# Patient Record
Sex: FEMALE | Race: WHITE | NOT HISPANIC OR LATINO | Employment: OTHER | URBAN - METROPOLITAN AREA
[De-identification: names, ages, dates, MRNs, and addresses within clinical notes are randomized per-mention and may not be internally consistent; named-entity substitution may affect disease eponyms.]

---

## 2017-03-23 ENCOUNTER — DOCUMENTATION (OUTPATIENT)
Dept: PHYSICAL THERAPY | Facility: CLINIC | Age: 72
End: 2017-03-23

## 2017-03-23 NOTE — PROGRESS NOTES
Discharge Summary  Discharge Summary from Physical Therapy Report      Dates  PT visit: 09/28/16 - 10/13/16  Number of Visits: 6     Discharge Status of Patient: See final PT note dated 10/13/16    Goals: Partially Met    Discharge Plan: Patient to return to referring/providing physician    Comments Patient did not return for further PT after follow-up with Dr. Adame (10/21/16).    Date of Discharge 03/23/16        Annabelle Black, PT, DPT  Physical Therapist

## 2017-12-04 ENCOUNTER — ON CAMPUS - OUTPATIENT (OUTPATIENT)
Dept: URBAN - METROPOLITAN AREA HOSPITAL 91 | Facility: HOSPITAL | Age: 72
End: 2017-12-04

## 2017-12-04 DIAGNOSIS — Z96.89 PRESENCE OF OTHER SPECIFIED FUNCTIONAL IMPLANTS: ICD-10-CM

## 2017-12-04 PROCEDURE — 43275 ERCP REMOVE FORGN BODY DUCT: CPT

## 2019-12-30 ENCOUNTER — INPATIENT HOSPITAL (OUTPATIENT)
Dept: URBAN - METROPOLITAN AREA HOSPITAL 90 | Facility: HOSPITAL | Age: 74
End: 2019-12-30

## 2019-12-30 DIAGNOSIS — R19.7 DIARRHEA, UNSPECIFIED: ICD-10-CM

## 2019-12-30 DIAGNOSIS — R63.4 ABNORMAL WEIGHT LOSS: ICD-10-CM

## 2019-12-30 DIAGNOSIS — R74.8 ABNORMAL LEVELS OF OTHER SERUM ENZYMES: ICD-10-CM

## 2019-12-30 DIAGNOSIS — R10.9 UNSPECIFIED ABDOMINAL PAIN: ICD-10-CM

## 2019-12-30 PROCEDURE — 99223 1ST HOSP IP/OBS HIGH 75: CPT

## 2020-01-01 PROCEDURE — 99232 SBSQ HOSP IP/OBS MODERATE 35: CPT

## 2020-01-02 PROCEDURE — 99232 SBSQ HOSP IP/OBS MODERATE 35: CPT

## 2021-02-16 ENCOUNTER — APPOINTMENT (OUTPATIENT)
Dept: VACCINE CLINIC | Facility: HOSPITAL | Age: 76
End: 2021-02-16

## 2022-06-14 ENCOUNTER — APPOINTMENT (OUTPATIENT)
Dept: GENERAL RADIOLOGY | Facility: HOSPITAL | Age: 77
End: 2022-06-14

## 2022-06-14 ENCOUNTER — HOSPITAL ENCOUNTER (INPATIENT)
Facility: HOSPITAL | Age: 77
LOS: 4 days | Discharge: NURSING, REHAB OR LONG-TERM CARE | End: 2022-06-18
Attending: EMERGENCY MEDICINE | Admitting: INTERNAL MEDICINE

## 2022-06-14 DIAGNOSIS — S72.001A CLOSED FRACTURE OF RIGHT HIP, INITIAL ENCOUNTER: ICD-10-CM

## 2022-06-14 DIAGNOSIS — S72.141A CLOSED COMMINUTED INTERTROCHANTERIC FRACTURE OF RIGHT FEMUR, INITIAL ENCOUNTER: Primary | ICD-10-CM

## 2022-06-14 PROCEDURE — 25010000002 ONDANSETRON PER 1 MG: Performed by: EMERGENCY MEDICINE

## 2022-06-14 PROCEDURE — 86901 BLOOD TYPING SEROLOGIC RH(D): CPT | Performed by: EMERGENCY MEDICINE

## 2022-06-14 PROCEDURE — 25010000002 MORPHINE PER 10 MG: Performed by: EMERGENCY MEDICINE

## 2022-06-14 PROCEDURE — 86900 BLOOD TYPING SEROLOGIC ABO: CPT | Performed by: EMERGENCY MEDICINE

## 2022-06-14 PROCEDURE — 87635 SARS-COV-2 COVID-19 AMP PRB: CPT | Performed by: EMERGENCY MEDICINE

## 2022-06-14 PROCEDURE — 99284 EMERGENCY DEPT VISIT MOD MDM: CPT

## 2022-06-14 PROCEDURE — 85025 COMPLETE CBC W/AUTO DIFF WBC: CPT | Performed by: EMERGENCY MEDICINE

## 2022-06-14 PROCEDURE — 71045 X-RAY EXAM CHEST 1 VIEW: CPT

## 2022-06-14 PROCEDURE — 73502 X-RAY EXAM HIP UNI 2-3 VIEWS: CPT

## 2022-06-14 PROCEDURE — 80053 COMPREHEN METABOLIC PANEL: CPT | Performed by: EMERGENCY MEDICINE

## 2022-06-14 PROCEDURE — 86850 RBC ANTIBODY SCREEN: CPT | Performed by: EMERGENCY MEDICINE

## 2022-06-14 RX ORDER — MORPHINE SULFATE 2 MG/ML
4 INJECTION, SOLUTION INTRAMUSCULAR; INTRAVENOUS ONCE
Status: COMPLETED | OUTPATIENT
Start: 2022-06-14 | End: 2022-06-14

## 2022-06-14 RX ORDER — ONDANSETRON 2 MG/ML
4 INJECTION INTRAMUSCULAR; INTRAVENOUS ONCE
Status: COMPLETED | OUTPATIENT
Start: 2022-06-14 | End: 2022-06-14

## 2022-06-14 RX ADMIN — MORPHINE SULFATE 4 MG: 2 INJECTION, SOLUTION INTRAMUSCULAR; INTRAVENOUS at 23:43

## 2022-06-14 RX ADMIN — ONDANSETRON 4 MG: 2 INJECTION INTRAMUSCULAR; INTRAVENOUS at 23:43

## 2022-06-15 ENCOUNTER — ANESTHESIA (OUTPATIENT)
Dept: PERIOP | Facility: HOSPITAL | Age: 77
End: 2022-06-15

## 2022-06-15 ENCOUNTER — APPOINTMENT (OUTPATIENT)
Dept: GENERAL RADIOLOGY | Facility: HOSPITAL | Age: 77
End: 2022-06-15

## 2022-06-15 ENCOUNTER — ANESTHESIA EVENT (OUTPATIENT)
Dept: PERIOP | Facility: HOSPITAL | Age: 77
End: 2022-06-15

## 2022-06-15 PROBLEM — S72.141A CLOSED COMMINUTED INTERTROCHANTERIC FRACTURE OF PROXIMAL END OF RIGHT FEMUR: Status: ACTIVE | Noted: 2022-06-14

## 2022-06-15 PROBLEM — K21.9 GERD (GASTROESOPHAGEAL REFLUX DISEASE): Status: ACTIVE | Noted: 2022-06-15

## 2022-06-15 PROBLEM — I10 HTN (HYPERTENSION): Status: ACTIVE | Noted: 2022-06-15

## 2022-06-15 PROBLEM — E78.5 HLD (HYPERLIPIDEMIA): Status: ACTIVE | Noted: 2022-06-15

## 2022-06-15 LAB
ABO GROUP BLD: NORMAL
ALBUMIN SERPL-MCNC: 4.1 G/DL (ref 3.5–5.2)
ALBUMIN/GLOB SERPL: 1.1 G/DL
ALP SERPL-CCNC: 160 U/L (ref 39–117)
ALT SERPL W P-5'-P-CCNC: 39 U/L (ref 1–33)
ANION GAP SERPL CALCULATED.3IONS-SCNC: 15 MMOL/L (ref 5–15)
ANION GAP SERPL CALCULATED.3IONS-SCNC: 16.4 MMOL/L (ref 5–15)
AST SERPL-CCNC: 53 U/L (ref 1–32)
BASOPHILS # BLD AUTO: 0.04 10*3/MM3 (ref 0–0.2)
BASOPHILS NFR BLD AUTO: 0.6 % (ref 0–1.5)
BILIRUB SERPL-MCNC: 0.6 MG/DL (ref 0–1.2)
BLD GP AB SCN SERPL QL: NEGATIVE
BUN SERPL-MCNC: 11 MG/DL (ref 8–23)
BUN SERPL-MCNC: 12 MG/DL (ref 8–23)
BUN/CREAT SERPL: 14.5 (ref 7–25)
BUN/CREAT SERPL: 14.9 (ref 7–25)
CALCIUM SPEC-SCNC: 8.9 MG/DL (ref 8.6–10.5)
CALCIUM SPEC-SCNC: 9.3 MG/DL (ref 8.6–10.5)
CHLORIDE SERPL-SCNC: 101 MMOL/L (ref 98–107)
CHLORIDE SERPL-SCNC: 99 MMOL/L (ref 98–107)
CO2 SERPL-SCNC: 20.6 MMOL/L (ref 22–29)
CO2 SERPL-SCNC: 23 MMOL/L (ref 22–29)
CREAT SERPL-MCNC: 0.74 MG/DL (ref 0.57–1)
CREAT SERPL-MCNC: 0.83 MG/DL (ref 0.57–1)
DEPRECATED RDW RBC AUTO: 41.5 FL (ref 37–54)
DEPRECATED RDW RBC AUTO: 42.9 FL (ref 37–54)
EGFRCR SERPLBLD CKD-EPI 2021: 71.8 ML/MIN/1.73
EGFRCR SERPLBLD CKD-EPI 2021: 82.4 ML/MIN/1.73
EOSINOPHIL # BLD AUTO: 0 10*3/MM3 (ref 0–0.4)
EOSINOPHIL NFR BLD AUTO: 0 % (ref 0.3–6.2)
ERYTHROCYTE [DISTWIDTH] IN BLOOD BY AUTOMATED COUNT: 12.2 % (ref 12.3–15.4)
ERYTHROCYTE [DISTWIDTH] IN BLOOD BY AUTOMATED COUNT: 12.3 % (ref 12.3–15.4)
GLOBULIN UR ELPH-MCNC: 3.6 GM/DL
GLUCOSE SERPL-MCNC: 109 MG/DL (ref 65–99)
GLUCOSE SERPL-MCNC: 127 MG/DL (ref 65–99)
HCT VFR BLD AUTO: 34.4 % (ref 34–46.6)
HCT VFR BLD AUTO: 38.4 % (ref 34–46.6)
HGB BLD-MCNC: 11.9 G/DL (ref 12–15.9)
HGB BLD-MCNC: 13.6 G/DL (ref 12–15.9)
IMM GRANULOCYTES # BLD AUTO: 0.03 10*3/MM3 (ref 0–0.05)
IMM GRANULOCYTES NFR BLD AUTO: 0.4 % (ref 0–0.5)
LYMPHOCYTES # BLD AUTO: 0.94 10*3/MM3 (ref 0.7–3.1)
LYMPHOCYTES NFR BLD AUTO: 13.6 % (ref 19.6–45.3)
MCH RBC QN AUTO: 32.8 PG (ref 26.6–33)
MCH RBC QN AUTO: 33.9 PG (ref 26.6–33)
MCHC RBC AUTO-ENTMCNC: 34.6 G/DL (ref 31.5–35.7)
MCHC RBC AUTO-ENTMCNC: 35.4 G/DL (ref 31.5–35.7)
MCV RBC AUTO: 94.8 FL (ref 79–97)
MCV RBC AUTO: 95.8 FL (ref 79–97)
MONOCYTES # BLD AUTO: 0.46 10*3/MM3 (ref 0.1–0.9)
MONOCYTES NFR BLD AUTO: 6.7 % (ref 5–12)
NEUTROPHILS NFR BLD AUTO: 5.42 10*3/MM3 (ref 1.7–7)
NEUTROPHILS NFR BLD AUTO: 78.7 % (ref 42.7–76)
NRBC BLD AUTO-RTO: 0 /100 WBC (ref 0–0.2)
PLATELET # BLD AUTO: 62 10*3/MM3 (ref 140–450)
PLATELET # BLD AUTO: 77 10*3/MM3 (ref 140–450)
PMV BLD AUTO: 10.4 FL (ref 6–12)
PMV BLD AUTO: 10.5 FL (ref 6–12)
POTASSIUM SERPL-SCNC: 3.5 MMOL/L (ref 3.5–5.2)
POTASSIUM SERPL-SCNC: 3.6 MMOL/L (ref 3.5–5.2)
PROT SERPL-MCNC: 7.7 G/DL (ref 6–8.5)
QT INTERVAL: 429 MS
RBC # BLD AUTO: 3.63 10*6/MM3 (ref 3.77–5.28)
RBC # BLD AUTO: 4.01 10*6/MM3 (ref 3.77–5.28)
RH BLD: POSITIVE
SARS-COV-2 RNA PNL SPEC NAA+PROBE: NOT DETECTED
SODIUM SERPL-SCNC: 137 MMOL/L (ref 136–145)
SODIUM SERPL-SCNC: 138 MMOL/L (ref 136–145)
T&S EXPIRATION DATE: NORMAL
WBC NRBC COR # BLD: 5.56 10*3/MM3 (ref 3.4–10.8)
WBC NRBC COR # BLD: 6.89 10*3/MM3 (ref 3.4–10.8)

## 2022-06-15 PROCEDURE — 76000 FLUOROSCOPY <1 HR PHYS/QHP: CPT | Performed by: ORTHOPAEDIC SURGERY

## 2022-06-15 PROCEDURE — 80048 BASIC METABOLIC PNL TOTAL CA: CPT | Performed by: NURSE PRACTITIONER

## 2022-06-15 PROCEDURE — 73502 X-RAY EXAM HIP UNI 2-3 VIEWS: CPT | Performed by: ORTHOPAEDIC SURGERY

## 2022-06-15 PROCEDURE — 25010000002 HYDROMORPHONE PER 4 MG: Performed by: NURSE ANESTHETIST, CERTIFIED REGISTERED

## 2022-06-15 PROCEDURE — C1713 ANCHOR/SCREW BN/BN,TIS/BN: HCPCS | Performed by: ORTHOPAEDIC SURGERY

## 2022-06-15 PROCEDURE — 25010000002 VANCOMYCIN PER 500 MG: Performed by: ORTHOPAEDIC SURGERY

## 2022-06-15 PROCEDURE — 25010000002 ONDANSETRON PER 1 MG

## 2022-06-15 PROCEDURE — 0QS636Z REPOSITION RIGHT UPPER FEMUR WITH INTRAMEDULLARY INTERNAL FIXATION DEVICE, PERCUTANEOUS APPROACH: ICD-10-PCS | Performed by: ORTHOPAEDIC SURGERY

## 2022-06-15 PROCEDURE — 25010000002 PROPOFOL 10 MG/ML EMULSION: Performed by: NURSE ANESTHETIST, CERTIFIED REGISTERED

## 2022-06-15 PROCEDURE — 25010000002 DEXAMETHASONE PER 1 MG: Performed by: NURSE ANESTHETIST, CERTIFIED REGISTERED

## 2022-06-15 PROCEDURE — 73501 X-RAY EXAM HIP UNI 1 VIEW: CPT

## 2022-06-15 PROCEDURE — 99222 1ST HOSP IP/OBS MODERATE 55: CPT | Performed by: NURSE PRACTITIONER

## 2022-06-15 PROCEDURE — 85027 COMPLETE CBC AUTOMATED: CPT | Performed by: NURSE PRACTITIONER

## 2022-06-15 PROCEDURE — 25010000002 FENTANYL CITRATE (PF) 50 MCG/ML SOLUTION: Performed by: NURSE ANESTHETIST, CERTIFIED REGISTERED

## 2022-06-15 PROCEDURE — 93005 ELECTROCARDIOGRAM TRACING: CPT | Performed by: NURSE PRACTITIONER

## 2022-06-15 PROCEDURE — 93010 ELECTROCARDIOGRAM REPORT: CPT | Performed by: INTERNAL MEDICINE

## 2022-06-15 PROCEDURE — 25010000002 ONDANSETRON PER 1 MG: Performed by: ORTHOPAEDIC SURGERY

## 2022-06-15 PROCEDURE — 25010000002 MORPHINE PER 10 MG: Performed by: NURSE PRACTITIONER

## 2022-06-15 PROCEDURE — C1769 GUIDE WIRE: HCPCS | Performed by: ORTHOPAEDIC SURGERY

## 2022-06-15 DEVICE — TRIGEN LOW PROFILE SCREW 5.0MM X 35MM
Type: IMPLANTABLE DEVICE | Site: FEMUR | Status: FUNCTIONAL
Brand: TRIGEN

## 2022-06-15 DEVICE — TRIGEN INTERTAN 11.5MM X 18CM 125DEGREE
Type: IMPLANTABLE DEVICE | Site: FEMUR | Status: FUNCTIONAL
Brand: TRIGEN

## 2022-06-15 DEVICE — INTERTAN LAG/COMPRESSION SCREW KIT                                    95MM / 90MM
Type: IMPLANTABLE DEVICE | Site: FEMUR | Status: FUNCTIONAL
Brand: TRIGEN

## 2022-06-15 RX ORDER — LANOLIN ALCOHOL/MO/W.PET/CERES
1000 CREAM (GRAM) TOPICAL DAILY
COMMUNITY

## 2022-06-15 RX ORDER — PANTOPRAZOLE SODIUM 40 MG/1
40 TABLET, DELAYED RELEASE ORAL EVERY MORNING
Status: DISCONTINUED | OUTPATIENT
Start: 2022-06-15 | End: 2022-06-18 | Stop reason: HOSPADM

## 2022-06-15 RX ORDER — MIDAZOLAM HYDROCHLORIDE 1 MG/ML
0.5 INJECTION INTRAMUSCULAR; INTRAVENOUS
Status: DISCONTINUED | OUTPATIENT
Start: 2022-06-15 | End: 2022-06-15 | Stop reason: HOSPADM

## 2022-06-15 RX ORDER — LISINOPRIL 10 MG/1
10 TABLET ORAL EVERY MORNING
Status: DISCONTINUED | OUTPATIENT
Start: 2022-06-15 | End: 2022-06-17

## 2022-06-15 RX ORDER — FENTANYL CITRATE 50 UG/ML
50 INJECTION, SOLUTION INTRAMUSCULAR; INTRAVENOUS
Status: DISCONTINUED | OUTPATIENT
Start: 2022-06-15 | End: 2022-06-15 | Stop reason: HOSPADM

## 2022-06-15 RX ORDER — EPHEDRINE SULFATE 50 MG/ML
INJECTION, SOLUTION INTRAVENOUS AS NEEDED
Status: DISCONTINUED | OUTPATIENT
Start: 2022-06-15 | End: 2022-06-15 | Stop reason: SURG

## 2022-06-15 RX ORDER — CEFAZOLIN SODIUM 2 G/100ML
2 INJECTION, SOLUTION INTRAVENOUS ONCE
Status: DISCONTINUED | OUTPATIENT
Start: 2022-06-15 | End: 2022-06-15

## 2022-06-15 RX ORDER — DEXAMETHASONE SODIUM PHOSPHATE 10 MG/ML
INJECTION INTRAMUSCULAR; INTRAVENOUS AS NEEDED
Status: DISCONTINUED | OUTPATIENT
Start: 2022-06-15 | End: 2022-06-15 | Stop reason: SURG

## 2022-06-15 RX ORDER — ONDANSETRON 2 MG/ML
INJECTION INTRAMUSCULAR; INTRAVENOUS AS NEEDED
Status: DISCONTINUED | OUTPATIENT
Start: 2022-06-15 | End: 2022-06-15 | Stop reason: SURG

## 2022-06-15 RX ORDER — PROPOFOL 10 MG/ML
VIAL (ML) INTRAVENOUS AS NEEDED
Status: DISCONTINUED | OUTPATIENT
Start: 2022-06-15 | End: 2022-06-15 | Stop reason: SURG

## 2022-06-15 RX ORDER — SODIUM CHLORIDE, SODIUM LACTATE, POTASSIUM CHLORIDE, CALCIUM CHLORIDE 600; 310; 30; 20 MG/100ML; MG/100ML; MG/100ML; MG/100ML
9 INJECTION, SOLUTION INTRAVENOUS CONTINUOUS
Status: DISCONTINUED | OUTPATIENT
Start: 2022-06-15 | End: 2022-06-17

## 2022-06-15 RX ORDER — ONDANSETRON 2 MG/ML
4 INJECTION INTRAMUSCULAR; INTRAVENOUS ONCE AS NEEDED
Status: DISCONTINUED | OUTPATIENT
Start: 2022-06-15 | End: 2022-06-15 | Stop reason: HOSPADM

## 2022-06-15 RX ORDER — ONDANSETRON 4 MG/1
4 TABLET, FILM COATED ORAL EVERY 6 HOURS PRN
Status: DISCONTINUED | OUTPATIENT
Start: 2022-06-15 | End: 2022-06-18 | Stop reason: HOSPADM

## 2022-06-15 RX ORDER — LABETALOL HYDROCHLORIDE 5 MG/ML
5 INJECTION, SOLUTION INTRAVENOUS
Status: DISCONTINUED | OUTPATIENT
Start: 2022-06-15 | End: 2022-06-15 | Stop reason: HOSPADM

## 2022-06-15 RX ORDER — TRAMADOL HYDROCHLORIDE 50 MG/1
50 TABLET ORAL ONCE AS NEEDED
Status: DISCONTINUED | OUTPATIENT
Start: 2022-06-15 | End: 2022-06-15 | Stop reason: HOSPADM

## 2022-06-15 RX ORDER — SODIUM CHLORIDE 9 MG/ML
100 INJECTION, SOLUTION INTRAVENOUS CONTINUOUS
Status: DISCONTINUED | OUTPATIENT
Start: 2022-06-15 | End: 2022-06-16

## 2022-06-15 RX ORDER — ACETAMINOPHEN 325 MG/1
650 TABLET ORAL EVERY 4 HOURS PRN
Status: DISCONTINUED | OUTPATIENT
Start: 2022-06-15 | End: 2022-06-18 | Stop reason: HOSPADM

## 2022-06-15 RX ORDER — ENOXAPARIN SODIUM 100 MG/ML
40 INJECTION SUBCUTANEOUS EVERY 24 HOURS
Status: DISCONTINUED | OUTPATIENT
Start: 2022-06-16 | End: 2022-06-16

## 2022-06-15 RX ORDER — HYDROMORPHONE HYDROCHLORIDE 1 MG/ML
0.5 INJECTION, SOLUTION INTRAMUSCULAR; INTRAVENOUS; SUBCUTANEOUS
Status: DISCONTINUED | OUTPATIENT
Start: 2022-06-15 | End: 2022-06-15 | Stop reason: HOSPADM

## 2022-06-15 RX ORDER — OXYCODONE AND ACETAMINOPHEN 10; 325 MG/1; MG/1
1 TABLET ORAL EVERY 6 HOURS PRN
Status: DISCONTINUED | OUTPATIENT
Start: 2022-06-15 | End: 2022-06-18 | Stop reason: HOSPADM

## 2022-06-15 RX ORDER — FLUMAZENIL 0.1 MG/ML
0.2 INJECTION INTRAVENOUS AS NEEDED
Status: DISCONTINUED | OUTPATIENT
Start: 2022-06-15 | End: 2022-06-15 | Stop reason: HOSPADM

## 2022-06-15 RX ORDER — PROMETHAZINE HYDROCHLORIDE 25 MG/1
25 SUPPOSITORY RECTAL ONCE AS NEEDED
Status: DISCONTINUED | OUTPATIENT
Start: 2022-06-15 | End: 2022-06-15 | Stop reason: HOSPADM

## 2022-06-15 RX ORDER — EPHEDRINE SULFATE 50 MG/ML
5 INJECTION, SOLUTION INTRAVENOUS ONCE AS NEEDED
Status: DISCONTINUED | OUTPATIENT
Start: 2022-06-15 | End: 2022-06-15 | Stop reason: HOSPADM

## 2022-06-15 RX ORDER — SODIUM CHLORIDE 0.9 % (FLUSH) 0.9 %
3 SYRINGE (ML) INJECTION EVERY 12 HOURS SCHEDULED
Status: DISCONTINUED | OUTPATIENT
Start: 2022-06-15 | End: 2022-06-15 | Stop reason: HOSPADM

## 2022-06-15 RX ORDER — ACETAMINOPHEN 160 MG/5ML
650 SOLUTION ORAL EVERY 4 HOURS PRN
Status: DISCONTINUED | OUTPATIENT
Start: 2022-06-15 | End: 2022-06-18 | Stop reason: HOSPADM

## 2022-06-15 RX ORDER — VANCOMYCIN HYDROCHLORIDE 1 G/200ML
1 INJECTION, SOLUTION INTRAVENOUS ONCE
Status: COMPLETED | OUTPATIENT
Start: 2022-06-15 | End: 2022-06-15

## 2022-06-15 RX ORDER — ROPINIROLE 0.5 MG/1
0.25 TABLET, FILM COATED ORAL NIGHTLY
Status: DISCONTINUED | OUTPATIENT
Start: 2022-06-15 | End: 2022-06-18 | Stop reason: HOSPADM

## 2022-06-15 RX ORDER — SODIUM CHLORIDE 0.9 % (FLUSH) 0.9 %
10 SYRINGE (ML) INJECTION AS NEEDED
Status: DISCONTINUED | OUTPATIENT
Start: 2022-06-15 | End: 2022-06-18 | Stop reason: HOSPADM

## 2022-06-15 RX ORDER — CALCIUM CARBONATE 200(500)MG
2 TABLET,CHEWABLE ORAL 2 TIMES DAILY PRN
Status: DISCONTINUED | OUTPATIENT
Start: 2022-06-15 | End: 2022-06-18 | Stop reason: HOSPADM

## 2022-06-15 RX ORDER — LIDOCAINE HYDROCHLORIDE 10 MG/ML
0.5 INJECTION, SOLUTION EPIDURAL; INFILTRATION; INTRACAUDAL; PERINEURAL ONCE AS NEEDED
Status: DISCONTINUED | OUTPATIENT
Start: 2022-06-15 | End: 2022-06-15 | Stop reason: HOSPADM

## 2022-06-15 RX ORDER — ACETAMINOPHEN 650 MG/1
650 SUPPOSITORY RECTAL EVERY 4 HOURS PRN
Status: DISCONTINUED | OUTPATIENT
Start: 2022-06-15 | End: 2022-06-18 | Stop reason: HOSPADM

## 2022-06-15 RX ORDER — DIPHENHYDRAMINE HCL 25 MG
25 CAPSULE ORAL
Status: DISCONTINUED | OUTPATIENT
Start: 2022-06-15 | End: 2022-06-15 | Stop reason: HOSPADM

## 2022-06-15 RX ORDER — NALOXONE HCL 0.4 MG/ML
0.2 VIAL (ML) INJECTION AS NEEDED
Status: DISCONTINUED | OUTPATIENT
Start: 2022-06-15 | End: 2022-06-15 | Stop reason: HOSPADM

## 2022-06-15 RX ORDER — MAGNESIUM HYDROXIDE 1200 MG/15ML
LIQUID ORAL AS NEEDED
Status: DISCONTINUED | OUTPATIENT
Start: 2022-06-15 | End: 2022-06-15 | Stop reason: HOSPADM

## 2022-06-15 RX ORDER — PROMETHAZINE HYDROCHLORIDE 25 MG/1
25 TABLET ORAL ONCE AS NEEDED
Status: DISCONTINUED | OUTPATIENT
Start: 2022-06-15 | End: 2022-06-15 | Stop reason: HOSPADM

## 2022-06-15 RX ORDER — PRAVASTATIN SODIUM 20 MG
20 TABLET ORAL EVERY MORNING
Status: DISCONTINUED | OUTPATIENT
Start: 2022-06-15 | End: 2022-06-18 | Stop reason: HOSPADM

## 2022-06-15 RX ORDER — FAMOTIDINE 10 MG/ML
20 INJECTION, SOLUTION INTRAVENOUS ONCE
Status: COMPLETED | OUTPATIENT
Start: 2022-06-15 | End: 2022-06-15

## 2022-06-15 RX ORDER — IBUPROFEN 600 MG/1
600 TABLET ORAL ONCE AS NEEDED
Status: DISCONTINUED | OUTPATIENT
Start: 2022-06-15 | End: 2022-06-15 | Stop reason: HOSPADM

## 2022-06-15 RX ORDER — MELOXICAM 15 MG/1
15 TABLET ORAL DAILY
Status: DISCONTINUED | OUTPATIENT
Start: 2022-06-15 | End: 2022-06-15

## 2022-06-15 RX ORDER — ONDANSETRON 2 MG/ML
4 INJECTION INTRAMUSCULAR; INTRAVENOUS EVERY 6 HOURS PRN
Status: DISCONTINUED | OUTPATIENT
Start: 2022-06-15 | End: 2022-06-18 | Stop reason: HOSPADM

## 2022-06-15 RX ORDER — SODIUM CHLORIDE 0.9 % (FLUSH) 0.9 %
10 SYRINGE (ML) INJECTION EVERY 12 HOURS SCHEDULED
Status: DISCONTINUED | OUTPATIENT
Start: 2022-06-15 | End: 2022-06-18 | Stop reason: HOSPADM

## 2022-06-15 RX ORDER — AMOXICILLIN 250 MG
2 CAPSULE ORAL 2 TIMES DAILY
Status: DISCONTINUED | OUTPATIENT
Start: 2022-06-15 | End: 2022-06-18 | Stop reason: HOSPADM

## 2022-06-15 RX ORDER — MORPHINE SULFATE 2 MG/ML
2 INJECTION, SOLUTION INTRAMUSCULAR; INTRAVENOUS
Status: DISCONTINUED | OUTPATIENT
Start: 2022-06-15 | End: 2022-06-18 | Stop reason: HOSPADM

## 2022-06-15 RX ORDER — FUROSEMIDE 20 MG/1
20 TABLET ORAL 2 TIMES DAILY
COMMUNITY

## 2022-06-15 RX ORDER — DIPHENHYDRAMINE HYDROCHLORIDE 50 MG/ML
12.5 INJECTION INTRAMUSCULAR; INTRAVENOUS
Status: DISCONTINUED | OUTPATIENT
Start: 2022-06-15 | End: 2022-06-15 | Stop reason: HOSPADM

## 2022-06-15 RX ORDER — CEFAZOLIN SODIUM 2 G/100ML
2 INJECTION, SOLUTION INTRAVENOUS
Status: DISCONTINUED | OUTPATIENT
Start: 2022-06-16 | End: 2022-06-15

## 2022-06-15 RX ORDER — SODIUM CHLORIDE 0.9 % (FLUSH) 0.9 %
3-10 SYRINGE (ML) INJECTION AS NEEDED
Status: DISCONTINUED | OUTPATIENT
Start: 2022-06-15 | End: 2022-06-15 | Stop reason: HOSPADM

## 2022-06-15 RX ORDER — IPRATROPIUM BROMIDE AND ALBUTEROL SULFATE 2.5; .5 MG/3ML; MG/3ML
3 SOLUTION RESPIRATORY (INHALATION) ONCE AS NEEDED
Status: DISCONTINUED | OUTPATIENT
Start: 2022-06-15 | End: 2022-06-15 | Stop reason: HOSPADM

## 2022-06-15 RX ORDER — HYDRALAZINE HYDROCHLORIDE 20 MG/ML
5 INJECTION INTRAMUSCULAR; INTRAVENOUS
Status: DISCONTINUED | OUTPATIENT
Start: 2022-06-15 | End: 2022-06-15 | Stop reason: HOSPADM

## 2022-06-15 RX ORDER — HYDROMORPHONE HCL 110MG/55ML
PATIENT CONTROLLED ANALGESIA SYRINGE INTRAVENOUS AS NEEDED
Status: DISCONTINUED | OUTPATIENT
Start: 2022-06-15 | End: 2022-06-15 | Stop reason: SURG

## 2022-06-15 RX ORDER — ROPINIROLE 0.25 MG/1
0.25 TABLET, FILM COATED ORAL NIGHTLY
COMMUNITY

## 2022-06-15 RX ORDER — LIDOCAINE HYDROCHLORIDE 20 MG/ML
INJECTION, SOLUTION INFILTRATION; PERINEURAL AS NEEDED
Status: DISCONTINUED | OUTPATIENT
Start: 2022-06-15 | End: 2022-06-15 | Stop reason: SURG

## 2022-06-15 RX ORDER — VANCOMYCIN HYDROCHLORIDE 1 G/200ML
15 INJECTION, SOLUTION INTRAVENOUS EVERY 12 HOURS
Status: COMPLETED | OUTPATIENT
Start: 2022-06-15 | End: 2022-06-15

## 2022-06-15 RX ORDER — ALBUTEROL SULFATE 2.5 MG/3ML
2.5 SOLUTION RESPIRATORY (INHALATION) EVERY 6 HOURS PRN
Status: DISCONTINUED | OUTPATIENT
Start: 2022-06-15 | End: 2022-06-18 | Stop reason: HOSPADM

## 2022-06-15 RX ADMIN — SODIUM CHLORIDE 100 ML/HR: 9 INJECTION, SOLUTION INTRAVENOUS at 02:51

## 2022-06-15 RX ADMIN — MORPHINE SULFATE 2 MG: 2 INJECTION, SOLUTION INTRAMUSCULAR; INTRAVENOUS at 11:15

## 2022-06-15 RX ADMIN — HYDROMORPHONE HYDROCHLORIDE 0.5 MG: 2 INJECTION, SOLUTION INTRAMUSCULAR; INTRAVENOUS; SUBCUTANEOUS at 13:52

## 2022-06-15 RX ADMIN — HYDROMORPHONE HYDROCHLORIDE 0.5 MG: 1 INJECTION, SOLUTION INTRAMUSCULAR; INTRAVENOUS; SUBCUTANEOUS at 15:34

## 2022-06-15 RX ADMIN — VANCOMYCIN HYDROCHLORIDE 1 G: 1 INJECTION, SOLUTION INTRAVENOUS at 13:23

## 2022-06-15 RX ADMIN — LIDOCAINE HYDROCHLORIDE 100 MG: 20 INJECTION, SOLUTION INFILTRATION; PERINEURAL at 13:39

## 2022-06-15 RX ADMIN — DEXAMETHASONE SODIUM PHOSPHATE 8 MG: 10 INJECTION INTRAMUSCULAR; INTRAVENOUS at 13:43

## 2022-06-15 RX ADMIN — PRAVASTATIN SODIUM 20 MG: 20 TABLET ORAL at 07:23

## 2022-06-15 RX ADMIN — Medication 10 ML: at 20:31

## 2022-06-15 RX ADMIN — METOPROLOL TARTRATE 25 MG: 25 TABLET, FILM COATED ORAL at 08:07

## 2022-06-15 RX ADMIN — ONDANSETRON 4 MG: 2 INJECTION INTRAMUSCULAR; INTRAVENOUS at 14:24

## 2022-06-15 RX ADMIN — LISINOPRIL 10 MG: 10 TABLET ORAL at 07:23

## 2022-06-15 RX ADMIN — PANTOPRAZOLE SODIUM 40 MG: 40 TABLET, DELAYED RELEASE ORAL at 07:23

## 2022-06-15 RX ADMIN — FAMOTIDINE 20 MG: 10 INJECTION INTRAVENOUS at 13:20

## 2022-06-15 RX ADMIN — OXYCODONE AND ACETAMINOPHEN 1 TABLET: 325; 10 TABLET ORAL at 20:31

## 2022-06-15 RX ADMIN — ONDANSETRON 4 MG: 2 INJECTION INTRAMUSCULAR; INTRAVENOUS at 17:33

## 2022-06-15 RX ADMIN — SODIUM CHLORIDE, POTASSIUM CHLORIDE, SODIUM LACTATE AND CALCIUM CHLORIDE 9 ML/HR: 600; 310; 30; 20 INJECTION, SOLUTION INTRAVENOUS at 13:19

## 2022-06-15 RX ADMIN — MORPHINE SULFATE 2 MG: 2 INJECTION, SOLUTION INTRAMUSCULAR; INTRAVENOUS at 02:50

## 2022-06-15 RX ADMIN — MELOXICAM 15 MG: 15 TABLET ORAL at 08:07

## 2022-06-15 RX ADMIN — OXYCODONE AND ACETAMINOPHEN 1 TABLET: 325; 10 TABLET ORAL at 05:39

## 2022-06-15 RX ADMIN — METOPROLOL TARTRATE 25 MG: 25 TABLET, FILM COATED ORAL at 20:30

## 2022-06-15 RX ADMIN — VANCOMYCIN HYDROCHLORIDE 1000 MG: 1 INJECTION, SOLUTION INTRAVENOUS at 18:38

## 2022-06-15 RX ADMIN — ROPINIROLE HYDROCHLORIDE 0.25 MG: 0.5 TABLET, FILM COATED ORAL at 20:29

## 2022-06-15 RX ADMIN — PROPOFOL 150 MG: 10 INJECTION, EMULSION INTRAVENOUS at 13:39

## 2022-06-15 RX ADMIN — DOCUSATE SODIUM 50MG AND SENNOSIDES 8.6MG 2 TABLET: 8.6; 5 TABLET, FILM COATED ORAL at 20:29

## 2022-06-15 RX ADMIN — EPHEDRINE SULFATE 5 MG: 50 INJECTION INTRAVENOUS at 14:11

## 2022-06-15 RX ADMIN — EPHEDRINE SULFATE 5 MG: 50 INJECTION INTRAVENOUS at 14:27

## 2022-06-15 RX ADMIN — HYDROMORPHONE HYDROCHLORIDE 0.5 MG: 2 INJECTION, SOLUTION INTRAMUSCULAR; INTRAVENOUS; SUBCUTANEOUS at 13:47

## 2022-06-15 RX ADMIN — FENTANYL CITRATE 50 MCG: 50 INJECTION INTRAMUSCULAR; INTRAVENOUS at 15:16

## 2022-06-15 NOTE — CONSULTS
Orthopaedic Consult    Marc Crawford MD      Patient: Kenia N Sample    Date of Admission: 6/14/2022 10:21 PM    YOB: 1945    Medical Record Number: [id number]    Attending Physician: Axel Jaeger*  Consulting Physician: Marc Crawford MD      Chief Complaints: Closed fracture of right hip, initial encounter ([facility]) [S72.001A]      History of Present Illness: 77 y.o. female admitted to [facility], [place] with Closed fracture of right hip, initial encounter ([facility]) [S72.001A].  The patient is well-known to my practice.  She presented with a fall yesterday and presented to the emergency department with complaints of pain and difficulty bearing weight.  Radiographs demonstrated an intertrochanteric femur fracture.  She was presumably booked for surgery with another surgeon however as the patient is established in our practice, she has requested for me to assume her care.  She notes pain in the right hip.  No other musculoskeletal complaints at the present time.  The patient had actually been seen in our office yesterday and had a cortisone injection to the right greater trochanteric bursa.    Allergies:   Allergies   Allergen Reactions   • Levaquin [Levofloxacin] Hives   • Zocor [Simvastatin] Myalgia   • Codeine GI Intolerance   • Penicillins Hives       Medications:   Home Medications:  Medications Prior to Admission   Medication Sig Dispense Refill Last Dose   • albuterol (PROVENTIL HFA;VENTOLIN HFA) 108 (90 BASE) MCG/ACT inhaler Inhale 2 puffs every 4 (four) hours as needed.      • Magnesium 500 MG capsule Take 500 mg by mouth 2 (two) times a day.      • metoprolol tartrate (LOPRESSOR) 25 MG tablet Take 25 mg by mouth 2 (two) times a day.      • omeprazole (PriLOSEC) 40 MG capsule Take 40 mg by mouth every morning.      • pravastatin (PRAVACHOL) 20 MG tablet Take 20 mg by mouth every morning.      • sennosides-docusate sodium (SENOKOT-S) 8.6-50 MG tablet Take 2 tablets by mouth 2 (two) times a  day. 100 tablet 1    • vitamin B-12 (CYANOCOBALAMIN) 1000 MCG tablet Take 1,000 mcg by mouth Daily.      • furosemide (LASIX) 20 MG tablet Take 20 mg by mouth 2 (Two) Times a Day.      • lisinopril (PRINIVIL,ZESTRIL) 10 MG tablet Take 10 mg by mouth every morning.      • meloxicam (MOBIC) 15 MG tablet Take 15 mg by mouth daily.      • oxyCODONE-acetaminophen (PERCOCET)  MG per tablet Take 1 tablet by mouth every 3 (three) hours as needed for moderate pain (4-6) (max 8/day). 100 tablet 0    • rOPINIRole (REQUIP) 0.25 MG tablet Take 0.25 mg by mouth Every Night. Take 1 hour before bedtime.      • triamterene-hydrochlorothiazide (MAXZIDE) 75-50 MG per tablet Take 1 tablet by mouth every morning.          Current Medications:  Scheduled Meds:lisinopril, 10 mg, Oral, QAM  metoprolol tartrate, 25 mg, Oral, Q12H  pantoprazole, 40 mg, Oral, QAM  pravastatin, 20 mg, Oral, QAM  rOPINIRole, 0.25 mg, Oral, Nightly  sennosides-docusate, 2 tablet, Oral, BID  sodium chloride, 10 mL, Intravenous, Q12H      Continuous Infusions:sodium chloride, 100 mL/hr, Last Rate: 100 mL/hr (06/15/22 0251)      PRN Meds:.•  acetaminophen **OR** acetaminophen **OR** acetaminophen  •  albuterol  •  calcium carbonate  •  Morphine  •  ondansetron **OR** ondansetron  •  oxyCODONE-acetaminophen  •  sodium chloride    Past Medical History:   Diagnosis Date   • Anginal pain (CMS/HCC)    • Arthritis    • Gastric reflux syndrome    • Hiatal hernia    • High cholesterol    • History of bronchitis    • Hypertension    • Irregular heart beat      Past Surgical History:   Procedure Laterality Date   • APPENDECTOMY     • EXTERNAL EAR SURGERY Right     had tumor excised behind right ear lobe benign   • GALLBLADDER SURGERY     • HYSTERECTOMY     • KNEE ARTHROPLASTY Right    • KNEE ARTHROSCOPY Right    • MA REVISE KNEE JOINT REPLACE,1 PART Right 9/7/2016    Procedure: RT ILIOTIBIAL BAND RELEASE RT TOTAL KNEE POLY EXCHANGE;  Surgeon: Toy Adame MD;   Location: [facility] MAIN OR;  Service: Orthopedics   • ROTATOR CUFF REPAIR Bilateral    • WRIST MASS EXCISION Right      Social History     Occupational History   • Not on file   Tobacco Use   • Smoking status: Former Smoker     Packs/day: 1.00     Years: 30.00     Pack years: 30.00     Types: Cigarettes   • Smokeless tobacco: Never Used   Substance and Sexual Activity   • Alcohol use: Yes     Comment: 1 wine drink q 3-4 months   • Drug use: No   • Sexual activity: Not on file      Social History     Social History Narrative   • Not on file     No family history on file.      Review of Systems:   Constitutional: Negative for fatigue, fever, or weight loss  HEENT: Patient denies any headaches, vision changes, sore throat. Admits to wearing glasses  Pulmonary: Patient denies any cough, congestion, SOA, or wheezing  Cardiovascular: Patient denies any chest pain, palpitations, weakness,  Gastrointestinal:  Patient denies nausea, vomiting, diarrhea, constipation  Genital/Urinary: Patient denies dysuria, urinary frequency, urgency, incontinence, retention  Musculoskeletal: Positive for right hip pain. Negative for muscle cramps  Neurological: Patient denies dizziness, paresthesia, loss of sensation, seizures, syncope  Endocrine system: Patient denies tremors, cold or heat intolerance  Psychological: Patient denies thoughts/plans or harming self or other; hallucinations,  insomnia  Skin: Patient denies any bruising, rashes, lesions, or ulcers   Hematopoietic: Patient denies history of MRSA or slow wound healing,  spontaneous or excessive bleeding    Physical Exam: 77 y.o. female  Vitals:    06/15/22 0142 06/15/22 0604 06/15/22 0723 06/15/22 1100   BP: 139/72 124/76 124/76 100/60   BP Location: Right arm Right arm  Right arm   Patient Position: Lying Lying  Lying   Pulse: 71 81 81 54   Resp: 18 18  18   Temp: 97 °F (36.1 °C) 98.2 °F (36.8 °C)  98.1 °F (36.7 °C)   TempSrc: Oral Oral  Oral   SpO2: 90% 92%  97%   Weight: 72 kg  "(158 lb 12.8 oz)      Height: 160 cm (63\")                               General Appearance:  Alert, cooperative, in no acute distress    HEENT:    Normocephalic,  Atraumatic, Pupils are equal   Neck:   No adenopathy, supple, trachea midline, no thyromegaly       Lungs:     Clear to auscultation, equal expansion bilaterally, respirations regular, even and               unlabored    Heart:    Regular rhythm and normal rate, normal S1 and S2, no murmur, no gallops   Chest Wall:    Within normal limits   Abdomen:     Normal bowel sounds, no masses,  soft non-tender, non-distended,       Rectal:  Musculoskeletal:     Deferred  No skin changes appreciated to the right lower extremity she is resting in an externally rotated position she has extreme tenderness with logroll.  Distally the extremity is neurovascular intact.  Pulses are palpable the foot is warm and well-perfused similar to the contralateral side   Extremities:   No clubbing, no edema, no cyanosis   Pulses  Neurovascular:   Pulses palpable and equal bilaterally in all 4 extremities    Patient was alert and oriented x 3 spheres   Skin:   No lesions, ulcers or rashes   Neurologic:   Cranial nerves 2 - 12 grossly intact, sensation intact            Diagnostic Tests:  2 views of the right hip demonstrate an intertrochanteric femur fracture with some displacement and external rotation deformity.        Assessment:  Patient Active Problem List   Diagnosis   • Snapping right knee   • Closed fracture of right hip (HCC)   • HTN (hypertension)   • HLD (hyperlipidemia)   • GERD (gastroesophageal reflux disease)   • Closed comminuted intertrochanteric fracture of proximal end of right femur (HCC)           Plan:    Right intertrochanteric femur fracture  I discussed the case with the patient as well as her family member at bedside.  Due to the nature of the fracture, I do recommend proceeding with operative intervention which would be in a intramedullary nail.  I " discussed the risks of nonunion malunion DVT as well as other morbidities and mortality associated with hip fracture.  She is aware of the slightly elevated risk of infection especially given that she had an injection of cortisone in this area about 24 hours ago.  Unfortunately, there is nothing we can do to mitigate this risk.  She voices an understanding.    We will proceed with operative intervention today.  All questions were answered to the patient's satisfaction    Date: 6/15/2022  Marc Crawford MD  Orthopaedic Surgeon    [facility]  [phone]  [email or web address]

## 2022-06-15 NOTE — NURSING NOTE
Pt requested different surgeon. RN called preferred provider and they agree to take case. On call surgeon notified.

## 2022-06-15 NOTE — OP NOTE
Right FEMUR INTRAMEDULLARY NAILING/RODDING  Procedure Note    Ada N Sample  6/15/2022    Pre-op Diagnosis: Right intertrochanteric femur fracture  Post-op Diagnosis: Same  Procedure: Right intertrochanteric femur fracture intramedullary nail CPT 31274  Surgeon:  Marc Crawford MD  Anesthesia: General, Anesthesiologist: Luke Sanders MD  CRNA: Sondra Howell CRNA; Jo Lloyd CRNA  Staff: Circulator: Jorge A Mcdonald RN  Radiology Technologist: Krista Durham; Augustina Reddy  Scrub Person: Chana Gruber  Vendor Representative: Baron Jeet  Assistant: Austin Simon APRN CFA  The services of a first assist were necessary for performing the procedure safely and expeditiously.  The first assist was present for the entire duration of the case and helped with positioning, retraction and closure of the incision.    Estimated Blood Loss: 100ml  Specimens:   Order Name Source Comment Collection Info Order Time   PREPARE PLATELET PHERESIS    6/15/2022  1:19 PM     Indication for Transfusion   Other - Comment Required          Other indication   Platelets <012255 with orthopaedic surgery          When to Transfuse?   Hold for Surgery/Procedure (Insert Date)          Surgery Date   6/15/2022          Drains: none  Complications: None    Components Utilized:    Implant Name Type Inv. Item Serial No.  Lot No. LRB No. Used Action   NAIL FEM IM TRIGEN/INTERTAN 125D 11.5MM 18CM - [id number] Implant NAIL FEM IM TRIGEN/INTERTAN 125D 11.5MM 18CM  [employer] [id number] Right 1 Implanted   SCRW LAG COMPR KT SZ95 AND 90MM 2PK - [id number] Implant SCRW LAG COMPR KT SZ95 AND 90MM 2PK  [employer] [id number] Right 1 Implanted   SCRW TRIGEN LP 5X35MM - [id number] Implant SCRW TRIGEN LP 5X35MM  [employer] [id number] Right 1 Implanted       Indication for Procedure:  This patient is a very pleasant 77-year-old female who presented to the hospital yesterday after complaints of a fall.   She had significant pain and difficulty bearing weight.  Radiographs demonstrated a displaced intertrochanteric femur fracture.  The patient was actually seen in our office yesterday and had a greater trochanteric bursal injection on the right side..  Radiographs upon presentation to the emergency department demonstrated an intertrochanteric femur fracture.  Surgical options and non-surgical options were discussed in detail and to the patient's satisfaction.  Surgical intervention was recommended based on the patient's injury and functional status.  I did recommend operative intervention for stabilization of the fracture to restore mobility and decreased morbidity associated with a hip fracture.  The risk of infection, hardware failure, screw cut out, nonunion, malunion, pain were all discussed with the patient and family.  I did advise the family as well as the patient that she is at a slightly elevated risk of infection postoperatively given the recent corticosteroid injection in the surgical site.    The risks and benefits of surgery were discussed with patient and informed consent was obtained.  Risks include but are not limited to, infection, bleeding, nerve injury, blood clots, risks associated with anesthesia, need for further surgery, persistent pain, and possibly death.    Protocols for intravenous antibiotics and venous thrombosis were followed for this patient.  IV antibiotics were infused prior to surgery and will be discontinued within 24 hours of completion of the surgical procedure.       DESCRIPTION OF PROCEDURE:     The patient was seen in Preoperative Holding Area where their surgical site was marked. Preoperative antibiotics were received. H&P and consent updated. They were taken to the Operating Room and provided general anesthesia on the Operating Room table.  The patient was placed on a [place] operating table.  The perineal post was placed and well-padded.  Traction boots were applied and  well-padded.  At this point the operative extremity was placed on slight traction and internal rotation for a gentle reduction maneuver.  The nonoperative extremity was placed in a scissor position extended and adducted.  I then assessed using biplanar image intensification my reduction and the calcar was well reduced.  I was overall very satisfied with the reduction.  Then the extremity was prepped.  Was draped in usual sterile fashion using the shower curtain drape.  Ancef was administered.  A formal surgical timeout was performed by all members of the team.  At this point the greater trochanter was marked out.  I made a small incision about 3 cm just proximal to the tip of the greater trochanter.  The starting wire was then placed just medial to the face of the greater trochanter.  The pin was confirmed to be close to center on the shaft on AP and lateral image intensification.  The starting wire was then passed into the canal.  The opening reamer was then placed using careful attention to medialize as much as possible.  Once this was performed a ball-tipped guidewire was passed into the canal confirmed to be intramedullary.  I then sequentially reamed up to a size 13 for a 11.5 mm nail.  I elected a 125 degrees neck shaft angle nail.  The nail was tapped into place using careful attention to make sure the reduction was maintained.  Once I had visualized the nail in appropriate position rostral to caudal in the neck I removed the ball-tipped guidewire made a counterincision for the cephalo-medullary screw and then placed a guidewire up the neck.  I confirmed this to be a tip apex distance less than 25 mm on AP and lateral views.  It was center center.  I then measured this and selected the lag screw and compression screw of the aforementioned lengths.  These were 95 and 90 mm appropriate drills were then performed for both the compression lag screw as well as the proximal lag screw.  Gentle compression was placed  across the fracture it was left in a dynamic mode as far as a cephalo-medullary screw.  Attention was then directed distally towards the distal interlock using a guide a 35 mm distal interlock screw was placed in bicortical fashion.  The jig was then removed traction was removed from the extremity reduction was maintained.  Final imaging was taken and saved to the PACS system confirming satisfactory reduction alignment of the fracture.  The wounds were then irrigated using sterile saline.  The deep layer was closed using 0 Vicryl suture followed by 2-0 Vicryl suture and then staples.  Sterile dressings were applied.  All counts are correct at the end of the procedure.  Patient was transferred off of the operating table in satisfactory condition.    Postoperative Plan:  The patient will receive routine postoperative vancomycin due to anaphylaxis with penicillin they will receive Lovenox for DVT prophylaxis.    Weight bearing status: Weightbearing as tolerated to right lower extremity    Patient will need her thrombocytopenia monitored there is a potential for significant bruising postoperatively and we may end up needing to transfuse platelets depending on how she does.        Marc Crawford MD  Orthopaedic Surgeon    [facility]  [phone]

## 2022-06-15 NOTE — PLAN OF CARE
Goal Outcome Evaluation:           Progress: improving  Outcome Evaluation: Pt is 77/F S/P of right hip IM nailing after a fall at home. VSS. 2L O2. Dressing C/D/I. DTV. Accumax and scds in place. Educated on BP monitoring. D/C plan pending. Will continue to monitor.

## 2022-06-15 NOTE — ED NOTES
Nursing report ED to floor  Ada N Sample  77 y.o.  female    HPI :   Chief Complaint   Patient presents with   • Fall       Admitting doctor:   Mahin Escobedo MD    Admitting diagnosis:   The encounter diagnosis was Closed fracture of right hip, initial encounter ([facility]).    Code status:   Current Code Status     Date Active Code Status Order ID Comments User Context       Not on file    Advance Care Planning Activity          Allergies:   Zocor [simvastatin], Codeine, and Penicillins    Intake and Output  No intake or output data in the 24 hours ending 06/15/22 0052    Weight:   There were no vitals filed for this visit.    Most recent vitals:   Vitals:    06/14/22 2218   BP: 122/88   Pulse: 84   Resp: 18   Temp: 98.4 °F (36.9 °C)   TempSrc: Tympanic   SpO2: 96%       Active LDAs/IV Access:   Lines, Drains & Airways     Active LDAs     Name Placement date Placement time Site Days    Peripheral IV 06/14/22 2337 Left Forearm 06/14/22 2337  Forearm  less than 1                Labs (abnormal labs have a star):   Labs Reviewed   COMPREHENSIVE METABOLIC PANEL - Abnormal; Notable for the following components:       Result Value    Glucose 127 (*)     ALT (SGPT) 39 (*)     AST (SGOT) 53 (*)     Alkaline Phosphatase 160 (*)     All other components within normal limits    Narrative:     GFR Normal >60  Chronic Kidney Disease <60  Kidney Failure <15     CBC WITH AUTO DIFFERENTIAL - Abnormal; Notable for the following components:    MCH 33.9 (*)     Platelets 77 (*)     Neutrophil % 78.7 (*)     Lymphocyte % 13.6 (*)     Eosinophil % 0.0 (*)     All other components within normal limits   COVID-19,[facility] IN-HOUSE CEPHEID/SANDEE, NP SWAB IN TRANSPORT MEDIA 8-12 HR TAT - Normal    Narrative:     Fact sheet for providers: https://www.fda.gov/media/966268/download    Fact sheet for patients: https://www.fda.gov/media/524927/download    Test performed by PCR.   COVID PRE-OP / PRE-PROCEDURE SCREENING ORDER (NO ISOLATION)     Narrative:     The following orders were created for panel order COVID PRE-OP / PRE-PROCEDURE SCREENING ORDER (NO ISOLATION) - Swab, Nasopharynx.  Procedure                               Abnormality         Status                     ---------                               -----------         ------                     COVID-TRES Dee IN-HOUSE...[[id number]]  Normal              Final result                 Please view results for these tests on the individual orders.   TYPE AND SCREEN   CBC AND DIFFERENTIAL    Narrative:     The following orders were created for panel order CBC & Differential.  Procedure                               Abnormality         Status                     ---------                               -----------         ------                     CBC Auto Differential[[id number]]         Abnormal            Final result                 Please view results for these tests on the individual orders.       EKG:   No orders to display       Meds given in ED:   Medications   morphine injection 4 mg (4 mg Intravenous Given 6/14/22 2343)   ondansetron (ZOFRAN) injection 4 mg (4 mg Intravenous Given 6/14/22 2343)       Imaging results:  XR Chest 1 View    Result Date: 6/14/2022  No acute findings.  This report was finalized on 6/14/2022 11:37 PM by Dr. Chelsi Tompkins M.D.      XR Hip With or Without Pelvis 2 - 3 View Right    Result Date: 6/14/2022  Intertrochanteric right femoral fracture.  This report was finalized on 6/14/2022 11:36 PM by Dr. Chelsi Tompkins M.D.        Ambulatory status:   bedrest    Social issues:   Social History     Socioeconomic History   • Marital status:    Tobacco Use   • Smoking status: Former Smoker     Packs/day: 1.00     Years: 30.00     Pack years: 30.00     Types: Cigarettes   • Smokeless tobacco: Never Used   Substance and Sexual Activity   • Alcohol use: Yes     Comment: 1 wine drink q 3-4 months   • Drug use: No       NIH Stroke Scale:        Nursing report ED  to floor:

## 2022-06-15 NOTE — CONSULTS
Patient Name: Kenia Palomo  :1945  77 y.o.    Date of Admission: 2022  Date of Consultation:  06/15/22  Encounter Provider: BLAKE Grimes  Place of Service: [facility] CARDIOLOGY  Referring Provider: Mahin Escobedo MD  Patient Care Team:  Brayden Jackson MD as PCP - General  Brayden Jackson MD as PCP - Family Medicine  Naif York MD as Consulting Physician (Cardiology)  Narendra Banda MD as Surgeon (General Surgery)      Chief complaint: fall, hip fracture    Reason for consultation: perioperative risk assessment    History of Present Illness: Ms. Palomo is a 77 year old woman, followed by Dr. York for hypertension and hyperlipidemia. She does not have a history of coronary artery disease or congestive heart failure. She was evaluated for shortness of breath with a stress test in  that showed no evidence of ischemia. She had an echo in  for lower extremity edema and shortness of breath. This showed normal LVEF and no significant valve disease. She was last seen in the office for routine follow up in 2021. She was doing well at that time. EKG was abnormal but not changed from baseline. Blood pressure was well controlled.     She presented to the emergency room last night after sustaining a mechanical fall. She was walking in her kitchen, spilled some water and slipped and fell. She had severe right hip pain. She was found to have a right hip fracture. She denies syncope or near syncope.    Patient reports she has decent functional capacity. She is the primary care giver to her . He can walk several blocks without difficulty or angina.     She has chronic shortness of breath with exertion that is unchanged.     She takes lasix for lower extremity edema. She has never been told she has congestive heart failure.     She has never had a myocardial infarction or been told she has coronary artery disease.     Echocardiogram  07/2020  Summary    Overall left ventricular function is normal.    The mitral valve appears normal in structure and function.    The ejection fraction biplane was calculated at 75% Left ventricle size is    normal. Mild left ventricular hypertrophy. Overall left ventricular function    is normal. Normal diastolic filling pattern for age.    The peak instantaneous gradient of the aortic valve is 15 mmHg. The mean    gradient of the aortic valve is 2 mmHg. The aortic valve area by VTI, is    calculated at 1.57 cm2.       Past Medical History:   Diagnosis Date   • Anginal pain (CMS/HCC)    • Arthritis    • Gastric reflux syndrome    • Hiatal hernia    • High cholesterol    • History of bronchitis    • Hypertension    • Irregular heart beat        Past Surgical History:   Procedure Laterality Date   • APPENDECTOMY     • EXTERNAL EAR SURGERY Right     had tumor excised behind right ear lobe benign   • GALLBLADDER SURGERY     • HYSTERECTOMY     • KNEE ARTHROPLASTY Right    • KNEE ARTHROSCOPY Right    • SD REVISE KNEE JOINT REPLACE,1 PART Right 9/7/2016    Procedure: RT ILIOTIBIAL BAND RELEASE RT TOTAL KNEE POLY EXCHANGE;  Surgeon: Toy Adame MD;  Location: [facility];  Service: Orthopedics   • ROTATOR CUFF REPAIR Bilateral    • WRIST MASS EXCISION Right          Prior to Admission medications    Medication Sig Start Date End Date Taking? Authorizing Provider   albuterol (PROVENTIL HFA;VENTOLIN HFA) 108 (90 BASE) MCG/ACT inhaler Inhale 2 puffs every 4 (four) hours as needed.   Yes Priscilla Romero MD   Magnesium 500 MG capsule Take 500 mg by mouth 2 (two) times a day.   Yes Priscilla Romero MD   metoprolol tartrate (LOPRESSOR) 25 MG tablet Take 25 mg by mouth 2 (two) times a day. 10/26/15  Yes Priscilla Romero MD   omeprazole (PriLOSEC) 40 MG capsule Take 40 mg by mouth every morning.   Yes Priscilla Romero MD   pravastatin (PRAVACHOL) 20 MG tablet Take 20 mg by mouth every morning.   Yes  Priscilla Romero MD   sennosides-docusate sodium (SENOKOT-S) 8.6-50 MG tablet Take 2 tablets by mouth 2 (two) times a day. 9/8/16  Yes Toy Adame MD   vitamin B-12 (CYANOCOBALAMIN) 1000 MCG tablet Take 1,000 mcg by mouth Daily.   Yes Priscilla Romero MD   furosemide (LASIX) 20 MG tablet Take 20 mg by mouth 2 (Two) Times a Day.    Priscilla Romero MD   lisinopril (PRINIVIL,ZESTRIL) 10 MG tablet Take 10 mg by mouth every morning.    Priscilla Romero MD   meloxicam (MOBIC) 15 MG tablet Take 15 mg by mouth daily.    Priscilla Romero MD   oxyCODONE-acetaminophen (PERCOCET)  MG per tablet Take 1 tablet by mouth every 3 (three) hours as needed for moderate pain (4-6) (max 8/day). 9/8/16   Toy Adame MD   rOPINIRole (REQUIP) 0.25 MG tablet Take 0.25 mg by mouth Every Night. Take 1 hour before bedtime.    Priscilla Romero MD   triamterene-hydrochlorothiazide (MAXZIDE) 75-50 MG per tablet Take 1 tablet by mouth every morning.    Priscilla Romero MD       Allergies   Allergen Reactions   • Levaquin [Levofloxacin] Hives   • Zocor [Simvastatin] Myalgia   • Codeine GI Intolerance   • Penicillins Hives       Social History     Socioeconomic History   • Marital status:    Tobacco Use   • Smoking status: Former Smoker     Packs/day: 1.00     Years: 30.00     Pack years: 30.00     Types: Cigarettes   • Smokeless tobacco: Never Used   Substance and Sexual Activity   • Alcohol use: Yes     Comment: 1 wine drink q 3-4 months   • Drug use: No       No family history on file.    REVIEW OF SYSTEMS:   All systems reviewed.  Pertinent positives identified in HPI.  All other systems are negative.        Objective:     Vitals:    06/15/22 0041 06/15/22 0142 06/15/22 0604 06/15/22 0723   BP: 105/61 139/72 124/76 124/76   BP Location:  Right arm Right arm    Patient Position:  Lying Lying    Pulse:  71 81 81   Resp:  18 18    Temp:  97 °F (36.1 °C) 98.2 °F (36.8 °C)    TempSrc:  Oral Oral   "  SpO2:  90% 92%    Weight:  72 kg (158 lb 12.8 oz)     Height:  160 cm (63\")       Body mass index is 28.13 kg/m².    Physical Exam:  Constitutional: She is oriented to person, place, and time. She appears well-developed. She does not appear ill.   HENT:   Head: Normocephalic and atraumatic. Head is without contusion.   Right Ear: Hearing normal. No drainage.   Left Ear: Hearing normal. No drainage.   Nose: No nasal deformity. No epistaxis.   Eyes: Lids are normal. Right eye exhibits no exudate. Left eye exhibits no exudate.  Neck: No JVD present. Carotid bruit is not present. No tracheal deviation present. No thyroid mass and no thyromegaly present.   Cardiovascular: Normal rate, regular rhythm and normal heart sounds.    Pulses:       Posterior tibial pulses are 2+ on the right side, and 2+ on the left side.   Pulmonary/Chest: Effort normal and breath sounds normal.   Abdominal: Soft. Normal appearance and bowel sounds are normal. There is no tenderness.   Musculoskeletal: Normal range of motion.        Right shoulder: She exhibits no deformity.        Left shoulder: She exhibits no deformity.   Neurological: She is alert and oriented to person, place, and time. She has normal strength.   Skin: Skin is warm, dry and intact. No rash noted.   Psychiatric: She has a normal mood and affect. Her behavior is normal. Thought content normal.   Vitals reviewed      Lab Review:     Results from last 7 days   Lab Units 06/15/22  0437 06/14/22  2339   SODIUM mmol/L 138 137   POTASSIUM mmol/L 3.5 3.6   CHLORIDE mmol/L 101 99   CO2 mmol/L 20.6* 23.0   BUN mg/dL 11 12   CREATININE mg/dL 0.74 0.83   CALCIUM mg/dL 8.9 9.3   BILIRUBIN mg/dL  --  0.6   ALK PHOS U/L  --  160*   ALT (SGPT) U/L  --  39*   AST (SGOT) U/L  --  53*   GLUCOSE mg/dL 109* 127*         Results from last 7 days   Lab Units 06/15/22  0437   WBC 10*3/mm3 5.56   HEMOGLOBIN g/dL 11.9*   HEMATOCRIT % 34.4   PLATELETS 10*3/mm3 62*        EKG interpretation by " Chela September 2021.  EKG reviewed by me and shows normal sinus rhythm, left anterior fascicular block, poor R wave progression, nonspecific ST-T wave changes especially in the inferior leads, the EKG is similar to the previous EKG dated September 1, 2020 reviewed both EKGs              Current Facility-Administered Medications:   •  acetaminophen (TYLENOL) tablet 650 mg, 650 mg, Oral, Q4H PRN **OR** acetaminophen (TYLENOL) 160 MG/5ML solution 650 mg, 650 mg, Oral, Q4H PRN **OR** acetaminophen (TYLENOL) suppository 650 mg, 650 mg, Rectal, Q4H PRN, Gema Pan APRN  •  albuterol (PROVENTIL) nebulizer solution 0.083% 2.5 mg/3mL, 2.5 mg, Nebulization, Q6H PRN, Gema Pan APRN  •  calcium carbonate (TUMS) chewable tablet 500 mg (200 mg elemental), 2 tablet, Oral, BID PRN, Gema Pan APRN  •  lisinopril (PRINIVIL,ZESTRIL) tablet 10 mg, 10 mg, Oral, QAM, Gema Pan APRN, 10 mg at 06/15/22 0723  •  meloxicam (MOBIC) tablet 15 mg, 15 mg, Oral, Daily, Gema Pan APRN, 15 mg at 06/15/22 0807  •  metoprolol tartrate (LOPRESSOR) tablet 25 mg, 25 mg, Oral, Q12H, Gema Pan APRN, 25 mg at 06/15/22 0807  •  morphine injection 2 mg, 2 mg, Intravenous, Q3H PRN, Gema Pan APRN, 2 mg at 06/15/22 0250  •  ondansetron (ZOFRAN) tablet 4 mg, 4 mg, Oral, Q6H PRN **OR** ondansetron (ZOFRAN) injection 4 mg, 4 mg, Intravenous, Q6H PRN, Gema Pan APRN  •  oxyCODONE-acetaminophen (PERCOCET)  MG per tablet 1 tablet, 1 tablet, Oral, Q6H PRN, Gema Pan APRN, 1 tablet at 06/15/22 0539  •  pantoprazole (PROTONIX) EC tablet 40 mg, 40 mg, Oral, QALORRIE, Gema Pan APRN, 40 mg at 06/15/22 0723  •  pravastatin (PRAVACHOL) tablet 20 mg, 20 mg, Oral, CATERINA, Gema Pan APRN, 20 mg at 06/15/22 0723  •  rOPINIRole (REQUIP) tablet 0.25 mg, 0.25 mg, Oral, Nightly, Gema Pan APRN  •  sennosides-docusate (PERICOLACE) 8.6-50 MG  per tablet 2 tablet, 2 tablet, Oral, BID, Gema Pan APRN  •  sodium chloride 0.9 % flush 10 mL, 10 mL, Intravenous, Q12H, Gema Pan APRN  •  sodium chloride 0.9 % flush 10 mL, 10 mL, Intravenous, PRN, Gema Pan APRN  •  sodium chloride 0.9 % infusion, 100 mL/hr, Intravenous, Continuous, Gema Pan APRN, Last Rate: 100 mL/hr at 06/15/22 0251, 100 mL/hr at 06/15/22 0251    Assessment and Plan:       1. Mechanical fall, right hip fracture  2. Hypertension  3. Hyperlipidemia   4. Abnormal EKG - reviewed tracing from [place] - and her EKG is unchanged.   5. Stress 2016 without ischemia  6. Echo 2020 with preserved LVEF. No significant valve disease.     No history of CAD or heart failure.   Fair functional capacity without symptoms.   She is low risk would proceed with surgical intervention as indicated.     BLAKE Grimes  06/15/22  08:17 EDT

## 2022-06-15 NOTE — ED TRIAGE NOTES
Pt was brought in by ems from home for a fall earlier today. Pt was walking in kitchen when she slipped. Pt c/o right pelvis pain. Denies loc, denies hitting head, denies blood thinners. shortening or rotation present to right leg.     Pt wearing mask on arrival. Staff wearing mask and goggles at time of triage.

## 2022-06-15 NOTE — ED PROVIDER NOTES
EMERGENCY DEPARTMENT ENCOUNTER    Room Number:  15/15  PCP: Brayden Jackson MD  Historian: Patient  History Limited By: Nothing      HPI  Chief Complaint: Right hip injury  Context: Kenia Palomo is a 77 y.o. female who presents to the ED c/o right hip injury.  Patient states she slipped about 4 hours ago and injured her right hip.  States she had been having some problems with that hip even before the slip.  Patient states she did not hit her head.  No neck pain no chest pain or abdominal pain.  Has had no blood thinners.  No prior hip injury.      Location: Right hip  Radiation: None  Character: Aching  Duration: 4 hours  Severity: Moderate  Progression: Not improving  Aggravating Factors: Movement  Alleviating Factors: Remaining still        MEDICAL RECORD REVIEW    Hypertension, hyperlipidemia          PAST MEDICAL HISTORY  Active Ambulatory Problems     Diagnosis Date Noted   • Snapping right knee 09/07/2016     Resolved Ambulatory Problems     Diagnosis Date Noted   • No Resolved Ambulatory Problems     Past Medical History:   Diagnosis Date   • Anginal pain (CMS/HCC)    • Arthritis    • Gastric reflux syndrome    • Hiatal hernia    • High cholesterol    • History of bronchitis    • Hypertension    • Irregular heart beat          PAST SURGICAL HISTORY  Past Surgical History:   Procedure Laterality Date   • APPENDECTOMY     • EXTERNAL EAR SURGERY Right     had tumor excised behind right ear lobe benign   • GALLBLADDER SURGERY     • HYSTERECTOMY     • KNEE ARTHROPLASTY Right    • KNEE ARTHROSCOPY Right    • MD REVISE KNEE JOINT REPLACE,1 PART Right 9/7/2016    Procedure: RT ILIOTIBIAL BAND RELEASE RT TOTAL KNEE POLY EXCHANGE;  Surgeon: Toy Adame MD;  [facility];  Service: Orthopedics   • ROTATOR CUFF REPAIR Bilateral    • WRIST MASS EXCISION Right          FAMILY HISTORY  No family history on file.      SOCIAL HISTORY  Social History     Socioeconomic History   • Marital status:     Tobacco Use   • Smoking status: Former Smoker     Packs/day: 1.00     Years: 30.00     Pack years: 30.00     Types: Cigarettes   • Smokeless tobacco: Never Used   Substance and Sexual Activity   • Alcohol use: Yes     Comment: 1 wine drink q 3-4 months   • Drug use: No         ALLERGIES  Zocor [simvastatin], Codeine, and Penicillins        REVIEW OF SYSTEMS  Review of Systems   Constitutional: Negative for fever.   HENT: Negative for sore throat.    Eyes: Negative.    Respiratory: Negative for cough and shortness of breath.    Cardiovascular: Negative for chest pain.   Gastrointestinal: Negative for abdominal pain, diarrhea and vomiting.   Genitourinary: Negative for dysuria.   Musculoskeletal: Positive for myalgias. Negative for neck pain.   Skin: Negative for rash.   Allergic/Immunologic: Negative.    Neurological: Negative for weakness, numbness and headaches.   Hematological: Negative.    Psychiatric/Behavioral: Negative.    All other systems reviewed and are negative.           PHYSICAL EXAM  ED Triage Vitals [06/14/22 2218]   Temp Heart Rate Resp BP SpO2   98.4 °F (36.9 °C) 84 18 122/88 96 %      Temp src Heart Rate Source Patient Position BP Location FiO2 (%)   Tympanic Monitor -- -- --       Physical Exam  Vitals and nursing note reviewed.   Constitutional:       General: She is not in acute distress.  HENT:      Head: Normocephalic and atraumatic.   Eyes:      Pupils: Pupils are equal, round, and reactive to light.   Cardiovascular:      Rate and Rhythm: Normal rate and regular rhythm.      Heart sounds: Normal heart sounds.   Pulmonary:      Effort: Pulmonary effort is normal. No respiratory distress.      Breath sounds: Normal breath sounds.   Abdominal:      Palpations: Abdomen is soft.      Tenderness: There is no abdominal tenderness. There is no guarding or rebound.   Musculoskeletal:         General: Normal range of motion.      Cervical back: Normal range of motion and neck supple.      Comments:  Pain with movement of right hip.  Slightly shortened   Skin:     General: Skin is warm and dry.      Findings: No rash.   Neurological:      Mental Status: She is alert and oriented to person, place, and time.      Sensory: Sensation is intact.   Psychiatric:         Mood and Affect: Mood and affect normal.       Patient was wearing a face mask when I entered the room and they continued to wear a mask throughout their stay in the ED.  I wore PPE, including gloves, face mask with shield or face mask with goggles whenever I was in the room with patient.       LAB RESULTS  No results found for this or any previous visit (from the past 24 hour(s)).    Ordered the above labs and reviewed the results.        RADIOLOGY  XR Hip With or Without Pelvis 2 - 3 View Right    (Results Pending)   XR Chest 1 View    (Results Pending)        Ordered the above noted radiological studies. Reviewed by me in PACS.           PROCEDURES  Procedures            MEDICATIONS GIVEN IN ER  Medications   morphine injection 4 mg (has no administration in time range)   ondansetron (ZOFRAN) injection 4 mg (has no administration in time range)             PROGRESS AND CONSULTS  ED Course as of 06/14/22 2331 Tue Jun 14, 2022   2236 22:36 EDT  ED first look.  Patient states about 4 hours ago she slipped and fell injuring her right hip.  Patient states she had been having pain in that hip earlier.  Patient did not hit her head.  No neck pain no chest pain or abdominal pain.  Patient transported by EMS.   [SL]   2316 23:16 EDT  Discussed with Dr. Suazo who will consult. [SL]   2328 23:28 EDT  Discussed with Gema LUTHER with [facility] and will admit. [SL]      ED Course User Index  [SL] Don Husain MD           MEDICAL DECISION MAKING      MDM  Number of Diagnoses or Management Options     Amount and/or Complexity of Data Reviewed  Tests in the radiology section of CPT®: reviewed and ordered (Right hip fracture)  Discuss the patient with other  providers: yes (Discussed with Dr. Suazo and Gema with [facility] and will be admitted.)               DIAGNOSIS  Final diagnoses:   Closed fracture of right hip, initial encounter ([facility])           DISPOSITION  admit        Latest Documented Vital Signs:  As of 23:31 EDT  BP- 122/88 HR- 84 Temp- 98.4 °F (36.9 °C) (Tympanic) O2 sat- 96%                         Don Husain MD  06/14/22 9965

## 2022-06-15 NOTE — H&P
Patient Name:  Kenia Palomo  YOB: 1945  MRN:  [id number]  Admit Date:  6/14/2022  Patient Care Team:  Brayden Jackson MD as PCP - General  Brayden Jackson MD as PCP - Family Medicine  Naif York MD as Consulting Physician (Cardiology)  Narendra Banda MD as Surgeon (General Surgery)      Subjective   History Present Illness     Chief Complaint   Patient presents with   • Fall       Ms. Palomo is a 77 y.o. former smoker with a history of hypertension, hyperlipidemia, and GERD that presents to [facility] complaining of a fall.  She reports she was walking in her kitchen last night when she slipped and fell.  She denies head injury and loss of consciousness.  She states she was able to get up and walk after falling but she had difficulty getting off her toilet later that evening.  She reports pain in the right hip that is described as constant, severe, and sharp in nature.  She states movement exacerbates the pain and she denies alleviating factors.  She denies paresthesias, chest pain, shortness of breath, fever, and chills.  An x-ray performed in the ED revealed a intertrochanteric right femoral fracture.        History of Present Illness  Review of Systems   Constitutional: Negative for chills and fever.   HENT: Negative for congestion and sore throat.    Eyes: Negative for photophobia and visual disturbance.   Respiratory: Negative for cough and shortness of breath.    Cardiovascular: Negative for chest pain, palpitations and leg swelling.   Gastrointestinal: Negative for abdominal pain, nausea and vomiting.   Endocrine: Negative for polydipsia, polyphagia and polyuria.   Genitourinary: Negative for dysuria, flank pain and frequency.   Musculoskeletal: Positive for arthralgias, gait problem and myalgias.   Skin: Negative for rash and wound.   Neurological: Negative for dizziness, light-headedness, numbness and headaches.        Personal History     Past Medical  History:   Diagnosis Date   • Anginal pain (CMS/HCC)    • Arthritis    • Gastric reflux syndrome    • Hiatal hernia    • High cholesterol    • History of bronchitis    • Hypertension    • Irregular heart beat      Past Surgical History:   Procedure Laterality Date   • APPENDECTOMY     • EXTERNAL EAR SURGERY Right     had tumor excised behind right ear lobe benign   • GALLBLADDER SURGERY     • HYSTERECTOMY     • KNEE ARTHROPLASTY Right    • KNEE ARTHROSCOPY Right    • MS REVISE KNEE JOINT REPLACE,1 PART Right 9/7/2016    Procedure: RT ILIOTIBIAL BAND RELEASE RT TOTAL KNEE POLY EXCHANGE;  Surgeon: Toy Adame MD;  Location: [facility];  Service: Orthopedics   • ROTATOR CUFF REPAIR Bilateral    • WRIST MASS EXCISION Right      No family history on file.  Social History     Tobacco Use   • Smoking status: Former Smoker     Packs/day: 1.00     Years: 30.00     Pack years: 30.00     Types: Cigarettes   • Smokeless tobacco: Never Used   Substance Use Topics   • Alcohol use: Yes     Comment: 1 wine drink q 3-4 months   • Drug use: No     Medications Prior to Admission   Medication Sig Dispense Refill Last Dose   • albuterol (PROVENTIL HFA;VENTOLIN HFA) 108 (90 BASE) MCG/ACT inhaler Inhale 2 puffs every 4 (four) hours as needed.      • Magnesium 500 MG capsule Take 500 mg by mouth 2 (two) times a day.      • metoprolol tartrate (LOPRESSOR) 25 MG tablet Take 25 mg by mouth 2 (two) times a day.      • omeprazole (PriLOSEC) 40 MG capsule Take 40 mg by mouth every morning.      • pravastatin (PRAVACHOL) 20 MG tablet Take 20 mg by mouth every morning.      • sennosides-docusate sodium (SENOKOT-S) 8.6-50 MG tablet Take 2 tablets by mouth 2 (two) times a day. 100 tablet 1    • vitamin B-12 (CYANOCOBALAMIN) 1000 MCG tablet Take 1,000 mcg by mouth Daily.      • furosemide (LASIX) 20 MG tablet Take 20 mg by mouth 2 (Two) Times a Day.      • lisinopril (PRINIVIL,ZESTRIL) 10 MG tablet Take 10 mg by mouth every morning.      •  meloxicam (MOBIC) 15 MG tablet Take 15 mg by mouth daily.      • oxyCODONE-acetaminophen (PERCOCET)  MG per tablet Take 1 tablet by mouth every 3 (three) hours as needed for moderate pain (4-6) (max 8/day). 100 tablet 0    • rOPINIRole (REQUIP) 0.25 MG tablet Take 0.25 mg by mouth Every Night. Take 1 hour before bedtime.      • triamterene-hydrochlorothiazide (MAXZIDE) 75-50 MG per tablet Take 1 tablet by mouth every morning.        Allergies:    Allergies   Allergen Reactions   • Zocor [Simvastatin] Myalgia   • Codeine GI Intolerance   • Penicillins Hives       Objective    Objective     Vital Signs  Temp:  [97 °F (36.1 °C)-98.4 °F (36.9 °C)] 97 °F (36.1 °C)  Heart Rate:  [71-84] 71  Resp:  [18] 18  BP: (105-140)/(61-88) 139/72  SpO2:  [90 %-96 %] 90 %  on   ;   Device (Oxygen Therapy): room air  Body mass index is 28.13 kg/m².    Physical Exam  Vitals and nursing note reviewed.   Constitutional:       Appearance: Normal appearance.   HENT:      Head: Normocephalic and atraumatic.      Nose: Nose normal.      Mouth/Throat:      Mouth: Mucous membranes are moist.      Pharynx: Oropharynx is clear.   Eyes:      Extraocular Movements: Extraocular movements intact.      Conjunctiva/sclera: Conjunctivae normal.   Cardiovascular:      Rate and Rhythm: Normal rate and regular rhythm.      Pulses: Normal pulses.           Dorsalis pedis pulses are 2+ on the right side and 2+ on the left side.      Heart sounds: Normal heart sounds.   Pulmonary:      Effort: Pulmonary effort is normal.      Breath sounds: Normal breath sounds.   Abdominal:      General: Bowel sounds are normal.      Palpations: Abdomen is soft.   Musculoskeletal:         General: Tenderness (right hip) present.      Cervical back: Normal range of motion and neck supple.      Right lower leg: No edema.      Left lower leg: No edema.   Skin:     General: Skin is warm and dry.   Neurological:      General: No focal deficit present.      Mental Status:  She is alert and oriented to person, place, and time.   Psychiatric:         Mood and Affect: Mood normal.         Behavior: Behavior normal.         Results Review:  I reviewed the patient's new clinical results.  I reviewed the patient's new imaging results and agree with the interpretation.  I reviewed the patient's other test results and agree with the interpretation  I personally viewed and interpreted the patient's EKG/Telemetry data  Discussed with ED provider.    Lab Results (last 24 hours)     Procedure Component Value Units Date/Time    COVID PRE-OP / PRE-PROCEDURE SCREENING ORDER (NO ISOLATION) - Swab, Nasopharynx [[id number]]  (Normal) Collected: 06/14/22 2332    Specimen: Swab from Nasopharynx Updated: 06/15/22 0000    Narrative:      The following orders were created for panel order COVID PRE-OP / PRE-PROCEDURE SCREENING ORDER (NO ISOLATION) - Swab, Nasopharynx.  Procedure                               Abnormality         Status                     ---------                               -----------         ------                     COVID-19,[facility] IN-HOUSE...[[id number]]  Normal              Final result                 Please view results for these tests on the individual orders.    COVID-19,[facility] IN-HOUSE CEPHEID/SANDEE NP SWAB IN TRANSPORT MEDIA 8-12 HR TAT - Swab, Nasopharynx [[id number]]  (Normal) Collected: 06/14/22 2332    Specimen: Swab from Nasopharynx Updated: 06/15/22 0000     COVID19 Not Detected    Narrative:      Fact sheet for providers: https://www.fda.gov/media/500408/download    Fact sheet for patients: https://www.fda.gov/media/869384/download    Test performed by PCR.    CBC & Differential [[id number]]  (Abnormal) Collected: 06/14/22 2339    Specimen: Blood Updated: 06/15/22 0008    Narrative:      The following orders were created for panel order CBC & Differential.  Procedure                               Abnormality         Status                     ---------                                -----------         ------                     CBC Auto Differential[[id number]]         Abnormal            Final result                 Please view results for these tests on the individual orders.    Comprehensive Metabolic Panel [[id number]]  (Abnormal) Collected: 06/14/22 2339    Specimen: Blood Updated: 06/15/22 0024     Glucose 127 mg/dL      BUN 12 mg/dL      Creatinine 0.83 mg/dL      Sodium 137 mmol/L      Potassium 3.6 mmol/L      Chloride 99 mmol/L      CO2 23.0 mmol/L      Calcium 9.3 mg/dL      Total Protein 7.7 g/dL      Albumin 4.10 g/dL      ALT (SGPT) 39 U/L      AST (SGOT) 53 U/L      Alkaline Phosphatase 160 U/L      Total Bilirubin 0.6 mg/dL      Globulin 3.6 gm/dL      A/G Ratio 1.1 g/dL      BUN/Creatinine Ratio 14.5     Anion Gap 15.0 mmol/L      eGFR 71.8 mL/min/1.73      Comment: National Kidney Foundation and American Society of Nephrology (ASN) Task Force recommended calculation based on the Chronic Kidney Disease Epidemiology Collaboration (CKD-EPI) equation refit without adjustment for race.       Narrative:      GFR Normal >60  Chronic Kidney Disease <60  Kidney Failure <15      CBC Auto Differential [[id number]]  (Abnormal) Collected: 06/14/22 2339    Specimen: Blood Updated: 06/15/22 0008     WBC 6.89 10*3/mm3      RBC 4.01 10*6/mm3      Hemoglobin 13.6 g/dL      Hematocrit 38.4 %      MCV 95.8 fL      MCH 33.9 pg      MCHC 35.4 g/dL      RDW 12.3 %      RDW-SD 42.9 fl      MPV 10.5 fL      Platelets 77 10*3/mm3      Neutrophil % 78.7 %      Lymphocyte % 13.6 %      Monocyte % 6.7 %      Eosinophil % 0.0 %      Basophil % 0.6 %      Immature Grans % 0.4 %      Neutrophils, Absolute 5.42 10*3/mm3      Lymphocytes, Absolute 0.94 10*3/mm3      Monocytes, Absolute 0.46 10*3/mm3      Eosinophils, Absolute 0.00 10*3/mm3      Basophils, Absolute 0.04 10*3/mm3      Immature Grans, Absolute 0.03 10*3/mm3      nRBC 0.0 /100 WBC           Imaging Results (Last 24 Hours)     Procedure Component  Value Units Date/Time    XR Chest 1 View [[id number]] Collected: 06/14/22 2336     Updated: 06/14/22 2340    Narrative:      SINGLE VIEW OF THE CHEST     HISTORY: Preoperative examination     COMPARISON: 08/31/2016     FINDINGS:  Cardiomegaly is present. There is no vascular congestion. There is  stable elevation of the right hemidiaphragm. There is calcification of  the aorta. No pneumothorax, pleural effusion, or acute infiltrate is  seen.       Impression:      No acute findings.     This report was finalized on 6/14/2022 11:37 PM by Dr. Chelsi Tompkins M.D.       XR Hip With or Without Pelvis 2 - 3 View Right [[id number]] Collected: 06/14/22 2335     Updated: 06/14/22 2339    Narrative:      AP VIEW THE PELVIS +2 VIEWS RIGHT HIP     HISTORY: Right hip injury     COMPARISON: None available.     FINDINGS:  The patient has a comminuted intertrochanteric fracture of the right  femur. No additional fractures are seen. There are degenerative changes  involving both hips and both SI joints.       Impression:      Intertrochanteric right femoral fracture.     This report was finalized on 6/14/2022 11:36 PM by Dr. Chelsi Tompkins M.D.                 No orders to display        Assessment/Plan     Active Hospital Problems    Diagnosis  POA   • **Closed fracture of right hip (HCC) [S72.001A]  Yes   • HTN (hypertension) [I10]  Unknown   • HLD (hyperlipidemia) [E78.5]  Unknown   • GERD (gastroesophageal reflux disease) [K21.9]  Unknown       Closed Fracture of Right Hip  -Admit to a med/surg unit for monitoring  -Orthopedic surgery consult  -Keep NPO  -PRN analgesia  -Neurovascular checks  -PT consult  -Check EKG for surgical clearance  -RCRI class 1 with a risk percentage of 0.4%    Hypertension  -Blood pressures stable. Continue home regimen  -Monitor    Hyperlipidemia/GERD  -Continue home medications    -I discussed the patients findings and my recommendations with patient.    VTE Prophylaxis - SCDs.  Code Status -  Full code.       BLAKE Leija  [facility]  06/15/22  02:27 EDT

## 2022-06-15 NOTE — ANESTHESIA POSTPROCEDURE EVALUATION
Patient: Ada N Sample    Procedure Summary     Date: 06/15/22 Room / Location: [facility] OR  / [facility] MAIN OR    Anesthesia Start: 1331 Anesthesia Stop: 1449    Procedure: RIGHT HIP INTRAMEDULLARY NAILING/RODDING (Right Thigh) Diagnosis:       Closed comminuted intertrochanteric fracture of right femur, initial encounter ([facility])      (Closed comminuted intertrochanteric fracture of right femur, initial encounter ([facility]) [S72.141A])    Surgeons: Marc Crawford MD Provider: Luke Sanders MD    Anesthesia Type: general ASA Status: 3          Anesthesia Type: general    Vitals  Vitals Value Taken Time   /79 06/15/22 1601   Temp 37.1 °C (98.7 °F) 06/15/22 1445   Pulse 65 06/15/22 1606   Resp 14 06/15/22 1545   SpO2 96 % 06/15/22 1607   Vitals shown include unvalidated device data.        Anesthesia Post Evaluation

## 2022-06-15 NOTE — ANESTHESIA PROCEDURE NOTES
Airway  Urgency: elective    Date/Time: 6/15/2022 1:41 PM  Airway not difficult    General Information and Staff    Patient location during procedure: OR  Anesthesiologist: Luke Sanders MD  CRNA/CAA: Sondra Howell CRNA    Indications and Patient Condition  Indications for airway management: airway protection    Preoxygenated: yes  MILS maintained throughout  Mask difficulty assessment: 0 - not attempted    Final Airway Details  Final airway type: supraglottic airway      Successful airway: classic  Size 4    Number of attempts at approach: 1  Assessment: lips, teeth, and gum same as pre-op and atraumatic intubation

## 2022-06-15 NOTE — ANESTHESIA PREPROCEDURE EVALUATION
Anesthesia Evaluation     Patient summary reviewed and Nursing notes reviewed                Airway   Mallampati: II  TM distance: >3 FB  Neck ROM: full  No difficulty expected  Dental    (+) edentulous, upper dentures and lower dentures    Pulmonary    (+) a smoker Former,   Cardiovascular     ECG reviewed  Rhythm: regular  Rate: normal    (+) hypertension, angina, hyperlipidemia,       Neuro/Psych- negative ROS  GI/Hepatic/Renal/Endo    (+)  hiatal hernia, GERD,      Musculoskeletal     Abdominal    Substance History - negative use     OB/GYN negative ob/gyn ROS         Other   arthritis,                      Anesthesia Plan    ASA 3     general     (Dentures have been removed    I have reviewed the patient's history with the patient and the chart, including all pertinent laboratory results and imaging. I have explained the risks of anesthesia including but not limited to dental damage, corneal abrasion, nerve injury, MI, stroke, and death. Questions asked and answered. Anesthetic plan discussed with patient and team as indicated. Patient expressed understanding of the above.  )  intravenous induction     Anesthetic plan, risks, benefits, and alternatives have been provided, discussed and informed consent has been obtained with: patient.    Plan discussed with CRNA.        CODE STATUS:    Code Status (Patient has no pulse and is not breathing): CPR (Attempt to Resuscitate)  Medical Interventions (Patient has pulse or is breathing): Full Support

## 2022-06-15 NOTE — CONSULTS
Orthopedic Consult    Patient: Kenia N Sample                                           YOB: 1945     Date of Admission: 6/14/2022 10:21 PM            Medical Record Number: [id number]    Attending Physician: Axel Jaeger*    Consulting Physician: Maximino Suazo MD    Reason for Consult: RIGHT hip fracture    History of Present Illness: 77 y.o. female admitted to [facility] with Closed fracture of right hip, initial encounter ([facility]) [S72.001A].     The patient was evaluated in the emergency room and was diagnosed with a  hip fracture.   Secondary to the age and multiple medical co morbidities the patient was admitted to the hospitalist.   As I was on call for the emergency room I was consulted for further evaluation and treatment.       I received a call regarding this patient's admission from the floor as a consult today morning.  I have made tentative plans for internal fixation for June 16.      I received a message from Dr. Marc Crawford that the patient is known to the practice and she is requesting his care.  I will sign off.    Date: 6/15/2022    Maximino Suazo MD    CC: Brayden Jackson MD; MD Mil Gavin Robert Russel*

## 2022-06-15 NOTE — PLAN OF CARE
Goal Outcome Evaluation:  Plan of Care Reviewed With: patient        Progress: no change  Outcome Evaluation: ER ADMIT OF TODAY. PATIENT HERE WITH RIGHT HIP FX. VSS. BEDREST. PUREWICK IN PLACE. PO/IV PAIN MEDICATION HELPING WITH PAIN. NPO SIPS WITH MED. EDUCATION PROVIDED ON PAIN CONTROL. PATIENT VERBALIZED UNDERSTANDING.

## 2022-06-16 PROBLEM — D69.6 THROMBOCYTOPENIA (HCC): Status: ACTIVE | Noted: 2022-06-16

## 2022-06-16 LAB
ALBUMIN SERPL-MCNC: 3.3 G/DL (ref 3.5–5.2)
ALBUMIN/GLOB SERPL: 1.1 G/DL
ALP SERPL-CCNC: 104 U/L (ref 39–117)
ALT SERPL W P-5'-P-CCNC: 25 U/L (ref 1–33)
ANION GAP SERPL CALCULATED.3IONS-SCNC: 10.1 MMOL/L (ref 5–15)
AST SERPL-CCNC: 31 U/L (ref 1–32)
BILIRUB SERPL-MCNC: 0.8 MG/DL (ref 0–1.2)
BUN SERPL-MCNC: 16 MG/DL (ref 8–23)
BUN/CREAT SERPL: 18.4 (ref 7–25)
CALCIUM SPEC-SCNC: 8.5 MG/DL (ref 8.6–10.5)
CHLORIDE SERPL-SCNC: 103 MMOL/L (ref 98–107)
CO2 SERPL-SCNC: 24.9 MMOL/L (ref 22–29)
CREAT SERPL-MCNC: 0.87 MG/DL (ref 0.57–1)
DEPRECATED RDW RBC AUTO: 43.2 FL (ref 37–54)
EGFRCR SERPLBLD CKD-EPI 2021: 67.9 ML/MIN/1.73
ERYTHROCYTE [DISTWIDTH] IN BLOOD BY AUTOMATED COUNT: 12 % (ref 12.3–15.4)
GLOBULIN UR ELPH-MCNC: 3 GM/DL
GLUCOSE SERPL-MCNC: 133 MG/DL (ref 65–99)
HCT VFR BLD AUTO: 29.6 % (ref 34–46.6)
HGB BLD-MCNC: 10.1 G/DL (ref 12–15.9)
MCH RBC QN AUTO: 33.9 PG (ref 26.6–33)
MCHC RBC AUTO-ENTMCNC: 34.1 G/DL (ref 31.5–35.7)
MCV RBC AUTO: 99.3 FL (ref 79–97)
PLATELET # BLD AUTO: 54 10*3/MM3 (ref 140–450)
PMV BLD AUTO: 11.7 FL (ref 6–12)
POTASSIUM SERPL-SCNC: 3.9 MMOL/L (ref 3.5–5.2)
PROT SERPL-MCNC: 6.3 G/DL (ref 6–8.5)
RBC # BLD AUTO: 2.98 10*6/MM3 (ref 3.77–5.28)
SODIUM SERPL-SCNC: 138 MMOL/L (ref 136–145)
WBC NRBC COR # BLD: 6.31 10*3/MM3 (ref 3.4–10.8)

## 2022-06-16 PROCEDURE — 85027 COMPLETE CBC AUTOMATED: CPT | Performed by: ORTHOPAEDIC SURGERY

## 2022-06-16 PROCEDURE — 80053 COMPREHEN METABOLIC PANEL: CPT | Performed by: ORTHOPAEDIC SURGERY

## 2022-06-16 PROCEDURE — 99232 SBSQ HOSP IP/OBS MODERATE 35: CPT | Performed by: INTERNAL MEDICINE

## 2022-06-16 PROCEDURE — 25010000002 ONDANSETRON PER 1 MG: Performed by: ORTHOPAEDIC SURGERY

## 2022-06-16 PROCEDURE — 25010000002 MORPHINE PER 10 MG: Performed by: ORTHOPAEDIC SURGERY

## 2022-06-16 RX ADMIN — SODIUM CHLORIDE 100 ML/HR: 9 INJECTION, SOLUTION INTRAVENOUS at 06:31

## 2022-06-16 RX ADMIN — PANTOPRAZOLE SODIUM 40 MG: 40 TABLET, DELAYED RELEASE ORAL at 08:39

## 2022-06-16 RX ADMIN — DOCUSATE SODIUM 50MG AND SENNOSIDES 8.6MG 2 TABLET: 8.6; 5 TABLET, FILM COATED ORAL at 08:39

## 2022-06-16 RX ADMIN — Medication 10 ML: at 08:40

## 2022-06-16 RX ADMIN — MORPHINE SULFATE 2 MG: 2 INJECTION, SOLUTION INTRAMUSCULAR; INTRAVENOUS at 21:18

## 2022-06-16 RX ADMIN — OXYCODONE AND ACETAMINOPHEN 1 TABLET: 325; 10 TABLET ORAL at 23:56

## 2022-06-16 RX ADMIN — LISINOPRIL 10 MG: 10 TABLET ORAL at 08:39

## 2022-06-16 RX ADMIN — MORPHINE SULFATE 2 MG: 2 INJECTION, SOLUTION INTRAMUSCULAR; INTRAVENOUS at 15:40

## 2022-06-16 RX ADMIN — ONDANSETRON 4 MG: 2 INJECTION INTRAMUSCULAR; INTRAVENOUS at 01:02

## 2022-06-16 RX ADMIN — OXYCODONE AND ACETAMINOPHEN 1 TABLET: 325; 10 TABLET ORAL at 14:24

## 2022-06-16 RX ADMIN — METOPROLOL TARTRATE 25 MG: 25 TABLET, FILM COATED ORAL at 08:39

## 2022-06-16 RX ADMIN — MORPHINE SULFATE 2 MG: 2 INJECTION, SOLUTION INTRAMUSCULAR; INTRAVENOUS at 01:02

## 2022-06-16 RX ADMIN — PRAVASTATIN SODIUM 20 MG: 20 TABLET ORAL at 08:39

## 2022-06-16 RX ADMIN — OXYCODONE AND ACETAMINOPHEN 1 TABLET: 325; 10 TABLET ORAL at 08:43

## 2022-06-16 RX ADMIN — SODIUM CHLORIDE 1000 ML: 9 INJECTION, SOLUTION INTRAVENOUS at 12:09

## 2022-06-16 RX ADMIN — DOCUSATE SODIUM 50MG AND SENNOSIDES 8.6MG 2 TABLET: 8.6; 5 TABLET, FILM COATED ORAL at 21:19

## 2022-06-16 RX ADMIN — Medication 10 ML: at 21:18

## 2022-06-16 RX ADMIN — ROPINIROLE HYDROCHLORIDE 0.25 MG: 0.5 TABLET, FILM COATED ORAL at 21:19

## 2022-06-16 NOTE — DISCHARGE PLACEMENT REQUEST
"Sample, Kenia N (77 y.o. Female)             Date of Birth   1945    Social Security Number       Address   [address] [place] [address]    Home Phone   [phone]    MRN   [id number]       [facility]    Marital Status                               Admission Date   6/14/22    Admission Type   Emergency    Admitting Provider   Axel Jaeger MD    Attending Provider   Axel Jaeger MD    Department, Room/Bed   [facility] [address] [place], P898/1       Discharge Date       Discharge Disposition       Discharge Destination                               Attending Provider: Axel Jaeger MD    Allergies: Levaquin [Levofloxacin], Zocor [Simvastatin], Codeine, Penicillins    Isolation: None   Infection: None   Code Status: CPR   Advance Care Planning Activity    Ht: 160 cm (63\")   Wt: 72 kg (158 lb 12.8 oz)    Admission Cmt: None   Principal Problem: Closed comminuted intertrochanteric fracture of proximal end of right femur (HCC) [S72.141A] More...                 Active Insurance as of 6/14/2022     Primary Coverage     Payor Plan Insurance Group Employer/Plan Group    MEDICARE MEDICARE A & B      Payor Plan Address Payor Plan Phone Number Payor Plan Fax Number Effective Dates    [address] [phone]  5/1/1993 - None Entered    [facility] [id number]       Subscriber Name Subscriber Birth Date Member ID       SAMPLE,ADA N 1945 [id number]           Secondary Coverage     Payor Plan Insurance Group Employer/Plan Group    [place]      Payor Plan Address Payor Plan Phone Number Payor Plan Fax Number Effective Dates    [address] [phone]  8/29/2016 - None Entered    [place] [address]       Subscriber Name Subscriber Birth Date Member ID       SAMPLE,ADA N 1945 [id number]                 Emergency Contacts      (Rel.) Home Phone Work Phone Mobile Phone    Sample,Nabil (Spouse) " [phone] -- [phone]    Kaylie Gutierrez (Daughter) [phone] -- [phone]

## 2022-06-16 NOTE — PROGRESS NOTES
Orthopedic Progress Note    Subjective :   Patient seen and examined.  Resting comfortably in her hospital bed.  No acute issues overnight.  She did ambulate yesterday.  Pain is controlled.    Objective :    Vital signs in last 24 hours:  Temp:  [95.6 °F (35.3 °C)-98.8 °F (37.1 °C)] 96.8 °F (36 °C)  Heart Rate:  [50-98] 62  Resp:  [12-18] 16  BP: ()/(56-93) 123/68  Vitals:    06/15/22 1733 06/15/22 2338 06/16/22 0157 06/16/22 0700   BP: 120/62 97/56 122/62 123/68   BP Location:  Right arm Right arm Right arm   Patient Position:  Lying Lying Lying   Pulse: 98 50 57 62   Resp: 16 14 16 16   Temp: 96.7 °F (35.9 °C) 97.1 °F (36.2 °C) 95.6 °F (35.3 °C) 96.8 °F (36 °C)   TempSrc: Oral Oral Oral Oral   SpO2: 98% 99% 98% 93%   Weight:       Height:           PHYSICAL EXAM:  Patient is calm, in no acute distress, awake and oriented x 3.  Dressing clean, dry and intact.  No signs of infection.  Swelling is appropriate.  Ecchymosis is appropriate in amount.  No significant bruising.  At this time.  Homans test is negative.  Patient is neurovascularly intact distally.  EHL, FHL, TA, and GS are intact.  DP/TP are 2+.    LABS:  Results from last 7 days   Lab Units 06/16/22  0454   WBC 10*3/mm3 6.31   HEMOGLOBIN g/dL 10.1*   HEMATOCRIT % 29.6*   PLATELETS 10*3/mm3 54*     Results from last 7 days   Lab Units 06/16/22  0454   SODIUM mmol/L 138   POTASSIUM mmol/L 3.9   CHLORIDE mmol/L 103   CO2 mmol/L 24.9   BUN mg/dL 16   CREATININE mg/dL 0.87   GLUCOSE mg/dL 133*   CALCIUM mg/dL 8.5*        Study Result    Narrative & Impression   XR HIP W OR WO PELVIS 1 VIEW RIGHT-     INDICATIONS: Postoperative evaluation.     TECHNIQUE: Frontal views of the pelvis and right hip     COMPARISON: 06/14/2022     FINDINGS:      Intact appearing bob and screw surgical hardware is seen transfixing  the right intertrochanteric fracture, with adjacent surgical soft tissue  gas.        IMPRESSION:     Postsurgical changes.           This  report was finalized on 6/15/2022 3:36 PM by Dr. Delonte White M.D.            ASSESSMENT:  Status post right hip intramedullary nail fixation, POD #1    Plan:  Continue Physical Therapy, weightbearing as tolerated to the right lower extremity.  Continue SCDs, Continue Lovenox for DVT prophylaxis.  Dispo planning for home with home health care versus rehab when medically stable.  We will await physical therapy recommendations.  Continue to monitor platelets.  As well as the bruising.  Platelets this morning are 54.  Patient to follow-up in the office in 3 weeks.  We will sign off from the orthopedic standpoint.  Please call if needed.     Thor Soriano PA-C    Date: 6/16/2022  Time: 07:17 EDT

## 2022-06-16 NOTE — PLAN OF CARE
Goal Outcome Evaluation:  Plan of Care Reviewed With: patient        Progress: improving  Outcome Evaluation: A&OX4, UP TO CHAIR, VOIDING PER BRP, SL, 1L NS BOLUS D/T HYPOTENSION IS AM, ALL ANTIHYPERTENSIVES HELD EXCEPT FOR PARAMETERS ON METOPROLOL PER CARDIOLOGY, ADEQUATE PO INTAKE, PAIN CONTROLLED C PO MEDS, DSG- SCANT S/S DRAINAGE, NVI, RA

## 2022-06-16 NOTE — CASE MANAGEMENT/SOCIAL WORK
Discharge Planning Assessment  [facility]     Patient Name: Kenia BRIAN Sample  MRN: [id number]  Today's Date: 6/16/2022    Admit Date: 6/14/2022     Discharge Needs Assessment     Row Name 06/16/22 1155       Living Environment    People in Home spouse    Name(s) of People in Home Nabil Sample/    Current Living Arrangements home    Primary Care Provided by self    Provides Primary Care For spouse    Family Caregiver if Needed child(tirso), adult    Family Caregiver Names 3 daughters, Kaylie, Cindy, & Tiarra    Quality of Family Relationships helpful;involved;supportive    Able to Return to Prior Arrangements other (see comments)  Patient may need SNF at [place] vs        Resource/Environmental Concerns    Resource/Environmental Concerns none    Transportation Concerns none       Transition Planning    Patient/Family Anticipates Transition to home with help/services;inpatient rehabilitation facility    Patient/Family Anticipated Services at Transition home health care;skilled nursing;rehabilitation services    Transportation Anticipated family or friend will provide       Discharge Needs Assessment    Readmission Within the Last 30 Days no previous admission in last 30 days    Equipment Currently Used at Home none    Concerns to be Addressed no discharge needs identified;denies needs/concerns at this time    Anticipated Changes Related to Illness none    Equipment Needed After Discharge none    Outpatient/Agency/Support Group Needs homecare agency;inpatient rehabilitation facility;skilled nursing facility    Discharge Facility/Level of Care Needs home with home health;nursing facility, skilled;rehabilitation facility    Provided Post Acute Provider List? N/A    N/A Provider List Comment [facility]    Provided Post Acute Provider Quality & Resource List? N/A    N/A Quality & Resource List Comment [facility]    Patient's Choice of Community Agency(s)  [facility] or [facility]               Discharge Plan     Row Name 06/16/22 1157       Plan    Plan Home with [facility] vs [facility] SNF, pending therapy and doctor recommendations    Patient/Family in Agreement with Plan yes    Plan Comments CCP met with patient and daughter Cindy at bedside introduced self and explained role. Patient confirmed the demographic information on her face sheet is accurate with the exception of her PCP. She states her current PCP is Dr. Doris Guillory with [facility]. Patient states she has worked with [facility] in the past. Patient states she has been to [facility] in the past for rehab. Patient denies owning or using and DME. Patient states she has 3 stairs with handrails on both sides to enter/exit her home. Patient denies any stairs within the home to maneuver. Patient states she uses the [facility] on [address] for her medicine. Patient states family can transport her home at discharge.              Continued Care and Services - Admitted Since 6/14/2022    Coordination has not been started for this encounter.          Demographic Summary     Row Name 06/16/22 1154       General Information    Admission Type inpatient    Arrived From home    Reason for Consult discharge planning    Preferred Language English               Functional Status     Row Name 06/16/22 1154       Functional Status    Usual Activity Tolerance good    Current Activity Tolerance moderate       Functional Status, IADL    Medications independent    Meal Preparation independent    Housekeeping independent    Laundry independent    Shopping independent       Mental Status    General Appearance WDL WDL       Mental Status Summary    Recent Changes in Mental Status/Cognitive Functioning no changes       Employment/    Employment Status retired               Psychosocial    No documentation.                Abuse/Neglect    No documentation.                Legal    No documentation.                 Substance Abuse    No documentation.                Patient Forms    No documentation.

## 2022-06-16 NOTE — PROGRESS NOTES
" LOS: 2 days     Name: Kenia BRIAN Sample  Age: 77 y.o.  Sex: female  :  1945  MRN: [id number]         Primary Care Physician: Brayden Jackson MD    Subjective   Subjective  Hip pain better today.  No new acute complaints.  Denies chest pain or shortness of breath.  Still some pain but reasonably controlled.  Discussed with her the thrombocytopenia for which she states she does have a history of \"liver problems\" but she cannot be more specific and I do not see any associated records.    Objective   Vital Signs  Temp:  [95.6 °F (35.3 °C)-98.8 °F (37.1 °C)] 96.8 °F (36 °C)  Heart Rate:  [50-98] 62  Resp:  [12-18] 16  BP: ()/(56-93) 123/68  Body mass index is 28.13 kg/m².    Objective:  General Appearance:  Comfortable and in no acute distress.    Vital signs: (most recent): Blood pressure 123/68, pulse 62, temperature 96.8 °F (36 °C), temperature source Oral, resp. rate 16, height 160 cm (63\"), weight 72 kg (158 lb 12.8 oz), SpO2 93 %.    Lungs:  Normal effort and normal respiratory rate.    Heart: Normal rate.  Regular rhythm.    Abdomen: Abdomen is soft.  Bowel sounds are normal.   There is no abdominal tenderness.     Extremities: There is no dependent edema or local swelling.    Neurological: Patient is alert and oriented to person, place and time.    Skin:  Warm and dry.              Results Review:       I reviewed the patient's new clinical results.    Results from last 7 days   Lab Units 22  0454 06/15/22  0437 22  2339   WBC 10*3/mm3 6.31 5.56 6.89   HEMOGLOBIN g/dL 10.1* 11.9* 13.6   PLATELETS 10*3/mm3 54* 62* 77*     Results from last 7 days   Lab Units 22  0454 06/15/22  0437 22  2339   SODIUM mmol/L 138 138 137   POTASSIUM mmol/L 3.9 3.5 3.6   CHLORIDE mmol/L 103 101 99   CO2 mmol/L 24.9 20.6* 23.0   BUN mg/dL 16 11 12   CREATININE mg/dL 0.87 0.74 0.83   CALCIUM mg/dL 8.5* 8.9 9.3   GLUCOSE mg/dL 133* 109* 127*                 Scheduled Meds:   lisinopril, 10 mg, Oral, " QAM  metoprolol tartrate, 25 mg, Oral, Q12H  pantoprazole, 40 mg, Oral, QAM  pravastatin, 20 mg, Oral, QAM  rOPINIRole, 0.25 mg, Oral, Nightly  sennosides-docusate, 2 tablet, Oral, BID  sodium chloride, 10 mL, Intravenous, Q12H      PRN Meds:   •  acetaminophen **OR** acetaminophen **OR** acetaminophen  •  albuterol  •  calcium carbonate  •  Morphine  •  ondansetron **OR** ondansetron  •  oxyCODONE-acetaminophen  •  sodium chloride  Continuous Infusions:  lactated ringers, 9 mL/hr, Last Rate: Stopped (06/15/22 1441)        Assessment & Plan   Active Hospital Problems    Diagnosis  POA   • **Closed comminuted intertrochanteric fracture of proximal end of right femur (HCC) [S72.141A]  Unknown   • Thrombocytopenia (HCC) [D69.6]  Unknown   • HTN (hypertension) [I10]  Unknown   • HLD (hyperlipidemia) [E78.5]  Unknown   • GERD (gastroesophageal reflux disease) [K21.9]  Unknown      Resolved Hospital Problems   No resolved problems to display.       Assessment & Plan    - POD 1 from right intramedullary nail  -Blood pressure stable and continue present regimen  -Has been weaned to room air this morning  -Thrombocytopenia appears to be acute on chronic based upon previous labs.  Currently trending down.  Still greater than 50,000 and will monitor.  If drops below 50 K then will consult hematology  -No heparin or anticoagulation for DVT prophylaxis given thrombocytopenia.  SCDs only for now  -PT and CCP for discharge planning        I wore full protective equipment throughout the patient encounter including eye protection and facemask.  Hand hygiene was performed before donning protective equipment and after removal when leaving the room.    Axel Jaeger MD  [facility]  06/16/22  09:39 EDT

## 2022-06-16 NOTE — PROGRESS NOTES
"CC: Hypertension    Interval History: No acute events overnight      Vital Signs  Temp:  [95.6 °F (35.3 °C)-98.8 °F (37.1 °C)] 96.4 °F (35.8 °C)  Heart Rate:  [50-98] 58  Resp:  [12-18] 16  BP: ()/(56-93) 88/58    Intake/Output Summary (Last 24 hours) at 6/16/2022 1157  Last data filed at 6/16/2022 0858  Gross per 24 hour   Intake 2260 ml   Output 900 ml   Net 1360 ml     Flowsheet Rows    Flowsheet Row First Filed Value   Admission Height 160 cm (63\") Documented at 06/15/2022 0142   Admission Weight 72 kg (158 lb 12.8 oz) Documented at 06/15/2022 0142          PHYSICAL EXAM:  General: No acute distress  Resp:NL Rate, symmetric chest expansion,unlabored, clear  CV:NL rate and rhythm, NL PMI, NL S1 and S2, no Murmur, no gallop, no rub, No JVD.   ABD:Nl sounds, no masses or tenderness, nondistended, no guarding or rebound  Neuro: alert,cooperative and oriented  Extr:Normal pedal pulses, No edema or cyanosis, moves all extremities      Results Review:    Results from last 7 days   Lab Units 06/16/22  0454   SODIUM mmol/L 138   POTASSIUM mmol/L 3.9   CHLORIDE mmol/L 103   CO2 mmol/L 24.9   BUN mg/dL 16   CREATININE mg/dL 0.87   GLUCOSE mg/dL 133*   CALCIUM mg/dL 8.5*         Results from last 7 days   Lab Units 06/16/22  0454   WBC 10*3/mm3 6.31   HEMOGLOBIN g/dL 10.1*   HEMATOCRIT % 29.6*   PLATELETS 10*3/mm3 54*                     I reviewed the patient's new clinical results.  I personally viewed and interpreted the patient's EKG/Telemetry data        Medication Review:   Meds reviewed    lactated ringers, 9 mL/hr, Last Rate: Stopped (06/15/22 1441)        Assessment/Plan    1. Mechanical fall, right hip fracture status post right intramedullary nail on 6/15/2022-surgery was uneventful  2. Hypertension-at time of my exam she was noted to have hypotension with systolic in the 80s.  Family verified patient was noted on lisinopril at home and only on metoprolol  Computer home medication list include " triamterene/hydrochlorothiazide, lisinopril and metoprolol- but she is only on metoprolol and Lasix at home.  3. Hyperlipidemia on statin  4. Abnormal EKG - reviewed tracing from [facility] - and her EKG is unchanged.   5. Stress 2016 without ischemia  6. Echo 2020 with preserved LVEF. No significant valve disease.        Patient will receive 1 L normal saline bolus with frequent blood pressure checks.  Hold antihypertensives for today and restart metoprolol if needed with bp >140/90    No further recommendation from cardiology standpoint.  I will sign off but call with any questions.    Discussed diagnosis and plan of care with patient, her daughter and her nurse.      Jerson Daniels MD  06/16/22  11:57 EDT

## 2022-06-16 NOTE — PLAN OF CARE
Goal Outcome Evaluation:  Plan of Care Reviewed With: patient        Progress: improving  Outcome Evaluation: 77/F POD1 right im nail after fall at home. VSS 2L O2. dressing intact w/ dried drainage. assist x1 w/ walker to bathroom to void. pain controlled w/ PRN pain meds. d/c plan pending. TM.

## 2022-06-16 NOTE — CASE MANAGEMENT/SOCIAL WORK
Continued Stay Note  [facility]     Patient Name: Kenia BRIAN Sample  MRN: [id number]  Today's Date: 6/16/2022    Admit Date: 6/14/2022     Discharge Plan     Row Name 06/16/22 1204       Plan    Plan Home with [facility] vs [facility], SNF, pending therapy and doctor recommendations    Patient/Family in Agreement with Plan yes    Plan Comments CCP made outbound call to [facility] this date at 12pm, left VM regarding referral. CCP made outbound call to [facility] with [facility] this date at 12:04pm, no answer left VM regarding referral.    Row Name 06/16/22 1157       Plan    Plan Home with [facility] vs [facility] SNF, pending therapy and doctor recommendations    Patient/Family in Agreement with Plan yes    Plan Comments CCP met with patient and daughter Cindy at bedside introduced self and explained role. Patient confirmed the demographic information on her face sheet is accurate with the exception of her PCP. She states her current PCP is Dr. Doris Guillory with [facility]. Patient states she has worked with [facility] in the past. Patient states she has been to [facility] in the past for rehab. Patient denies owning or using and DME. Patient states she has 3 stairs with handrails on both sides to enter/exit her home. Patient denies any stairs within the home to maneuver. Patient states she uses the [facility] on [address] for her medicine. Patient states family can transport her home at discharge.               Discharge Codes    No documentation.

## 2022-06-17 LAB
ANION GAP SERPL CALCULATED.3IONS-SCNC: 8.6 MMOL/L (ref 5–15)
BUN SERPL-MCNC: 18 MG/DL (ref 8–23)
BUN/CREAT SERPL: 17.8 (ref 7–25)
CALCIUM SPEC-SCNC: 8.6 MG/DL (ref 8.6–10.5)
CHLORIDE SERPL-SCNC: 103 MMOL/L (ref 98–107)
CO2 SERPL-SCNC: 25.4 MMOL/L (ref 22–29)
CREAT SERPL-MCNC: 1.01 MG/DL (ref 0.57–1)
DEPRECATED RDW RBC AUTO: 42.2 FL (ref 37–54)
EGFRCR SERPLBLD CKD-EPI 2021: 56.7 ML/MIN/1.73
ERYTHROCYTE [DISTWIDTH] IN BLOOD BY AUTOMATED COUNT: 11.7 % (ref 12.3–15.4)
GLUCOSE SERPL-MCNC: 113 MG/DL (ref 65–99)
HCT VFR BLD AUTO: 27.2 % (ref 34–46.6)
HGB BLD-MCNC: 9.3 G/DL (ref 12–15.9)
MCH RBC QN AUTO: 34.2 PG (ref 26.6–33)
MCHC RBC AUTO-ENTMCNC: 34.2 G/DL (ref 31.5–35.7)
MCV RBC AUTO: 100 FL (ref 79–97)
PLATELET # BLD AUTO: 59 10*3/MM3 (ref 140–450)
PMV BLD AUTO: 11.5 FL (ref 6–12)
POTASSIUM SERPL-SCNC: 4.9 MMOL/L (ref 3.5–5.2)
RBC # BLD AUTO: 2.72 10*6/MM3 (ref 3.77–5.28)
SODIUM SERPL-SCNC: 137 MMOL/L (ref 136–145)
WBC NRBC COR # BLD: 5.91 10*3/MM3 (ref 3.4–10.8)

## 2022-06-17 PROCEDURE — 85027 COMPLETE CBC AUTOMATED: CPT | Performed by: INTERNAL MEDICINE

## 2022-06-17 PROCEDURE — 25010000002 MORPHINE PER 10 MG: Performed by: ORTHOPAEDIC SURGERY

## 2022-06-17 PROCEDURE — 80048 BASIC METABOLIC PNL TOTAL CA: CPT | Performed by: INTERNAL MEDICINE

## 2022-06-17 PROCEDURE — 97530 THERAPEUTIC ACTIVITIES: CPT

## 2022-06-17 PROCEDURE — 97162 PT EVAL MOD COMPLEX 30 MIN: CPT

## 2022-06-17 RX ADMIN — Medication 10 ML: at 08:47

## 2022-06-17 RX ADMIN — PANTOPRAZOLE SODIUM 40 MG: 40 TABLET, DELAYED RELEASE ORAL at 08:46

## 2022-06-17 RX ADMIN — MORPHINE SULFATE 2 MG: 2 INJECTION, SOLUTION INTRAMUSCULAR; INTRAVENOUS at 17:45

## 2022-06-17 RX ADMIN — METOPROLOL TARTRATE 25 MG: 25 TABLET, FILM COATED ORAL at 08:46

## 2022-06-17 RX ADMIN — OXYCODONE AND ACETAMINOPHEN 1 TABLET: 325; 10 TABLET ORAL at 06:08

## 2022-06-17 RX ADMIN — LISINOPRIL 10 MG: 10 TABLET ORAL at 08:45

## 2022-06-17 RX ADMIN — OXYCODONE AND ACETAMINOPHEN 1 TABLET: 325; 10 TABLET ORAL at 12:57

## 2022-06-17 RX ADMIN — PRAVASTATIN SODIUM 20 MG: 20 TABLET ORAL at 08:45

## 2022-06-17 RX ADMIN — OXYCODONE AND ACETAMINOPHEN 1 TABLET: 325; 10 TABLET ORAL at 19:11

## 2022-06-17 RX ADMIN — Medication 10 ML: at 21:44

## 2022-06-17 RX ADMIN — DOCUSATE SODIUM 50MG AND SENNOSIDES 8.6MG 2 TABLET: 8.6; 5 TABLET, FILM COATED ORAL at 08:46

## 2022-06-17 RX ADMIN — DOCUSATE SODIUM 50MG AND SENNOSIDES 8.6MG 2 TABLET: 8.6; 5 TABLET, FILM COATED ORAL at 21:39

## 2022-06-17 RX ADMIN — METOPROLOL TARTRATE 25 MG: 25 TABLET, FILM COATED ORAL at 21:38

## 2022-06-17 NOTE — PLAN OF CARE
Goal Outcome Evaluation:  Plan of Care Reviewed With: patient        Progress: improving  Outcome Evaluation: 77/F POD2 right im nail after suffering a fall at home fx the right hip. a/Ox4, r/a, hypotensive and bp meds held this shift. dressing intact w/ dried drainage. assist x1 w/ walker to bathroom or bsc to void. pain controlled w/ PRN pain meds. d/c plan pending either  or [facility]. [facility].

## 2022-06-17 NOTE — CASE MANAGEMENT/SOCIAL WORK
Continued Stay Note  [facility]     Patient Name: Kenia BRIAN Sample  MRN: [id number]  Today's Date: 6/17/2022    Admit Date: 6/14/2022     Discharge Plan     Row Name 06/17/22 1709       Plan    Plan [facility] -- Accepted.    Patient/Family in Agreement with Plan yes    Plan Comments CCP called and left a message for the patient's daughter/Kaylie to f/u on the patient's Covid-19 Vaccination status. CCP left both [facility]'s telephone number as well as the Case Management Dept telephone number in case she calls back over the weekend. No other needs identified at this time.               Discharge Codes    No documentation.               Expected Discharge Date and Time     Expected Discharge Date Expected Discharge Time    Jun 17, 2022             Melissa JIMENEZ RN

## 2022-06-17 NOTE — PLAN OF CARE
Goal Outcome Evaluation:  Plan of Care Reviewed With: patient           Outcome Evaluation: Patient is a 76 yo female who presented after falling at home and was found to have a R femur fx. Pt is now POD2 R IM nailing. She lives with her  and is ind at baseline without use of AD. She has 4 ABHINAV home and reports 2 falls in the last 6 months. She presents today with decreased activity tolerance, strength, ROM, and overall functional decline. She completed bed mobility with CGA, STS to rwx requiring CGAx2, and ambulated 25ft using rwx requiring CGA-minAx2. Pt with antalgic gait pattern and decreased annelise. She will likely continue to benefit from skilled PT to address functional deficits. PT rec SNF at [place].

## 2022-06-17 NOTE — THERAPY EVALUATION
Patient Name: Kenia BRIAN Sample  : 1945    MRN: [id number]                              Today's Date: 2022       Admit Date: 2022    Visit Dx:     ICD-10-CM ICD-9-CM   1. Closed comminuted intertrochanteric fracture of right femur, initial encounter (formerly [facility])  S72.141A 820.21   2. Closed fracture of right hip, initial encounter (formerly [facility])  S72.001A 820.8     Patient Active Problem List   Diagnosis   • Snapping right knee   • HTN (hypertension)   • HLD (hyperlipidemia)   • GERD (gastroesophageal reflux disease)   • Closed comminuted intertrochanteric fracture of proximal end of right femur (HCC)   • Thrombocytopenia (HCC)     Past Medical History:   Diagnosis Date   • Anginal pain (formerly [facility])    • Arthritis    • Gastric reflux syndrome    • Hiatal hernia    • High cholesterol    • History of bronchitis    • Hypertension    • Irregular heart beat      Past Surgical History:   Procedure Laterality Date   • APPENDECTOMY     • EXTERNAL EAR SURGERY Right     had tumor excised behind right ear lobe benign   • FEMUR IM NAILING/RODDING Right 6/15/2022    Procedure: RIGHT HIP INTRAMEDULLARY NAILING/RODDING;  Surgeon: Marc Crawford MD;  Location: [facility];  Service: Orthopedics;  Laterality: Right;   • GALLBLADDER SURGERY     • HYSTERECTOMY     • KNEE ARTHROPLASTY Right    • KNEE ARTHROSCOPY Right    • IA REVISE KNEE JOINT REPLACE,1 PART Right 2016    Procedure: RT ILIOTIBIAL BAND RELEASE RT TOTAL KNEE POLY EXCHANGE;  Surgeon: Toy Adame MD;  Location: [facility];  Service: Orthopedics   • ROTATOR CUFF REPAIR Bilateral    • WRIST MASS EXCISION Right       General Information     Row Name 22 1528          Physical Therapy Time and Intention    Document Type evaluation  -CB     Mode of Treatment individual therapy;physical therapy  -CB     Row Name 22 1528          General Information    Patient Profile Reviewed yes  -CB     Prior Level of Function independent:;gait;transfer;bed mobility  -CB      Existing Precautions/Restrictions fall  -CB     Barriers to Rehab none identified  -CB     Row Name 06/17/22 1528          Living Environment    People in Home spouse  -CB     Row Name 06/17/22 1528          Home Main Entrance    Number of Stairs, Main Entrance four  -CB     Stair Railings, Main Entrance railings safe and in good condition  -CB     Row Name 06/17/22 1528          Stairs Within Home, Primary    Number of Stairs, Within Home, Primary none  -CB     Row Name 06/17/22 1528          Cognition    Orientation Status (Cognition) oriented x 3  -CB     Row Name 06/17/22 1528          Safety Issues, Functional Mobility    Impairments Affecting Function (Mobility) balance;endurance/activity tolerance;pain;strength;range of motion (ROM)  -CB           User Key  (r) = Recorded By, (t) = Taken By, (c) = Cosigned By    Initials Name Provider Type    Pennie Choudhury PT Physical Therapist               Mobility     Row Name 06/17/22 1529          Bed Mobility    Bed Mobility supine-sit  -CB     Supine-Sit [place] (Bed Mobility) contact guard;verbal cues  -CB     Assistive Device (Bed Mobility) head of bed elevated;bed rails  -CB     Row Name 06/17/22 1529          Sit-Stand Transfer    Sit-Stand [place] (Transfers) contact guard;2 person assist;verbal cues;nonverbal cues (demo/gesture)  -CB     Assistive Device (Sit-Stand Transfers) walker, front-wheeled  -CB     Comment, (Sit-Stand Transfer) cues for UE placement  -CB     Row Name 06/17/22 1529          Gait/Stairs (Locomotion)    [place] Level (Gait) contact guard;minimum assist (75% patient effort);2 person assist;verbal cues;nonverbal cues (demo/gesture)  -CB     Assistive Device (Gait) walker, front-wheeled  -CB     Distance in Feet (Gait) 25ft  -CB     Deviations/Abnormal Patterns (Gait) gait speed decreased;stride length decreased;antalgic  -CB     Right Sided Gait Deviations weight shift ability decreased  -CB     Comment, (Gait/Stairs) slow  annelise and antalgic gait pattern  -CB     Row Name 06/17/22 1529          Mobility    Extremity Weight-bearing Status right lower extremity  -CB     Right Lower Extremity (Weight-bearing Status) weight-bearing as tolerated (WBAT)  -CB           User Key  (r) = Recorded By, (t) = Taken By, (c) = Cosigned By    Initials Name Provider Type    CB Pennie Moya, PT Physical Therapist               Obj/Interventions     Row Name 06/17/22 1531          Range of Motion Comprehensive    Comment, General Range of Motion R hip ROM limited by pain LLE WFL  -CB     Row Name 06/17/22 1531          Strength Comprehensive (MMT)    Comment, General Manual Muscle Testing (MMT) Assessment post op weakness  -CB     Row Name 06/17/22 1531          Motor Skills    Therapeutic Exercise --  AP, marching, LAQ x10 reps; minimal hip flexion RLE  -CB     Row Name 06/17/22 1531          Balance    Balance Assessment standing static balance;standing dynamic balance  -CB     Static Standing Balance contact guard;verbal cues  -CB     Dynamic Standing Balance contact guard;minimal assist;verbal cues  -CB     Position/Device Used, Standing Balance supported;walker, front-wheeled  -CB     Balance Interventions sitting;standing;sit to stand;supported;static;dynamic;minimal challenge  -CB     Row Name 06/17/22 1531          Sensory Assessment (Somatosensory)    Sensory Assessment (Somatosensory) sensation intact  -CB           User Key  (r) = Recorded By, (t) = Taken By, (c) = Cosigned By    Initials Name Provider Type    CB Pennie Moya, PT Physical Therapist               Goals/Plan     [facility] Name 06/17/22 1534          Bed Mobility Goal 1 (PT)    Activity/Assistive Device (Bed Mobility Goal 1, PT) bed mobility activities, all  -CB     [place] Level/Cues Needed (Bed Mobility Goal 1, PT) standby assist  -CB     Time Frame (Bed Mobility Goal 1, PT) long term goal (LTG);1 week  -CB     Row Name 06/17/22 1534          Transfer Goal 1 (PT)     Activity/Assistive Device (Transfer Goal 1, PT) sit-to-stand/stand-to-sit;bed-to-chair/chair-to-bed  -CB     [place] Level/Cues Needed (Transfer Goal 1, PT) standby assist  -CB     Time Frame (Transfer Goal 1, PT) long term goal (LTG);1 week  -CB     Row Name 06/17/22 1534          Gait Training Goal 1 (PT)    Activity/Assistive Device (Gait Training Goal 1, PT) gait (walking locomotion);walker, rolling  -CB     [place] Level (Gait Training Goal 1, PT) contact guard required  -CB     Distance (Gait Training Goal 1, PT) 75ft  -CB     Time Frame (Gait Training Goal 1, PT) long term goal (LTG);1 week  -CB     Row Name 06/17/22 1530          Therapy Assessment/Plan (PT)    Planned Therapy Interventions (PT) balance training;bed mobility training;gait training;home exercise program;patient/family education;strengthening;ROM (range of motion);transfer training  -CB           User Key  (r) = Recorded By, (t) = Taken By, (c) = Cosigned By    Initials Name Provider Type    CB Pennie Moya, PT Physical Therapist               Clinical Impression     Row Name 06/17/22 1530          Pain    Pretreatment Pain Rating 8/10  -CB     Posttreatment Pain Rating 8/10  -CB     Pain Location - Side/Orientation Right  -CB     Pain Location incisional  -CB     Pain Location - hip  -CB     Pain Intervention(s) Repositioned;Ambulation/increased activity  -CB     Row Name 06/17/22 1536          Plan of Care Review    Plan of Care Reviewed With patient  -CB     Outcome Evaluation Patient is a 78 yo female who presented after falling at home and was found to have a R femur fx. Pt is now POD2 R IM nailing. She lives with her  and is ind at baseline without use of AD. She has 4 ABHINAV home and reports 2 falls in the last 6 months. She presents today with decreased activity tolerance, strength, ROM, and overall functional decline. She completed bed mobility with CGA, STS to rwx requiring CGAx2, and ambulated 25ft using rwx  requiring CGA-minAx2. Pt with antalgic gait pattern and decreased annelise. She will likely continue to benefit from skilled PT to address functional deficits. PT rec SNF at [place].  -CB     Row Name 06/17/22 1531          Therapy Assessment/Plan (PT)    Rehab Potential (PT) good, to achieve stated therapy goals  -CB     Criteria for Skilled Interventions Met (PT) yes  -CB     Therapy Frequency (PT) 6 times/wk  -CB     Row Name 06/17/22 1531          Positioning and Restraints    Pre-Treatment Position in bed  -CB     Post Treatment Position chair  -CB     In Chair notified nsg;reclined;call light within reach;encouraged to call for assist;exit alarm on;legs elevated;with family/caregiver  -CB           User Key  (r) = Recorded By, (t) = Taken By, (c) = Cosigned By    Initials Name Provider Type    Pennie Choudhury, PT Physical Therapist               Outcome Measures     Row Name 06/17/22 1535 06/17/22 0730       How much help from another person do you currently need...    Turning from your back to your side while in flat bed without using bedrails? 3  -CB 1  -BC    Moving from lying on back to sitting on the side of a flat bed without bedrails? 3  -CB 1  -BC    Moving to and from a bed to a chair (including a wheelchair)? 3  -CB 1  -BC    Standing up from a chair using your arms (e.g., wheelchair, bedside chair)? 3  -CB 1  -BC    Climbing 3-5 steps with a railing? 1  -CB 1  -BC    To walk in hospital room? 3  -CB 1  -BC    AM-PAC 6 Clicks Score (PT) 16  -CB 6  -BC    Highest level of mobility 5 --> Static standing  -CB 2 --> Bed activities/dependent transfer  -BC    Row Name 06/17/22 1535          Functional Assessment    Outcome Measure Options AM-PAC 6 Clicks Basic Mobility (PT)  -CB           User Key  (r) = Recorded By, (t) = Taken By, (c) = Cosigned By    Initials Name Provider Type    Pennie Choudhury, PT Physical Therapist    Philomena Smith RN Registered Nurse                             Physical Therapy  Education                 Title: PT OT SLP Therapies (In Progress)     Topic: Physical Therapy (In Progress)     Point: Mobility training (Done)     Learning Progress Summary           Patient Acceptance, E,TB,D, VU,NR by  at 6/17/2022 1535                   Point: Home exercise program (Done)     Learning Progress Summary           Patient Acceptance, E,TB,D, VU,NR by  at 6/17/2022 1535                   Point: Body mechanics (Not Started)     Learner Progress:  Not documented in this visit.          Point: Precautions (Not Started)     Learner Progress:  Not documented in this visit.                      User Key     Initials Effective Dates Name Provider Type Discipline     10/22/21 -  Pennie Moya, PT Physical Therapist PT              PT Recommendation and Plan  Planned Therapy Interventions (PT): balance training, bed mobility training, gait training, home exercise program, patient/family education, strengthening, ROM (range of motion), transfer training  Plan of Care Reviewed With: patient  Outcome Evaluation: Patient is a 78 yo female who presented after falling at home and was found to have a R femur fx. Pt is now POD2 R IM nailing. She lives with her  and is ind at baseline without use of AD. She has 4 ABHINAV home and reports 2 falls in the last 6 months. She presents today with decreased activity tolerance, strength, ROM, and overall functional decline. She completed bed mobility with CGA, STS to rwx requiring CGAx2, and ambulated 25ft using rwx requiring CGA-minAx2. Pt with antalgic gait pattern and decreased annelise. She will likely continue to benefit from skilled PT to address functional deficits. PT rec SNF at [place].     Time Calculation:    PT Charges     Row Name 06/17/22 4857             Time Calculation    Start Time 1505  -CB      Stop Time 1520  -CB      Time Calculation (min) 15 min  -CB      PT Received On 06/17/22  -CB      PT - Next Appointment 06/18/22  -      PT Goal Re-Cert  Due Date 06/24/22  -CB              Time Calculation- PT    Total Timed Code Minutes- PT 8 minute(s)  -CB              Timed Charges    48508 - PT Therapeutic Activity Minutes 8  -CB              Total Minutes    Timed Charges Total Minutes 8  -CB       Total Minutes 8  -CB            User Key  (r) = Recorded By, (t) = Taken By, (c) = Cosigned By    Initials Name Provider Type    CB Pennie Moya, PT Physical Therapist              Therapy Charges for Today     Code Description Service Date Service Provider Modifiers Qty    16593627385 HC PT THERAPEUTIC ACT EA 15 MIN 6/17/2022 Pennie Moya, PT GP 1    63930990194 HC PT EVAL MOD COMPLEXITY 3 6/17/2022 Pennie Moya, PT GP 1    63667569314 HC PT THER SUPP EA 15 MIN 6/17/2022 Pennie Moya, PT GP 1          PT G-Codes  Outcome Measure Options: AM-PAC 6 Clicks Basic Mobility (PT)  AM-PAC 6 Clicks Score (PT): 16    Pennie Moya PT  6/17/2022

## 2022-06-17 NOTE — PROGRESS NOTES
" LOS: 3 days     Name: Kenia BRIAN Sample  Age: 77 y.o.  Sex: female  :  1945  MRN: [id number]         Primary Care Physician: Brayden Jackson MD    Subjective   Subjective  Still having some hip pain but overall better.  Denies shortness of breath or chest pain.    Objective   Vital Signs  Temp:  [96.4 °F (35.8 °C)-98.4 °F (36.9 °C)] 97.4 °F (36.3 °C)  Heart Rate:  [58-72] 71  Resp:  [16-20] 16  BP: ()/(52-68) 120/68  Body mass index is 28.13 kg/m².    Objective:  General Appearance:  Comfortable and in no acute distress.    Vital signs: (most recent): Blood pressure 120/68, pulse 71, temperature 97.4 °F (36.3 °C), temperature source Oral, resp. rate 16, height 160 cm (63\"), weight 72 kg (158 lb 12.8 oz), SpO2 93 %.    Lungs:  Normal effort and normal respiratory rate.    Heart: Normal rate.  Regular rhythm.    Abdomen: Abdomen is soft.  Bowel sounds are normal.   There is no abdominal tenderness.     Extremities: There is no dependent edema or local swelling.    Neurological: Patient is alert and oriented to person, place and time.    Skin:  Warm and dry.              Results Review:       I reviewed the patient's new clinical results.    Results from last 7 days   Lab Units 06/17/22  0438 06/16/22  0454 06/15/22  0437 06/14/22  2339   WBC 10*3/mm3 5.91 6.31 5.56 6.89   HEMOGLOBIN g/dL 9.3* 10.1* 11.9* 13.6   PLATELETS 10*3/mm3 59* 54* 62* 77*     Results from last 7 days   Lab Units 06/17/22  0438 06/16/22  0454 06/15/22  0437 06/14/22  2339   SODIUM mmol/L 137 138 138 137   POTASSIUM mmol/L 4.9 3.9 3.5 3.6   CHLORIDE mmol/L 103 103 101 99   CO2 mmol/L 25.4 24.9 20.6* 23.0   BUN mg/dL 18 16 11 12   CREATININE mg/dL 1.01* 0.87 0.74 0.83   CALCIUM mg/dL 8.6 8.5* 8.9 9.3   GLUCOSE mg/dL 113* 133* 109* 127*                 Scheduled Meds:   metoprolol tartrate, 25 mg, Oral, Q12H  pantoprazole, 40 mg, Oral, QAM  pravastatin, 20 mg, Oral, QAM  rOPINIRole, 0.25 mg, Oral, Nightly  sennosides-docusate, 2 tablet, " Oral, BID  sodium chloride, 10 mL, Intravenous, Q12H      PRN Meds:   •  acetaminophen **OR** acetaminophen **OR** acetaminophen  •  albuterol  •  calcium carbonate  •  Morphine  •  ondansetron **OR** ondansetron  •  oxyCODONE-acetaminophen  •  sodium chloride  Continuous Infusions:       Assessment & Plan   Active Hospital Problems    Diagnosis  POA   • **Closed comminuted intertrochanteric fracture of proximal end of right femur (HCC) [S72.141A]  Unknown   • Thrombocytopenia (HCC) [D69.6]  Unknown   • HTN (hypertension) [I10]  Unknown   • HLD (hyperlipidemia) [E78.5]  Unknown   • GERD (gastroesophageal reflux disease) [K21.9]  Unknown      Resolved Hospital Problems   No resolved problems to display.       Assessment & Plan       - POD 2 from right intramedullary nail  -Blood pressure stable and continue present regimen  -Has been weaned to room air this morning  -Thrombocytopenia appears to be acute on chronic based upon previous labs.    Trending back up. Still greater than 50,000 and will monitor.   -No heparin or anticoagulation for DVT prophylaxis given thrombocytopenia.  SCDs only for now  -PT and CCP for discharge planning      I wore full protective equipment throughout the patient encounter including eye protection and facemask.  Hand hygiene was performed before donning protective equipment and after removal when leaving the room.    Axel Jaeger MD  [facility]  06/17/22  08:54 EDT

## 2022-06-17 NOTE — CASE MANAGEMENT/SOCIAL WORK
Continued Stay Note  [facility]     Patient Name: Kenia BRIAN Sample  MRN: [id number]  Today's Date: 6/17/2022    Admit Date: 6/14/2022     Discharge Plan     Row Name 06/17/22 1053       Plan    Plan [facility] -- Accepted.    Patient/Family in Agreement with Plan yes    Plan Comments Spoke with Sarah/Maciej who has accepted the pt and has a SNF bed for her at [facility] tomorrow/Sunday. Updated the patient who's agreeable and plans on [facility]. Sarah said the patient is overdue to receive her 2nd Booster Shot (4th dose in total) of the Covid-19 Vaccine. Discussed with the patient who said she doesnt believe she's received the 2nd booster shot, but asked CCP to call her daughter/Kaylie. CCP discussed with Kaylie who said she believes the patient has receveid the 2nd booster shot - she will look into the pt's records and CCP will f/u with her. Kaylie is also agreeable with the pt discharging to [facility].               Discharge Codes    No documentation.               Expected Discharge Date and Time     Expected Discharge Date Expected Discharge Time    Jun 17, 2022             Melissa JIMENEZ RN

## 2022-06-18 VITALS
RESPIRATION RATE: 22 BRPM | TEMPERATURE: 98.8 F | SYSTOLIC BLOOD PRESSURE: 157 MMHG | HEART RATE: 70 BPM | HEIGHT: 63 IN | BODY MASS INDEX: 28.14 KG/M2 | OXYGEN SATURATION: 93 % | WEIGHT: 158.8 LBS | DIASTOLIC BLOOD PRESSURE: 81 MMHG

## 2022-06-18 LAB
ANION GAP SERPL CALCULATED.3IONS-SCNC: 10.6 MMOL/L (ref 5–15)
BUN SERPL-MCNC: 14 MG/DL (ref 8–23)
BUN/CREAT SERPL: 15.4 (ref 7–25)
CALCIUM SPEC-SCNC: 8.6 MG/DL (ref 8.6–10.5)
CHLORIDE SERPL-SCNC: 101 MMOL/L (ref 98–107)
CO2 SERPL-SCNC: 24.4 MMOL/L (ref 22–29)
CREAT SERPL-MCNC: 0.91 MG/DL (ref 0.57–1)
DEPRECATED RDW RBC AUTO: 43 FL (ref 37–54)
EGFRCR SERPLBLD CKD-EPI 2021: 64.3 ML/MIN/1.73
ERYTHROCYTE [DISTWIDTH] IN BLOOD BY AUTOMATED COUNT: 11.9 % (ref 12.3–15.4)
GLUCOSE SERPL-MCNC: 111 MG/DL (ref 65–99)
HCT VFR BLD AUTO: 27.4 % (ref 34–46.6)
HGB BLD-MCNC: 9.3 G/DL (ref 12–15.9)
MCH RBC QN AUTO: 33.8 PG (ref 26.6–33)
MCHC RBC AUTO-ENTMCNC: 33.9 G/DL (ref 31.5–35.7)
MCV RBC AUTO: 99.6 FL (ref 79–97)
PLATELET # BLD AUTO: 74 10*3/MM3 (ref 140–450)
PMV BLD AUTO: 10.7 FL (ref 6–12)
POTASSIUM SERPL-SCNC: 4 MMOL/L (ref 3.5–5.2)
RBC # BLD AUTO: 2.75 10*6/MM3 (ref 3.77–5.28)
SODIUM SERPL-SCNC: 136 MMOL/L (ref 136–145)
WBC NRBC COR # BLD: 6.99 10*3/MM3 (ref 3.4–10.8)

## 2022-06-18 PROCEDURE — 80048 BASIC METABOLIC PNL TOTAL CA: CPT | Performed by: INTERNAL MEDICINE

## 2022-06-18 PROCEDURE — 85027 COMPLETE CBC AUTOMATED: CPT | Performed by: INTERNAL MEDICINE

## 2022-06-18 RX ORDER — ACETAMINOPHEN 325 MG/1
650 TABLET ORAL EVERY 4 HOURS PRN
Start: 2022-06-18

## 2022-06-18 RX ORDER — OXYCODONE AND ACETAMINOPHEN 10; 325 MG/1; MG/1
1 TABLET ORAL EVERY 6 HOURS PRN
Qty: 12 TABLET | Refills: 0 | Status: SHIPPED | OUTPATIENT
Start: 2022-06-18

## 2022-06-18 RX ADMIN — PRAVASTATIN SODIUM 20 MG: 20 TABLET ORAL at 09:21

## 2022-06-18 RX ADMIN — METOPROLOL TARTRATE 25 MG: 25 TABLET, FILM COATED ORAL at 09:22

## 2022-06-18 RX ADMIN — PANTOPRAZOLE SODIUM 40 MG: 40 TABLET, DELAYED RELEASE ORAL at 06:02

## 2022-06-18 RX ADMIN — DOCUSATE SODIUM 50MG AND SENNOSIDES 8.6MG 2 TABLET: 8.6; 5 TABLET, FILM COATED ORAL at 09:22

## 2022-06-18 RX ADMIN — Medication 10 ML: at 09:23

## 2022-06-18 RX ADMIN — OXYCODONE AND ACETAMINOPHEN 1 TABLET: 325; 10 TABLET ORAL at 09:22

## 2022-06-18 NOTE — PLAN OF CARE
Stable. A/O. No s/s distress. POD#2 R hip nailing. WBAT. PRN Percocet for pain control. Assist x1 w/ walker. BRP. Plan to d/c [facility].

## 2022-06-18 NOTE — DISCHARGE SUMMARY
Date of Admission: 6/14/2022  Date of Discharge:  6/18/2022  Primary Care Physician: Brayden Jackson MD     Discharge Diagnosis:  Active Hospital Problems    Diagnosis  POA   • **Closed comminuted intertrochanteric fracture of proximal end of right femur (HCC) [S72.141A]  Unknown   • Thrombocytopenia (HCC) [D69.6]  Unknown   • HTN (hypertension) [I10]  Unknown   • HLD (hyperlipidemia) [E78.5]  Unknown   • GERD (gastroesophageal reflux disease) [K21.9]  Unknown      Resolved Hospital Problems   No resolved problems to display.       DETAILS OF HOSPITAL STAY     Pertinent Test Results and Procedures Performed    X-ray of the right hip and pelvis showed intertrochanteric right femoral fracture    Chest x-ray unremarkable    Hospital Course  This is a pleasant 77-year-old female who suffered a mechanical fall at home resulting in right intertrochanteric hip fracture.  Please see H&P for full details of admission.  She was evaluated by orthopedic surgery and underwent right intramedullary nailing.  Perioperatively she did have acute on chronic thrombocytopenia with platelets dropping as low as around 54k.  For this reason, chemical DVT prophylaxis was avoided.  Her platelets are now trending up and in the 70k range without any signs of bleeding.  She was evaluated perioperatively by cardiology and did not require any additional cardiac work-up.  Postoperatively she did have some hypotension which resolved with IV fluids.  She is now medically stable, has been approved for subacute rehab and will transition there today in stable condition.  She will follow-up with orthopedic surgery and 2 to 3 weeks.    Physical Exam at Discharge:  General: No acute distress, AAOx3  HEENT: EOMI, PERRL  Cardiovascular: +s1 and s2, RRR  Lungs: No rhonchi or wheezing  Abdomen: soft, nontender    Consults:   Consults     Date and Time Order Name Status Description    6/15/2022  7:08 AM Inpatient Cardiology Consult Completed     6/15/2022   2:31 AM Inpatient Orthopedic Surgery Consult Completed     6/14/2022 10:55 PM Ortho (on-call MD unless specified) Completed     6/14/2022 10:55 PM [facility] (on-call MD unless specified) Details              Condition on Discharge: Stable, improved    Discharge Disposition  Skilled Nursing Facility (DC - External)    Discharge Medications     Discharge Medications      New Medications      Instructions Start Date   acetaminophen 325 MG tablet  Commonly known as: TYLENOL   650 mg, Oral, Every 4 Hours PRN         Changes to Medications      Instructions Start Date   oxyCODONE-acetaminophen  MG per tablet  Commonly known as: PERCOCET  What changed: when to take this   1 tablet, Oral, Every 6 Hours PRN         Continue These Medications      Instructions Start Date   albuterol sulfate  (90 Base) MCG/ACT inhaler  Commonly known as: PROVENTIL HFA;VENTOLIN HFA;PROAIR HFA   2 puffs, Inhalation, Every 4 Hours PRN      furosemide 20 MG tablet  Commonly known as: LASIX   20 mg, Oral, 2 Times Daily      Magnesium 500 MG capsule   500 mg, Oral, 2 Times Daily      metoprolol tartrate 25 MG tablet  Commonly known as: LOPRESSOR   25 mg, Oral, 2 Times Daily      omeprazole 40 MG capsule  Commonly known as: priLOSEC   40 mg, Oral, Every Morning      pravastatin 20 MG tablet  Commonly known as: PRAVACHOL   20 mg, Oral, Every Morning      rOPINIRole 0.25 MG tablet  Commonly known as: REQUIP   0.25 mg, Oral, Nightly, Take 1 hour before bedtime.      sennosides-docusate 8.6-50 MG per tablet  Commonly known as: PERICOLACE   2 tablets, Oral, 2 Times Daily      vitamin B-12 1000 MCG tablet  Commonly known as: CYANOCOBALAMIN   1,000 mcg, Oral, Daily         Stop These Medications    lisinopril 10 MG tablet  Commonly known as: PRINIVIL,ZESTRIL     meloxicam 15 MG tablet  Commonly known as: MOBIC     triamterene-hydrochlorothiazide 75-50 MG per tablet  Commonly known as: MAXZIDE            Discharge Diet:   Diet Instructions     Diet:  Regular      Discharge Diet: Regular          Activity at Discharge:   Activity Instructions     Activity as Tolerated            Follow-up Appointments  No future appointments.  Additional Instructions for the Follow-ups that You Need to Schedule     Discharge Follow-up with PCP   As directed       Currently Documented PCP:    Brayden Jackson MD    PCP Phone Number:    [phone]     Follow Up Details: 1 week after rehab         Discharge Follow-up with Specified Provider: Dr. Crawford of orthopedic surgery in 2-3 weeks   As directed      To: Dr. Crawford of orthopedic surgery in 2-3 weeks               I have examined and discussed discharge planning with the patient today.    I wore full protective equipment throughout the patient encounter including eye protection and facemask.  Hand hygiene was performed before donning protective equipment and after removal when leaving the room.     Axel Jaeger MD  06/18/22  09:10 EDT    Time: Discharge greater than 30 min

## 2022-06-20 NOTE — CASE MANAGEMENT/SOCIAL WORK
Case Management Discharge Note      Final Note: [facility] SNF.    Provided Post Acute Provider List?: N/A  N/A Provider List Comment: Patient [facility] for [facility]  Provided Post Acute Provider Quality & Resource List?: N/A  N/A Quality & Resource List Comment: Patient requests [facility] for [facility]    Selected Continued Care - Discharged on 6/18/2022 Admission date: 6/14/2022 - Discharge disposition: Skilled Nursing Facility (DC - External)    Destination Coordination complete.    Service Provider Selected Services Address Phone Fax Patient Preferred    [facility]  Skilled Nursing [address] [id number] [phone] --          Durable Medical Equipment    No services have been selected for the patient.              Dialysis/Infusion    No services have been selected for the patient.              Home Medical Care Coordination complete.    Service Provider Selected Services Address Phone Fax Patient Preferred    [facility]  Home Health Services [address] [address] [phone] --          Therapy    No services have been selected for the patient.              Community Resources    No services have been selected for the patient.              Community & DME    No services have been selected for the patient.                       Final Discharge Disposition Code: 03 - skilled nursing facility (SNF)

## 2022-07-05 ENCOUNTER — HOME HEALTH ADMISSION (OUTPATIENT)
Dept: HOME HEALTH SERVICES | Facility: HOME HEALTHCARE | Age: 77
End: 2022-07-05

## 2023-12-03 ENCOUNTER — APPOINTMENT (OUTPATIENT)
Dept: GENERAL RADIOLOGY | Facility: HOSPITAL | Age: 78
End: 2023-12-03
Payer: MEDICARE

## 2023-12-03 PROCEDURE — 36415 COLL VENOUS BLD VENIPUNCTURE: CPT

## 2023-12-03 PROCEDURE — 73502 X-RAY EXAM HIP UNI 2-3 VIEWS: CPT

## 2023-12-03 PROCEDURE — 99284 EMERGENCY DEPT VISIT MOD MDM: CPT

## 2023-12-03 PROCEDURE — 72110 X-RAY EXAM L-2 SPINE 4/>VWS: CPT

## 2023-12-03 RX ORDER — HYDROCODONE BITARTRATE AND ACETAMINOPHEN 5; 325 MG/1; MG/1
1 TABLET ORAL ONCE
Status: COMPLETED | OUTPATIENT
Start: 2023-12-03 | End: 2023-12-04

## 2023-12-04 ENCOUNTER — APPOINTMENT (OUTPATIENT)
Dept: CT IMAGING | Facility: HOSPITAL | Age: 78
End: 2023-12-04
Payer: MEDICARE

## 2023-12-04 ENCOUNTER — HOSPITAL ENCOUNTER (OUTPATIENT)
Facility: HOSPITAL | Age: 78
Setting detail: OBSERVATION
Discharge: HOME HEALTH | End: 2023-12-06
Attending: EMERGENCY MEDICINE | Admitting: INTERNAL MEDICINE
Payer: MEDICARE

## 2023-12-04 DIAGNOSIS — M43.9 COMPRESSION DEFORMITY OF VERTEBRA: ICD-10-CM
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
## 2023-12-04 DIAGNOSIS — M51.36 DEGENERATIVE DISC DISEASE, LUMBAR: ICD-10-CM

## 2023-12-04 DIAGNOSIS — R26.2 UNABLE TO AMBULATE: Primary | ICD-10-CM

## 2023-12-04 DIAGNOSIS — S16.1XXA STRAIN OF NECK MUSCLE, INITIAL ENCOUNTER: ICD-10-CM

## 2023-12-04 DIAGNOSIS — M54.50 ACUTE MIDLINE LOW BACK PAIN WITHOUT SCIATICA: ICD-10-CM

## 2023-12-04 DIAGNOSIS — M25.551 RIGHT HIP PAIN: ICD-10-CM

## 2023-12-04 LAB
ANION GAP SERPL CALCULATED.3IONS-SCNC: 12 MMOL/L (ref 5–15)
ANION GAP SERPL CALCULATED.3IONS-SCNC: 13 MMOL/L (ref 5–15)
BASOPHILS # BLD AUTO: 0.03 10*3/MM3 (ref 0–0.2)
BASOPHILS NFR BLD AUTO: 0.5 % (ref 0–1.5)
BUN SERPL-MCNC: 14 MG/DL (ref 8–23)
BUN SERPL-MCNC: 16 MG/DL (ref 8–23)
BUN/CREAT SERPL: 17.9 (ref 7–25)
BUN/CREAT SERPL: 19.8 (ref 7–25)
CALCIUM SPEC-SCNC: 9.2 MG/DL (ref 8.6–10.5)
CALCIUM SPEC-SCNC: 9.2 MG/DL (ref 8.6–10.5)
CHLORIDE SERPL-SCNC: 101 MMOL/L (ref 98–107)
CHLORIDE SERPL-SCNC: 99 MMOL/L (ref 98–107)
CO2 SERPL-SCNC: 27 MMOL/L (ref 22–29)
CO2 SERPL-SCNC: 28 MMOL/L (ref 22–29)
CREAT SERPL-MCNC: 0.78 MG/DL (ref 0.57–1)
CREAT SERPL-MCNC: 0.81 MG/DL (ref 0.57–1)
DEPRECATED RDW RBC AUTO: 44.8 FL (ref 37–54)
DEPRECATED RDW RBC AUTO: 45.4 FL (ref 37–54)
EGFRCR SERPLBLD CKD-EPI 2021: 73.5 ML/MIN/1.73
EGFRCR SERPLBLD CKD-EPI 2021: 76.9 ML/MIN/1.73
EOSINOPHIL # BLD AUTO: 0.1 10*3/MM3 (ref 0–0.4)
EOSINOPHIL NFR BLD AUTO: 1.6 % (ref 0.3–6.2)
ERYTHROCYTE [DISTWIDTH] IN BLOOD BY AUTOMATED COUNT: 12.6 % (ref 12.3–15.4)
ERYTHROCYTE [DISTWIDTH] IN BLOOD BY AUTOMATED COUNT: 12.8 % (ref 12.3–15.4)
GLUCOSE SERPL-MCNC: 112 MG/DL (ref 65–99)
GLUCOSE SERPL-MCNC: 123 MG/DL (ref 65–99)
HCT VFR BLD AUTO: 36 % (ref 34–46.6)
HCT VFR BLD AUTO: 37.1 % (ref 34–46.6)
HGB BLD-MCNC: 12.4 G/DL (ref 12–15.9)
HGB BLD-MCNC: 12.5 G/DL (ref 12–15.9)
LYMPHOCYTES # BLD AUTO: 1.68 10*3/MM3 (ref 0.7–3.1)
LYMPHOCYTES NFR BLD AUTO: 27.2 % (ref 19.6–45.3)
MCH RBC QN AUTO: 32.5 PG (ref 26.6–33)
MCH RBC QN AUTO: 34 PG (ref 26.6–33)
MCHC RBC AUTO-ENTMCNC: 33.4 G/DL (ref 31.5–35.7)
MCHC RBC AUTO-ENTMCNC: 34.7 G/DL (ref 31.5–35.7)
MCV RBC AUTO: 97.4 FL (ref 79–97)
MCV RBC AUTO: 97.8 FL (ref 79–97)
MONOCYTES # BLD AUTO: 0.44 10*3/MM3 (ref 0.1–0.9)
MONOCYTES NFR BLD AUTO: 7.1 % (ref 5–12)
NEUTROPHILS NFR BLD AUTO: 3.91 10*3/MM3 (ref 1.7–7)
NEUTROPHILS NFR BLD AUTO: 63.3 % (ref 42.7–76)
PLATELET # BLD AUTO: 70 10*3/MM3 (ref 140–450)
PLATELET # BLD AUTO: 80 10*3/MM3 (ref 140–450)
PMV BLD AUTO: 10.4 FL (ref 6–12)
PMV BLD AUTO: 10.8 FL (ref 6–12)
POTASSIUM SERPL-SCNC: 3.5 MMOL/L (ref 3.5–5.2)
POTASSIUM SERPL-SCNC: 3.8 MMOL/L (ref 3.5–5.2)
RBC # BLD AUTO: 3.68 10*6/MM3 (ref 3.77–5.28)
RBC # BLD AUTO: 3.81 10*6/MM3 (ref 3.77–5.28)
SODIUM SERPL-SCNC: 139 MMOL/L (ref 136–145)
SODIUM SERPL-SCNC: 141 MMOL/L (ref 136–145)
WBC NRBC COR # BLD AUTO: 3.74 10*3/MM3 (ref 3.4–10.8)
WBC NRBC COR # BLD AUTO: 6.18 10*3/MM3 (ref 3.4–10.8)

## 2023-12-04 PROCEDURE — 80048 BASIC METABOLIC PNL TOTAL CA: CPT | Performed by: NURSE PRACTITIONER

## 2023-12-04 PROCEDURE — 96375 TX/PRO/DX INJ NEW DRUG ADDON: CPT

## 2023-12-04 PROCEDURE — 85025 COMPLETE CBC W/AUTO DIFF WBC: CPT | Performed by: PHYSICIAN ASSISTANT

## 2023-12-04 PROCEDURE — 96376 TX/PRO/DX INJ SAME DRUG ADON: CPT

## 2023-12-04 PROCEDURE — G0378 HOSPITAL OBSERVATION PER HR: HCPCS

## 2023-12-04 PROCEDURE — 25010000002 ONDANSETRON PER 1 MG: Performed by: NURSE PRACTITIONER

## 2023-12-04 PROCEDURE — 72125 CT NECK SPINE W/O DYE: CPT

## 2023-12-04 PROCEDURE — 36415 COLL VENOUS BLD VENIPUNCTURE: CPT | Performed by: NURSE PRACTITIONER

## 2023-12-04 PROCEDURE — 80048 BASIC METABOLIC PNL TOTAL CA: CPT | Performed by: PHYSICIAN ASSISTANT

## 2023-12-04 PROCEDURE — 85027 COMPLETE CBC AUTOMATED: CPT | Performed by: NURSE PRACTITIONER

## 2023-12-04 PROCEDURE — 96374 THER/PROPH/DIAG INJ IV PUSH: CPT

## 2023-12-04 PROCEDURE — 25010000002 MORPHINE PER 10 MG: Performed by: NURSE PRACTITIONER

## 2023-12-04 RX ORDER — MORPHINE SULFATE 2 MG/ML
2 INJECTION, SOLUTION INTRAMUSCULAR; INTRAVENOUS EVERY 4 HOURS PRN
Status: DISCONTINUED | OUTPATIENT
Start: 2023-12-04 | End: 2023-12-04

## 2023-12-04 RX ORDER — SODIUM CHLORIDE 0.9 % (FLUSH) 0.9 %
10 SYRINGE (ML) INJECTION AS NEEDED
Status: DISCONTINUED | OUTPATIENT
Start: 2023-12-04 | End: 2023-12-06 | Stop reason: HOSPADM

## 2023-12-04 RX ORDER — SODIUM CHLORIDE 0.9 % (FLUSH) 0.9 %
10 SYRINGE (ML) INJECTION EVERY 12 HOURS SCHEDULED
Status: DISCONTINUED | OUTPATIENT
Start: 2023-12-04 | End: 2023-12-06 | Stop reason: HOSPADM

## 2023-12-04 RX ORDER — CYCLOBENZAPRINE HCL 10 MG
10 TABLET ORAL 3 TIMES DAILY PRN
Status: DISCONTINUED | OUTPATIENT
Start: 2023-12-04 | End: 2023-12-06 | Stop reason: HOSPADM

## 2023-12-04 RX ORDER — AMOXICILLIN 250 MG
2 CAPSULE ORAL 2 TIMES DAILY
Status: DISCONTINUED | OUTPATIENT
Start: 2023-12-04 | End: 2023-12-04

## 2023-12-04 RX ORDER — OXYCODONE AND ACETAMINOPHEN 10; 325 MG/1; MG/1
1 TABLET ORAL EVERY 6 HOURS PRN
Status: DISCONTINUED | OUTPATIENT
Start: 2023-12-04 | End: 2023-12-04

## 2023-12-04 RX ORDER — ONDANSETRON 2 MG/ML
4 INJECTION INTRAMUSCULAR; INTRAVENOUS EVERY 6 HOURS PRN
Status: DISCONTINUED | OUTPATIENT
Start: 2023-12-04 | End: 2023-12-06 | Stop reason: HOSPADM

## 2023-12-04 RX ORDER — CALCIUM CARBONATE 500 MG/1
2 TABLET, CHEWABLE ORAL 2 TIMES DAILY PRN
Status: DISCONTINUED | OUTPATIENT
Start: 2023-12-04 | End: 2023-12-06 | Stop reason: HOSPADM

## 2023-12-04 RX ORDER — MORPHINE SULFATE 4 MG/ML
4 INJECTION, SOLUTION INTRAMUSCULAR; INTRAVENOUS EVERY 4 HOURS PRN
Status: DISCONTINUED | OUTPATIENT
Start: 2023-12-04 | End: 2023-12-04

## 2023-12-04 RX ORDER — BISACODYL 10 MG
10 SUPPOSITORY, RECTAL RECTAL DAILY PRN
Status: DISCONTINUED | OUTPATIENT
Start: 2023-12-04 | End: 2023-12-06 | Stop reason: HOSPADM

## 2023-12-04 RX ORDER — OXYCODONE AND ACETAMINOPHEN 10; 325 MG/1; MG/1
1 TABLET ORAL EVERY 4 HOURS PRN
Status: DISCONTINUED | OUTPATIENT
Start: 2023-12-04 | End: 2023-12-04

## 2023-12-04 RX ORDER — POLYETHYLENE GLYCOL 3350 17 G/17G
17 POWDER, FOR SOLUTION ORAL DAILY PRN
Status: DISCONTINUED | OUTPATIENT
Start: 2023-12-04 | End: 2023-12-06 | Stop reason: HOSPADM

## 2023-12-04 RX ORDER — CHOLECALCIFEROL (VITAMIN D3) 125 MCG
1000 CAPSULE ORAL DAILY
Status: DISCONTINUED | OUTPATIENT
Start: 2023-12-04 | End: 2023-12-06 | Stop reason: HOSPADM

## 2023-12-04 RX ORDER — BISACODYL 5 MG/1
5 TABLET, DELAYED RELEASE ORAL DAILY PRN
Status: DISCONTINUED | OUTPATIENT
Start: 2023-12-04 | End: 2023-12-06 | Stop reason: HOSPADM

## 2023-12-04 RX ORDER — PANTOPRAZOLE SODIUM 40 MG/1
40 TABLET, DELAYED RELEASE ORAL
Status: DISCONTINUED | OUTPATIENT
Start: 2023-12-04 | End: 2023-12-06 | Stop reason: HOSPADM

## 2023-12-04 RX ORDER — PRAVASTATIN SODIUM 20 MG
20 TABLET ORAL EVERY MORNING
Status: DISCONTINUED | OUTPATIENT
Start: 2023-12-04 | End: 2023-12-06 | Stop reason: HOSPADM

## 2023-12-04 RX ORDER — LIDOCAINE 4 G/G
2 PATCH TOPICAL
Status: DISCONTINUED | OUTPATIENT
Start: 2023-12-04 | End: 2023-12-06 | Stop reason: HOSPADM

## 2023-12-04 RX ORDER — ROPINIROLE 0.5 MG/1
0.25 TABLET, FILM COATED ORAL NIGHTLY
Status: DISCONTINUED | OUTPATIENT
Start: 2023-12-04 | End: 2023-12-06 | Stop reason: HOSPADM

## 2023-12-04 RX ORDER — FUROSEMIDE 20 MG/1
20 TABLET ORAL 2 TIMES DAILY
Status: DISCONTINUED | OUTPATIENT
Start: 2023-12-04 | End: 2023-12-06 | Stop reason: HOSPADM

## 2023-12-04 RX ORDER — HYDROCODONE BITARTRATE AND ACETAMINOPHEN 5; 325 MG/1; MG/1
1 TABLET ORAL EVERY 4 HOURS PRN
Status: DISCONTINUED | OUTPATIENT
Start: 2023-12-04 | End: 2023-12-06 | Stop reason: HOSPADM

## 2023-12-04 RX ORDER — ACETAMINOPHEN 160 MG/5ML
650 SOLUTION ORAL EVERY 4 HOURS PRN
Status: DISCONTINUED | OUTPATIENT
Start: 2023-12-04 | End: 2023-12-06 | Stop reason: HOSPADM

## 2023-12-04 RX ORDER — SODIUM CHLORIDE 9 MG/ML
40 INJECTION, SOLUTION INTRAVENOUS AS NEEDED
Status: DISCONTINUED | OUTPATIENT
Start: 2023-12-04 | End: 2023-12-06 | Stop reason: HOSPADM

## 2023-12-04 RX ORDER — ONDANSETRON 4 MG/1
4 TABLET, FILM COATED ORAL EVERY 6 HOURS PRN
Status: DISCONTINUED | OUTPATIENT
Start: 2023-12-04 | End: 2023-12-06 | Stop reason: HOSPADM

## 2023-12-04 RX ORDER — ACETAMINOPHEN 325 MG/1
650 TABLET ORAL EVERY 4 HOURS PRN
Status: DISCONTINUED | OUTPATIENT
Start: 2023-12-04 | End: 2023-12-06 | Stop reason: HOSPADM

## 2023-12-04 RX ORDER — MORPHINE SULFATE 2 MG/ML
2 INJECTION, SOLUTION INTRAMUSCULAR; INTRAVENOUS EVERY 4 HOURS PRN
Status: DISCONTINUED | OUTPATIENT
Start: 2023-12-04 | End: 2023-12-06 | Stop reason: HOSPADM

## 2023-12-04 RX ORDER — HYDROCODONE BITARTRATE AND ACETAMINOPHEN 5; 325 MG/1; MG/1
1 TABLET ORAL ONCE
Status: DISCONTINUED | OUTPATIENT
Start: 2023-12-04 | End: 2023-12-06 | Stop reason: HOSPADM

## 2023-12-04 RX ORDER — ACETAMINOPHEN 650 MG/1
650 SUPPOSITORY RECTAL EVERY 4 HOURS PRN
Status: DISCONTINUED | OUTPATIENT
Start: 2023-12-04 | End: 2023-12-06 | Stop reason: HOSPADM

## 2023-12-04 RX ORDER — AMOXICILLIN 250 MG
2 CAPSULE ORAL 2 TIMES DAILY
Status: DISCONTINUED | OUTPATIENT
Start: 2023-12-04 | End: 2023-12-06 | Stop reason: HOSPADM

## 2023-12-04 RX ADMIN — MAGNESIUM OXIDE 400 MG (241.3 MG MAGNESIUM) TABLET 400 MG: TABLET at 21:18

## 2023-12-04 RX ADMIN — HYDROCODONE BITARTRATE AND ACETAMINOPHEN 1 TABLET: 5; 325 TABLET ORAL at 21:18

## 2023-12-04 RX ADMIN — CYCLOBENZAPRINE 10 MG: 10 TABLET, FILM COATED ORAL at 21:18

## 2023-12-04 RX ADMIN — ONDANSETRON 4 MG: 2 INJECTION INTRAMUSCULAR; INTRAVENOUS at 04:59

## 2023-12-04 RX ADMIN — MORPHINE SULFATE 2 MG: 2 INJECTION, SOLUTION INTRAMUSCULAR; INTRAVENOUS at 08:54

## 2023-12-04 RX ADMIN — MAGNESIUM OXIDE 400 MG (241.3 MG MAGNESIUM) TABLET 400 MG: TABLET at 12:17

## 2023-12-04 RX ADMIN — FUROSEMIDE 20 MG: 20 TABLET ORAL at 12:17

## 2023-12-04 RX ADMIN — DOCUSATE SODIUM 50MG AND SENNOSIDES 8.6MG 2 TABLET: 8.6; 5 TABLET, FILM COATED ORAL at 08:54

## 2023-12-04 RX ADMIN — HYDROCODONE BITARTRATE AND ACETAMINOPHEN 1 TABLET: 5; 325 TABLET ORAL at 01:10

## 2023-12-04 RX ADMIN — DOCUSATE SODIUM 50MG AND SENNOSIDES 8.6MG 2 TABLET: 8.6; 5 TABLET, FILM COATED ORAL at 12:18

## 2023-12-04 RX ADMIN — PANTOPRAZOLE SODIUM 40 MG: 40 TABLET, DELAYED RELEASE ORAL at 12:17

## 2023-12-04 RX ADMIN — OXYCODONE AND ACETAMINOPHEN 1 TABLET: 10; 325 TABLET ORAL at 16:04

## 2023-12-04 RX ADMIN — LIDOCAINE 2 PATCH: 4 PATCH TOPICAL at 12:17

## 2023-12-04 RX ADMIN — DOCUSATE SODIUM 50MG AND SENNOSIDES 8.6MG 2 TABLET: 8.6; 5 TABLET, FILM COATED ORAL at 21:18

## 2023-12-04 RX ADMIN — METOPROLOL TARTRATE 25 MG: 25 TABLET, FILM COATED ORAL at 21:18

## 2023-12-04 RX ADMIN — ONDANSETRON 4 MG: 2 INJECTION INTRAMUSCULAR; INTRAVENOUS at 18:55

## 2023-12-04 RX ADMIN — Medication 10 ML: at 08:55

## 2023-12-04 RX ADMIN — Medication 10 ML: at 21:19

## 2023-12-04 RX ADMIN — Medication 1000 MCG: at 12:17

## 2023-12-04 RX ADMIN — PRAVASTATIN SODIUM 20 MG: 20 TABLET ORAL at 12:18

## 2023-12-04 RX ADMIN — ROPINIROLE HYDROCHLORIDE 0.25 MG: 0.5 TABLET, FILM COATED ORAL at 21:18

## 2023-12-04 RX ADMIN — FUROSEMIDE 20 MG: 20 TABLET ORAL at 21:18

## 2023-12-04 RX ADMIN — METOPROLOL TARTRATE 25 MG: 25 TABLET, FILM COATED ORAL at 12:18

## 2023-12-04 RX ADMIN — MORPHINE SULFATE 2 MG: 2 INJECTION, SOLUTION INTRAMUSCULAR; INTRAVENOUS at 04:59

## 2023-12-04 NOTE — ED PROVIDER NOTES
EMERGENCY DEPARTMENT ENCOUNTER    Room Number:  10/10  PCP: Brayden Jackson MD      HPI:  Chief Complaint: Right hip pain, lower back, neck pain     A complete HPI/ROS/PMH/PSH/SH/FH are unobtainable due to: Nothing  Context: Kenia Palomo is a 78 y.o. female who presents to the ED c/o right hip pain, lower back pain, neck pain.  Patient states she was pivoting in her home, lost balance falling onto her right hip.  She states since that time the fall she has had significant neck pain, lower back pain, right hip pain.  She reports she has not tried to ambulate or bear weight since the fall.  She currently describes the pain as severe at rest and worse with movement.  She reports she had right hip surgery roughly a year ago.  She denies hitting her head, headache, nausea, vomiting.    Reviewing her chart she had a right intertrochanteric fracture and was admitted to the hospital mid June 2022.  On 6/15/2022 Dr. Marc Crawford took the patient to the OR and surgery was performed with right femur intramedullary nailing/rodding.  Surgery was documented as being uncomplicated.  Patient remained in the hospital through 6/18/2022 and was discharged by Dr. Axel Jaeger/internal medicine.        PAST MEDICAL HISTORY  Active Ambulatory Problems     Diagnosis Date Noted    Snapping right knee 09/07/2016    HTN (hypertension) 06/15/2022    HLD (hyperlipidemia) 06/15/2022    GERD (gastroesophageal reflux disease) 06/15/2022    Closed comminuted intertrochanteric fracture of proximal end of right femur 06/14/2022    Thrombocytopenia 06/16/2022     Resolved Ambulatory Problems     Diagnosis Date Noted    No Resolved Ambulatory Problems     Past Medical History:   Diagnosis Date    Anginal pain     Arthritis     Gastric reflux syndrome     Hiatal hernia     High cholesterol     History of bronchitis     Hypertension     Irregular heart beat          PAST SURGICAL HISTORY  Past Surgical History:   Procedure Laterality Date     APPENDECTOMY      EXTERNAL EAR SURGERY Right     had tumor excised behind right ear lobe benign    FEMUR IM NAILING/RODDING Right 6/15/2022    Procedure: RIGHT HIP INTRAMEDULLARY NAILING/RODDING;  Surgeon: Marc Crawford MD;  Location: [facility];  Service: Orthopedics;  Laterality: Right;    GALLBLADDER SURGERY      HYSTERECTOMY      KNEE ARTHROPLASTY Right     KNEE ARTHROSCOPY Right     LA REVISE KNEE JOINT REPLACE,1 PART Right 9/7/2016    Procedure: RT ILIOTIBIAL BAND RELEASE RT TOTAL KNEE POLY EXCHANGE;  Surgeon: Toy Adame MD;  Location: [facility];  Service: Orthopedics    ROTATOR CUFF REPAIR Bilateral     WRIST MASS EXCISION Right          FAMILY HISTORY  No family history on file.      SOCIAL HISTORY  Social History     Socioeconomic History    Marital status:    Tobacco Use    Smoking status: Former     Packs/day: 1.00     Years: 30.00     Additional pack years: 0.00     Total pack years: 30.00     Types: Cigarettes    Smokeless tobacco: Never   Vaping Use    Vaping Use: Never used   Substance and Sexual Activity    Alcohol use: Yes     Comment: 1 wine drink q 3-4 months    Drug use: No         ALLERGIES  Levaquin [levofloxacin], Zocor [simvastatin], Codeine, and Penicillins        REVIEW OF SYSTEMS  Review of Systems     All systems reviewed and negative except for those discussed in HPI.       PHYSICAL EXAM  ED Triage Vitals [12/03/23 2143]   Temp Heart Rate Resp BP SpO2   98.9 °F (37.2 °C) 72 18 146/88 99 %      Temp src Heart Rate Source Patient Position BP Location FiO2 (%)   Tympanic Monitor Sitting -- --       Physical Exam      GENERAL: Appears uncomfortable but nontoxic  HENT: nares patent  EYES: no scleral icterus  CV: regular rhythm, normal rate, pedal pulses palpable and roughly equal bilaterally RESPIRATORY: normal effort  ABDOMEN: soft, nontender  MUSCULOSKELETAL: Lower back: TTP mid lumbar spine and along the right SI joint/no obvious deformity.  Right hip: Increased pain  with internal/external rotation/leg is not shortened and externally rotated.  C-spine: Mild tenderness diffusely/no point tenderness/no deformity  NEURO: alert, moves all extremities, follows commands  PSYCH:  calm, cooperative  SKIN: warm, dry    Vital signs and nursing notes reviewed.          LAB RESULTS  Recent Results (from the past 24 hour(s))   Basic Metabolic Panel    Collection Time: 12/04/23  1:10 AM    Specimen: Blood   Result Value Ref Range    Glucose 112 (H) 65 - 99 mg/dL    BUN 16 8 - 23 mg/dL    Creatinine 0.81 0.57 - 1.00 mg/dL    Sodium 139 136 - 145 mmol/L    Potassium 3.5 3.5 - 5.2 mmol/L    Chloride 99 98 - 107 mmol/L    CO2 27.0 22.0 - 29.0 mmol/L    Calcium 9.2 8.6 - 10.5 mg/dL    BUN/Creatinine Ratio 19.8 7.0 - 25.0    Anion Gap 13.0 5.0 - 15.0 mmol/L    eGFR 73.5 >60.0 mL/min/1.73   CBC Auto Differential    Collection Time: 12/04/23  1:10 AM    Specimen: Blood   Result Value Ref Range    WBC 6.18 3.40 - 10.80 10*3/mm3    RBC 3.81 3.77 - 5.28 10*6/mm3    Hemoglobin 12.4 12.0 - 15.9 g/dL    Hematocrit 37.1 34.0 - 46.6 %    MCV 97.4 (H) 79.0 - 97.0 fL    MCH 32.5 26.6 - 33.0 pg    MCHC 33.4 31.5 - 35.7 g/dL    RDW 12.8 12.3 - 15.4 %    RDW-SD 44.8 37.0 - 54.0 fl    MPV 10.4 6.0 - 12.0 fL    Platelets 80 (L) 140 - 450 10*3/mm3    Neutrophil % 63.3 42.7 - 76.0 %    Lymphocyte % 27.2 19.6 - 45.3 %    Monocyte % 7.1 5.0 - 12.0 %    Eosinophil % 1.6 0.3 - 6.2 %    Basophil % 0.5 0.0 - 1.5 %    Neutrophils, Absolute 3.91 1.70 - 7.00 10*3/mm3    Lymphocytes, Absolute 1.68 0.70 - 3.10 10*3/mm3    Monocytes, Absolute 0.44 0.10 - 0.90 10*3/mm3    Eosinophils, Absolute 0.10 0.00 - 0.40 10*3/mm3    Basophils, Absolute 0.03 0.00 - 0.20 10*3/mm3       Ordered the above labs and reviewed the results.        RADIOLOGY  CT Cervical Spine Without Contrast    Result Date: 12/4/2023  Patient: SAMPLE, ADA  Time Out: 01:11 Exam(s): CT C SPINE EXAM:   CT Cervical Spine Without Intravenous Contrast CLINICAL HISTORY:      fall, neck pain. TECHNIQUE:   Axial computed tomography images of the cervical spine without intravenous contrast.  CTDI is 18.53 mGy and DLP is 300 mGy-cm.  This CT exam was performed according to the principle of ALARA (As Low As Reasonably Achievable) by using one or more of the following dose reduction techniques: automated exposure control, adjustment of the mA and or kV according to patient size, and or use of iterative reconstruction technique. COMPARISON:   No relevant prior studies available. FINDINGS:   Vertebrae:  The vertebral bodies are intact without acute osseous traumatic injury.  There is straightening of the normal cervical lordosis.   No anterolisthesis or retrolisthesis is identified.  The facet joints are well aligned without subluxation or dislocation.  The pedicles, transverse processes and spinous processes are intact.   Discs spinal canal neural foramina:  Disc space narrowing with marginal hypertrophic changes, most prominent from C4-5 through C6-7.  No significant osseous central canal stenosis.   Soft tissues:  Incidental surgical clips in the left supraclavicular region.  No significant traumatic injury.   Lung apices:  The included lung apices demonstrate no evidence for acute traumatic injury. IMPRESSION:       No acute osseous traumatic injury or significant abnormal alignment involving the cervical spine.  Incidental chronic multilevel degenerative changes noted.     Electronically signed by Jimy Cm MD on 12-04-23 at 0111     Ordered the above noted radiological studies. Reviewed by me in PACS.          PROCEDURES  Procedures          MEDICATIONS GIVEN IN ER  Medications   morphine injection 2 mg (has no administration in time range)   HYDROcodone-acetaminophen (NORCO) 5-325 MG per tablet 1 tablet (1 tablet Oral Given 12/4/23 0110)         MEDICAL DECISION MAKING, PROGRESS, and CONSULTS    Discussion below represents my analysis of pertinent findings related to patient's  condition, differential diagnosis, treatment plan and final disposition.      Orders placed during this visit:  Orders Placed This Encounter   Procedures    XR Hip With or Without Pelvis 2 - 3 View Right    XR Spine Lumbar Complete 4+VW    CT Cervical Spine Without Contrast    Basic Metabolic Panel    CBC Auto Differential    Basic Metabolic Panel    CBC (No Diff)    Diet: Regular/House Diet; Texture: Regular Texture (IDDSI 7); Fluid Consistency: Thin (IDDSI 0)    Ambulate patient    Vital Signs    Intake & Output    Weigh Patient    Oral Care    Saline Lock & Maintain IV Access    Place Sequential Compression Device    Maintain Sequential Compression Device    Neurovascular Checks    Code Status and Medical Interventions:    [facility] (on-call MD unless specified) Details    Inpatient Orthopedic Surgery Consult    PT Consult: Eval & Treat Functional Mobility Below Baseline    Insert Peripheral IV    CBC & Differential         Additional sources:  - Discussed/obtained information from independent historians: Daughter confirms history of bedside  Additional information was obtained to confirm the patient's history.    - External (non-ED) record review: As above in the main body of my note            - Chronic or social conditions impacting care: None      Differential diagnosis:    Cervical strain, cervical fracture, lumbar strain, lumbar contusion, compression fracture, right hip periprosthetic fracture.  Will obtain CT of the C-spine, x-ray of the right hip and lumbar spine to further evaluate.      Independent interpretation of labs, radiology studies, and discussions with consultants:  ED Course as of 12/04/23 0414   Sun Dec 03, 2023   4509 First look in triage: Patient states that she has right hip pain, right low back pain, and neck pain after a fall at home this afternoon.  She fell about a year ago and broke her right hip and had surgery.  She denies head trauma or loss of consciousness.    On exam she does have  some mild pain with passive ranging of the right hip, mild cervical spine tenderness without step-off, and mild L-spine tenderness without step-off.  I discussed plan to obtain imaging studies and provide something for pain control.    Care will be resumed by the oncoming provider once patient is roomed. [JR]   Mon Dec 04, 2023   0056 BP: 146/88 [RC]   0056 Temp: 98.9 °F (37.2 °C) [RC]   0056 Heart Rate: 72 [RC]   0056 Resp: 18 [RC]   0056 SpO2: 99 %  RA [RC]   0059 I viewed the patient's lumbar spine x-ray.  There are compression like deformities at L1 and L5 with spondylolisthesis of L5 on S1 and severe degenerative changes.  Patient does not appear to be acutely point tender in these areas and I suspect this is more chronic but have no past imaging to compare to.  Will wait for the radiologist official read [RC]   0101 I viewed the patient's right hip x-ray: No obvious periprosthetic fracture, pelvic fractures noted.  Again we will wait on the radiologist official read. [RC]   0101 Will order pain medication at this time and attempt to ambulate the patient to help determine disposition [RC]   0308 Patient unable to ambulate or remotely began to bear weight.  She is coming from home.  Is felt she be better served by admission for further orthopedic evaluation.  Discussed patient's case with the hospitalist who accepts the patient at this time [RC]      ED Course User Index  [JR] Charlie Sylvester MD  [RC] Kenji Keys III, PA               DIAGNOSIS  Final diagnoses:   Unable to ambulate   Fall, initial encounter   Acute midline low back pain without sciatica   Right hip pain   Strain of neck muscle, initial encounter         DISPOSITION  admission      Latest Documented Vital Signs:  As of 04:14 EST  BP- 109/67 HR- 71 Temp- 98.9 °F (37.2 °C) (Tympanic) O2 sat- 93%      --    Please note that portions of this were completed with a voice recognition program.       Note Disclaimer: At [facility],  we believe that sharing information builds trust and better relationships. You are receiving this note because you are receiving care at [facility] or recently visited. It is possible you will see health information before a provider has talked with you about it. This kind of information can be easy to misunderstand. To help you fully understand what it means for your health, we urge you to discuss this note with your provider.         Kenji Keys III, PA  12/04/23 0414

## 2023-12-04 NOTE — ED PROVIDER NOTES
MD ATTESTATION NOTE  The HOMA and I have discussed this patient's history, physical exam, and treatment plan. I have reviewed the HOMA documentation and I affirm the documentation and agree with their diagnostics, findings, treatment, plan, and disposition.    I provided a substantive portion of the care of this patient and personally had a face to face interaction with the patient. I personally performed the physical exam, in its entirety.  The attached note describes my personal findings.    PCP: Brayden Jackson MD  Patient Care Team:  Brayden Jackson MD as PCP - General  Brayden Jackson MD as PCP - Family Medicine  Naif York MD as Consulting Physician (Cardiology)  Narendra Banda MD as Surgeon (General Surgery)     Kenia Palomo is a 78 y.o. female who presents to the ED c/o fall.  Patient reports mechanical fall at home, denies any head injury, no loss consciousness, complains of neck low back as well as right hip pain.  Patient reports pain is sharp, worse with movement, improved with rest.  Patient reports that she has not been able ambulate since the fall.    On exam:  General: NAD.  Head: NCAT.  ENT: nares patent, no scleral icterus  Neck: Supple, trachea midline.  Cervical spine: No step-offs or deformities, no midline tenderness to palpation, bilateral paraspinal tenderness to palpation.  Cardiac: regular rate and rhythm.  Lungs: normal effort.  Abdomen: Soft, NTTP.   Lumbar spine: No step-offs or deformities, no midline tenderness palpation, bilateral paraspinal tenderness to palpation.  Pelvis stable.  Right lower extremity has normal lie.  Extremities: Moves all extremities well, no peripheral edema  Right lower extremity: Tenderness at right lateral hip, no crepitus or deformity, skin intact, neurovascular intact distally.  Neuro: alert, MAEW, follows commands  Psych: calm, cooperative  Skin: Warm, dry.    Medical Decision Making:  After the initial H&P, I discussed pertinent information from  history and physical exam with patient/family.  Discussed differential diagnosis.  Discussed plan for ED evaluation/work-up/treatment.  All questions answered.  Patient/family is agreeable with plan.    ED Course as of 12/04/23 0556   [place] Dec 03, 2023   2398 First look in triage: Patient states that she has right hip pain, right low back pain, and neck pain after a fall at home this afternoon.  She fell about a year ago and broke her right hip and had surgery.  She denies head trauma or loss of consciousness.    On exam she does have some mild pain with passive ranging of the right hip, mild cervical spine tenderness without step-off, and mild L-spine tenderness without step-off.  I discussed plan to obtain imaging studies and provide something for pain control.    Care will be resumed by the oncoming provider once patient is roomed. [JR]   Mon Dec 04, 2023   0056 BP: 146/88 [RC]   0056 Temp: 98.9 °F (37.2 °C) [RC]   0056 Heart Rate: 72 [RC]   0056 Resp: 18 [RC]   0056 SpO2: 99 %  RA [RC]   0059 I viewed the patient's lumbar spine x-ray.  There are compression like deformities at L1 and L5 with spondylolisthesis of L5 on S1 and severe degenerative changes.  Patient does not appear to be acutely point tender in these areas and I suspect this is more chronic but have no past imaging to compare to.  Will wait for the radiologist official read [RC]   0101 I viewed the patient's right hip x-ray: No obvious periprosthetic fracture, pelvic fractures noted.  Again we will wait on the radiologist official read. [RC]   0101 Will order pain medication at this time and attempt to ambulate the patient to help determine disposition [RC]   0308 Patient unable to ambulate or remotely began to bear weight.  She is coming from home.  Is felt she be better served by admission for further orthopedic evaluation.  Discussed patient's case with the hospitalist who accepts the patient at this time [RC]      ED Course User Index  [JR]  Charlie Sylvester MD  [RC] Kenji Keys III, PA       Diagnosis  Final diagnoses:   Unable to ambulate   Fall, initial encounter   Acute midline low back pain without sciatica   Right hip pain   Strain of neck muscle, initial encounter          Rony Kirk MD  12/04/23 4396

## 2023-12-04 NOTE — H&P
Patient Name:  Kenia Palomo  YOB: 1945  MRN:  [id number]  Admit Date:  12/4/2023  Patient Care Team:  Brayden Jackson MD as PCP - General  Brayden Jackson MD as PCP - Family Medicine  Naif York MD as Consulting Physician (Cardiology)  Narendra Banda MD as Surgeon (General Surgery)      Subjective   History Present Illness     Chief Complaint   Patient presents with    Back Pain    Hip Pain    Fall       Ms. Palomo is a 78 y.o. with a history of right hip fracture, left foot stress  fracture, GERD, HTN that presents after a mechanical fall at home. She states she has a left foot stress fracture that impairs her mobility and a right hip that needs to be replaced. She lost her balance at home and present with right back pain and right hip pain. She also has mild neck pain. She denies injury to the head, LOC, CP, syncope, SOB, or lightheadedness. She is not on blood thinners. She was unable to ambulate due to pain.     History of Present Illness    Review of Systems   Constitutional:  Positive for activity change. Negative for appetite change and unexpected weight change.   HENT:  Negative for trouble swallowing.    Respiratory:  Negative for choking and shortness of breath.    Cardiovascular:  Negative for chest pain.   Gastrointestinal:  Negative for abdominal distention, abdominal pain, blood in stool, constipation, diarrhea, nausea and vomiting.   Genitourinary:  Negative for dysuria.   Musculoskeletal:  Positive for arthralgias, back pain, gait problem, myalgias and neck pain.   Skin:  Negative for color change and wound.   Neurological:  Negative for dizziness, tremors, speech difficulty, weakness, light-headedness and numbness.   Hematological:  Does not bruise/bleed easily.   Psychiatric/Behavioral: Negative.  Negative for behavioral problems.         Personal History     Past Medical History:   Diagnosis Date    Anginal pain     Arthritis     Gastric reflux syndrome     Hiatal  hernia     High cholesterol     History of bronchitis     Hypertension     Irregular heart beat      Past Surgical History:   Procedure Laterality Date    APPENDECTOMY      EXTERNAL EAR SURGERY Right     had tumor excised behind right ear lobe benign    FEMUR IM NAILING/RODDING Right 6/15/2022    Procedure: RIGHT HIP INTRAMEDULLARY NAILING/RODDING;  Surgeon: Marc Crawford MD;  Location: [facility];  Service: Orthopedics;  Laterality: Right;    GALLBLADDER SURGERY      HYSTERECTOMY      KNEE ARTHROPLASTY Right     KNEE ARTHROSCOPY Right     AK REVISE KNEE JOINT REPLACE,1 PART Right 9/7/2016    Procedure: RT ILIOTIBIAL BAND RELEASE RT TOTAL KNEE POLY EXCHANGE;  Surgeon: Toy Adame MD;  Location: [facility];  Service: Orthopedics    ROTATOR CUFF REPAIR Bilateral     WRIST MASS EXCISION Right      No family history on file.  Social History     Tobacco Use    Smoking status: Former     Packs/day: 1.00     Years: 30.00     Additional pack years: 0.00     Total pack years: 30.00     Types: Cigarettes    Smokeless tobacco: Never   Vaping Use    Vaping Use: Never used   Substance Use Topics    Alcohol use: Yes     Comment: 1 wine drink q 3-4 months    Drug use: No     No current facility-administered medications on file prior to encounter.     Current Outpatient Medications on File Prior to Encounter   Medication Sig Dispense Refill    acetaminophen (TYLENOL) 325 MG tablet Take 2 tablets by mouth Every 4 (Four) Hours As Needed for Mild Pain .      albuterol (PROVENTIL HFA;VENTOLIN HFA) 108 (90 BASE) MCG/ACT inhaler Inhale 2 puffs Every 4 (Four) Hours As Needed.      furosemide (LASIX) 20 MG tablet Take 1 tablet by mouth 2 (Two) Times a Day.      Magnesium 500 MG capsule Take 500 mg by mouth 2 (two) times a day.      metoprolol tartrate (LOPRESSOR) 25 MG tablet Take 1 tablet by mouth 2 (Two) Times a Day.      omeprazole (PriLOSEC) 40 MG capsule Take 1 capsule by mouth Every Morning.      pravastatin (PRAVACHOL) 20  MG tablet Take 1 tablet by mouth Every Morning.      rOPINIRole (REQUIP) 0.25 MG tablet Take 1 tablet by mouth Every Night. Take 1 hour before bedtime.      sennosides-docusate sodium (SENOKOT-S) 8.6-50 MG tablet Take 2 tablets by mouth 2 (two) times a day. 100 tablet 1    vitamin B-12 (CYANOCOBALAMIN) 1000 MCG tablet Take 1 tablet by mouth Daily.      oxyCODONE-acetaminophen (PERCOCET)  MG per tablet Take 1 tablet by mouth Every 6 (Six) Hours As Needed for Moderate Pain  (max 8/day). 12 tablet 0     Allergies   Allergen Reactions    Levaquin [Levofloxacin] Hives    Zocor [Simvastatin] Myalgia    Codeine GI Intolerance    Penicillins Hives and Swelling     Pt reports tongue swelling       Objective    Objective     Vital Signs  Temp:  [98 °F (36.7 °C)-98.9 °F (37.2 °C)] 98 °F (36.7 °C)  Heart Rate:  [70-93] 86  Resp:  [16-18] 16  BP: (109-167)/(67-94) 156/82  SpO2:  [85 %-99 %] 99 %  on  Flow (L/min):  [2] 2;   Device (Oxygen Therapy): nasal cannula  Body mass index is 26.72 kg/m².    Physical Exam  Vitals and nursing note reviewed.   Constitutional:       General: She is not in acute distress.     Appearance: She is well-developed. She is not toxic-appearing.   HENT:      Head: Normocephalic and atraumatic.   Eyes:      Pupils: Pupils are equal, round, and reactive to light.   Cardiovascular:      Rate and Rhythm: Normal rate and regular rhythm.      Heart sounds: Normal heart sounds.   Pulmonary:      Effort: Pulmonary effort is normal.      Breath sounds: Normal breath sounds.   Abdominal:      General: Bowel sounds are normal. There is no distension or abdominal bruit.      Palpations: Abdomen is soft. Abdomen is not rigid. There is no shifting dullness, fluid wave, mass or pulsatile mass.      Tenderness: There is no abdominal tenderness. There is no guarding.      Hernia: No hernia is present.   Genitourinary:     Vagina: Vaginal discharge: right hip tenderness decreased ROM, not shortened or rotated..    Musculoskeletal:         General: Normal range of motion.   Skin:     General: Skin is warm and dry.   Neurological:      General: No focal deficit present.      Mental Status: She is alert and oriented to person, place, and time.   Psychiatric:         Behavior: Behavior normal.         Thought Content: Thought content normal.           Results Review:  I reviewed the patient's new clinical results.  I reviewed the patient's new imaging results and agree with the interpretation.  I reviewed the patient's other test results and agree with the interpretation  I personally viewed and interpreted the patient's EKG/Telemetry data  Discussed with ED provider.    Lab Results (last 24 hours)       Procedure Component Value Units Date/Time    CBC & Differential [[id number]]  (Abnormal) Collected: 12/04/23 0110    Specimen: Blood Updated: 12/04/23 0140    Narrative:      The following orders were created for panel order CBC & Differential.  Procedure                               Abnormality         Status                     ---------                               -----------         ------                     CBC Auto Differential[[id number]]        Abnormal            Final result                 Please view results for these tests on the individual orders.    Basic Metabolic Panel [[id number]]  (Abnormal) Collected: 12/04/23 0110    Specimen: Blood Updated: 12/04/23 0155     Glucose 112 mg/dL      BUN 16 mg/dL      Creatinine 0.81 mg/dL      Sodium 139 mmol/L      Potassium 3.5 mmol/L      Chloride 99 mmol/L      CO2 27.0 mmol/L      Calcium 9.2 mg/dL      BUN/Creatinine Ratio 19.8     Anion Gap 13.0 mmol/L      eGFR 73.5 mL/min/1.73     Narrative:      GFR Normal >60  Chronic Kidney Disease <60  Kidney Failure <15    The GFR formula is only valid for adults with stable renal function between ages 18 and 70.    CBC Auto Differential [[id number]]  (Abnormal) Collected: 12/04/23 0110    Specimen: Blood Updated: 12/04/23 0140      WBC 6.18 10*3/mm3      RBC 3.81 10*6/mm3      Hemoglobin 12.4 g/dL      Hematocrit 37.1 %      MCV 97.4 fL      MCH 32.5 pg      MCHC 33.4 g/dL      RDW 12.8 %      RDW-SD 44.8 fl      MPV 10.4 fL      Platelets 80 10*3/mm3      Neutrophil % 63.3 %      Lymphocyte % 27.2 %      Monocyte % 7.1 %      Eosinophil % 1.6 %      Basophil % 0.5 %      Neutrophils, Absolute 3.91 10*3/mm3      Lymphocytes, Absolute 1.68 10*3/mm3      Monocytes, Absolute 0.44 10*3/mm3      Eosinophils, Absolute 0.10 10*3/mm3      Basophils, Absolute 0.03 10*3/mm3     Basic Metabolic Panel [[id number]]  (Abnormal) Collected: 12/04/23 0656    Specimen: Blood Updated: 12/04/23 0756     Glucose 123 mg/dL      BUN 14 mg/dL      Creatinine 0.78 mg/dL      Sodium 141 mmol/L      Potassium 3.8 mmol/L      Chloride 101 mmol/L      CO2 28.0 mmol/L      Calcium 9.2 mg/dL      BUN/Creatinine Ratio 17.9     Anion Gap 12.0 mmol/L      eGFR 76.9 mL/min/1.73     Narrative:      GFR Normal >60  Chronic Kidney Disease <60  Kidney Failure <15    The GFR formula is only valid for adults with stable renal function between ages 18 and 70.    CBC (No Diff) [[id number]]  (Abnormal) Collected: 12/04/23 0656    Specimen: Blood Updated: 12/04/23 0801     WBC 3.74 10*3/mm3      RBC 3.68 10*6/mm3      Hemoglobin 12.5 g/dL      Hematocrit 36.0 %      MCV 97.8 fL      MCH 34.0 pg      MCHC 34.7 g/dL      RDW 12.6 %      RDW-SD 45.4 fl      MPV 10.8 fL      Platelets 70 10*3/mm3             Imaging Results (Last 24 Hours)       Procedure Component Value Units Date/Time    XR Hip With or Without Pelvis 2 - 3 View Right [[id number]] Collected: 12/04/23 0621     Updated: 12/04/23 0628    Narrative:      XR HIP W OR WO PELVIS 2-3 VIEW RIGHT-     Clinical: Fell with right hip pain     COMPARISON: 6/15/2022     FINDINGS: Medullary bob and gamma nails in position as before. No  hardware loosening. No fracture or dislocation seen. The right hip joint  is similar to the previous  examination. Healed intertrochanteric  fracture. There is right hip joint degeneration also seen similar to the  previous examination. The remainder is unremarkable.     This report was finalized on 12/4/2023 6:25 AM by Dr. Francisco Perdomo M.D  on Workstation: [id number]       XR Spine Lumbar Complete 4+VW [[id number]] Collected: 12/04/23 0614     Updated: 12/04/23 0621    Narrative:      XR SPINE LUMBAR COMPLETE 4+VW-     Clinical: Fell with low back pain     FINDINGS: Compression deformity with approximately 50% volume loss of  the T12 and L2. Moderate to substantial multilevel disc degeneration  lower thoracic and lumbar spine. Grade 1-2 anterior listhesis of L5 on  S1. Questionable associated spondylolysis. There is mid and lower lumbar  facet hypertrophy. The remainder is unremarkable.     This report was finalized on 12/4/2023 6:18 AM by Dr. Francisco Perdomo M.D  on Workstation: [id number]       CT Cervical Spine Without Contrast [[id number]] Collected: 12/04/23 0112     Updated: 12/04/23 0112    Narrative:        Patient: SAMPLE, ADA  Time Out: 01:11  Exam(s): CT C SPINE     EXAM:    CT Cervical Spine Without Intravenous Contrast    CLINICAL HISTORY:      fall, neck pain.    TECHNIQUE:    Axial computed tomography images of the cervical spine without   intravenous contrast.  CTDI is 18.53 mGy and DLP is 300 mGy-cm.  This CT   exam was performed according to the principle of ALARA (As Low As   Reasonably Achievable) by using one or more of the following dose   reduction techniques: automated exposure control, adjustment of the mA   and or kV according to patient size, and or use of iterative   reconstruction technique.    COMPARISON:    No relevant prior studies available.    FINDINGS:    Vertebrae:  The vertebral bodies are intact without acute osseous   traumatic injury.  There is straightening of the normal cervical lordosis.    No anterolisthesis or retrolisthesis is identified.  The facet joints   are well  aligned without subluxation or dislocation.  The pedicles,   transverse processes and spinous processes are intact.    Discs spinal canal neural foramina:  Disc space narrowing with marginal   hypertrophic changes, most prominent from C4-5 through C6-7.  No   significant osseous central canal stenosis.    Soft tissues:  Incidental surgical clips in the left supraclavicular   region.  No significant traumatic injury.    Lung apices:  The included lung apices demonstrate no evidence for   acute traumatic injury.    IMPRESSION:         No acute osseous traumatic injury or significant abnormal alignment   involving the cervical spine.  Incidental chronic multilevel degenerative   changes noted.      Impression:          Electronically signed by Jimy Cm MD on 12-04-23 at 0111                No orders to display        Assessment/Plan     Active Hospital Problems    Diagnosis  POA    **Unable to ambulate [R26.2]  Yes    Right hip pain [M25.551]  Yes    Thrombocytopenia [D69.6]  Yes    HTN (hypertension) [I10]  Yes    HLD (hyperlipidemia) [E78.5]  Yes    GERD (gastroesophageal reflux disease) [K21.9]  Yes      Resolved Hospital Problems   No resolved problems to display.       Ms. Palomo is a 78 y.o.  that presents with a fall resulting in right hip, back, neck pain. Her biggest complaint at this time is hip pain. She was unable to ambulate in the ER. Xray with compression like deformities at L1 and L5 with spondylolisthesis of L5 on S1 and severe degenerative changes. She has a history of right hip pain witgh hx right femus intrameduallry nailing/ bob per TONY Crawford  Consult orthopedic surgery  Defer additional imaging to specialist. Per patient she needs a total hip replacement. Supportive care and pain control   PT OT when cleared by ortho   Check vit D, B12 folate    HTN- resume home lasix and BB. BP slightly high from pain.     GERD- continue ppi    HLD- statin       I discussed the patient's findings and my  recommendations with patient, nursing staff, and Dr. Lal .    VTE Prophylaxis - SCDs.  Code Status - Full code.       BLAKE House  [facility]  12/04/23  09:31 EST

## 2023-12-04 NOTE — ED TRIAGE NOTES
PT arrived via [facility] with complaints of a fall today. PT complains of back and hip pain. PT family witnessed the fall and denies any LOC or blood thinners.

## 2023-12-04 NOTE — PROGRESS NOTES
Orthopaedic Consultation      Patient: Kenia BRIAN Sample    Date of Admission: 12/4/2023 12:45 AM    YOB: 1945    Medical Record Number: [id number]    Attending Physician:  Earnest Lal MD    Consulting Physician:  Thor Soriano PA-C        Chief Complaints: Low back pain/right hip pain after fall    History of Present Illness:     The patient is a pleasant 78-year-old female.  She is well-known to our office.  She is followed in our office for multiple orthopedic issues.  She is managed by Dr. George for her left foot.  She has had previous history of a stress fracture and was initially treated in the boot and subsequently also given a recent cortisone injection into her foot for pain relief.  However she also has previous history of right hip intramedullary nail fixation after a fall by Dr. Crawford.  She has been followed in the office subsequently for this.  Very early during her postoperative course she did have initial collapse of her fracture site.  However she did end up going on to heal the fracture.  She did have some prominence of her hardware and she was having pain from this.  We previously have discussed with her removal of intramedullary nail and conversion to right total hip arthroplasty.  However because of her foot she has been managing with that at this point.  She presented to [facility], [place], operated by [facility] status post fall at home yesterday.  She states that she lost her balance at home.  She fell.  She was having back pain as well as right hip pain.  X-rays were performed of the right hip showing right hip intramedullary nail fixation with healing of the previous fracture.  She was also found to have compression fractures of her spine of T12 and L2.  Patient subsequently was not able to ambulate in the emergency room.  Therefore she was admitted.  Orthopedics has been consulted for evaluation of the above.      Allergies:   Allergies   Allergen Reactions    Levaquin [Levofloxacin] Hives     Zocor [Simvastatin] Myalgia    Codeine GI Intolerance    Penicillins Hives and Swelling     Pt reports tongue swelling       Medications:   Home Medications:  Medications Prior to Admission   Medication Sig Dispense Refill Last Dose    acetaminophen (TYLENOL) 325 MG tablet Take 2 tablets by mouth Every 4 (Four) Hours As Needed for Mild Pain .   12/3/2023    albuterol (PROVENTIL HFA;VENTOLIN HFA) 108 (90 BASE) MCG/ACT inhaler Inhale 2 puffs Every 4 (Four) Hours As Needed.   12/3/2023    furosemide (LASIX) 20 MG tablet Take 1 tablet by mouth 2 (Two) Times a Day.   12/3/2023    Magnesium 500 MG capsule Take 500 mg by mouth 2 (two) times a day.   12/3/2023    metoprolol tartrate (LOPRESSOR) 25 MG tablet Take 1 tablet by mouth 2 (Two) Times a Day.   12/3/2023    omeprazole (PriLOSEC) 40 MG capsule Take 1 capsule by mouth Every Morning.   12/3/2023    pravastatin (PRAVACHOL) 20 MG tablet Take 1 tablet by mouth Every Morning.   12/3/2023    rOPINIRole (REQUIP) 0.25 MG tablet Take 1 tablet by mouth Every Night. Take 1 hour before bedtime.   12/3/2023    sennosides-docusate sodium (SENOKOT-S) 8.6-50 MG tablet Take 2 tablets by mouth 2 (two) times a day. 100 tablet 1 12/3/2023    vitamin B-12 (CYANOCOBALAMIN) 1000 MCG tablet Take 1 tablet by mouth Daily.   12/3/2023    oxyCODONE-acetaminophen (PERCOCET)  MG per tablet Take 1 tablet by mouth Every 6 (Six) Hours As Needed for Moderate Pain  (max 8/day). 12 tablet 0        Current Medications:  Scheduled Meds:furosemide, 20 mg, Oral, BID  HYDROcodone-acetaminophen, 1 tablet, Oral, Once  Lidocaine, 2 patch, Transdermal, Q24H  magnesium oxide, 400 mg, Oral, BID  metoprolol tartrate, 25 mg, Oral, Q12H  pantoprazole, 40 mg, Oral, Q AM  pravastatin, 20 mg, Oral, QAM  rOPINIRole, 0.25 mg, Oral, Nightly  sennosides-docusate, 2 tablet, Oral, BID  sodium chloride, 10 mL, Intravenous, Q12H  vitamin B-12, 1,000 mcg, Oral, Daily      Continuous Infusions:   PRN Meds:.   acetaminophen **OR** acetaminophen **OR** acetaminophen    [DISCONTINUED] senna-docusate sodium **AND** polyethylene glycol **AND** bisacodyl **AND** bisacodyl    calcium carbonate    cyclobenzaprine    Morphine    ondansetron **OR** ondansetron    oxyCODONE-acetaminophen    sodium chloride    sodium chloride    Past Medical History:   Diagnosis Date    Anginal pain     Arthritis     Gastric reflux syndrome     Hiatal hernia     High cholesterol     History of bronchitis     Hypertension     Irregular heart beat      Past Surgical History:   Procedure Laterality Date    APPENDECTOMY      EXTERNAL EAR SURGERY Right     had tumor excised behind right ear lobe benign    FEMUR IM NAILING/RODDING Right 6/15/2022    Procedure: RIGHT HIP INTRAMEDULLARY NAILING/RODDING;  Surgeon: Marc Crawford MD;  Location: [facility];  Service: Orthopedics;  Laterality: Right;    GALLBLADDER SURGERY      HYSTERECTOMY      KNEE ARTHROPLASTY Right     KNEE ARTHROSCOPY Right     TX REVISE KNEE JOINT REPLACE,1 PART Right 9/7/2016    Procedure: RT ILIOTIBIAL BAND RELEASE RT TOTAL KNEE POLY EXCHANGE;  Surgeon: Toy Adame MD;  Location: [facility];  Service: Orthopedics    ROTATOR CUFF REPAIR Bilateral     WRIST MASS EXCISION Right      Social History     Occupational History    Not on file   Tobacco Use    Smoking status: Former     Packs/day: 1.00     Years: 30.00     Additional pack years: 0.00     Total pack years: 30.00     Types: Cigarettes    Smokeless tobacco: Never   Vaping Use    Vaping Use: Never used   Substance and Sexual Activity    Alcohol use: Yes     Comment: 1 wine drink q 3-4 months    Drug use: No    Sexual activity: Not on file      Social History     Social History Narrative    Not on file     No family history on file.      Review of Systems:   No other pertinent positives or negatives other than what is mentioned in the HPI and below.  Constitutional: Negative for fatigue, fever, or weight loss  HEENT: No  "active headache.  Pulmonary: Patient denies SOA.  Cardiovascular: Patient denies any chest pain.  Gastrointestinal:  Patient denies active vomiting or diarrhea.  Musculoskeletal: Positive for right hip and low back pain.  Neurological: Patient denies active dizziness or loss of consciousness.  Skin: Patient denies any active bleeding.    Vital signs in last 24 hours:  Temp:  [97 °F (36.1 °C)-98.9 °F (37.2 °C)] 97 °F (36.1 °C)  Heart Rate:  [70-93] 74  Resp:  [16-18] 16  BP: (109-167)/(67-94) 143/85  Vitals:    12/04/23 0511 12/04/23 0531 12/04/23 0653 12/04/23 1154   BP:  146/94 156/82 143/85   BP Location:   Right arm Right arm   Patient Position:   Lying Lying   Pulse:  85 86 74   Resp:   16 16   Temp:   98 °F (36.7 °C) 97 °F (36.1 °C)   TempSrc:   Oral Oral   SpO2: (!) 85% 96% 99% 99%   Weight:   68.4 kg (150 lb 12.7 oz)    Height:   160 cm (62.99\")           Physical Exam: 78 y.o. female         General Appearance:  Alert, cooperative, in no acute distress    HEENT:    Atraumatic, Pupils are equal   Neck:   Cervical spine midline, no appreciable JVD   Lungs:     Breathing non-labored and chest rise symmetric    Heart:   Abdomen:     Rectal:       Extremities:   Pulses  Neurovascular:   Skin:   Musculoskeletal:      Pulse regular    Soft, Non-tender or distended    Deferred        No clubbing, cyanosis, or edema    Intact    Cranial nerves 2 - 12 grossly intact, sensation intact    No skin lesions  Focused physical examination of the patient's right lower extremity was performed.  She has no overlying skin changes.  Previous incisions over her right hip are well-healed.  The right lower extremity is slightly shorter than the left.  However consistent with previous exams in our office.  No surrounding erythema or warmth about the hip.  She is able to perform active knee flexion.  She has a previous incision over the anterior knee that is healed well as well.  She has no pain with logroll.  Her compartments are " soft.  Her sensation is intact distally.  She has mild discomfort with hip flexion.  Neurovascularly intact.  EHL, FHL, TA, and GS are intact.  DP/TP are 2+.     Diagnostic Tests:    Results from last 7 days   Lab Units 12/04/23  0656   WBC 10*3/mm3 3.74   HEMOGLOBIN g/dL 12.5   HEMATOCRIT % 36.0   PLATELETS 10*3/mm3 70*     Results from last 7 days   Lab Units 12/04/23  0656   SODIUM mmol/L 141   POTASSIUM mmol/L 3.8   CHLORIDE mmol/L 101   CO2 mmol/L 28.0   BUN mg/dL 14   CREATININE mg/dL 0.78   GLUCOSE mg/dL 123*   CALCIUM mg/dL 9.2         Study Result    Narrative & Impression      Patient: SAMPLE, ADA  Time Out: 01:11  Exam(s): CT C SPINE      EXAM:    CT Cervical Spine Without Intravenous Contrast     CLINICAL HISTORY:      fall, neck pain.     TECHNIQUE:    Axial computed tomography images of the cervical spine without   intravenous contrast.  CTDI is 18.53 mGy and DLP is 300 mGy-cm.  This CT   exam was performed according to the principle of ALARA (As Low As   Reasonably Achievable) by using one or more of the following dose   reduction techniques: automated exposure control, adjustment of the mA   and or kV according to patient size, and or use of iterative   reconstruction technique.     COMPARISON:    No relevant prior studies available.     FINDINGS:    Vertebrae:  The vertebral bodies are intact without acute osseous   traumatic injury.  There is straightening of the normal cervical lordosis.    No anterolisthesis or retrolisthesis is identified.  The facet joints   are well aligned without subluxation or dislocation.  The pedicles,   transverse processes and spinous processes are intact.    Discs spinal canal neural foramina:  Disc space narrowing with marginal   hypertrophic changes, most prominent from C4-5 through C6-7.  No   significant osseous central canal stenosis.    Soft tissues:  Incidental surgical clips in the left supraclavicular   region.  No significant traumatic injury.    Lung apices:   The included lung apices demonstrate no evidence for   acute traumatic injury.     IMPRESSION:         No acute osseous traumatic injury or significant abnormal alignment   involving the cervical spine.  Incidental chronic multilevel degenerative   changes noted.     IMPRESSION:        Electronically signed by Jimy Cm MD on 12-04-23 at 0111       Study Result    Narrative & Impression   XR SPINE LUMBAR COMPLETE 4+VW-     Clinical: Fell with low back pain     FINDINGS: Compression deformity with approximately 50% volume loss of  the T12 and L2. Moderate to substantial multilevel disc degeneration  lower thoracic and lumbar spine. Grade 1-2 anterior listhesis of L5 on  S1. Questionable associated spondylolysis. There is mid and lower lumbar  facet hypertrophy. The remainder is unremarkable.     This report was finalized on 12/4/2023 6:18 AM by Dr. Francisco Perdomo M.D  on Workstation: [id number]     Study Result    Narrative & Impression   XR HIP W OR WO PELVIS 2-3 VIEW RIGHT-     Clinical: Fell with right hip pain     COMPARISON: 6/15/2022     FINDINGS: Medullary bob and gamma nails in position as before. No  hardware loosening. No fracture or dislocation seen. The right hip joint  is similar to the previous examination. Healed intertrochanteric  fracture. There is right hip joint degeneration also seen similar to the  previous examination. The remainder is unremarkable.     This report was finalized on 12/4/2023 6:25 AM by Dr. Francisco Perdomo M.D  on Workstation: [id number]         Assessment:    Unable to ambulate    HTN (hypertension)    HLD (hyperlipidemia)    GERD (gastroesophageal reflux disease)    Thrombocytopenia    Right hip pain        Plan:      I did have an extensive discussion with the patient regards her situation in the hospital today.  I have reviewed the above.  Patient does have compression deformities at the T12 and L2 areas.  This could certainly be causing her back pain.  She also has  grade 1 2 anterior listhesis of L5 on S1.  X-rays of the right hip have been reviewed.  They have also been compared to previous x-rays that we have had in the office.  She has a healed intertrochanteric fracture.  The intramedullary nail was essentially in the same place that it was during her office visit.  She did have initial collapse as well as prominence of her hardware.  We have previously discussed with her conversion to total hip arthroplasty.  However she has been managing the stress fracture in her foot at this point.    In regards to her situation in the hospital.  No acute fractures or emergent intervention needed for the patient's right hip.  Therefore she can be weightbearing as tolerated from the orthopedic standpoint.  We will have her work with physical therapy and Occupational Therapy to see if she can safely maneuver and ambulate on her own with assistance.  She normally uses a walker at home.  If she is not able to progress well with physical therapy she may need rehab placement.  Otherwise in terms of previous discussion we would plan this on an outpatient basis and nonemergent basis with conversion of intramedullary nail to total hip arthroplasty.    All findings discussed my supervising physician Dr. Marc Crawford.    Thank you very much for this consultation and allowing us to be a part of the patient's care.  No further orthopedic intervention warranted at this time.  Will sign off.  Please call if needed.    Date: 12/4/2023  Thor Soriano PA-C

## 2023-12-04 NOTE — ED NOTES
Nursing report ED to floor  Ada N Sample  78 y.o.  female    HPI :   Chief Complaint   Patient presents with    Back Pain    Hip Pain    Fall     Kenia N Sample is a 78 y.o. female who presents to the ED c/o right hip pain, lower back pain, neck pain.  Patient states she was pivoting in her home, lost balance falling onto her right hip.  She states since that time the fall she has had significant neck pain, lower back pain, right hip pain.  She reports she has not tried to ambulate or bear weight since the fall.  She currently describes the pain as severe at rest and worse with movement.  She reports she had right hip surgery roughly a year ago.  She denies hitting her head, headache, nausea, vomiting.     Reviewing her chart she had a right intertrochanteric fracture and was admitted to the hospital mid June 2022.  On 6/15/2022 Dr. Marc Crawford took the patient to the OR and surgery was performed with right femur intramedullary nailing/rodding.  Surgery was documented as being uncomplicated.  Patient remained in the hospital through 6/18/2022 and was discharged by Dr. Axel Jaeger/internal medicine.    Admitting doctor:   Jimy Perez MD    Admitting diagnosis:   The primary encounter diagnosis was Unable to ambulate. Diagnoses of Fall, initial encounter, Acute midline low back pain without sciatica, Right hip pain, and Strain of neck muscle, initial encounter were also pertinent to this visit.    Code status:   Current Code Status       Date Active Code Status Order ID Comments User Context       12/4/2023 0304 CPR (Attempt to Resuscitate) [id number]  Gema Pan APRN ED        Question Answer    Code Status (Patient has no pulse and is not breathing) CPR (Attempt to Resuscitate)    Medical Interventions (Patient has pulse or is breathing) Full Support                    Allergies:   Levaquin [levofloxacin], Zocor [simvastatin], Codeine, and Penicillins    Isolation:   No active  isolations    Intake and Output  No intake or output data in the 24 hours ending 12/04/23 0437    Weight:       12/03/23  2145   Weight: 63.5 kg (140 lb)       Most recent vitals:   Vitals:    12/04/23 0331 12/04/23 0332 12/04/23 0400 12/04/23 0401   BP: 114/69   109/67   Patient Position:       Pulse: 70   71   Resp:       Temp:       TempSrc:       SpO2:  93% 93%    Weight:       Height:           Active LDAs/IV Access:   Lines, Drains & Airways       Active LDAs       Name Placement date Placement time Site Days    Peripheral IV 12/04/23 0208 Left Antecubital 12/04/23 0208  Antecubital  less than 1                    Labs (abnormal labs have a star):   Labs Reviewed   BASIC METABOLIC PANEL - Abnormal; Notable for the following components:       Result Value    Glucose 112 (*)     All other components within normal limits    Narrative:     GFR Normal >60  Chronic Kidney Disease <60  Kidney Failure <15    The GFR formula is only valid for adults with stable renal function between ages 18 and 70.   CBC WITH AUTO DIFFERENTIAL - Abnormal; Notable for the following components:    MCV 97.4 (*)     Platelets 80 (*)     All other components within normal limits   BASIC METABOLIC PANEL   CBC (NO DIFF)   CBC AND DIFFERENTIAL    Narrative:     The following orders were created for panel order CBC & Differential.  Procedure                               Abnormality         Status                     ---------                               -----------         ------                     CBC Auto Differential[[id number]]        Abnormal            Final result                 Please view results for these tests on the individual orders.       EKG:   No orders to display       Meds given in ED:   Medications   sodium chloride 0.9 % flush 10 mL (has no administration in time range)   sodium chloride 0.9 % flush 10 mL (has no administration in time range)   sodium chloride 0.9 % infusion 40 mL (has no administration in time range)    sennosides-docusate (PERICOLACE) 8.6-50 MG per tablet 2 tablet (has no administration in time range)     And   polyethylene glycol (MIRALAX) packet 17 g (has no administration in time range)     And   bisacodyl (DULCOLAX) EC tablet 5 mg (has no administration in time range)     And   bisacodyl (DULCOLAX) suppository 10 mg (has no administration in time range)   acetaminophen (TYLENOL) tablet 650 mg (has no administration in time range)     Or   acetaminophen (TYLENOL) 160 MG/5ML oral solution 650 mg (has no administration in time range)     Or   acetaminophen (TYLENOL) suppository 650 mg (has no administration in time range)   ondansetron (ZOFRAN) tablet 4 mg (has no administration in time range)     Or   ondansetron (ZOFRAN) injection 4 mg (has no administration in time range)   calcium carbonate (TUMS) chewable tablet 500 mg (200 mg elemental) (has no administration in time range)   HYDROcodone-acetaminophen (NORCO) 5-325 MG per tablet 1 tablet (has no administration in time range)   morphine injection 2 mg (has no administration in time range)   HYDROcodone-acetaminophen (NORCO) 5-325 MG per tablet 1 tablet (1 tablet Oral Given 12/4/23 0110)       Imaging results:  CT Cervical Spine Without Contrast    Result Date: 12/4/2023  Electronically signed by Jimy Cm MD on 12-04-23 at 0111     Ambulatory status:   - bedrest per injury     Social issues:   Social History     Socioeconomic History    Marital status:    Tobacco Use    Smoking status: Former     Packs/day: 1.00     Years: 30.00     Additional pack years: 0.00     Total pack years: 30.00     Types: Cigarettes    Smokeless tobacco: Never   Vaping Use    Vaping Use: Never used   Substance and Sexual Activity    Alcohol use: Yes     Comment: 1 wine drink q 3-4 months    Drug use: No              Tammy Suarez RN  12/04/23 04:37 EST

## 2023-12-05 LAB
25(OH)D3 SERPL-MCNC: 28.7 NG/ML (ref 30–100)
ALBUMIN SERPL-MCNC: 3.4 G/DL (ref 3.5–5.2)
ALBUMIN/GLOB SERPL: 1 G/DL
ALP SERPL-CCNC: 101 U/L (ref 39–117)
ALT SERPL W P-5'-P-CCNC: 23 U/L (ref 1–33)
ANION GAP SERPL CALCULATED.3IONS-SCNC: 7.2 MMOL/L (ref 5–15)
AST SERPL-CCNC: 30 U/L (ref 1–32)
BASOPHILS # BLD AUTO: 0.04 10*3/MM3 (ref 0–0.2)
BASOPHILS NFR BLD AUTO: 0.8 % (ref 0–1.5)
BILIRUB SERPL-MCNC: 0.8 MG/DL (ref 0–1.2)
BUN SERPL-MCNC: 15 MG/DL (ref 8–23)
BUN/CREAT SERPL: 16.3 (ref 7–25)
CALCIUM SPEC-SCNC: 9.1 MG/DL (ref 8.6–10.5)
CHLORIDE SERPL-SCNC: 102 MMOL/L (ref 98–107)
CO2 SERPL-SCNC: 32.8 MMOL/L (ref 22–29)
CREAT SERPL-MCNC: 0.92 MG/DL (ref 0.57–1)
DEPRECATED RDW RBC AUTO: 44.6 FL (ref 37–54)
EGFRCR SERPLBLD CKD-EPI 2021: 63.1 ML/MIN/1.73
EOSINOPHIL # BLD AUTO: 0.09 10*3/MM3 (ref 0–0.4)
EOSINOPHIL NFR BLD AUTO: 1.8 % (ref 0.3–6.2)
ERYTHROCYTE [DISTWIDTH] IN BLOOD BY AUTOMATED COUNT: 12.4 % (ref 12.3–15.4)
FOLATE SERPL-MCNC: >20 NG/ML (ref 4.78–24.2)
GLOBULIN UR ELPH-MCNC: 3.3 GM/DL
GLUCOSE SERPL-MCNC: 106 MG/DL (ref 65–99)
HCT VFR BLD AUTO: 34.4 % (ref 34–46.6)
HGB BLD-MCNC: 12 G/DL (ref 12–15.9)
IMM GRANULOCYTES # BLD AUTO: 0.01 10*3/MM3 (ref 0–0.05)
IMM GRANULOCYTES NFR BLD AUTO: 0.2 % (ref 0–0.5)
LYMPHOCYTES # BLD AUTO: 1.74 10*3/MM3 (ref 0.7–3.1)
LYMPHOCYTES NFR BLD AUTO: 34.7 % (ref 19.6–45.3)
MCH RBC QN AUTO: 34.2 PG (ref 26.6–33)
MCHC RBC AUTO-ENTMCNC: 34.9 G/DL (ref 31.5–35.7)
MCV RBC AUTO: 98 FL (ref 79–97)
MONOCYTES # BLD AUTO: 0.38 10*3/MM3 (ref 0.1–0.9)
MONOCYTES NFR BLD AUTO: 7.6 % (ref 5–12)
NEUTROPHILS NFR BLD AUTO: 2.75 10*3/MM3 (ref 1.7–7)
NEUTROPHILS NFR BLD AUTO: 54.9 % (ref 42.7–76)
NRBC BLD AUTO-RTO: 0 /100 WBC (ref 0–0.2)
PLATELET # BLD AUTO: 58 10*3/MM3 (ref 140–450)
PMV BLD AUTO: 10.2 FL (ref 6–12)
POTASSIUM SERPL-SCNC: 4.4 MMOL/L (ref 3.5–5.2)
PROT SERPL-MCNC: 6.7 G/DL (ref 6–8.5)
RBC # BLD AUTO: 3.51 10*6/MM3 (ref 3.77–5.28)
SODIUM SERPL-SCNC: 142 MMOL/L (ref 136–145)
VIT B12 BLD-MCNC: 739 PG/ML (ref 211–946)
WBC NRBC COR # BLD AUTO: 5.01 10*3/MM3 (ref 3.4–10.8)

## 2023-12-05 PROCEDURE — 96376 TX/PRO/DX INJ SAME DRUG ADON: CPT

## 2023-12-05 PROCEDURE — 97166 OT EVAL MOD COMPLEX 45 MIN: CPT

## 2023-12-05 PROCEDURE — 97530 THERAPEUTIC ACTIVITIES: CPT

## 2023-12-05 PROCEDURE — 25010000002 ONDANSETRON PER 1 MG: Performed by: NURSE PRACTITIONER

## 2023-12-05 PROCEDURE — G0378 HOSPITAL OBSERVATION PER HR: HCPCS

## 2023-12-05 PROCEDURE — 82746 ASSAY OF FOLIC ACID SERUM: CPT | Performed by: NURSE PRACTITIONER

## 2023-12-05 PROCEDURE — 97162 PT EVAL MOD COMPLEX 30 MIN: CPT

## 2023-12-05 PROCEDURE — 85025 COMPLETE CBC W/AUTO DIFF WBC: CPT | Performed by: INTERNAL MEDICINE

## 2023-12-05 PROCEDURE — 82607 VITAMIN B-12: CPT | Performed by: NURSE PRACTITIONER

## 2023-12-05 PROCEDURE — 97535 SELF CARE MNGMENT TRAINING: CPT

## 2023-12-05 PROCEDURE — 80053 COMPREHEN METABOLIC PANEL: CPT | Performed by: INTERNAL MEDICINE

## 2023-12-05 PROCEDURE — 82306 VITAMIN D 25 HYDROXY: CPT | Performed by: NURSE PRACTITIONER

## 2023-12-05 RX ORDER — ERGOCALCIFEROL 1.25 MG/1
50000 CAPSULE ORAL
Status: DISCONTINUED | OUTPATIENT
Start: 2023-12-05 | End: 2023-12-06 | Stop reason: HOSPADM

## 2023-12-05 RX ADMIN — PANTOPRAZOLE SODIUM 40 MG: 40 TABLET, DELAYED RELEASE ORAL at 06:09

## 2023-12-05 RX ADMIN — HYDROCODONE BITARTRATE AND ACETAMINOPHEN 1 TABLET: 5; 325 TABLET ORAL at 04:04

## 2023-12-05 RX ADMIN — Medication 10 ML: at 21:30

## 2023-12-05 RX ADMIN — FUROSEMIDE 20 MG: 20 TABLET ORAL at 21:31

## 2023-12-05 RX ADMIN — PRAVASTATIN SODIUM 20 MG: 20 TABLET ORAL at 06:09

## 2023-12-05 RX ADMIN — METOPROLOL TARTRATE 25 MG: 25 TABLET, FILM COATED ORAL at 09:24

## 2023-12-05 RX ADMIN — ROPINIROLE HYDROCHLORIDE 0.25 MG: 0.5 TABLET, FILM COATED ORAL at 21:31

## 2023-12-05 RX ADMIN — METOPROLOL TARTRATE 25 MG: 25 TABLET, FILM COATED ORAL at 21:31

## 2023-12-05 RX ADMIN — CYCLOBENZAPRINE 10 MG: 10 TABLET, FILM COATED ORAL at 04:05

## 2023-12-05 RX ADMIN — Medication 10 ML: at 09:28

## 2023-12-05 RX ADMIN — HYDROCODONE BITARTRATE AND ACETAMINOPHEN 1 TABLET: 5; 325 TABLET ORAL at 21:36

## 2023-12-05 RX ADMIN — CYCLOBENZAPRINE 10 MG: 10 TABLET, FILM COATED ORAL at 14:49

## 2023-12-05 RX ADMIN — HYDROCODONE BITARTRATE AND ACETAMINOPHEN 1 TABLET: 5; 325 TABLET ORAL at 14:49

## 2023-12-05 RX ADMIN — MAGNESIUM OXIDE 400 MG (241.3 MG MAGNESIUM) TABLET 400 MG: TABLET at 21:31

## 2023-12-05 RX ADMIN — MAGNESIUM OXIDE 400 MG (241.3 MG MAGNESIUM) TABLET 400 MG: TABLET at 09:24

## 2023-12-05 RX ADMIN — ONDANSETRON 4 MG: 2 INJECTION INTRAMUSCULAR; INTRAVENOUS at 19:56

## 2023-12-05 RX ADMIN — DOCUSATE SODIUM 50MG AND SENNOSIDES 8.6MG 2 TABLET: 8.6; 5 TABLET, FILM COATED ORAL at 09:24

## 2023-12-05 RX ADMIN — FUROSEMIDE 20 MG: 20 TABLET ORAL at 09:24

## 2023-12-05 RX ADMIN — Medication 1000 MCG: at 09:24

## 2023-12-05 RX ADMIN — LIDOCAINE 2 PATCH: 4 PATCH TOPICAL at 09:24

## 2023-12-05 RX ADMIN — ERGOCALCIFEROL 50000 UNITS: 1.25 CAPSULE ORAL at 11:42

## 2023-12-05 NOTE — PLAN OF CARE
Goal Outcome Evaluation:  Plan of Care Reviewed With: patient                Patient is an 78 y.o. female admitted to [facility] for fall resulting in T12 and L2 compression fx, R hip pain (WBAT with no intervention at this time), grade 1-2 anterior listhesis L5-S1 on 12/4/2023. PMHx includes R IM nailing, GERD, HTN. Patient is ind at baseline with use of rwx and lives with her spouse. Today, patient performed bed mobility with Adriana (log roll), required CGA for transfers, and ambulated 10ft and 50ft using rwx requiring CGA. Strength, pain, activity tolerance deficits noted. Patient may benefit from skilled PT services to address functional deficits and improve level of independence prior to discharge. Anticipate home with spouse and HHPT upon DC.      Anticipated Discharge Disposition (PT): home with home health, home with assist

## 2023-12-05 NOTE — PLAN OF CARE
Problem: Adult Inpatient Plan of Care  Goal: Plan of Care Review  Outcome: Ongoing, Progressing  Flowsheets (Taken 12/5/2023 6509)  Progress: no change  Plan of Care Reviewed With: patient  Outcome Evaluation: Patient was medicated with PRN Norco x 2 and Flexeril x 2 for pain and comfort this shift. Patient is alert, oriented and up with x 1 assist. Bed alarm on. Patient takes pills whole with water. Continue with plan of care.   Goal Outcome Evaluation:  Plan of Care Reviewed With: patient        Progress: no change  Outcome Evaluation: Patient was medicated with PRN Norco x 2 and Flexeril x 2 for pain and comfort this shift. Patient is alert, oriented and up with x 1 assist. Bed alarm on. Patient takes pills whole with water. Continue with plan of care.

## 2023-12-05 NOTE — PROGRESS NOTES
Name: Kenia BRIAN Sample ADMIT: 2023   : 1945  PCP: Brayden Jackson MD    MRN: [id number] LOS: 0 days   AGE/SEX: 78 y.o. female  ROOM: [facility]     Subjective   Subjective   Her hip pain feels better but she is still having low back pain but now wonders if back pain was present prior to her fall. She was able to walk to bathroom with walker but has not worked with pt ot yet. No numbness or tingling in extremities        Objective   Objective   Vital Signs  Temp:  [97 °F (36.1 °C)-97.8 °F (36.6 °C)] 97.7 °F (36.5 °C)  Heart Rate:  [59-74] 66  Resp:  [16] 16  BP: (143-153)/(74-90) 151/90  SpO2:  [97 %-99 %] 99 %  on  Flow (L/min):  [2] 2;   Device (Oxygen Therapy): nasal cannula  Body mass index is 26.72 kg/m².  Physical Exam  Vitals reviewed.   Constitutional:       General: She is not in acute distress.     Appearance: She is well-developed.   HENT:      Head: Normocephalic and atraumatic.      Mouth/Throat:      Mouth: Mucous membranes are moist.   Cardiovascular:      Rate and Rhythm: Normal rate and regular rhythm.   Pulmonary:      Effort: Pulmonary effort is normal. No respiratory distress.      Breath sounds: Normal breath sounds.   Abdominal:      General: Bowel sounds are normal. There is no distension.      Palpations: Abdomen is soft.      Tenderness: There is no abdominal tenderness.   Musculoskeletal:         General: Tenderness present.      Comments: Lumbar pain    Skin:     General: Skin is warm and dry.   Neurological:      General: No focal deficit present.      Mental Status: She is alert and oriented to person, place, and time.   Psychiatric:         Mood and Affect: Mood normal.         Behavior: Behavior normal.         Thought Content: Thought content normal.       Results Review     I reviewed the patient's new clinical results.  Results from last 7 days   Lab Units 23  0709 23  0656 23  0110   WBC 10*3/mm3 5.01 3.74 6.18   HEMOGLOBIN g/dL 12.0 12.5 12.4   PLATELETS  "10*3/mm3 58* 70* 80*     Results from last 7 days   Lab Units 12/05/23  0709 12/04/23  0656 12/04/23  0110   SODIUM mmol/L 142 141 139   POTASSIUM mmol/L 4.4 3.8 3.5   CHLORIDE mmol/L 102 101 99   CO2 mmol/L 32.8* 28.0 27.0   BUN mg/dL 15 14 16   CREATININE mg/dL 0.92 0.78 0.81   GLUCOSE mg/dL 106* 123* 112*   EGFR mL/min/1.73 63.1 76.9 73.5     Results from last 7 days   Lab Units 12/05/23  0709   ALBUMIN g/dL 3.4*   BILIRUBIN mg/dL 0.8   ALK PHOS U/L 101   AST (SGOT) U/L 30   ALT (SGPT) U/L 23     Results from last 7 days   Lab Units 12/05/23  0709 12/04/23  0656 12/04/23  0110   CALCIUM mg/dL 9.1 9.2 9.2   ALBUMIN g/dL 3.4*  --   --        No results found for: \"HGBA1C\", \"POCGLU\"    CT Cervical Spine Without Contrast    Result Date: 12/4/2023  Electronically signed by Jimy Cm MD on 12-04-23 at 0111     I have personally reviewed all medications:  Scheduled Medications  furosemide, 20 mg, Oral, BID  HYDROcodone-acetaminophen, 1 tablet, Oral, Once  Lidocaine, 2 patch, Transdermal, Q24H  magnesium oxide, 400 mg, Oral, BID  metoprolol tartrate, 25 mg, Oral, Q12H  pantoprazole, 40 mg, Oral, Q AM  pravastatin, 20 mg, Oral, QAM  rOPINIRole, 0.25 mg, Oral, Nightly  sennosides-docusate, 2 tablet, Oral, BID  sodium chloride, 10 mL, Intravenous, Q12H  vitamin B-12, 1,000 mcg, Oral, Daily    Infusions   Diet  Diet: Regular/House Diet; Texture: Regular Texture (IDDSI 7); Fluid Consistency: Thin (IDDSI 0)    I have personally reviewed:  [x]  Laboratory   [x]  Microbiology   [x]  Radiology   [x]  EKG/Telemetry  [x]  Cardiology/Vascular   []  Pathology    []  Records       Assessment/Plan     Active Hospital Problems    Diagnosis  POA    **Unable to ambulate [R26.2]  Yes    Right hip pain [M25.551]  Yes    Thrombocytopenia [D69.6]  Yes    HTN (hypertension) [I10]  Yes    HLD (hyperlipidemia) [E78.5]  Yes    GERD (gastroesophageal reflux disease) [K21.9]  Yes      Resolved Hospital Problems   No resolved problems to " display.       78 y.o. female admitted with Unable to ambulate.     Right hip pain: Orthopedic surgery has evaluated and cleared her for PT/OT.  They are not planning on any additional imaging of the hip and are planning on outpatient follow-up continue to control pain  Still awaiting PT OT eval.  If she fails to progress we could consider MRI of the T and L-spine due to the compression deformities seen on plain film however, she seems to think back pain is stable. Wean IV narcotics depending on how she does with PT OT     HTN: Home regimen,  Monitor BP.     GERD: PPI     HLD: Statin     Thrombocytopenia: Monitor.    SCDs for DVT prophylaxis.  Full code.  Discussed with patient and nursing staff.  Anticipate discharge  HH v SNF in 1-2 days       BLAKE House  [facility]  12/05/23  09:27 EST

## 2023-12-05 NOTE — THERAPY EVALUATION
Patient Name: Kenia BRIAN Sample  : 1945    MRN: [id number]                              Today's Date: 2023       Admit Date: 2023    Visit Dx:     ICD-10-CM ICD-9-CM   1. Unable to ambulate  R26.2 719.7   2. Fall, initial encounter  W19.XXXA E888.9   3. Acute midline low back pain without sciatica  M54.50 724.2   4. Right hip pain  M25.551 719.45   5. Strain of neck muscle, initial encounter  S16.1XXA 847.0     Patient Active Problem List   Diagnosis    Snapping right knee    HTN (hypertension)    HLD (hyperlipidemia)    GERD (gastroesophageal reflux disease)    Closed comminuted intertrochanteric fracture of proximal end of right femur    Thrombocytopenia    Unable to ambulate    Right hip pain     Past Medical History:   Diagnosis Date    Anginal pain     Arthritis     Gastric reflux syndrome     Hiatal hernia     High cholesterol     History of bronchitis     Hypertension     Irregular heart beat      Past Surgical History:   Procedure Laterality Date    APPENDECTOMY      EXTERNAL EAR SURGERY Right     had tumor excised behind right ear lobe benign    FEMUR IM NAILING/RODDING Right 6/15/2022    Procedure: RIGHT HIP INTRAMEDULLARY NAILING/RODDING;  Surgeon: Marc Crawford MD;  Location: [facility];  Service: Orthopedics;  Laterality: Right;    GALLBLADDER SURGERY      HYSTERECTOMY      KNEE ARTHROPLASTY Right     KNEE ARTHROSCOPY Right     NM REVJ TOTAL KNEE ARTHRP W/WO ALGRFT 1 COMPONENT Right 2016    Procedure: RT ILIOTIBIAL BAND RELEASE RT TOTAL KNEE POLY EXCHANGE;  Surgeon: Toy Adame MD;  Location: [facility];  Service: Orthopedics    ROTATOR CUFF REPAIR Bilateral     WRIST MASS EXCISION Right       General Information       Row Name 23 1426          OT Time and Intention    Document Type evaluation  -RB     Mode of Treatment individual therapy;occupational therapy  -RB       Row Name 23 1426          General Information    Patient Profile Reviewed yes  -RB     Prior  Level of Function independent:;ADL's;transfer  -RB     Existing Precautions/Restrictions fall  -RB     Barriers to Rehab medically complex;previous functional deficit  -RB       Row Name 12/05/23 1426          Living Environment    People in Home spouse  -RB       Row Name 12/05/23 1426          Cognition    Orientation Status (Cognition) oriented x 3  -RB       Row Name 12/05/23 1426          Safety Issues, Functional Mobility    Safety Issues Affecting Function (Mobility) safety precaution awareness;safety precautions follow-through/compliance  -RB     Impairments Affecting Function (Mobility) balance;endurance/activity tolerance;strength;pain  -RB               User Key  (r) = Recorded By, (t) = Taken By, (c) = Cosigned By      Initials Name Provider Type    RB Letitia Snidre OT Occupational Therapist                     Mobility/ADL's       Row Name 12/05/23 1426          Bed Mobility    Bed Mobility supine-sit  -RB     Supine-Sit [place] (Bed Mobility) minimum assist (75% patient effort);verbal cues;1 person assist  -RB     Assistive Device (Bed Mobility) bed rails  -RB     Comment, (Bed Mobility) cues for log rolling  -RB       Row Name 12/05/23 1426          Transfers    Transfers sit-stand transfer;stand-sit transfer  -RB       Row Name 12/05/23 1426          Sit-Stand Transfer    Sit-Stand [place] (Transfers) contact guard;verbal cues  -RB     Assistive Device (Sit-Stand Transfers) walker, front-wheeled  -RB       Row Name 12/05/23 1426          Stand-Sit Transfer    Stand-Sit [place] (Transfers) verbal cues;contact guard  -     Assistive Device (Stand-Sit Transfers) walker, front-wheeled  -RB       Row Name 12/05/23 1426          Functional Mobility    Functional Mobility- Ind. Level contact guard assist;verbal cues required  -RB     Functional Mobility- Device walker, front-wheeled  -RB     Functional Mobility- Safety Issues step length decreased  -RB       Row Name 12/05/23 1426           Activities of Daily Living    BADL Assessment/Intervention toileting  -RB       Row Name 12/05/23 1426          Mobility    Extremity Weight-bearing Status right lower extremity  -RB     Right Lower Extremity (Weight-bearing Status) weight-bearing as tolerated (WBAT)  -RB       Row Name 12/05/23 1426          Toileting Assessment/Training    [place] Level (Toileting) toileting skills;minimum assist (75% patient effort);adjust/manage clothing;change pad/brief  -RB     Position (Toileting) supported sitting;supported standing  -RB               User Key  (r) = Recorded By, (t) = Taken By, (c) = Cosigned By      Initials Name Provider Type    Letitia Samuel OT Occupational Therapist                   Obj/Interventions       Row Name 12/05/23 1427          Sensory Assessment (Somatosensory)    Sensory Assessment (Somatosensory) sensation intact  -RB       Row Name 12/05/23 1427          Vision Assessment/Intervention    Visual Impairment/Limitations WFL  -RB       Row Name 12/05/23 1427          Range of Motion Comprehensive    General Range of Motion no range of motion deficits identified  -RB       Row Name 12/05/23 1427          Strength Comprehensive (MMT)    Comment, General Manual Muscle Testing (MMT) Assessment Generalized weakness in her arms and legs, specifically RLE due to prior fx's  -RB       Row Name 12/05/23 1427          Balance    Comment, Balance SBA sitting balance and CGA + walker standing balance  -RB               User Key  (r) = Recorded By, (t) = Taken By, (c) = Cosigned By      Initials Name Provider Type    Letitia Samuel OT Occupational Therapist                   Goals/Plan       Row Name 12/05/23 1428          Bed Mobility Goal 1 (OT)    Activity/Assistive Device (Bed Mobility Goal 1, OT) bed mobility activities, all  -RB     [place] Level/Cues Needed (Bed Mobility Goal 1, OT) supervision required  -RB     Time Frame (Bed Mobility Goal 1, OT) short term goal  (STG);2 weeks  -RB     Progress/Outcomes (Bed Mobility Goal 1, OT) continuing progress toward goal  -RB       Row Name 12/05/23 1428          Transfer Goal 1 (OT)    Activity/Assistive Device (Transfer Goal 1, OT) transfers, all  -RB     [place] Level/Cues Needed (Transfer Goal 1, OT) supervision required  -RB     Time Frame (Transfer Goal 1, OT) short term goal (STG);2 weeks  -RB     Progress/Outcome (Transfer Goal 1, OT) continuing progress toward goal  -RB       Row Name 12/05/23 1428          Dressing Goal 1 (OT)    Activity/Device (Dressing Goal 1, OT) dressing skills, all  -RB     [place]/Cues Needed (Dressing Goal 1, OT) supervision required  -RB     Time Frame (Dressing Goal 1, OT) short term goal (STG);2 weeks  -RB     Progress/Outcome (Dressing Goal 1, OT) continuing progress toward goal  -RB       Row Name 12/05/23 1428          Toileting Goal 1 (OT)    Activity/Device (Toileting Goal 1, OT) toileting skills, all  -RB     [place] Level/Cues Needed (Toileting Goal 1, OT) supervision required  -RB     Time Frame (Toileting Goal 1, OT) short term goal (STG);2 weeks  -RB     Progress/Outcome (Toileting Goal 1, OT) continuing progress toward goal  -RB       Row Name 12/05/23 1428          Grooming Goal 1 (OT)    Activity/Device (Grooming Goal 1, OT) grooming skills, all  -RB     [place] (Grooming Goal 1, OT) supervision required  -RB     Time Frame (Grooming Goal 1, OT) short term goal (STG);2 weeks  -RB     Progress/Outcome (Grooming Goal 1, OT) continuing progress toward goal  -RB       Row Name 12/05/23 1428          Therapy Assessment/Plan (OT)    Planned Therapy Interventions (OT) transfer/mobility retraining;strengthening exercise;activity tolerance training;adaptive equipment training;BADL retraining;functional balance retraining;occupation/activity based interventions;patient/caregiver education/training  -RB               User Key  (r) = Recorded By, (t) = Taken By, (c) = Cosigned  By      Initials Name Provider Type    RB Letitia Snider, GERONIMO Occupational Therapist                   Clinical Impression       Row Name 12/05/23 1427          Pain Assessment    Pretreatment Pain Rating 0/10 - no pain  -RB     Posttreatment Pain Rating 7/10  -RB     Pain Location generalized  -RB     Pain Location - back  -RB     Pain Intervention(s) Repositioned;Ambulation/increased activity  -RB       Row Name 12/05/23 1427          Plan of Care Review    Plan of Care Reviewed With patient  -RB     Progress no change  -RB     Outcome Evaluation Patient is an 78 y.o. female admitted to [facility] for fall resulting in T12 and L2 compression fx, R hip pain (WBAT with no intervention at this time), grade 1-2 anterior listhesis L5-S1 on 12/4/2023. PMHx includes R IM nailing, GERD, HTN. Patient is ind at baseline with use of rwx and lives with her spouse. Today, patient performed bed mobility with Adriana (log roll), required CGA for transfers, and ambulated 10ft and 50ft using rwx requiring CGA. Strength, pain, activity tolerance deficits noted. Patient may benefit from skilled PT services to address functional deficits and improve level of independence prior to discharge. Anticipate home with spouse and HHPT upon DC.  -RB       Row Name 12/05/23 1428          Therapy Assessment/Plan (OT)    Rehab Potential (OT) good, to achieve stated therapy goals  -RB     Criteria for Skilled Therapeutic Interventions Met (OT) yes;skilled treatment is necessary  -RB     Therapy Frequency (OT) 5 times/wk  -RB       Row Name 12/05/23 1427          Therapy Plan Review/Discharge Plan (OT)    Anticipated Discharge Disposition (OT) home with assist;home with home health  -RB       Row Name 12/05/23 1427          Vital Signs    O2 Delivery Pre Treatment room air  -RB     O2 Delivery Intra Treatment room air  -RB     O2 Delivery Post Treatment room air  -RB     Pre Patient Position Supine  -RB     Intra Patient Position Standing  -RB     Post  Patient Position Sitting  -RB       Row Name 12/05/23 1427          Positioning and Restraints    Pre-Treatment Position in bed  -RB     Post Treatment Position chair  -RB     In Chair notified nsg;reclined;sitting;call light within reach;encouraged to call for assist;exit alarm on;legs elevated  -RB               User Key  (r) = Recorded By, (t) = Taken By, (c) = Cosigned By      Initials Name Provider Type    RB Letitia Snider OT Occupational Therapist                   Outcome Measures       Row Name 12/05/23 1429          How much help from another is currently needed...    Putting on and taking off regular lower body clothing? 2  -RB     Bathing (including washing, rinsing, and drying) 2  -RB     Toileting (which includes using toilet bed pan or urinal) 2  -RB     Putting on and taking off regular upper body clothing 3  -RB     Taking care of personal grooming (such as brushing teeth) 3  -RB     Eating meals 4  -RB     AM-PAC 6 Clicks Score (OT) 16  -RB       Row Name 12/05/23 1420 12/05/23 0924       How much help from another person do you currently need...    Turning from your back to your side while in flat bed without using bedrails? 3  -CB 3  -EB    Moving from lying on back to sitting on the side of a flat bed without bedrails? 3  -CB 3  -EB    Moving to and from a bed to a chair (including a wheelchair)? 3  -CB 3  -EB    Standing up from a chair using your arms (e.g., wheelchair, bedside chair)? 3  -CB 3  -EB    Climbing 3-5 steps with a railing? 3  -CB 3  -EB    To walk in hospital room? 3  -CB 3  -EB    AM-PAC 6 Clicks Score (PT) 18  -CB 18  -EB    Highest Level of Mobility Goal 6 --> Walk 10 steps or more  -CB 6 --> Walk 10 steps or more  -EB      Row Name 12/05/23 1429          Modified Dean Scale    Modified Moore Scale 4 - Moderately severe disability.  Unable to walk without assistance, and unable to attend to own bodily needs without assistance.  -RB       Row Name 12/05/23 1429 12/05/23  1420       Functional Assessment    Outcome Measure Options AM-PAC 6 Clicks Daily Activity (OT);Modified [place]  -RB AM-PAC 6 Clicks Basic Mobility (PT)  -CB              User Key  (r) = Recorded By, (t) = Taken By, (c) = Cosigned By      Initials Name Provider Type    RB Letitia Snider, OT Occupational Therapist    Luisa Cr, RN Registered Nurse    Pennie Choudhury, PT Physical Therapist                    Occupational Therapy Education       Title: PT OT SLP Therapies (In Progress)       Topic: Occupational Therapy (Not Started)       Point: ADL training (Not Started)       Description:   Instruct learner(s) on proper safety adaptation and remediation techniques during self care or transfers.   Instruct in proper use of assistive devices.                  Learner Progress:  Not documented in this visit.              Point: Home exercise program (Not Started)       Description:   Instruct learner(s) on appropriate technique for monitoring, assisting and/or progressing therapeutic exercises/activities.                  Learner Progress:  Not documented in this visit.              Point: Precautions (Not Started)       Description:   Instruct learner(s) on prescribed precautions during self-care and functional transfers.                  Learner Progress:  Not documented in this visit.              Point: Body mechanics (Not Started)       Description:   Instruct learner(s) on proper positioning and spine alignment during self-care, functional mobility activities and/or exercises.                  Learner Progress:  Not documented in this visit.                                  OT Recommendation and Plan  Planned Therapy Interventions (OT): transfer/mobility retraining, strengthening exercise, activity tolerance training, adaptive equipment training, BADL retraining, functional balance retraining, occupation/activity based interventions, patient/caregiver education/training  Therapy Frequency (OT): 5  times/wk  Plan of Care Review  Plan of Care Reviewed With: patient  Progress: no change  Outcome Evaluation: Patient is an 78 y.o. female admitted to [facility] for fall resulting in T12 and L2 compression fx, R hip pain (WBAT with no intervention at this time), grade 1-2 anterior listhesis L5-S1 on 12/4/2023. PMHx includes R IM nailing, GERD, HTN. Patient is ind at baseline with use of rwx and lives with her spouse. Today, patient performed bed mobility with Adriana (log roll), required CGA for transfers, and ambulated 10ft and 50ft using rwx requiring CGA. Strength, pain, activity tolerance deficits noted. Patient may benefit from skilled PT services to address functional deficits and improve level of independence prior to discharge. Anticipate home with spouse and HHPT upon DC.     Time Calculation:   Evaluation Complexity (OT)  Review Occupational Profile/Medical/Therapy History Complexity: expanded/moderate complexity  Assessment, Occupational Performance/Identification of Deficit Complexity: 3-5 performance deficits  Clinical Decision Making Complexity (OT): detailed assessment/moderate complexity  Overall Complexity of Evaluation (OT): moderate complexity     Time Calculation- OT       Row Name 12/05/23 1425             Time Calculation- OT    OT Start Time 1346  -RB      OT Stop Time 1402  -RB      OT Time Calculation (min) 16 min  -RB      Total Timed Code Minutes- OT 8 minute(s)  -RB      OT Received On 12/05/23  -RB      OT - Next Appointment 12/06/23  -RB         Timed Charges    08388 - OT Self Care/Mgmt Minutes 8  -RB         Untimed Charges    OT Eval/Re-eval Minutes 8  -RB         Total Minutes    Timed Charges Total Minutes 8  -RB      Untimed Charges Total Minutes 8  -RB       Total Minutes 16  -RB                User Key  (r) = Recorded By, (t) = Taken By, (c) = Cosigned By      Initials Name Provider Type    RB Letitia Snider, OT Occupational Therapist                  Therapy Charges for Today        Code Description Service Date Service Provider Modifiers Qty    08567127949 HC OT SELF CARE/MGMT/TRAIN EA 15 MIN 12/5/2023 Letitia Snider, OT GO 1    [id number] HC OT EVAL MOD COMPLEXITY 2 12/5/2023 Letitia Snider OT GO 1                 Letitia Snider OT  12/5/2023

## 2023-12-05 NOTE — THERAPY EVALUATION
Patient Name: Kenia BRIAN Sample  : 1945    MRN: [id number]                              Today's Date: 2023       Admit Date: 2023    Visit Dx:     ICD-10-CM ICD-9-CM   1. Unable to ambulate  R26.2 719.7   2. Fall, initial encounter  W19.XXXA E888.9   3. Acute midline low back pain without sciatica  M54.50 724.2   4. Right hip pain  M25.551 719.45   5. Strain of neck muscle, initial encounter  S16.1XXA 847.0     Patient Active Problem List   Diagnosis    Snapping right knee    HTN (hypertension)    HLD (hyperlipidemia)    GERD (gastroesophageal reflux disease)    Closed comminuted intertrochanteric fracture of proximal end of right femur    Thrombocytopenia    Unable to ambulate    Right hip pain     Past Medical History:   Diagnosis Date    Anginal pain     Arthritis     Gastric reflux syndrome     Hiatal hernia     High cholesterol     History of bronchitis     Hypertension     Irregular heart beat      Past Surgical History:   Procedure Laterality Date    APPENDECTOMY      EXTERNAL EAR SURGERY Right     had tumor excised behind right ear lobe benign    FEMUR IM NAILING/RODDING Right 6/15/2022    Procedure: RIGHT HIP INTRAMEDULLARY NAILING/RODDING;  Surgeon: Marc Crawford MD;  Location: [facility];  Service: Orthopedics;  Laterality: Right;    GALLBLADDER SURGERY      HYSTERECTOMY      KNEE ARTHROPLASTY Right     KNEE ARTHROSCOPY Right     GA REVJ TOTAL KNEE ARTHRP W/WO ALGRFT 1 COMPONENT Right 2016    Procedure: RT ILIOTIBIAL BAND RELEASE RT TOTAL KNEE POLY EXCHANGE;  Surgeon: Toy Adame MD;  Location: [facility];  Service: Orthopedics    ROTATOR CUFF REPAIR Bilateral     WRIST MASS EXCISION Right       General Information       Row Name 23 1417          Physical Therapy Time and Intention    Document Type evaluation  -CB     Mode of Treatment physical therapy  -CB       Row Name 23 1417          General Information    Patient Profile Reviewed yes  -CB     Prior Level of  Function independent:;gait;transfer;bed mobility  rwx at baseline  -CB     Existing Precautions/Restrictions fall;weight bearing  -CB     Barriers to Rehab medically complex  -CB       Row Name 12/05/23 1417          Living Environment    People in Home spouse  -CB       Row Name 12/05/23 1417          Home Main Entrance    Number of Stairs, Main Entrance --  a few per pt  -CB       Row Name 12/05/23 1417          Cognition    Orientation Status (Cognition) oriented x 3  -CB       Row Name 12/05/23 1417          Safety Issues, Functional Mobility    Impairments Affecting Function (Mobility) endurance/activity tolerance;strength;pain  -CB               User Key  (r) = Recorded By, (t) = Taken By, (c) = Cosigned By      Initials Name Provider Type    CB Pennie Moya, PT Physical Therapist                   Mobility       Row Name 12/05/23 1418          Bed Mobility    Bed Mobility supine-sit  -CB     Supine-Sit [place] (Bed Mobility) minimum assist (75% patient effort);verbal cues  -CB     Comment, (Bed Mobility) log roll technique  -CB       Row Name 12/05/23 1418          Sit-Stand Transfer    Sit-Stand [place] (Transfers) contact guard;verbal cues  -CB     Assistive Device (Sit-Stand Transfers) walker, front-wheeled  -CB     Comment, (Sit-Stand Transfer) x2 STS reps to rwx  -CB       Row Name 12/05/23 1418          Gait/Stairs (Locomotion)    [place] Level (Gait) contact guard;verbal cues  -CB     Assistive Device (Gait) walker, front-wheeled  -CB     Distance in Feet (Gait) 10ft and 50ft  -CB     Deviations/Abnormal Patterns (Gait) gait speed decreased;stride length decreased;annelise decreased  -CB     Bilateral Gait Deviations forward flexed posture;heel strike decreased  -CB     Comment, (Gait/Stairs) no overt LOB noted  -CB       Row Name 12/05/23 1418          Mobility    Extremity Weight-bearing Status right lower extremity  -CB     Right Lower Extremity (Weight-bearing Status) weight-bearing  as tolerated (WBAT)  -CB               User Key  (r) = Recorded By, (t) = Taken By, (c) = Cosigned By      Initials Name Provider Type    Pennie Choudhury PT Physical Therapist                   Obj/Interventions       Row Name 12/05/23 1419          Range of Motion Comprehensive    Comment, General Range of Motion R hip limited by pain  -CB       Row Name 12/05/23 1419          Strength Comprehensive (MMT)    General Manual Muscle Testing (MMT) Assessment lower extremity strength deficits identified  -CB     Comment, General Manual Muscle Testing (MMT) Assessment generalized weakness  -CB       Row Name 12/05/23 1419          Balance    Balance Assessment standing static balance;standing dynamic balance  -CB     Static Standing Balance contact guard;verbal cues  -CB     Dynamic Standing Balance contact guard;verbal cues  -CB     Position/Device Used, Standing Balance supported;walker, front-wheeled  -CB       Row Name 12/05/23 1419          Sensory Assessment (Somatosensory)    Sensory Assessment (Somatosensory) sensation intact  -CB               User Key  (r) = Recorded By, (t) = Taken By, (c) = Cosigned By      Initials Name Provider Type    Pennie Choudhury, CARLY Physical Therapist                   Goals/Plan       Row Name 12/05/23 1420          Bed Mobility Goal 1 (PT)    Activity/Assistive Device (Bed Mobility Goal 1, PT) bed mobility activities, all  -CB     [place] Level/Cues Needed (Bed Mobility Goal 1, PT) modified independence  -CB     Time Frame (Bed Mobility Goal 1, PT) long term goal (LTG);1 week  -CB       Row Name 12/05/23 1420          Transfer Goal 1 (PT)    Activity/Assistive Device (Transfer Goal 1, PT) sit-to-stand/stand-to-sit;bed-to-chair/chair-to-bed;walker, rolling  -CB     [place] Level/Cues Needed (Transfer Goal 1, PT) supervision required  -CB     Time Frame (Transfer Goal 1, PT) long term goal (LTG);1 week  -CB       Row Name 12/05/23 1420          Gait Training Goal 1 (PT)     Activity/Assistive Device (Gait Training Goal 1, PT) gait (walking locomotion);assistive device use;improve balance and speed;increase endurance/gait distance;decrease fall risk;diminish gait deviation;walker, rolling  -CB     [place] Level (Gait Training Goal 1, PT) supervision required  -CB     Distance (Gait Training Goal 1, PT) 150ft  -CB     Time Frame (Gait Training Goal 1, PT) long term goal (LTG);1 week  -CB       Row Name 12/05/23 1420          Therapy Assessment/Plan (PT)    Planned Therapy Interventions (PT) balance training;bed mobility training;gait training;home exercise program;patient/family education;transfer training;strengthening  -CB               User Key  (r) = Recorded By, (t) = Taken By, (c) = Cosigned By      Initials Name Provider Type    Pennie Choudhury, PT Physical Therapist                   Clinical Impression       Row Name 12/05/23 1419          Pain    Pre/Posttreatment Pain Comment 7/10 in R hip and low back  -CB     Pain Intervention(s) Repositioned;Rest;Ambulation/increased activity  -CB       Row Name 12/05/23 1419          Plan of Care Review    Plan of Care Reviewed With patient  -CB       Row Name 12/05/23 1419          Therapy Assessment/Plan (PT)    Rehab Potential (PT) good, to achieve stated therapy goals  -CB     Criteria for Skilled Interventions Met (PT) yes  -CB     Therapy Frequency (PT) 5 times/wk  -CB       Row Name 12/05/23 1419          Positioning and Restraints    Pre-Treatment Position in bed  -CB     Post Treatment Position chair  -CB     In Chair notified nsg;call light within reach;encouraged to call for assist;exit alarm on;sitting;RUE elevated;LUE elevated  -CB               User Key  (r) = Recorded By, (t) = Taken By, (c) = Cosigned By      Initials Name Provider Type    Pennie Choudhury, PT Physical Therapist                   Outcome Measures       Row Name 12/05/23 1420 12/05/23 0993       How much help from another person do you currently  need...    Turning from your back to your side while in flat bed without using bedrails? 3  -CB 3  -EB    Moving from lying on back to sitting on the side of a flat bed without bedrails? 3  -CB 3  -EB    Moving to and from a bed to a chair (including a wheelchair)? 3  -CB 3  -EB    Standing up from a chair using your arms (e.g., wheelchair, bedside chair)? 3  -CB 3  -EB    Climbing 3-5 steps with a railing? 3  -CB 3  -EB    To walk in hospital room? 3  -CB 3  -EB    AM-PAC 6 Clicks Score (PT) 18  -CB 18  -EB    Highest Level of Mobility Goal 6 --> Walk 10 steps or more  -CB 6 --> Walk 10 steps or more  -EB      Row Name 12/05/23 1420          Functional Assessment    Outcome Measure Options AM-PAC 6 Clicks Basic Mobility (PT)  -               User Key  (r) = Recorded By, (t) = Taken By, (c) = Cosigned By      Initials Name Provider Type    EB Luisa Rosales, RN Registered Nurse    CB Pennie Moya, PT Physical Therapist                                 Physical Therapy Education       Title: PT OT SLP Therapies (In Progress)       Topic: Physical Therapy (In Progress)       Point: Mobility training (Done)       Learning Progress Summary             Patient Acceptance, E,TB, VU,NR by  at 12/5/2023 1421                         Point: Home exercise program (Not Started)       Learner Progress:  Not documented in this visit.              Point: Body mechanics (Done)       Learning Progress Summary             Patient Acceptance, E,TB, VU,NR by  at 12/5/2023 1421                         Point: Precautions (Done)       Learning Progress Summary             Patient Acceptance, E,TB, VU,NR by  at 12/5/2023 1421                                         User Key       Initials Effective Dates Name Provider Type Discipline     10/22/21 -  Pennie Moya, CARLY Physical Therapist PT                  PT Recommendation and Plan  Planned Therapy Interventions (PT): balance training, bed mobility training, gait training, home  exercise program, patient/family education, transfer training, strengthening  Plan of Care Reviewed With: patient     Time Calculation:         PT Charges       Row Name 12/05/23 1424             Time Calculation    Start Time 1346  -CB      Stop Time 1400  -CB      Time Calculation (min) 14 min  -CB      PT Received On 12/05/23  -CB      PT - Next Appointment 12/06/23  -CB      PT Goal Re-Cert Due Date 12/12/23  -CB         Time Calculation- PT    Total Timed Code Minutes- PT 8 minute(s)  -CB         Timed Charges    74816 - PT Therapeutic Activity Minutes 8  -CB         Total Minutes    Timed Charges Total Minutes 8  -CB       Total Minutes 8  -CB                User Key  (r) = Recorded By, (t) = Taken By, (c) = Cosigned By      Initials Name Provider Type    CB Pennie Moya, PT Physical Therapist                  Therapy Charges for Today       Code Description Service Date Service Provider Modifiers Qty    27911576561 HC PT THERAPEUTIC ACT EA 15 MIN 12/5/2023 Pennie Moya, PT GP 1    79544313987 HC PT EVAL MOD COMPLEXITY 3 12/5/2023 Pennie Moya, PT GP 1            PT G-Codes  Outcome Measure Options: AM-PAC 6 Clicks Basic Mobility (PT)  AM-PAC 6 Clicks Score (PT): 18  PT Discharge Summary  Anticipated Discharge Disposition (PT): home with home health, home with assist    Pennie Moya PT  12/5/2023

## 2023-12-05 NOTE — PLAN OF CARE
Goal Outcome Evaluation:  Plan of Care Reviewed With: patient        Progress: no change  Outcome Evaluation: Pt is alert and oriented x4. Up with x1 and walker to the bathroom. Sat in chair throughout the day. x1 norco and flexeril given. Continuous pulse ox. VSS. Will cont plan of care.

## 2023-12-05 NOTE — PLAN OF CARE
The pt was admitted to [facility] 2/2 to T12 and L2 compression fx, R hip pain (WBAT with no intervention at this time), grade 1-2 anterior listhesis L5-S1 on 12/4/2023. PMHx includes R IM nailing, GERD, HTN. Patient is ind at baseline with use of rwx and lives with her spouse. The pt was able to log roll to the EOB with Min A. CGA + walker to stand and mobilize to the bathroom for toileting. She reported generalized back pain. No LOB. Anticipate a d/c to Home with HH.     Anticipated Discharge Disposition (OT): home with assist, home with home health

## 2023-12-06 ENCOUNTER — DOCUMENTATION (OUTPATIENT)
Dept: HOME HEALTH SERVICES | Facility: HOME HEALTHCARE | Age: 78
End: 2023-12-06
Payer: MEDICARE

## 2023-12-06 ENCOUNTER — HOME HEALTH ADMISSION (OUTPATIENT)
Dept: HOME HEALTH SERVICES | Facility: HOME HEALTHCARE | Age: 78
End: 2023-12-06
Payer: MEDICARE

## 2023-12-06 VITALS
DIASTOLIC BLOOD PRESSURE: 80 MMHG | RESPIRATION RATE: 18 BRPM | SYSTOLIC BLOOD PRESSURE: 128 MMHG | BODY MASS INDEX: 26.72 KG/M2 | HEIGHT: 63 IN | OXYGEN SATURATION: 90 % | TEMPERATURE: 97 F | HEART RATE: 68 BPM | WEIGHT: 150.79 LBS

## 2023-12-06 PROBLEM — M51.369 DEGENERATIVE DISC DISEASE, LUMBAR: Status: ACTIVE | Noted: 2023-12-06

## 2023-12-06 PROBLEM — M51.36 DEGENERATIVE DISC DISEASE, LUMBAR: Status: ACTIVE | Noted: 2023-12-06

## 2023-12-06 PROCEDURE — G0378 HOSPITAL OBSERVATION PER HR: HCPCS

## 2023-12-06 PROCEDURE — 97110 THERAPEUTIC EXERCISES: CPT

## 2023-12-06 PROCEDURE — 97530 THERAPEUTIC ACTIVITIES: CPT

## 2023-12-06 PROCEDURE — 97535 SELF CARE MNGMENT TRAINING: CPT

## 2023-12-06 RX ORDER — CYCLOBENZAPRINE HCL 10 MG
10 TABLET ORAL 3 TIMES DAILY PRN
Qty: 15 TABLET | Refills: 0 | Status: SHIPPED | OUTPATIENT
Start: 2023-12-06 | End: 2023-12-11

## 2023-12-06 RX ORDER — POLYETHYLENE GLYCOL 3350 17 G/17G
17 POWDER, FOR SOLUTION ORAL DAILY PRN
Start: 2023-12-06

## 2023-12-06 RX ORDER — HYDROCODONE BITARTRATE AND ACETAMINOPHEN 5; 325 MG/1; MG/1
1 TABLET ORAL EVERY 4 HOURS PRN
Qty: 18 TABLET | Refills: 0 | Status: SHIPPED | OUTPATIENT
Start: 2023-12-06 | End: 2023-12-09

## 2023-12-06 RX ORDER — BISACODYL 5 MG/1
5 TABLET, DELAYED RELEASE ORAL DAILY PRN
Start: 2023-12-06

## 2023-12-06 RX ORDER — NALOXONE HYDROCHLORIDE 4 MG/.1ML
SPRAY NASAL
Qty: 2 EACH | Refills: 0 | Status: SHIPPED | OUTPATIENT
Start: 2023-12-06

## 2023-12-06 RX ORDER — LIDOCAINE 4 G/G
2 PATCH TOPICAL
Qty: 20 PATCH | Refills: 0 | Status: SHIPPED | OUTPATIENT
Start: 2023-12-07 | End: 2023-12-17

## 2023-12-06 RX ORDER — ERGOCALCIFEROL 1.25 MG/1
50000 CAPSULE ORAL
Qty: 5 CAPSULE | Refills: 0 | Status: SHIPPED | OUTPATIENT
Start: 2023-12-12

## 2023-12-06 RX ADMIN — LIDOCAINE 2 PATCH: 4 PATCH TOPICAL at 10:03

## 2023-12-06 RX ADMIN — FUROSEMIDE 20 MG: 20 TABLET ORAL at 10:02

## 2023-12-06 RX ADMIN — PRAVASTATIN SODIUM 20 MG: 20 TABLET ORAL at 06:18

## 2023-12-06 RX ADMIN — PANTOPRAZOLE SODIUM 40 MG: 40 TABLET, DELAYED RELEASE ORAL at 06:18

## 2023-12-06 RX ADMIN — DOCUSATE SODIUM 50MG AND SENNOSIDES 8.6MG 2 TABLET: 8.6; 5 TABLET, FILM COATED ORAL at 10:02

## 2023-12-06 RX ADMIN — Medication 1000 MCG: at 10:02

## 2023-12-06 RX ADMIN — METOPROLOL TARTRATE 25 MG: 25 TABLET, FILM COATED ORAL at 10:02

## 2023-12-06 RX ADMIN — HYDROCODONE BITARTRATE AND ACETAMINOPHEN 1 TABLET: 5; 325 TABLET ORAL at 10:07

## 2023-12-06 RX ADMIN — MAGNESIUM OXIDE 400 MG (241.3 MG MAGNESIUM) TABLET 400 MG: TABLET at 10:02

## 2023-12-06 RX ADMIN — Medication 10 ML: at 10:03

## 2023-12-06 NOTE — THERAPY TREATMENT NOTE
Patient Name: Kenia BRIAN Sample  : 1945    MRN: [id number]                              Today's Date: 2023       Admit Date: 2023    Visit Dx:     ICD-10-CM ICD-9-CM   1. Unable to ambulate  R26.2 719.7   2. Fall, initial encounter  W19.XXXA E888.9   3. Acute midline low back pain without sciatica  M54.50 724.2   4. Right hip pain  M25.551 719.45   5. Strain of neck muscle, initial encounter  S16.1XXA 847.0     Patient Active Problem List   Diagnosis    Snapping right knee    HTN (hypertension)    HLD (hyperlipidemia)    GERD (gastroesophageal reflux disease)    Closed comminuted intertrochanteric fracture of proximal end of right femur    Thrombocytopenia    Unable to ambulate    Right hip pain    Degenerative disc disease, thoracic and lumbar     Past Medical History:   Diagnosis Date    Anginal pain     Arthritis     Gastric reflux syndrome     Hiatal hernia     High cholesterol     History of bronchitis     Hypertension     Irregular heart beat      Past Surgical History:   Procedure Laterality Date    APPENDECTOMY      EXTERNAL EAR SURGERY Right     had tumor excised behind right ear lobe benign    FEMUR IM NAILING/RODDING Right 6/15/2022    Procedure: RIGHT HIP INTRAMEDULLARY NAILING/RODDING;  Surgeon: Marc Crawford MD;  Location: [facility];  Service: Orthopedics;  Laterality: Right;    GALLBLADDER SURGERY      HYSTERECTOMY      KNEE ARTHROPLASTY Right     KNEE ARTHROSCOPY Right     PA REVJ TOTAL KNEE ARTHRP W/WO ALGRFT 1 COMPONENT Right 2016    Procedure: RT ILIOTIBIAL BAND RELEASE RT TOTAL KNEE POLY EXCHANGE;  Surgeon: Toy Adame MD;  Location: [facility];  Service: Orthopedics    ROTATOR CUFF REPAIR Bilateral     WRIST MASS EXCISION Right       General Information       Row Name 23 1458          Physical Therapy Time and Intention    Document Type therapy note (daily note)  -CB     Mode of Treatment individual therapy;physical therapy  -CB       Row Name 23 1451           General Information    Existing Precautions/Restrictions fall  -CB       Row Name 12/06/23 1458          Cognition    Orientation Status (Cognition) oriented x 3  -CB               User Key  (r) = Recorded By, (t) = Taken By, (c) = Cosigned By      Initials Name Provider Type    CB Pennie Moya, PT Physical Therapist                   Mobility       Row Name 12/06/23 1458          Bed Mobility    Comment, (Bed Mobility) sitting EOB upon PT entering room  -CB       Row Name 12/06/23 1458          Sit-Stand Transfer    Sit-Stand [place] (Transfers) standby assist;verbal cues  -CB     Assistive Device (Sit-Stand Transfers) walker, front-wheeled  -CB       Row Name 12/06/23 1458          Gait/Stairs (Locomotion)    [place] Level (Gait) standby assist;verbal cues  -CB     Assistive Device (Gait) walker, front-wheeled  -CB     Distance in Feet (Gait) 250ft  -CB     Deviations/Abnormal Patterns (Gait) gait speed decreased;stride length decreased;annelise decreased  -CB     Bilateral Gait Deviations forward flexed posture;heel strike decreased  -CB     Comment, (Gait/Stairs) no overt LOB or unsteadiness noted  -CB       Row Name 12/06/23 1458          Mobility    Extremity Weight-bearing Status right lower extremity  -CB     Right Lower Extremity (Weight-bearing Status) weight-bearing as tolerated (WBAT)  -CB               User Key  (r) = Recorded By, (t) = Taken By, (c) = Cosigned By      Initials Name Provider Type    Pennie Choudhury PT Physical Therapist                   Obj/Interventions       Row Name 12/06/23 1459          Balance    Balance Assessment standing static balance;standing dynamic balance  -CB     Static Standing Balance standby assist  -CB     Dynamic Standing Balance standby assist  -CB     Position/Device Used, Standing Balance supported;walker, front-wheeled  -CB     Balance Interventions sitting;standing;sit to stand;supported;dynamic;static;minimal challenge  -CB                User Key  (r) = Recorded By, (t) = Taken By, (c) = Cosigned By      Initials Name Provider Type    Pennie Choudhury, PT Physical Therapist                   Goals/Plan    No documentation.                  Clinical Impression       Row Name 12/06/23 1459          Pain    Pretreatment Pain Rating 7/10  -CB     Posttreatment Pain Rating 7/10  -CB     Pain Location lower  -CB     Pain Location - back  -CB     Pain Intervention(s) Repositioned;Rest;Ambulation/increased activity  -CB       Row Name 12/06/23 0773          Plan of Care Review    Plan of Care Reviewed With patient  -CB     Progress improving  -CB     Outcome Evaluation Patient is agreeable to PT this afternoon and improved overall functional mobility. She increased ambulation distance to 250ft using rwx requiring SBA and STS to rwx with SBA. Pt reports pain 7/10 but improved with ambulation. Plan home with assist and HHPT.  -CB       Row Name 12/06/23 1453          Positioning and Restraints    Pre-Treatment Position in bed  -CB     Post Treatment Position bed  -CB     In Bed sitting EOB;call light within reach;encouraged to call for assist;exit alarm on;with family/caregiver;side rails up x2  -CB               User Key  (r) = Recorded By, (t) = Taken By, (c) = Cosigned By      Initials Name Provider Type    Pennie Choudhury, PT Physical Therapist                   Outcome Measures       Row Name 12/06/23 8311          How much help from another person do you currently need...    Turning from your back to your side while in flat bed without using bedrails? 4  -CB     Moving from lying on back to sitting on the side of a flat bed without bedrails? 4  -CB     Moving to and from a bed to a chair (including a wheelchair)? 3  -CB     Standing up from a chair using your arms (e.g., wheelchair, bedside chair)? 3  -CB     Climbing 3-5 steps with a railing? 3  -CB     To walk in hospital room? 3  -CB     AM-PAC 6 Clicks Score (PT) 20  -CB     Highest Level of  Mobility Goal 6 --> Walk 10 steps or more  -       Row Name 12/06/23 1501 12/06/23 0943       Functional Assessment    Outcome Measure Options AM-PAC 6 Clicks Basic Mobility (PT)  -CB AM-PAC 6 Clicks Daily Activity (OT)  -ENOC              User Key  (r) = Recorded By, (t) = Taken By, (c) = Cosigned By      Initials Name Provider Type    CB Pennie Moya, PT Physical Therapist    Amelia Interiano, OT Occupational Therapist                                 Physical Therapy Education       Title: PT OT SLP Therapies (In Progress)       Topic: Physical Therapy (In Progress)       Point: Mobility training (Done)       Learning Progress Summary             Patient Acceptance, E,TB, VU,NR by  at 12/6/2023 1502    Acceptance, E,TB, VU,NR by  at 12/5/2023 1421                         Point: Home exercise program (Not Started)       Learner Progress:  Not documented in this visit.              Point: Body mechanics (Done)       Learning Progress Summary             Patient Acceptance, E,TB, VU,NR by  at 12/5/2023 1421                         Point: Precautions (Done)       Learning Progress Summary             Patient Acceptance, E,TB, VU,NR by  at 12/5/2023 1421                                         User Key       Initials Effective Dates Name Provider Type Discipline     10/22/21 -  Pennie Moya, PT Physical Therapist PT                  PT Recommendation and Plan  Planned Therapy Interventions (PT): balance training, bed mobility training, gait training, home exercise program, patient/family education, transfer training, strengthening  Plan of Care Reviewed With: patient  Progress: improving  Outcome Evaluation: Patient is agreeable to PT this afternoon and improved overall functional mobility. She increased ambulation distance to 250ft using rwx requiring SBA and STS to rwx with SBA. Pt reports pain 7/10 but improved with ambulation. Plan home with assist and HHPT.     Time Calculation:         PT Charges        Row Name 12/06/23 1502             Time Calculation    Start Time 1343  -CB      Stop Time 1355  -CB      Time Calculation (min) 12 min  -CB      PT Received On 12/06/23  -CB      PT - Next Appointment 12/07/23  -CB         Time Calculation- PT    Total Timed Code Minutes- PT 12 minute(s)  -CB         Timed Charges    98613 - PT Therapeutic Activity Minutes 12  -CB         Total Minutes    Timed Charges Total Minutes 12  -CB       Total Minutes 12  -CB                User Key  (r) = Recorded By, (t) = Taken By, (c) = Cosigned By      Initials Name Provider Type    CB Pennie Moya, PT Physical Therapist                  Therapy Charges for Today       Code Description Service Date Service Provider Modifiers Qty    50833775082 HC PT THERAPEUTIC ACT EA 15 MIN 12/5/2023 Pennie Moya, PT GP 1    67109483618 HC PT EVAL MOD COMPLEXITY 3 12/5/2023 Pennie Moya, PT GP 1    69308536384 HC PT THERAPEUTIC ACT EA 15 MIN 12/6/2023 Pennie Moya, PT GP 1            PT G-Codes  Outcome Measure Options: AM-PAC 6 Clicks Basic Mobility (PT)  AM-PAC 6 Clicks Score (PT): 20  AM-PAC 6 Clicks Score (OT): 18  Modified Dean Scale: 4 - Moderately severe disability.  Unable to walk without assistance, and unable to attend to own bodily needs without assistance.  PT Discharge Summary  Anticipated Discharge Disposition (PT): home with home health, home with assist    Pennie Moya PT  12/6/2023

## 2023-12-06 NOTE — PLAN OF CARE
Goal Outcome Evaluation:  Plan of Care Reviewed With: patient           Outcome Evaluation: Pt seen for OT session this AM with a focus on UE strengthening and functional mobility. she performed several theraband (red) exercises seated in chair. Performed functional mobility with CGA/SBA and use of rwx. Also performed toileting with CGA for brief management. pt improving well. Pt to continue to benefit from skilled OT to address deficits and maximize indpendence with ADLs.      Anticipated Discharge Disposition (OT): home with assist, home with home health

## 2023-12-06 NOTE — PROGRESS NOTES
Case Management Discharge Note      Final Note: Discharged to home with [facility] today and daughter to provide the transportation at discharge. NABILA Ba RN, CCP.    Provided Post Acute Provider List?: Yes  Post Acute Provider List: Home Health  Provided Post Acute Provider Quality & Resource List?: Yes  Post Acute Provider Quality and Resource List: Home Health  Delivered To: Patient  Method of Delivery: In person    Selected Continued Care - Admitted Since 12/4/2023       Destination    No services have been selected for the patient.                Durable Medical Equipment    No services have been selected for the patient.                Dialysis/Infusion    No services have been selected for the patient.                Home Medical Care Coordination complete.      Service Provider Selected Services Address Phone Fax Patient Preferred    [facility] ,  Home Nursing ,  Home Rehabilitation [address] [address] [phone] --              Therapy    No services have been selected for the patient.                Community Resources    No services have been selected for the patient.                Community & DME    No services have been selected for the patient.                    Transportation Services  Private: Car    Final Discharge Disposition Code: 06 - home with home health care

## 2023-12-06 NOTE — PROGRESS NOTES
[facility] will follow post hospital as requested. Patient/spouse agreeable to services. Contact information confirmed. Correct address for patient is [address] [place] [address]. [place] [address]. Patient can be reached on home number [phone]. Back up number is [phone] ( daughter). Patient is not currently receiving any other home health services. Verbal order received from Terri with PCP, Dr. Guillory, for home health care.

## 2023-12-06 NOTE — PROGRESS NOTES
Discharge Planning Assessment  [facility]     Patient Name: Kenia N Sample  MRN: [id number]  Today's Date: 12/6/2023    Admit Date: 12/4/2023    [facility]   Discharge Needs Assessment       Row Name 12/06/23 1343       Living Environment    People in Home spouse    Current Living Arrangements home    Potentially Unsafe Housing Conditions none    Primary Care Provided by self    Provides Primary Care For no one    Family Caregiver if Needed child(tirso), adult;spouse    Quality of Family Relationships helpful;involved;supportive    Able to Return to Prior Arrangements yes       Resource/Environmental Concerns    Resource/Environmental Concerns none    Transportation Concerns none       Transition Planning    Patient/Family Anticipates Transition to home with family    Transportation Anticipated family or friend will provide       Discharge Needs Assessment    Readmission Within the Last 30 Days no previous admission in last 30 days    Equipment Currently Used at Home walker, rolling    Anticipated Changes Related to Illness none    Equipment Needed After Discharge none                   Discharge Plan       Row Name 12/06/23 1527       Plan    Final Discharge Disposition Code 06 - home with home health care    Final Note Discharged to home with [facility] today and daughter to provide the transportation at discharge. NABILA Ba RN, CCP.      Row Name 12/06/23 1338       [facility]    [facility]    Provided Post Acute Provider List? Yes    Post Acute Provider List Home Health    Provided Post Acute Provider Quality & Resource List? Yes    Post Acute Provider Quality and Resource List Home Health    Delivered To Patient    Method of Delivery In person    Plan Comments CCP met with patient at bedside. Introduced self and explained role of CCP. Patient confirmed the information on her face sheet was accurate. Patients PCP is Amy Guillory which was updated on the patients’ chart.  Patient lives at home with her spouse. Patient stated that her address is [address] [place] [address], [place], [address]. Updated the address in epic. She uses a walker at home. Patient has used [facility] and been to [facility] in the past. [facility] referral pending at request from patient. Patient states family can transport.                  Continued Care and Services - Admitted Since 12/4/2023       Home Medical Care Coordination complete.      Service Provider Request Status Selected Services Address Phone Fax Patient Preferred     [facility]  Selected Home Health Services ,  Home Nursing ,  Home Rehabilitation [address] [address] [phone] --                  Expected Discharge Date and Time       Expected Discharge Date Expected Discharge Time    Dec 6, 2023            Demographic Summary       Row Name 12/06/23 1520       General Information    Admission Type observation    Arrived From emergency department    Referral Source admission list      Row Name 12/06/23 1343       General Information    Admission Type observation    Arrived From emergency department    Referral Source admission list    Reason for Consult discharge planning    Preferred Language English                   Functional Status       Row Name 12/06/23 1343       Functional Status    Usual Activity Tolerance good    Current Activity Tolerance good       Functional Status, IADL    Medications independent    Meal Preparation independent    Housekeeping independent    Laundry independent    Shopping independent       Mental Status    General Appearance WDL WDL       Mental Status Summary    Recent Changes in Mental Status/Cognitive Functioning no changes       Employment/    Employment Status retired                   Psychosocial    No documentation.                  Abuse/Neglect    No documentation.                  Legal    No documentation.                  Substance Abuse    No  documentation.                  Patient Forms    No documentation.                     Eva Ba RN

## 2023-12-06 NOTE — CASE MANAGEMENT/SOCIAL WORK
Discharge Planning Assessment  [facility]     Patient Name: Kenia BRIAN Sample  MRN: [id number]  Today's Date: 12/6/2023    Admit Date: 12/4/2023    [facility]   Discharge Needs Assessment       Row Name 12/06/23 1343       Living Environment    People in Home spouse    Current Living Arrangements home    Potentially Unsafe Housing Conditions none    Primary Care Provided by self    Provides Primary Care For no one    Family Caregiver if Needed child(tirso), adult;spouse    Quality of Family Relationships helpful;involved;supportive    Able to Return to Prior Arrangements yes       Resource/Environmental Concerns    Resource/Environmental Concerns none    Transportation Concerns none       Transition Planning    Patient/Family Anticipates Transition to home with family    Transportation Anticipated family or friend will provide       Discharge Needs Assessment    Readmission Within the Last 30 Days no previous admission in last 30 days    Equipment Currently Used at Home walker, rolling    Anticipated Changes Related to Illness none    Equipment Needed After Discharge none                   Discharge Plan       Row Name 12/06/23 1338       Plan    [facility])    Provided Post Acute Provider List? Yes    Post Acute Provider List Home Health    Provided Post Acute Provider Quality & Resource List? Yes    Post Acute Provider Quality and Resource List Home Health    Delivered To Patient    Method of Delivery In person    Plan Comments CCP met with patient at bedside. Introduced self and explained role of CCP. Patient confirmed the information on her face sheet was accurate. Patients PCP is Amy Guillory which was updated on the patients’ chart. Patient lives at home with her spouse. Patient stated that her address is [address] [place] [address], [place], [place], [address]. Updated the address in epic. She uses a walker at home. Patient has used [facility] and been to [facility] in the past. [facility] referral pending  at request from patient. Patient states family can transport.                  Continued Care and Services - Admitted Since 12/4/2023       Home Medical Care       Service Provider Request Status Selected Services Address Phone Fax Patient Preferred     [facility] Pending - Request Sent N/A [address] [address] [phone] --                  Expected Discharge Date and Time       Expected Discharge Date Expected Discharge Time    Dec 7, 2023            Demographic Summary       Row Name 12/06/23 1343       General Information    Admission Type observation    Arrived From emergency department    Referral Source admission list    Reason for Consult discharge planning    Preferred Language English                   Functional Status       Row Name 12/06/23 1343       Functional Status    Usual Activity Tolerance good    Current Activity Tolerance good       Functional Status, IADL    Medications independent    Meal Preparation independent    Housekeeping independent    Laundry independent    Shopping independent       Mental Status    General Appearance WDL WDL       Mental Status Summary    Recent Changes in Mental Status/Cognitive Functioning no changes       Employment/    Employment Status retired                   Psychosocial    No documentation.                  Abuse/Neglect    No documentation.                  Legal    No documentation.                  Substance Abuse    No documentation.                  Patient Forms    No documentation.

## 2023-12-06 NOTE — THERAPY TREATMENT NOTE
Patient Name: Kenia BRIAN Sample  : 1945    MRN: [id number]                              Today's Date: 2023       Admit Date: 2023    Visit Dx:     ICD-10-CM ICD-9-CM   1. Unable to ambulate  R26.2 719.7   2. Fall, initial encounter  W19.XXXA E888.9   3. Acute midline low back pain without sciatica  M54.50 724.2   4. Right hip pain  M25.551 719.45   5. Strain of neck muscle, initial encounter  S16.1XXA 847.0     Patient Active Problem List   Diagnosis    Snapping right knee    HTN (hypertension)    HLD (hyperlipidemia)    GERD (gastroesophageal reflux disease)    Closed comminuted intertrochanteric fracture of proximal end of right femur    Thrombocytopenia    Unable to ambulate    Right hip pain     Past Medical History:   Diagnosis Date    Anginal pain     Arthritis     Gastric reflux syndrome     Hiatal hernia     High cholesterol     History of bronchitis     Hypertension     Irregular heart beat      Past Surgical History:   Procedure Laterality Date    APPENDECTOMY      EXTERNAL EAR SURGERY Right     had tumor excised behind right ear lobe benign    FEMUR IM NAILING/RODDING Right 6/15/2022    Procedure: RIGHT HIP INTRAMEDULLARY NAILING/RODDING;  Surgeon: Marc Crawford MD;  Location: [facility];  Service: Orthopedics;  Laterality: Right;    GALLBLADDER SURGERY      HYSTERECTOMY      KNEE ARTHROPLASTY Right     KNEE ARTHROSCOPY Right     [place] REVJ TOTAL KNEE ARTHRP W/WO ALGRFT 1 COMPONENT Right 2016    Procedure: RT ILIOTIBIAL BAND RELEASE RT TOTAL KNEE POLY EXCHANGE;  Surgeon: Toy Adame MD;  Location: [facility];  Service: Orthopedics    ROTATOR CUFF REPAIR Bilateral     WRIST MASS EXCISION Right       General Information       Row Name 23 0943          OT Time and Intention    Document Type therapy note (daily note)  -ENOC     Mode of Treatment individual therapy;occupational therapy  -KA       Row Name 23 0943          General Information    Patient Profile Reviewed yes   -     Existing Precautions/Restrictions fall  -       Row Name 12/06/23 0943          Cognition    Orientation Status (Cognition) oriented x 3  -       Row Name 12/06/23 0943          Safety Issues, Functional Mobility    Impairments Affecting Function (Mobility) balance;endurance/activity tolerance;strength;pain  -               User Key  (r) = Recorded By, (t) = Taken By, (c) = Cosigned By      Initials Name Provider Type    Amelia Interiano OT Occupational Therapist                     Mobility/ADL's       Row Name 12/06/23 0944          Bed Mobility    Bed Mobility supine-sit  -     Supine-Sit [place] (Bed Mobility) contact guard;1 person assist  -     Comment, (Bed Mobility) UI at the end of session  -       Row Name 12/06/23 0944          Transfers    Transfers sit-stand transfer;toilet transfer  -       Row Name 12/06/23 0944          Sit-Stand Transfer    Sit-Stand [place] (Transfers) contact guard;verbal cues  -     Assistive Device (Sit-Stand Transfers) walker, front-wheeled  -       Row Name 12/06/23 0944          Stand-Sit Transfer    Stand-Sit [place] (Transfers) verbal cues;contact guard  -     Assistive Device (Stand-Sit Transfers) walker, front-wheeled  -       Row Name 12/06/23 0944          Toilet Transfer    [place] Level (Toilet Transfer) standby assist;contact guard;1 person assist  -     Assistive Device (Toilet Transfer) walker, front-wheeled  -ENOC     Comment, (Toilet Transfer) --  Declined need for toileting this AM initially and then called therapist back into room to ambulate to toilet which she performed with SBA/CGA and use of rwx  -       Row Name 12/06/23 0944          Functional Mobility    Functional Mobility- Ind. Level contact guard assist;standby assist  -     Functional Mobility- Device walker, front-wheeled  [facility]     Functional Mobility-Distance (Feet) --  within room and out into hallway for fxl household distances with  SBA/CGA and use of rwx. slow pace noted  -       Row Name 12/06/23 0944          Activities of Daily Living    BADL Assessment/Intervention --  politely declined stating she was waiting for her daughter to bring her own supplies for bathing and grooming  -       Row Name 12/06/23 0944          Mobility    Extremity Weight-bearing Status right lower extremity  -     Right Lower Extremity (Weight-bearing Status) weight-bearing as tolerated (WBAT)  -               User Key  (r) = Recorded By, (t) = Taken By, (c) = Cosigned By      Initials Name Provider Type    Amelia Interiano OT Occupational Therapist                   Obj/Interventions       Row Name 12/06/23 0945          Balance    Balance Assessment sitting static balance;sitting dynamic balance  -     Static Sitting Balance standby assist;supervision  -     Dynamic Sitting Balance standby assist  -     Position, Sitting Balance sitting edge of bed  -     Static Standing Balance standby assist  -     Dynamic Standing Balance standby assist;contact guard  -     Position/Device Used, Standing Balance walker, front-wheeled  -     Balance Interventions sitting;standing;occupation based/functional task  -               User Key  (r) = Recorded By, (t) = Taken By, (c) = Cosigned By      Initials Name Provider Type    Amelia Interiano OT Occupational Therapist                   Goals/Plan    No documentation.                  Clinical Impression       Row Name 12/06/23 0946          Pain Assessment    Pretreatment Pain Rating 7/10  -KA     Posttreatment Pain Rating 7/10  -KA     Pain Location generalized  -     Pain Location - back  -       Row Name 12/06/23 0946          Plan of Care Review    Plan of Care Reviewed With patient  -     Outcome Evaluation Pt seen for OT session this AM with a focus on UE strengthening and functional mobility. she performed several theraband (red) exercises seated in chair. Performed functional  mobility with CGA/SBA and use of rwx. Also performed toileting with CGA for brief management. pt improving well. Pt to continue to benefit from skilled OT to address deficits and maximize indpendence with ADLs.  -       Row Name 12/06/23 0946          Therapy Plan Review/Discharge Plan (OT)    Anticipated Discharge Disposition (OT) home with assist;home with home health  -       Row Name 12/06/23 0946          Vital Signs    Pre Patient Position Supine  -     Intra Patient Position Standing  -KA     Post Patient Position Sitting  -       Row Name 12/06/23 0946          Positioning and Restraints    Pre-Treatment Position in bed  -KA     Post Treatment Position chair  -     In Chair reclined;call light within reach;encouraged to call for assist;exit alarm on  -               User Key  (r) = Recorded By, (t) = Taken By, (c) = Cosigned By      Initials Name Provider Type    Amelia Interiano OT Occupational Therapist                   Outcome Measures       Row Name 12/06/23 0943          How much help from another is currently needed...    Putting on and taking off regular lower body clothing? 2  -KA     Bathing (including washing, rinsing, and drying) 3  -KA     Toileting (which includes using toilet bed pan or urinal) 3  -KA     Putting on and taking off regular upper body clothing 3  -KA     Taking care of personal grooming (such as brushing teeth) 3  -KA     Eating meals 4  -KA     AM-PAC 6 Clicks Score (OT) 18  -KA       Row Name 12/06/23 0943          Functional Assessment    Outcome Measure Options AM-PAC 6 Clicks Daily Activity (OT)  -               User Key  (r) = Recorded By, (t) = Taken By, (c) = Cosigned By      Initials Name Provider Type    Amelia Interiano OT Occupational Therapist                    Occupational Therapy Education       Title: PT OT SLP Therapies (In Progress)       Topic: Occupational Therapy (Not Started)       Point: ADL training (Not Started)       Description:    Instruct learner(s) on proper safety adaptation and remediation techniques during self care or transfers.   Instruct in proper use of assistive devices.                  Learner Progress:  Not documented in this visit.              Point: Home exercise program (Not Started)       Description:   Instruct learner(s) on appropriate technique for monitoring, assisting and/or progressing therapeutic exercises/activities.                  Learner Progress:  Not documented in this visit.              Point: Precautions (Not Started)       Description:   Instruct learner(s) on prescribed precautions during self-care and functional transfers.                  Learner Progress:  Not documented in this visit.              Point: Body mechanics (Not Started)       Description:   Instruct learner(s) on proper positioning and spine alignment during self-care, functional mobility activities and/or exercises.                  Learner Progress:  Not documented in this visit.                                  OT Recommendation and Plan     Plan of Care Review  Plan of Care Reviewed With: patient  Outcome Evaluation: Pt seen for OT session this AM with a focus on UE strengthening and functional mobility. she performed several theraband (red) exercises seated in chair. Performed functional mobility with CGA/SBA and use of rwx. Also performed toileting with CGA for brief management. pt improving well. Pt to continue to benefit from skilled OT to address deficits and maximize indpendence with ADLs.     Time Calculation:         Time Calculation- OT       Row Name 12/06/23 0959 12/06/23 0942          Time Calculation- OT    OT Start Time 0947  -KA 0920  -KA     OT Stop Time 0955  -KA 0943  -KA     OT Time Calculation (min) 8 min  -KA 23 min  -KA     Total Timed Code Minutes- OT 8 minute(s)  -KA 23 minute(s)  -KA        Timed Charges    66889 - OT Therapeutic Exercise Minutes -- 13  -KA     97767 - OT Therapeutic Activity Minutes -- 10   -KA     07359 - OT Self Care/Mgmt Minutes 8  -KA --        Total Minutes    Timed Charges Total Minutes 8  -KA 23  -KA      Total Minutes 8  -KA 23  -KA               User Key  (r) = Recorded By, (t) = Taken By, (c) = Cosigned By      Initials Name Provider Type    Amelia Interiano OT Occupational Therapist                  Therapy Charges for Today       Code Description Service Date Service Provider Modifiers Qty    32285904936 HC OT THER PROC EA 15 MIN 12/6/2023 Amelia Moralez OT GO 1    78803228461 HC OT THERAPEUTIC ACT EA 15 MIN 12/6/2023 Amelia Moralez OT GO 1    68170038654 HC OT SELF CARE/MGMT/TRAIN EA 15 MIN 12/6/2023 Amelia Moralez OT GO 1                 Amelia Moralez OT  12/6/2023

## 2023-12-06 NOTE — PLAN OF CARE
Goal Outcome Evaluation:  Plan of Care Reviewed With: patient        Progress: improving  Outcome Evaluation: Patient is agreeable to PT this afternoon and improved overall functional mobility. She increased ambulation distance to 250ft using rwx requiring SBA and STS to rwx with SBA. Pt reports pain 7/10 but improved with ambulation. Plan home with assist and HHPT.      Anticipated Discharge Disposition (PT): home with home health, home with assist

## 2023-12-06 NOTE — DISCHARGE SUMMARY
Patient Name: Kenia Palomo  : 1945  MRN: [id number]    Date of Admission: 2023  Date of Discharge:  2023  Primary Care Physician: Amy Guillory MD      Chief Complaint:   Back Pain, Hip Pain, and Fall      Discharge Diagnoses     Active Hospital Problems    Diagnosis  POA    **Unable to ambulate [R26.2]  Yes    Degenerative disc disease, thoracic and lumbar [M51.36]  Unknown    Right hip pain [M25.551]  Yes    Thrombocytopenia [D69.6]  Yes    HTN (hypertension) [I10]  Yes    HLD (hyperlipidemia) [E78.5]  Yes    GERD (gastroesophageal reflux disease) [K21.9]  Yes      Resolved Hospital Problems   No resolved problems to display.        Hospital Course     Ms. Palomo is a 78 y.o. female with a history of chronic back pain, stress fracture of left foot, history of right intertrochanteric femur fracture, osteoarthritis that presents from a fall at home.  She presented with right hip pain, low back pain with inability to ambulate due to pain.  States she fell because she has a stress fracture of her left foot that puts her off balance.  She has been having chronic low back pain but it is significantly worse after her fall.  Orthopedic surgery was consulted for right hip pain.  X-rays demonstrated right intramedullary nail fixation with healing of prior fracture and no abnormal hardware findings.  Orthopedic surgery did not recommend operative intervention and cleared her for PT OT.  X-rays of her spine with compression deformity of T12 and L2 with multilevel degenerative disc disease of the thoracic spine. If she failed to progress would have obtained MRI of the T and L-spine due to the compression deformities seen on plain film.  However, her pain has been improving and she is ambulating adequately with physical therapy and Occupational Therapy.  She will need home health at discharge.  She has been advised to follow-up with her PCP as needed.           Day of Discharge     Subjective:    Pain in low back present after working with therapy but tolerable. No numbness tingling or other concerning symptoms. Family at bedside.     Physical Exam:  Temp:  [97 °F (36.1 °C)-98.3 °F (36.8 °C)] 97 °F (36.1 °C)  Heart Rate:  [60-79] 68  Resp:  [18-20] 18  BP: (123-135)/(69-80) 128/80  Body mass index is 26.72 kg/m².  Physical Exam  Vitals reviewed.   Constitutional:       Appearance: She is well-developed. She is not ill-appearing.   Cardiovascular:      Rate and Rhythm: Normal rate and regular rhythm.   Pulmonary:      Effort: Pulmonary effort is normal. No respiratory distress.      Breath sounds: Normal breath sounds.   Abdominal:      General: Bowel sounds are normal. There is no distension.      Palpations: Abdomen is soft.      Tenderness: There is no abdominal tenderness.   Musculoskeletal:         General: No swelling or deformity.      Right lower leg: No edema.      Left lower leg: No edema.   Skin:     General: Skin is warm and dry.   Neurological:      General: No focal deficit present.      Mental Status: She is alert and oriented to person, place, and time.   Psychiatric:         Mood and Affect: Mood normal.         Behavior: Behavior normal.         Thought Content: Thought content normal.         Consultants     Consult Orders (all) (From admission, onward)       Start     Ordered    12/05/23 0928  Inpatient Case Management  Consult  Once        Provider:  (Not yet assigned)    12/05/23 0928 12/04/23 0303  Inpatient Orthopedic Surgery Consult  Once        Specialty:  Orthopedic Surgery  Provider:  Marc Crawford MD    12/04/23 0304    12/04/23 0134  [facility] (on-call MD unless specified) Details  Once        Specialty:  Hospitalist  Provider:  (Not yet assigned)    12/04/23 0133                  Procedures     * Surgery not found *    Imaging Results (All)       Procedure Component Value Units Date/Time    XR Hip With or Without Pelvis 2 - 3 View Right [[id number]] Collected:  12/04/23 0621     Updated: 12/04/23 0628    Narrative:      XR HIP W OR WO PELVIS 2-3 VIEW RIGHT-     Clinical: Fell with right hip pain     COMPARISON: 6/15/2022     FINDINGS: Medullary bob and gamma nails in position as before. No  hardware loosening. No fracture or dislocation seen. The right hip joint  is similar to the previous examination. Healed intertrochanteric  fracture. There is right hip joint degeneration also seen similar to the  previous examination. The remainder is unremarkable.     This report was finalized on 12/4/2023 6:25 AM by Dr. Francisco Perdomo M.D  on Workstation: [id number]       XR Spine Lumbar Complete 4+VW [[id number]] Collected: 12/04/23 0614     Updated: 12/04/23 0621    Narrative:      XR SPINE LUMBAR COMPLETE 4+VW-     Clinical: Fell with low back pain     FINDINGS: Compression deformity with approximately 50% volume loss of  the T12 and L2. Moderate to substantial multilevel disc degeneration  lower thoracic and lumbar spine. Grade 1-2 anterior listhesis of L5 on  S1. Questionable associated spondylolysis. There is mid and lower lumbar  facet hypertrophy. The remainder is unremarkable.     This report was finalized on 12/4/2023 6:18 AM by Dr. Francisco Perdomo M.D  on Workstation: [id number]       CT Cervical Spine Without Contrast [[id number]] Collected: 12/04/23 0112     Updated: 12/04/23 0112    Narrative:        Patient: SAMPLE, ADA  Time Out: 01:11  Exam(s): CT C SPINE     EXAM:    CT Cervical Spine Without Intravenous Contrast    CLINICAL HISTORY:      fall, neck pain.    TECHNIQUE:    Axial computed tomography images of the cervical spine without   intravenous contrast.  CTDI is 18.53 mGy and DLP is 300 mGy-cm.  This CT   exam was performed according to the principle of ALARA (As Low As   Reasonably Achievable) by using one or more of the following dose   reduction techniques: automated exposure control, adjustment of the mA   and or kV according to patient size, and or use of  iterative   reconstruction technique.    COMPARISON:    No relevant prior studies available.    FINDINGS:    Vertebrae:  The vertebral bodies are intact without acute osseous   traumatic injury.  There is straightening of the normal cervical lordosis.    No anterolisthesis or retrolisthesis is identified.  The facet joints   are well aligned without subluxation or dislocation.  The pedicles,   transverse processes and spinous processes are intact.    Discs spinal canal neural foramina:  Disc space narrowing with marginal   hypertrophic changes, most prominent from C4-5 through C6-7.  No   significant osseous central canal stenosis.    Soft tissues:  Incidental surgical clips in the left supraclavicular   region.  No significant traumatic injury.    Lung apices:  The included lung apices demonstrate no evidence for   acute traumatic injury.    IMPRESSION:         No acute osseous traumatic injury or significant abnormal alignment   involving the cervical spine.  Incidental chronic multilevel degenerative   changes noted.      Impression:          Electronically signed by Jimy Cm MD on 12-04-23 at 0111              Pertinent Labs     Results from last 7 days   Lab Units 12/05/23  0709 12/04/23  0656 12/04/23  0110   WBC 10*3/mm3 5.01 3.74 6.18   HEMOGLOBIN g/dL 12.0 12.5 12.4   PLATELETS 10*3/mm3 58* 70* 80*     Results from last 7 days   Lab Units 12/05/23  0709 12/04/23  0656 12/04/23  0110   SODIUM mmol/L 142 141 139   POTASSIUM mmol/L 4.4 3.8 3.5   CHLORIDE mmol/L 102 101 99   CO2 mmol/L 32.8* 28.0 27.0   BUN mg/dL 15 14 16   CREATININE mg/dL 0.92 0.78 0.81   GLUCOSE mg/dL 106* 123* 112*   EGFR mL/min/1.73 63.1 76.9 73.5     Results from last 7 days   Lab Units 12/05/23  0709   ALBUMIN g/dL 3.4*   BILIRUBIN mg/dL 0.8   ALK PHOS U/L 101   AST (SGOT) U/L 30   ALT (SGPT) U/L 23     Results from last 7 days   Lab Units 12/05/23  0709 12/04/23  0656 12/04/23  0110   CALCIUM mg/dL 9.1 9.2 9.2   ALBUMIN g/dL  "3.4*  --   --                Invalid input(s): \"LDLCALC\"          Test Results Pending at Discharge       Discharge Details        Discharge Medications        New Medications        Instructions Start Date   bisacodyl 5 MG EC tablet  Commonly known as: DULCOLAX   5 mg, Oral, Daily PRN      cyclobenzaprine 10 MG tablet  Commonly known as: FLEXERIL   10 mg, Oral, 3 Times Daily PRN      HYDROcodone-acetaminophen 5-325 MG per tablet  Commonly known as: NORCO   1 tablet, Oral, Every 4 Hours PRN      Lidocaine Pain Relief 4 %  Generic drug: Lidocaine   2 patches, Transdermal, Every 24 Hours Scheduled, Remove & Discard patch within 12 hours or as directed by MD   Start Date: December 7, 2023     naloxone 4 MG/0.1ML nasal spray  Commonly known as: NARCAN   Call 911. Don't prime. Tebbetts in 1 nostril for overdose. Repeat in 2-3 minutes in other nostril if no or minimal breathing/responsiveness.      polyethylene glycol 17 g packet  Commonly known as: MIRALAX   17 g, Oral, Daily PRN      vitamin D 1.25 MG (05892 UT) capsule capsule  Commonly known as: ERGOCALCIFEROL   50,000 Units, Oral, Every 7 Days   Start Date: December 12, 2023            Continue These Medications        Instructions Start Date   acetaminophen 325 MG tablet  Commonly known as: TYLENOL   650 mg, Oral, Every 4 Hours PRN      albuterol sulfate  (90 Base) MCG/ACT inhaler  Commonly known as: PROVENTIL HFA;VENTOLIN HFA;PROAIR HFA   2 puffs, Inhalation, Every 4 Hours PRN      furosemide 20 MG tablet  Commonly known as: LASIX   20 mg, Oral, 2 Times Daily      Magnesium 500 MG capsule   500 mg, Oral, 2 Times Daily      metoprolol tartrate 25 MG tablet  Commonly known as: LOPRESSOR   25 mg, Oral, 2 Times Daily      omeprazole 40 MG capsule  Commonly known as: priLOSEC   40 mg, Oral, Every Morning      oxyCODONE-acetaminophen  MG per tablet  Commonly known as: PERCOCET   1 tablet, Oral, Every 6 Hours PRN      pravastatin 20 MG tablet  Commonly known as: " PRAVACHOL   20 mg, Oral, Every Morning      rOPINIRole 0.25 MG tablet  Commonly known as: REQUIP   0.25 mg, Oral, Nightly, Take 1 hour before bedtime.      sennosides-docusate 8.6-50 MG per tablet  Commonly known as: PERICOLACE   2 tablets, Oral, 2 Times Daily      vitamin B-12 1000 MCG tablet  Commonly known as: CYANOCOBALAMIN   1,000 mcg, Oral, Daily               Allergies   Allergen Reactions    Levaquin [Levofloxacin] Hives    Zocor [Simvastatin] Myalgia    Codeine GI Intolerance    Penicillins Hives and Swelling     Pt reports tongue swelling    Percocet [Oxycodone-Acetaminophen] Itching and Nausea And Vomiting       Discharge Disposition:  Home-Health Care Svc      Discharge Diet:  Diet Order   Procedures    Diet: Regular/House Diet; Texture: Regular Texture (IDDSI 7); Fluid Consistency: Thin (IDDSI 0)       Discharge Activity:  as tolerated  Activity Instructions       Activity as Tolerated              CODE STATUS:    Code Status and Medical Interventions:   Ordered at: 12/04/23 0304     Code Status (Patient has no pulse and is not breathing):    CPR (Attempt to Resuscitate)     Medical Interventions (Patient has pulse or is breathing):    Full Support       No future appointments.  Additional Instructions for the Follow-ups that You Need to Schedule       Ambulatory Referral to Home Health   As directed      Face to Face Visit Date: 12/6/2023   Follow-up provider for Plan of Care?: I treated the patient in an acute care facility and will not continue treatment after discharge.   Follow-up provider: CARO SALEH [[id number]]   Reason/Clinical Findings: right hip pain   Describe mobility limitations that make leaving home difficult: impaired mobility due to back and hip pain   Nursing/Therapeutic Services Requested: Skilled Nursing Physical Therapy Occupational Therapy   Skilled nursing orders: Medication education Pain management   PT orders: Therapeutic exercise Gait Training Transfer training Strengthening  Home safety assessment   Weight Bearing Status: Full Weight Bearing   Occupational orders: Activities of daily living Energy conservation Strengthening Fine motor Home safety assessment   Frequency: Other               Follow-up Information       Amy Guillory MD .    Specialty: Family Medicine  Contact information:  [address] [id number]  [phone]               Brayden Jackson MD .    Specialty: Internal Medicine  Contact information:  [address] [id number]  [phone]                             Additional Instructions for the Follow-ups that You Need to Schedule       Ambulatory Referral to Home Health   As directed      Face to Face Visit Date: 12/6/2023   Follow-up provider for Plan of Care?: I treated the patient in an acute care facility and will not continue treatment after discharge.   Follow-up provider: CARO SALEH [[id number]]   Reason/Clinical Findings: right hip pain   Describe mobility limitations that make leaving home difficult: impaired mobility due to back and hip pain   Nursing/Therapeutic Services Requested: Skilled Nursing Physical Therapy Occupational Therapy   Skilled nursing orders: Medication education Pain management   PT orders: Therapeutic exercise Gait Training Transfer training Strengthening Home safety assessment   Weight Bearing Status: Full Weight Bearing   Occupational orders: Activities of daily living Energy conservation Strengthening Fine motor Home safety assessment   Frequency: Other            Time Spent on Discharge:  Greater than 45 minutes      [facility]  [facility]  12/06/23  14:48 EST

## 2023-12-06 NOTE — DISCHARGE PLACEMENT REQUEST
"Sample, Kenia N (78 y.o. Female)       Date of Birth   1945    Social Security Number       Address   [address] [place] [address]    Home Phone   [phone]    MRN   [id number]       [facility]    Marital Status                               Admission Date   12/4/23    Admission Type   Emergency    Admitting Provider   Earnest Lal MD    Attending Provider   Keaton Moss MD    Department, Room/Bed   [address], P498/1       Discharge Date       Discharge Disposition       Discharge Destination                                 Attending Provider: Keaton Moss MD    Allergies: Levaquin [Levofloxacin], Zocor [Simvastatin], Codeine, Penicillins, Percocet [Oxycodone-acetaminophen]    Isolation: None   Infection: None   Code Status: CPR    Ht: 160 cm (62.99\")   Wt: 68.4 kg (150 lb 12.7 oz)    Admission Cmt: None   Principal Problem: Unable to ambulate [R26.2]                   Active Insurance as of 12/4/2023       Primary Coverage       Payor Plan Insurance Group Employer/Plan Group    MEDICARE MEDICARE A & B        Payor Plan Address Payor Plan Phone Number Payor Plan Fax Number Effective Dates    [address] [phone]  5/1/1993 - None Entered    [facility] [id number]         Subscriber Name Subscriber Birth Date Member ID       SAMPLE,ADA N 1945 [id number]               Secondary Coverage       Payor Plan Insurance Group Employer/Plan Group    [place]        Payor Plan Address Payor Plan Phone Number Payor Plan Fax Number Effective Dates    [address] [phone]  8/29/2016 - None Entered    [facility] [place] [address]         Subscriber Name Subscriber Birth Date Member ID       SAMPLE,ADA N 1945 [id number]                     Emergency Contacts        (Rel.) Home Phone Work Phone Mobile Phone    Sample,Nabil (Spouse) [phone] -- [phone]    GutierrezKaylie (Daughter) " [phone] -- [phone]

## 2023-12-06 NOTE — PLAN OF CARE
Goal Outcome Evaluation:  Plan of Care Reviewed With: patient        Progress: no change   Patient is alert and oriented times four. Up with assistance of one and her walker. Complaints of pain to her hip. Medication given as ordered. She has not rested well this shift . She states that she just can't get to sleep. Daughter updated on plan of care. Will continue with plan of care.

## 2023-12-07 ENCOUNTER — HOME CARE VISIT (OUTPATIENT)
Dept: HOME HEALTH SERVICES | Facility: HOME HEALTHCARE | Age: 78
End: 2023-12-07
Payer: MEDICARE

## 2023-12-07 VITALS
HEART RATE: 79 BPM | TEMPERATURE: 96.5 F | SYSTOLIC BLOOD PRESSURE: 130 MMHG | RESPIRATION RATE: 18 BRPM | OXYGEN SATURATION: 95 % | DIASTOLIC BLOOD PRESSURE: 78 MMHG

## 2023-12-07 PROCEDURE — G0299 HHS/HOSPICE OF RN EA 15 MIN: HCPCS

## 2023-12-07 NOTE — HOME HEALTH
78F with a history of chronic back pain, stress fracture of left foot, history of right intertrochanteric femur fracture, osteoarthritis.  Recent hospitalization 12/4/23-12/6/23 after a fall at home, and inability to ambulate due to pain. States she fell because she has a stress fracture of her left foot that puts her off balance. She has been having chronic low back pain, that is starting to improve with PT.   Orthopedic surgery was consulted for right hip pain. X-rays demonstrated right intramedullary nail fixation with healing of prior fracture and no abnormal hardware findings. Orthopedic surgery did not recommend operative intervention and cleared her for PT OT. X-rays of her spine with compression deformity of T12 and L2 with multilevel degenerative disc disease of the thoracic spine. If she failed to progress would have obtained MRI of the T and L-spine due to the compression deformities seen on plain film.  Uses walker to ambulate.  Lives at home with spouse who assist. Medications reconciled. SN FOC pain due to lumbar disc degeneration.  SN teaching and instruction of pain control and medication regime.  Patient denies need for further SN visits.

## 2023-12-09 ENCOUNTER — HOME CARE VISIT (OUTPATIENT)
Dept: HOME HEALTH SERVICES | Facility: HOME HEALTHCARE | Age: 78
End: 2023-12-09
Payer: MEDICARE

## 2023-12-11 ENCOUNTER — HOME CARE VISIT (OUTPATIENT)
Dept: HOME HEALTH SERVICES | Facility: HOME HEALTHCARE | Age: 78
End: 2023-12-11
Payer: MEDICARE

## 2023-12-11 VITALS
SYSTOLIC BLOOD PRESSURE: 132 MMHG | DIASTOLIC BLOOD PRESSURE: 80 MMHG | HEART RATE: 89 BPM | OXYGEN SATURATION: 98 % | TEMPERATURE: 97.7 F

## 2023-12-11 VITALS — RESPIRATION RATE: 18 BRPM | HEART RATE: 100 BPM | OXYGEN SATURATION: 98 % | TEMPERATURE: 97.7 F

## 2023-12-11 PROCEDURE — G0151 HHCP-SERV OF PT,EA 15 MIN: HCPCS

## 2023-12-11 PROCEDURE — G0152 HHCP-SERV OF OT,EA 15 MIN: HCPCS

## 2023-12-11 NOTE — HOME HEALTH
"CURRENT SITUATION: Pt admitted to hospital 12-04-23 to 12-06-23 after a fall at home with resultant pain causing an inability to ambulate. She has a history of LBP which she was receiving OP PT for and it was improving. X-rays of her spine showed compression deformity w/50% volume loss at T12 and L2 and multi-level DDD of T-spine. Ortho recommends MRI if she does not progress.   PMHx: recent hx of L-foot stress fx (which she reports is why she fell b/c it throws her off balance, was wearing walking-boot x8 wks), a R-intertrochanteric femur fx s/p IM nailing (6/2022, stable, but painful and a JENNIFER was scheduled and cancelled due to the non-healing L-foot stress fx), HTN, HLD, and OA.   OT's FOCUS OF CARE: LBP mgmt in ADLs, safety w/ADLs.  SUBJECTIVE: \"I have those gadgets for dressing and I use the reacher.\" Agreeable to OT evaluation.  SOCIAL & ENVIRONMENTAL SITUATION: Pt lives w/spouse in a clean, well-kept single story home w/3+1 steps to enter w/B-rails.  PATIENT'S GOAL: \"I want this pain to go away so I can start to feel better.\"  INTERVENTIONS: OT Eval, ADL training, Home Safety, Transfer/Mobility training, Monitor vitals including SPO2 via pulse-oximeter (notifiy MD if resting O2 <90% on room air), Patient/CG education, Falls-risk prevention, Recommendations on AE, DME, AD, environmental adaptations.  ASSESSMENT: Pt is not in need of skilled OT at this time. She is performing her ADLs and functional transfers safely using AE and DME already in the home. She does experience pain during tasks and will benefit from PT to address her pains. Pt is in agreement that OT is not needed at this time. She v/u of the info covered in today's visit and she is encouraged to continue to use the AE in LBD/LBB, use the DME for seated showering to avoid pain flares and improve her safety, and avoid LUE resistive exercises until her L-upper rib area pains are significantly better/resolved. She understands also, that if her " situation changes, OT can return to address her needs as long as she is still active w/HH. At this time, OT will not return for further visits.  PLAN: OT eval only.

## 2023-12-11 NOTE — HOME HEALTH
Pt admitted to hospital 12-04-23 to 12-06-23 after a fall at home with resultant pain causing an inability to ambulate. She has a history of LBP which she was receiving OP PT for and it was improving. X-rays of her spine showed compression deformity w/50% volume loss at T12 and L2 and multi-level DDD of T-spine. Ortho recommends MRI if she does not progress.   PMHx: recent hx of L-foot stress fx (which she reports is why she fell b/c it throws her off balance, was wearing walking-boot x8 wks), a R-intertrochanteric  Pt presented on a walker.  She reports she had a fall.  She fractured her R hip June 2022 and has been on the walker since then.   Pt reports she fell last Sunday with the walker. She reports she has a stress fracture in L foot for about 6 months.  She is also having L flank pain that started after returning home from the hospital that she relates to the fall.  She reports when she fell she landed on her L side.  This flank pain is limiting her active L shoulder ROM.  Encourage her to complete AAROM of L shoulder to prevent frozen shoulder. Encourage her to make appt with PCP for post hospital f/u and to address L flank pain. Pt resides with spouse in a 1 story home with steps to enter.   FOC: low back pain, L flank pain, fall risk.    Skilled PT necessary to instruct pt in HEP, instruct pt in pain management, optimize her gait, and decrease her risk for falls.     Plan for next visit: review pain management, review and progress HEP as tolerated, gait with AAD.

## 2023-12-11 NOTE — Clinical Note
To: Amy Guillory MD    [facility] [address] [facility] [address]   REGARDING: ADA N. SAMPLE (; 1945)    OT HOME HEALTH EVAL COMPLETED 23.   ASSESSMENT: Pt is not in need of skilled OT at this time. She is performing her ADLs and functional transfers safely using AE and DME already in the home. She does experience pain during tasks and will benefit from PT to address her pains. (She has never tried a TENS unit.)     Pt is in agreement that OT is not needed at this time. She v/u of the info covered in today's visit and she is encouraged to continue to use the AE in LBD/LBB, use the DME for seated showering to avoid pain flares and improve her safety, and avoid LUE resistive exercises until her L-upper rib area pains are significantly better/resolved. She has new pain in this area, started the day after she did Theraband exercises w/therapist in hospital. Her pain level in that area has gotten worse since home and impacting the functional use of her LUE. It could possibly be an intercostal or mm pull/strain.     She also understands that if she gets her L-upper rib area medically checked out and her need for therapy changes, OT can return to address her needs as long as she is still active w/HH. At this time, OT will not return for further visits.    Thank you for the referral.

## 2023-12-14 ENCOUNTER — HOME CARE VISIT (OUTPATIENT)
Dept: HOME HEALTH SERVICES | Facility: HOME HEALTHCARE | Age: 78
End: 2023-12-14
Payer: MEDICARE

## 2023-12-18 ENCOUNTER — HOME CARE VISIT (OUTPATIENT)
Dept: HOME HEALTH SERVICES | Facility: HOME HEALTHCARE | Age: 78
End: 2023-12-18
Payer: MEDICARE

## 2023-12-20 ENCOUNTER — HOME CARE VISIT (OUTPATIENT)
Dept: HOME HEALTH SERVICES | Facility: HOME HEALTHCARE | Age: 78
End: 2023-12-20
Payer: MEDICARE

## 2023-12-20 NOTE — HOME HEALTH
Pt answered door for therapist walking with walker.  She was home with spouse and visitor. She returned the couch and is sitting on a heating pad. She reports she was told no more PT.  They believe she may have a compression fracture in low back.  She is waiting for results.  She denies recent falls, and no medication changes.  Plan to place patient on hold and wait until she sees Dr Del Valle on 12/26/2023.  Therapist has been having difficulty reaching patient by phone.  She reports she has been having trouble with her phone.  Therapist called her home phone while visiting and it worked.

## 2023-12-20 NOTE — CASE COMMUNICATION
Please route to attending MD     Patient missed a PT visit from [facility] on 12/20/2023    Reason: non billable visit today.  Plan to f/u with patient after she sees Dr Del Valle on 12/26/2023.  See note section for more details.       For your records only.   Per CMS Guidance, MD must be notified of missed/cancelled visits; therefore the prescribed frequency was not met.

## 2023-12-26 ENCOUNTER — HOME CARE VISIT (OUTPATIENT)
Dept: HOME HEALTH SERVICES | Facility: HOME HEALTHCARE | Age: 78
End: 2023-12-26
Payer: MEDICARE

## 2023-12-26 NOTE — CASE COMMUNICATION
Pt reports her MD appt was changed from 12/26 to 12/28/2023.  So will wait to see patient after this visit. She also reports she is upset because she found out from ortho that she has a mass on her lung.  They advised her to see her PCP, but she wants to go to a lung doctor.

## 2023-12-27 ENCOUNTER — HOME CARE VISIT (OUTPATIENT)
Dept: HOME HEALTH SERVICES | Facility: HOME HEALTHCARE | Age: 78
End: 2023-12-27
Payer: MEDICARE

## 2023-12-27 NOTE — CASE COMMUNICATION
Please route to attending MD     Patient missed a PT visit from [facility] on 12/27/2023    Reason: waiting update from ortho and her ortho appt was re-scheduled from 12/26 to 12/28/2023       For your records only.   Per CMS Guidance, MD must be notified of missed/cancelled visits; therefore the prescribed frequency was not met.

## 2024-01-02 ENCOUNTER — HOME CARE VISIT (OUTPATIENT)
Dept: HOME HEALTH SERVICES | Facility: HOME HEALTHCARE | Age: 79
End: 2024-01-02
Payer: MEDICARE

## 2024-01-02 NOTE — HOME HEALTH
Pt was seen by ortho and was told absolutely no PT.  She reports she is able to complete ADL's in the home with walker.  She continues to have back pain.  She reports they found a mass on her lung and she is waiting to go to lung specialist.    She does not have any therapy needs at this time.

## 2024-01-02 NOTE — CASE COMMUNICATION
"Pt has MD appt today.  She said ortho said \"absolutely no more PT.\"  She reports she has a fracture and is wearing a back brace.  Will most likely complete a DC without a visit.   "

## 2024-02-19 ENCOUNTER — CONSULT (OUTPATIENT)
Dept: ONCOLOGY | Facility: CLINIC | Age: 79
End: 2024-02-19
Payer: MEDICARE

## 2024-02-19 ENCOUNTER — LAB (OUTPATIENT)
Dept: LAB | Facility: HOSPITAL | Age: 79
End: 2024-02-19
Payer: MEDICARE

## 2024-02-19 VITALS
SYSTOLIC BLOOD PRESSURE: 172 MMHG | HEIGHT: 63 IN | OXYGEN SATURATION: 100 % | DIASTOLIC BLOOD PRESSURE: 98 MMHG | RESPIRATION RATE: 18 BRPM | HEART RATE: 71 BPM | WEIGHT: 145.7 LBS | TEMPERATURE: 96 F | BODY MASS INDEX: 25.82 KG/M2

## 2024-02-19 DIAGNOSIS — C34.92 NSCLC OF LEFT LUNG: Primary | ICD-10-CM

## 2024-02-19 DIAGNOSIS — C34.90 MALIGNANT NEOPLASM OF LUNG, UNSPECIFIED LATERALITY, UNSPECIFIED PART OF LUNG: Primary | ICD-10-CM

## 2024-02-19 LAB
BASOPHILS # BLD AUTO: 0.05 10*3/MM3 (ref 0–0.2)
BASOPHILS NFR BLD AUTO: 0.9 % (ref 0–1.5)
DEPRECATED RDW RBC AUTO: 45.4 FL (ref 37–54)
EOSINOPHIL # BLD AUTO: 0.09 10*3/MM3 (ref 0–0.4)
EOSINOPHIL NFR BLD AUTO: 1.7 % (ref 0.3–6.2)
ERYTHROCYTE [DISTWIDTH] IN BLOOD BY AUTOMATED COUNT: 12.7 % (ref 12.3–15.4)
HCT VFR BLD AUTO: 37.8 % (ref 34–46.6)
HGB BLD-MCNC: 13 G/DL (ref 12–15.9)
IMM GRANULOCYTES # BLD AUTO: 0.03 10*3/MM3 (ref 0–0.05)
IMM GRANULOCYTES NFR BLD AUTO: 0.6 % (ref 0–0.5)
LYMPHOCYTES # BLD AUTO: 1.59 10*3/MM3 (ref 0.7–3.1)
LYMPHOCYTES NFR BLD AUTO: 29.2 % (ref 19.6–45.3)
MCH RBC QN AUTO: 33.8 PG (ref 26.6–33)
MCHC RBC AUTO-ENTMCNC: 34.4 G/DL (ref 31.5–35.7)
MCV RBC AUTO: 98.2 FL (ref 79–97)
MONOCYTES # BLD AUTO: 0.48 10*3/MM3 (ref 0.1–0.9)
MONOCYTES NFR BLD AUTO: 8.8 % (ref 5–12)
NEUTROPHILS NFR BLD AUTO: 3.21 10*3/MM3 (ref 1.7–7)
NEUTROPHILS NFR BLD AUTO: 58.8 % (ref 42.7–76)
NRBC BLD AUTO-RTO: 0 /100 WBC (ref 0–0.2)
PLATELET # BLD AUTO: 94 10*3/MM3 (ref 140–450)
PMV BLD AUTO: 10.9 FL (ref 6–12)
RBC # BLD AUTO: 3.85 10*6/MM3 (ref 3.77–5.28)
WBC NRBC COR # BLD AUTO: 5.45 10*3/MM3 (ref 3.4–10.8)

## 2024-02-19 PROCEDURE — 99205 OFFICE O/P NEW HI 60 MIN: CPT | Performed by: INTERNAL MEDICINE

## 2024-02-19 PROCEDURE — 36415 COLL VENOUS BLD VENIPUNCTURE: CPT

## 2024-02-19 PROCEDURE — 85025 COMPLETE CBC W/AUTO DIFF WBC: CPT

## 2024-02-19 PROCEDURE — 1125F AMNT PAIN NOTED PAIN PRSNT: CPT | Performed by: INTERNAL MEDICINE

## 2024-02-19 PROCEDURE — 3077F SYST BP >= 140 MM HG: CPT | Performed by: INTERNAL MEDICINE

## 2024-02-19 PROCEDURE — 3080F DIAST BP >= 90 MM HG: CPT | Performed by: INTERNAL MEDICINE

## 2024-02-19 NOTE — PROGRESS NOTES
Subjective discussed the patient's history to date    REASON FOR CONSULTATION:    Provide an opinion on any further workup or treatment                             REQUESTING PHYSICIAN:      RECORDS OBTAINED:  Records of the patients history including those obtained from the referring provider were reviewed and summarized in detail.    HISTORY OF PRESENT ILLNESS:  The patient is a 78 y.o. year old female who is here for an opinion about the above issue.    History of Present Illness     The patient is an 78-year-old female with below medical history recently admitted 12/4 - 6/20/2023 with a history of right intertrochanteric femoral fracture and osteoarthritis presenting from a fall at home.  She had a stress fracture left foot that affected her balance, chronic low back pain that is also worsened with a fall.  Upon presentation orthopedic surgery was consulted finding a right intramedullary nail fixation and healing without abnormal findings.  No additional surgery was planned though physical therapy continued.  Spinal films demonstrated compression deformity T12 and L2 but fortunately she did further improve and was able to be discharged.    The patient had follow-up with several physicians including orthopedic physicians with an eventual chest exam that revealed an unexpected nodularity.  She was then referred to pulmonary medicine 1/8/2024 with a history of smoking for 35 years quitting 8 years ago and indication that there was a lung nodule found incidentally on imaging for the spine.  Through the [facility] system a CT of the chest demonstrated a 2.0 x 1.9 cm noncalcified nodule involving the posterior medial aspect left lower lobe abutting the pleura, more smoothly marginated 0.9 cm noncalcified right middle lobe nodule and a few additional smaller noncalcified nodules including 2 adjacent nodules measuring less than 0.4 cm the right middle lobe along with mild emphysema with mild to moderate subpleural and  bibasilar scarring.  There is no pleural effusion or pneumothorax.    A PET/CT was obtained 2/6/2024 with a solid subpleural irregular nodule left lower lobe medially measuring 1.7 x 1.8 intensely hypermetabolic with SUV of 12.8, no additional nodular mass, had metabolic right superior mediastinal lymph node lateral measuring 0.9 cm to 3.9, diffuse lower lobe tracer uptake with mediastinal right hilar lymph nodes partially calcified thought to be inflammatory or granulomatous disease.  There is also low-grade tracer uptake associated subacute healing left anterior rib fractures.    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    The patient was referred urgently from [facility] for assessment.  It is not certain why she has not been seen in the [facility] system though she now states that she wished to be seen here.  History    Sometimes you block it    Past Surgical History:   Procedure Laterality Date    APPENDECTOMY      CARDIAC CATHETERIZATION      EXTERNAL EAR SURGERY Right     had tumor excised behind right ear lobe benign    FEMUR IM NAILING/RODDING Right 06/15/2022    Procedure: RIGHT HIP INTRAMEDULLARY NAILING/RODDING;  Surgeon: Marc Crawford MD;  Location: [facility];  Service: Orthopedics;  Laterality: Right;    GALLBLADDER SURGERY      HYSTERECTOMY      KNEE ARTHROPLASTY Right     KNEE ARTHROSCOPY Right     FL REVJ TOTAL KNEE ARTHRP W/WO ALGRFT 1 COMPONENT Right 09/07/2016    Procedure: RT ILIOTIBIAL BAND RELEASE RT TOTAL KNEE POLY EXCHANGE;  Surgeon: Toy Adame MD;  Location: [facility];  Service: Orthopedics    ROTATOR CUFF REPAIR Bilateral     WRIST MASS EXCISION Right         Current Outpatient Medications on File Prior to Visit   Medication Sig Dispense Refill    acetaminophen (TYLENOL) 325 MG tablet Take 2 tablets by mouth Every 4 (Four) Hours As Needed for Mild  Pain .      albuterol (PROVENTIL HFA;VENTOLIN HFA) 108 (90 BASE) MCG/ACT inhaler Inhale 2 puffs Every 4 (Four) Hours As Needed. Indications: Asthma      bisacodyl (DULCOLAX) 5 MG EC tablet Take 1 tablet by mouth Daily As Needed for Constipation (Use if polyethylene glycol is ineffective).      furosemide (LASIX) 20 MG tablet Take 1 tablet by mouth 2 (Two) Times a Day. Indications: Edema      Magnesium 500 MG capsule Take 500 mg by mouth 2 (Two) Times a Day. Indications: Acid Indigestion      metoprolol tartrate (LOPRESSOR) 25 MG tablet Take 1 tablet by mouth 2 (Two) Times a Day. Indications: High Blood Pressure Disorder      naloxone (NARCAN) 4 MG/0.1ML nasal spray Call 911. Don't prime. Court[place] in 1 nostril for overdose. Repeat in 2-3 minutes in other nostril if no or minimal breathing/responsiveness. 2 each 0    omeprazole (PriLOSEC) 40 MG capsule Take 1 capsule by mouth Every Morning. Indications: Heartburn      oxyCODONE-acetaminophen (PERCOCET)  MG per tablet Take 1 tablet by mouth Every 6 (Six) Hours As Needed for Moderate Pain  (max 8/day). 12 tablet 0    polyethylene glycol (MIRALAX) 17 g packet Take 17 g by mouth Daily As Needed (Use if senna-docusate is ineffective).      pravastatin (PRAVACHOL) 20 MG tablet Take 1 tablet by mouth Every Morning.      rOPINIRole (REQUIP) 0.25 MG tablet Take 1 tablet by mouth Every Night. Take 1 hour before bedtime.  Indications: Restless Leg Syndrome      sennosides-docusate sodium (SENOKOT-S) 8.6-50 MG tablet Take 2 tablets by mouth 2 (two) times a day. 100 tablet 1    vitamin B-12 (CYANOCOBALAMIN) 1000 MCG tablet Take 1 tablet by mouth Daily. Indications: Inadequate Vitamin B12      vitamin D (ERGOCALCIFEROL) 1.25 MG (87678 UT) capsule capsule Take 1 capsule by mouth Every 7 (Seven) Days. 5 capsule 0     No current facility-administered medications on file prior to visit.        ALLERGIES:    Allergies   Allergen Reactions    Levaquin [Levofloxacin] Hives    Zocor  "[Simvastatin] Myalgia    Codeine GI Intolerance    Penicillins Hives and Swelling     Pt reports tongue swelling    Percocet [Oxycodone-Acetaminophen] Itching and Nausea And Vomiting        Social History     Socioeconomic History    Marital status:      Spouse name: Nabil   Tobacco Use    Smoking status: Former     Packs/day: 1.00     Years: 30.00     Additional pack years: 0.00     Total pack years: 30.00     Types: Cigarettes    Smokeless tobacco: Never   Vaping Use    Vaping Use: Never used   Substance and Sexual Activity    Alcohol use: Yes     Comment: 1 wine drink q 3-4 months    Drug use: No        Family History   Problem Relation Age of Onset    Hypertension Mother     Heart failure Mother     Heart attack Maternal Grandfather         Review of Systems   Constitutional:  Positive for activity change. Negative for appetite change, fatigue and unexpected weight change.   HENT: Negative.     Eyes: Negative.    Respiratory:  Positive for shortness of breath (Chronic).    Cardiovascular:  Positive for chest pain (Following her fall lasting 3 to 4 weeks, then resolving left chest).   Gastrointestinal: Negative.    Endocrine: Negative.    Genitourinary: Negative.    Musculoskeletal:  Positive for back pain (Chronic back pain).   Neurological: Negative.    Psychiatric/Behavioral: Negative.          Objective     Vitals:    02/19/24 1454   BP: 172/98   Pulse: 71   Resp: 18   Temp: 96 °F (35.6 °C)   TempSrc: Temporal   SpO2: 100%   Weight: 66.1 kg (145 lb 11.2 oz)   Height: 160 cm (62.99\")   PainSc: 10-Worst pain ever   PainLoc: Back         2/19/2024     2:59 PM   Current Status   ECOG score 1       Physical Exam  Constitutional:       Appearance: Normal appearance.   HENT:      Head: Normocephalic and atraumatic.      Mouth/Throat:      Mouth: Mucous membranes are moist.      Pharynx: Oropharynx is clear.   Eyes:      Extraocular Movements: Extraocular movements intact.      Conjunctiva/sclera: " Conjunctivae normal.      Pupils: Pupils are equal, round, and reactive to light.   Cardiovascular:      Rate and Rhythm: Normal rate and regular rhythm.      Pulses: Normal pulses.      Heart sounds: Normal heart sounds.   Pulmonary:      Effort: Pulmonary effort is normal.      Breath sounds: Normal breath sounds.   Abdominal:      General: Bowel sounds are normal.      Palpations: Abdomen is soft.   Musculoskeletal:         General: Normal range of motion.      Cervical back: Normal range of motion and neck supple.   Skin:     General: Skin is warm and dry.   Neurological:      General: No focal deficit present.      Mental Status: She is alert and oriented to person, place, and time.   Psychiatric:         Mood and Affect: Mood normal.         Behavior: Behavior normal.           RECENT LABS:  Hematology WBC   Date Value Ref Range Status   02/19/2024 5.45 3.40 - 10.80 10*3/mm3 Final   02/06/2024 4.15 (L) 4.5 - 11.0 10*3/uL Final     RBC   Date Value Ref Range Status   02/19/2024 3.85 3.77 - 5.28 10*6/mm3 Final   02/06/2024 3.70 (L) 4.0 - 5.2 10*6/uL Final     Hemoglobin   Date Value Ref Range Status   02/19/2024 13.0 12.0 - 15.9 g/dL Final   02/06/2024 12.4 12.0 - 16.0 g/dL Final     Hematocrit   Date Value Ref Range Status   02/19/2024 37.8 34.0 - 46.6 % Final   02/06/2024 36.6 36.0 - 46.0 % Final     Platelets   Date Value Ref Range Status   02/19/2024 94 (L) 140 - 450 10*3/mm3 Final   02/06/2024 77 (L) 140 - 440 10*3/uL Final          Assessment & Plan      78 year-old female with a orthopedic history as described in particular involving right hip, bilateral knees and bilateral rotator cuffs recently falling and sustaining a contusion involving her right hip and evidence of compression deformities T12 and L2.  Upon discharge she was seen by orthopedics and, unexpectedly, on additional back studies a left lung nodule was determined.    This was reviewed by pulmonary medicine at [facility] leading to a  a CT of  the chest which demonstrated a 2.0 x 1.9 cm noncalcified nodule involving the posterior medial aspect left lower lobe abutting the pleura, more smoothly marginated 0.9 cm noncalcified right middle lobe nodule and a few additional smaller noncalcified nodules including 2 adjacent nodules measuring less than 0.4 cm the right middle lobe along with mild emphysema with mild to moderate subpleural and bibasilar scarring.  There is no pleural effusion or pneumothorax.    A PET/CT was obtained 2/6/2024 with a solid subpleural irregular nodule left lower lobe medially measuring 1.7 x 1.8 intensely hypermetabolic with SUV of 12.8, no additional nodular mass, had metabolic right superior mediastinal lymph node lateral measuring 0.9 cm to 3.9, diffuse lower lobe tracer uptake with mediastinal right hilar lymph nodes partially calcified thought to be inflammatory or granulomatous disease.  There is also low-grade tracer uptake associated subacute healing left anterior rib fractures.    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    The patient is now seen in CBC office.  There has not been assessment by surgery or, apparently, by pulmonary medicine formally though the studies above are available for review.  After discussion we will need to have her presented in the thoracic conference so that opinions from radiology, thoracic surgery radiation therapy as well as pathology can be obtained.    Plan:  *Discussed with the patient and her daughter overall plans to have the patient presented in a larger venue so that a decision can be made about additional diagnostics and/or proceeding to surgery versus SBRT now.    *Tumor conference has been requested 2/22/2024.    *The patient and her daughter be contacted just after this to discuss options and opinions    *A 10-day follow-up will also  requested.    *The patient and are agreeable with this plan and follow-up.      I spent 60 minutes caring for Ada on this date of service. This time includes time spent by me in the following activities: preparing for the visit, reviewing tests, obtaining and/or reviewing a separately obtained history, performing a medically appropriate examination and/or evaluation, counseling and educating the patient/family/caregiver, ordering medications, tests, or procedures, referring and communicating with other health care professionals, documenting information in the medical record, independently interpreting results and communicating that information with the patient/family/caregiver, and care coordination.   *Patient's case discussed during thoracic conference with the finding of a right supraclavicular node.  This was unknown to us when seen her initially in consultation.  Request made to obtain ultrasound-guided biopsy of the right supraclavicular node and this is discussed with the patient's daughter who indicates the patient would be willing to proceed.  We will go and have this scheduled even though she is to be seen within the next week and the result may not yet be available.

## 2024-02-22 ENCOUNTER — PATIENT OUTREACH (OUTPATIENT)
Dept: OTHER | Facility: HOSPITAL | Age: 79
End: 2024-02-22
Payer: MEDICARE

## 2024-02-22 DIAGNOSIS — C34.92 NSCLC OF LEFT LUNG: Primary | ICD-10-CM

## 2024-02-22 DIAGNOSIS — R93.89 ABNORMAL CT OF THE CHEST: ICD-10-CM

## 2024-02-22 DIAGNOSIS — R94.8 ABNORMAL PET SCAN OF MEDIASTINUM: ICD-10-CM

## 2024-02-22 DIAGNOSIS — R94.2 ABNORMAL PET SCAN OF LUNG: ICD-10-CM

## 2024-02-22 NOTE — PROGRESS NOTES
Order entered for ultrasound guided right supraclavicular node biopsy per in-basket message Dr. Mann

## 2024-02-23 ENCOUNTER — PATIENT OUTREACH (OUTPATIENT)
Dept: OTHER | Facility: HOSPITAL | Age: 79
End: 2024-02-23
Payer: MEDICARE

## 2024-02-23 NOTE — PROGRESS NOTES
Reviewed chart. IP 12/4-12/6/23 s/p fall.  Dc'd with [facility]. Recent diagnosis of squamous cell carcinoma LLL diagnosed at [facility] via needle BX 2/6. Initial consult with Dr. Mann 2/19, scheduled again 2/28. having US guided BX of supraclavicular node 3/6    Referral rec'd from Dr. Mann. Called patient at preferred number in chart (which is for her daughter Melonie), left message introducing myself, requested cb to explain my role and the services available in the Cancer Center.

## 2024-02-24 NOTE — PROGRESS NOTES
03/06/24 0001   Pre-Procedure Phone Call   Procedure Time Verified Yes   Arrival Time 0630   Procedure Location Verified Yes   NPO Status Reinforced Yes  (MN)   Ride and Caregiver Arranged Yes   Patient Knows to Bring Current Medications   (daughter will bring an updated list the day of the procedure.)   Bring Outside Films Requested   (appt notes says Dr. Courtney reviewed the CT, PET from [facility].)

## 2024-02-26 ENCOUNTER — PATIENT OUTREACH (OUTPATIENT)
Dept: OTHER | Facility: HOSPITAL | Age: 79
End: 2024-02-26
Payer: MEDICARE

## 2024-02-26 NOTE — PROGRESS NOTES
US guided BX scheduled 3/6.    Called patient at preferred number in chart (which is for her daughter Melonie), left message introducing myself, requested cb to explain my role and the services available in the Cancer Center.

## 2024-02-27 ENCOUNTER — PATIENT OUTREACH (OUTPATIENT)
Dept: OTHER | Facility: HOSPITAL | Age: 79
End: 2024-02-27
Payer: MEDICARE

## 2024-02-27 NOTE — PROGRESS NOTES
Call from patient's daughter. Melonie preferred contact in the chart.  Introduced myself and explained navigational services.      The patient met with Dr. Mann for an initial consultation appointment 2/19/24 for her biopsy proven squamous cell carcinoma of her left lower lobe; TPS score 15%, EGFR mutation not detected, ALK rearrangement not detected, ROS1 rearrangement not detected. Her work up was completed at [facility].  Dr. Mann discussed her case during thoracic conference last week and ordered a US guided biopsy of a right supraclavicular node, which is scheduled 3/6/24.  The patient was scheduled to meet with Dr. Mann tomorrow, although her questioned if this appt was needed since her biopsy has not been completed. Contacted Dr. Mann's office about appt scheduled tomorrow.  Per Dr. Mann, he will see her several days after the biopsy scheduled for 3/6. Dr. Mann's office will call the patient to schedule this appt.  Notified her daughter of the appointment change.     Per her daughter, the patient is alert and oriented. She ambulates with a walker and is fairly independent with ADLs.  The patient was IP 12/4-12/6/23 after sustaining a fall at home. Per the hospital notes, she has a history of right intertrochanteric femoral fracture and osteoarthritis. She had a stress fracture left foot that affected her balance, chronic low back pain that is also worsened with a fall. Upon presentation orthopedic surgery was consulted finding a right intramedullary nail fixation and healing without abnormal findings. No additional surgery was planned though physical therapy continued. Spinal films demonstrated compression deformity T12 and L2 but fortunately she did further improve and was able to be discharged. She was discharged home with [facility].      The patient is a former smoker from age 14 to approximately 8 years ago.    The patient has Medicare and Medicaid.  The patient's daughter denies any current BHE claim  "issues. We discussed financial navigation, cost estimates and possible financial assistance if needed in the future.     The patient denies transportation, financial or other resource needs at this time.  Her daughter transports her to/from appts.     The patient has been eating well and denies weight loss.    The patient has an ACP. Requested copy for the chart.    Per her daughter, the patient is coping \"ok\" with her diagnosis. The patient has a history of anxiety although does not take medications.  We discussed OSW and Behavioral oncology services.  Patient's daughter expressed gratitude for my support and denied any additional needs at this time. We also discussed [employer] and [employer].     We discussed integrative therapies and other services at the [facility].  Mailed a navigation folder with the following information today: [facility] Support [facility], Cancer and Restorative Exercise - CARE, [facility] exercise program, Living with a Diagnosis of Lung Cancer, Lung Cancer [place] Support Services, [facility], Message Therapy, Reiki Therapy, [employer] [facility], Cancer Nutrition, Smoking Cessation and Survivorship Clinic.      I will call try to meet with patient after her next appointment with Dr. Mann or call within a few days of that appt; provided my name and number and encouraged patient to call if any needs arise.    "

## 2024-02-28 ENCOUNTER — TELEPHONE (OUTPATIENT)
Dept: OTHER | Facility: HOSPITAL | Age: 79
End: 2024-02-28
Payer: MEDICARE

## 2024-02-28 NOTE — TELEPHONE ENCOUNTER
Oncology Social Work  ..Distress Screening Follow-up    Diagnosis:  squamous cell carcinoma of her left lower lobe  Treatment:  TBD    Location of Distress Screening: Medical Oncology    Distress Level: 6 (2/19/2024  4:00 PM)    Physical Concerns:       Practical Problems:       Emotional Concerns:    Worry or anxiety: Y    Family Concerns:       Spiritual Concerns:        Interventions:     Chart reviewed.  OSW called and spoke to patient's daughter, Kaylie.  Explained role of OSW. Offered to see patient for emotional support/ therapy to help process dx and treatment.  Kaylie reports pt is experiencing anxiety.  Daughter to talk with her mom ( patient) and get back with OSW.  Offered to see pt after her appt with Dr Mann on 3/13.  Daughter has my contact number and will reach out.     Augustina Sidhu, MSSW, LCSW

## 2024-03-06 ENCOUNTER — TELEPHONE (OUTPATIENT)
Dept: ONCOLOGY | Facility: CLINIC | Age: 79
End: 2024-03-06
Payer: MEDICARE

## 2024-03-06 ENCOUNTER — HOSPITAL ENCOUNTER (OUTPATIENT)
Dept: ULTRASOUND IMAGING | Facility: HOSPITAL | Age: 79
Discharge: HOME | End: 2024-03-06
Admitting: RADIOLOGY
Payer: MEDICARE

## 2024-03-06 VITALS
HEIGHT: 63 IN | HEART RATE: 56 BPM | BODY MASS INDEX: 25.69 KG/M2 | RESPIRATION RATE: 16 BRPM | WEIGHT: 145 LBS | OXYGEN SATURATION: 97 % | DIASTOLIC BLOOD PRESSURE: 76 MMHG | SYSTOLIC BLOOD PRESSURE: 147 MMHG

## 2024-03-06 DIAGNOSIS — R94.8 ABNORMAL PET SCAN OF MEDIASTINUM: ICD-10-CM

## 2024-03-06 DIAGNOSIS — R93.89 ABNORMAL CT OF THE CHEST: ICD-10-CM

## 2024-03-06 DIAGNOSIS — C34.92 NSCLC OF LEFT LUNG: ICD-10-CM

## 2024-03-06 PROCEDURE — 88341 IMHCHEM/IMCYTCHM EA ADD ANTB: CPT | Performed by: INTERNAL MEDICINE

## 2024-03-06 PROCEDURE — 25010000002 LIDOCAINE 1 % SOLUTION: Performed by: RADIOLOGY

## 2024-03-06 PROCEDURE — 88305 TISSUE EXAM BY PATHOLOGIST: CPT | Performed by: INTERNAL MEDICINE

## 2024-03-06 PROCEDURE — 88173 CYTOPATH EVAL FNA REPORT: CPT | Performed by: INTERNAL MEDICINE

## 2024-03-06 PROCEDURE — 63710000001 ALPRAZOLAM 0.5 MG TABLET: Performed by: RADIOLOGY

## 2024-03-06 PROCEDURE — A9270 NON-COVERED ITEM OR SERVICE: HCPCS | Performed by: RADIOLOGY

## 2024-03-06 PROCEDURE — 88342 IMHCHEM/IMCYTCHM 1ST ANTB: CPT | Performed by: INTERNAL MEDICINE

## 2024-03-06 RX ORDER — LIDOCAINE HYDROCHLORIDE 10 MG/ML
10 INJECTION, SOLUTION INFILTRATION; PERINEURAL ONCE
Status: COMPLETED | OUTPATIENT
Start: 2024-03-06 | End: 2024-03-06

## 2024-03-06 RX ORDER — POTASSIUM CHLORIDE 20 MEQ/1
1 TABLET, EXTENDED RELEASE ORAL 2 TIMES DAILY
COMMUNITY
Start: 2024-01-11

## 2024-03-06 RX ORDER — TEMAZEPAM 15 MG/1
15 CAPSULE ORAL NIGHTLY PRN
COMMUNITY

## 2024-03-06 RX ORDER — FLUTICASONE PROPIONATE 50 MCG
2 SPRAY, SUSPENSION (ML) NASAL DAILY
COMMUNITY
Start: 2024-01-12

## 2024-03-06 RX ORDER — ALLOPURINOL 100 MG/1
1 TABLET ORAL DAILY
COMMUNITY
Start: 2024-02-02

## 2024-03-06 RX ORDER — ALPRAZOLAM 0.5 MG/1
0.5 TABLET ORAL ONCE
Status: COMPLETED | OUTPATIENT
Start: 2024-03-06 | End: 2024-03-06

## 2024-03-06 RX ADMIN — ALPRAZOLAM 0.5 MG: 0.5 TABLET ORAL at 07:21

## 2024-03-06 RX ADMIN — LIDOCAINE HYDROCHLORIDE 8.5 ML: 10 INJECTION, SOLUTION INFILTRATION; PERINEURAL at 08:17

## 2024-03-06 NOTE — DISCHARGE INSTRUCTIONS
EDUCATION / DISCHARGE INSTRUCTIONS  Aspiration:  An aspiration is a procedure used to take a sample of cells or tissue from a lump, mass or other area of concern.   Within a week the radiologist will send a report to your physician.  A pathologist will also examine the tissue and send a report.  THIS EDUCATION INFORMATION WAS REVIEWED PRIOR TO THE PROCEDURE AND CONSENT.  Patient initials_________________Time________________      Post Procedure:    *  Rest today (no pushing pulling or straining).   *  Slowly increase activity tomorow.    *  If you received sedation do not drive for 24 hours.   *  Keep dressing clean and dry.   *  Leave dressing on puncture site for 24 hours.    *  You may shower when dressing removed.   *  If needed, use an ice pack at the site for up to 20 minutes at a time for pain.    Call your doctor if experiencing:   *  Signs of infection such as redness, swelling, excessive pain and / or foul smelling drainage from the puncture site.   *  Chills or fever over 101 degrees (by mouth).   *  Unrelieved pain.   *  Any new or severe symptoms.      Following the procedure:     Follow-up with the ordering physician as directed.    Continue to take other medications as directed by your physician unless otherwise instructed.     If you have any concerns please call the Radiology Nurses Desk at [phone].  You are the most important factor in your recovery.  Follow the above instructions carefully.

## 2024-03-06 NOTE — TELEPHONE ENCOUNTER
Caller: Kaylie Gutierrez    Relationship: Emergency Contact    Best call back number: [phone]     Who are you requesting to speak with (clinical staff, provider,  specific staff member): SCHEDULING    Do you know the name of the person who called: KAT    What was the call regarding: RETURNING CALL TO OK MOVING APPT ON 3/13/24  TO 8 AM

## 2024-03-06 NOTE — TELEPHONE ENCOUNTER
Caller: Kaylie Gutierrez    Relationship: Emergency Contact    Best call back number: [phone]    What is the best time to reach you: ANYTIME    Who are you requesting to speak with (clinical staff, provider,  specific staff member): SCHEDULING        What was the call regarding:     WANTED TO CHECK AND SEE IF THERE WAS AN EARLIER TIME COULD POSSIBLY MOVE APPOINTMENT UP TO FOR 03/13 LAB AND FOLLOW UP 1

## 2024-03-07 ENCOUNTER — TELEPHONE (OUTPATIENT)
Dept: INTERVENTIONAL RADIOLOGY/VASCULAR | Facility: HOSPITAL | Age: 79
End: 2024-03-07
Payer: MEDICARE

## 2024-03-08 LAB
CYTO UR: NORMAL
LAB AP CASE REPORT: NORMAL
LAB AP DIAGNOSIS COMMENT: NORMAL
LAB AP NON-GYN SPECIMEN ADEQUACY: NORMAL
PATH REPORT.FINAL DX SPEC: NORMAL
PATH REPORT.GROSS SPEC: NORMAL

## 2024-03-12 NOTE — PROGRESS NOTES
Subjective discussed inconclusive biopsy results per right supraclavicular lymph node.    REASON FOR FOLLOW-UP: Left lower lobe lung mass-squamous of carcinoma  Provide an opinion on any further workup or treatment                             History of Present Illness     The patient is an 79-year-old female with below medical history recently admitted 12/4 - 6/20/2023 with a history of right intertrochanteric femoral fracture and osteoarthritis presenting from a fall at home.  She had a stress fracture left foot that affected her balance, chronic low back pain that is also worsened with a fall.  Upon presentation orthopedic surgery was consulted finding a right intramedullary nail fixation and healing without abnormal findings.  No additional surgery was planned though physical therapy continued.  Spinal films demonstrated compression deformity T12 and L2 but fortunately she did further improve and was able to be discharged.    The patient had follow-up with several physicians including orthopedic physicians with an eventual chest exam that revealed an unexpected nodularity.  She was then referred to pulmonary medicine 1/8/2024 with a history of smoking for 35 years quitting 8 years ago and indication that there was a lung nodule found incidentally on imaging for the spine.  Through the [facility] system a CT of the chest demonstrated a 2.0 x 1.9 cm noncalcified nodule involving the posterior medial aspect left lower lobe abutting the pleura, more smoothly marginated 0.9 cm noncalcified right middle lobe nodule and a few additional smaller noncalcified nodules including 2 adjacent nodules measuring less than 0.4 cm the right middle lobe along with mild emphysema with mild to moderate subpleural and bibasilar scarring.  There is no pleural effusion or pneumothorax.    A PET/CT was obtained 2/6/2024 with a solid subpleural irregular nodule left lower lobe medially measuring 1.7 x 1.8 intensely hypermetabolic with SUV  of 12.8, no additional nodular mass, had metabolic right superior mediastinal lymph node lateral measuring 0.9 cm to 3.9, diffuse lower lobe tracer uptake with mediastinal right hilar lymph nodes partially calcified thought to be inflammatory or granulomatous disease.  There is also low-grade tracer uptake associated subacute healing left anterior rib fractures.    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    The patient was referred urgently from [facility] for assessment.  It is not certain why she has not been seen in the [place] system though she now states that she wished to be seen here.     Patient's case discussed during thoracic conference with the finding of a right supraclavicular node.  This was unknown to us when seen her initially in consultation.  Request made to obtain ultrasound-guided biopsy of the right supraclavicular node and this is discussed with the patient's daughter who indicates the patient would be willing to proceed.  We will go and have this scheduled even though she is to be seen within the next week and the result may not yet be available.    3/6/2023 FNA obtained, discussed with IR physician Dr. Courtney with findings of a few atypical clusters present suspicious for involvement by non-small cell carcinoma.  He is offered to rebiopsy quickly if necessary.    The patient is seen 3/13/2024 with rebiopsy requested as well as radiation therapy consultation.  The case is discussed with interventional radiology and a repeat biopsy is possible.  The patient is willing to proceed in this fashion and we have also discussed radiation therapy consultation in the interval.  She continues have symptoms of chronic back pain and left lid lesion that is going to be reviewed by ophthalmology.       Past Surgical History:   Procedure Laterality Date    APPENDECTOMY       CARDIAC CATHETERIZATION      EXTERNAL EAR SURGERY Right     had tumor excised behind right ear lobe benign    FEMUR IM NAILING/RODDING Right 06/15/2022    Procedure: RIGHT HIP INTRAMEDULLARY NAILING/RODDING;  Surgeon: Marc Crawford MD;  Location: [facility];  Service: Orthopedics;  Laterality: Right;    GALLBLADDER SURGERY      HYSTERECTOMY      KNEE ARTHROPLASTY Right     KNEE ARTHROSCOPY Right     NV REVJ TOTAL KNEE ARTHRP W/WO ALGRFT 1 COMPONENT Right 09/07/2016    Procedure: RT ILIOTIBIAL BAND RELEASE RT TOTAL KNEE POLY EXCHANGE;  Surgeon: Toy Adame MD;  Location: [facility];  Service: Orthopedics    ROTATOR CUFF REPAIR Bilateral     WRIST MASS EXCISION Right         Current Outpatient Medications on File Prior to Visit   Medication Sig Dispense Refill    acetaminophen (TYLENOL) 325 MG tablet Take 2 tablets by mouth Every 4 (Four) Hours As Needed for Mild Pain .      albuterol (PROVENTIL HFA;VENTOLIN HFA) 108 (90 BASE) MCG/ACT inhaler Inhale 2 puffs Every 4 (Four) Hours As Needed. Indications: Asthma      allopurinol (ZYLOPRIM) 100 MG tablet Take 1 tablet by mouth Daily.      bisacodyl (DULCOLAX) 5 MG EC tablet Take 1 tablet by mouth Daily As Needed for Constipation (Use if polyethylene glycol is ineffective).      fluticasone (FLONASE) 50 MCG/ACT nasal spray 2 sprays into the nostril(s) as directed by provider Daily.      furosemide (LASIX) 20 MG tablet Take 1 tablet by mouth 2 (Two) Times a Day. Indications: Edema      Magnesium 500 MG capsule Take 500 mg by mouth 2 (Two) Times a Day. Indications: Acid Indigestion      metoprolol tartrate (LOPRESSOR) 25 MG tablet Take 1 tablet by mouth 2 (Two) Times a Day. Indications: High Blood Pressure Disorder      naloxone (NARCAN) 4 MG/0.1ML nasal spray Call 911. Don't prime. [place] in 1 nostril for overdose. Repeat in 2-3 minutes in other nostril if no or minimal breathing/responsiveness. 2 each 0    omeprazole (PriLOSEC) 40 MG capsule Take 1 capsule by  mouth Every Morning. Indications: Heartburn      oxyCODONE-acetaminophen (PERCOCET)  MG per tablet Take 1 tablet by mouth Every 6 (Six) Hours As Needed for Moderate Pain  (max 8/day). 12 tablet 0    polyethylene glycol (MIRALAX) 17 g packet Take 17 g by mouth Daily As Needed (Use if senna-docusate is ineffective).      potassium chloride (K-DUR,KLOR-CON) 20 MEQ CR tablet Take 1 tablet by mouth 2 (Two) Times a Day.      pravastatin (PRAVACHOL) 20 MG tablet Take 1 tablet by mouth Every Morning.      rOPINIRole (REQUIP) 0.25 MG tablet Take 1 tablet by mouth Every Night. Take 1 hour before bedtime.  Indications: Restless Leg Syndrome      sennosides-docusate sodium (SENOKOT-S) 8.6-50 MG tablet Take 2 tablets by mouth 2 (two) times a day. 100 tablet 1    temazepam (RESTORIL) 15 MG capsule Take 1 capsule by mouth At Night As Needed for Sleep.      vitamin B-12 (CYANOCOBALAMIN) 1000 MCG tablet Take 1 tablet by mouth Daily. Indications: Inadequate Vitamin B12      vitamin D (ERGOCALCIFEROL) 1.25 MG (67970 UT) capsule capsule Take 1 capsule by mouth Every 7 (Seven) Days. 5 capsule 0     No current facility-administered medications on file prior to visit.        ALLERGIES:    Allergies   Allergen Reactions    Levaquin [Levofloxacin] Hives    Zocor [Simvastatin] Myalgia    Codeine GI Intolerance    Penicillins Hives and Swelling     Pt reports tongue swelling    Percocet [Oxycodone-Acetaminophen] Itching and Nausea And Vomiting        Social History     Socioeconomic History    Marital status:      Spouse name: Nabil   Tobacco Use    Smoking status: Former     Current packs/day: 1.00     Average packs/day: 1 pack/day for 30.0 years (30.0 ttl pk-yrs)     Types: Cigarettes    Smokeless tobacco: Never   Vaping Use    Vaping status: Never Used   Substance and Sexual Activity    Alcohol use: Yes     Comment: 1 wine drink q 3-4 months    Drug use: No        Family History   Problem Relation Age of Onset     "Hypertension Mother     Heart failure Mother     Heart attack Maternal Grandfather         Review of Systems   Constitutional:  Positive for activity change. Negative for appetite change, fatigue and unexpected weight change.   HENT: Negative.     Eyes: Negative.    Respiratory:  Positive for shortness of breath (Chronic).    Cardiovascular:  Positive for chest pain (Following her fall lasting 3 to 4 weeks, then resolving left chest).   Gastrointestinal: Negative.    Endocrine: Negative.    Genitourinary: Negative.    Musculoskeletal:  Positive for back pain (Chronic back pain).   Neurological: Negative.    Psychiatric/Behavioral: Negative.          Objective     Vitals:    03/13/24 0827   BP: 176/94   Pulse: 74   Resp: 16   Temp: 97.7 °F (36.5 °C)   TempSrc: Temporal   SpO2: 98%   Weight: 67.2 kg (148 lb 3.2 oz)   Height: 160 cm (62.99\")   PainSc:   4   PainLoc: Back           3/13/2024     8:31 AM   Current Status   ECOG score 1       Physical Exam  Constitutional:       Appearance: Normal appearance.   HENT:      Head: Normocephalic and atraumatic.      Mouth/Throat:      Mouth: Mucous membranes are moist.      Pharynx: Oropharynx is clear.   Eyes:      Extraocular Movements: Extraocular movements intact.      Conjunctiva/sclera: Conjunctivae normal.      Pupils: Pupils are equal, round, and reactive to light.      Comments: Left lid nodular lesion   Cardiovascular:      Rate and Rhythm: Normal rate and regular rhythm.      Pulses: Normal pulses.      Heart sounds: Normal heart sounds.   Pulmonary:      Effort: Pulmonary effort is normal.      Breath sounds: Normal breath sounds.   Abdominal:      General: Bowel sounds are normal.      Palpations: Abdomen is soft.   Musculoskeletal:         General: Normal range of motion.      Cervical back: Normal range of motion and neck supple.   Skin:     General: Skin is warm and dry.   Neurological:      General: No focal deficit present.      Mental Status: She is alert " and oriented to person, place, and time.   Psychiatric:         Mood and Affect: Mood normal.         Behavior: Behavior normal.           RECENT LABS:  Hematology WBC   Date Value Ref Range Status   03/13/2024 5.97 3.40 - 10.80 10*3/mm3 Final   02/06/2024 4.15 (L) 4.5 - 11.0 10*3/uL Final     RBC   Date Value Ref Range Status   03/13/2024 3.85 3.77 - 5.28 10*6/mm3 Final   02/06/2024 3.70 (L) 4.0 - 5.2 10*6/uL Final     Hemoglobin   Date Value Ref Range Status   03/13/2024 13.4 12.0 - 15.9 g/dL Final   02/06/2024 12.4 12.0 - 16.0 g/dL Final     Hematocrit   Date Value Ref Range Status   03/13/2024 37.0 34.0 - 46.6 % Final   02/06/2024 36.6 36.0 - 46.0 % Final     Platelets   Date Value Ref Range Status   03/13/2024 84 (L) 140 - 450 10*3/mm3 Final   02/06/2024 77 (L) 140 - 440 10*3/uL Final          Assessment & Plan      79 year-old female with a orthopedic history as described in particular involving right hip, bilateral knees and bilateral rotator cuffs recently falling and sustaining a contusion involving her right hip and evidence of compression deformities T12 and L2.  Upon discharge she was seen by orthopedics and, unexpectedly, on additional back studies a left lung nodule was determined.    This was reviewed by pulmonary medicine at [facility] leading to a  a CT of the chest which demonstrated a 2.0 x 1.9 cm noncalcified nodule involving the posterior medial aspect left lower lobe abutting the pleura, more smoothly marginated 0.9 cm noncalcified right middle lobe nodule and a few additional smaller noncalcified nodules including 2 adjacent nodules measuring less than 0.4 cm the right middle lobe along with mild emphysema with mild to moderate subpleural and bibasilar scarring.  There is no pleural effusion or pneumothorax.    A PET/CT was obtained 2/6/2024 with a solid subpleural irregular nodule left lower lobe medially measuring 1.7 x 1.8 intensely hypermetabolic with SUV of 12.8, no additional nodular mass,  had metabolic right superior mediastinal lymph node lateral measuring 0.9 cm to 3.9, diffuse lower lobe tracer uptake with mediastinal right hilar lymph nodes partially calcified thought to be inflammatory or granulomatous disease.  There is also low-grade tracer uptake associated subacute healing left anterior rib fractures.    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    The patient is now seen in [facility] office.  There has not been assessment by surgery or, apparently, by pulmonary medicine formally though the studies above are available for review.  After discussion we will need to have her presented in the thoracic conference so that opinions from radiology, thoracic surgery radiation therapy as well as pathology can be obtained.    Patient's case discussed during thoracic conference with the finding of a right supraclavicular node.  This was unknown to us when seen her initially in consultation.  Request made to obtain ultrasound-guided biopsy of the right supraclavicular node and this is discussed with the patient's daughter who indicates the patient would be willing to proceed.  We will go and have this scheduled even though she is to be seen within the next week and the result may not yet be available.    3/6/2023 FNA obtained, discussed with IR physician Dr. Courtney with findings of a few atypical clusters present suspicious for involvement by non-small cell carcinoma.  He is offered to rebiopsy quickly if necessary.    The patient is seen 3/13/2024 with rebiopsy requested as well as radiation therapy consultation.  The case is discussed with interventional radiology and a repeat biopsy is possible.  The patient is willing to proceed in this fashion and we have also discussed radiation therapy consultation in the interval.  She continues have symptoms of chronic back pain and  left lid lesion that is going to be reviewed by ophthalmology.       Plan:    *Patient agrees to undergo repeat biopsy right supraclavicular lymph node, Dr. Courtney plans to proceed with FNA versus core biopsy next available.    *MD follow-up 5 days later    *Request radiation therapy consultation in the interval.    *Patient plans to undergo assessment for her back pain-chronic-potential pain management as well as ophthalmologic assessment of the nodular lesion involving her left lid.    *The patient and are agreeable with this plan and follow-up.    I spent 40 minutes caring for Ada on this date of service. This time includes time spent by me in the following activities: preparing for the visit, reviewing tests, obtaining and/or reviewing a separately obtained history, performing a medically appropriate examination and/or evaluation, counseling and educating the patient/family/caregiver, ordering medications, tests, or procedures, referring and communicating with other health care professionals, documenting information in the medical record, independently interpreting results and communicating that information with the patient/family/caregiver, and care coordination.         I

## 2024-03-13 ENCOUNTER — OFFICE VISIT (OUTPATIENT)
Dept: ONCOLOGY | Facility: CLINIC | Age: 79
End: 2024-03-13
Payer: MEDICARE

## 2024-03-13 ENCOUNTER — LAB (OUTPATIENT)
Dept: LAB | Facility: HOSPITAL | Age: 79
End: 2024-03-13
Payer: MEDICARE

## 2024-03-13 VITALS
DIASTOLIC BLOOD PRESSURE: 94 MMHG | WEIGHT: 148.2 LBS | HEART RATE: 74 BPM | OXYGEN SATURATION: 98 % | BODY MASS INDEX: 26.26 KG/M2 | HEIGHT: 63 IN | SYSTOLIC BLOOD PRESSURE: 176 MMHG | TEMPERATURE: 97.7 F | RESPIRATION RATE: 16 BRPM

## 2024-03-13 DIAGNOSIS — R94.8 ABNORMAL PET SCAN OF MEDIASTINUM: ICD-10-CM

## 2024-03-13 DIAGNOSIS — C34.92 NSCLC OF LEFT LUNG: ICD-10-CM

## 2024-03-13 DIAGNOSIS — C34.92 NSCLC OF LEFT LUNG: Primary | ICD-10-CM

## 2024-03-13 LAB
BASOPHILS # BLD AUTO: 0.06 10*3/MM3 (ref 0–0.2)
BASOPHILS NFR BLD AUTO: 1 % (ref 0–1.5)
DEPRECATED RDW RBC AUTO: 45.1 FL (ref 37–54)
EOSINOPHIL # BLD AUTO: 0.11 10*3/MM3 (ref 0–0.4)
EOSINOPHIL NFR BLD AUTO: 1.8 % (ref 0.3–6.2)
ERYTHROCYTE [DISTWIDTH] IN BLOOD BY AUTOMATED COUNT: 12.9 % (ref 12.3–15.4)
HCT VFR BLD AUTO: 37 % (ref 34–46.6)
HGB BLD-MCNC: 13.4 G/DL (ref 12–15.9)
IMM GRANULOCYTES # BLD AUTO: 0.09 10*3/MM3 (ref 0–0.05)
IMM GRANULOCYTES NFR BLD AUTO: 1.5 % (ref 0–0.5)
LYMPHOCYTES # BLD AUTO: 2.18 10*3/MM3 (ref 0.7–3.1)
LYMPHOCYTES NFR BLD AUTO: 36.5 % (ref 19.6–45.3)
MCH RBC QN AUTO: 34.8 PG (ref 26.6–33)
MCHC RBC AUTO-ENTMCNC: 36.2 G/DL (ref 31.5–35.7)
MCV RBC AUTO: 96.1 FL (ref 79–97)
MONOCYTES # BLD AUTO: 0.47 10*3/MM3 (ref 0.1–0.9)
MONOCYTES NFR BLD AUTO: 7.9 % (ref 5–12)
NEUTROPHILS NFR BLD AUTO: 3.06 10*3/MM3 (ref 1.7–7)
NEUTROPHILS NFR BLD AUTO: 51.3 % (ref 42.7–76)
NRBC BLD AUTO-RTO: 0 /100 WBC (ref 0–0.2)
PLATELET # BLD AUTO: 84 10*3/MM3 (ref 140–450)
PMV BLD AUTO: 11.2 FL (ref 6–12)
RBC # BLD AUTO: 3.85 10*6/MM3 (ref 3.77–5.28)
WBC NRBC COR # BLD AUTO: 5.97 10*3/MM3 (ref 3.4–10.8)

## 2024-03-13 PROCEDURE — 85025 COMPLETE CBC W/AUTO DIFF WBC: CPT

## 2024-03-14 ENCOUNTER — PATIENT OUTREACH (OUTPATIENT)
Dept: OTHER | Facility: HOSPITAL | Age: 79
End: 2024-03-14
Payer: MEDICARE

## 2024-03-14 NOTE — PROGRESS NOTES
Reviewed chart.Met with Dr. Mann yesterday. Patient agreed to undergo repeat biopsy right supraclavicular lymph node, schedule 3/15. Meets with Dr. Hopkins 3/20.    Called patient's daughter Kaylie. We discussed the patient's appt with Dr. Mann yesterday as well as her biopsy appt tomorrow and appt with Dr. Hopkins next week.      The patient received the navigation folder that I mailed.  Her daughter denies any questions/concerns or resource needs at this time.  I will try to meet them next week at her appt with Dr. Hopkins. Encouraged them to call as needed.

## 2024-03-15 ENCOUNTER — TELEPHONE (OUTPATIENT)
Dept: ONCOLOGY | Facility: CLINIC | Age: 79
End: 2024-03-15
Payer: MEDICARE

## 2024-03-15 ENCOUNTER — HOSPITAL ENCOUNTER (OUTPATIENT)
Dept: ULTRASOUND IMAGING | Facility: HOSPITAL | Age: 79
Discharge: HOME | End: 2024-03-15
Payer: MEDICARE

## 2024-03-15 RX ORDER — SODIUM CHLORIDE 0.9 % (FLUSH) 0.9 %
10 SYRINGE (ML) INJECTION AS NEEDED
Status: CANCELLED | OUTPATIENT
Start: 2024-03-18

## 2024-03-15 NOTE — TELEPHONE ENCOUNTER
Caller: Kaylie Gutierrez    Relationship to patient: Emergency Contact    Best call back number: [phone]    Type of visit: LAB AND F/U APPT    Requested date: 3/20/24 - HAS ANOTHER APPT  AND WANTED TO SEE DR IBARRA AFTER THAT IF POSSIBLE    If rescheduling, when is the original appointment: 3/21/24

## 2024-03-15 NOTE — PROGRESS NOTES
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 female with a history of hypertension, hyperlipidemia, GERD who was found incidentally to have a 2 cm nodule in left lower lobe abutting the pleura.  In addition she had a 0.9 cm right middle lobe nodule as well as some other scattered small pulmonary nodules.  PET/CT was performed on 2/6/2024 demonstrating  intense avidity in the left lower lobe nodule as well as avidity in the right supraclavicular lymph node measuring  0.9 cm with an SUV of 3.9.  CT-guided biopsy of the left lower lobe nodule demonstrated squamous cell carcinoma.  The patient's case was discussed at thoracic conference and biopsy of the right supraclavicular node was recommended.  FNA demonstrated atypical clusters suspicious for involvement of non-small cell carcinoma.  The patient was sent for rebiopsy for confirmation and referred to radiation oncology.      Imaging:      CT Chest 1/10/2024    IMPRESSION:   1. There is an approximately 2 cm spiculated noncalcified nodule   involving the posterior medial aspect of the left lower lobe which is   most concerning for a primary lung cancer. PET/CT imaging is   recommended for further characterization.   2. There are additional noncalcified pulmonary nodules measuring up to   0.9 cm which are strictly indeterminate. The 0.9 cm right middle lobe   nodule can also be reassessed with PET/CT imaging. Additional smaller   nodules will need to be followed by 3 month surveillance CT.   3. Emphysema with subpleural and basilar predominant scarring.   4.  Old healed granulomatous disease.   5. Atherosclerosis including coronary artery disease.   6. Osteopenia with age indeterminate compression deformities involving   T12, L1, and L2.   7. Limited evaluation as detailed above.       PET/CT 1/25/2024    IMPRESSION:   1. Solid irregular hypermetabolic subpleural nodule in the left lower   lobe is consistent with the primary neoplasm. A CT guided biopsy is   recommended.   2. Hypermetabolic lymph node noted lateral to the right lobe of the   thyroid gland worrisome for lymph node metastasis.   3. Lower grade tracer uptake associated with partially calcified   mediastinal and right hilar lymph nodes is likely inflammatory related   to remote granulomatous disease.   4. Low-grade tracer uptake associated with subacute healing left   anterior rib fractures.       Pathology:      Left lower lobe CT-guided biopsy pathology 2/6/2024    DIAGNOSIS:   LUNG, LEFT LOWER LOBE, CT-GUIDED CORE BIOPSY:        SQUAMOUS CELL CARCINOMA.        PLEASE SEE COMMENT.       PATHOLOGIST COMMENT:   Using appropriate controls the sections from the lung core biopsy are   immunohistochemically stained.  The tumor cells show positive immunoreactivity   for CAM5.2, CK5/6 and p40.  There is focal weak immunoreactivity for CK7 and the   cells are negative for TTF-1 and synaptophysin.     This immunophenotype is consistent with the diagnosis of a squamous cell   carcinoma.       FNA right supraclavicular lymph node 3/6/2024    Final Diagnosis   1. Neck, Right Supraclavicular Lymph Node, Fine Needle Aspiration (FNA):               A.  Few atypical cell clusters present in smears suspicious for involvement by                        nonsmall cell carcinoma (see comment).               B.  Limited cellularity present in cell block material; nondiagnostic of malignancy (see comment)     Comment    Recent CT scan disclosed lung nodule with suspicious border involving left lower lobe (1/10/2024).  Current  specimen clinically designated (right supraclavicular lymph node/neck).  Few of the smears show scattered lymphocytes with rare clusters of hyperchromatic atypical cells suspicious for involvement by non-small cell carcinoma.  Cellblock material contains only limited cellularity with only rare clusters of epithelial cells with only minimal atypia.  Immunostains for TTF-1, CK7, CK5/6, p40, Napsin A with appropriate controls were attempted on the limited cell block material.  Only rare clusters of CK7 positive epithelial cells are noted in the cellblock material.  No significant immunoreactivity is seen utilizing CK5/6, p40, TTF-1 and/or Napsin A.  Definitive malignancy could not be confirmed by immunostaining.  Additional larger sampling would be necessary for more definitive diagnosis.  This case was shared in consultation with Alton White and Tristan, who concurred with the above diagnosis.       Labs:    Lab Results   Component Value Date    CREATININE 0.92 12/05/2023             Problem List:  Patient Active Problem List   Diagnosis    Snapping right knee    HTN (hypertension)    HLD (hyperlipidemia)    GERD (gastroesophageal reflux disease)    Closed comminuted intertrochanteric fracture of proximal end of right femur    Thrombocytopenia    Unable to ambulate    Right hip pain    Degenerative disc disease, thoracic and lumbar    NSCLC of left lung          Medications:  Current Outpatient Medications on File Prior to Visit   Medication Sig Dispense Refill    acetaminophen (TYLENOL) 325 MG tablet Take 2 tablets by mouth Every 4 (Four) Hours As Needed for Mild Pain .      albuterol (PROVENTIL HFA;VENTOLIN HFA) 108 (90 BASE) MCG/ACT inhaler Inhale 2 puffs Every 4 (Four) Hours As Needed. Indications: Asthma      allopurinol (ZYLOPRIM) 100 MG tablet Take 1 tablet by mouth Daily.      bisacodyl (DULCOLAX) 5 MG EC tablet Take 1 tablet by mouth Daily As Needed for Constipation (Use if polyethylene glycol is ineffective).       fluticasone (FLONASE) 50 MCG/ACT nasal spray 2 sprays into the nostril(s) as directed by provider Daily.      furosemide (LASIX) 20 MG tablet Take 1 tablet by mouth 2 (Two) Times a Day. Indications: Edema      Magnesium 500 MG capsule Take 500 mg by mouth 2 (Two) Times a Day. Indications: Acid Indigestion      metoprolol tartrate (LOPRESSOR) 25 MG tablet Take 1 tablet by mouth 2 (Two) Times a Day. Indications: High Blood Pressure Disorder      naloxone (NARCAN) 4 MG/0.1ML nasal spray Call 911. Don't prime. [place] in 1 nostril for overdose. Repeat in 2-3 minutes in other nostril if no or minimal breathing/responsiveness. 2 each 0    omeprazole (PriLOSEC) 40 MG capsule Take 1 capsule by mouth Every Morning. Indications: Heartburn      polyethylene glycol (MIRALAX) 17 g packet Take 17 g by mouth Daily As Needed (Use if senna-docusate is ineffective).      potassium chloride (K-DUR,KLOR-CON) 20 MEQ CR tablet Take 1 tablet by mouth 2 (Two) Times a Day.      pravastatin (PRAVACHOL) 20 MG tablet Take 1 tablet by mouth Every Morning. (Patient not taking: Reported on 3/18/2024)      rOPINIRole (REQUIP) 0.25 MG tablet Take 1 tablet by mouth Every Night. Take 1 hour before bedtime.  Indications: Restless Leg Syndrome      sennosides-docusate sodium (SENOKOT-S) 8.6-50 MG tablet Take 2 tablets by mouth 2 (two) times a day. 100 tablet 1    temazepam (RESTORIL) 15 MG capsule Take 1 capsule by mouth At Night As Needed for Sleep.      vitamin B-12 (CYANOCOBALAMIN) 1000 MCG tablet Take 1 tablet by mouth Daily. Indications: Inadequate Vitamin B12      vitamin D (ERGOCALCIFEROL) 1.25 MG (40772 UT) capsule capsule Take 1 capsule by mouth Every 7 (Seven) Days. 5 capsule 0     No current facility-administered medications on file prior to visit.          Allergies:  Allergies   Allergen Reactions    Levaquin [Levofloxacin] Hives    Zocor [Simvastatin] Myalgia    Atorvastatin Other (See Comments)    Codeine GI Intolerance    Penicillins  "Hives and Swelling     Pt reports tongue swelling    Percocet [Oxycodone-Acetaminophen] Itching and Nausea And Vomiting         Family History:  The patient has no family history of conditions which would be contraindications to radiation therapy  Mother  of lung cancer, daughter colon neuroendocrine tumor and thyroid cancer    Social History:  Former Smoker    Distance From Clinic: 30 minutes - 1 hour    Patient has someone who can assist with transportation: yes      Review of Systems:    Review of Systems   Constitutional:  Positive for fatigue.   Psychiatric/Behavioral:  The patient is nervous/anxious.        Vital Signs:  /91   Pulse 63   Wt 68.1 kg (150 lb 3.2 oz)   SpO2 97%   BMI 26.61 kg/m²   Estimated body mass index is 26.61 kg/m² as calculated from the following:    Height as of 3/18/24: 160 cm (63\").    Weight as of this encounter: 68.1 kg (150 lb 3.2 oz).  Pain Score    24 0953   PainSc:   7   PainLoc: Back         ECOG: Capable of only limited selfcare; confined to bed or chair more than 50% of waking hours = 3    Physical Exam  Vitals reviewed.   Constitutional:       General: She is not in acute distress.     Appearance: Normal appearance.   HENT:      Head: Normocephalic and atraumatic.   Eyes:      Extraocular Movements: Extraocular movements intact.      Pupils: Pupils are equal, round, and reactive to light.   Pulmonary:      Effort: Pulmonary effort is normal.   Abdominal:      General: Abdomen is flat.      Palpations: Abdomen is soft.   Musculoskeletal:      Cervical back: Normal range of motion.   Skin:     General: Skin is warm and dry.   Neurological:      General: No focal deficit present.      Mental Status: She is alert and oriented to person, place, and time.   Psychiatric:         Mood and Affect: Mood normal.         Behavior: Behavior normal.          Result Review :  The following data was reviewed by: Fernando Hopkins MD on 2024:  Labs: Last Creatinine "   Data reviewed : Radiologic studies CT Chest, PET CT             Diagnoses and all orders for this visit:    1. NSCLC of left lung (Primary)        Assessment:    Kenia N Sample is an 79 y.o. female with a history of hypertension, hyperlipidemia, GERD who was found incidentally to have a 2 cm nodule in left lower lobe abutting the pleura.  In addition she had a 0.9 cm right middle lobe nodule as well as some other scattered small pulmonary nodules.  PET/CT was performed on 2/6/2024 demonstrating  intense avidity in the left lower lobe nodule as well as avidity in the right supraclavicular lymph node measuring  0.9 cm with an SUV of 3.9.  CT-guided biopsy of the left lower lobe nodule demonstrated squamous cell carcinoma.  The patient's case was discussed at thoracic conference and biopsy of the right supraclavicular node was recommended.  FNA demonstrated atypical clusters suspicious for involvement of non-small cell carcinoma.  The patient was sent for rebiopsy for confirmation and referred to radiation oncology.    I met with the patient and discussed her workup to date.  I discussed the risk, benefits, side effects, and logistics of radiation treatment planning delivery in the context of treating her left lower lobe lesion abutting the pleura with definitive radiation.  At the time of our visit her pathology from repeat biopsy of the neck was not back.  I emphasized to the patient that should this pathology come back positive that may influence her treatment decisions significantly.  The patient expressed understanding.    Following the completion of my visit with the patient , the patient's pathology returned with metastatic carcinoma in the right neck node.  I have placed the patient on for tumor board discussion tomorrow morning.  I have discussed the case with Dr. Mann.  I will follow-up with the patient after tumor board.      Plan:    -Patient with approximately 2 cm left lower lobe squamous cell carcinoma  abutting the pleura, now with an additional contralateral supraclavicular neck lymph node near the thyroid.  We will discuss the patient's case at tumor board tomorrow morning.  The patient will likely need MRI brain for additional staging.  SBRT remains an option for her left lower lobe.       I spent 60 minutes caring for Ada on this date of service. This time includes time spent by me in the following activities:preparing for the visit, reviewing tests, obtaining and/or reviewing a separately obtained history, documenting information in the medical record, independently interpreting results and communicating that information with the patient/family/caregiver, and care coordination  Follow Up   No follow-ups on file.  Patient was given instructions and counseling regarding her condition or for health maintenance advice. Please see specific information pulled into the AVS if appropriate.     Fernando Hopkins MD

## 2024-03-18 ENCOUNTER — HOSPITAL ENCOUNTER (OUTPATIENT)
Dept: ULTRASOUND IMAGING | Facility: HOSPITAL | Age: 79
Discharge: HOME | End: 2024-03-18
Admitting: INTERNAL MEDICINE
Payer: MEDICARE

## 2024-03-18 VITALS
SYSTOLIC BLOOD PRESSURE: 148 MMHG | WEIGHT: 148 LBS | RESPIRATION RATE: 18 BRPM | OXYGEN SATURATION: 95 % | DIASTOLIC BLOOD PRESSURE: 85 MMHG | HEART RATE: 59 BPM | TEMPERATURE: 97.8 F | HEIGHT: 63 IN | BODY MASS INDEX: 26.22 KG/M2

## 2024-03-18 DIAGNOSIS — R93.89 ABNORMAL CT OF THE CHEST: ICD-10-CM

## 2024-03-18 DIAGNOSIS — R94.8 ABNORMAL PET SCAN OF MEDIASTINUM: ICD-10-CM

## 2024-03-18 DIAGNOSIS — C34.92 NSCLC OF LEFT LUNG: ICD-10-CM

## 2024-03-18 PROCEDURE — 63710000001 ACETAMINOPHEN 325 MG TABLET: Performed by: RADIOLOGY

## 2024-03-18 PROCEDURE — 88342 IMHCHEM/IMCYTCHM 1ST ANTB: CPT | Performed by: INTERNAL MEDICINE

## 2024-03-18 PROCEDURE — A9270 NON-COVERED ITEM OR SERVICE: HCPCS | Performed by: RADIOLOGY

## 2024-03-18 PROCEDURE — 63710000001 ALPRAZOLAM 0.5 MG TABLET: Performed by: RADIOLOGY

## 2024-03-18 PROCEDURE — 88305 TISSUE EXAM BY PATHOLOGIST: CPT | Performed by: INTERNAL MEDICINE

## 2024-03-18 PROCEDURE — 88360 TUMOR IMMUNOHISTOCHEM/MANUAL: CPT | Performed by: INTERNAL MEDICINE

## 2024-03-18 PROCEDURE — 25010000002 LIDOCAINE 1 % SOLUTION: Performed by: RADIOLOGY

## 2024-03-18 PROCEDURE — 76942 ECHO GUIDE FOR BIOPSY: CPT

## 2024-03-18 PROCEDURE — 88341 IMHCHEM/IMCYTCHM EA ADD ANTB: CPT | Performed by: INTERNAL MEDICINE

## 2024-03-18 RX ORDER — LIDOCAINE HYDROCHLORIDE 10 MG/ML
10 INJECTION, SOLUTION INFILTRATION; PERINEURAL ONCE
Status: COMPLETED | OUTPATIENT
Start: 2024-03-18 | End: 2024-03-18

## 2024-03-18 RX ORDER — SODIUM CHLORIDE 0.9 % (FLUSH) 0.9 %
10 SYRINGE (ML) INJECTION AS NEEDED
Status: DISCONTINUED | OUTPATIENT
Start: 2024-03-18 | End: 2024-03-19 | Stop reason: HOSPADM

## 2024-03-18 RX ORDER — ACETAMINOPHEN 325 MG/1
650 TABLET ORAL ONCE
Status: COMPLETED | OUTPATIENT
Start: 2024-03-18 | End: 2024-03-18

## 2024-03-18 RX ORDER — ALPRAZOLAM 0.5 MG/1
0.5 TABLET ORAL ONCE
Status: COMPLETED | OUTPATIENT
Start: 2024-03-18 | End: 2024-03-18

## 2024-03-18 RX ADMIN — ALPRAZOLAM 0.5 MG: 0.5 TABLET ORAL at 08:19

## 2024-03-18 RX ADMIN — LIDOCAINE HYDROCHLORIDE 3 ML: 10 INJECTION, SOLUTION INFILTRATION; PERINEURAL at 09:10

## 2024-03-18 RX ADMIN — ACETAMINOPHEN 325MG 650 MG: 325 TABLET ORAL at 09:39

## 2024-03-18 NOTE — DISCHARGE INSTRUCTIONS
EDUCATION / DISCHARGE INSTRUCTIONS  Aspiration:  An aspiration is a procedure used to take a sample of cells or tissue from a lump, mass or other area of concern.   Within a week the radiologist will send a report to your physician.  A pathologist will also examine the tissue and send a report.  THIS EDUCATION INFORMATION WAS REVIEWED PRIOR TO THE PROCEDURE AND CONSENT.        Post Procedure:    *  Rest today (no pushing pulling or straining).   *  Slowly increase activity tomorow.    *  If you received sedation do not drive for 24 hours.   *  Keep dressing clean and dry.   *  Leave dressing on puncture site for 24 hours.    *  You may shower when dressing removed.   *  If needed, use an ice pack at the site for up to 20 minutes at a time for pain.    Call your doctor if experiencing:   *  Signs of infection such as redness, swelling, excessive pain and / or foul smelling drainage from the puncture site.   *  Chills or fever over 101 degrees (by mouth).   *  Unrelieved pain.   *  Any new or severe symptoms.      Following the procedure:     Follow-up with the ordering physician as directed.    Continue to take other medications as directed by your physician unless otherwise instructed.    If you have any concerns please call the Radiology Nurses Desk at [phone].  You are the most important factor in your recovery.  Follow the above instructions carefully.

## 2024-03-19 ENCOUNTER — TELEPHONE (OUTPATIENT)
Dept: INTERVENTIONAL RADIOLOGY/VASCULAR | Facility: HOSPITAL | Age: 79
End: 2024-03-19
Payer: MEDICARE

## 2024-03-19 ENCOUNTER — TELEPHONE (OUTPATIENT)
Dept: RADIATION ONCOLOGY | Facility: HOSPITAL | Age: 79
End: 2024-03-19
Payer: MEDICARE

## 2024-03-19 NOTE — TELEPHONE ENCOUNTER
Called pt's listed number (daughter's phone ) to confirm 9:30am appt with Dr. Hopkins tomorrow. Pt's daughter did not answer, LVM.

## 2024-03-20 ENCOUNTER — CONSULT (OUTPATIENT)
Dept: RADIATION ONCOLOGY | Facility: HOSPITAL | Age: 79
End: 2024-03-20
Payer: MEDICARE

## 2024-03-20 ENCOUNTER — PATIENT OUTREACH (OUTPATIENT)
Dept: OTHER | Facility: HOSPITAL | Age: 79
End: 2024-03-20
Payer: MEDICARE

## 2024-03-20 VITALS
WEIGHT: 150.2 LBS | BODY MASS INDEX: 26.61 KG/M2 | SYSTOLIC BLOOD PRESSURE: 177 MMHG | HEART RATE: 63 BPM | OXYGEN SATURATION: 97 % | DIASTOLIC BLOOD PRESSURE: 91 MMHG

## 2024-03-20 DIAGNOSIS — C34.92 NSCLC OF LEFT LUNG: Primary | ICD-10-CM

## 2024-03-20 NOTE — PROGRESS NOTES
REASON FOR FOLLOW-UP: Left lower lobe lung mass-squamous of carcinoma                               History of Present Illness     The patient is an 79-year-old female with below medical history recently admitted 12/4 - 6/20/2023 with a history of right intertrochanteric femoral fracture and osteoarthritis presenting from a fall at home.  She had a stress fracture left foot that affected her balance, chronic low back pain that is also worsened with a fall.  Upon presentation orthopedic surgery was consulted finding a right intramedullary nail fixation and healing without abnormal findings.  No additional surgery was planned though physical therapy continued.  Spinal films demonstrated compression deformity T12 and L2 but fortunately she did further improve and was able to be discharged.    The patient had follow-up with several physicians including orthopedic physicians with an eventual chest exam that revealed an unexpected nodularity.  She was then referred to pulmonary medicine 1/8/2024 with a history of smoking for 35 years quitting 8 years ago and indication that there was a lung nodule found incidentally on imaging for the spine.  Through the [facility] system a CT of the chest demonstrated a 2.0 x 1.9 cm noncalcified nodule involving the posterior medial aspect left lower lobe abutting the pleura, more smoothly marginated 0.9 cm noncalcified right middle lobe nodule and a few additional smaller noncalcified nodules including 2 adjacent nodules measuring less than 0.4 cm the right middle lobe along with mild emphysema with mild to moderate subpleural and bibasilar scarring.  There is no pleural effusion or pneumothorax.    A PET/CT was obtained 2/6/2024 with a solid subpleural irregular nodule left lower lobe medially measuring 1.7 x 1.8 intensely hypermetabolic with SUV of 12.8, no additional nodular mass, had metabolic right superior mediastinal lymph node lateral measuring 0.9 cm to 3.9, diffuse lower  lobe tracer uptake with mediastinal right hilar lymph nodes partially calcified thought to be inflammatory or granulomatous disease.  There is also low-grade tracer uptake associated subacute healing left anterior rib fractures.    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    The patient was referred urgently from [facility] for assessment.  It is not certain why she has not been seen in the [facility] system though she now states that she wished to be seen here.     Patient's case discussed during thoracic conference with the finding of a right supraclavicular node.  This was unknown to us when seen her initially in consultation.  Request made to obtain ultrasound-guided biopsy of the right supraclavicular node and this is discussed with the patient's daughter who indicates the patient would be willing to proceed.  We will go and have this scheduled even though she is to be seen within the next week and the result may not yet be available.    3/6/2023 FNA obtained, discussed with IR physician Dr. Courtney with findings of a few atypical clusters present suspicious for involvement by non-small cell carcinoma.  He is offered to rebiopsy quickly if necessary.    The patient is seen 3/13/2024 with rebiopsy requested as well as radiation therapy consultation.  The case is discussed with interventional radiology and a repeat biopsy is possible.  The patient is willing to proceed in this fashion and we have also discussed radiation therapy consultation in the interval.  She continues have symptoms of chronic back pain and left lid lesion that is going to be reviewed by ophthalmology.    The patient went on to have a follow-up exam with repeat right supraclavicular lymph node biopsy 3/18/2024.  This was found positive for metastatic carcinoma.  The patient was seen by Dr. Hopkins with the conclusion  that she has a 2 cm left lower lobe squamous cell carcinoma abutting the pleura additional contralateral supraclavicular lymph node in the thyroid.  This is to be discussed in conference 3/21/2024.    This was discussed 3/21/2024 recognizing the patient's frailty.  Options for treating the areas that are known positive including the left lower lobe and right supraclavicular node would include radiation therapy and Dr. Hopkins will see the patient concerning this as we also follow her for consideration of systemic therapy including a [id number] considering the genomic studies done thus far and the inability to obtain additional genomics from the scant tissue that we have even from recent biopsy.  Finally the patient's overall status including an older adult assessment is going to be requested.       Past Surgical History:   Procedure Laterality Date    APPENDECTOMY      CARDIAC CATHETERIZATION      EXTERNAL EAR SURGERY Right     had tumor excised behind right ear lobe benign    FEMUR IM NAILING/RODDING Right 06/15/2022    Procedure: RIGHT HIP INTRAMEDULLARY NAILING/RODDING;  Surgeon: Marc Crawford MD;  Location: [facility];  Service: Orthopedics;  Laterality: Right;    GALLBLADDER SURGERY      HYSTERECTOMY      KNEE ARTHROPLASTY Right     KNEE ARTHROSCOPY Right     IN REVJ TOTAL KNEE ARTHRP W/WO ALGRFT 1 COMPONENT Right 09/07/2016    Procedure: RT ILIOTIBIAL BAND RELEASE RT TOTAL KNEE POLY EXCHANGE;  Surgeon: Toy Adame MD;  Location: [facility];  Service: Orthopedics    ROTATOR CUFF REPAIR Bilateral     US GUIDED LYMPH NODE BIOPSY  3/18/2024    WRIST MASS EXCISION Right         Current Outpatient Medications on File Prior to Visit   Medication Sig Dispense Refill    acetaminophen (TYLENOL) 325 MG tablet Take 2 tablets by mouth Every 4 (Four) Hours As Needed for Mild Pain .      albuterol (PROVENTIL HFA;VENTOLIN HFA) 108 (90 BASE) MCG/ACT inhaler Inhale 2 puffs Every 4 (Four) Hours As Needed. Indications:  Asthma      allopurinol (ZYLOPRIM) 100 MG tablet Take 1 tablet by mouth Daily.      bisacodyl (DULCOLAX) 5 MG EC tablet Take 1 tablet by mouth Daily As Needed for Constipation (Use if polyethylene glycol is ineffective).      fluticasone (FLONASE) 50 MCG/ACT nasal spray 2 sprays into the nostril(s) as directed by provider Daily.      furosemide (LASIX) 20 MG tablet Take 1 tablet by mouth 2 (Two) Times a Day. Indications: Edema      Magnesium 500 MG capsule Take 500 mg by mouth 2 (Two) Times a Day. Indications: Acid Indigestion      metoprolol tartrate (LOPRESSOR) 25 MG tablet Take 1 tablet by mouth 2 (Two) Times a Day. Indications: High Blood Pressure Disorder      naloxone (NARCAN) 4 MG/0.1ML nasal spray Call 911. Don't prime. [place] in 1 nostril for overdose. Repeat in 2-3 minutes in other nostril if no or minimal breathing/responsiveness. 2 each 0    omeprazole (PriLOSEC) 40 MG capsule Take 1 capsule by mouth Every Morning. Indications: Heartburn      polyethylene glycol (MIRALAX) 17 g packet Take 17 g by mouth Daily As Needed (Use if senna-docusate is ineffective).      potassium chloride (K-DUR,KLOR-CON) 20 MEQ CR tablet Take 1 tablet by mouth 2 (Two) Times a Day.      pravastatin (PRAVACHOL) 20 MG tablet Take 1 tablet by mouth Every Morning.      rOPINIRole (REQUIP) 0.25 MG tablet Take 1 tablet by mouth Every Night. Take 1 hour before bedtime.  Indications: Restless Leg Syndrome      sennosides-docusate sodium (SENOKOT-S) 8.6-50 MG tablet Take 2 tablets by mouth 2 (two) times a day. 100 tablet 1    temazepam (RESTORIL) 15 MG capsule Take 1 capsule by mouth At Night As Needed for Sleep.      vitamin B-12 (CYANOCOBALAMIN) 1000 MCG tablet Take 1 tablet by mouth Daily. Indications: Inadequate Vitamin B12      vitamin D (ERGOCALCIFEROL) 1.25 MG (45437 UT) capsule capsule Take 1 capsule by mouth Every 7 (Seven) Days. 5 capsule 0     No current facility-administered medications on file prior to visit.     "    ALLERGIES:    Allergies   Allergen Reactions    Levaquin [Levofloxacin] Hives    Zocor [Simvastatin] Myalgia    Atorvastatin Other (See Comments)    Codeine GI Intolerance    Penicillins Hives and Swelling     Pt reports tongue swelling    Percocet [Oxycodone-Acetaminophen] Itching and Nausea And Vomiting        Social History     Socioeconomic History    Marital status:      Spouse name: Nabil   Tobacco Use    Smoking status: Former     Current packs/day: 1.00     Average packs/day: 1 pack/day for 30.0 years (30.0 ttl pk-yrs)     Types: Cigarettes    Smokeless tobacco: Never   Vaping Use    Vaping status: Never Used   Substance and Sexual Activity    Alcohol use: Yes     Comment: 1 wine drink q 3-4 months    Drug use: No        Family History   Problem Relation Age of Onset    Hypertension Mother     Heart failure Mother     Heart attack Maternal Grandfather         Review of Systems   Constitutional:  Positive for activity change. Negative for appetite change, fatigue and unexpected weight change.   HENT: Negative.     Eyes: Negative.    Respiratory:  Positive for shortness of breath (Chronic).    Cardiovascular:  Positive for chest pain (Following her fall lasting 3 to 4 weeks, then resolving left chest).   Gastrointestinal: Negative.    Endocrine: Negative.    Genitourinary: Negative.    Musculoskeletal:  Positive for back pain (Chronic back pain).   Neurological: Negative.    Psychiatric/Behavioral: Negative.          Objective     Vitals:    03/21/24 0818   BP: 165/88   Pulse: 65   Resp: 16   Temp: 97.3 °F (36.3 °C)   TempSrc: Temporal   SpO2: 98%   Weight: 66.9 kg (147 lb 8 oz)   Height: 160 cm (62.99\")   PainSc:   4   PainLoc: Back             3/21/2024     8:18 AM   Current Status   ECOG score 1       Physical Exam  Constitutional:       Appearance: Normal appearance.   HENT:      Head: Normocephalic and atraumatic.      Mouth/Throat:      Mouth: Mucous membranes are moist.      Pharynx: " Oropharynx is clear.   Eyes:      Extraocular Movements: Extraocular movements intact.      Conjunctiva/sclera: Conjunctivae normal.      Pupils: Pupils are equal, round, and reactive to light.      Comments: Left lid nodular lesion   Cardiovascular:      Rate and Rhythm: Normal rate and regular rhythm.      Pulses: Normal pulses.      Heart sounds: Normal heart sounds.   Pulmonary:      Effort: Pulmonary effort is normal.      Breath sounds: Normal breath sounds.   Abdominal:      General: Bowel sounds are normal.      Palpations: Abdomen is soft.   Musculoskeletal:         General: Normal range of motion.      Cervical back: Normal range of motion and neck supple.   Skin:     General: Skin is warm and dry.   Neurological:      General: No focal deficit present.      Mental Status: She is alert and oriented to person, place, and time.   Psychiatric:         Mood and Affect: Mood normal.         Behavior: Behavior normal.           RECENT LABS:  Hematology WBC   Date Value Ref Range Status   03/21/2024 4.81 3.40 - 10.80 10*3/mm3 Final   02/06/2024 4.15 (L) 4.5 - 11.0 10*3/uL Final     RBC   Date Value Ref Range Status   03/21/2024 3.88 3.77 - 5.28 10*6/mm3 Final   02/06/2024 3.70 (L) 4.0 - 5.2 10*6/uL Final     Hemoglobin   Date Value Ref Range Status   03/21/2024 13.2 12.0 - 15.9 g/dL Final   02/06/2024 12.4 12.0 - 16.0 g/dL Final     Hematocrit   Date Value Ref Range Status   03/21/2024 36.7 34.0 - 46.6 % Final   02/06/2024 36.6 36.0 - 46.0 % Final     Platelets   Date Value Ref Range Status   03/21/2024 74 (L) 140 - 450 10*3/mm3 Final   02/06/2024 77 (L) 140 - 440 10*3/uL Final          Assessment & Plan      79 year-old female with a orthopedic history as described in particular involving right hip, bilateral knees and bilateral rotator cuffs recently falling and sustaining a contusion involving her right hip and evidence of compression deformities T12 and L2.  Upon discharge she was seen by orthopedics and,  unexpectedly, on additional back studies a left lung nodule was determined.    This was reviewed by pulmonary medicine at [facility] leading to a  a CT of the chest which demonstrated a 2.0 x 1.9 cm noncalcified nodule involving the posterior medial aspect left lower lobe abutting the pleura, more smoothly marginated 0.9 cm noncalcified right middle lobe nodule and a few additional smaller noncalcified nodules including 2 adjacent nodules measuring less than 0.4 cm the right middle lobe along with mild emphysema with mild to moderate subpleural and bibasilar scarring.  There is no pleural effusion or pneumothorax.    A PET/CT was obtained 2/6/2024 with a solid subpleural irregular nodule left lower lobe medially measuring 1.7 x 1.8 intensely hypermetabolic with SUV of 12.8, no additional nodular mass, had metabolic right superior mediastinal lymph node lateral measuring 0.9 cm to 3.9, diffuse lower lobe tracer uptake with mediastinal right hilar lymph nodes partially calcified thought to be inflammatory or granulomatous disease.  There is also low-grade tracer uptake associated subacute healing left anterior rib fractures.    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    The patient is now seen in CBC office.  There has not been assessment by surgery or, apparently, by pulmonary medicine formally though the studies above are available for review.  After discussion we will need to have her presented in the thoracic conference so that opinions from radiology, thoracic surgery radiation therapy as well as pathology can be obtained.    Patient's case discussed during thoracic conference with the finding of a right supraclavicular node.  This was unknown to us when seen her initially in consultation.  Request made to obtain ultrasound-guided biopsy of the right supraclavicular node and this  is discussed with the patient's daughter who indicates the patient would be willing to proceed.  We will go and have this scheduled even though she is to be seen within the next week and the result may not yet be available.    3/6/2023 FNA obtained, discussed with IR physician Dr. Courtney with findings of a few atypical clusters present suspicious for involvement by non-small cell carcinoma.  He is offered to rebiopsy quickly if necessary.    The patient is seen 3/13/2024 with rebiopsy requested as well as radiation therapy consultation.  The case is discussed with interventional radiology and a repeat biopsy is possible.  The patient is willing to proceed in this fashion and we have also discussed radiation therapy consultation in the interval.  She continues have symptoms of chronic back pain and left lid lesion that is going to be reviewed by ophthalmology.    This was discussed 3/21/2024 recognizing the patient's frailty.  Options for treating the areas that are known positive including the left lower lobe and right supraclavicular node would include radiation therapy and Dr. Hopkins will see the patient concerning this as we also follow her for consideration of systemic therapy including a [id number] considering the genomic studies done thus far and the inability to obtain additional genomics from the scant tissue that we have even from recent biopsy.  Finally the patient's overall status including an older adult assessment is going to be requested.     Plan:  *Again after discussion in tumor conference plan to request Dr. Hopkins to see the patient in follow-up and proceed with therapy    *MRI of the brain    *Supportive oncology referral for older adult assessment    *Return to laboratory today for [id number]    *MD follow-up 3 to 4 weeks    *After lengthy conversation the patient and her daughter agree with this plan and follow-up    I spent 50 minutes caring for Ada on this date of service. This time includes  time spent by me in the following activities: preparing for the visit, reviewing tests, obtaining and/or reviewing a separately obtained history, performing a medically appropriate examination and/or evaluation, counseling and educating the patient/family/caregiver, ordering medications, tests, or procedures, referring and communicating with other health care professionals, documenting information in the medical record, independently interpreting results and communicating that information with the patient/family/caregiver, and care coordination.           I

## 2024-03-20 NOTE — PROGRESS NOTES
Met patient and daughter in radiation center today. Reintroduced myself.    We discussed what to expect at her appt with Dr. Hopkins today. The patient voiced being anxious about waiting for her test results.  Provided active listening and emotional support, validating and normalizing patient's feelings.     We again discussed integrative therapies and other services [facility] as well as [facility] and [facility]. Patient expressed gratitude for my support and denied any additional needs at this time. I will call in 1-2 weeks; encouraged patient to call as needed.

## 2024-03-21 ENCOUNTER — OFFICE VISIT (OUTPATIENT)
Dept: ONCOLOGY | Facility: CLINIC | Age: 79
End: 2024-03-21
Payer: MEDICARE

## 2024-03-21 ENCOUNTER — LAB (OUTPATIENT)
Dept: LAB | Facility: HOSPITAL | Age: 79
End: 2024-03-21
Payer: MEDICARE

## 2024-03-21 VITALS
OXYGEN SATURATION: 98 % | WEIGHT: 147.5 LBS | RESPIRATION RATE: 16 BRPM | HEIGHT: 63 IN | DIASTOLIC BLOOD PRESSURE: 88 MMHG | BODY MASS INDEX: 26.13 KG/M2 | HEART RATE: 65 BPM | SYSTOLIC BLOOD PRESSURE: 165 MMHG | TEMPERATURE: 97.3 F

## 2024-03-21 DIAGNOSIS — C34.92 NSCLC OF LEFT LUNG: Primary | ICD-10-CM

## 2024-03-21 DIAGNOSIS — R94.8 ABNORMAL PET SCAN OF MEDIASTINUM: ICD-10-CM

## 2024-03-21 DIAGNOSIS — C34.92 NSCLC OF LEFT LUNG: ICD-10-CM

## 2024-03-21 LAB
BASOPHILS # BLD AUTO: 0.04 10*3/MM3 (ref 0–0.2)
BASOPHILS NFR BLD AUTO: 0.8 % (ref 0–1.5)
DEPRECATED RDW RBC AUTO: 43.3 FL (ref 37–54)
DX PRELIMINARY: NORMAL
EOSINOPHIL # BLD AUTO: 0.12 10*3/MM3 (ref 0–0.4)
EOSINOPHIL NFR BLD AUTO: 2.5 % (ref 0.3–6.2)
ERYTHROCYTE [DISTWIDTH] IN BLOOD BY AUTOMATED COUNT: 12.6 % (ref 12.3–15.4)
HCT VFR BLD AUTO: 36.7 % (ref 34–46.6)
HGB BLD-MCNC: 13.2 G/DL (ref 12–15.9)
IMM GRANULOCYTES # BLD AUTO: 0.02 10*3/MM3 (ref 0–0.05)
IMM GRANULOCYTES NFR BLD AUTO: 0.4 % (ref 0–0.5)
LAB AP CASE REPORT: NORMAL
LAB AP CLINICAL INFORMATION: NORMAL
LAB AP DIAGNOSIS COMMENT: NORMAL
LAB AP SPECIAL STAINS: NORMAL
LYMPHOCYTES # BLD AUTO: 1.87 10*3/MM3 (ref 0.7–3.1)
LYMPHOCYTES NFR BLD AUTO: 38.9 % (ref 19.6–45.3)
MCH RBC QN AUTO: 34 PG (ref 26.6–33)
MCHC RBC AUTO-ENTMCNC: 36 G/DL (ref 31.5–35.7)
MCV RBC AUTO: 94.6 FL (ref 79–97)
MONOCYTES # BLD AUTO: 0.43 10*3/MM3 (ref 0.1–0.9)
MONOCYTES NFR BLD AUTO: 8.9 % (ref 5–12)
NEUTROPHILS NFR BLD AUTO: 2.33 10*3/MM3 (ref 1.7–7)
NEUTROPHILS NFR BLD AUTO: 48.5 % (ref 42.7–76)
NRBC BLD AUTO-RTO: 0 /100 WBC (ref 0–0.2)
PATH REPORT.FINAL DX SPEC: NORMAL
PATH REPORT.GROSS SPEC: NORMAL
PLATELET # BLD AUTO: 74 10*3/MM3 (ref 140–450)
PMV BLD AUTO: 10.7 FL (ref 6–12)
RBC # BLD AUTO: 3.88 10*6/MM3 (ref 3.77–5.28)
WBC NRBC COR # BLD AUTO: 4.81 10*3/MM3 (ref 3.4–10.8)

## 2024-03-21 PROCEDURE — 36415 COLL VENOUS BLD VENIPUNCTURE: CPT

## 2024-03-21 PROCEDURE — 85025 COMPLETE CBC W/AUTO DIFF WBC: CPT

## 2024-03-25 ENCOUNTER — TELEPHONE (OUTPATIENT)
Dept: PSYCHIATRY | Facility: HOSPITAL | Age: 79
End: 2024-03-25
Payer: MEDICARE

## 2024-03-25 DIAGNOSIS — C34.92 NSCLC OF LEFT LUNG: Primary | ICD-10-CM

## 2024-03-25 NOTE — TELEPHONE ENCOUNTER
Oncology Social Work    Referral received to see for supportive therapy.  OSW called and spoke to patient's daughter, Kaylie.  Explained supportive therapy for her mother and what it would entail.  Kaylie to call OSW back after their appt with Radiation Oncology.  Phone # provided.  Augustina Sidhu, BALDOMEROW, LCSW

## 2024-03-26 ENCOUNTER — HOSPITAL ENCOUNTER (OUTPATIENT)
Dept: RADIATION ONCOLOGY | Facility: HOSPITAL | Age: 79
Setting detail: RADIATION/ONCOLOGY SERIES
End: 2024-03-26
Payer: MEDICARE

## 2024-03-26 PROCEDURE — 77263 THER RADIOLOGY TX PLNG CPLX: CPT | Performed by: STUDENT IN AN ORGANIZED HEALTH CARE EDUCATION/TRAINING PROGRAM

## 2024-03-26 PROCEDURE — 77334 RADIATION TREATMENT AID(S): CPT | Performed by: STUDENT IN AN ORGANIZED HEALTH CARE EDUCATION/TRAINING PROGRAM

## 2024-03-28 ENCOUNTER — HOSPITAL ENCOUNTER (OUTPATIENT)
Dept: ULTRASOUND IMAGING | Facility: HOSPITAL | Age: 79
End: 2024-03-28
Payer: MEDICARE

## 2024-04-01 ENCOUNTER — HOSPITAL ENCOUNTER (OUTPATIENT)
Dept: MRI IMAGING | Facility: HOSPITAL | Age: 79
Discharge: HOME | End: 2024-04-01
Admitting: INTERNAL MEDICINE
Payer: MEDICARE

## 2024-04-01 ENCOUNTER — HOSPITAL ENCOUNTER (OUTPATIENT)
Dept: RADIATION ONCOLOGY | Facility: HOSPITAL | Age: 79
Setting detail: RADIATION/ONCOLOGY SERIES
End: 2024-04-01
Payer: MEDICARE

## 2024-04-01 DIAGNOSIS — C34.92 NSCLC OF LEFT LUNG: ICD-10-CM

## 2024-04-01 LAB
CREAT BLDA-MCNC: 0.8 MG/DL (ref 0.6–1.3)
REF LAB TEST METHOD: NORMAL

## 2024-04-01 PROCEDURE — A9577 INJ MULTIHANCE: HCPCS | Performed by: INTERNAL MEDICINE

## 2024-04-01 PROCEDURE — 82565 ASSAY OF CREATININE: CPT

## 2024-04-01 PROCEDURE — 0 GADOBENATE DIMEGLUMINE 529 MG/ML SOLUTION: Performed by: INTERNAL MEDICINE

## 2024-04-01 PROCEDURE — 70553 MRI BRAIN STEM W/O & W/DYE: CPT

## 2024-04-01 RX ADMIN — GADOBENATE DIMEGLUMINE 13 ML: 529 INJECTION, SOLUTION INTRAVENOUS at 15:23

## 2024-04-02 PROCEDURE — 77338 DESIGN MLC DEVICE FOR IMRT: CPT | Performed by: STUDENT IN AN ORGANIZED HEALTH CARE EDUCATION/TRAINING PROGRAM

## 2024-04-02 PROCEDURE — 77301 RADIOTHERAPY DOSE PLAN IMRT: CPT | Performed by: STUDENT IN AN ORGANIZED HEALTH CARE EDUCATION/TRAINING PROGRAM

## 2024-04-02 PROCEDURE — 77300 RADIATION THERAPY DOSE PLAN: CPT | Performed by: STUDENT IN AN ORGANIZED HEALTH CARE EDUCATION/TRAINING PROGRAM

## 2024-04-02 PROCEDURE — 77293 RESPIRATOR MOTION MGMT SIMUL: CPT | Performed by: STUDENT IN AN ORGANIZED HEALTH CARE EDUCATION/TRAINING PROGRAM

## 2024-04-04 ENCOUNTER — HOSPITAL ENCOUNTER (OUTPATIENT)
Dept: RADIATION ONCOLOGY | Facility: HOSPITAL | Age: 79
Discharge: HOME | End: 2024-04-04

## 2024-04-04 ENCOUNTER — PATIENT OUTREACH (OUTPATIENT)
Dept: OTHER | Facility: HOSPITAL | Age: 79
End: 2024-04-04
Payer: MEDICARE

## 2024-04-04 LAB
RAD ONC ARIA COURSE ID: NORMAL
RAD ONC ARIA COURSE INTENT: NORMAL
RAD ONC ARIA COURSE LAST TREATMENT DATE: NORMAL
RAD ONC ARIA COURSE START DATE: NORMAL
RAD ONC ARIA COURSE TREATMENT ELAPSED DAYS: 0
RAD ONC ARIA FIRST TREATMENT DATE: NORMAL
RAD ONC ARIA PLAN FRACTIONS TREATED TO DATE: 1
RAD ONC ARIA PLAN FRACTIONS TREATED TO DATE: 1
RAD ONC ARIA PLAN ID: NORMAL
RAD ONC ARIA PLAN ID: NORMAL
RAD ONC ARIA PLAN PRESCRIBED DOSE PER FRACTION: 10 GY
RAD ONC ARIA PLAN PRESCRIBED DOSE PER FRACTION: 8 GY
RAD ONC ARIA PLAN PRIMARY REFERENCE POINT: NORMAL
RAD ONC ARIA PLAN PRIMARY REFERENCE POINT: NORMAL
RAD ONC ARIA PLAN TOTAL FRACTIONS PRESCRIBED: 5
RAD ONC ARIA PLAN TOTAL FRACTIONS PRESCRIBED: 5
RAD ONC ARIA PLAN TOTAL PRESCRIBED DOSE: 4000 CGY
RAD ONC ARIA PLAN TOTAL PRESCRIBED DOSE: 5000 CGY
RAD ONC ARIA REFERENCE POINT DOSAGE GIVEN TO DATE: 10 GY
RAD ONC ARIA REFERENCE POINT DOSAGE GIVEN TO DATE: 8 GY
RAD ONC ARIA REFERENCE POINT ID: NORMAL
RAD ONC ARIA REFERENCE POINT ID: NORMAL
RAD ONC ARIA REFERENCE POINT SESSION DOSAGE GIVEN: 10 GY
RAD ONC ARIA REFERENCE POINT SESSION DOSAGE GIVEN: 8 GY

## 2024-04-04 PROCEDURE — 77373 STRTCTC BDY RAD THER TX DLVR: CPT | Performed by: STUDENT IN AN ORGANIZED HEALTH CARE EDUCATION/TRAINING PROGRAM

## 2024-04-04 PROCEDURE — 77435 SBRT MANAGEMENT: CPT | Performed by: STUDENT IN AN ORGANIZED HEALTH CARE EDUCATION/TRAINING PROGRAM

## 2024-04-04 NOTE — PROGRESS NOTES
Reviewed chart    Met patient and daughter in radiation department today.  She states she is doing fairly well.  The positioning required for treatment today caused some arm discomfort, otherwise, she denies any issues at this time.  We discussed common side effects to treatment and usual onset of those symptoms.  Patient verbalized understanding.    The patient inquired about her recent MRI results.  Explained her providers will discuss with her at upcoming appt. She is scheduled for f/u appt with Dr. Mann 4/11.    The patient denies any questions, concerns or ongoing resource needs. The patient plans to reschedule the OAFA that she missed earlier this week.    I will follow up in several weeks; encouraged patient to call as needed.

## 2024-04-05 ENCOUNTER — HOSPITAL ENCOUNTER (OUTPATIENT)
Dept: RADIATION ONCOLOGY | Facility: HOSPITAL | Age: 79
Discharge: HOME | End: 2024-04-05

## 2024-04-05 LAB

## 2024-04-05 PROCEDURE — 77373 STRTCTC BDY RAD THER TX DLVR: CPT | Performed by: STUDENT IN AN ORGANIZED HEALTH CARE EDUCATION/TRAINING PROGRAM

## 2024-04-08 ENCOUNTER — RADIATION ONCOLOGY WEEKLY ASSESSMENT (OUTPATIENT)
Dept: RADIATION ONCOLOGY | Facility: HOSPITAL | Age: 79
End: 2024-04-08
Payer: MEDICARE

## 2024-04-08 ENCOUNTER — HOSPITAL ENCOUNTER (OUTPATIENT)
Dept: RADIATION ONCOLOGY | Facility: HOSPITAL | Age: 79
Discharge: HOME | End: 2024-04-08
Payer: MEDICARE

## 2024-04-08 VITALS
BODY MASS INDEX: 26.26 KG/M2 | HEART RATE: 63 BPM | DIASTOLIC BLOOD PRESSURE: 96 MMHG | WEIGHT: 148.2 LBS | SYSTOLIC BLOOD PRESSURE: 179 MMHG | OXYGEN SATURATION: 96 %

## 2024-04-08 DIAGNOSIS — C34.92 NSCLC OF LEFT LUNG: Primary | ICD-10-CM

## 2024-04-08 LAB
RAD ONC ARIA COURSE ID: NORMAL
RAD ONC ARIA COURSE INTENT: NORMAL
RAD ONC ARIA COURSE LAST TREATMENT DATE: NORMAL
RAD ONC ARIA COURSE START DATE: NORMAL
RAD ONC ARIA COURSE TREATMENT ELAPSED DAYS: 4
RAD ONC ARIA FIRST TREATMENT DATE: NORMAL
RAD ONC ARIA PLAN FRACTIONS TREATED TO DATE: 3
RAD ONC ARIA PLAN FRACTIONS TREATED TO DATE: 3
RAD ONC ARIA PLAN ID: NORMAL
RAD ONC ARIA PLAN ID: NORMAL
RAD ONC ARIA PLAN PRESCRIBED DOSE PER FRACTION: 10 GY
RAD ONC ARIA PLAN PRESCRIBED DOSE PER FRACTION: 8 GY
RAD ONC ARIA PLAN PRIMARY REFERENCE POINT: NORMAL
RAD ONC ARIA PLAN PRIMARY REFERENCE POINT: NORMAL
RAD ONC ARIA PLAN TOTAL FRACTIONS PRESCRIBED: 5
RAD ONC ARIA PLAN TOTAL FRACTIONS PRESCRIBED: 5
RAD ONC ARIA PLAN TOTAL PRESCRIBED DOSE: 4000 CGY
RAD ONC ARIA PLAN TOTAL PRESCRIBED DOSE: 5000 CGY
RAD ONC ARIA REFERENCE POINT DOSAGE GIVEN TO DATE: 24 GY
RAD ONC ARIA REFERENCE POINT DOSAGE GIVEN TO DATE: 30 GY
RAD ONC ARIA REFERENCE POINT ID: NORMAL
RAD ONC ARIA REFERENCE POINT ID: NORMAL
RAD ONC ARIA REFERENCE POINT SESSION DOSAGE GIVEN: 10 GY
RAD ONC ARIA REFERENCE POINT SESSION DOSAGE GIVEN: 8 GY

## 2024-04-08 PROCEDURE — 77336 RADIATION PHYSICS CONSULT: CPT | Performed by: STUDENT IN AN ORGANIZED HEALTH CARE EDUCATION/TRAINING PROGRAM

## 2024-04-08 PROCEDURE — 77373 STRTCTC BDY RAD THER TX DLVR: CPT | Performed by: STUDENT IN AN ORGANIZED HEALTH CARE EDUCATION/TRAINING PROGRAM

## 2024-04-08 NOTE — PROGRESS NOTES
Radiation Oncology  On-Treatment Note      Patient: Kenia BRIAN Sample    MRN: [id number]    Attending Physician: Fernando Hopkins MD     Diagnosis:     ICD-10-CM ICD-9-CM   1. NSCLC of left lung  C34.92 162.9       Radiation Therapy Visit:  Continue radiation therapy, Dosimetry plan remains acceptable, Films reviewed and remains acceptable, Pain assessed, Pain management planned, Radiation dose schedule reviewed and remains acceptable, Radiation technique remains acceptable, and Symptoms within expected range    Radiation Treatments       Active   Plans   LLL-SBRT   Most recent treatment: Dose planned: 1,000 cGy (fraction 3 on 4/8/2024)   Total: Dose planned: 5,000 cGy (5 fractions)   Elapsed Days: 4      RNeck LN-SBRT   Most recent treatment: Dose planned: 800 cGy (fraction 3 on 4/8/2024)   Total: Dose planned: 4,000 cGy (5 fractions)   Elapsed Days: 4      Reference Points   Rx: SBRT LLL   Most recent treatment: Dose given: 1,000 cGy (on 4/8/2024)   Total: Dose given: 3,000 cGy   Elapsed Days: 4      Rx: SBRT Neck LN   Most recent treatment: Dose given: 800 cGy (on 4/8/2024)   Total: Dose given: 2,400 cGy   Elapsed Days: 4                      Physical Examination:  Vitals: Blood pressure 179/96, pulse 63, weight 67.2 kg (148 lb 3.2 oz), SpO2 96%.  Pain Score    04/08/24 1028   PainSc:   7   PainLoc: Back       Ambulatory and capable of all selfcare but unable to carry out any work activities; up and about more than 50% of waking hours = 2    We examined the relevant areas: yes  Findings are within the expected range for this stage of treatment: yes  -------------------------------------------------------------------------------------------------------------------    ACTION ITEMS:  Patient tolerating treatment well and as expected for this stage in their treatment and Continue radiation therapy as planned    Estimated Completion Date: 4/10/2024    Follow up in 3 months      Fernando Hopkins MD  Radiation Oncology

## 2024-04-09 ENCOUNTER — HOSPITAL ENCOUNTER (OUTPATIENT)
Dept: RADIATION ONCOLOGY | Facility: HOSPITAL | Age: 79
Discharge: HOME | End: 2024-04-09

## 2024-04-09 LAB
RAD ONC ARIA COURSE ID: NORMAL
RAD ONC ARIA COURSE INTENT: NORMAL
RAD ONC ARIA COURSE LAST TREATMENT DATE: NORMAL
RAD ONC ARIA COURSE START DATE: NORMAL
RAD ONC ARIA COURSE TREATMENT ELAPSED DAYS: 5
RAD ONC ARIA FIRST TREATMENT DATE: NORMAL
RAD ONC ARIA PLAN FRACTIONS TREATED TO DATE: 4
RAD ONC ARIA PLAN FRACTIONS TREATED TO DATE: 4
RAD ONC ARIA PLAN ID: NORMAL
RAD ONC ARIA PLAN ID: NORMAL
RAD ONC ARIA PLAN PRESCRIBED DOSE PER FRACTION: 10 GY
RAD ONC ARIA PLAN PRESCRIBED DOSE PER FRACTION: 8 GY
RAD ONC ARIA PLAN PRIMARY REFERENCE POINT: NORMAL
RAD ONC ARIA PLAN PRIMARY REFERENCE POINT: NORMAL
RAD ONC ARIA PLAN TOTAL FRACTIONS PRESCRIBED: 5
RAD ONC ARIA PLAN TOTAL FRACTIONS PRESCRIBED: 5
RAD ONC ARIA PLAN TOTAL PRESCRIBED DOSE: 4000 CGY
RAD ONC ARIA PLAN TOTAL PRESCRIBED DOSE: 5000 CGY
RAD ONC ARIA REFERENCE POINT DOSAGE GIVEN TO DATE: 32 GY
RAD ONC ARIA REFERENCE POINT DOSAGE GIVEN TO DATE: 40 GY
RAD ONC ARIA REFERENCE POINT ID: NORMAL
RAD ONC ARIA REFERENCE POINT ID: NORMAL
RAD ONC ARIA REFERENCE POINT SESSION DOSAGE GIVEN: 10 GY
RAD ONC ARIA REFERENCE POINT SESSION DOSAGE GIVEN: 8 GY

## 2024-04-09 PROCEDURE — 77373 STRTCTC BDY RAD THER TX DLVR: CPT | Performed by: STUDENT IN AN ORGANIZED HEALTH CARE EDUCATION/TRAINING PROGRAM

## 2024-04-10 ENCOUNTER — TREATMENT (OUTPATIENT)
Dept: RADIATION ONCOLOGY | Facility: HOSPITAL | Age: 79
End: 2024-04-10
Payer: MEDICARE

## 2024-04-10 ENCOUNTER — HOSPITAL ENCOUNTER (OUTPATIENT)
Dept: RADIATION ONCOLOGY | Facility: HOSPITAL | Age: 79
Discharge: HOME | End: 2024-04-10

## 2024-04-10 DIAGNOSIS — C34.92 NSCLC OF LEFT LUNG: Primary | ICD-10-CM

## 2024-04-10 LAB
RAD ONC ARIA COURSE ID: NORMAL
RAD ONC ARIA COURSE INTENT: NORMAL
RAD ONC ARIA COURSE LAST TREATMENT DATE: NORMAL
RAD ONC ARIA COURSE START DATE: NORMAL
RAD ONC ARIA COURSE TREATMENT ELAPSED DAYS: 6
RAD ONC ARIA FIRST TREATMENT DATE: NORMAL
RAD ONC ARIA PLAN FRACTIONS TREATED TO DATE: 5
RAD ONC ARIA PLAN FRACTIONS TREATED TO DATE: 5
RAD ONC ARIA PLAN ID: NORMAL
RAD ONC ARIA PLAN ID: NORMAL
RAD ONC ARIA PLAN PRESCRIBED DOSE PER FRACTION: 10 GY
RAD ONC ARIA PLAN PRESCRIBED DOSE PER FRACTION: 8 GY
RAD ONC ARIA PLAN PRIMARY REFERENCE POINT: NORMAL
RAD ONC ARIA PLAN PRIMARY REFERENCE POINT: NORMAL
RAD ONC ARIA PLAN TOTAL FRACTIONS PRESCRIBED: 5
RAD ONC ARIA PLAN TOTAL FRACTIONS PRESCRIBED: 5
RAD ONC ARIA PLAN TOTAL PRESCRIBED DOSE: 4000 CGY
RAD ONC ARIA PLAN TOTAL PRESCRIBED DOSE: 5000 CGY
RAD ONC ARIA REFERENCE POINT DOSAGE GIVEN TO DATE: 40 GY
RAD ONC ARIA REFERENCE POINT DOSAGE GIVEN TO DATE: 50 GY
RAD ONC ARIA REFERENCE POINT ID: NORMAL
RAD ONC ARIA REFERENCE POINT ID: NORMAL
RAD ONC ARIA REFERENCE POINT SESSION DOSAGE GIVEN: 10 GY
RAD ONC ARIA REFERENCE POINT SESSION DOSAGE GIVEN: 8 GY

## 2024-04-10 PROCEDURE — 77373 STRTCTC BDY RAD THER TX DLVR: CPT | Performed by: STUDENT IN AN ORGANIZED HEALTH CARE EDUCATION/TRAINING PROGRAM

## 2024-04-10 NOTE — PROGRESS NOTES
REASON FOR FOLLOW-UP: Left lower lobe lung mass-squamous of carcinoma                               History of Present Illness     The patient is an 79-year-old female with below medical history recently admitted 12/4 - 6/20/2023 with a history of right intertrochanteric femoral fracture and osteoarthritis presenting from a fall at home.  She had a stress fracture left foot that affected her balance, chronic low back pain that is also worsened with a fall.  Upon presentation orthopedic surgery was consulted finding a right intramedullary nail fixation and healing without abnormal findings.  No additional surgery was planned though physical therapy continued.  Spinal films demonstrated compression deformity T12 and L2 but fortunately she did further improve and was able to be discharged.    The patient had follow-up with several physicians including orthopedic physicians with an eventual chest exam that revealed an unexpected nodularity.  She was then referred to pulmonary medicine 1/8/2024 with a history of smoking for 35 years quitting 8 years ago and indication that there was a lung nodule found incidentally on imaging for the spine.  Through the [facility] system a CT of the chest demonstrated a 2.0 x 1.9 cm noncalcified nodule involving the posterior medial aspect left lower lobe abutting the pleura, more smoothly marginated 0.9 cm noncalcified right middle lobe nodule and a few additional smaller noncalcified nodules including 2 adjacent nodules measuring less than 0.4 cm the right middle lobe along with mild emphysema with mild to moderate subpleural and bibasilar scarring.  There is no pleural effusion or pneumothorax.    A PET/CT was obtained 2/6/2024 with a solid subpleural irregular nodule left lower lobe medially measuring 1.7 x 1.8 intensely hypermetabolic with SUV of 12.8, no additional nodular mass, had metabolic right superior mediastinal lymph node lateral measuring 0.9 cm to 3.9, diffuse lower  lobe tracer uptake with mediastinal right hilar lymph nodes partially calcified thought to be inflammatory or granulomatous disease.  There is also low-grade tracer uptake associated subacute healing left anterior rib fractures.    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    The patient was referred urgently from [facility] for assessment.  It is not certain why she has not been seen in the [facility] system though she now states that she wished to be seen here.     Patient's case discussed during thoracic conference with the finding of a right supraclavicular node.  This was unknown to us when seen her initially in consultation.  Request made to obtain ultrasound-guided biopsy of the right supraclavicular node and this is discussed with the patient's daughter who indicates the patient would be willing to proceed.  We will go and have this scheduled even though she is to be seen within the next week and the result may not yet be available.    3/6/2023 FNA obtained, discussed with IR physician Dr. Courtney with findings of a few atypical clusters present suspicious for involvement by non-small cell carcinoma.  He is offered to rebiopsy quickly if necessary.    The patient is seen 3/13/2024 with rebiopsy requested as well as radiation therapy consultation.  The case is discussed with interventional radiology and a repeat biopsy is possible.  The patient is willing to proceed in this fashion and we have also discussed radiation therapy consultation in the interval.  She continues have symptoms of chronic back pain and left lid lesion that is going to be reviewed by ophthalmology.    The patient went on to have a follow-up exam with repeat right supraclavicular lymph node biopsy 3/18/2024.  This was found positive for metastatic carcinoma.  The patient was seen by Dr. Hopkins with the conclusion  that she has a 2 cm left lower lobe squamous cell carcinoma abutting the pleura additional contralateral supraclavicular lymph node in the thyroid.  This is to be discussed in conference 3/21/2024.    This was discussed 3/21/2024 recognizing the patient's frailty.  Options for treating the areas that are known positive including the left lower lobe and right supraclavicular node would include radiation therapy and Dr. Hopkins will see the patient concerning this as we also follow her for consideration of systemic therapy including a [id number] considering the genomic studies done thus far and the inability to obtain additional genomics from the scant tissue that we have even from recent biopsy.  Finally the patient's overall status including an older adult assessment is going to be requested.    The patient's [id number] revealed NF1  detected with possible indication of Selumetinib effectiveness.  Additionally TMB was 10.4 mutations per megabase.  Concurrently the patient was seen by radiation therapy treating the left lower lobe and right neck site via SBRT.  Further MRI of the brain revealed no evidence of metastatic disease.    She is seen back 4/11/2024.  She had been seen 4/8/2024 with SBRT to left lower lobe and right neck lesion.  This has not been completed and she is to follow-up with radiation therapy mid July.  We have discussed her findings today with the patient seen along with her granddaughter.  She did well with radiation therapy and has no additional symptoms currently respiratorily.  Her genomic testing suggested possible use of Selumetinib (not currently available) though we will follow this as she is subsequently reevaluated.       Past Surgical History:   Procedure Laterality Date    APPENDECTOMY      CARDIAC CATHETERIZATION      EXTERNAL EAR SURGERY Right     had tumor excised behind right ear lobe benign    FEMUR IM NAILING/RODDING Right 06/15/2022    Procedure: RIGHT HIP INTRAMEDULLARY  NAILING/RODDING;  Surgeon: Marc Crawford MD;  Location: [facility] MAIN OR;  Service: Orthopedics;  Laterality: Right;    GALLBLADDER SURGERY      HYSTERECTOMY      KNEE ARTHROPLASTY Right     KNEE ARTHROSCOPY Right     NJ REVJ TOTAL KNEE ARTHRP W/WO ALGRFT 1 COMPONENT Right 09/07/2016    Procedure: RT ILIOTIBIAL BAND RELEASE RT TOTAL KNEE POLY EXCHANGE;  Surgeon: Toy Adame MD;  Location: [facility] OR;  Service: Orthopedics    ROTATOR CUFF REPAIR Bilateral     US GUIDED LYMPH NODE BIOPSY  3/18/2024    WRIST MASS EXCISION Right         Current Outpatient Medications on File Prior to Visit   Medication Sig Dispense Refill    acetaminophen (TYLENOL) 325 MG tablet Take 2 tablets by mouth Every 4 (Four) Hours As Needed for Mild Pain .      albuterol (PROVENTIL HFA;VENTOLIN HFA) 108 (90 BASE) MCG/ACT inhaler Inhale 2 puffs Every 4 (Four) Hours As Needed. Indications: Asthma      allopurinol (ZYLOPRIM) 100 MG tablet Take 1 tablet by mouth Daily.      bisacodyl (DULCOLAX) 5 MG EC tablet Take 1 tablet by mouth Daily As Needed for Constipation (Use if polyethylene glycol is ineffective).      fluticasone (FLONASE) 50 MCG/ACT nasal spray 2 sprays into the nostril(s) as directed by provider Daily.      furosemide (LASIX) 20 MG tablet Take 1 tablet by mouth 2 (Two) Times a Day. Indications: Edema      Magnesium 500 MG capsule Take 500 mg by mouth 2 (Two) Times a Day. Indications: Acid Indigestion      metoprolol tartrate (LOPRESSOR) 25 MG tablet Take 1 tablet by mouth 2 (Two) Times a Day. Indications: High Blood Pressure Disorder      naloxone (NARCAN) 4 MG/0.1ML nasal spray Call 911. Don't prime. [place] in 1 nostril for overdose. Repeat in 2-3 minutes in other nostril if no or minimal breathing/responsiveness. 2 each 0    omeprazole (PriLOSEC) 40 MG capsule Take 1 capsule by mouth Every Morning. Indications: Heartburn      polyethylene glycol (MIRALAX) 17 g packet Take 17 g by mouth Daily As Needed (Use if senna-docusate is  ineffective).      potassium chloride (K-DUR,KLOR-CON) 20 MEQ CR tablet Take 1 tablet by mouth 2 (Two) Times a Day.      pravastatin (PRAVACHOL) 20 MG tablet Take 1 tablet by mouth Every Morning.      rOPINIRole (REQUIP) 0.25 MG tablet Take 1 tablet by mouth Every Night. Take 1 hour before bedtime.  Indications: Restless Leg Syndrome      sennosides-docusate sodium (SENOKOT-S) 8.6-50 MG tablet Take 2 tablets by mouth 2 (two) times a day. 100 tablet 1    temazepam (RESTORIL) 15 MG capsule Take 1 capsule by mouth At Night As Needed for Sleep.      vitamin B-12 (CYANOCOBALAMIN) 1000 MCG tablet Take 1 tablet by mouth Daily. Indications: Inadequate Vitamin B12      vitamin D (ERGOCALCIFEROL) 1.25 MG (39434 UT) capsule capsule Take 1 capsule by mouth Every 7 (Seven) Days. 5 capsule 0     No current facility-administered medications on file prior to visit.        ALLERGIES:    Allergies   Allergen Reactions    Levaquin [Levofloxacin] Hives    Zocor [Simvastatin] Myalgia    Atorvastatin Other (See Comments)    Codeine GI Intolerance    Penicillins Hives and Swelling     Pt reports tongue swelling    Percocet [Oxycodone-Acetaminophen] Itching and Nausea And Vomiting        Social History     Socioeconomic History    Marital status:      Spouse name: Nabil   Tobacco Use    Smoking status: Former     Current packs/day: 1.00     Average packs/day: 1 pack/day for 30.0 years (30.0 ttl pk-yrs)     Types: Cigarettes    Smokeless tobacco: Never   Vaping Use    Vaping status: Never Used   Substance and Sexual Activity    Alcohol use: Yes     Comment: 1 wine drink q 3-4 months    Drug use: No        Family History   Problem Relation Age of Onset    Hypertension Mother     Heart failure Mother     Heart attack Maternal Grandfather         Review of Systems   Constitutional:  Positive for activity change. Negative for appetite change, fatigue and unexpected weight change.   HENT: Negative.     Eyes: Negative.    Respiratory:   "Positive for shortness of breath (Chronic).    Cardiovascular:  Negative for chest pain.   Gastrointestinal: Negative.    Endocrine: Negative.    Genitourinary: Negative.    Musculoskeletal:  Positive for back pain (Chronic back pain).   Neurological: Negative.    Psychiatric/Behavioral: Negative.          Objective     Vitals:    04/11/24 1002   BP: 178/82   Pulse: 69   Resp: 18   Temp: 98.7 °F (37.1 °C)   TempSrc: Temporal   SpO2: 94%   Weight: 66.8 kg (147 lb 3.2 oz)   Height: 160 cm (62.99\")   PainSc: 0-No pain               4/11/2024    10:02 AM   Current Status   ECOG score 1       Physical Exam  Constitutional:       Appearance: Normal appearance.   HENT:      Head: Normocephalic and atraumatic.      Mouth/Throat:      Mouth: Mucous membranes are moist.      Pharynx: Oropharynx is clear.   Eyes:      Extraocular Movements: Extraocular movements intact.      Conjunctiva/sclera: Conjunctivae normal.      Pupils: Pupils are equal, round, and reactive to light.      Comments: Left lid nodular lesion   Cardiovascular:      Rate and Rhythm: Normal rate and regular rhythm.      Pulses: Normal pulses.      Heart sounds: Normal heart sounds.   Pulmonary:      Effort: Pulmonary effort is normal.      Breath sounds: Normal breath sounds.   Abdominal:      General: Bowel sounds are normal.      Palpations: Abdomen is soft.   Musculoskeletal:         General: Normal range of motion.      Cervical back: Normal range of motion and neck supple.   Skin:     General: Skin is warm and dry.   Neurological:      General: No focal deficit present.      Mental Status: She is alert and oriented to person, place, and time.   Psychiatric:         Mood and Affect: Mood normal.         Behavior: Behavior normal.           RECENT LABS:  Hematology WBC   Date Value Ref Range Status   04/11/2024 4.53 3.40 - 10.80 10*3/mm3 Final   02/06/2024 4.15 (L) 4.5 - 11.0 10*3/uL Final     RBC   Date Value Ref Range Status   04/11/2024 3.90 3.77 - " 5.28 10*6/mm3 Final   02/06/2024 3.70 (L) 4.0 - 5.2 10*6/uL Final     Hemoglobin   Date Value Ref Range Status   04/11/2024 12.9 12.0 - 15.9 g/dL Final   02/06/2024 12.4 12.0 - 16.0 g/dL Final     Hematocrit   Date Value Ref Range Status   04/11/2024 37.7 34.0 - 46.6 % Final   02/06/2024 36.6 36.0 - 46.0 % Final     Platelets   Date Value Ref Range Status   04/11/2024 72 (L) 140 - 450 10*3/mm3 Final   02/06/2024 77 (L) 140 - 440 10*3/uL Final          Assessment & Plan      79 year-old female with a orthopedic history as described in particular involving right hip, bilateral knees and bilateral rotator cuffs recently falling and sustaining a contusion involving her right hip and evidence of compression deformities T12 and L2.  Upon discharge she was seen by orthopedics and, unexpectedly, on additional back studies a left lung nodule was determined.    This was reviewed by pulmonary medicine at [facility] leading to a  a CT of the chest which demonstrated a 2.0 x 1.9 cm noncalcified nodule involving the posterior medial aspect left lower lobe abutting the pleura, more smoothly marginated 0.9 cm noncalcified right middle lobe nodule and a few additional smaller noncalcified nodules including 2 adjacent nodules measuring less than 0.4 cm the right middle lobe along with mild emphysema with mild to moderate subpleural and bibasilar scarring.  There is no pleural effusion or pneumothorax.    A PET/CT was obtained 2/6/2024 with a solid subpleural irregular nodule left lower lobe medially measuring 1.7 x 1.8 intensely hypermetabolic with SUV of 12.8, no additional nodular mass, had metabolic right superior mediastinal lymph node lateral measuring 0.9 cm to 3.9, diffuse lower lobe tracer uptake with mediastinal right hilar lymph nodes partially calcified thought to be inflammatory or granulomatous disease.  There is also low-grade tracer uptake associated subacute healing left anterior rib fractures.    CT-guided biopsy  obtained 2/6/2024 revealed squamous cell carcinoma, TPS score 15%, EGFR mutation not detected, ALK rearrangement not detected, ROS1 rearrangement not detected.    The patient is now seen in CBC office.  There has not been assessment by surgery or, apparently, by pulmonary medicine formally though the studies above are available for review.  After discussion we will need to have her presented in the thoracic conference so that opinions from radiology, thoracic surgery radiation therapy as well as pathology can be obtained.    Patient's case discussed during thoracic conference with the finding of a right supraclavicular node.  This was unknown to us when seen her initially in consultation.  Request made to obtain ultrasound-guided biopsy of the right supraclavicular node and this is discussed with the patient's daughter who indicates the patient would be willing to proceed.  We will go and have this scheduled even though she is to be seen within the next week and the result may not yet be available.    3/6/2023 FNA obtained, discussed with IR physician Dr. Courtney with findings of a few atypical clusters present suspicious for involvement by non-small cell carcinoma.  He is offered to rebiopsy quickly if necessary.    The patient is seen 3/13/2024 with rebiopsy requested as well as radiation therapy consultation.  The case is discussed with interventional radiology and a repeat biopsy is possible.  The patient is willing to proceed in this fashion and we have also discussed radiation therapy consultation in the interval.  She continues have symptoms of chronic back pain and left lid lesion that is going to be reviewed by ophthalmology.    This was discussed 3/21/2024 recognizing the patient's frailty.  Options for treating the areas that are known positive including the left lower lobe and right supraclavicular node would include radiation therapy and Dr. Hopkins will see the patient concerning this as we also follow her  for consideration of systemic therapy including a [id number] considering the genomic studies done thus far and the inability to obtain additional genomics from the scant tissue that we have even from recent biopsy.  Finally the patient's overall status including an older adult assessment is going to be requested.    She is seen back 4/11/2024.  She had been seen 4/8/2024 with SBRT to left lower lobe and right neck lesion.  This has not been completed and she is to follow-up with radiation therapy mid July.  We have discussed her findings today with the patient seen along with her granddaughter.  She did well with radiation therapy and has no additional symptoms currently respiratorily.    Her genomic testing suggested possible use of Selumetinib (not currently available) though we will follow this as she is subsequently reevaluated.         Plan:  *.  Follow-up CT neck, chest, abdomen, pelvis in early July 2024  *MD follow-up 7/12/2024-XRT and medical oncology.          I

## 2024-04-11 ENCOUNTER — LAB (OUTPATIENT)
Dept: LAB | Facility: HOSPITAL | Age: 79
End: 2024-04-11
Payer: MEDICARE

## 2024-04-11 ENCOUNTER — OFFICE VISIT (OUTPATIENT)
Dept: ONCOLOGY | Facility: CLINIC | Age: 79
End: 2024-04-11
Payer: MEDICARE

## 2024-04-11 VITALS
TEMPERATURE: 98.7 F | RESPIRATION RATE: 18 BRPM | BODY MASS INDEX: 26.08 KG/M2 | DIASTOLIC BLOOD PRESSURE: 82 MMHG | WEIGHT: 147.2 LBS | HEART RATE: 69 BPM | HEIGHT: 63 IN | SYSTOLIC BLOOD PRESSURE: 178 MMHG | OXYGEN SATURATION: 94 %

## 2024-04-11 DIAGNOSIS — C34.92 NSCLC OF LEFT LUNG: Primary | ICD-10-CM

## 2024-04-11 DIAGNOSIS — C34.92 NSCLC OF LEFT LUNG: ICD-10-CM

## 2024-04-11 LAB
BASOPHILS # BLD AUTO: 0.03 10*3/MM3 (ref 0–0.2)
BASOPHILS NFR BLD AUTO: 0.7 % (ref 0–1.5)
DEPRECATED RDW RBC AUTO: 44.7 FL (ref 37–54)
EOSINOPHIL # BLD AUTO: 0.08 10*3/MM3 (ref 0–0.4)
EOSINOPHIL NFR BLD AUTO: 1.8 % (ref 0.3–6.2)
ERYTHROCYTE [DISTWIDTH] IN BLOOD BY AUTOMATED COUNT: 12.5 % (ref 12.3–15.4)
HCT VFR BLD AUTO: 37.7 % (ref 34–46.6)
HGB BLD-MCNC: 12.9 G/DL (ref 12–15.9)
IMM GRANULOCYTES # BLD AUTO: 0.06 10*3/MM3 (ref 0–0.05)
IMM GRANULOCYTES NFR BLD AUTO: 1.3 % (ref 0–0.5)
LYMPHOCYTES # BLD AUTO: 1.12 10*3/MM3 (ref 0.7–3.1)
LYMPHOCYTES NFR BLD AUTO: 24.7 % (ref 19.6–45.3)
MCH RBC QN AUTO: 33.1 PG (ref 26.6–33)
MCHC RBC AUTO-ENTMCNC: 34.2 G/DL (ref 31.5–35.7)
MCV RBC AUTO: 96.7 FL (ref 79–97)
MONOCYTES # BLD AUTO: 0.41 10*3/MM3 (ref 0.1–0.9)
MONOCYTES NFR BLD AUTO: 9.1 % (ref 5–12)
NEUTROPHILS NFR BLD AUTO: 2.83 10*3/MM3 (ref 1.7–7)
NEUTROPHILS NFR BLD AUTO: 62.4 % (ref 42.7–76)
NRBC BLD AUTO-RTO: 0 /100 WBC (ref 0–0.2)
PLATELET # BLD AUTO: 72 10*3/MM3 (ref 140–450)
PMV BLD AUTO: 11 FL (ref 6–12)
RBC # BLD AUTO: 3.9 10*6/MM3 (ref 3.77–5.28)
WBC NRBC COR # BLD AUTO: 4.53 10*3/MM3 (ref 3.4–10.8)

## 2024-04-11 PROCEDURE — 99213 OFFICE O/P EST LOW 20 MIN: CPT | Performed by: INTERNAL MEDICINE

## 2024-04-11 PROCEDURE — 1126F AMNT PAIN NOTED NONE PRSNT: CPT | Performed by: INTERNAL MEDICINE

## 2024-04-11 PROCEDURE — 36415 COLL VENOUS BLD VENIPUNCTURE: CPT

## 2024-04-11 PROCEDURE — 3079F DIAST BP 80-89 MM HG: CPT | Performed by: INTERNAL MEDICINE

## 2024-04-11 PROCEDURE — 85025 COMPLETE CBC W/AUTO DIFF WBC: CPT

## 2024-04-11 PROCEDURE — 3077F SYST BP >= 140 MM HG: CPT | Performed by: INTERNAL MEDICINE

## 2024-04-11 NOTE — PROGRESS NOTES
Radiation Treatment Summary Note      Patient Name: Kenia BRIAN Sample  : 1945    Attending Provider: Fernando Hopkins MD      Diagnosis:     ICD-10-CM ICD-9-CM   1. NSCLC of left lung  C34.92 162.9       Radiation Start Date: 2024    Radiation Completion Date: 4/10/2024      Prescription:     1.  Site: LLL  Laterality: Left  Total Dose: 5000cGy  Dose per Fraction: 1000cGy  Total Fractions: 5  Daily or BID: Daily  Modality: Photon  Technique: SBRT (2-5fx)  Bolus: No    2.   Site: Right SCV Node  Laterality: Right  Total Dose: 4000cGy  Dose per Fraction: 800cGy  Total Fractions: 5  Daily or BID: Daily  Modality: Photon  Technique: SBRT (2-5fx)  Bolus: No        Final Delivered Dose Deviated From Initially Prescribed Dose: No    Concurrent Chemotherapy: No    Patient Tolerated Treatment Without Unexpected Side Effects/Complications: Yes    ECOG: Capable of only limited selfcare; confined to bed or chair more than 50% of waking hours = 3    Pain Management Plan: None Indicated/PRN OTC    Follow-Up Plan: 3 months    Imaging Ordered for Follow-Up: Pending systemic therapy decision with Dr. Mackenzie Hopkins MD

## 2024-04-16 ENCOUNTER — TELEPHONE (OUTPATIENT)
Dept: RADIATION ONCOLOGY | Facility: HOSPITAL | Age: 79
End: 2024-04-16
Payer: MEDICARE

## 2024-04-16 DIAGNOSIS — C34.92 NSCLC OF LEFT LUNG: Primary | ICD-10-CM

## 2024-04-16 LAB
RAD ONC ARIA COURSE END DATE: NORMAL
RAD ONC ARIA COURSE ID: NORMAL
RAD ONC ARIA COURSE INTENT: NORMAL
RAD ONC ARIA COURSE LAST TREATMENT DATE: NORMAL
RAD ONC ARIA COURSE START DATE: NORMAL
RAD ONC ARIA COURSE TREATMENT ELAPSED DAYS: 6
RAD ONC ARIA FIRST TREATMENT DATE: NORMAL
RAD ONC ARIA PLAN FRACTIONS TREATED TO DATE: 5
RAD ONC ARIA PLAN FRACTIONS TREATED TO DATE: 5
RAD ONC ARIA PLAN ID: NORMAL
RAD ONC ARIA PLAN ID: NORMAL
RAD ONC ARIA PLAN NAME: NORMAL
RAD ONC ARIA PLAN NAME: NORMAL
RAD ONC ARIA PLAN PRESCRIBED DOSE PER FRACTION: 10 GY
RAD ONC ARIA PLAN PRESCRIBED DOSE PER FRACTION: 8 GY
RAD ONC ARIA PLAN PRIMARY REFERENCE POINT: NORMAL
RAD ONC ARIA PLAN PRIMARY REFERENCE POINT: NORMAL
RAD ONC ARIA PLAN TOTAL FRACTIONS PRESCRIBED: 5
RAD ONC ARIA PLAN TOTAL FRACTIONS PRESCRIBED: 5
RAD ONC ARIA PLAN TOTAL PRESCRIBED DOSE: 4000 CGY
RAD ONC ARIA PLAN TOTAL PRESCRIBED DOSE: 5000 CGY
RAD ONC ARIA REFERENCE POINT DOSAGE GIVEN TO DATE: 40 GY
RAD ONC ARIA REFERENCE POINT DOSAGE GIVEN TO DATE: 50 GY
RAD ONC ARIA REFERENCE POINT ID: NORMAL
RAD ONC ARIA REFERENCE POINT ID: NORMAL

## 2024-04-16 NOTE — TELEPHONE ENCOUNTER
Pt. Called, stating she was experiencing severe heartburn after her XRT treatment. I Encouraged pt. It is a normal side effect and that I will speak with MD about additional medications to add.     Spoke with MD Ok to order Carafate.

## 2024-04-22 RX ORDER — SUCRALFATE 1 G/1
1 TABLET ORAL 3 TIMES DAILY
Qty: 120 TABLET | Refills: 1 | Status: SHIPPED | OUTPATIENT
Start: 2024-04-22

## 2024-05-01 ENCOUNTER — PATIENT OUTREACH (OUTPATIENT)
Dept: OTHER | Facility: HOSPITAL | Age: 79
End: 2024-05-01
Payer: MEDICARE

## 2024-05-01 NOTE — PROGRESS NOTES
Reviewed chart.  S/p SBRT to left lower lobe and right neck lesion 4/4-4/10/24. Saw Dr. Mann 4/11. Scheduled for scans 7/1, sees Dr. Mann and Dr. Hopkins 7/12     Called patient. Spoke with her daughter. She states the patient tolerated radiation fairly well. She had an episode of reflux last week which resolved. We discussed her recent appt with Dr. Mann and upcoming appts. She has no questions or concerns.     We again discussed integrative therapies and other [facility]. Patient's daughter expressed gratitude for my support and denied any additional needs at this time. I will call after her appts in July; encouraged patient/family to call as needed.

## 2024-05-30 NOTE — PAT
PAT call complete. Education provided to the patient on the following:     - Nothing to eat after midnight the night before your procedure, sip of water only with metoprolol AM DOS. Advised to stop ALL vitamins and supplements.   - You will need to have someone drive you home after your procedure and remain with you for 24 hours after. The  will need to remain on site during your visit.  - Please remove all jewelry, including body piercing's, and leave any valuables at home. Only bring your drivers license and insurance card on day of procedure.  - Do not wear contact lenses; wear glasses and bring your case.  - Wash with antibacterial soap (such as Dial) the night before and morning of procedure.  - Be prepared to provide your last dose of all home medications.  - Coffee and vending available on the 1st and 5th floors; no cafeteria on site.  - You will need to arrive at 0830 on 6/6/24 at [facility] located at [address]. We are located on the 5th floor.  - Please be aware that arrival times may be subject to change up until the day of surgery.   - Feel free to contact us at: [phone] with any additional questions/concerns.

## 2024-05-30 NOTE — DISCHARGE INSTRUCTIONS
POST OPERATIVE INSTRUCTIONS  EYELID SURGERY        Most procedures are performed with local anesthesia with intravenous sedation. While post-operative nausea is rare with this type of anesthesia, it is wise to start your diet by drinking clear liquids after surgery (e.g., 7-Up, Gatorade, ginger ale, etc.).  If clear liquids are tolerated well without nausea or vomiting, you may advance towards a regular diet.  Avoid “fatty foods” (eg, milk, pizza, hamburgers, etc.) on the day of surgery or as long as nausea persists.      Many eyelid procedures only require Extra Strength Tylenol for postoperative pain control.  For those patients with more extensive eyelid reconstruction, a prescription for a pain reliever is often given.  Patients may choose to take the prescribed pain reliever OR Extra Strength Tylenol, BUT NOT both together.  Unless specifically instructed, aspirin products such as Chapo, Bufferin, Anacin, Excedrin, etc., should be avoided for at least one week after surgery.  This also includes Advil, Nuprin, Motrin and Ibuprofen.  Also wait one week to restart vitamins, fish oils, or herbal medications. Any other medications (except blood thinners), which you were taking prior to surgery, should be continued on their regular schedule. If you are on blood thinners, we will call you the day after your surgery to discuss when to restart.     Whenever lying down or sleeping, keep your head elevated on 2 or 3 pillows such that your head is always elevated above the level of your chest.      Apply cold compress to surgical site for 15 min every 1 hour while awake for first 3 days following surgery. Can then decrease frequency to 4-5x daily until followup. A folded washcloth soaked in ice-cold water and wrung out works well for this.  A bag of frozen peas/corn also works well as it is lightweight but molds to the eye.  Do not apply ice directly to the skin. Can also alternate between cold compresses and warm  compresses after the first 3 days.    A small tube of eye ointment should be provided after surgery.  This is to be gently applied to the stitches and/or eye twice daily.  If the eyes feel irritated, the ointment is safe to use in the eye for lubrication.  This will likely result in blurred vision but will go away once the ointment is stopped.    EXCEPTION:  If a patch is taped over the eye, do NOT apply cold compresses or ointment.  Leave the patch undisturbed.  A physician will remove the patch at your first post-operative visit.    If your surgery was done on an outpatient basis, you should receive a phone call the day after surgery to check on your progress and to arrange for a post-operative clinic appointment.  The first post-operative visit will be 1-2 weeks after surgery to check the incisions and remove sutures if necessary.  This appointment may be with Dr. Quesada, who is Dr. Kamara/Faustino's surgical fellow. A second post-operative visit six to eight weeks after surgery will assess the final surgical result.    The following problems should be reported to the office as soon as possible:  Continuous, brisk bleeding.  Please note that some oozing or drainage is common following a surgical procedure.  Do not try to clean dried blood from the surgical site.   This will likely only create more bleeding.  Temperature (fever) over 101?F.  Excessive pain at surgical site not relieved by the pain medication, especially if associated with protrusion of the eyeball.  Sudden loss of vision.  Please note that some blurriness is expected after surgery due to the ointment used around the eyes.  All patients should be able to check their vision even with eyelid swelling.      If a problem should arise or you have a question, someone from our team can be reached at any time of the day or week by calling [phone].  I hope that your surgery experience with us is a positive one.  Please contact my office with any  questions.  Sincerely,       MD Fuad Valentino MD

## 2024-05-30 NOTE — PAT
Spoke to Dr. Kamara in regards to platelets of 72. Okay to proceed with surgery. No new labs needed.

## 2024-06-03 NOTE — NURSING NOTE
[facility] AA Review: Dr. Mckeon reviewed pt's chart. No preop EKG needed. Place pt on monitor DOS. If abnormal then get EKG.

## 2024-06-06 ENCOUNTER — ANESTHESIA (OUTPATIENT)
Age: 79
End: 2024-06-06
Payer: MEDICARE

## 2024-06-06 ENCOUNTER — ANESTHESIA EVENT (OUTPATIENT)
Age: 79
End: 2024-06-06
Payer: MEDICARE

## 2024-06-06 ENCOUNTER — HOSPITAL ENCOUNTER (OUTPATIENT)
Age: 79
Setting detail: HOSPITAL OUTPATIENT SURGERY
Discharge: HOME | End: 2024-06-06
Attending: OPHTHALMOLOGY | Admitting: OPHTHALMOLOGY
Payer: MEDICARE

## 2024-06-06 VITALS
TEMPERATURE: 97.1 F | DIASTOLIC BLOOD PRESSURE: 75 MMHG | RESPIRATION RATE: 16 BRPM | WEIGHT: 145.8 LBS | OXYGEN SATURATION: 96 % | HEART RATE: 55 BPM | SYSTOLIC BLOOD PRESSURE: 142 MMHG | HEIGHT: 63 IN | BODY MASS INDEX: 25.83 KG/M2

## 2024-06-06 DIAGNOSIS — C44.111: ICD-10-CM

## 2024-06-06 PROCEDURE — 25010000002 CLINDAMYCIN 900 MG/50ML SOLUTION: Performed by: OPHTHALMOLOGY

## 2024-06-06 PROCEDURE — 25010000002 PROPOFOL 1000 MG/100ML EMULSION: Performed by: ANESTHESIOLOGY

## 2024-06-06 PROCEDURE — 67840 REMOVE EYELID LESION: CPT | Performed by: OPHTHALMOLOGY

## 2024-06-06 PROCEDURE — 25810000003 LACTATED RINGERS PER 1000 ML: Performed by: ANESTHESIOLOGY

## 2024-06-06 PROCEDURE — 88331 PATH CONSLTJ SURG 1 BLK 1SPC: CPT | Performed by: OPHTHALMOLOGY

## 2024-06-06 PROCEDURE — 88305 TISSUE EXAM BY PATHOLOGIST: CPT | Performed by: OPHTHALMOLOGY

## 2024-06-06 PROCEDURE — 25010000002 BUPIVACAINE (PF) 0.5 % SOLUTION 10 ML VIAL: Performed by: OPHTHALMOLOGY

## 2024-06-06 PROCEDURE — 25010000002 PROPOFOL 10 MG/ML EMULSION: Performed by: ANESTHESIOLOGY

## 2024-06-06 RX ORDER — ERYTHROMYCIN 5 MG/G
OINTMENT OPHTHALMIC AS NEEDED
Status: DISCONTINUED | OUTPATIENT
Start: 2024-06-06 | End: 2024-06-06 | Stop reason: HOSPADM

## 2024-06-06 RX ORDER — CLINDAMYCIN PHOSPHATE 900 MG/50ML
900 INJECTION, SOLUTION INTRAVENOUS EVERY 8 HOURS SCHEDULED
Status: DISCONTINUED | OUTPATIENT
Start: 2024-06-06 | End: 2024-06-06 | Stop reason: HOSPADM

## 2024-06-06 RX ORDER — NALOXONE HCL 0.4 MG/ML
0.2 VIAL (ML) INJECTION AS NEEDED
Status: DISCONTINUED | OUTPATIENT
Start: 2024-06-06 | End: 2024-06-06 | Stop reason: HOSPADM

## 2024-06-06 RX ORDER — ACETAMINOPHEN 325 MG/1
650 TABLET ORAL ONCE
Status: COMPLETED | OUTPATIENT
Start: 2024-06-06 | End: 2024-06-06

## 2024-06-06 RX ORDER — HYDRALAZINE HYDROCHLORIDE 20 MG/ML
5 INJECTION INTRAMUSCULAR; INTRAVENOUS
Status: DISCONTINUED | OUTPATIENT
Start: 2024-06-06 | End: 2024-06-06 | Stop reason: HOSPADM

## 2024-06-06 RX ORDER — PROPOFOL 10 MG/ML
INJECTION, EMULSION INTRAVENOUS CONTINUOUS PRN
Status: DISCONTINUED | OUTPATIENT
Start: 2024-06-06 | End: 2024-06-06 | Stop reason: SURG

## 2024-06-06 RX ORDER — LIDOCAINE HYDROCHLORIDE 20 MG/ML
INJECTION, SOLUTION INFILTRATION; PERINEURAL AS NEEDED
Status: DISCONTINUED | OUTPATIENT
Start: 2024-06-06 | End: 2024-06-06 | Stop reason: SURG

## 2024-06-06 RX ORDER — DIPHENHYDRAMINE HYDROCHLORIDE 50 MG/ML
12.5 INJECTION INTRAMUSCULAR; INTRAVENOUS
Status: DISCONTINUED | OUTPATIENT
Start: 2024-06-06 | End: 2024-06-06 | Stop reason: HOSPADM

## 2024-06-06 RX ORDER — SODIUM CHLORIDE 0.9 % (FLUSH) 0.9 %
3 SYRINGE (ML) INJECTION EVERY 12 HOURS SCHEDULED
Status: DISCONTINUED | OUTPATIENT
Start: 2024-06-06 | End: 2024-06-06 | Stop reason: HOSPADM

## 2024-06-06 RX ORDER — TETRACAINE HYDROCHLORIDE 5 MG/ML
SOLUTION OPHTHALMIC AS NEEDED
Status: DISCONTINUED | OUTPATIENT
Start: 2024-06-06 | End: 2024-06-06 | Stop reason: HOSPADM

## 2024-06-06 RX ORDER — ONDANSETRON 2 MG/ML
4 INJECTION INTRAMUSCULAR; INTRAVENOUS ONCE AS NEEDED
Status: DISCONTINUED | OUTPATIENT
Start: 2024-06-06 | End: 2024-06-06 | Stop reason: HOSPADM

## 2024-06-06 RX ORDER — FLUMAZENIL 0.1 MG/ML
0.2 INJECTION INTRAVENOUS AS NEEDED
Status: DISCONTINUED | OUTPATIENT
Start: 2024-06-06 | End: 2024-06-06 | Stop reason: HOSPADM

## 2024-06-06 RX ORDER — SODIUM CHLORIDE, SODIUM LACTATE, POTASSIUM CHLORIDE, CALCIUM CHLORIDE 600; 310; 30; 20 MG/100ML; MG/100ML; MG/100ML; MG/100ML
9 INJECTION, SOLUTION INTRAVENOUS CONTINUOUS
Status: DISCONTINUED | OUTPATIENT
Start: 2024-06-06 | End: 2024-06-06 | Stop reason: HOSPADM

## 2024-06-06 RX ORDER — LABETALOL HYDROCHLORIDE 5 MG/ML
5 INJECTION, SOLUTION INTRAVENOUS
Status: DISCONTINUED | OUTPATIENT
Start: 2024-06-06 | End: 2024-06-06 | Stop reason: HOSPADM

## 2024-06-06 RX ORDER — SODIUM CHLORIDE 0.9 % (FLUSH) 0.9 %
3-10 SYRINGE (ML) INJECTION AS NEEDED
Status: DISCONTINUED | OUTPATIENT
Start: 2024-06-06 | End: 2024-06-06 | Stop reason: HOSPADM

## 2024-06-06 RX ORDER — FAMOTIDINE 10 MG/ML
20 INJECTION, SOLUTION INTRAVENOUS ONCE
Status: DISCONTINUED | OUTPATIENT
Start: 2024-06-06 | End: 2024-06-06 | Stop reason: HOSPADM

## 2024-06-06 RX ORDER — ERYTHROMYCIN 5 MG/G
1 OINTMENT OPHTHALMIC 2 TIMES DAILY
Qty: 3.5 G | Refills: 1 | Status: SHIPPED | OUTPATIENT
Start: 2024-06-06

## 2024-06-06 RX ORDER — PROPOFOL 10 MG/ML
VIAL (ML) INTRAVENOUS AS NEEDED
Status: DISCONTINUED | OUTPATIENT
Start: 2024-06-06 | End: 2024-06-06 | Stop reason: SURG

## 2024-06-06 RX ADMIN — LIDOCAINE HYDROCHLORIDE 50 MG: 20 INJECTION, SOLUTION INFILTRATION; PERINEURAL at 08:43

## 2024-06-06 RX ADMIN — PROPOFOL 20 MG: 10 INJECTION, EMULSION INTRAVENOUS at 08:43

## 2024-06-06 RX ADMIN — CLINDAMYCIN IN 5 PERCENT DEXTROSE 900 MG: 18 INJECTION, SOLUTION INTRAVENOUS at 07:47

## 2024-06-06 RX ADMIN — PROPOFOL 250 MCG/KG/MIN: 10 INJECTION, EMULSION INTRAVENOUS at 08:43

## 2024-06-06 RX ADMIN — SODIUM CHLORIDE, POTASSIUM CHLORIDE, SODIUM LACTATE AND CALCIUM CHLORIDE 9 ML/HR: 600; 310; 30; 20 INJECTION, SOLUTION INTRAVENOUS at 08:22

## 2024-06-06 RX ADMIN — PROPOFOL 20 MG: 10 INJECTION, EMULSION INTRAVENOUS at 08:57

## 2024-06-06 RX ADMIN — CLINDAMYCIN IN 5 PERCENT DEXTROSE 900 MG: 18 INJECTION, SOLUTION INTRAVENOUS at 08:43

## 2024-06-06 RX ADMIN — ACETAMINOPHEN 650 MG: 325 TABLET ORAL at 10:04

## 2024-06-06 NOTE — ANESTHESIA POSTPROCEDURE EVALUATION
"Patient: Kenia BRIAN Sample    Procedure Summary       Date: 06/06/24 Room / Location: [facility] OR [facility] / [facility]    Anesthesia Start: 0835 Anesthesia Stop: 0946    Procedure: LEFT LOWER EYELID BASEL CELL CARCINOMA EXCISIONAL AND REPAIR WITH FROZEN SECTIONS, (Left: Eye) Diagnosis:     Surgeons: Austin Kamara MD Provider: Rosalinda Zazueta MD    Anesthesia Type: MAC ASA Status: 3            Anesthesia Type: MAC    Vitals  Vitals Value Taken Time   /75 06/06/24 1002   Temp 36.2 °C (97.1 °F) 06/06/24 0948   Pulse 56 06/06/24 1011   Resp 16 06/06/24 0948   SpO2 99 % 06/06/24 1011   Vitals shown include unfiled device data.        Post Anesthesia Care and Evaluation    Patient location during evaluation: PHASE II  Patient participation: complete - patient participated  Level of consciousness: awake  Pain management: adequate    Airway patency: patent  Anesthetic complications: No anesthetic complications    Cardiovascular status: acceptable  Respiratory status: acceptable  Hydration status: acceptable    Comments: /77 (BP Location: Left arm, Patient Position: Lying)   Pulse 57   Temp 36.2 °C (97.1 °F) (Oral)   Resp 16   Ht 160 cm (63\")   Wt 66.1 kg (145 lb 12.8 oz)   SpO2 98%   BMI 25.83 kg/m²     "

## 2024-06-06 NOTE — ANESTHESIA PREPROCEDURE EVALUATION
Anesthesia Evaluation     Patient summary reviewed                Airway   Mallampati: III  Dental    (+) lower dentures and upper dentures    Pulmonary - normal exam   (+) a smoker Former, lung cancer,  Cardiovascular - normal exam    ECG reviewed    (+) hypertension less than 2 medications, valvular problems/murmurs AS, hyperlipidemia    ROS comment: Reviewed echo:                  Left ventricle size is normal.                            Mild left ventricular hypertrophy.                            Overall left ventricular function is normal. The ejection fraction biplane was calculated at 64%.                            Abnormal left ventricular diastolic filling consistent with impaired relaxation.                            The right ventricular chamber size and systolic function are within normal limits.         Neuro/Psych- negative ROS  GI/Hepatic/Renal/Endo    (+) hiatal hernia, GERD  (-) diabetes, no thyroid disorder    Musculoskeletal     Abdominal    Substance History      OB/GYN          Other      history of cancer                  Anesthesia Plan    ASA 3     MAC       Anesthetic plan, risks, benefits, and alternatives have been provided, discussed and informed consent has been obtained with: patient.    CODE STATUS:

## 2024-06-06 NOTE — OP NOTE
OPERATIVE NOTE    Patient Identification:  Name: Kenia BRIAN Sample  Age: 79 y.o.  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 2% lidocaine with epinephrine and 0.5% marcaine in a 1:1 fashion was injected over the surgical site, and the patient was prepped and draped in the usual manner for orbitofacial surgery.     Corneal protectors were placed in both eyes.    A wedge resection was marked on the left lower eyelid with a vertical line on each side of the area of the presumed nodular basal cell carcinoma. A #15 Bard-Max blade incision was made over the marked area.  Straight iris scissors were used to perform a full-thickness en bloc excision of the eyelid margin and tarsus with care to include the entire vertical length of tarsus.  The specimen was marked for orientation with a suture and sent to pathology for frozen sections.      The eyelid margin was reapproximated with a 6-0 silk suture through the mucocutaneous junction.The tarsal plate was closed with multiple interrupted 6-0 Vicryl sutures, partially penetrating the tarsus to avoid corneal irritation.  The skin was then closed with 5-0 fast absorbing suture.    The pathologist called back into the room and noted the lesion was confirmed basal cell carcinoma and margins were clear.    The corneal protectors were removed and antibiotic  ophthalmic ointment was placed over the surgical site.     The patient was then awakened and taken from the operating room in good condition, having tolerated the procedure well. There were no complications, and the estimated blood loss was less than 10 cc.

## 2024-06-06 NOTE — H&P
[facility]   PREOPERATIVE HISTORY AND PHYSICAL    Patient Name:Kenia BRIAN Sample  : 1945  MRN: [id number]  Primary Care Physician: Amy Guillory MD  Date of admission: 2024    Subjective   Subjective     Chief Complaint: preoperative evaluation    History of Present Illness  Kenia Palomo is a 79 y.o. female who presents for preoperative evaluation. She is scheduled for LEFT LOWER EYELID BASEL CELL CARCINOMA EXCISIONAL AND REPAIR WITH FROZEN SECTIONS, POSSIBLE ROTATIONAL FLAP (Left)    Review of Systems   Eyes:  Positive for redness and visual disturbance.   All other systems reviewed and are negative.       Personal History     Past Medical History:   Diagnosis Date    Anesthesia complication     slow to wake up    Anginal pain     Arthritis     Cancer     Lung    Gastric reflux syndrome     Gout     Hiatal hernia     High cholesterol     History of bronchitis     Hyperlipidemia     Hypertension     Irregular heart beat     Mild aortic valve stenosis     per echo 2023    RLS (restless legs syndrome)        Past Surgical History:   Procedure Laterality Date    APPENDECTOMY      CARDIAC CATHETERIZATION      EXTERNAL EAR SURGERY Right     had tumor excised behind right ear lobe benign    FEMUR IM NAILING/RODDING Right 06/15/2022    Procedure: RIGHT HIP INTRAMEDULLARY NAILING/RODDING;  Surgeon: Marc Crawford MD;  Location: [facility];  Service: Orthopedics;  Laterality: Right;    GALLBLADDER SURGERY      HYSTERECTOMY      KNEE ARTHROPLASTY Right     KNEE ARTHROSCOPY Right     ND REVJ TOTAL KNEE ARTHRP W/WO ALGRFT 1 COMPONENT Right 2016    Procedure: RT ILIOTIBIAL BAND RELEASE RT TOTAL KNEE POLY EXCHANGE;  Surgeon: Toy Adame MD;  Location: [facility];  Service: Orthopedics    ROTATOR CUFF REPAIR Bilateral     US GUIDED LYMPH NODE BIOPSY  3/18/2024    WRIST MASS EXCISION Right        Family History: Her family history includes Heart attack in her maternal grandfather; Heart failure  in her mother; Hypertension in her mother.     Social History: She  reports that she has quit smoking. Her smoking use included cigarettes. She has a 30 pack-year smoking history. She has never used smokeless tobacco. She reports current alcohol use. She reports that she does not use drugs.    Home Medications:  Magnesium, acetaminophen, albuterol sulfate HFA, allopurinol, bisacodyl, fluticasone, furosemide, metoprolol tartrate, naloxone, omeprazole, polyethylene glycol, potassium chloride, pravastatin, rOPINIRole, sennosides-docusate, sucralfate, temazepam, vitamin B-12, and vitamin D    Allergies:  She is allergic to levaquin [levofloxacin], zocor [simvastatin], atorvastatin, codeine, penicillins, and percocet [oxycodone-acetaminophen].    Objective    Objective     Vitals:    Temp:  [98 °F (36.7 °C)] 98 °F (36.7 °C)  Heart Rate:  [56-69] 56  Resp:  [14-16] 16  BP: (162-176)/() 162/70    Physical Exam  HENT:      Mouth/Throat:      Mouth: Mucous membranes are moist.   Eyes:      Comments: LLL lesion   Cardiovascular:      Rate and Rhythm: Normal rate.      Pulses: Normal pulses.   Pulmonary:      Effort: Pulmonary effort is normal.   Skin:     General: Skin is warm.   Neurological:      Mental Status: She is alert and oriented to person, place, and time.   Psychiatric:         Mood and Affect: Mood normal.         Assessment & Plan   Assessment / Plan     Brief Patient Summary:  Kenia N Sample is a 79 y.o. female who presents for preoperative evaluation.    * No Diagnosis Codes entered *    Active Hospital Problems:  There are no active hospital problems to display for this patient.    Plan:   Procedure(s):  LEFT LOWER EYELID BASEL CELL CARCINOMA EXCISIONAL AND REPAIR WITH FROZEN SECTIONS, POSSIBLE ROTATIONAL FLAP    The risks, benefits, and alternatives of the procedure including but not limited to bleeding,infection,injury to surrounding structures, loss of vision/eye and risks of the anesthesia were  discussed in detail with the patient and questions were answered. No guarantees were made or implied. Informed consent was obtained.    Sunita Quesada MD

## 2024-06-07 LAB
LAB AP CASE REPORT: NORMAL
Lab: NORMAL
PATH REPORT.FINAL DX SPEC: NORMAL
PATH REPORT.GROSS SPEC: NORMAL

## 2024-07-01 ENCOUNTER — HOSPITAL ENCOUNTER (OUTPATIENT)
Dept: PET IMAGING | Facility: HOSPITAL | Age: 79
Discharge: HOME | End: 2024-07-01
Admitting: INTERNAL MEDICINE
Payer: MEDICARE

## 2024-07-01 DIAGNOSIS — C34.92 NSCLC OF LEFT LUNG: ICD-10-CM

## 2024-07-01 LAB — CREAT BLDA-MCNC: 0.9 MG/DL (ref 0.6–1.3)

## 2024-07-01 PROCEDURE — 82565 ASSAY OF CREATININE: CPT

## 2024-07-01 PROCEDURE — 25510000001 IOPAMIDOL 61 % SOLUTION: Performed by: INTERNAL MEDICINE

## 2024-07-01 PROCEDURE — 0 DIATRIZOATE MEGLUMINE & SODIUM PER 1 ML: Performed by: INTERNAL MEDICINE

## 2024-07-01 PROCEDURE — 74177 CT ABD & PELVIS W/CONTRAST: CPT

## 2024-07-01 PROCEDURE — 70491 CT SOFT TISSUE NECK W/DYE: CPT

## 2024-07-01 PROCEDURE — 71260 CT THORAX DX C+: CPT

## 2024-07-01 RX ADMIN — DIATRIZOATE MEGLUMINE AND DIATRIZOATE SODIUM 30 ML: 660; 100 LIQUID ORAL; RECTAL at 14:28

## 2024-07-01 RX ADMIN — IOPAMIDOL 85 ML: 612 INJECTION, SOLUTION INTRAVENOUS at 14:27

## 2024-07-11 ENCOUNTER — TELEPHONE (OUTPATIENT)
Dept: RADIATION ONCOLOGY | Facility: HOSPITAL | Age: 79
End: 2024-07-11
Payer: MEDICARE

## 2024-07-11 NOTE — PROGRESS NOTES
REASON FOR FOLLOW-UP: Left lower lobe lung mass-squamous of carcinoma                               History of Present Illness     The patient is an 79-year-old female with below medical history recently admitted 12/4 - 6/20/2023 with a history of right intertrochanteric femoral fracture and osteoarthritis presenting from a fall at home.  She had a stress fracture left foot that affected her balance, chronic low back pain that is also worsened with a fall.  Upon presentation orthopedic surgery was consulted finding a right intramedullary nail fixation and healing without abnormal findings.  No additional surgery was planned though physical therapy continued.  Spinal films demonstrated compression deformity T12 and L2 but fortunately she did further improve and was able to be discharged.    The patient had follow-up with several physicians including orthopedic physicians with an eventual chest exam that revealed an unexpected nodularity.  She was then referred to pulmonary medicine 1/8/2024 with a history of smoking for 35 years quitting 8 years ago and indication that there was a lung nodule found incidentally on imaging for the spine.  Through the [facility] system a CT of the chest demonstrated a 2.0 x 1.9 cm noncalcified nodule involving the posterior medial aspect left lower lobe abutting the pleura, more smoothly marginated 0.9 cm noncalcified right middle lobe nodule and a few additional smaller noncalcified nodules including 2 adjacent nodules measuring less than 0.4 cm the right middle lobe along with mild emphysema with mild to moderate subpleural and bibasilar scarring.  There is no pleural effusion or pneumothorax.    A PET/CT was obtained 2/6/2024 with a solid subpleural irregular nodule left lower lobe medially measuring 1.7 x 1.8 intensely hypermetabolic with SUV of 12.8, no additional nodular mass, had metabolic right superior mediastinal lymph node lateral measuring 0.9 cm to 3.9, diffuse lower  lobe tracer uptake with mediastinal right hilar lymph nodes partially calcified thought to be inflammatory or granulomatous disease.  There is also low-grade tracer uptake associated subacute healing left anterior rib fractures.    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    The patient was referred urgently from [facility] for assessment.  It is not certain why she has not been seen in the [facility] system though she now states that she wished to be seen here.     Patient's case discussed during thoracic conference with the finding of a right supraclavicular node.  This was unknown to us when seen her initially in consultation.  Request made to obtain ultrasound-guided biopsy of the right supraclavicular node and this is discussed with the patient's daughter who indicates the patient would be willing to proceed.  We will go and have this scheduled even though she is to be seen within the next week and the result may not yet be available.    3/6/2023 FNA obtained, discussed with IR physician Dr. Courtney with findings of a few atypical clusters present suspicious for involvement by non-small cell carcinoma.  He is offered to rebiopsy quickly if necessary.    The patient is seen 3/13/2024 with rebiopsy requested as well as radiation therapy consultation.  The case is discussed with interventional radiology and a repeat biopsy is possible.  The patient is willing to proceed in this fashion and we have also discussed radiation therapy consultation in the interval.  She continues have symptoms of chronic back pain and left lid lesion that is going to be reviewed by ophthalmology.    The patient went on to have a follow-up exam with repeat right supraclavicular lymph node biopsy 3/18/2024.  This was found positive for metastatic carcinoma.  The patient was seen by Dr. Hopkins with the conclusion  that she has a 2 cm left lower lobe squamous cell carcinoma abutting the pleura additional contralateral supraclavicular lymph node in the thyroid.  This is to be discussed in conference 3/21/2024.    This was discussed 3/21/2024 recognizing the patient's frailty.  Options for treating the areas that are known positive including the left lower lobe and right supraclavicular node would include radiation therapy and Dr. Hopkins will see the patient concerning this as we also follow her for consideration of systemic therapy including a [id number] considering the genomic studies done thus far and the inability to obtain additional genomics from the scant tissue that we have even from recent biopsy.  Finally the patient's overall status including an older adult assessment is going to be requested.    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  Additionally TMB was 10.4 mutations per megabase.  Concurrently the patient was seen by radiation therapy treating the left lower lobe and right neck site via SBRT.  Further MRI of the brain revealed no evidence of metastatic disease.    She is seen back 4/11/2024.  She had been seen 4/8/2024 with SBRT to left lower lobe and right neck lesion.  This has not been completed and she is to follow-up with radiation therapy mid July.  We have discussed her findings today with the patient seen along with her granddaughter.  She did well with radiation therapy and has no additional symptoms currently respiratorily.  Her genomic testing suggested possible use of Selumetinib (not currently available) though we will follow this as she is subsequently reevaluated.    A follow-up CT series was obtained 7/1/2024.  This reveals a slight decrease in the hypermetabolic right supraclavicular node measuring 0.9 x 0.7 cm previously 1.2 x 1.1 cm.  In the chest there is decrease in the mass in the posterior medial aspect left lower lobe and  evidence of radiation pneumonitis adjacently.  Margins are difficult to appreciate completely but maximal diameter is 2 cm previously 2.2 cm as compared to previous.  There is a pleural-based 1.0 x 1.6 cm nodule medial aspect of the right lower lobe which which was less conspicuous previously, multiple nodule opacities right middle lobe that are larger and a 1.3 cm density possibly mucous plugging otherwise seen.  These findings suggest a follow-up series of scans will still be necessary.    The patient is seen with her daughter and they both agree with this follow-up plan.       Past Surgical History:   Procedure Laterality Date    APPENDECTOMY      CARDIAC CATHETERIZATION      EXTERNAL EAR SURGERY Right     had tumor excised behind right ear lobe benign    EYE LESION EXCISION Left 6/6/2024    Procedure: LEFT LOWER EYELID BASEL CELL CARCINOMA EXCISIONAL AND REPAIR WITH FROZEN SECTIONS,;  Surgeon: Austin Kamara MD;  Location: [facility] OR;  Service: Ophthalmology;  Laterality: Left;    FEMUR IM NAILING/RODDING Right 06/15/2022    Procedure: RIGHT HIP INTRAMEDULLARY NAILING/RODDING;  Surgeon: Marc Crawford MD;  Location: [facility] OR;  Service: Orthopedics;  Laterality: Right;    GALLBLADDER SURGERY      HYSTERECTOMY      KNEE ARTHROPLASTY Right     KNEE ARTHROSCOPY Right     AL REVJ TOTAL KNEE ARTHRP W/WO ALGRFT 1 COMPONENT Right 09/07/2016    Procedure: RT ILIOTIBIAL BAND RELEASE RT TOTAL KNEE POLY EXCHANGE;  Surgeon: Toy dAame MD;  Location: [facility];  Service: Orthopedics    ROTATOR CUFF REPAIR Bilateral     US GUIDED LYMPH NODE BIOPSY  3/18/2024    WRIST MASS EXCISION Right         Current Outpatient Medications on File Prior to Visit   Medication Sig Dispense Refill    acetaminophen (TYLENOL) 325 MG tablet Take 2 tablets by mouth Every 4 (Four) Hours As Needed for Mild Pain .      albuterol (PROVENTIL HFA;VENTOLIN HFA) 108 (90 BASE) MCG/ACT inhaler Inhale 2 puffs Every 4 (Four) Hours As  Needed. Indications: Asthma      allopurinol (ZYLOPRIM) 100 MG tablet Take 1 tablet by mouth Daily.      bisacodyl (DULCOLAX) 5 MG EC tablet Take 1 tablet by mouth Daily As Needed for Constipation (Use if polyethylene glycol is ineffective).      erythromycin (ROMYCIN) 5 MG/GM ophthalmic ointment Apply 1 Application to eye(s) as directed by provider 2 (Two) Times a Day. Apply to surgical site 2 times daily. May use in eye for discomfort. 3.5 g 1    fluticasone (FLONASE) 50 MCG/ACT nasal spray 2 sprays into the nostril(s) as directed by provider Daily.      Magnesium 500 MG capsule Take 500 mg by mouth 2 (Two) Times a Day. Indications: Acid Indigestion      metoprolol tartrate (LOPRESSOR) 25 MG tablet Take 1 tablet by mouth 2 (Two) Times a Day. Indications: High Blood Pressure Disorder      naloxone (NARCAN) 4 MG/0.1ML nasal spray Call 911. Don't prime. [place] in 1 nostril for overdose. Repeat in 2-3 minutes in other nostril if no or minimal breathing/responsiveness. 2 each 0    omeprazole (PriLOSEC) 40 MG capsule Take 1 capsule by mouth Every Morning. Indications: Heartburn      polyethylene glycol (MIRALAX) 17 g packet Take 17 g by mouth Daily As Needed (Use if senna-docusate is ineffective).      potassium chloride (K-DUR,KLOR-CON) 20 MEQ CR tablet Take 1 tablet by mouth 2 (Two) Times a Day.      pravastatin (PRAVACHOL) 20 MG tablet Take 1 tablet by mouth Every Morning.      sennosides-docusate sodium (SENOKOT-S) 8.6-50 MG tablet Take 2 tablets by mouth 2 (two) times a day. 100 tablet 1    sucralfate (Carafate) 1 g tablet Take 1 tablet by mouth 3 (Three) Times a Day. 120 tablet 1    temazepam (RESTORIL) 15 MG capsule Take 1 capsule by mouth At Night As Needed for Sleep.      vitamin B-12 (CYANOCOBALAMIN) 1000 MCG tablet Take 1 tablet by mouth Daily. Indications: Inadequate Vitamin B12      vitamin D (ERGOCALCIFEROL) 1.25 MG (49179 UT) capsule capsule Take 1 capsule by mouth Every 7 (Seven) Days. 5 capsule 0     [DISCONTINUED] furosemide (LASIX) 20 MG tablet Take 1 tablet by mouth 2 (Two) Times a Day. Indications: Edema      [DISCONTINUED] rOPINIRole (REQUIP) 0.25 MG tablet Take 1 tablet by mouth Every Night. Take 1 hour before bedtime.  Indications: Restless Leg Syndrome       No current facility-administered medications on file prior to visit.        ALLERGIES:    Allergies   Allergen Reactions    Levaquin [Levofloxacin] Hives    Zocor [Simvastatin] Myalgia    Atorvastatin Other (See Comments)    Codeine GI Intolerance    Penicillins Hives and Swelling     Pt reports tongue swelling    Percocet [Oxycodone-Acetaminophen] Itching and Nausea And Vomiting        Social History     Socioeconomic History    Marital status:      Spouse name: Nabil   Tobacco Use    Smoking status: Former     Current packs/day: 1.00     Average packs/day: 1 pack/day for 30.0 years (30.0 ttl pk-yrs)     Types: Cigarettes    Smokeless tobacco: Never   Vaping Use    Vaping status: Never Used   Substance and Sexual Activity    Alcohol use: Yes     Comment: 1 wine drink q 3-4 months    Drug use: No    Sexual activity: Defer        Family History   Problem Relation Age of Onset    Hypertension Mother     Heart failure Mother     Heart attack Maternal Grandfather         Review of Systems   Constitutional:  Positive for activity change. Negative for appetite change, fatigue and unexpected weight change.   HENT: Negative.     Eyes: Negative.    Respiratory:  Positive for shortness of breath (Chronic).    Cardiovascular:  Negative for chest pain.   Gastrointestinal: Negative.    Endocrine: Negative.    Genitourinary: Negative.    Musculoskeletal:  Positive for back pain (Chronic back pain).   Neurological: Negative.    Psychiatric/Behavioral: Negative.          Objective     Vitals:    07/12/24 0812 07/12/24 0818   BP: (!) 183/96 (!) 159/108   Pulse: 73    Resp: 16    Temp: 97.8 °F (36.6 °C)    TempSrc: Oral    SpO2: 95%    Weight: 67 kg (147 lb  "12.8 oz)    Height: 160 cm (62.99\")    PainSc: 0-No pain                  7/12/2024     8:11 AM   Current Status   ECOG score 0       Physical Exam  Constitutional:       Appearance: Normal appearance.   HENT:      Head: Normocephalic and atraumatic.      Mouth/Throat:      Mouth: Mucous membranes are moist.      Pharynx: Oropharynx is clear.   Eyes:      Extraocular Movements: Extraocular movements intact.      Conjunctiva/sclera: Conjunctivae normal.      Pupils: Pupils are equal, round, and reactive to light.      Comments: Left lid nodular lesion   Cardiovascular:      Rate and Rhythm: Normal rate and regular rhythm.      Pulses: Normal pulses.      Heart sounds: Normal heart sounds.   Pulmonary:      Effort: Pulmonary effort is normal.      Breath sounds: Normal breath sounds.   Abdominal:      General: Bowel sounds are normal.      Palpations: Abdomen is soft.   Musculoskeletal:         General: Normal range of motion.      Cervical back: Normal range of motion and neck supple.   Skin:     General: Skin is warm and dry.   Neurological:      General: No focal deficit present.      Mental Status: She is alert and oriented to person, place, and time.   Psychiatric:         Mood and Affect: Mood normal.         Behavior: Behavior normal.           RECENT LABS:  Hematology WBC   Date Value Ref Range Status   07/12/2024 3.63 3.40 - 10.80 10*3/mm3 Final   02/06/2024 4.15 (L) 4.5 - 11.0 10*3/uL Final     RBC   Date Value Ref Range Status   07/12/2024 3.75 (L) 3.77 - 5.28 10*6/mm3 Final   02/06/2024 3.70 (L) 4.0 - 5.2 10*6/uL Final     Hemoglobin   Date Value Ref Range Status   07/12/2024 13.0 12.0 - 15.9 g/dL Final   02/06/2024 12.4 12.0 - 16.0 g/dL Final     Hematocrit   Date Value Ref Range Status   07/12/2024 38.0 34.0 - 46.6 % Final   02/06/2024 36.6 36.0 - 46.0 % Final     Platelets   Date Value Ref Range Status   07/12/2024 91 (L) 140 - 450 10*3/mm3 Final   02/06/2024 77 (L) 140 - 440 10*3/uL Final    "       Assessment & Plan      79 year-old female with a orthopedic history as described in particular involving right hip, bilateral knees and bilateral rotator cuffs recently falling and sustaining a contusion involving her right hip and evidence of compression deformities T12 and L2.  Upon discharge she was seen by orthopedics and, unexpectedly, on additional back studies a left lung nodule was determined.    This was reviewed by pulmonary medicine at [facility] leading to a  a CT of the chest which demonstrated a 2.0 x 1.9 cm noncalcified nodule involving the posterior medial aspect left lower lobe abutting the pleura, more smoothly marginated 0.9 cm noncalcified right middle lobe nodule and a few additional smaller noncalcified nodules including 2 adjacent nodules measuring less than 0.4 cm the right middle lobe along with mild emphysema with mild to moderate subpleural and bibasilar scarring.  There is no pleural effusion or pneumothorax.    A PET/CT was obtained 2/6/2024 with a solid subpleural irregular nodule left lower lobe medially measuring 1.7 x 1.8 intensely hypermetabolic with SUV of 12.8, no additional nodular mass, had metabolic right superior mediastinal lymph node lateral measuring 0.9 cm to 3.9, diffuse lower lobe tracer uptake with mediastinal right hilar lymph nodes partially calcified thought to be inflammatory or granulomatous disease.  There is also low-grade tracer uptake associated subacute healing left anterior rib fractures.    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    The patient is now seen in CBC office.  There has not been assessment by surgery or, apparently, by pulmonary medicine formally though the studies above are available for review.  After discussion we will need to have her presented in the thoracic conference so that opinions from radiology,  thoracic surgery radiation therapy as well as pathology can be obtained.    Patient's case discussed during thoracic conference with the finding of a right supraclavicular node.  This was unknown to us when seen her initially in consultation.  Request made to obtain ultrasound-guided biopsy of the right supraclavicular node and this is discussed with the patient's daughter who indicates the patient would be willing to proceed.  We will go and have this scheduled even though she is to be seen within the next week and the result may not yet be available.    3/6/2023 FNA obtained, discussed with IR physician Dr. Courtney with findings of a few atypical clusters present suspicious for involvement by non-small cell carcinoma.  He is offered to rebiopsy quickly if necessary.    The patient is seen 3/13/2024 with rebiopsy requested as well as radiation therapy consultation.  The case is discussed with interventional radiology and a repeat biopsy is possible.  The patient is willing to proceed in this fashion and we have also discussed radiation therapy consultation in the interval.  She continues have symptoms of chronic back pain and left lid lesion that is going to be reviewed by ophthalmology.    This was discussed 3/21/2024 recognizing the patient's frailty.  Options for treating the areas that are known positive including the left lower lobe and right supraclavicular node would include radiation therapy and Dr. Hopkins will see the patient concerning this as we also follow her for consideration of systemic therapy including a [id number] considering the genomic studies done thus far and the inability to obtain additional genomics from the scant tissue that we have even from recent biopsy.  Finally the patient's overall status including an older adult assessment is going to be requested.    She is seen back 4/11/2024.  She had been seen 4/8/2024 with SBRT to left lower lobe and right neck lesion.  This has not been  completed and she is to follow-up with radiation therapy mid July.  We have discussed her findings today with the patient seen along with her granddaughter.  She did well with radiation therapy and has no additional symptoms currently respiratorily.    Her genomic testing suggested possible use of Selumetinib (not currently available) though we will follow this as she is subsequently reevaluated.      A follow-up CT series was obtained 7/1/2024.  This reveals a slight decrease in the hypermetabolic right supraclavicular node measuring 0.9 x 0.7 cm previously 1.2 x 1.1 cm.  In the chest there is decrease in the mass in the posterior medial aspect left lower lobe and evidence of radiation pneumonitis adjacently.  Margins are difficult to appreciate completely but maximal diameter is 2 cm previously 2.2 cm as compared to previous.  There is a pleural-based 1.0 x 1.6 cm nodule medial aspect of the right lower lobe which which was less conspicuous previously, multiple nodule opacities right middle lobe that are larger and a 1.3 cm density possibly mucous plugging otherwise seen.  These findings suggest a follow-up series of scans will still be necessary.    The patient is seen in office 7/12/2024 with a reasonably stable performance status.  At this point no additional intervention is necessary but a follow-up scan is requested.    Plan:  *Return 11 weeks for CT neck, chest, abdomen, pelvis, CMP, CBC    *12 weeks MD    *Radiation therapy follow-up in place as well              Plan:  *.  Follow-up CT neck, chest, abdomen, pelvis in early July 2024      *MD follow-up 7/12/2024-XRT and medical oncology.          I

## 2024-07-12 ENCOUNTER — OFFICE VISIT (OUTPATIENT)
Dept: ONCOLOGY | Facility: CLINIC | Age: 79
End: 2024-07-12
Payer: MEDICARE

## 2024-07-12 ENCOUNTER — LAB (OUTPATIENT)
Dept: LAB | Facility: HOSPITAL | Age: 79
End: 2024-07-12
Payer: MEDICARE

## 2024-07-12 ENCOUNTER — OFFICE VISIT (OUTPATIENT)
Dept: RADIATION ONCOLOGY | Facility: HOSPITAL | Age: 79
End: 2024-07-12
Payer: MEDICARE

## 2024-07-12 VITALS
HEART RATE: 73 BPM | RESPIRATION RATE: 16 BRPM | OXYGEN SATURATION: 95 % | WEIGHT: 147.8 LBS | DIASTOLIC BLOOD PRESSURE: 108 MMHG | HEIGHT: 63 IN | TEMPERATURE: 97.8 F | BODY MASS INDEX: 26.19 KG/M2 | SYSTOLIC BLOOD PRESSURE: 159 MMHG

## 2024-07-12 VITALS
WEIGHT: 147 LBS | HEART RATE: 67 BPM | SYSTOLIC BLOOD PRESSURE: 177 MMHG | BODY MASS INDEX: 26.05 KG/M2 | OXYGEN SATURATION: 96 % | DIASTOLIC BLOOD PRESSURE: 90 MMHG

## 2024-07-12 DIAGNOSIS — C34.92 NSCLC OF LEFT LUNG: Primary | ICD-10-CM

## 2024-07-12 DIAGNOSIS — C34.92 NSCLC OF LEFT LUNG: ICD-10-CM

## 2024-07-12 LAB
ALBUMIN SERPL-MCNC: 3.9 G/DL (ref 3.5–5.2)
ALBUMIN/GLOB SERPL: 1 G/DL
ALP SERPL-CCNC: 139 U/L (ref 39–117)
ALT SERPL W P-5'-P-CCNC: 10 U/L (ref 1–33)
ANION GAP SERPL CALCULATED.3IONS-SCNC: 10.7 MMOL/L (ref 5–15)
AST SERPL-CCNC: 37 U/L (ref 1–32)
BASOPHILS # BLD AUTO: 0.05 10*3/MM3 (ref 0–0.2)
BASOPHILS NFR BLD AUTO: 1.4 % (ref 0–1.5)
BILIRUB SERPL-MCNC: 0.4 MG/DL (ref 0–1.2)
BUN SERPL-MCNC: 17 MG/DL (ref 8–23)
BUN/CREAT SERPL: 19.3 (ref 7–25)
CALCIUM SPEC-SCNC: 9.8 MG/DL (ref 8.6–10.5)
CHLORIDE SERPL-SCNC: 103 MMOL/L (ref 98–107)
CO2 SERPL-SCNC: 28.3 MMOL/L (ref 22–29)
CREAT SERPL-MCNC: 0.88 MG/DL (ref 0.57–1)
DEPRECATED RDW RBC AUTO: 47.6 FL (ref 37–54)
EGFRCR SERPLBLD CKD-EPI 2021: 66.1 ML/MIN/1.73
EOSINOPHIL # BLD AUTO: 0.14 10*3/MM3 (ref 0–0.4)
EOSINOPHIL NFR BLD AUTO: 3.9 % (ref 0.3–6.2)
ERYTHROCYTE [DISTWIDTH] IN BLOOD BY AUTOMATED COUNT: 12.7 % (ref 12.3–15.4)
GLOBULIN UR ELPH-MCNC: 4.1 GM/DL
GLUCOSE SERPL-MCNC: 97 MG/DL (ref 65–99)
HCT VFR BLD AUTO: 38 % (ref 34–46.6)
HGB BLD-MCNC: 13 G/DL (ref 12–15.9)
IMM GRANULOCYTES # BLD AUTO: 0.01 10*3/MM3 (ref 0–0.05)
IMM GRANULOCYTES NFR BLD AUTO: 0.3 % (ref 0–0.5)
LYMPHOCYTES # BLD AUTO: 1.2 10*3/MM3 (ref 0.7–3.1)
LYMPHOCYTES NFR BLD AUTO: 33.1 % (ref 19.6–45.3)
MCH RBC QN AUTO: 34.7 PG (ref 26.6–33)
MCHC RBC AUTO-ENTMCNC: 34.2 G/DL (ref 31.5–35.7)
MCV RBC AUTO: 101.3 FL (ref 79–97)
MONOCYTES # BLD AUTO: 0.3 10*3/MM3 (ref 0.1–0.9)
MONOCYTES NFR BLD AUTO: 8.3 % (ref 5–12)
NEUTROPHILS NFR BLD AUTO: 1.93 10*3/MM3 (ref 1.7–7)
NEUTROPHILS NFR BLD AUTO: 53 % (ref 42.7–76)
NRBC BLD AUTO-RTO: 0 /100 WBC (ref 0–0.2)
PLATELET # BLD AUTO: 91 10*3/MM3 (ref 140–450)
PMV BLD AUTO: 10.6 FL (ref 6–12)
POTASSIUM SERPL-SCNC: 4.5 MMOL/L (ref 3.5–5.2)
PROT SERPL-MCNC: 8 G/DL (ref 6–8.5)
RBC # BLD AUTO: 3.75 10*6/MM3 (ref 3.77–5.28)
SODIUM SERPL-SCNC: 142 MMOL/L (ref 136–145)
WBC NRBC COR # BLD AUTO: 3.63 10*3/MM3 (ref 3.4–10.8)

## 2024-07-12 PROCEDURE — 80053 COMPREHEN METABOLIC PANEL: CPT

## 2024-07-12 PROCEDURE — 85025 COMPLETE CBC W/AUTO DIFF WBC: CPT

## 2024-07-12 PROCEDURE — 99214 OFFICE O/P EST MOD 30 MIN: CPT | Performed by: INTERNAL MEDICINE

## 2024-07-12 PROCEDURE — 3080F DIAST BP >= 90 MM HG: CPT | Performed by: INTERNAL MEDICINE

## 2024-07-12 PROCEDURE — 36415 COLL VENOUS BLD VENIPUNCTURE: CPT

## 2024-07-12 PROCEDURE — 3077F SYST BP >= 140 MM HG: CPT | Performed by: INTERNAL MEDICINE

## 2024-07-12 PROCEDURE — 1126F AMNT PAIN NOTED NONE PRSNT: CPT | Performed by: INTERNAL MEDICINE

## 2024-07-12 NOTE — PROGRESS NOTES
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  Site: LLL  Laterality: Left  Total Dose: 5000cGy  Dose per Fraction: 1000cGy  Total Fractions: 5  Daily or BID: Daily  Modality: Photon  Technique: SBRT (2-5fx)  Bolus: No     2.   Site: Right SCV Node  Laterality: Right  Total Dose: 4000cGy  Dose per Fraction: 800cGy  Total Fractions: 5  Daily or BID: Daily  Modality: Photon  Technique: SBRT (2-5fx)  Bolus: No      Interval History:    Ada N Sample presents for regularly scheduled follow-up approximately 3 months after completion of radiation therapy to the left lower lobe and a metastatic right supraclavicular lymph node.  The patient is not on any systemic therapy and continues to follow with Dr. Mann.  She reports that she is doing fairly well from a pulmonary standpoint.  Her main complaints are related to orthopedic issues in her hip for which she is considering seeing orthopedic surgery.      Imaging:    CT Neck 7/1/20254    FINDINGS:  NECK:  1. Slight decrease in size of the hypermetabolic right supraclavicular  node measuring 0.9 x 0.7 cm, previously 1.2 x 1.1 cm, series 2 image 55.  There are no other suspicious nodes at the supraclavicular regions or  neck.      CT CAP 7/1/2024    CHEST:  1. There has been slight decrease in size of the mass at the  posteromedial aspect of the left lower lobe and there is new radiation  pneumonitis adjacently. The margins of the remaining masses are  partially obscured by the radiation pneumonitis, but maximal diameter is  approximately 2 cm and previously 2.2 cm when measured along the same  plane, series 3 image 44. There is surrounding radiation pneumonitis and  a slightly separate region of radiation pneumonitis slightly inferiorly  and laterally on image 51.     2. There is a  pleural-based 1.0 x 0.6 cm nodule at the medial aspect of  the right upper lobe which was much less conspicuous previously, image  34. This nodule is worrisome. There are multiple nodular opacities at  the right middle lobe which are larger as well. There is an  approximately 1.3 cm nodular density which may represent a component of  mucus plugging, but is indeterminate, image 58. There is a 9 mm nodule  which is unchanged, image 54 and a smaller nodular density along a  vessel posteriorly image 53 which are stable.     3. There are no pleural or pericardial effusions. There is no  lymphadenopathy within the chest.        ABDOMEN/PELVIS: No convincing evidence for metastatic disease.  1. There is no suspicious liver lesion. Pneumobilia is stable. Adrenals  appear unremarkable. No acute abnormality is seen at the spleen,  pancreas, adrenals, or kidneys.     2. Gastric folds appear significantly thickened and gastritis is  suspected. Please correlate clinically. Multiple duodenal diverticula  are noted. There is no acute bowel abnormality. There is extensive  sigmoid diverticulosis without evidence for diverticulitis.     3. No free fluid or lymphadenopathy. Adnexa appear unremarkable. There  are extensive abdominal aortic atherosclerotic changes without  aneurysmal dilatation.      Pathology:      No new relevant pathology      Labs:    Lab Results   Component Value Date    CREATININE 0.88 07/12/2024       CMP          4/1/2024    14:27 7/1/2024    14:12 7/12/2024    08:09   CMP   Glucose   97    BUN   17    Creatinine 0.80  0.90  0.88    EGFR   66.1    Sodium   142    Potassium   4.5    Chloride   103    Calcium   9.8    Total Protein   8.0    Albumin   3.9    Globulin   4.1    Total Bilirubin   0.4    Alkaline Phosphatase   139    AST (SGOT)   37    ALT (SGPT)   10    Albumin/Globulin Ratio   1.0    BUN/Creatinine Ratio   19.3    Anion Gap   10.7      CBC          3/21/2024    08:05 4/11/2024    09:49 7/12/2024     08:09   CBC   WBC 4.81  4.53  3.63    RBC 3.88  3.90  3.75    Hemoglobin 13.2  12.9  13.0    Hematocrit 36.7  37.7  38.0    MCV 94.6  96.7  101.3    MCH 34.0  33.1  34.7    MCHC 36.0  34.2  34.2    RDW 12.6  12.5  12.7    Platelets 74  72  91              Problem List:  Patient Active Problem List   Diagnosis    Snapping right knee    HTN (hypertension)    HLD (hyperlipidemia)    GERD (gastroesophageal reflux disease)    Closed comminuted intertrochanteric fracture of proximal end of right femur    Thrombocytopenia    Unable to ambulate    Right hip pain    Degenerative disc disease, thoracic and lumbar    NSCLC of left lung          Medications:  Current Outpatient Medications on File Prior to Visit   Medication Sig Dispense Refill    acetaminophen (TYLENOL) 325 MG tablet Take 2 tablets by mouth Every 4 (Four) Hours As Needed for Mild Pain .      albuterol (PROVENTIL HFA;VENTOLIN HFA) 108 (90 BASE) MCG/ACT inhaler Inhale 2 puffs Every 4 (Four) Hours As Needed. Indications: Asthma      allopurinol (ZYLOPRIM) 100 MG tablet Take 1 tablet by mouth Daily.      bisacodyl (DULCOLAX) 5 MG EC tablet Take 1 tablet by mouth Daily As Needed for Constipation (Use if polyethylene glycol is ineffective).      erythromycin (ROMYCIN) 5 MG/GM ophthalmic ointment Apply 1 Application to eye(s) as directed by provider 2 (Two) Times a Day. Apply to surgical site 2 times daily. May use in eye for discomfort. 3.5 g 1    fluticasone (FLONASE) 50 MCG/ACT nasal spray 2 sprays into the nostril(s) as directed by provider Daily.      Magnesium 500 MG capsule Take 500 mg by mouth 2 (Two) Times a Day. Indications: Acid Indigestion      metoprolol tartrate (LOPRESSOR) 25 MG tablet Take 1 tablet by mouth 2 (Two) Times a Day. Indications: High Blood Pressure Disorder      naloxone (NARCAN) 4 MG/0.1ML nasal spray Call 911. Don't prime. [place] in 1 nostril for overdose. Repeat in 2-3 minutes in other nostril if no or minimal breathing/responsiveness. 2  "each 0    omeprazole (PriLOSEC) 40 MG capsule Take 1 capsule by mouth Every Morning. Indications: Heartburn      polyethylene glycol (MIRALAX) 17 g packet Take 17 g by mouth Daily As Needed (Use if senna-docusate is ineffective).      potassium chloride (K-DUR,KLOR-CON) 20 MEQ CR tablet Take 1 tablet by mouth 2 (Two) Times a Day.      pravastatin (PRAVACHOL) 20 MG tablet Take 1 tablet by mouth Every Morning.      sennosides-docusate sodium (SENOKOT-S) 8.6-50 MG tablet Take 2 tablets by mouth 2 (two) times a day. 100 tablet 1    sucralfate (Carafate) 1 g tablet Take 1 tablet by mouth 3 (Three) Times a Day. 120 tablet 1    temazepam (RESTORIL) 15 MG capsule Take 1 capsule by mouth At Night As Needed for Sleep.      vitamin B-12 (CYANOCOBALAMIN) 1000 MCG tablet Take 1 tablet by mouth Daily. Indications: Inadequate Vitamin B12      vitamin D (ERGOCALCIFEROL) 1.25 MG (18217 UT) capsule capsule Take 1 capsule by mouth Every 7 (Seven) Days. 5 capsule 0    [DISCONTINUED] furosemide (LASIX) 20 MG tablet Take 1 tablet by mouth 2 (Two) Times a Day. Indications: Edema      [DISCONTINUED] rOPINIRole (REQUIP) 0.25 MG tablet Take 1 tablet by mouth Every Night. Take 1 hour before bedtime.  Indications: Restless Leg Syndrome       No current facility-administered medications on file prior to visit.          Allergies:  Allergies   Allergen Reactions    Levaquin [Levofloxacin] Hives    Zocor [Simvastatin] Myalgia    Atorvastatin Other (See Comments)    Codeine GI Intolerance    Penicillins Hives and Swelling     Pt reports tongue swelling    Percocet [Oxycodone-Acetaminophen] Itching and Nausea And Vomiting           Vital Signs:  /90   Pulse 67   Wt 66.7 kg (147 lb)   SpO2 96%   BMI 26.05 kg/m²   Estimated body mass index is 26.05 kg/m² as calculated from the following:    Height as of an earlier encounter on 7/12/24: 160 cm (62.99\").    Weight as of this encounter: 66.7 kg (147 lb).  Pain Score    07/12/24 0859   PainSc: " 0-No pain         ECOG: Ambulatory and capable of all selfcare but unable to carry out any work activities; up and about more than 50% of waking hours = 2    Physical Exam  Vitals reviewed.   Constitutional:       General: She is not in acute distress.     Appearance: Normal appearance.   HENT:      Head: Normocephalic and atraumatic.   Eyes:      Extraocular Movements: Extraocular movements intact.      Pupils: Pupils are equal, round, and reactive to light.   Pulmonary:      Effort: Pulmonary effort is normal.   Abdominal:      General: Abdomen is flat.      Palpations: Abdomen is soft.   Musculoskeletal:      Cervical back: Normal range of motion.   Skin:     General: Skin is warm and dry.   Neurological:      General: No focal deficit present.      Mental Status: She is alert and oriented to person, place, and time.   Psychiatric:         Mood and Affect: Mood normal.         Behavior: Behavior normal.          Result Review :  The following data was reviewed by: Fernando Hopkins MD on 07/12/2024:  Labs: Last Creatinine, CBC, CMP  Data reviewed : Radiologic studies CT Neck, CT CAP             Diagnoses and all orders for this visit:    1. NSCLC of left lung (Primary)      Assessment:    Ada N Sample presents for regularly scheduled follow-up approximately 3 months after completion of radiation therapy to the left lower lobe and a metastatic right supraclavicular lymph node.  The patient is not on any systemic therapy and continues to follow with Dr. Mann.  She reports that she is doing fairly well from a pulmonary standpoint.  Her main complaints are related to orthopedic issues in her hip for which she is considering seeing orthopedic surgery.    I met with the patient and reviewed the results of her most recent CT imaging in detail.  Overall this is consistent with interval response to the treated areas.  I am somewhat concerned about findings in the right lung with some increased nodules.  I discussed the  case with Dr. Mann.  He has ordered repeat imaging in 3 months.  We will follow-up with her at that time as well.      Plan:    -Follow-up in 3 months with repeat CT imaging, already ordered by Dr. Mann.       I spent 30 minutes caring for Ada on this date of service. This time includes time spent by me in the following activities:preparing for the visit, reviewing tests, obtaining and/or reviewing a separately obtained history, referring and communicating with other health care professionals , documenting information in the medical record, independently interpreting results and communicating that information with the patient/family/caregiver, and care coordination  Follow Up   No follow-ups on file.  Patient was given instructions and counseling regarding her condition or for health maintenance advice. Please see specific information pulled into the AVS if appropriate.     Fernando Hopkins MD

## 2024-07-12 NOTE — LETTER
July 12, 2024     Amy Guillroy MD  [address]  [facility] [address]  [facility] [address]    Patient: Kenia N Sample   YOB: 1945   Date of Visit: 7/12/2024     Dear Amy Guillory MD:       Thank you for referring Ada Sample to me for evaluation. Below are the relevant portions of my assessment and plan of care.    If you have questions, please do not hesitate to call me. I look forward to following Ada along with you.         Sincerely,        Shabbir Mann MD        CC: MD Mackenzie Valdovinos Michael D., MD  07/12/24 0843  Sign when Signing Visit        REASON FOR FOLLOW-UP: Left lower lobe lung mass-squamous of carcinoma                               History of Present Illness     The patient is an 79-year-old female with below medical history recently admitted 12/4 - 6/20/2023 with a history of right intertrochanteric femoral fracture and osteoarthritis presenting from a fall at home.  She had a stress fracture left foot that affected her balance, chronic low back pain that is also worsened with a fall.  Upon presentation orthopedic surgery was consulted finding a right intramedullary nail fixation and healing without abnormal findings.  No additional surgery was planned though physical therapy continued.  Spinal films demonstrated compression deformity T12 and L2 but fortunately she did further improve and was able to be discharged.    The patient had follow-up with several physicians including orthopedic physicians with an eventual chest exam that revealed an unexpected nodularity.  She was then referred to pulmonary medicine 1/8/2024 with a history of smoking for 35 years quitting 8 years ago and indication that there was a lung nodule found incidentally on imaging for the spine.  Through the [facility] system a CT of the chest demonstrated a 2.0 x 1.9 cm noncalcified nodule involving the posterior medial aspect left lower lobe abutting the pleura, more smoothly marginated 0.9 cm  noncalcified right middle lobe nodule and a few additional smaller noncalcified nodules including 2 adjacent nodules measuring less than 0.4 cm the right middle lobe along with mild emphysema with mild to moderate subpleural and bibasilar scarring.  There is no pleural effusion or pneumothorax.    A PET/CT was obtained 2/6/2024 with a solid subpleural irregular nodule left lower lobe medially measuring 1.7 x 1.8 intensely hypermetabolic with SUV of 12.8, no additional nodular mass, had metabolic right superior mediastinal lymph node lateral measuring 0.9 cm to 3.9, diffuse lower lobe tracer uptake with mediastinal right hilar lymph nodes partially calcified thought to be inflammatory or granulomatous disease.  There is also low-grade tracer uptake associated subacute healing left anterior rib fractures.    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    The patient was referred urgently from [facility] for assessment.  It is not certain why she has not been seen in the [place] system though she now states that she wished to be seen here.     Patient's case discussed during thoracic conference with the finding of a right supraclavicular node.  This was unknown to us when seen her initially in consultation.  Request made to obtain ultrasound-guided biopsy of the right supraclavicular node and this is discussed with the patient's daughter who indicates the patient would be willing to proceed.  We will go and have this scheduled even though she is to be seen within the next week and the result may not yet be available.    3/6/2023 FNA obtained, discussed with IR physician Dr. Courtney with findings of a few atypical clusters present suspicious for involvement by non-small cell carcinoma.  He is offered to rebiopsy quickly if necessary.    The patient is seen 3/13/2024 with rebiopsy requested as  well as radiation therapy consultation.  The case is discussed with interventional radiology and a repeat biopsy is possible.  The patient is willing to proceed in this fashion and we have also discussed radiation therapy consultation in the interval.  She continues have symptoms of chronic back pain and left lid lesion that is going to be reviewed by ophthalmology.    The patient went on to have a follow-up exam with repeat right supraclavicular lymph node biopsy 3/18/2024.  This was found positive for metastatic carcinoma.  The patient was seen by Dr. Hopkins with the conclusion that she has a 2 cm left lower lobe squamous cell carcinoma abutting the pleura additional contralateral supraclavicular lymph node in the thyroid.  This is to be discussed in conference 3/21/2024.    This was discussed 3/21/2024 recognizing the patient's frailty.  Options for treating the areas that are known positive including the left lower lobe and right supraclavicular node would include radiation therapy and Dr. Hopkins will see the patient concerning this as we also follow her for consideration of systemic therapy including a [id number] considering the genomic studies done thus far and the inability to obtain additional genomics from the scant tissue that we have even from recent biopsy.  Finally the patient's overall status including an older adult assessment is going to be requested.    The patient's [id number] revealed NF1  detected with possible indication of Selumetinib effectiveness.  Additionally TMB was 10.4 mutations per megabase.  Concurrently the patient was seen by radiation therapy treating the left lower lobe and right neck site via SBRT.  Further MRI of the brain revealed no evidence of metastatic disease.    She is seen back 4/11/2024.  She had been seen 4/8/2024 with SBRT to left lower lobe and right neck lesion.  This has not been completed and she is to follow-up with radiation therapy mid July.  We have  discussed her findings today with the patient seen along with her granddaughter.  She did well with radiation therapy and has no additional symptoms currently respiratorily.  Her genomic testing suggested possible use of Selumetinib (not currently available) though we will follow this as she is subsequently reevaluated.    A follow-up CT series was obtained 7/1/2024.  This reveals a slight decrease in the hypermetabolic right supraclavicular node measuring 0.9 x 0.7 cm previously 1.2 x 1.1 cm.  In the chest there is decrease in the mass in the posterior medial aspect left lower lobe and evidence of radiation pneumonitis adjacently.  Margins are difficult to appreciate completely but maximal diameter is 2 cm previously 2.2 cm as compared to previous.  There is a pleural-based 1.0 x 1.6 cm nodule medial aspect of the right lower lobe which which was less conspicuous previously, multiple nodule opacities right middle lobe that are larger and a 1.3 cm density possibly mucous plugging otherwise seen.  These findings suggest a follow-up series of scans will still be necessary.    The patient is seen with her daughter and they both agree with this follow-up plan.       Past Surgical History:   Procedure Laterality Date   • APPENDECTOMY     • CARDIAC CATHETERIZATION     • EXTERNAL EAR SURGERY Right     had tumor excised behind right ear lobe benign   • EYE LESION EXCISION Left 6/6/2024    Procedure: LEFT LOWER EYELID BASEL CELL CARCINOMA EXCISIONAL AND REPAIR WITH FROZEN SECTIONS,;  Surgeon: Austin Kamara MD;  Location: [facility] MAIN OR;  Service: Ophthalmology;  Laterality: Left;   • FEMUR IM NAILING/RODDING Right 06/15/2022    Procedure: RIGHT HIP INTRAMEDULLARY NAILING/RODDING;  Surgeon: Marc Crawford MD;  Location[facility] MAIN OR;  Service: Orthopedics;  Laterality: Right;   • GALLBLADDER SURGERY     • HYSTERECTOMY     • KNEE ARTHROPLASTY Right    • KNEE ARTHROSCOPY Right    • UT REVJ TOTAL KNEE ARTHRP W/WO  ALGRFT 1 COMPONENT Right 09/07/2016    Procedure: RT ILIOTIBIAL BAND RELEASE RT TOTAL KNEE POLY EXCHANGE;  Surgeon: Toy Adame MD;  Location: [facility];  Service: Orthopedics   • ROTATOR CUFF REPAIR Bilateral    • US GUIDED LYMPH NODE BIOPSY  3/18/2024   • WRIST MASS EXCISION Right         Current Outpatient Medications on File Prior to Visit   Medication Sig Dispense Refill   • acetaminophen (TYLENOL) 325 MG tablet Take 2 tablets by mouth Every 4 (Four) Hours As Needed for Mild Pain .     • albuterol (PROVENTIL HFA;VENTOLIN HFA) 108 (90 BASE) MCG/ACT inhaler Inhale 2 puffs Every 4 (Four) Hours As Needed. Indications: Asthma     • allopurinol (ZYLOPRIM) 100 MG tablet Take 1 tablet by mouth Daily.     • bisacodyl (DULCOLAX) 5 MG EC tablet Take 1 tablet by mouth Daily As Needed for Constipation (Use if polyethylene glycol is ineffective).     • erythromycin (ROMYCIN) 5 MG/GM ophthalmic ointment Apply 1 Application to eye(s) as directed by provider 2 (Two) Times a Day. Apply to surgical site 2 times daily. May use in eye for discomfort. 3.5 g 1   • fluticasone (FLONASE) 50 MCG/ACT nasal spray 2 sprays into the nostril(s) as directed by provider Daily.     • Magnesium 500 MG capsule Take 500 mg by mouth 2 (Two) Times a Day. Indications: Acid Indigestion     • metoprolol tartrate (LOPRESSOR) 25 MG tablet Take 1 tablet by mouth 2 (Two) Times a Day. Indications: High Blood Pressure Disorder     • naloxone (NARCAN) 4 MG/0.1ML nasal spray Call 911. Don't prime. [place] in 1 nostril for overdose. Repeat in 2-3 minutes in other nostril if no or minimal breathing/responsiveness. 2 each 0   • omeprazole (PriLOSEC) 40 MG capsule Take 1 capsule by mouth Every Morning. Indications: Heartburn     • polyethylene glycol (MIRALAX) 17 g packet Take 17 g by mouth Daily As Needed (Use if senna-docusate is ineffective).     • potassium chloride (K-DUR,KLOR-CON) 20 MEQ CR tablet Take 1 tablet by mouth 2 (Two) Times a Day.     •  pravastatin (PRAVACHOL) 20 MG tablet Take 1 tablet by mouth Every Morning.     • sennosides-docusate sodium (SENOKOT-S) 8.6-50 MG tablet Take 2 tablets by mouth 2 (two) times a day. 100 tablet 1   • sucralfate (Carafate) 1 g tablet Take 1 tablet by mouth 3 (Three) Times a Day. 120 tablet 1   • temazepam (RESTORIL) 15 MG capsule Take 1 capsule by mouth At Night As Needed for Sleep.     • vitamin B-12 (CYANOCOBALAMIN) 1000 MCG tablet Take 1 tablet by mouth Daily. Indications: Inadequate Vitamin B12     • vitamin D (ERGOCALCIFEROL) 1.25 MG (78520 UT) capsule capsule Take 1 capsule by mouth Every 7 (Seven) Days. 5 capsule 0   • [DISCONTINUED] furosemide (LASIX) 20 MG tablet Take 1 tablet by mouth 2 (Two) Times a Day. Indications: Edema     • [DISCONTINUED] rOPINIRole (REQUIP) 0.25 MG tablet Take 1 tablet by mouth Every Night. Take 1 hour before bedtime.  Indications: Restless Leg Syndrome       No current facility-administered medications on file prior to visit.        ALLERGIES:    Allergies   Allergen Reactions   • Levaquin [Levofloxacin] Hives   • Zocor [Simvastatin] Myalgia   • Atorvastatin Other (See Comments)   • Codeine GI Intolerance   • Penicillins Hives and Swelling     Pt reports tongue swelling   • Percocet [Oxycodone-Acetaminophen] Itching and Nausea And Vomiting        Social History     Socioeconomic History   • Marital status:      Spouse name: Nabil   Tobacco Use   • Smoking status: Former     Current packs/day: 1.00     Average packs/day: 1 pack/day for 30.0 years (30.0 ttl pk-yrs)     Types: Cigarettes   • Smokeless tobacco: Never   Vaping Use   • Vaping status: Never Used   Substance and Sexual Activity   • Alcohol use: Yes     Comment: 1 wine drink q 3-4 months   • Drug use: No   • Sexual activity: Defer        Family History   Problem Relation Age of Onset   • Hypertension Mother    • Heart failure Mother    • Heart attack Maternal Grandfather         Review of Systems   Constitutional:   "Positive for activity change. Negative for appetite change, fatigue and unexpected weight change.   HENT: Negative.     Eyes: Negative.    Respiratory:  Positive for shortness of breath (Chronic).    Cardiovascular:  Negative for chest pain.   Gastrointestinal: Negative.    Endocrine: Negative.    Genitourinary: Negative.    Musculoskeletal:  Positive for back pain (Chronic back pain).   Neurological: Negative.    Psychiatric/Behavioral: Negative.          Objective    Vitals:    07/12/24 0812 07/12/24 0818   BP: (!) 183/96 (!) 159/108   Pulse: 73    Resp: 16    Temp: 97.8 °F (36.6 °C)    TempSrc: Oral    SpO2: 95%    Weight: 67 kg (147 lb 12.8 oz)    Height: 160 cm (62.99\")    PainSc: 0-No pain                  7/12/2024     8:11 AM   Current Status   ECOG score 0       Physical Exam  Constitutional:       Appearance: Normal appearance.   HENT:      Head: Normocephalic and atraumatic.      Mouth/Throat:      Mouth: Mucous membranes are moist.      Pharynx: Oropharynx is clear.   Eyes:      Extraocular Movements: Extraocular movements intact.      Conjunctiva/sclera: Conjunctivae normal.      Pupils: Pupils are equal, round, and reactive to light.      Comments: Left lid nodular lesion   Cardiovascular:      Rate and Rhythm: Normal rate and regular rhythm.      Pulses: Normal pulses.      Heart sounds: Normal heart sounds.   Pulmonary:      Effort: Pulmonary effort is normal.      Breath sounds: Normal breath sounds.   Abdominal:      General: Bowel sounds are normal.      Palpations: Abdomen is soft.   Musculoskeletal:         General: Normal range of motion.      Cervical back: Normal range of motion and neck supple.   Skin:     General: Skin is warm and dry.   Neurological:      General: No focal deficit present.      Mental Status: She is alert and oriented to person, place, and time.   Psychiatric:         Mood and Affect: Mood normal.         Behavior: Behavior normal.           RECENT LABS:  Hematology WBC "   Date Value Ref Range Status   07/12/2024 3.63 3.40 - 10.80 10*3/mm3 Final   02/06/2024 4.15 (L) 4.5 - 11.0 10*3/uL Final     RBC   Date Value Ref Range Status   07/12/2024 3.75 (L) 3.77 - 5.28 10*6/mm3 Final   02/06/2024 3.70 (L) 4.0 - 5.2 10*6/uL Final     Hemoglobin   Date Value Ref Range Status   07/12/2024 13.0 12.0 - 15.9 g/dL Final   02/06/2024 12.4 12.0 - 16.0 g/dL Final     Hematocrit   Date Value Ref Range Status   07/12/2024 38.0 34.0 - 46.6 % Final   02/06/2024 36.6 36.0 - 46.0 % Final     Platelets   Date Value Ref Range Status   07/12/2024 91 (L) 140 - 450 10*3/mm3 Final   02/06/2024 77 (L) 140 - 440 10*3/uL Final          Assessment & Plan     79 year-old female with a orthopedic history as described in particular involving right hip, bilateral knees and bilateral rotator cuffs recently falling and sustaining a contusion involving her right hip and evidence of compression deformities T12 and L2.  Upon discharge she was seen by orthopedics and, unexpectedly, on additional back studies a left lung nodule was determined.    This was reviewed by pulmonary medicine at [facility] leading to a  a CT of the chest which demonstrated a 2.0 x 1.9 cm noncalcified nodule involving the posterior medial aspect left lower lobe abutting the pleura, more smoothly marginated 0.9 cm noncalcified right middle lobe nodule and a few additional smaller noncalcified nodules including 2 adjacent nodules measuring less than 0.4 cm the right middle lobe along with mild emphysema with mild to moderate subpleural and bibasilar scarring.  There is no pleural effusion or pneumothorax.    A PET/CT was obtained 2/6/2024 with a solid subpleural irregular nodule left lower lobe medially measuring 1.7 x 1.8 intensely hypermetabolic with SUV of 12.8, no additional nodular mass, had metabolic right superior mediastinal lymph node lateral measuring 0.9 cm to 3.9, diffuse lower lobe tracer uptake with mediastinal right hilar lymph nodes  partially calcified thought to be inflammatory or granulomatous disease.  There is also low-grade tracer uptake associated subacute healing left anterior rib fractures.    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    The patient is now seen in CBC office.  There has not been assessment by surgery or, apparently, by pulmonary medicine formally though the studies above are available for review.  After discussion we will need to have her presented in the thoracic conference so that opinions from radiology, thoracic surgery radiation therapy as well as pathology can be obtained.    Patient's case discussed during thoracic conference with the finding of a right supraclavicular node.  This was unknown to us when seen her initially in consultation.  Request made to obtain ultrasound-guided biopsy of the right supraclavicular node and this is discussed with the patient's daughter who indicates the patient would be willing to proceed.  We will go and have this scheduled even though she is to be seen within the next week and the result may not yet be available.    3/6/2023 FNA obtained, discussed with IR physician Dr. Courtney with findings of a few atypical clusters present suspicious for involvement by non-small cell carcinoma.  He is offered to rebiopsy quickly if necessary.    The patient is seen 3/13/2024 with rebiopsy requested as well as radiation therapy consultation.  The case is discussed with interventional radiology and a repeat biopsy is possible.  The patient is willing to proceed in this fashion and we have also discussed radiation therapy consultation in the interval.  She continues have symptoms of chronic back pain and left lid lesion that is going to be reviewed by ophthalmology.    This was discussed 3/21/2024 recognizing the patient's frailty.  Options for treating the areas  that are known positive including the left lower lobe and right supraclavicular node would include radiation therapy and Dr. Hopkins will see the patient concerning this as we also follow her for consideration of systemic therapy including a [id number] considering the genomic studies done thus far and the inability to obtain additional genomics from the scant tissue that we have even from recent biopsy.  Finally the patient's overall status including an older adult assessment is going to be requested.    She is seen back 4/11/2024.  She had been seen 4/8/2024 with SBRT to left lower lobe and right neck lesion.  This has not been completed and she is to follow-up with radiation therapy mid July.  We have discussed her findings today with the patient seen along with her granddaughter.  She did well with radiation therapy and has no additional symptoms currently respiratorily.    Her genomic testing suggested possible use of Selumetinib (not currently available) though we will follow this as she is subsequently reevaluated.      A follow-up CT series was obtained 7/1/2024.  This reveals a slight decrease in the hypermetabolic right supraclavicular node measuring 0.9 x 0.7 cm previously 1.2 x 1.1 cm.  In the chest there is decrease in the mass in the posterior medial aspect left lower lobe and evidence of radiation pneumonitis adjacently.  Margins are difficult to appreciate completely but maximal diameter is 2 cm previously 2.2 cm as compared to previous.  There is a pleural-based 1.0 x 1.6 cm nodule medial aspect of the right lower lobe which which was less conspicuous previously, multiple nodule opacities right middle lobe that are larger and a 1.3 cm density possibly mucous plugging otherwise seen.  These findings suggest a follow-up series of scans will still be necessary.    The patient is seen in office 7/12/2024 with a reasonably stable performance status.  At this point no additional intervention is necessary but  a follow-up scan is requested.    Plan:  *Return 11 weeks for CT neck, chest, abdomen, pelvis, CMP, CBC    *12 weeks MD    *Radiation therapy follow-up in place as well              Plan:  *.  Follow-up CT neck, chest, abdomen, pelvis in early July 2024      *MD follow-up 7/12/2024-XRT and medical oncology.          I

## 2024-07-15 ENCOUNTER — PATIENT OUTREACH (OUTPATIENT)
Dept: OTHER | Facility: HOSPITAL | Age: 79
End: 2024-07-15
Payer: MEDICARE

## 2024-07-15 NOTE — PROGRESS NOTES
Reviewed chart. Patient with [facility].  Met with Dr. Hopkins and Dr. Mann 7/12. She is now 3 months post completion of radiation therapy to the left lower lobe and a metastatic right supraclavicular lymph node. The patient is not on any systemic therapy. 7/1 Scans consistent with interval response to the treated areas. CT scans scheduled in PET department 10/4. Sees Dr. Mann 10/11    Called patient. She is at an appt right now and will call me back

## 2024-07-17 ENCOUNTER — TELEPHONE (OUTPATIENT)
Dept: RADIATION ONCOLOGY | Facility: HOSPITAL | Age: 79
End: 2024-07-17
Payer: MEDICARE

## 2024-07-22 ENCOUNTER — PATIENT OUTREACH (OUTPATIENT)
Dept: OTHER | Facility: HOSPITAL | Age: 79
End: 2024-07-22
Payer: MEDICARE

## 2024-07-22 NOTE — PROGRESS NOTES
Reviewed chart. Patient with [facility].  Met with Dr. Hopkins and Dr. Mann 7/12. She is now 3 months post completion of radiation therapy to the left lower lobe and a metastatic right supraclavicular lymph node. The patient is not on any systemic therapy. 7/1 Scans consistent with interval response to the treated areas. CT scans scheduled in PET department 10/4. Sees Dr. Mann 10/11     Called patient. Left message that I was just touching base to see how she was doing. Wanted to know if she had any questions/concerns or resource/supportive care needs; requested cb.  Call from patient's daughter Melonie. She states the patient is doing well. Denies questions, concerns or needs. She will tell the patient that I called; encouraged them to call if needed. I will continue to follow.

## 2024-08-09 ENCOUNTER — TELEPHONE (OUTPATIENT)
Dept: RADIATION ONCOLOGY | Facility: HOSPITAL | Age: 79
End: 2024-08-09
Payer: MEDICARE

## 2024-09-30 ENCOUNTER — TELEPHONE (OUTPATIENT)
Dept: RADIATION ONCOLOGY | Facility: HOSPITAL | Age: 79
End: 2024-09-30
Payer: MEDICARE

## 2024-10-04 ENCOUNTER — LAB (OUTPATIENT)
Dept: LAB | Facility: HOSPITAL | Age: 79
End: 2024-10-04
Payer: MEDICARE

## 2024-10-04 ENCOUNTER — HOSPITAL ENCOUNTER (OUTPATIENT)
Dept: PET IMAGING | Facility: HOSPITAL | Age: 79
Discharge: HOME | End: 2024-10-04
Payer: MEDICARE

## 2024-10-04 DIAGNOSIS — C34.92 NSCLC OF LEFT LUNG: ICD-10-CM

## 2024-10-04 LAB
ALBUMIN SERPL-MCNC: 4 G/DL (ref 3.5–5.2)
ALBUMIN/GLOB SERPL: 1 G/DL
ALP SERPL-CCNC: 130 U/L (ref 39–117)
ALT SERPL W P-5'-P-CCNC: 16 U/L (ref 1–33)
ANION GAP SERPL CALCULATED.3IONS-SCNC: 11.6 MMOL/L (ref 5–15)
AST SERPL-CCNC: 31 U/L (ref 1–32)
BASOPHILS # BLD AUTO: 0.05 10*3/MM3 (ref 0–0.2)
BASOPHILS NFR BLD AUTO: 1.2 % (ref 0–1.5)
BILIRUB SERPL-MCNC: 0.6 MG/DL (ref 0–1.2)
BUN SERPL-MCNC: 15 MG/DL (ref 8–23)
BUN/CREAT SERPL: 18.1 (ref 7–25)
CALCIUM SPEC-SCNC: 9.7 MG/DL (ref 8.6–10.5)
CHLORIDE SERPL-SCNC: 99 MMOL/L (ref 98–107)
CO2 SERPL-SCNC: 26.4 MMOL/L (ref 22–29)
CREAT SERPL-MCNC: 0.83 MG/DL (ref 0.57–1)
DEPRECATED RDW RBC AUTO: 47.7 FL (ref 37–54)
EGFRCR SERPLBLD CKD-EPI 2021: 70.9 ML/MIN/1.73
EOSINOPHIL # BLD AUTO: 0.14 10*3/MM3 (ref 0–0.4)
EOSINOPHIL NFR BLD AUTO: 3.3 % (ref 0.3–6.2)
ERYTHROCYTE [DISTWIDTH] IN BLOOD BY AUTOMATED COUNT: 12.6 % (ref 12.3–15.4)
GLOBULIN UR ELPH-MCNC: 3.9 GM/DL
GLUCOSE SERPL-MCNC: 97 MG/DL (ref 65–99)
HCT VFR BLD AUTO: 38.7 % (ref 34–46.6)
HGB BLD-MCNC: 12.8 G/DL (ref 12–15.9)
IMM GRANULOCYTES # BLD AUTO: 0.01 10*3/MM3 (ref 0–0.05)
IMM GRANULOCYTES NFR BLD AUTO: 0.2 % (ref 0–0.5)
LYMPHOCYTES # BLD AUTO: 1.5 10*3/MM3 (ref 0.7–3.1)
LYMPHOCYTES NFR BLD AUTO: 35.4 % (ref 19.6–45.3)
MCH RBC QN AUTO: 34.3 PG (ref 26.6–33)
MCHC RBC AUTO-ENTMCNC: 33.1 G/DL (ref 31.5–35.7)
MCV RBC AUTO: 103.8 FL (ref 79–97)
MONOCYTES # BLD AUTO: 0.36 10*3/MM3 (ref 0.1–0.9)
MONOCYTES NFR BLD AUTO: 8.5 % (ref 5–12)
NEUTROPHILS NFR BLD AUTO: 2.18 10*3/MM3 (ref 1.7–7)
NEUTROPHILS NFR BLD AUTO: 51.4 % (ref 42.7–76)
NRBC BLD AUTO-RTO: 0 /100 WBC (ref 0–0.2)
PLATELET # BLD AUTO: 88 10*3/MM3 (ref 140–450)
PMV BLD AUTO: 10.6 FL (ref 6–12)
POTASSIUM SERPL-SCNC: 4 MMOL/L (ref 3.5–5.2)
PROT SERPL-MCNC: 7.9 G/DL (ref 6–8.5)
RBC # BLD AUTO: 3.73 10*6/MM3 (ref 3.77–5.28)
SODIUM SERPL-SCNC: 137 MMOL/L (ref 136–145)
WBC NRBC COR # BLD AUTO: 4.24 10*3/MM3 (ref 3.4–10.8)

## 2024-10-04 PROCEDURE — 36415 COLL VENOUS BLD VENIPUNCTURE: CPT

## 2024-10-04 PROCEDURE — 74177 CT ABD & PELVIS W/CONTRAST: CPT

## 2024-10-04 PROCEDURE — 0 DIATRIZOATE MEGLUMINE & SODIUM PER 1 ML: Performed by: INTERNAL MEDICINE

## 2024-10-04 PROCEDURE — 25510000001 IOPAMIDOL 61 % SOLUTION: Performed by: INTERNAL MEDICINE

## 2024-10-04 PROCEDURE — 85025 COMPLETE CBC W/AUTO DIFF WBC: CPT

## 2024-10-04 PROCEDURE — 71260 CT THORAX DX C+: CPT

## 2024-10-04 PROCEDURE — 80053 COMPREHEN METABOLIC PANEL: CPT

## 2024-10-04 PROCEDURE — 70491 CT SOFT TISSUE NECK W/DYE: CPT

## 2024-10-04 RX ORDER — DIATRIZOATE MEGLUMINE AND DIATRIZOATE SODIUM 660; 100 MG/ML; MG/ML
30 SOLUTION ORAL; RECTAL
Status: COMPLETED | OUTPATIENT
Start: 2024-10-04 | End: 2024-10-04

## 2024-10-04 RX ORDER — IOPAMIDOL 612 MG/ML
100 INJECTION, SOLUTION INTRAVASCULAR
Status: COMPLETED | OUTPATIENT
Start: 2024-10-04 | End: 2024-10-04

## 2024-10-04 RX ADMIN — DIATRIZOATE MEGLUMINE AND DIATRIZOATE SODIUM 30 ML: 660; 100 LIQUID ORAL; RECTAL at 13:45

## 2024-10-04 RX ADMIN — IOPAMIDOL 85 ML: 612 INJECTION, SOLUTION INTRAVENOUS at 14:44

## 2024-10-07 ENCOUNTER — HOSPITAL ENCOUNTER (OUTPATIENT)
Facility: HOSPITAL | Age: 79
Setting detail: OBSERVATION
Discharge: HOME HEALTH | End: 2024-10-09
Attending: STUDENT IN AN ORGANIZED HEALTH CARE EDUCATION/TRAINING PROGRAM | Admitting: HOSPITALIST
Payer: MEDICARE

## 2024-10-07 ENCOUNTER — APPOINTMENT (OUTPATIENT)
Dept: GENERAL RADIOLOGY | Facility: HOSPITAL | Age: 79
End: 2024-10-07
Payer: MEDICARE

## 2024-10-07 DIAGNOSIS — R26.89 UNABLE TO BEAR WEIGHT: ICD-10-CM
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
## 2024-10-07 DIAGNOSIS — M25.551 RIGHT HIP PAIN: Primary | ICD-10-CM

## 2024-10-07 LAB
ALBUMIN SERPL-MCNC: 3.6 G/DL (ref 3.5–5.2)
ALBUMIN/GLOB SERPL: 1.1 G/DL
ALP SERPL-CCNC: 146 U/L (ref 39–117)
ALT SERPL W P-5'-P-CCNC: 15 U/L (ref 1–33)
ANION GAP SERPL CALCULATED.3IONS-SCNC: 9 MMOL/L (ref 5–15)
AST SERPL-CCNC: 30 U/L (ref 1–32)
BASOPHILS # BLD AUTO: 0.04 10*3/MM3 (ref 0–0.2)
BASOPHILS NFR BLD AUTO: 1.1 % (ref 0–1.5)
BILIRUB SERPL-MCNC: 0.2 MG/DL (ref 0–1.2)
BUN SERPL-MCNC: 11 MG/DL (ref 8–23)
BUN/CREAT SERPL: 15.7 (ref 7–25)
CALCIUM SPEC-SCNC: 9.1 MG/DL (ref 8.6–10.5)
CHLORIDE SERPL-SCNC: 102 MMOL/L (ref 98–107)
CO2 SERPL-SCNC: 25 MMOL/L (ref 22–29)
CREAT SERPL-MCNC: 0.7 MG/DL (ref 0.57–1)
DEPRECATED RDW RBC AUTO: 45.7 FL (ref 37–54)
EGFRCR SERPLBLD CKD-EPI 2021: 87 ML/MIN/1.73
EOSINOPHIL # BLD AUTO: 0.14 10*3/MM3 (ref 0–0.4)
EOSINOPHIL NFR BLD AUTO: 4 % (ref 0.3–6.2)
ERYTHROCYTE [DISTWIDTH] IN BLOOD BY AUTOMATED COUNT: 12.5 % (ref 12.3–15.4)
GLOBULIN UR ELPH-MCNC: 3.3 GM/DL
GLUCOSE SERPL-MCNC: 103 MG/DL (ref 65–99)
HCT VFR BLD AUTO: 35.6 % (ref 34–46.6)
HGB BLD-MCNC: 12.5 G/DL (ref 12–15.9)
IMM GRANULOCYTES # BLD AUTO: 0 10*3/MM3 (ref 0–0.05)
IMM GRANULOCYTES NFR BLD AUTO: 0 % (ref 0–0.5)
LYMPHOCYTES # BLD AUTO: 1.21 10*3/MM3 (ref 0.7–3.1)
LYMPHOCYTES NFR BLD AUTO: 34.8 % (ref 19.6–45.3)
MCH RBC QN AUTO: 35.4 PG (ref 26.6–33)
MCHC RBC AUTO-ENTMCNC: 35.1 G/DL (ref 31.5–35.7)
MCV RBC AUTO: 100.8 FL (ref 79–97)
MONOCYTES # BLD AUTO: 0.3 10*3/MM3 (ref 0.1–0.9)
MONOCYTES NFR BLD AUTO: 8.6 % (ref 5–12)
NEUTROPHILS NFR BLD AUTO: 1.79 10*3/MM3 (ref 1.7–7)
NEUTROPHILS NFR BLD AUTO: 51.5 % (ref 42.7–76)
NRBC BLD AUTO-RTO: 0 /100 WBC (ref 0–0.2)
PLATELET # BLD AUTO: 83 10*3/MM3 (ref 140–450)
PMV BLD AUTO: 10 FL (ref 6–12)
POTASSIUM SERPL-SCNC: 4.1 MMOL/L (ref 3.5–5.2)
PROT SERPL-MCNC: 6.9 G/DL (ref 6–8.5)
RBC # BLD AUTO: 3.53 10*6/MM3 (ref 3.77–5.28)
SODIUM SERPL-SCNC: 136 MMOL/L (ref 136–145)
WBC NRBC COR # BLD AUTO: 3.48 10*3/MM3 (ref 3.4–10.8)

## 2024-10-07 PROCEDURE — 96374 THER/PROPH/DIAG INJ IV PUSH: CPT

## 2024-10-07 PROCEDURE — 25010000002 MORPHINE PER 10 MG: Performed by: STUDENT IN AN ORGANIZED HEALTH CARE EDUCATION/TRAINING PROGRAM

## 2024-10-07 PROCEDURE — 73502 X-RAY EXAM HIP UNI 2-3 VIEWS: CPT

## 2024-10-07 PROCEDURE — 99285 EMERGENCY DEPT VISIT HI MDM: CPT

## 2024-10-07 PROCEDURE — 80053 COMPREHEN METABOLIC PANEL: CPT | Performed by: STUDENT IN AN ORGANIZED HEALTH CARE EDUCATION/TRAINING PROGRAM

## 2024-10-07 PROCEDURE — 85025 COMPLETE CBC W/AUTO DIFF WBC: CPT | Performed by: STUDENT IN AN ORGANIZED HEALTH CARE EDUCATION/TRAINING PROGRAM

## 2024-10-07 RX ORDER — MORPHINE SULFATE 2 MG/ML
4 INJECTION, SOLUTION INTRAMUSCULAR; INTRAVENOUS ONCE
Status: COMPLETED | OUTPATIENT
Start: 2024-10-07 | End: 2024-10-07

## 2024-10-07 RX ADMIN — MORPHINE SULFATE 4 MG: 2 INJECTION, SOLUTION INTRAMUSCULAR; INTRAVENOUS at 23:03

## 2024-10-08 ENCOUNTER — APPOINTMENT (OUTPATIENT)
Dept: CT IMAGING | Facility: HOSPITAL | Age: 79
End: 2024-10-08
Payer: MEDICARE

## 2024-10-08 ENCOUNTER — APPOINTMENT (OUTPATIENT)
Dept: MRI IMAGING | Facility: HOSPITAL | Age: 79
End: 2024-10-08
Payer: MEDICARE

## 2024-10-08 ENCOUNTER — APPOINTMENT (OUTPATIENT)
Dept: GENERAL RADIOLOGY | Facility: HOSPITAL | Age: 79
End: 2024-10-08
Payer: MEDICARE

## 2024-10-08 PROBLEM — S22.080A T12 COMPRESSION FRACTURE: Status: ACTIVE | Noted: 2024-10-08

## 2024-10-08 PROBLEM — R26.89 UNABLE TO BEAR WEIGHT: Status: ACTIVE | Noted: 2024-10-08
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
## 2024-10-08 PROBLEM — S32.010A CLOSED COMPRESSION FRACTURE OF L1 VERTEBRA: Status: ACTIVE | Noted: 2024-10-08

## 2024-10-08 LAB
ALBUMIN SERPL-MCNC: 3.5 G/DL (ref 3.5–5.2)
ALBUMIN/GLOB SERPL: 1.1 G/DL
ALP SERPL-CCNC: 122 U/L (ref 39–117)
ALT SERPL W P-5'-P-CCNC: 14 U/L (ref 1–33)
ANION GAP SERPL CALCULATED.3IONS-SCNC: 8.9 MMOL/L (ref 5–15)
AST SERPL-CCNC: 28 U/L (ref 1–32)
BILIRUB SERPL-MCNC: 0.2 MG/DL (ref 0–1.2)
BUN SERPL-MCNC: 11 MG/DL (ref 8–23)
BUN/CREAT SERPL: 13.6 (ref 7–25)
CALCIUM SPEC-SCNC: 8.8 MG/DL (ref 8.6–10.5)
CHLORIDE SERPL-SCNC: 104 MMOL/L (ref 98–107)
CO2 SERPL-SCNC: 26.1 MMOL/L (ref 22–29)
CREAT SERPL-MCNC: 0.81 MG/DL (ref 0.57–1)
DEPRECATED RDW RBC AUTO: 46.9 FL (ref 37–54)
EGFRCR SERPLBLD CKD-EPI 2021: 73 ML/MIN/1.73
ERYTHROCYTE [DISTWIDTH] IN BLOOD BY AUTOMATED COUNT: 12.6 % (ref 12.3–15.4)
GLOBULIN UR ELPH-MCNC: 3.3 GM/DL
GLUCOSE SERPL-MCNC: 86 MG/DL (ref 65–99)
HCT VFR BLD AUTO: 34.8 % (ref 34–46.6)
HGB BLD-MCNC: 11.9 G/DL (ref 12–15.9)
MCH RBC QN AUTO: 34.7 PG (ref 26.6–33)
MCHC RBC AUTO-ENTMCNC: 34.2 G/DL (ref 31.5–35.7)
MCV RBC AUTO: 101.5 FL (ref 79–97)
PLATELET # BLD AUTO: 85 10*3/MM3 (ref 140–450)
PMV BLD AUTO: 9.8 FL (ref 6–12)
POTASSIUM SERPL-SCNC: 3.9 MMOL/L (ref 3.5–5.2)
PROT SERPL-MCNC: 6.8 G/DL (ref 6–8.5)
RBC # BLD AUTO: 3.43 10*6/MM3 (ref 3.77–5.28)
SODIUM SERPL-SCNC: 139 MMOL/L (ref 136–145)
WBC NRBC COR # BLD AUTO: 3.82 10*3/MM3 (ref 3.4–10.8)

## 2024-10-08 PROCEDURE — G0378 HOSPITAL OBSERVATION PER HR: HCPCS

## 2024-10-08 PROCEDURE — 0 GADOBENATE DIMEGLUMINE 529 MG/ML SOLUTION: Performed by: HOSPITALIST

## 2024-10-08 PROCEDURE — 96376 TX/PRO/DX INJ SAME DRUG ADON: CPT

## 2024-10-08 PROCEDURE — A9577 INJ MULTIHANCE: HCPCS | Performed by: HOSPITALIST

## 2024-10-08 PROCEDURE — 36415 COLL VENOUS BLD VENIPUNCTURE: CPT | Performed by: NURSE PRACTITIONER

## 2024-10-08 PROCEDURE — 72158 MRI LUMBAR SPINE W/O & W/DYE: CPT

## 2024-10-08 PROCEDURE — 73560 X-RAY EXAM OF KNEE 1 OR 2: CPT

## 2024-10-08 PROCEDURE — 72192 CT PELVIS W/O DYE: CPT

## 2024-10-08 PROCEDURE — 25010000002 ONDANSETRON PER 1 MG: Performed by: STUDENT IN AN ORGANIZED HEALTH CARE EDUCATION/TRAINING PROGRAM

## 2024-10-08 PROCEDURE — 72131 CT LUMBAR SPINE W/O DYE: CPT

## 2024-10-08 PROCEDURE — 96375 TX/PRO/DX INJ NEW DRUG ADDON: CPT

## 2024-10-08 PROCEDURE — 25010000002 ONDANSETRON PER 1 MG: Performed by: NURSE PRACTITIONER

## 2024-10-08 PROCEDURE — 99214 OFFICE O/P EST MOD 30 MIN: CPT | Performed by: NURSE PRACTITIONER

## 2024-10-08 PROCEDURE — 85027 COMPLETE CBC AUTOMATED: CPT | Performed by: NURSE PRACTITIONER

## 2024-10-08 PROCEDURE — 80053 COMPREHEN METABOLIC PANEL: CPT | Performed by: NURSE PRACTITIONER

## 2024-10-08 RX ORDER — DIAZEPAM 2 MG
6 TABLET ORAL ONCE AS NEEDED
Status: COMPLETED | OUTPATIENT
Start: 2024-10-08 | End: 2024-10-08

## 2024-10-08 RX ORDER — AMOXICILLIN 250 MG
2 CAPSULE ORAL 2 TIMES DAILY PRN
Status: DISCONTINUED | OUTPATIENT
Start: 2024-10-08 | End: 2024-10-09 | Stop reason: HOSPADM

## 2024-10-08 RX ORDER — ONDANSETRON 2 MG/ML
4 INJECTION INTRAMUSCULAR; INTRAVENOUS ONCE
Status: COMPLETED | OUTPATIENT
Start: 2024-10-08 | End: 2024-10-08

## 2024-10-08 RX ORDER — PRAVASTATIN SODIUM 20 MG
20 TABLET ORAL EVERY MORNING
Status: DISCONTINUED | OUTPATIENT
Start: 2024-10-09 | End: 2024-10-09 | Stop reason: HOSPADM

## 2024-10-08 RX ORDER — ALLOPURINOL 100 MG/1
100 TABLET ORAL DAILY
Status: DISCONTINUED | OUTPATIENT
Start: 2024-10-09 | End: 2024-10-09 | Stop reason: HOSPADM

## 2024-10-08 RX ORDER — BISACODYL 10 MG
10 SUPPOSITORY, RECTAL RECTAL DAILY PRN
Status: DISCONTINUED | OUTPATIENT
Start: 2024-10-08 | End: 2024-10-09 | Stop reason: HOSPADM

## 2024-10-08 RX ORDER — ACETAMINOPHEN 325 MG/1
650 TABLET ORAL EVERY 4 HOURS PRN
Status: DISCONTINUED | OUTPATIENT
Start: 2024-10-08 | End: 2024-10-09 | Stop reason: HOSPADM

## 2024-10-08 RX ORDER — ONDANSETRON 2 MG/ML
4 INJECTION INTRAMUSCULAR; INTRAVENOUS EVERY 6 HOURS PRN
Status: DISCONTINUED | OUTPATIENT
Start: 2024-10-08 | End: 2024-10-09 | Stop reason: HOSPADM

## 2024-10-08 RX ORDER — METHOCARBAMOL 500 MG/1
500 TABLET, FILM COATED ORAL EVERY 8 HOURS PRN
Status: DISCONTINUED | OUTPATIENT
Start: 2024-10-08 | End: 2024-10-09 | Stop reason: HOSPADM

## 2024-10-08 RX ORDER — TEMAZEPAM 15 MG/1
15 CAPSULE ORAL NIGHTLY PRN
Status: DISCONTINUED | OUTPATIENT
Start: 2024-10-08 | End: 2024-10-09 | Stop reason: HOSPADM

## 2024-10-08 RX ORDER — POLYETHYLENE GLYCOL 3350 17 G/17G
17 POWDER, FOR SOLUTION ORAL DAILY PRN
Status: DISCONTINUED | OUTPATIENT
Start: 2024-10-08 | End: 2024-10-09 | Stop reason: HOSPADM

## 2024-10-08 RX ORDER — PANTOPRAZOLE SODIUM 40 MG/1
40 TABLET, DELAYED RELEASE ORAL
Status: DISCONTINUED | OUTPATIENT
Start: 2024-10-09 | End: 2024-10-09 | Stop reason: HOSPADM

## 2024-10-08 RX ORDER — SODIUM CHLORIDE 0.9 % (FLUSH) 0.9 %
10 SYRINGE (ML) INJECTION AS NEEDED
Status: DISCONTINUED | OUTPATIENT
Start: 2024-10-08 | End: 2024-10-09 | Stop reason: HOSPADM

## 2024-10-08 RX ORDER — HYDROCODONE BITARTRATE AND ACETAMINOPHEN 7.5; 325 MG/1; MG/1
1 TABLET ORAL ONCE
Status: COMPLETED | OUTPATIENT
Start: 2024-10-08 | End: 2024-10-08

## 2024-10-08 RX ORDER — AMOXICILLIN 250 MG
2 CAPSULE ORAL 2 TIMES DAILY
Status: DISCONTINUED | OUTPATIENT
Start: 2024-10-08 | End: 2024-10-09 | Stop reason: HOSPADM

## 2024-10-08 RX ORDER — HYDROCODONE BITARTRATE AND ACETAMINOPHEN 5; 325 MG/1; MG/1
1 TABLET ORAL EVERY 4 HOURS PRN
Status: DISCONTINUED | OUTPATIENT
Start: 2024-10-08 | End: 2024-10-09 | Stop reason: HOSPADM

## 2024-10-08 RX ORDER — LIDOCAINE 4 G/G
1 PATCH TOPICAL
Status: DISCONTINUED | OUTPATIENT
Start: 2024-10-08 | End: 2024-10-09 | Stop reason: HOSPADM

## 2024-10-08 RX ORDER — FAMOTIDINE 20 MG/1
20 TABLET, FILM COATED ORAL 2 TIMES DAILY PRN
Status: DISCONTINUED | OUTPATIENT
Start: 2024-10-08 | End: 2024-10-09 | Stop reason: HOSPADM

## 2024-10-08 RX ORDER — ALBUTEROL SULFATE 0.83 MG/ML
2.5 SOLUTION RESPIRATORY (INHALATION) EVERY 6 HOURS PRN
Status: DISCONTINUED | OUTPATIENT
Start: 2024-10-08 | End: 2024-10-09 | Stop reason: HOSPADM

## 2024-10-08 RX ORDER — ACETAMINOPHEN 160 MG/5ML
650 SOLUTION ORAL EVERY 4 HOURS PRN
Status: DISCONTINUED | OUTPATIENT
Start: 2024-10-08 | End: 2024-10-09 | Stop reason: HOSPADM

## 2024-10-08 RX ORDER — BISACODYL 5 MG/1
5 TABLET, DELAYED RELEASE ORAL DAILY PRN
Status: DISCONTINUED | OUTPATIENT
Start: 2024-10-08 | End: 2024-10-09 | Stop reason: HOSPADM

## 2024-10-08 RX ORDER — METOPROLOL TARTRATE 25 MG/1
25 TABLET, FILM COATED ORAL EVERY 12 HOURS SCHEDULED
Status: DISCONTINUED | OUTPATIENT
Start: 2024-10-08 | End: 2024-10-09 | Stop reason: HOSPADM

## 2024-10-08 RX ORDER — ACETAMINOPHEN 650 MG/1
650 SUPPOSITORY RECTAL EVERY 4 HOURS PRN
Status: DISCONTINUED | OUTPATIENT
Start: 2024-10-08 | End: 2024-10-09 | Stop reason: HOSPADM

## 2024-10-08 RX ADMIN — SENNOSIDES AND DOCUSATE SODIUM 2 TABLET: 50; 8.6 TABLET ORAL at 21:05

## 2024-10-08 RX ADMIN — METHOCARBAMOL TABLETS 500 MG: 500 TABLET, COATED ORAL at 21:05

## 2024-10-08 RX ADMIN — TEMAZEPAM 15 MG: 15 CAPSULE ORAL at 21:05

## 2024-10-08 RX ADMIN — ACETAMINOPHEN 325MG 650 MG: 325 TABLET ORAL at 16:34

## 2024-10-08 RX ADMIN — ONDANSETRON 4 MG: 2 INJECTION, SOLUTION INTRAMUSCULAR; INTRAVENOUS at 09:02

## 2024-10-08 RX ADMIN — ONDANSETRON 4 MG: 2 INJECTION, SOLUTION INTRAMUSCULAR; INTRAVENOUS at 01:28

## 2024-10-08 RX ADMIN — ACETAMINOPHEN 325MG 650 MG: 325 TABLET ORAL at 06:10

## 2024-10-08 RX ADMIN — DIAZEPAM 6 MG: 2 TABLET ORAL at 17:11

## 2024-10-08 RX ADMIN — LIDOCAINE 1 PATCH: 4 PATCH TOPICAL at 13:57

## 2024-10-08 RX ADMIN — METOPROLOL TARTRATE 25 MG: 25 TABLET, FILM COATED ORAL at 10:58

## 2024-10-08 RX ADMIN — SENNOSIDES AND DOCUSATE SODIUM 2 TABLET: 50; 8.6 TABLET ORAL at 10:57

## 2024-10-08 RX ADMIN — METHOCARBAMOL TABLETS 500 MG: 500 TABLET, COATED ORAL at 13:57

## 2024-10-08 RX ADMIN — ACETAMINOPHEN 325MG 650 MG: 325 TABLET ORAL at 21:05

## 2024-10-08 RX ADMIN — HYDROCODONE BITARTRATE AND ACETAMINOPHEN 1 TABLET: 7.5; 325 TABLET ORAL at 01:28

## 2024-10-08 RX ADMIN — MAGNESIUM OXIDE 400 MG (241.3 MG MAGNESIUM) TABLET 800 MG: TABLET at 21:05

## 2024-10-08 RX ADMIN — METOPROLOL TARTRATE 25 MG: 25 TABLET, FILM COATED ORAL at 21:05

## 2024-10-08 RX ADMIN — GADOBENATE DIMEGLUMINE 15 ML: 529 INJECTION, SOLUTION INTRAVENOUS at 17:42

## 2024-10-08 RX ADMIN — ACETAMINOPHEN 325MG 650 MG: 325 TABLET ORAL at 10:58

## 2024-10-08 NOTE — ED PROVIDER NOTES
EMERGENCY DEPARTMENT ENCOUNTER    Room number:  26/26  Date Seen:  10/8/2024  PCP:  Amy Guillory MD    Laboratory Results:  Recent Results (from the past 24 hour(s))   Comprehensive Metabolic Panel    Collection Time: 10/07/24 10:59 PM    Specimen: Blood   Result Value Ref Range    Glucose 103 (H) 65 - 99 mg/dL    BUN 11 8 - 23 mg/dL    Creatinine 0.70 0.57 - 1.00 mg/dL    Sodium 136 136 - 145 mmol/L    Potassium 4.1 3.5 - 5.2 mmol/L    Chloride 102 98 - 107 mmol/L    CO2 25.0 22.0 - 29.0 mmol/L    Calcium 9.1 8.6 - 10.5 mg/dL    Total Protein 6.9 6.0 - 8.5 g/dL    Albumin 3.6 3.5 - 5.2 g/dL    ALT (SGPT) 15 1 - 33 U/L    AST (SGOT) 30 1 - 32 U/L    Alkaline Phosphatase 146 (H) 39 - 117 U/L    Total Bilirubin 0.2 0.0 - 1.2 mg/dL    Globulin 3.3 gm/dL    A/G Ratio 1.1 g/dL    BUN/Creatinine Ratio 15.7 7.0 - 25.0    Anion Gap 9.0 5.0 - 15.0 mmol/L    eGFR 87.0 >60.0 mL/min/1.73   CBC Auto Differential    Collection Time: 10/07/24 10:59 PM    Specimen: Blood   Result Value Ref Range    WBC 3.48 3.40 - 10.80 10*3/mm3    RBC 3.53 (L) 3.77 - 5.28 10*6/mm3    Hemoglobin 12.5 12.0 - 15.9 g/dL    Hematocrit 35.6 34.0 - 46.6 %    .8 (H) 79.0 - 97.0 fL    MCH 35.4 (H) 26.6 - 33.0 pg    MCHC 35.1 31.5 - 35.7 g/dL    RDW 12.5 12.3 - 15.4 %    RDW-SD 45.7 37.0 - 54.0 fl    MPV 10.0 6.0 - 12.0 fL    Platelets 83 (L) 140 - 450 10*3/mm3    Neutrophil % 51.5 42.7 - 76.0 %    Lymphocyte % 34.8 19.6 - 45.3 %    Monocyte % 8.6 5.0 - 12.0 %    Eosinophil % 4.0 0.3 - 6.2 %    Basophil % 1.1 0.0 - 1.5 %    Immature Grans % 0.0 0.0 - 0.5 %    Neutrophils, Absolute 1.79 1.70 - 7.00 10*3/mm3    Lymphocytes, Absolute 1.21 0.70 - 3.10 10*3/mm3    Monocytes, Absolute 0.30 0.10 - 0.90 10*3/mm3    Eosinophils, Absolute 0.14 0.00 - 0.40 10*3/mm3    Basophils, Absolute 0.04 0.00 - 0.20 10*3/mm3    Immature Grans, Absolute 0.00 0.00 - 0.05 10*3/mm3    nRBC 0.0 0.0 - 0.2 /100 WBC     I reviewed the above results.    Radiology:  CT  Pelvis Without Contrast   Final Result         Electronically signed by Jimy Cm MD on 10-08-24 at 0103      CT Lumbar Spine Without Contrast   Final Result         Electronically signed by Jimy Cm MD on 10-08-24 at 0107      XR Hip With or Without Pelvis 2 - 3 View Right   Final Result        I reviewed the above results    Medications ordered in ED:  Medications   sodium chloride 0.9 % flush 10 mL (has no administration in time range)   acetaminophen (TYLENOL) tablet 650 mg (has no administration in time range)     Or   acetaminophen (TYLENOL) 160 MG/5ML oral solution 650 mg (has no administration in time range)     Or   acetaminophen (TYLENOL) suppository 650 mg (has no administration in time range)   famotidine (PEPCID) tablet 20 mg (has no administration in time range)   sennosides-docusate (PERICOLACE) 8.6-50 MG per tablet 2 tablet (has no administration in time range)     And   polyethylene glycol (MIRALAX) packet 17 g (has no administration in time range)     And   bisacodyl (DULCOLAX) EC tablet 5 mg (has no administration in time range)     And   bisacodyl (DULCOLAX) suppository 10 mg (has no administration in time range)   ondansetron (ZOFRAN) injection 4 mg (has no administration in time range)   melatonin tablet 5 mg (has no administration in time range)   morphine injection 4 mg (4 mg Intravenous Given 10/7/24 2303)   HYDROcodone-acetaminophen (NORCO) 7.5-325 MG per tablet 1 tablet (1 tablet Oral Given 10/8/24 0128)   ondansetron (ZOFRAN) injection 4 mg (4 mg Intravenous Given 10/8/24 0128)       ED Course as of 10/08/24 0304   Mon Oct 07, 2024   2318 Patient was turned over to me by Dr. Sousa.  Pending: CT and dispo   [CC]   Tue Oct 08, 2024   0139 Spoke with HENOK Talbert with [facility].  Reviewed history, exam, results, treatments.  She agrees admit the patient to Dr. Huang.      [CC]      ED Course User Index  [CC] Maryse Damon, PA-C       Diagnosis:  Final diagnoses:   Unable to  bear weight   Fall, initial encounter   Right hip pain           Provider attestation:  I personally reviewed the past medical history, past surgical history, social history, family history, current medications, and allergies as they appear in the chart.    The patient was seen and examined by myself and Dr. Sousa, who agree with plan.      Maryse Damon PA-C  10/08/24 0305

## 2024-10-08 NOTE — H&P
Patient Name:  Kenia Palomo  YOB: 1945  MRN:  [id number]  Admit Date:  10/7/2024  Patient Care Team:  Amy Guillory MD as PCP - General (Family Medicine)  Brayden Jackson MD as PCP - Family Medicine  Naif York MD as Consulting Physician (Cardiology)  Narendra Banda MD as Surgeon (General Surgery)  Shabbir Mann MD as Consulting Physician (Hematology and Oncology)  Gali Taveras APRN as Referring Physician ([facility])  Gali Martinez RN as Nurse Navigator  Fernando Hopkins MD as Consulting Physician (Radiation Oncology)      Subjective   History Present Illness     Chief Complaint   Patient presents with    Fall     History of Present Illness  Ms. Palomo is a 79 year old female with history of hypertension, hyperlipidemia, non-small cell lung cancer, GERD who presents to the emergency room after a fall at home.  Patient states she was just standing and went to turn and just fell over onto her right side.  She denies any syncope or chest pain, no loss of consciousness.  She complains right-sided hip/buttock pain, but no decreased range of motion, although she was having difficulty walking, she is also complaining of some right knee pain.  In the emergency room sodium 136, potassium 4.1, creatinine 0.70, BUN 11, white blood cell count 3.4, hemoglobin 12.5, hematocrit 35.6, platelets 83.  X-ray of the right hip shows gamma nail's and medullary bob demonstrated within the right femur, no change in position or loosening seen, no acute osseous abnormalities.  CT scan of the lumbar spine shows anterior wedge compression fracture involving T12, L1, and L2, no obvious acute fracture lines identified there is approximately 60% loss of height at T12, there is approximately 60% loss of height centrally at L2 level, the appearance is age-indeterminate.    Review of Systems   Constitutional:  Negative for appetite change and fever.   HENT:  Negative for  nosebleeds and trouble swallowing.    Eyes:  Negative for photophobia, redness and visual disturbance.   Respiratory:  Negative for cough, chest tightness, shortness of breath and wheezing.    Cardiovascular:  Negative for chest pain, palpitations and leg swelling.   Gastrointestinal:  Negative for abdominal distention, abdominal pain, nausea and vomiting.   Endocrine: Negative.    Genitourinary: Negative.    Musculoskeletal:  Positive for back pain and gait problem. Negative for joint swelling.   Skin: Negative.    Neurological:  Positive for weakness. Negative for dizziness, seizures, speech difficulty, light-headedness and headaches.   Hematological: Negative.    Psychiatric/Behavioral:  Negative for behavioral problems and confusion.         Personal History     Past Medical History:   Diagnosis Date    Anesthesia complication     slow to wake up    Anginal pain     Arthritis     Cancer     Lung    Gastric reflux syndrome     Gout     Hiatal hernia     High cholesterol     History of bronchitis     Hyperlipidemia     Hypertension     Irregular heart beat     Mild aortic valve stenosis     per echo 6/2023    RLS (restless legs syndrome)      Past Surgical History:   Procedure Laterality Date    APPENDECTOMY      CARDIAC CATHETERIZATION      EXTERNAL EAR SURGERY Right     had tumor excised behind right ear lobe benign    EYE LESION EXCISION Left 6/6/2024    Procedure: LEFT LOWER EYELID BASEL CELL CARCINOMA EXCISIONAL AND REPAIR WITH FROZEN SECTIONS,;  Surgeon: Austin Kamara MD;  Location: [facility] MAIN OR;  Service: Ophthalmology;  Laterality: Left;    FEMUR IM NAILING/RODDING Right 06/15/2022    Procedure: RIGHT HIP INTRAMEDULLARY NAILING/RODDING;  Surgeon: Marc Crawford MD;  Location: [facility] MAIN OR;  Service: Orthopedics;  Laterality: Right;    GALLBLADDER SURGERY      HYSTERECTOMY      KNEE ARTHROPLASTY Right     KNEE ARTHROSCOPY Right     NH REVJ TOTAL KNEE ARTHRP W/WO ALGRFT 1 COMPONENT Right  09/07/2016    Procedure: RT ILIOTIBIAL BAND RELEASE RT TOTAL KNEE POLY EXCHANGE;  Surgeon: Toy Adame MD;  Location: [facility] MAIN OR;  Service: Orthopedics    ROTATOR CUFF REPAIR Bilateral     US GUIDED LYMPH NODE BIOPSY  3/18/2024    WRIST MASS EXCISION Right      Family History   Problem Relation Age of Onset    Hypertension Mother     Heart failure Mother     Heart attack Maternal Grandfather      Social History     Tobacco Use    Smoking status: Former     Current packs/day: 1.00     Average packs/day: 1 pack/day for 30.0 years (30.0 ttl pk-yrs)     Types: Cigarettes    Smokeless tobacco: Never   Vaping Use    Vaping status: Never Used   Substance Use Topics    Alcohol use: Yes     Comment: 1 wine drink q 3-4 months    Drug use: Never     No current facility-administered medications on file prior to encounter.     Current Outpatient Medications on File Prior to Encounter   Medication Sig Dispense Refill    allopurinol (ZYLOPRIM) 100 MG tablet Take 1 tablet by mouth Daily.      Magnesium 500 MG capsule Take 500 mg by mouth 2 (Two) Times a Day. Indications: Acid Indigestion      metoprolol tartrate (LOPRESSOR) 25 MG tablet Take 1 tablet by mouth 2 (Two) Times a Day. Indications: High Blood Pressure      omeprazole (PriLOSEC) 40 MG capsule Take 1 capsule by mouth Every Morning. Indications: Heartburn      potassium chloride (K-DUR,KLOR-CON) 20 MEQ CR tablet Take 1 tablet by mouth 2 (Two) Times a Day.      pravastatin (PRAVACHOL) 20 MG tablet Take 1 tablet by mouth Every Morning.      temazepam (RESTORIL) 15 MG capsule Take 1 capsule by mouth At Night As Needed for Sleep.      vitamin B-12 (CYANOCOBALAMIN) 1000 MCG tablet Take 1 tablet by mouth Daily. Indications: Inadequate Vitamin B12      vitamin D (ERGOCALCIFEROL) 1.25 MG (02465 UT) capsule capsule Take 1 capsule by mouth Every 7 (Seven) Days. 5 capsule 0    acetaminophen (TYLENOL) 325 MG tablet Take 2 tablets by mouth Every 4 (Four) Hours As Needed for Mild  Pain .      albuterol (PROVENTIL HFA;VENTOLIN HFA) 108 (90 BASE) MCG/ACT inhaler Inhale 2 puffs Every 4 (Four) Hours As Needed. Indications: Asthma      bisacodyl (DULCOLAX) 5 MG EC tablet Take 1 tablet by mouth Daily As Needed for Constipation (Use if polyethylene glycol is ineffective).      erythromycin (ROMYCIN) 5 MG/GM ophthalmic ointment Apply 1 Application to eye(s) as directed by provider 2 (Two) Times a Day. Apply to surgical site 2 times daily. May use in eye for discomfort. 3.5 g 1    fluticasone (FLONASE) 50 MCG/ACT nasal spray Administer 2 sprays into the nostril(s) as directed by provider Daily.      naloxone (NARCAN) 4 MG/0.1ML nasal spray Call 911. Don't prime. [place] in 1 nostril for overdose. Repeat in 2-3 minutes in other nostril if no or minimal breathing/responsiveness. 2 each 0    polyethylene glycol (MIRALAX) 17 g packet Take 17 g by mouth Daily As Needed (Use if senna-docusate is ineffective).      sennosides-docusate sodium (SENOKOT-S) 8.6-50 MG tablet Take 2 tablets by mouth 2 (two) times a day. 100 tablet 1    sucralfate (Carafate) 1 g tablet Take 1 tablet by mouth 3 (Three) Times a Day. (Patient not taking: Reported on 10/8/2024) 120 tablet 1     Allergies   Allergen Reactions    Levaquin [Levofloxacin] Hives    Zocor [Simvastatin] Myalgia    Atorvastatin Other (See Comments)    Codeine GI Intolerance    Penicillins Hives and Swelling     Pt reports tongue swelling    Percocet [Oxycodone-Acetaminophen] Itching and Nausea And Vomiting       Objective    Objective     Vital Signs  Temp:  [97.5 °F (36.4 °C)-97.9 °F (36.6 °C)] 97.5 °F (36.4 °C)  Heart Rate:  [62-77] 72  Resp:  [18] 18  BP: (111-163)/(65-96) 163/65  SpO2:  [90 %-98 %] 94 %  on   ;   Device (Oxygen Therapy): room air  Body mass index is 26.7 kg/m².    Physical Exam  Vitals and nursing note reviewed.   Constitutional:       General: She is not in acute distress.     Appearance: She is well-developed.   HENT:      Head:  Normocephalic.   Neck:      Vascular: No JVD.   Cardiovascular:      Rate and Rhythm: Normal rate and regular rhythm.      Heart sounds: Normal heart sounds.   Pulmonary:      Effort: Pulmonary effort is normal.      Breath sounds: Normal breath sounds.      Comments: Lung sounds clear  Abdominal:      General: Bowel sounds are normal. There is no distension.      Palpations: Abdomen is soft.      Tenderness: There is no abdominal tenderness.   Musculoskeletal:         General: Normal range of motion.      Cervical back: Normal range of motion.      Right lower leg: No edema.      Left lower leg: No edema.   Skin:     General: Skin is warm and dry.      Capillary Refill: Capillary refill takes less than 2 seconds.   Neurological:      Mental Status: She is alert and oriented to person, place, and time.      Cranial Nerves: No cranial nerve deficit.      Sensory: No sensory deficit.      Comments: No focal deficits, but she does have some generalized weakness.  Complains of right side back/hip pain   Psychiatric:         Mood and Affect: Mood normal.         Behavior: Behavior normal.         Results Review:  I reviewed the patient's new clinical results.  I reviewed the patient's new imaging results and agree with the interpretation.  I reviewed the patient's other test results and agree with the interpretation  I personally viewed and interpreted the patient's EKG/Telemetry data  Discussed with ED provider.    Lab Results (last 24 hours)       Procedure Component Value Units Date/Time    CBC & Differential [[id number]]  (Abnormal) Collected: 10/07/24 2259    Specimen: Blood Updated: 10/07/24 7783    Narrative:      The following orders were created for panel order CBC & Differential.  Procedure                               Abnormality         Status                     ---------                               -----------         ------                     CBC Auto Differential[[id number]]        Abnormal             Final result                 Please view results for these tests on the individual orders.    Comprehensive Metabolic Panel [[id number]]  (Abnormal) Collected: 10/07/24 2259    Specimen: Blood Updated: 10/07/24 2337     Glucose 103 mg/dL      BUN 11 mg/dL      Creatinine 0.70 mg/dL      Sodium 136 mmol/L      Potassium 4.1 mmol/L      Comment: Slight hemolysis detected by analyzer. Result may be falsely elevated.        Chloride 102 mmol/L      CO2 25.0 mmol/L      Calcium 9.1 mg/dL      Total Protein 6.9 g/dL      Albumin 3.6 g/dL      ALT (SGPT) 15 U/L      AST (SGOT) 30 U/L      Alkaline Phosphatase 146 U/L      Total Bilirubin 0.2 mg/dL      Globulin 3.3 gm/dL      A/G Ratio 1.1 g/dL      BUN/Creatinine Ratio 15.7     Anion Gap 9.0 mmol/L      eGFR 87.0 mL/min/1.73     Narrative:      GFR Normal >60  Chronic Kidney Disease <60  Kidney Failure <15    The GFR formula is only valid for adults with stable renal function between ages 18 and 70.    CBC Auto Differential [[id number]]  (Abnormal) Collected: 10/07/24 2259    Specimen: Blood Updated: 10/07/24 2323     WBC 3.48 10*3/mm3      RBC 3.53 10*6/mm3      Hemoglobin 12.5 g/dL      Hematocrit 35.6 %      .8 fL      MCH 35.4 pg      MCHC 35.1 g/dL      RDW 12.5 %      RDW-SD 45.7 fl      MPV 10.0 fL      Platelets 83 10*3/mm3      Neutrophil % 51.5 %      Lymphocyte % 34.8 %      Monocyte % 8.6 %      Eosinophil % 4.0 %      Basophil % 1.1 %      Immature Grans % 0.0 %      Neutrophils, Absolute 1.79 10*3/mm3      Lymphocytes, Absolute 1.21 10*3/mm3      Monocytes, Absolute 0.30 10*3/mm3      Eosinophils, Absolute 0.14 10*3/mm3      Basophils, Absolute 0.04 10*3/mm3      Immature Grans, Absolute 0.00 10*3/mm3      nRBC 0.0 /100 WBC             Imaging Results (Last 24 Hours)       Procedure Component Value Units Date/Time    CT Lumbar Spine Without Contrast [[id number]] Collected: 10/08/24 0108     Updated: 10/08/24 0108    Narrative:        Patient: SAMPLE, ADA   Time Out: 01:07  Exam(s): CT L SPINE     EXAM:    CT Lumbar Spine Without Intravenous Contrast    CLINICAL HISTORY:      fall, right buttock pain.    TECHNIQUE:    Axial computed tomography images of the lumbar spine without   intravenous contrast.  CTDI is 18.03 mGy and DLP is 548.8 mGy-cm.  This   CT exam was performed according to the principle of ALARA (As Low As   Reasonably Achievable) by using one or more of the following dose   reduction techniques: automated exposure control, adjustment of the mA   and or kV according to patient size, and or use of iterative   reconstruction technique.    COMPARISON:    No relevant prior studies available.    FINDINGS:    Vertebrae:  There are anterior wedge compression fractures involving   T12, L1 and L2 levels.  No obvious acute fracture line identified.  There   is approximately 60% loss of height at T12.  There is a vertebral plana   appearance centrally at L1 level, measuring only 1-2 mm.  There is   approximately 60% loss of height centrally at L2 level.  These findings   result in accentuated kyphosis.  No significant retropulsion of the   posterior cortex or involvement of the pedicles or posterior elements are   noted.  There is mild loss of height involving the central L4 vertebral   body with approximately 20% loss of height.  A Schmorl's node is also   noted involving the inferior endplate.  There is 5 mm anterolisthesis of   L5 on S1.  Multilevel facet hypertrophic changes noted.    Discs spinal canal neural foramina:  Evaluation the central canal is   limited without intrathecal contrast.  No obvious posterior disc   protrusion.  No spinal canal stenosis.    Soft tissues:  No significant acute paraspinal soft tissue abnormality   identified.    IMPRESSION:       1.  There are anterior wedge compression fractures involving T12, L1 and   L2 levels.  No obvious acute fracture line identified.  There is   approximately 60% loss of height at T12.  There is a  vertebral plana   appearance centrally at L1 level, measuring only 1-2 mm.  There is   approximately 60% loss of height centrally at L2 level.  These findings   result in accentuated kyphosis.  No significant retropulsion of the   posterior cortex or involvement of the pedicles or posterior elements are   noted.  The appearance is age-indeterminate.  MRI with STIR imaging may   be performed to evaluate for residual bone marrow edema if there is   concern for a subacute process.  2.  Additional presumed late subacute to chronic findings, as noted above.      Impression:          Electronically signed by Jimy Cm MD on 10-08-24 at 0107    CT Pelvis Without Contrast [[id number]] Collected: 10/08/24 0104     Updated: 10/08/24 0104    Narrative:        Patient: SAMPLE, ADA  Time Out: 01:03  Exam(s): CT PELVIS Without Contrast     EXAM:    CT Pelvis Without Intravenous Contrast    CLINICAL HISTORY:      R buttock pain.    TECHNIQUE:    Axial computed tomography images of the pelvis without intravenous   contrast.  CTDI is 9.98 mGy and DLP is 277.5 mGy-cm.  This CT exam was   performed according to the principle of ALARA (As Low As Reasonably   Achievable) by using one or more of the following dose reduction   techniques: automated exposure control, adjustment of the mA and or kV   according to patient size, and or use of iterative reconstruction   technique.    COMPARISON:    No relevant prior studies available.    FINDINGS:    Bones joints:  No acute fracture.  No dislocation.    Soft tissues:  Unremarkable.    Vasculature:  Atherosclerotic calcification of the distal aorta and   iliac arteries.  No definite acute abnormality.    Bladder:  Unremarkable.  No stones.    Tubes, lines and devices:  Prior ORIF of the right femur with an   intramedullary bob noted.  Interlocking cannulated screws noted extending   through the proximal aspect of the intramedullary bob along the long axis   of the femoral neck.  The  screws are seated within the femoral head and   are completely covered by the overlying cortex.  The lateral margin of   the cannulated screws extend approximately 2 cm beyond the lateral margin   of the intertrochanteric femoral cortex.  These are noted in the adjacent   overlying musculature with asymmetric thickening noted.    IMPRESSION:       1.  No acute osseous abnormality or abnormal alignment involving the   pelvic bones are proximal femurs.  2.  Prior ORIF of the right femur with an intramedullary bob noted.    Interlocking cannulated screws noted extending through the proximal   aspect of the intramedullary bob along the long axis of the femoral neck.    The screws are seated within the femoral head and are completely covered   by the overlying cortex.  The lateral margin of the cannulated screws   extend approximately 2 cm beyond the lateral margin of the   intertrochanteric femoral cortex.  These are noted in the adjacent   overlying musculature with asymmetric thickening noted.  The clinical   significance of this finding is indeterminant.  Reactive changes on the   overlying musculature may be present.      Impression:          Electronically signed by Jimy Cm MD on 10-08-24 at 0103    XR Hip With or Without Pelvis 2 - 3 View Right [[id number]] Collected: 10/07/24 2213     Updated: 10/07/24 2217    Narrative:      XR HIP W OR WO PELVIS 2-3 VIEW RIGHT-     Clinical: Fell, pain     COMPARISON examination 12/3/2023     FINDINGS: Gamma nails and medullary bob demonstrated within the right  femur, no change in position or loosening seen. No fracture identified.  No bone lesion seen. The right hemipelvis is satisfactory in appearance.  There is right hip joint narrowing as before.     CONCLUSION: No hardware loosening or malalignment, no acute osseous  abnormalities seen.     This report was finalized on 10/7/2024 10:14 PM by Dr. Francisco Perdomo M.D on Workstation: [id number]                   No  orders to display        Assessment/Plan     Active Hospital Problems    Diagnosis  POA    **Unable to bear weight [R26.89]  Yes    Fall [W19.XXXA]  Yes    T12 compression fracture [S22.080A]  Yes    Closed compression fracture of L1 vertebra [S32.010A]  Yes    HTN (hypertension) [I10]  Yes    HLD (hyperlipidemia) [E78.5]  Yes    GERD (gastroesophageal reflux disease) [K21.9]  Yes     Ms. Palomo is a 79 year old female with history of hypertension, hyperlipidemia, non-small cell lung cancer, GERD who presents to the emergency room after a fall at home.     Fall/unable to bear weight/compression fracture  -MRI of the lumbar spine in a.m.  -Consult neurosurgery  -PT to eval and treat  -Up with assist/safety precautions    Hypertension/hyperlipidemia  -Chronic conditions stable  -Continue home medication when med rec completed    I discussed the patient's findings and my recommendations with patient.    VTE Prophylaxis - SCDs.  Code Status - Full code.       BLAKE Acevedo  [facility]  10/08/24  04:55 EDT

## 2024-10-08 NOTE — SIGNIFICANT NOTE
10/08/24 1445   OTHER   Discipline physical therapist   Rehab Time/Intention   Session Not Performed other (see comments)   Therapy Assessment/Plan (PT)   Criteria for Skilled Interventions Met (PT) yes   Recommendation   PT - Next Appointment 10/09/24     Pt admitted to [facility] after a mechanical fall and reports of inability to weight bear on R LE. Imaging revealed no acute fx or dislocation of R hip or knee. CT of L spine revealed possible fx (chronic?). RIAN following and ordered MRI. RIAN is awaiting MRI results to determine if pt needs to resume wearing back brace. Pt has been mobilizing with staff with an Ax1 today. PT will f/u tomorrow following MRI results to progress mobility and determine if brace is needed for OOB activity.

## 2024-10-08 NOTE — ED PROVIDER NOTES
EMERGENCY DEPARTMENT ENCOUNTER    Room Number:  26/26  PCP: Amy Guillory MD  History obtained from: Patient      HPI:  Chief Complaint: Right buttock pain  A complete HPI/ROS/PMH/PSH/SH/FH are unobtainable due to:   Context: Kenia BRIAN Sample is a 79 y.o. female who presents to the ED c/o right buttock pain.  Started after a nonsyncopal fall, turned and fell over and landed on that side.  Pain radiates out of her right knee over her lateral right thigh.  No other recent illness, fever, chills.            PAST MEDICAL HISTORY  Active Ambulatory Problems     Diagnosis Date Noted    Snapping right knee 09/07/2016    HTN (hypertension) 06/15/2022    HLD (hyperlipidemia) 06/15/2022    GERD (gastroesophageal reflux disease) 06/15/2022    Closed comminuted intertrochanteric fracture of proximal end of right femur 06/14/2022    Thrombocytopenia 06/16/2022    Unable to ambulate 12/04/2023    Right hip pain 12/04/2023    Degenerative disc disease, thoracic and lumbar 12/06/2023    NSCLC of left lung 02/19/2024     Resolved Ambulatory Problems     Diagnosis Date Noted    No Resolved Ambulatory Problems     Past Medical History:   Diagnosis Date    Anesthesia complication     Anginal pain     Arthritis     Cancer     Gastric reflux syndrome     Gout     Hiatal hernia     High cholesterol     History of bronchitis     Hyperlipidemia     Hypertension     Irregular heart beat     Mild aortic valve stenosis     RLS (restless legs syndrome)          PAST SURGICAL HISTORY  Past Surgical History:   Procedure Laterality Date    APPENDECTOMY      CARDIAC CATHETERIZATION      EXTERNAL EAR SURGERY Right     had tumor excised behind right ear lobe benign    EYE LESION EXCISION Left 6/6/2024    Procedure: LEFT LOWER EYELID BASEL CELL CARCINOMA EXCISIONAL AND REPAIR WITH FROZEN SECTIONS,;  Surgeon: Austin Kamara MD;  Location: [facility];  Service: Ophthalmology;  Laterality: Left;    FEMUR IM NAILING/RODDING Right  06/15/2022    Procedure: RIGHT HIP INTRAMEDULLARY NAILING/RODDING;  Surgeon: Marc Crawford MD;  Location: [facility] MAIN OR;  Service: Orthopedics;  Laterality: Right;    GALLBLADDER SURGERY      HYSTERECTOMY      KNEE ARTHROPLASTY Right     KNEE ARTHROSCOPY Right     AL REVJ TOTAL KNEE ARTHRP W/WO ALGRFT 1 COMPONENT Right 09/07/2016    Procedure: RT ILIOTIBIAL BAND RELEASE RT TOTAL KNEE POLY EXCHANGE;  Surgeon: Toy Adame MD;  Location: [facility] MAIN OR;  Service: Orthopedics    ROTATOR CUFF REPAIR Bilateral     US GUIDED LYMPH NODE BIOPSY  3/18/2024    WRIST MASS EXCISION Right          FAMILY HISTORY  Family History   Problem Relation Age of Onset    Hypertension Mother     Heart failure Mother     Heart attack Maternal Grandfather          SOCIAL HISTORY  Social History     Socioeconomic History    Marital status:      Spouse name: Nabil   Tobacco Use    Smoking status: Former     Current packs/day: 1.00     Average packs/day: 1 pack/day for 30.0 years (30.0 ttl pk-yrs)     Types: Cigarettes    Smokeless tobacco: Never   Vaping Use    Vaping status: Never Used   Substance and Sexual Activity    Alcohol use: Yes     Comment: 1 wine drink q 3-4 months    Drug use: No    Sexual activity: Defer         ALLERGIES  Levaquin [levofloxacin], Zocor [simvastatin], Atorvastatin, Codeine, Penicillins, and Percocet [oxycodone-acetaminophen]        REVIEW OF SYSTEMS    As per HPI      PHYSICAL EXAM  ED Triage Vitals [10/07/24 2112]   Temp Heart Rate Resp BP SpO2   97.9 °F (36.6 °C) 77 18 148/96 98 %      Temp src Heart Rate Source Patient Position BP Location FiO2 (%)   -- -- -- -- --       Physical Exam  Constitutional:       General: She is not in acute distress.  HENT:      Head: Normocephalic and atraumatic.   Cardiovascular:      Rate and Rhythm: Normal rate and regular rhythm.   Pulmonary:      Effort: Pulmonary effort is normal. No respiratory distress.   Abdominal:      General: There is no distension.       Palpations: Abdomen is soft.      Tenderness: There is no abdominal tenderness.   Musculoskeletal:         General: No swelling or deformity.      Comments: Tenderness to palpation over the right buttock and pain with raising the right leg at the hip   Skin:     General: Skin is warm and dry.   Neurological:      Mental Status: She is alert. Mental status is at baseline.           Vital signs and nursing notes reviewed.          LAB RESULTS  No results found for this or any previous visit (from the past 24 hour(s)).    Ordered the above labs and reviewed the results.        RADIOLOGY  XR Hip With or Without Pelvis 2 - 3 View Right    Result Date: 10/7/2024  XR HIP W OR WO PELVIS 2-3 VIEW RIGHT-  Clinical: Fell, pain  COMPARISON examination 12/3/2023  FINDINGS: Gamma nails and medullary bob demonstrated within the right femur, no change in position or loosening seen. No fracture identified. No bone lesion seen. The right hemipelvis is satisfactory in appearance. There is right hip joint narrowing as before.  CONCLUSION: No hardware loosening or malalignment, no acute osseous abnormalities seen.  This report was finalized on 10/7/2024 10:14 PM by Dr. Francisco Perdomo M.D on Workstation: [id number]       Ordered the above noted radiological studies. Reviewed by me in PACS.              MEDICATIONS GIVEN IN ER  Medications   morphine injection 4 mg (4 mg Intravenous Given 10/7/24 8393)               MEDICAL DECISION MAKING, PROGRESS, and CONSULTS    MDM: Patient presented emergency department with right buttock pain status post fall, otherwise well-appearing, vitals otherwise stable.  X-ray demonstrating no evidence of acute bony abnormality.  Patient still complaining of severe pain at reevaluation, will obtain CT for further evaluation.  Suspicion that patient will not be able to ambulate and will need to be admitted to the hospital for additional management.    All labs have been independently reviewed by me.  All  radiology studies have been reviewed by me and I have also reviewed the radiology report.   EKG's independently viewed and interpreted by me.  Discussion below represents my analysis of pertinent findings related to patient's condition, differential diagnosis, treatment plan and final disposition.      Additional sources:  - Discussed/ obtained information from independent historians:      - External (non-ED) record review:     - Chronic or social conditions impacting care:     - Shared decision making:        Orders placed during this visit:  Orders Placed This Encounter   Procedures    XR Hip With or Without Pelvis 2 - 3 View Right    CT Pelvis Without Contrast    CT Lumbar Spine Without Contrast    Comprehensive Metabolic Panel    CBC Auto Differential    CBC & Differential         Additional orders considered but not ordered:  Considered MRI of the lumbar spine however will defer pending course        Differential diagnosis includes but is not limited to:    Fracture, contusion, muscle injury, lumbar radiculopathy, IT band syndrome      Independent interpretation of labs, radiology studies, and discussions with consultants:  ED Course as of 10/07/24 2321   Mon Oct 07, 2024   2318 Patient was turned over to me by Dr. Sousa.  Pending: CT and dispo   [CC]      ED Course User Index  [CC] Maryse Damon, MARCELA           DIAGNOSIS  Final diagnoses:   None         DISPOSITION  Admitted to [facility]        Latest Documented Vital Signs:  As of 23:21 EDT  BP- 125/82 HR- 77 Temp- 97.9 °F (36.6 °C) O2 sat- 94%              --    Please note that portions of this were completed with a voice recognition program.       Note Disclaimer: At [facility], we believe that sharing information builds trust and better relationships. You are receiving this note because you are receiving care at [facility] or recently visited. It is possible you will see health information before a provider has talked with you about it. This kind  of information can be easy to misunderstand. To help you fully understand what it means for your health, we urge you to discuss this note with your provider.

## 2024-10-08 NOTE — CASE MANAGEMENT/SOCIAL WORK
Discharge Planning Assessment  [facility]     Patient Name: Kenia N Sample  MRN: [id number]  Today's Date: 10/8/2024    Admit Date: 10/7/2024    Plan: Home with family support.   Discharge Needs Assessment       Row Name 10/08/24 1212       Living Environment    People in Home spouse    Current Living Arrangements home    Primary Care Provided by self    Provides Primary Care For no one    Family Caregiver if Needed spouse;child(tirso), adult    Quality of Family Relationships helpful;involved;supportive    Able to Return to Prior Arrangements yes       Resource/Environmental Concerns    Transportation Concerns none       Transition Planning    Patient/Family Anticipates Transition to home with family    Patient/Family Anticipated Services at Transition     Transportation Anticipated family or friend will provide       Discharge Needs Assessment    Readmission Within the Last 30 Days no previous admission in last 30 days    Equipment Currently Used at Home walker, rolling                   Discharge Plan       Row Name 10/08/24 1212       Plan    Plan Home with family support.    Patient/Family in Agreement with Plan yes    Plan Comments Spoke with the patient, verified current information and explained the role of the CCP. Patient lives with her /Nabil and has family support. She's IADL and uses a walker. She's been to [facility] and has worked with [facility] in the past. The patient plans to discharge home with family support. CCP will follow for discharge needs.                  Continued Care and Services - Admitted Since 10/7/2024    No active coordination exists for this encounter.          Demographic Summary       Row Name 10/08/24 1211       General Information    Admission Type observation    Reason for Consult discharge planning    Preferred Language English       Contact Information    Permission Granted to Share Info With ;family/designee                    Functional Status       Row Name 10/08/24 1212       Functional Status    Usual Activity Tolerance good       Functional Status, IADL    Medications independent    Meal Preparation assistive equipment    Housekeeping assistive equipment    Laundry assistive equipment    Shopping assistive person;assistive equipment and person       Mental Status    General Appearance WDL WDL       Mental Status Summary    Recent Changes in Mental Status/Cognitive Functioning no changes                   Psychosocial       Row Name 10/08/24 1212       Intellectual Performance WDL    Level of Consciousness Alert       Coping/Stress    Patient Personal Strengths able to adapt    Sources of Support spouse;adult child(tirso)    Reaction to Health Status accepting    Understanding of Condition and Treatment adequate understanding of medical condition;adequate understanding of treatment       Developmental Stage (Eriksson's)    Developmental Stage Stage 8 (65 years-death/Late Adulthood) Integrity vs. Despair                          Patient Forms       Row Name 10/08/24 1214       Patient Forms    Patient Observation Letter Delivered    Delivered to Patient    Method of delivery In person                      Melissa JIMENEZ RN

## 2024-10-08 NOTE — PLAN OF CARE
Goal Outcome Evaluation:   Patient admitted to unit 10/8 from ED after fall at home. Aox4. VSS on RA. Ambulating assist x1. Pain has been adequately controlled with PRN Tylenol and Robaxin. MRI lumbar spine with and without contrast ordered. All needs currently met, will continue to monitor.

## 2024-10-08 NOTE — ED TRIAGE NOTES
To ER via EMS from home.  Pt stumbled and fell landing on rt hip.  C/o pain to rt iliac crest/pelvic area into rt flank.

## 2024-10-08 NOTE — ED NOTES
.Nursing report ED to floor  Ada N Sample  79 y.o.  female    HPI :  HPI (Adult)  Stated Reason for Visit: To ER via EMS from home.  Pt stumbled and fell landing on rt hip.  C/o pain to rt iliac crest/pelvic area into rt flank.  History Obtained From: patient, EMS    Chief Complaint  Chief Complaint   Patient presents with    Fall       Admitting doctor:   Yesenia Huang MD    Admitting diagnosis:   The primary encounter diagnosis was Right hip pain. Diagnoses of Unable to bear weight and Fall, initial encounter were also pertinent to this visit.    Code status:   Current Code Status       Date Active Code Status Order ID Comments User Context       10/8/2024 0201 CPR (Attempt to Resuscitate) [id number]  Gali Navarrete, [facility]        Question Answer    Code Status (Patient has no pulse and is not breathing) CPR (Attempt to Resuscitate)    Medical Interventions (Patient has pulse or is breathing) Full Support                    Allergies:   Levaquin [levofloxacin], Zocor [simvastatin], Atorvastatin, Codeine, Penicillins, and Percocet [oxycodone-acetaminophen]    Isolation:   No active isolations    Intake and Output    Intake/Output Summary (Last 24 hours) at 10/8/2024 0206  Last data filed at 10/7/2024 2313  Gross per 24 hour   Intake --   Output 300 ml   Net -300 ml       Weight:       10/07/24  2112   Weight: 66.2 kg (146 lb)       Most recent vitals:   Vitals:    10/07/24 2250 10/07/24 2320 10/08/24 0020 10/08/24 0130   BP: 125/82 124/67 111/71    Pulse: 77 74 68 76   Resp:       Temp:       SpO2: 94% 98% 92% 91%   Weight:       Height:           Active LDAs/IV Access:   Lines, Drains & Airways       Active LDAs       Name Placement date Placement time Site Days    Peripheral IV 10/07/24 2301 Left Antecubital 10/07/24  2301  Antecubital  less than 1    External Urinary Catheter 10/07/24  2313  --  less than 1                    Labs (abnormal labs have a star):   Labs Reviewed   COMPREHENSIVE METABOLIC  PANEL - Abnormal; Notable for the following components:       Result Value    Glucose 103 (*)     Alkaline Phosphatase 146 (*)     All other components within normal limits    Narrative:     GFR Normal >60  Chronic Kidney Disease <60  Kidney Failure <15    The GFR formula is only valid for adults with stable renal function between ages 18 and 70.   CBC WITH AUTO DIFFERENTIAL - Abnormal; Notable for the following components:    RBC 3.53 (*)     .8 (*)     MCH 35.4 (*)     Platelets 83 (*)     All other components within normal limits   CBC (NO DIFF)   COMPREHENSIVE METABOLIC PANEL   CBC AND DIFFERENTIAL    Narrative:     The following orders were created for panel order CBC & Differential.  Procedure                               Abnormality         Status                     ---------                               -----------         ------                     CBC Auto Differential[[id number]]        Abnormal            Final result                 Please view results for these tests on the individual orders.       EKG:   No orders to display       Meds given in ED:   Medications   sodium chloride 0.9 % flush 10 mL (has no administration in time range)   acetaminophen (TYLENOL) tablet 650 mg (has no administration in time range)     Or   acetaminophen (TYLENOL) 160 MG/5ML oral solution 650 mg (has no administration in time range)     Or   acetaminophen (TYLENOL) suppository 650 mg (has no administration in time range)   famotidine (PEPCID) tablet 20 mg (has no administration in time range)   sennosides-docusate (PERICOLACE) 8.6-50 MG per tablet 2 tablet (has no administration in time range)     And   polyethylene glycol (MIRALAX) packet 17 g (has no administration in time range)     And   bisacodyl (DULCOLAX) EC tablet 5 mg (has no administration in time range)     And   bisacodyl (DULCOLAX) suppository 10 mg (has no administration in time range)   ondansetron (ZOFRAN) injection 4 mg (has no administration in  time range)   melatonin tablet 5 mg (has no administration in time range)   morphine injection 4 mg (4 mg Intravenous Given 10/7/24 2303)   HYDROcodone-acetaminophen (NORCO) 7.5-325 MG per tablet 1 tablet (1 tablet Oral Given 10/8/24 0128)   ondansetron (ZOFRAN) injection 4 mg (4 mg Intravenous Given 10/8/24 0128)       Imaging results:  CT Lumbar Spine Without Contrast    Result Date: 10/8/2024  Electronically signed by Jimy Cm MD on 10-08-24 at 0107    CT Pelvis Without Contrast    Result Date: 10/8/2024  Electronically signed by Jimy Cm MD on 10-08-24 at 0103       Social issues:   Social History     Socioeconomic History    Marital status:      Spouse name: Nabil   Tobacco Use    Smoking status: Former     Current packs/day: 1.00     Average packs/day: 1 pack/day for 30.0 years (30.0 ttl pk-yrs)     Types: Cigarettes    Smokeless tobacco: Never   Vaping Use    Vaping status: Never Used   Substance and Sexual Activity    Alcohol use: Yes     Comment: 1 wine drink q 3-4 months    Drug use: No    Sexual activity: Defer       Peripheral Neurovascular  Peripheral Neurovascular (Adult)  Peripheral Neurovascular WDL: WDL    Neuro Cognitive  Neuro Cognitive (Adult)  Cognitive/Neuro/Behavioral WDL: WDL    Learning  Learning Assessment (Adult)  Learning Readiness and Ability: no barriers identified    Respiratory  Respiratory WDL  Respiratory WDL: WDL, all  Rhythm/Pattern, Respiratory: depth regular, pattern regular, no shortness of breath reported, unlabored    Abdominal Pain       Pain Assessments  Pain (Adult)  (0-10) Pain Rating: Rest: 10  Response to Pain Interventions: interventions effective per patient, nonverbal indicators present    NIH Stroke Scale       Norma Parish RN  10/08/24 02:06 EDT

## 2024-10-08 NOTE — CONSULTS
[facility] NEUROSURGERY CONSULT NOTE    Patient name: Ada N Sample  Referring Provider: BLAKE Mancini  Reason for Consultation: compression fracture T12, L1     Patient Care Team:  Amy Guillory MD as PCP - General (Family Medicine)  Brayden Jackson MD as PCP - Family Medicine  Naif York MD as Consulting Physician (Cardiology)  Narendra Banda MD as Surgeon (General Surgery)  Shabbir Mann MD as Consulting Physician (Hematology and Oncology)  Gali Taveras APRN as Referring Physician ([facility] [place])  Gali Martinez, RN as Nurse Navigator  Fernando Hopkins MD as Consulting Physician (Radiation Oncology)    Chief complaint: back pain, fall    Subjective .     History of present illness:    Patient is a 79 y.o.  female presents to ER with acute on chronic back pain after a fall that occurred as she was getting out of the shower and lost her footing falling onto her buttocks.  She states that she has chronic issues with pain and swelling of her left foot due to a stress fracture that gives her difficulty ambulating.  She also has imbalance issues following a right hip replacement years ago that has really given her problems.  She discussed total hip replacement with her orthopedic surgeon but has been remiss to proceed with this.  She does tell me that she saw Dr. Del Valle in November 2023 for back pain after a fall and was found to have several compression fractures.  She was placed in a brace but states that it did not really control her pain.  An MRI was ordered at the time as she was also incidentally diagnosed with lung cancer concurrently.  She never completed the MRI due to her cancer treatments inhibiting her time constraints.  She has been out of her brace for at least 4 to 5 months.  Her back pain really has not improved.  She denies any radicular leg pain outside of her chronic hip issue on the right and foot issue on the left.  No bowel or bladder  incontinence issues.    History non-small cell lung cancer diagnosed November 2023.  She had positive lymph nodes and she was treated with radiation alone.  No prior spine surgery.  Former smoker. No anticoagulant use.    Review of Systems  Review of Systems   Genitourinary:  Negative for enuresis.   Musculoskeletal:  Positive for arthralgias, back pain and gait problem.   Neurological:  Negative for weakness and numbness.       History  PAST MEDICAL HISTORY  Past Medical History:   Diagnosis Date    Anesthesia complication     slow to wake up    Anginal pain     Arthritis     Cancer     Lung    Gastric reflux syndrome     Gout     Hiatal hernia     High cholesterol     History of bronchitis     Hyperlipidemia     Hypertension     Irregular heart beat     Mild aortic valve stenosis     per echo 6/2023    RLS (restless legs syndrome)        PAST SURGICAL HISTORY  Past Surgical History:   Procedure Laterality Date    APPENDECTOMY      CARDIAC CATHETERIZATION      EXTERNAL EAR SURGERY Right     had tumor excised behind right ear lobe benign    EYE LESION EXCISION Left 6/6/2024    Procedure: LEFT LOWER EYELID BASEL CELL CARCINOMA EXCISIONAL AND REPAIR WITH FROZEN SECTIONS,;  Surgeon: Austin Kamara MD;  Location: [facility] OR;  Service: Ophthalmology;  Laterality: Left;    FEMUR IM NAILING/RODDING Right 06/15/2022    Procedure: RIGHT HIP INTRAMEDULLARY NAILING/RODDING;  Surgeon: Marc Crawford MD;  Location: [facility] OR;  Service: Orthopedics;  Laterality: Right;    GALLBLADDER SURGERY      HYSTERECTOMY      KNEE ARTHROPLASTY Right     KNEE ARTHROSCOPY Right     MA REVJ TOTAL KNEE ARTHRP W/WO ALGRFT 1 COMPONENT Right 09/07/2016    Procedure: RT ILIOTIBIAL BAND RELEASE RT TOTAL KNEE POLY EXCHANGE;  Surgeon: Toy Adame MD;  Location: [facility];  Service: Orthopedics    ROTATOR CUFF REPAIR Bilateral     US GUIDED LYMPH NODE BIOPSY  3/18/2024    WRIST MASS EXCISION Right        FAMILY HISTORY  Family  History   Problem Relation Age of Onset    Hypertension Mother     Heart failure Mother     Heart attack Maternal Grandfather        SOCIAL HISTORY  Social History     Tobacco Use    Smoking status: Former     Current packs/day: 1.00     Average packs/day: 1 pack/day for 30.0 years (30.0 ttl pk-yrs)     Types: Cigarettes    Smokeless tobacco: Never   Vaping Use    Vaping status: Never Used   Substance Use Topics    Alcohol use: Yes     Comment: 1 wine drink q 3-4 months    Drug use: Never       retired      Allergies:  Levaquin [levofloxacin], Zocor [simvastatin], Atorvastatin, Codeine, Penicillins, and Percocet [oxycodone-acetaminophen]    MEDICATIONS:  Medications Prior to Admission   Medication Sig Dispense Refill Last Dose    allopurinol (ZYLOPRIM) 100 MG tablet Take 1 tablet by mouth Daily.   10/8/2024    Magnesium 500 MG capsule Take 500 mg by mouth 2 (Two) Times a Day. Indications: Acid Indigestion   10/8/2024    metoprolol tartrate (LOPRESSOR) 25 MG tablet Take 1 tablet by mouth 2 (Two) Times a Day. Indications: High Blood Pressure   10/8/2024    omeprazole (PriLOSEC) 40 MG capsule Take 1 capsule by mouth Every Morning. Indications: Heartburn   10/8/2024    potassium chloride (K-DUR,KLOR-CON) 20 MEQ CR tablet Take 1 tablet by mouth 2 (Two) Times a Day.   10/8/2024    pravastatin (PRAVACHOL) 20 MG tablet Take 1 tablet by mouth Every Morning.   10/8/2024    temazepam (RESTORIL) 15 MG capsule Take 1 capsule by mouth At Night As Needed for Sleep.   10/7/2024    vitamin B-12 (CYANOCOBALAMIN) 1000 MCG tablet Take 1 tablet by mouth Daily. Indications: Inadequate Vitamin B12   10/8/2024    vitamin D (ERGOCALCIFEROL) 1.25 MG (63480 UT) capsule capsule Take 1 capsule by mouth Every 7 (Seven) Days. 5 capsule 0 10/8/2024    acetaminophen (TYLENOL) 325 MG tablet Take 2 tablets by mouth Every 4 (Four) Hours As Needed for Mild Pain .   Unknown    albuterol (PROVENTIL HFA;VENTOLIN HFA) 108 (90 BASE) MCG/ACT inhaler  Inhale 2 puffs Every 4 (Four) Hours As Needed. Indications: Asthma   Unknown    bisacodyl (DULCOLAX) 5 MG EC tablet Take 1 tablet by mouth Daily As Needed for Constipation (Use if polyethylene glycol is ineffective).   Unknown    erythromycin (ROMYCIN) 5 MG/GM ophthalmic ointment Apply 1 Application to eye(s) as directed by provider 2 (Two) Times a Day. Apply to surgical site 2 times daily. May use in eye for discomfort. 3.5 g 1 Unknown    fluticasone (FLONASE) 50 MCG/ACT nasal spray Administer 2 sprays into the nostril(s) as directed by provider Daily.   Unknown    naloxone (NARCAN) 4 MG/0.1ML nasal spray Call 911. Don't prime. [place] in 1 nostril for overdose. Repeat in 2-3 minutes in other nostril if no or minimal breathing/responsiveness. 2 each 0 Unknown    polyethylene glycol (MIRALAX) 17 g packet Take 17 g by mouth Daily As Needed (Use if senna-docusate is ineffective).   Unknown    sennosides-docusate sodium (SENOKOT-S) 8.6-50 MG tablet Take 2 tablets by mouth 2 (two) times a day. 100 tablet 1 Unknown    sucralfate (Carafate) 1 g tablet Take 1 tablet by mouth 3 (Three) Times a Day. (Patient not taking: Reported on 10/8/2024) 120 tablet 1 Not Taking         Current Facility-Administered Medications:     acetaminophen (TYLENOL) tablet 650 mg, 650 mg, Oral, Q4H PRN, 650 mg at 10/08/24 1058 **OR** acetaminophen (TYLENOL) 160 MG/5ML oral solution 650 mg, 650 mg, Oral, Q4H PRN **OR** acetaminophen (TYLENOL) suppository 650 mg, 650 mg, Rectal, Q4H PRN, Gali Navarrete APRN    albuterol (PROVENTIL) nebulizer solution 0.083% 2.5 mg/3mL, 2.5 mg, Nebulization, Q6H PRN, Gali Navarrete APRN    [START ON 10/9/2024] allopurinol (ZYLOPRIM) tablet 100 mg, 100 mg, Oral, Daily, Gali Navarrete APRN    sennosides-docusate (PERICOLACE) 8.6-50 MG per tablet 2 tablet, 2 tablet, Oral, BID PRN **AND** polyethylene glycol (MIRALAX) packet 17 g, 17 g, Oral, Daily PRN **AND** bisacodyl (DULCOLAX) EC tablet 5 mg, 5 mg,  Oral, Daily PRN **AND** bisacodyl (DULCOLAX) suppository 10 mg, 10 mg, Rectal, Daily PRN, Gali Navarrete APRN    diazePAM (VALIUM) tablet 6 mg, 6 mg, Oral, Once PRN, Teresa Boo APRN    famotidine (PEPCID) tablet 20 mg, 20 mg, Oral, BID PRN, Gali Navarrete APRN    Lidocaine 4 % 1 patch, 1 patch, Transdermal, Q24H, Teresa Boo APRN    magnesium oxide (MAG-OX) tablet 800 mg, 800 mg, Oral, BID, Gali Navarrete APRN    melatonin tablet 5 mg, 5 mg, Oral, Nightly PRN, Gali Navarrete APRN    methocarbamol (ROBAXIN) tablet 500 mg, 500 mg, Oral, Q8H PRN, Teresa Boo APRN    metoprolol tartrate (LOPRESSOR) tablet 25 mg, 25 mg, Oral, Q12H, Jimy Agee MD, 25 mg at 10/08/24 1058    ondansetron (ZOFRAN) injection 4 mg, 4 mg, Intravenous, Q6H PRN, Gali Navarrete APRN, 4 mg at 10/08/24 0902    [START ON 10/9/2024] pantoprazole (PROTONIX) EC tablet 40 mg, 40 mg, Oral, Q AM, Gali Navarrete APRN    [START ON 10/9/2024] pravastatin (PRAVACHOL) tablet 20 mg, 20 mg, Oral, QAM, Gali Navarrete APRN    sennosides-docusate (PERICOLACE) 8.6-50 MG per tablet 2 tablet, 2 tablet, Oral, BID, Jimy Agee MD, 2 tablet at 10/08/24 1057    sodium chloride 0.9 % flush 10 mL, 10 mL, Intravenous, PRN, Glai Navarrete APRN    temazepam (RESTORIL) capsule 15 mg, 15 mg, Oral, Nightly PRN, Jimy Agee MD      Objective     Results Review:  LABS:  Results from last 7 days   Lab Units 10/08/24  0454 10/07/24  2259 10/04/24  1410   WBC 10*3/mm3 3.82 3.48 4.24   HEMOGLOBIN g/dL 11.9* 12.5 12.8   HEMATOCRIT % 34.8 35.6 38.7   PLATELETS 10*3/mm3 85* 83* 88*     Results from last 7 days   Lab Units 10/08/24  0454 10/07/24  2259 10/04/24  1410   SODIUM mmol/L 139 136 137   POTASSIUM mmol/L 3.9 4.1 4.0   CHLORIDE mmol/L 104 102 99   CO2 mmol/L 26.1 25.0 26.4   BUN mg/dL 11 11 15   CREATININE mg/dL 0.81 0.70 0.83   CALCIUM mg/dL 8.8 9.1 9.7   BILIRUBIN  mg/dL 0.2 0.2 0.6   ALK PHOS U/L 122* 146* 130*   ALT (SGPT) U/L 14 15 16   AST (SGOT) U/L 28 30 31   GLUCOSE mg/dL 86 103* 97         DIAGNOSTICS:  CT lumbar spine compared with CT abdomen pelvis from July 1, 2024.  There are stable compression deformities seen at T12, L1, L2.  The L1 compression fracture is planum.  The T12 and L2 fractures exhibit approximately 50% height loss.  There is some mild retropulsion of the superior posterior endplate at all 3 levels, most predominant at L1 with mild canal stenosis, but not resulting in any significant canal stenosis.  There is some increased kyphosis with apex around L1 due to 3 adjacent level fractures.  Schmorl's node of the L2 superior endplate.  There is anterolisthesis of L5 on S1 With unroofing of L5/S1 disc.  This is a stable finding compared to prior CT abdomen.    Lumbar x-rays December 2023-T12 and L2 compression deformities.  There is no evidence of L1 compression deformity on this study.    CT pelvis:  IMPRESSION:       1.  No acute osseous abnormality or abnormal alignment involving the   pelvic bones are proximal femurs.  2.  Prior ORIF of the right femur with an intramedullary bob noted.    Interlocking cannulated screws noted extending through the proximal   aspect of the intramedullary bob along the long axis of the femoral neck.    The screws are seated within the femoral head and are completely covered   by the overlying cortex.  The lateral margin of the cannulated screws   extend approximately 2 cm beyond the lateral margin of the   intertrochanteric femoral cortex.  These are noted in the adjacent   overlying musculature with asymmetric thickening noted.  The clinical   significance of this finding is indeterminant.  Reactive changes on the   overlying musculature may be present.    Results Review:   I reviewed the patient's new clinical results.  I personally viewed and interpreted the patient's lumbar spine.  Also reviewed by and discussed with  Dr. Alejo    Vital Signs   Temp:  [97 °F (36.1 °C)-97.9 °F (36.6 °C)] 97 °F (36.1 °C)  Heart Rate:  [62-90] 90  Resp:  [18] 18  BP: (111-163)/(65-96) 147/76    Physical Exam:  Physical Exam  Vitals reviewed.   Constitutional:       Comments: Sitting up in chair.  Pleasant and cooperative.   Pulmonary:      Effort: Pulmonary effort is normal.   Musculoskeletal:      Thoracic back: No tenderness or bony tenderness.      Lumbar back: Bony tenderness present. No tenderness. Negative right straight leg raise test and negative left straight leg raise test.   Skin:     General: Skin is warm and dry.      Findings: No bruising.   Neurological:      General: No focal deficit present.      Deep Tendon Reflexes:      Reflex Scores:       Patellar reflexes are 0 on the right side and 1+ on the left side.  Psychiatric:         Mood and Affect: Mood normal.         Thought Content: Thought content normal.       Neurological Exam  Mental Status  Awake, alert and oriented to person, place and time.    Motor  Normal muscle bulk throughout. Normal muscle tone.    Full strength bilateral lower extremity.    Sensory  Light touch is normal in upper and lower extremities. Bilateral lower extremity.     Reflexes                                            Right                      Left  Patellar                                0                         1+  Deep tendon reflexes: History of right total knee replacement.    Right pathological reflexes: Ankle clonus absent.  Left pathological reflexes: Ankle clonus absent.    Gait      Not tested.  Awaiting PT eval.  Up to chair with assist.      Assessment & Plan       Unable to bear weight    HTN (hypertension)    HLD (hyperlipidemia)    GERD (gastroesophageal reflux disease)    Fall    T12 compression fracture    Closed compression fracture of L1 vertebra      Problem List Items Addressed This Visit          Unprioritized    Right hip pain - Primary    * (Principal) Unable to bear weight     Fall        COMORBID CONDITIONS:  Cancer including (primary or metastatic), Hypertension, Osteoporosis, and      Patient presents with acute on chronic back pain after a chemical fall yesterday getting out of the shower.  She reports ongoing back pain since November of last year when she had a fall resulting in compression deformity.  She was seen by orthospine, Dr. Del Valle and placed in a brace.  MRI imaging was requested, but she did not complete as she was also concurrently diagnosed with lung cancer and required multiple evaluations and treatments.  She has completed radiation for left lung nodules and lymph node.  She recently had a PET scan and has scheduled follow-up with oncology and pulmonary soon.  She states that there were some right sided findings on the most recent imaging.  She reports that her back pain is mostly midline but some to the right.  No radicular leg pain.  She has chronic right hip issues and chronic left foot issues related to prior surgery and fractures.  She is tender in the lumbar region but not in the thoracic.  Current imaging shows T12, L1, L2 fractures.  Radiologist did not compare to prior CT abdomen from July of this year which shows same fractures and stable.  Additionally plain x-rays in November of last year show T12 and L2 but not L1 fracture.  Uncertain whether she has new injury on top of chronic fractures or whether this is just an exacerbation of her chronic back pain.  She is agreeable to undergoing MRI imaging while she is here.  She does report claustrophobia so we will try some Valium.  She has no weakness or sensory changes on exam.  Will await imaging to determine whether she needs to resume wearing her brace or not.  I will add some Lidoderm and Robaxin to her medications.    PLAN:   Agree with MRI lumbar spine with and without contrast  Valium p.o. x 1 for MRI  Lidoderm patch  Robaxin and Tylenol   Defer narcotic medications if needed to primary service  Okay  "for PT eval and out of bed activity as tolerated  Needs outpatient follow-up with PCP or oncologist to workup osteoporosis for treatment      I discussed the patient's findings and my recommendations with patient and Dr. Alejo    During patient visit, I utilized appropriate personal protective equipment including gloves. Appropriate PPE was worn during the entire visit.  Hand hygiene was completed before and after.     Teresa Boo, APRN  10/08/24  12:17 EDT    \"Dictated utilizing Dragon dictation\".      "

## 2024-10-08 NOTE — PLAN OF CARE
Problem: Adult Inpatient Plan of Care  Goal: Plan of Care Review  Outcome: Progressing  Goal: Patient-Specific Goal (Individualized)  Outcome: Progressing  Goal: Absence of Hospital-Acquired Illness or Injury  Outcome: Progressing  Goal: Optimal Comfort and Wellbeing  Outcome: Progressing  Goal: Readiness for Transition of Care  Outcome: Progressing  Intervention: Mutually Develop Transition Plan  Recent Flowsheet Documentation  Taken 10/8/2024 0406 by Colleen Pichardo RN  Transportation Anticipated: family or friend will provide  Patient/Family Anticipated Services at Transition:   home health care   rehabilitation services   skilled nursing  Patient/Family Anticipates Transition to: home with family  Taken 10/8/2024 0405 by Colleen Pichardo, RN  Equipment Currently Used at Home: walker, rolling     Problem: Pain Acute  Goal: Acceptable Pain Control and Functional Ability  Outcome: Progressing   Goal Outcome Evaluation:

## 2024-10-08 NOTE — PLAN OF CARE
Problem: Adult Inpatient Plan of Care  Goal: Plan of Care Review  Outcome: Progressing  Goal: Patient-Specific Goal (Individualized)  Outcome: Progressing  Goal: Absence of Hospital-Acquired Illness or Injury  Outcome: Progressing  Goal: Optimal Comfort and Wellbeing  Outcome: Progressing  Goal: Readiness for Transition of Care  Outcome: Progressing     Problem: Pain Acute  Goal: Acceptable Pain Control and Functional Ability  Outcome: Progressing     Problem: Fall Injury Risk  Goal: Absence of Fall and Fall-Related Injury  Outcome: Progressing     Problem: Skin Injury Risk Increased  Goal: Skin Health and Integrity  Outcome: Progressing   Goal Outcome Evaluation:

## 2024-10-09 VITALS
DIASTOLIC BLOOD PRESSURE: 75 MMHG | TEMPERATURE: 98.1 F | WEIGHT: 146 LBS | HEART RATE: 60 BPM | BODY MASS INDEX: 26.87 KG/M2 | HEIGHT: 62 IN | SYSTOLIC BLOOD PRESSURE: 157 MMHG | OXYGEN SATURATION: 96 % | RESPIRATION RATE: 16 BRPM

## 2024-10-09 PROBLEM — S32.020A COMPRESSION FRACTURE OF L2 LUMBAR VERTEBRA: Status: ACTIVE | Noted: 2024-10-09

## 2024-10-09 PROBLEM — M54.50 LUMBAR PAIN: Status: ACTIVE | Noted: 2024-10-09

## 2024-10-09 PROCEDURE — 99213 OFFICE O/P EST LOW 20 MIN: CPT | Performed by: NURSE PRACTITIONER

## 2024-10-09 PROCEDURE — G0378 HOSPITAL OBSERVATION PER HR: HCPCS

## 2024-10-09 RX ORDER — METHOCARBAMOL 500 MG/1
500 TABLET, FILM COATED ORAL EVERY 8 HOURS PRN
Qty: 30 TABLET | Refills: 0 | Status: SHIPPED | OUTPATIENT
Start: 2024-10-09

## 2024-10-09 RX ADMIN — ACETAMINOPHEN 325MG 650 MG: 325 TABLET ORAL at 05:58

## 2024-10-09 RX ADMIN — PRAVASTATIN SODIUM 20 MG: 20 TABLET ORAL at 06:00

## 2024-10-09 RX ADMIN — PANTOPRAZOLE SODIUM 40 MG: 40 TABLET, DELAYED RELEASE ORAL at 05:58

## 2024-10-09 RX ADMIN — METHOCARBAMOL TABLETS 500 MG: 500 TABLET, COATED ORAL at 05:58

## 2024-10-09 RX ADMIN — LIDOCAINE 1 PATCH: 4 PATCH TOPICAL at 09:09

## 2024-10-09 RX ADMIN — ALLOPURINOL 100 MG: 100 TABLET ORAL at 09:06

## 2024-10-09 RX ADMIN — METOPROLOL TARTRATE 25 MG: 25 TABLET, FILM COATED ORAL at 09:09

## 2024-10-09 RX ADMIN — MAGNESIUM OXIDE 400 MG (241.3 MG MAGNESIUM) TABLET 800 MG: TABLET at 09:06

## 2024-10-09 NOTE — DISCHARGE SUMMARY
Patient Name: Kenia Palomo  : 1945  MRN: [id number]    Date of Admission: 10/7/2024  Date of Discharge:  10/9/2024  Primary Care Physician: Amy Guillory MD      Chief Complaint:   Fall      Discharge Diagnoses     Active Hospital Problems    Diagnosis  POA    **Closed compression fracture of L1 vertebra [S32.010A]  Yes    Compression fracture of L2 lumbar vertebra [S32.020A]  Yes    Lumbar pain [M54.50]  Yes    Fall [W19.XXXA]  Yes    T12 compression fracture [S22.080A]  Yes      Resolved Hospital Problems   No resolved problems to display.        Hospital Course     Ms. Palomo is a 79 y.o. female with a history of osteoporosis and prior compression fracture admitted after mechanical fall. See H&P for details. She was found to have T12-L2 compression fractures on CT of uncertain acuity. She was sent for MRI confirming some acute component. [facility] saw her and since pain was decently controlled and she was ambulatory they did not recommend kypho. She was stable for dc home with .    Day of Discharge     Subjective:  Pain is adequately controlled. Not really using pain meds    Physical Exam:  Temp:  [97.7 °F (36.5 °C)-98.1 °F (36.7 °C)] 98.1 °F (36.7 °C)  Heart Rate:  [60-67] 60  Resp:  [16-18] 16  BP: (148-160)/(62-78) 157/75  Body mass index is 26.7 kg/m².  Physical Exam  Vitals reviewed.   Constitutional:       General: She is not in acute distress.     Appearance: She is well-developed.   HENT:      Head: Normocephalic and atraumatic.   Eyes:      General: No scleral icterus.  Neck:      Vascular: No JVD.   Cardiovascular:      Rate and Rhythm: Normal rate and regular rhythm.      Heart sounds: Normal heart sounds. No murmur heard.  Pulmonary:      Effort: Pulmonary effort is normal. No respiratory distress.      Breath sounds: Normal breath sounds.   Abdominal:      General: Bowel sounds are normal. There is no distension.      Palpations: Abdomen is soft.      Tenderness: There is no abdominal  tenderness.   Skin:     General: Skin is warm and dry.      Coloration: Skin is not pale.   Neurological:      Mental Status: She is alert and oriented to person, place, and time.         Consultants     Consult Orders (all) (From admission, onward)       Start     Ordered    10/08/24 0702  Inpatient Neurosurgery Consult  IN AM        Specialty:  Neurosurgery  Provider:  Ketan Alejo MD    10/08/24 0453    10/08/24 0106  [facility] (on-call MD unless specified) Details  Once        Specialty:  Hospitalist  Provider:  (Not yet assigned)    10/08/24 0105                  Procedures     * Surgery not found *    Imaging Results (All)       Procedure Component Value Units Date/Time    MRI Lumbar Spine With & Without Contrast [[id number]] Collected: 10/08/24 1831     Updated: 10/08/24 1850    Narrative:      MRI LUMBAR SPINE W WO CONTRAST-     HISTORY:  back pain, multiple compression fractures, hx lung CA;  M25.551-Pain in right hip; R26.89-Other abnormalities of gait and  mobility; W19.XXXA-Unspecified fall, initial encounter     COMPARISON: CT lumbar spine 10/8/2024     FINDINGS: The conus is at L1 and the caudal aspect the spinal cord  appears unremarkable. Compression deformities are appreciated involving  L1 and to a slightly lesser extent T12 and then to a slightly lesser  extent L2. There is a severe compression deformity involving L1 with  loss of approximately 12 mm of vertical height centrally. Loss of  vertical height at T12 centrally is estimated to be approximately 8 mm  and there is loss of approximately 6 mm of vertical height at L2. A mild  concave deformity involving the inferior endplate of T11 is appreciated.  There is T2 hyperintensity appreciated related to the T12-L2 vertebral  bodies. T2 hyperintensity is also appreciated involving the endplates at  L3, L4 and L5 consistent with degenerative disease. Schmorl's nodes are  appreciated involving the inferior endplates of T9, T10 and L2.  After  contrast administration enhancement of L1 and to a slightly lesser  extent T12 and L2 is appreciated.     T11/T12: There is no evidence of a disc bulge or herniation.     T12/L1: Mild facet degenerative disease is present bilaterally.     L1-L2: Mild to moderate facet degenerative disease is present  bilaterally. There is prominent epidural fat on the right contributing  to moderate lateral recess narrowing on the right.     L2-L3: A mild broad-based disc osteophyte complex is present with no  evidence of herniation.     L4-L5: Moderate facet degenerative disease is present bilaterally. A  mild broad-based disc osteophyte complex is present with evidence of  herniation. Mild foraminal stenosis is present on the right secondary to  extension of the disc osteophyte complex into the neural foramen.     L5-S1: Moderate to severe facet degenerative disease is present  bilaterally. Mild foraminal stenosis is present bilaterally secondary to  extension of a small disc osteophyte complex into the neural foramen.       Impression:      1.  Multiple compression fractures are appreciated described in detail  above most severe of which is at L1 and then T12 and L2. Edema and  enhancement is appreciated involving T12-L2. The findings are suggestive  of acute to subacute fractures. There is no evidence of paraspinal edema  or bony retropulsion.   2.  Multiple level degenerative disease involving the lumbar spine is  noted as described in detail above with no evidence of a focal  herniation. This includes multilevel facet degenerative disease most  prominent of which is at L4-L5 and L5-S1. Lateral recess narrowing is  present to the right at L1-L2 secondary to facet degenerative disease  and prominent epidural fat. See above.  3.  As discussed above, enhancement related to the compression fractures  are appreciated as well as enhancement consistent with degenerative  disease at multiple levels. No convincing abnormal  enhancement to  suggest metastatic disease is identified.     This report was finalized on 10/8/2024 6:47 PM by Dr. Rustam Ferreira M.D  on Workstation: [id number]       XR Knee 1 or 2 View Right [[id number]] Collected: 10/08/24 1049     Updated: 10/08/24 1054    Narrative:      XR KNEE 1 OR 2 VW RIGHT-     INDICATIONS: Trauma.     TECHNIQUE: 2 VIEWS OF THE LEFT KNEE MINIMAL     COMPARISON: None available     FINDINGS:     Minimal to mild knee effusion is apparent. No acute fracture, erosion,  or dislocation is identified. Mild degenerative spurring is present.  Mild tibiofemoral joint space narrowing. Chondral calcifications are  seen. Follow-up/further evaluation can be obtained as indications  persist.       Impression:         As described.           This report was finalized on 10/8/2024 10:51 AM by Dr. eDlonte White M.D on Workstation: [id number]       CT Lumbar Spine Without Contrast [[id number]] Collected: 10/08/24 0108     Updated: 10/08/24 0108    Narrative:        Patient: SAMPLE, ADA  Time Out: 01:07  Exam(s): CT L SPINE     EXAM:    CT Lumbar Spine Without Intravenous Contrast    CLINICAL HISTORY:      fall, right buttock pain.    TECHNIQUE:    Axial computed tomography images of the lumbar spine without   intravenous contrast.  CTDI is 18.03 mGy and DLP is 548.8 mGy-cm.  This   CT exam was performed according to the principle of ALARA (As Low As   Reasonably Achievable) by using one or more of the following dose   reduction techniques: automated exposure control, adjustment of the mA   and or kV according to patient size, and or use of iterative   reconstruction technique.    COMPARISON:    No relevant prior studies available.    FINDINGS:    Vertebrae:  There are anterior wedge compression fractures involving   T12, L1 and L2 levels.  No obvious acute fracture line identified.  There   is approximately 60% loss of height at T12.  There is a vertebral plana   appearance centrally at L1 level,  measuring only 1-2 mm.  There is   approximately 60% loss of height centrally at L2 level.  These findings   result in accentuated kyphosis.  No significant retropulsion of the   posterior cortex or involvement of the pedicles or posterior elements are   noted.  There is mild loss of height involving the central L4 vertebral   body with approximately 20% loss of height.  A Schmorl's node is also   noted involving the inferior endplate.  There is 5 mm anterolisthesis of   L5 on S1.  Multilevel facet hypertrophic changes noted.    Discs spinal canal neural foramina:  Evaluation the central canal is   limited without intrathecal contrast.  No obvious posterior disc   protrusion.  No spinal canal stenosis.    Soft tissues:  No significant acute paraspinal soft tissue abnormality   identified.    IMPRESSION:       1.  There are anterior wedge compression fractures involving T12, L1 and   L2 levels.  No obvious acute fracture line identified.  There is   approximately 60% loss of height at T12.  There is a vertebral plana   appearance centrally at L1 level, measuring only 1-2 mm.  There is   approximately 60% loss of height centrally at L2 level.  These findings   result in accentuated kyphosis.  No significant retropulsion of the   posterior cortex or involvement of the pedicles or posterior elements are   noted.  The appearance is age-indeterminate.  MRI with STIR imaging may   be performed to evaluate for residual bone marrow edema if there is   concern for a subacute process.  2.  Additional presumed late subacute to chronic findings, as noted above.      Impression:          Electronically signed by Jimy Cm MD on 10-08-24 at 0107    CT Pelvis Without Contrast [[id number]] Collected: 10/08/24 0104     Updated: 10/08/24 0104    Narrative:        Patient: SAMPLE, ADA  Time Out: 01:03  Exam(s): CT PELVIS Without Contrast     EXAM:    CT Pelvis Without Intravenous Contrast    CLINICAL HISTORY:      R buttock  pain.    TECHNIQUE:    Axial computed tomography images of the pelvis without intravenous   contrast.  CTDI is 9.98 mGy and DLP is 277.5 mGy-cm.  This CT exam was   performed according to the principle of ALARA (As Low As Reasonably   Achievable) by using one or more of the following dose reduction   techniques: automated exposure control, adjustment of the mA and or kV   according to patient size, and or use of iterative reconstruction   technique.    COMPARISON:    No relevant prior studies available.    FINDINGS:    Bones joints:  No acute fracture.  No dislocation.    Soft tissues:  Unremarkable.    Vasculature:  Atherosclerotic calcification of the distal aorta and   iliac arteries.  No definite acute abnormality.    Bladder:  Unremarkable.  No stones.    Tubes, lines and devices:  Prior ORIF of the right femur with an   intramedullary bob noted.  Interlocking cannulated screws noted extending   through the proximal aspect of the intramedullary bob along the long axis   of the femoral neck.  The screws are seated within the femoral head and   are completely covered by the overlying cortex.  The lateral margin of   the cannulated screws extend approximately 2 cm beyond the lateral margin   of the intertrochanteric femoral cortex.  These are noted in the adjacent   overlying musculature with asymmetric thickening noted.    IMPRESSION:       1.  No acute osseous abnormality or abnormal alignment involving the   pelvic bones are proximal femurs.  2.  Prior ORIF of the right femur with an intramedullary bob noted.    Interlocking cannulated screws noted extending through the proximal   aspect of the intramedullary bob along the long axis of the femoral neck.    The screws are seated within the femoral head and are completely covered   by the overlying cortex.  The lateral margin of the cannulated screws   extend approximately 2 cm beyond the lateral margin of the   intertrochanteric femoral cortex.  These are noted  "in the adjacent   overlying musculature with asymmetric thickening noted.  The clinical   significance of this finding is indeterminant.  Reactive changes on the   overlying musculature may be present.      Impression:          Electronically signed by Jimy Cm MD on 10-08-24 at 0103    XR Hip With or Without Pelvis 2 - 3 View Right [[id number]] Collected: 10/07/24 2213     Updated: 10/07/24 2217    Narrative:      XR HIP W OR WO PELVIS 2-3 VIEW RIGHT-     Clinical: Fell, pain     COMPARISON examination 12/3/2023     FINDINGS: Gamma nails and medullary bob demonstrated within the right  femur, no change in position or loosening seen. No fracture identified.  No bone lesion seen. The right hemipelvis is satisfactory in appearance.  There is right hip joint narrowing as before.     CONCLUSION: No hardware loosening or malalignment, no acute osseous  abnormalities seen.     This report was finalized on 10/7/2024 10:14 PM by Dr. Francisco Perdomo M.D on Workstation: [id number]                 Pertinent Labs     Results from last 7 days   Lab Units 10/08/24  0454 10/07/24  2259 10/04/24  1410   WBC 10*3/mm3 3.82 3.48 4.24   HEMOGLOBIN g/dL 11.9* 12.5 12.8   PLATELETS 10*3/mm3 85* 83* 88*     Results from last 7 days   Lab Units 10/08/24  0454 10/07/24  2259 10/04/24  1410   SODIUM mmol/L 139 136 137   POTASSIUM mmol/L 3.9 4.1 4.0   CHLORIDE mmol/L 104 102 99   CO2 mmol/L 26.1 25.0 26.4   BUN mg/dL 11 11 15   CREATININE mg/dL 0.81 0.70 0.83   GLUCOSE mg/dL 86 103* 97   EGFR mL/min/1.73 73.0 87.0 70.9     Results from last 7 days   Lab Units 10/08/24  0454 10/07/24  2259 10/04/24  1410   ALBUMIN g/dL 3.5 3.6 4.0   BILIRUBIN mg/dL 0.2 0.2 0.6   ALK PHOS U/L 122* 146* 130*   AST (SGOT) U/L 28 30 31   ALT (SGPT) U/L 14 15 16     Results from last 7 days   Lab Units 10/08/24  0454 10/07/24  2259 10/04/24  1410   CALCIUM mg/dL 8.8 9.1 9.7   ALBUMIN g/dL 3.5 3.6 4.0               Invalid input(s): \"LDLCALC\"          Test " Results Pending at Discharge     Pending Results       None              Discharge Details        Discharge Medications        New Medications        Instructions Start Date   methocarbamol 500 MG tablet  Commonly known as: ROBAXIN   500 mg, Oral, Every 8 Hours PRN             Continue These Medications        Instructions Start Date   acetaminophen 325 MG tablet  Commonly known as: TYLENOL   650 mg, Oral, Every 4 Hours PRN      albuterol sulfate  (90 Base) MCG/ACT inhaler  Commonly known as: PROVENTIL HFA;VENTOLIN HFA;PROAIR HFA   2 puffs, Inhalation, Every 4 Hours PRN      allopurinol 100 MG tablet  Commonly known as: ZYLOPRIM   1 tablet, Oral, Daily      bisacodyl 5 MG EC tablet  Commonly known as: DULCOLAX   5 mg, Oral, Daily PRN      fluticasone 50 MCG/ACT nasal spray  Commonly known as: FLONASE   2 sprays, Nasal, Daily      Magnesium 500 MG capsule   500 mg, Oral, 2 Times Daily      metoprolol tartrate 25 MG tablet  Commonly known as: LOPRESSOR   1 tablet, Oral, 2 Times Daily      naloxone 4 MG/0.1ML nasal spray  Commonly known as: NARCAN   Call 911. Don't prime. [place] in 1 nostril for overdose. Repeat in 2-3 minutes in other nostril if no or minimal breathing/responsiveness.      omeprazole 40 MG capsule  Commonly known as: priLOSEC   1 capsule, Oral, Every Morning      polyethylene glycol 17 g packet  Commonly known as: MIRALAX   17 g, Oral, Daily PRN      potassium chloride 20 MEQ CR tablet  Commonly known as: KLOR-CON M20   1 tablet, Oral, 2 Times Daily      pravastatin 20 MG tablet  Commonly known as: PRAVACHOL   20 mg, Oral, Every Morning      sennosides-docusate 8.6-50 MG per tablet  Commonly known as: PERICOLACE   2 tablets, Oral, 2 Times Daily      temazepam 15 MG capsule  Commonly known as: RESTORIL   15 mg, Oral, Nightly PRN      vitamin B-12 1000 MCG tablet  Commonly known as: CYANOCOBALAMIN   1 tablet, Oral, Daily      vitamin D 1.25 MG (14244 UT) capsule capsule  Commonly known as:  ERGOCALCIFEROL   50,000 Units, Oral, Every 7 Days             Stop These Medications      erythromycin 5 MG/GM ophthalmic ointment  Commonly known as: ROMYCIN     sucralfate 1 g tablet  Commonly known as: Carafate              Allergies   Allergen Reactions    Levaquin [Levofloxacin] Hives    Zocor [Simvastatin] Myalgia    Atorvastatin Other (See Comments)    Codeine GI Intolerance    Penicillins Hives and Swelling     Pt reports tongue swelling    Percocet [Oxycodone-Acetaminophen] Itching and Nausea And Vomiting       Discharge Disposition:  Home-Health Care Svc      Discharge Diet:  Diet Order   Procedures    Diet: Cardiac; Healthy Heart (2-3 Na+); Fluid Consistency: Thin (IDDSI 0)       Discharge Activity:       CODE STATUS:    Code Status and Medical Interventions: CPR (Attempt to Resuscitate); Full Support   Ordered at: 10/08/24 0201     Code Status (Patient has no pulse and is not breathing):    CPR (Attempt to Resuscitate)     Medical Interventions (Patient has pulse or is breathing):    Full Support       Future Appointments   Date Time Provider Department Center   10/11/2024  1:20 PM VITALS ONLY [facility] [place]   10/11/2024  1:40 PM Shabbir Mann MD [facility] [place]   10/14/2024 11:45 AM Fernando Hopkins MD [facility] None      Contact information for follow-up providers       Amy Guillory MD .    Specialty: Family Medicine  Contact information:  [address] [facility] [address]  [place] [address]  [phone]               Brayden Jackson MD .    Specialty: Internal Medicine  Contact information:  [address] [facility] [address]  [place] [address]  [phone]                       Contact information for after-discharge care       Home Medical Care       [facility] .    Service: Home Health Services  Contact information:  [address] [facility] [address]  [facility] [address] [phone]                                   Time Spent on Discharge:   Greater than 30 minutes      Jimy Agee MD  [facility]  10/09/24  16:06 EDT

## 2024-10-09 NOTE — PROGRESS NOTES
LOS: 0 days   Patient Care Team:  Amy Guillory MD as PCP - General (Family Medicine)  Brayden Jackson MD as PCP - Family Medicine  Naif York MD as Consulting Physician (Cardiology)  Narendra Banda MD as Surgeon (General Surgery)  Shabbir Mann MD as Consulting Physician (Hematology and Oncology)  Gali Taveras APRN as Referring Physician ([facility])  Gali Martinez, RN as Nurse Navigator  Fernando Hopkins MD as Consulting Physician (Radiation Oncology)    Chief Complaint:  back pain after a fall    Subjective     Interval History: Reports improvement in pain today with muscle relaxer and pain medication.  Mobilizing better.  Has TLSO back brace at home.  Denies bowel or bladder incontinence, leg pain or weakness. Tolerated MRI. See below for MRI results.      History taken from: patient chart    Objective      Vital Signs  Temp:  [97.6 °F (36.4 °C)-98.1 °F (36.7 °C)] 98.1 °F (36.7 °C)  Heart Rate:  [60-71] 60  Resp:  [16-18] 16  BP: (137-160)/(62-78) 157/75      Results Review:     I reviewed the patient's new clinical results.  I reviewed the patient's new imaging results and agree with the interpretation.  Discussed with the patient and Dr. Alejo.    MRI LUMBAR SPINE W/WO CONTRAST 10/08/2024    Multiple compression fractures with edema and contrast enhancement the greatest of which is at L1 followed by T12 and L2.  These findings are more suggestive of acute to subacute fracture.  The enhancement is felt to be more related to degenerative disease.  No convincing evidence to suggest metastatic disease.    Physical Exam:     AA&O x 3. Conversation appropriate. No respiratory distress.   Sitting up in bed without difficulty.  No motor or sensory deficits.     Assessment & Plan       Closed compression fracture of L1 vertebra    Fall    T12 compression fracture    Compression fracture of L2 lumbar vertebra    Lumbar pain    Patient states that pain symptoms  are improved and she is moving around better.  She also verifies that she has the TLSO brace at home and will wear it at home whenever she is out of bed, walking around.    Since patient reports symptoms are improved, no need for further intervention other than bracing.  Patient is cleared for discharge anytime from neurosurgical standpoint.  We will arrange follow-up in our office in 3 weeks with new x-rays of the thoracic and lumbar spine.  Our office will arrange the appointment and contact the patient.  Nothing further to add from neurosurgical standpoint please contact our office for any further questions or concerns.    Staci aMry, BLAKE  10/09/24  11:42 EDT

## 2024-10-09 NOTE — DISCHARGE PLACEMENT REQUEST
"Sample, Kenia N (79 y.o. Female)       Date of Birth   1945    Social Security Number       Address   [address] [place] [address]    Home Phone   [phone]    MRN   [id number]       [facility]    Marital Status                               Admission Date   10/7/24    Admission Type   Emergency    Admitting Provider   Jimy Agee MD    Attending Provider   Jimy Agee MD    Department, Room/Bed   [facility] [address], P880/1       Discharge Date       Discharge Disposition       Discharge Destination                                 Attending Provider: Jimy Agee MD    Allergies: Levaquin [Levofloxacin], Zocor [Simvastatin], Atorvastatin, Codeine, Penicillins, Percocet [Oxycodone-acetaminophen]    Isolation: None   Infection: None   Code Status: CPR    Ht: 157.5 cm (62\")   Wt: 66.2 kg (146 lb)    Admission Cmt: None   Principal Problem: Closed compression fracture of L1 vertebra [S32.010A]                   Active Insurance as of 10/7/2024       Primary Coverage       Payor Plan Insurance Group Employer/Plan Group    MEDICARE MEDICARE A & B        Payor Plan Address Payor Plan Phone Number Payor Plan Fax Number Effective Dates    [address] [phone]  5/1/1993 - None Entered    [facility] [id number]         Subscriber Name Subscriber Birth Date Member ID       SAMPLE,ADA N 1945 [id number]               Secondary Coverage       Payor Plan Insurance Group Employer/Plan Group    [place]        Payor Plan Address Payor Plan Phone Number Payor Plan Fax Number Effective Dates    [address] [phone]  8/29/2016 - None Entered    [place] [address]         Subscriber Name Subscriber Birth Date Member ID       SAMPLE,ADA N 1945 [id number]                     Emergency Contacts        (Rel.) Home Phone Work Phone Mobile Phone    Sample,Nabil (Spouse) [phone] -- " [phone]    Kaylie Gutierrez (Daughter) -- -- [phone]

## 2024-10-09 NOTE — CASE MANAGEMENT/SOCIAL WORK
Continued Stay Note  [facility]     Patient Name: Kenia BRIAN Sample  MRN: [id number]  Today's Date: 10/9/2024    Admit Date: 10/7/2024    Plan: Home with family support & [facility].   Discharge Plan       Row Name 10/09/24 1423       Plan    Plan Home with family support & [facility].    Patient/Family in Agreement with Plan yes    Provided Post Acute Provider List? N/A    N/A Provider List Comment Declined; patient requests [facility].    Plan Comments Spoke with the patient and she plans to discharge home with family support and HH. She's worked with [facility]  in the past and requests a referral with them again. Referral sent in [facility]. Patient declines DME needs. She also plans for her family to transport her home at discharge. No other needs identified.                   Discharge Codes    No documentation.                 Expected Discharge Date and Time       Expected Discharge Date Expected Discharge Time    Oct 10, 2024               Melissa JIMENEZ RN

## 2024-10-09 NOTE — SIGNIFICANT NOTE
10/09/24 1350   OTHER   Discipline physical therapist   Rehab Time/Intention   Session Not Performed other (see comments)  (Pt declines PT eval/needs prior to dc. States she has been getting up without difficulty and has all equipment needs at home. Plan dc home.)   Therapy Assessment/Plan (PT)   Criteria for Skilled Interventions Met (PT) no problems identified which require skilled intervention

## 2024-10-10 ENCOUNTER — DOCUMENTATION (OUTPATIENT)
Dept: HOME HEALTH SERVICES | Facility: HOME HEALTHCARE | Age: 79
End: 2024-10-10
Payer: MEDICARE

## 2024-10-10 NOTE — PROGRESS NOTES
REASON FOR FOLLOW-UP: Left lower lobe lung mass-squamous of carcinoma                               History of Present Illness     The patient is an 79-year-old female with below medical history recently admitted 12/4 - 6/20/2023 with a history of right intertrochanteric femoral fracture and osteoarthritis presenting from a fall at home.  She had a stress fracture left foot that affected her balance, chronic low back pain that is also worsened with a fall.  Upon presentation orthopedic surgery was consulted finding a right intramedullary nail fixation and healing without abnormal findings.  No additional surgery was planned though physical therapy continued.  Spinal films demonstrated compression deformity T12 and L2 but fortunately she did further improve and was able to be discharged.    The patient had follow-up with several physicians including orthopedic physicians with an eventual chest exam that revealed an unexpected nodularity.  She was then referred to pulmonary medicine 1/8/2024 with a history of smoking for 35 years quitting 8 years ago and indication that there was a lung nodule found incidentally on imaging for the spine.  Through the [facility] system a CT of the chest demonstrated a 2.0 x 1.9 cm noncalcified nodule involving the posterior medial aspect left lower lobe abutting the pleura, more smoothly marginated 0.9 cm noncalcified right middle lobe nodule and a few additional smaller noncalcified nodules including 2 adjacent nodules measuring less than 0.4 cm the right middle lobe along with mild emphysema with mild to moderate subpleural and bibasilar scarring.  There is no pleural effusion or pneumothorax.    A PET/CT was obtained 2/6/2024 with a solid subpleural irregular nodule left lower lobe medially measuring 1.7 x 1.8 intensely hypermetabolic with SUV of 12.8, no additional nodular mass, had metabolic right superior mediastinal lymph node lateral measuring 0.9 cm to 3.9, diffuse lower  lobe tracer uptake with mediastinal right hilar lymph nodes partially calcified thought to be inflammatory or granulomatous disease.  There is also low-grade tracer uptake associated subacute healing left anterior rib fractures.    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    The patient was referred urgently from [facility] for assessment.  It is not certain why she has not been seen in the [facility] system though she now states that she wished to be seen here.     Patient's case discussed during thoracic conference with the finding of a right supraclavicular node.  This was unknown to us when seen her initially in consultation.  Request made to obtain ultrasound-guided biopsy of the right supraclavicular node and this is discussed with the patient's daughter who indicates the patient would be willing to proceed.  We will go and have this scheduled even though she is to be seen within the next week and the result may not yet be available.    3/6/2023 FNA obtained, discussed with IR physician Dr. Courtney with findings of a few atypical clusters present suspicious for involvement by non-small cell carcinoma.  He is offered to rebiopsy quickly if necessary.    The patient is seen 3/13/2024 with rebiopsy requested as well as radiation therapy consultation.  The case is discussed with interventional radiology and a repeat biopsy is possible.  The patient is willing to proceed in this fashion and we have also discussed radiation therapy consultation in the interval.  She continues have symptoms of chronic back pain and left lid lesion that is going to be reviewed by ophthalmology.    The patient went on to have a follow-up exam with repeat right supraclavicular lymph node biopsy 3/18/2024.  This was found positive for metastatic carcinoma.  The patient was seen by Dr. Hopkins with the conclusion  that she has a 2 cm left lower lobe squamous cell carcinoma abutting the pleura additional contralateral supraclavicular lymph node in the thyroid.  This is to be discussed in conference 3/21/2024.    This was discussed 3/21/2024 recognizing the patient's frailty.  Options for treating the areas that are known positive including the left lower lobe and right supraclavicular node would include radiation therapy and Dr. Hopkins will see the patient concerning this as we also follow her for consideration of systemic therapy including a [id number] considering the genomic studies done thus far and the inability to obtain additional genomics from the scant tissue that we have even from recent biopsy.  Finally the patient's overall status including an older adult assessment is going to be requested.    The patient's [id number] revealed NF1  detected with possible indication of Selumetinib effectiveness.  Additionally TMB was 10.4 mutations per megabase.  Concurrently the patient was seen by radiation therapy treating the left lower lobe and right neck site via SBRT.  Further MRI of the brain revealed no evidence of metastatic disease.    She is seen back 4/11/2024.  She had been seen 4/8/2024 with SBRT to left lower lobe and right neck lesion.  This has not been completed and she is to follow-up with radiation therapy mid July.  We have discussed her findings today with the patient seen along with her granddaughter.  She did well with radiation therapy and has no additional symptoms currently respiratorily.  Her genomic testing suggested possible use of Selumetinib (not currently available) though we will follow this as she is subsequently reevaluated.    A follow-up CT series was obtained 7/1/2024.  This reveals a slight decrease in the hypermetabolic right supraclavicular node measuring 0.9 x 0.7 cm previously 1.2 x 1.1 cm.  In the chest there is decrease in the mass in the posterior medial aspect left lower lobe and  evidence of radiation pneumonitis adjacently.  Margins are difficult to appreciate completely but maximal diameter is 2 cm previously 2.2 cm as compared to previous.  There is a pleural-based 1.0 x 1.6 cm nodule medial aspect of the right lower lobe which which was less conspicuous previously, multiple nodule opacities right middle lobe that are larger and a 1.3 cm density possibly mucous plugging otherwise seen.  These findings suggest a follow-up series of scans will still be necessary.    The patient is seen with her daughter and they both agree with this follow-up plan.    The patient required admission 10/7 - 9/20/2024 after mechanical fall with T12-L2 compression fractures treated conservatively.    The patient's follow-up exams obtained 10/4/2024.  She is seen back 10/11/2024 and there is serial decrease in the small right supraclavicular node when compared to previous now down to 7 x 5 mm, evidence of consolidation left lower lobe with tumor developed likely seen measuring 16 x 2 cm, 2 right middle lobe nodules-19 x 13 mm previously 15 x 10, adjacent 9 mm previously 4 to 5 mm and unchanged third right middle lobe noncalcified nodule, other nodule stable and there is no evidence of abdominal/pelvic or osseous metastasis.    The patient is seen 10/11/2024 slowly recovering from the mechanical fall described above.  She does not require additional treatment at this point after review of the scans with follow-up exams again are anticipated and she is willing to proceed.       Past Surgical History:   Procedure Laterality Date    APPENDECTOMY      CARDIAC CATHETERIZATION      EXTERNAL EAR SURGERY Right     had tumor excised behind right ear lobe benign    EYE LESION EXCISION Left 6/6/2024    Procedure: LEFT LOWER EYELID BASEL CELL CARCINOMA EXCISIONAL AND REPAIR WITH FROZEN SECTIONS,;  Surgeon: Austin Kamara MD;  Location: [facility] MAIN OR;  Service: Ophthalmology;  Laterality: Left;    FEMUR IM  NAILING/RODDING Right 06/15/2022    Procedure: RIGHT HIP INTRAMEDULLARY NAILING/RODDING;  Surgeon: Marc Crawford MD;  Location: [facility] OR;  Service: Orthopedics;  Laterality: Right;    GALLBLADDER SURGERY      HYSTERECTOMY      KNEE ARTHROPLASTY Right     KNEE ARTHROSCOPY Right     NJ REVJ TOTAL KNEE ARTHRP W/WO ALGRFT 1 COMPONENT Right 09/07/2016    Procedure: RT ILIOTIBIAL BAND RELEASE RT TOTAL KNEE POLY EXCHANGE;  Surgeon: Toy Adame MD;  Location: [facility] OR;  Service: Orthopedics    ROTATOR CUFF REPAIR Bilateral     US GUIDED LYMPH NODE BIOPSY  3/18/2024    WRIST MASS EXCISION Right         Current Outpatient Medications on File Prior to Visit   Medication Sig Dispense Refill    acetaminophen (TYLENOL) 325 MG tablet Take 2 tablets by mouth Every 4 (Four) Hours As Needed for Mild Pain .      albuterol (PROVENTIL HFA;VENTOLIN HFA) 108 (90 BASE) MCG/ACT inhaler Inhale 2 puffs Every 4 (Four) Hours As Needed. Indications: Asthma      allopurinol (ZYLOPRIM) 100 MG tablet Take 1 tablet by mouth Daily.      bisacodyl (DULCOLAX) 5 MG EC tablet Take 1 tablet by mouth Daily As Needed for Constipation (Use if polyethylene glycol is ineffective).      fluticasone (FLONASE) 50 MCG/ACT nasal spray Administer 2 sprays into the nostril(s) as directed by provider Daily.      Magnesium 500 MG capsule Take 500 mg by mouth 2 (Two) Times a Day. Indications: Acid Indigestion      methocarbamol (ROBAXIN) 500 MG tablet Take 1 tablet by mouth Every 8 (Eight) Hours As Needed for Muscle Spasms (back pain). 30 tablet 0    metoprolol tartrate (LOPRESSOR) 25 MG tablet Take 1 tablet by mouth 2 (Two) Times a Day. Indications: High Blood Pressure      naloxone (NARCAN) 4 MG/0.1ML nasal spray Call 911. Don't prime. [place] in 1 nostril for overdose. Repeat in 2-3 minutes in other nostril if no or minimal breathing/responsiveness. 2 each 0    omeprazole (PriLOSEC) 40 MG capsule Take 1 capsule by mouth Every Morning. Indications:  Heartburn      polyethylene glycol (MIRALAX) 17 g packet Take 17 g by mouth Daily As Needed (Use if senna-docusate is ineffective).      potassium chloride (K-DUR,KLOR-CON) 20 MEQ CR tablet Take 1 tablet by mouth 2 (Two) Times a Day.      pravastatin (PRAVACHOL) 20 MG tablet Take 1 tablet by mouth Every Morning.      sennosides-docusate sodium (SENOKOT-S) 8.6-50 MG tablet Take 2 tablets by mouth 2 (two) times a day. 100 tablet 1    temazepam (RESTORIL) 15 MG capsule Take 1 capsule by mouth At Night As Needed for Sleep.      vitamin B-12 (CYANOCOBALAMIN) 1000 MCG tablet Take 1 tablet by mouth Daily. Indications: Inadequate Vitamin B12      vitamin D (ERGOCALCIFEROL) 1.25 MG (53345 UT) capsule capsule Take 1 capsule by mouth Every 7 (Seven) Days. 5 capsule 0     No current facility-administered medications on file prior to visit.        ALLERGIES:    Allergies   Allergen Reactions    Levaquin [Levofloxacin] Hives    Zocor [Simvastatin] Myalgia    Atorvastatin Other (See Comments)    Codeine GI Intolerance    Penicillins Hives and Swelling     Pt reports tongue swelling    Percocet [Oxycodone-Acetaminophen] Itching and Nausea And Vomiting        Social History     Socioeconomic History    Marital status:      Spouse name: Nabil   Tobacco Use    Smoking status: Former     Current packs/day: 1.00     Average packs/day: 1 pack/day for 30.0 years (30.0 ttl pk-yrs)     Types: Cigarettes    Smokeless tobacco: Never   Vaping Use    Vaping status: Never Used   Substance and Sexual Activity    Alcohol use: Yes     Comment: 1 wine drink q 3-4 months    Drug use: Never    Sexual activity: Defer        Family History   Problem Relation Age of Onset    Hypertension Mother     Heart failure Mother     Heart attack Maternal Grandfather         Review of Systems   Constitutional:  Positive for activity change. Negative for appetite change, fatigue and unexpected weight change.   HENT: Negative.     Eyes: Negative.   "  Respiratory:  Positive for shortness of breath (Chronic).    Cardiovascular:  Negative for chest pain.   Gastrointestinal: Negative.    Endocrine: Negative.    Genitourinary: Negative.    Musculoskeletal:  Positive for back pain (Acute on chronic back pain).   Neurological: Negative.    Psychiatric/Behavioral: Negative.          Objective     Vitals:    10/11/24 1346   BP: 156/91   Pulse: 78   Temp: 98 °F (36.7 °C)   TempSrc: Oral   SpO2: 94%   Weight: 68.2 kg (150 lb 6.4 oz)   Height: 154.9 cm (61\")   PainSc:   6   PainLoc: Back                   10/11/2024     1:49 PM   Current Status   ECOG score 2       Physical Exam  Constitutional:       Appearance: Normal appearance.   HENT:      Head: Normocephalic and atraumatic.      Mouth/Throat:      Mouth: Mucous membranes are moist.      Pharynx: Oropharynx is clear.   Eyes:      Extraocular Movements: Extraocular movements intact.      Conjunctiva/sclera: Conjunctivae normal.      Pupils: Pupils are equal, round, and reactive to light.      Comments: Left lid nodular lesion   Cardiovascular:      Rate and Rhythm: Normal rate and regular rhythm.      Pulses: Normal pulses.      Heart sounds: Normal heart sounds.   Pulmonary:      Effort: Pulmonary effort is normal.      Breath sounds: Normal breath sounds.   Abdominal:      General: Bowel sounds are normal.      Palpations: Abdomen is soft.   Musculoskeletal:         General: Normal range of motion.      Cervical back: Normal range of motion and neck supple.   Skin:     General: Skin is warm and dry.   Neurological:      General: No focal deficit present.      Mental Status: She is alert and oriented to person, place, and time.   Psychiatric:         Mood and Affect: Mood normal.         Behavior: Behavior normal.         RECENT LABS:  Hematology WBC   Date Value Ref Range Status   10/08/2024 3.82 3.40 - 10.80 10*3/mm3 Final   02/06/2024 4.15 (L) 4.5 - 11.0 10*3/uL Final     RBC   Date Value Ref Range Status "   10/08/2024 3.43 (L) 3.77 - 5.28 10*6/mm3 Final   02/06/2024 3.70 (L) 4.0 - 5.2 10*6/uL Final     Hemoglobin   Date Value Ref Range Status   10/08/2024 11.9 (L) 12.0 - 15.9 g/dL Final   02/06/2024 12.4 12.0 - 16.0 g/dL Final     Hematocrit   Date Value Ref Range Status   10/08/2024 34.8 34.0 - 46.6 % Final   02/06/2024 36.6 36.0 - 46.0 % Final     Platelets   Date Value Ref Range Status   10/08/2024 85 (L) 140 - 450 10*3/mm3 Final   02/06/2024 77 (L) 140 - 440 10*3/uL Final          Assessment & Plan      79 year-old female with a orthopedic history as described in particular involving right hip, bilateral knees and bilateral rotator cuffs recently falling and sustaining a contusion involving her right hip and evidence of compression deformities T12 and L2.  Upon discharge she was seen by orthopedics and, unexpectedly, on additional back studies a left lung nodule was determined.    This [facility] a  a CT of the chest which demonstrated a 2.0 x 1.9 cm noncalcified nodule involving the posterior medial aspect left lower lobe abutting the pleura, more smoothly marginated 0.9 cm noncalcified right middle lobe nodule and a few additional smaller noncalcified nodules including 2 adjacent nodules measuring less than 0.4 cm the right middle lobe along with mild emphysema with mild to moderate subpleural and bibasilar scarring.  There is no pleural effusion or pneumothorax.    A PET/CT was obtained 2/6/2024 with a solid subpleural irregular nodule left lower lobe medially measuring 1.7 x 1.8 intensely hypermetabolic with SUV of 12.8, no additional nodular mass, had metabolic right superior mediastinal lymph node lateral measuring 0.9 cm to 3.9, diffuse lower lobe tracer uptake with mediastinal right hilar lymph nodes partially calcified thought to be inflammatory or granulomatous disease.  There is also low-grade tracer uptake associated subacute healing left anterior rib  fractures.    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    The patient is now seen in CBC office.  There has not been assessment by surgery or, apparently, by pulmonary medicine formally though the studies above are available for review.  After discussion we will need to have her presented in the thoracic conference so that opinions from radiology, thoracic surgery radiation therapy as well as pathology can be obtained.    Patient's case discussed during thoracic conference with the finding of a right supraclavicular node.  This was unknown to us when seen her initially in consultation.  Request made to obtain ultrasound-guided biopsy of the right supraclavicular node and this is discussed with the patient's daughter who indicates the patient would be willing to proceed.  We will go and have this scheduled even though she is to be seen within the next week and the result may not yet be available.    3/6/2023 FNA obtained, discussed with IR physician Dr. Courtney with findings of a few atypical clusters present suspicious for involvement by non-small cell carcinoma.  He is offered to rebiopsy quickly if necessary.    The patient is seen 3/13/2024 with rebiopsy requested as well as radiation therapy consultation.  The case is discussed with interventional radiology and a repeat biopsy is possible.  The patient is willing to proceed in this fashion and we have also discussed radiation therapy consultation in the interval.  She continues have symptoms of chronic back pain and left lid lesion that is going to be reviewed by ophthalmology.    This was discussed 3/21/2024 recognizing the patient's frailty.  Options for treating the areas that are known positive including the left lower lobe and right supraclavicular node would include radiation therapy and Dr. Hopkins will see the patient  concerning this as we also follow her for consideration of systemic therapy including a [id number] considering the genomic studies done thus far and the inability to obtain additional genomics from the scant tissue that we have even from recent biopsy.  Finally the patient's overall status including an older adult assessment is going to be requested.    She is seen back 4/11/2024.  She had been seen 4/8/2024 with SBRT to left lower lobe and right neck lesion.  This has not been completed and she is to follow-up with radiation therapy mid July.  We have discussed her findings today with the patient seen along with her granddaughter.  She did well with radiation therapy and has no additional symptoms currently respiratorily.    Her genomic testing suggested possible use of Selumetinib (not currently available) though we will follow this as she is subsequently reevaluated.      A follow-up CT series was obtained 7/1/2024.  This reveals a slight decrease in the hypermetabolic right supraclavicular node measuring 0.9 x 0.7 cm previously 1.2 x 1.1 cm.  In the chest there is decrease in the mass in the posterior medial aspect left lower lobe and evidence of radiation pneumonitis adjacently.  Margins are difficult to appreciate completely but maximal diameter is 2 cm previously 2.2 cm as compared to previous.  There is a pleural-based 1.0 x 1.6 cm nodule medial aspect of the right lower lobe which which was less conspicuous previously, multiple nodule opacities right middle lobe that are larger and a 1.3 cm density possibly mucous plugging otherwise seen.  These findings suggest a follow-up series of scans will still be necessary.    The patient is seen in office 7/12/2024 with a reasonably stable performance status.  At this point no additional intervention is necessary but a follow-up scan is requested.    The patient required admission 10/7 - 9/20/2024 after mechanical fall with T12-L2 compression fractures treated  conservatively.      The patient's follow-up exams obtained 10/4/2024.  She is seen back 10/11/2024 and there is serial decrease in the small right supraclavicular node when compared to previous now down to 7 x 5 mm, evidence of consolidation left lower lobe with tumor developed likely seen measuring 16 x 2 cm, 2 right middle lobe nodules-19 x 13 mm previously 15 x 10, adjacent 9 mm previously 4 to 5 mm and unchanged third right middle lobe noncalcified nodule, other nodule stable and there is no evidence of abdominal/pelvic or osseous metastasis.    The patient required admission 10/7 - 9/20/2024 after mechanical fall with T12-L2 compression fractures treated conservatively.    The patient's follow-up exams obtained 10/4/2024.  She is seen back 10/11/2024 and there is serial decrease in the small right supraclavicular node when compared to previous now down to 7 x 5 mm, evidence of consolidation left lower lobe with tumor developed likely seen measuring 16 x 2 cm, 2 right middle lobe nodules-19 x 13 mm previously 15 x 10, adjacent 9 mm previously 4 to 5 mm and unchanged third right middle lobe noncalcified nodule, other nodule stable and there is no evidence of abdominal/pelvic or osseous metastasis.    The patient is seen 10/11/2024 slowly recovering from the mechanical fall described above.  She does not require additional treatment at this point after review of the scans with follow-up exams again are anticipated and she is willing to proceed.         Plan:  *Return 23 weeks for CT neck, chest, abdomen, pelvis, CMP, CBC    *24 weeks MD    *Radiation therapy follow-up in place as well       I

## 2024-10-10 NOTE — CASE MANAGEMENT/SOCIAL WORK
Case Management Discharge Note      Final Note: [facility]    Provided Post Acute Provider List?: N/A  N/A Provider List Comment: Declined; patient requests [facility] .    Selected Continued Care - Discharged on 10/9/2024 Admission date: 10/7/2024 - Discharge disposition: Home-Health Care Svc      Destination    No services have been selected for the patient.                Durable Medical Equipment    No services have been selected for the patient.                Dialysis/Infusion    No services have been selected for the patient.                Home Medical Care Coordination complete.      Service Provider Selected Services Address Phone Fax Patient Preferred     [facility] [address] [address] [phone] --              Therapy    No services have been selected for the patient.                Community Resources    No services have been selected for the patient.                Community & DME    No services have been selected for the patient.                         Final Discharge Disposition Code: 06 - home with home health care

## 2024-10-11 ENCOUNTER — HOME HEALTH ADMISSION (OUTPATIENT)
Dept: HOME HEALTH SERVICES | Facility: HOME HEALTHCARE | Age: 79
End: 2024-10-11
Payer: MEDICARE

## 2024-10-11 ENCOUNTER — OFFICE VISIT (OUTPATIENT)
Dept: ONCOLOGY | Facility: CLINIC | Age: 79
End: 2024-10-11
Payer: MEDICARE

## 2024-10-11 VITALS
TEMPERATURE: 98 F | DIASTOLIC BLOOD PRESSURE: 91 MMHG | BODY MASS INDEX: 28.4 KG/M2 | HEART RATE: 78 BPM | SYSTOLIC BLOOD PRESSURE: 156 MMHG | OXYGEN SATURATION: 94 % | WEIGHT: 150.4 LBS | HEIGHT: 61 IN

## 2024-10-11 DIAGNOSIS — C34.92 NSCLC OF LEFT LUNG: Primary | ICD-10-CM

## 2024-10-11 PROCEDURE — 1125F AMNT PAIN NOTED PAIN PRSNT: CPT | Performed by: INTERNAL MEDICINE

## 2024-10-11 PROCEDURE — 3080F DIAST BP >= 90 MM HG: CPT | Performed by: INTERNAL MEDICINE

## 2024-10-11 PROCEDURE — 3077F SYST BP >= 140 MM HG: CPT | Performed by: INTERNAL MEDICINE

## 2024-10-11 PROCEDURE — 99214 OFFICE O/P EST MOD 30 MIN: CPT | Performed by: INTERNAL MEDICINE

## 2024-10-14 ENCOUNTER — TELEPHONE (OUTPATIENT)
Dept: RADIATION ONCOLOGY | Facility: HOSPITAL | Age: 79
End: 2024-10-14
Payer: MEDICARE

## 2024-10-14 ENCOUNTER — TELEPHONE (OUTPATIENT)
Dept: ONCOLOGY | Facility: CLINIC | Age: 79
End: 2024-10-14
Payer: MEDICARE

## 2024-10-14 NOTE — TELEPHONE ENCOUNTER
Called radiation and advised them that due to transportation issues, pt is not going to keep apt on 10/14 and Dr. Mann reviewed scans with pt. This does not need to be rescheduled at this time per Dr. Mann. Sudheer in radiation v/u.       ----- Message from Sarah FORBES sent at 10/14/2024 11:04 AM EDT -----  [facility], they will not let me cancel apt with radiation today without you putting a note or calling them with a reason. Call [facility] to cancel appt- Monday. Sorry, I tried. Thanks, Sarah

## 2024-10-14 NOTE — TELEPHONE ENCOUNTER
Call received from [facility] to cancel appointment [facility] nurse for Dr. Mann. Per Dr. Mann to cancel follow-up appointment with Dr. Hopkins due transportation issues. Per [facility] we are we are not rescheduling any future with our office

## 2024-10-15 ENCOUNTER — PATIENT OUTREACH (OUTPATIENT)
Dept: OTHER | Facility: HOSPITAL | Age: 79
End: 2024-10-15
Payer: MEDICARE

## 2024-10-15 NOTE — PROGRESS NOTES
Reviewed chart. Patient with [facility] LLL. S/p 5 SBRT 4/4-4/10/24 to the left lower lobe and 5 SBRT to metastatic right supraclavicular lymph node. CT scan 10/4, saw Dr. Mann 10/11. IP 10/7-10/9/24 after mechanical fall. She was found to have T12-L2 compression fractures on CT of uncertain acuity. Kypho was not recommended. She was stable for dc home with . Scans scheduled 4/2, f/u Dr. Mann 4/9/25    Called patient's daughter Kaylie, preferred contact on chart. Left message that I was just touching base to see how she was doing. Wanted to know if she had any questions/concerns or resource/supportive care needs; requested cb. I will likely close case if doing well in regards to lung cancer

## 2024-10-16 ENCOUNTER — TELEPHONE (OUTPATIENT)
Dept: NEUROSURGERY | Facility: CLINIC | Age: 79
End: 2024-10-16
Payer: MEDICARE

## 2024-10-16 NOTE — TELEPHONE ENCOUNTER
10/16/24 kayla. Staci piña.  Pt is scheduled for xray and hospital follow up on 11/07/24 at 10:45 for  Xray and 11:00 a.m. with hospital follow up with dr JOSIE Alejo.  I left detail voicemail at [phone].   I mailed both appt reminders to address on file.    Pt was inpt on 10/07/24 and discharged on 10/09/24    ++hub: ok to communicate and/or reschedule to meet pt's needs. Thanks!+++

## 2024-10-23 ENCOUNTER — TELEPHONE (OUTPATIENT)
Dept: NEUROSURGERY | Facility: CLINIC | Age: 79
End: 2024-10-23

## 2024-10-23 NOTE — TELEPHONE ENCOUNTER
PROVIDER: DR. REZA    REASON FOR CALL:    PATIENT ADA SAMPLE'S DAUGHTER, MYKEL, CALLED TO CANCEL THE PT'S UPCOMING HOS F/U STATING THAT THE PT WOULD BE FOLLOWING UP ELSEWHERE REGARDING THIS DX. THE VISIT HAS BEEN CANCELED. SHE NEEDS THE XR THAT WAS SCHEDULED ON THE SAME DAY TO BE CANCELED, TOO. THANKS.    CALL THE PATIENT OR THE DAUGHTER BACK IF THERE ARE ANY QUESTIONS.     DAUGHTER'S PHONE # [phone]

## 2024-10-28 ENCOUNTER — PATIENT OUTREACH (OUTPATIENT)
Dept: OTHER | Facility: HOSPITAL | Age: 79
End: 2024-10-28
Payer: MEDICARE

## 2024-10-28 NOTE — PROGRESS NOTES
Reviewed chart. Patient with SCC LLL. S/p 5 SBRT 4/4-4/10/24 to the left lower lobe and 5 SBRT to metastatic right supraclavicular lymph node. CT scan 10/4, saw Dr. Mann 10/11. IP 10/7-10/9/24 after mechanical fall. She was found to have T12-L2 compression fractures on CT of uncertain acuity. Kypho was not recommended. She was stable for dc home with . Scans scheduled 4/2/25, f/u Dr. Mann 4/9/25     Called Kaylie, daughter.  We discussed her fall/admit with compression fractures.  We also discussed her CT scan and appt with Dr. Mann last month. Kaylie states the patient is feeling much better.     We discussed her April 2025 CT scan and oncology appt with Dr. Mann.   Kaylie denies questions, concerns or ongoing resource needs.     Since she is stable with no active cancer treatment and no ongoing resource needs, I will close her case to navigation although reminded Kaylie that the patient can access the services in the [facility] even with her navigation case being closed. Encouraged patient/Kaylie to call in the future if the need arises. Kaylie verbalized understanding.

## 2024-12-04 ENCOUNTER — TELEPHONE (OUTPATIENT)
Dept: ONCOLOGY | Facility: CLINIC | Age: 79
End: 2024-12-04
Payer: MEDICARE

## 2024-12-04 NOTE — TELEPHONE ENCOUNTER
Called pt and she was wondering if Dr. Mann would recommend getting a RSV vaccination (this is the vaccine she was referring to but could not remember the name). Advised her Dr. Mann does recommend getting this vaccination for all eligible patients. She v/u.     ----- Message from Rosaura JACK sent at 12/3/2024  2:51 PM EST -----  Junito Severino,  this patient called here or was probably transferred here and wanted to check with  about taking a Upper respiratory vaccination.  She did not know what the name of the shot.

## 2025-04-02 ENCOUNTER — LAB (OUTPATIENT)
Dept: LAB | Facility: HOSPITAL | Age: 80
End: 2025-04-02
Payer: MEDICARE

## 2025-04-02 ENCOUNTER — HOSPITAL ENCOUNTER (OUTPATIENT)
Dept: PET IMAGING | Facility: HOSPITAL | Age: 80
Discharge: HOME | End: 2025-04-02
Admitting: INTERNAL MEDICINE
Payer: MEDICARE

## 2025-04-02 DIAGNOSIS — C34.92 NSCLC OF LEFT LUNG: ICD-10-CM

## 2025-04-02 LAB
ALBUMIN SERPL-MCNC: 3.9 G/DL (ref 3.5–5.2)
ALBUMIN/GLOB SERPL: 1 G/DL
ALP SERPL-CCNC: 133 U/L (ref 39–117)
ALT SERPL W P-5'-P-CCNC: 19 U/L (ref 1–33)
ANION GAP SERPL CALCULATED.3IONS-SCNC: 15.4 MMOL/L (ref 5–15)
AST SERPL-CCNC: 32 U/L (ref 1–32)
BASOPHILS # BLD AUTO: 0.04 10*3/MM3 (ref 0–0.2)
BASOPHILS NFR BLD AUTO: 1 % (ref 0–1.5)
BILIRUB SERPL-MCNC: 0.5 MG/DL (ref 0–1.2)
BUN SERPL-MCNC: 28 MG/DL (ref 8–23)
BUN/CREAT SERPL: 23.1 (ref 7–25)
CALCIUM SPEC-SCNC: 9.8 MG/DL (ref 8.6–10.5)
CHLORIDE SERPL-SCNC: 96 MMOL/L (ref 98–107)
CO2 SERPL-SCNC: 21.6 MMOL/L (ref 22–29)
CREAT SERPL-MCNC: 1.21 MG/DL (ref 0.57–1)
DEPRECATED RDW RBC AUTO: 48.6 FL (ref 37–54)
EGFRCR SERPLBLD CKD-EPI 2021: 44.8 ML/MIN/1.73
EOSINOPHIL # BLD AUTO: 0.1 10*3/MM3 (ref 0–0.4)
EOSINOPHIL NFR BLD AUTO: 2.4 % (ref 0.3–6.2)
ERYTHROCYTE [DISTWIDTH] IN BLOOD BY AUTOMATED COUNT: 12.8 % (ref 12.3–15.4)
GLOBULIN UR ELPH-MCNC: 4.1 GM/DL
GLUCOSE SERPL-MCNC: 85 MG/DL (ref 65–99)
HCT VFR BLD AUTO: 35.3 % (ref 34–46.6)
HGB BLD-MCNC: 11.9 G/DL (ref 12–15.9)
IMM GRANULOCYTES # BLD AUTO: 0 10*3/MM3 (ref 0–0.05)
IMM GRANULOCYTES NFR BLD AUTO: 0 % (ref 0–0.5)
LYMPHOCYTES # BLD AUTO: 1.12 10*3/MM3 (ref 0.7–3.1)
LYMPHOCYTES NFR BLD AUTO: 27.1 % (ref 19.6–45.3)
MCH RBC QN AUTO: 34.8 PG (ref 26.6–33)
MCHC RBC AUTO-ENTMCNC: 33.7 G/DL (ref 31.5–35.7)
MCV RBC AUTO: 103.2 FL (ref 79–97)
MONOCYTES # BLD AUTO: 0.34 10*3/MM3 (ref 0.1–0.9)
MONOCYTES NFR BLD AUTO: 8.2 % (ref 5–12)
NEUTROPHILS NFR BLD AUTO: 2.54 10*3/MM3 (ref 1.7–7)
NEUTROPHILS NFR BLD AUTO: 61.3 % (ref 42.7–76)
NRBC BLD AUTO-RTO: 0 /100 WBC (ref 0–0.2)
PLATELET # BLD AUTO: 77 10*3/MM3 (ref 140–450)
PMV BLD AUTO: 11.1 FL (ref 6–12)
POTASSIUM SERPL-SCNC: 4.9 MMOL/L (ref 3.5–5.2)
PROT SERPL-MCNC: 8 G/DL (ref 6–8.5)
RBC # BLD AUTO: 3.42 10*6/MM3 (ref 3.77–5.28)
SODIUM SERPL-SCNC: 133 MMOL/L (ref 136–145)
WBC NRBC COR # BLD AUTO: 4.14 10*3/MM3 (ref 3.4–10.8)

## 2025-04-02 PROCEDURE — 85025 COMPLETE CBC W/AUTO DIFF WBC: CPT

## 2025-04-02 PROCEDURE — 36415 COLL VENOUS BLD VENIPUNCTURE: CPT

## 2025-04-02 PROCEDURE — 80053 COMPREHEN METABOLIC PANEL: CPT

## 2025-04-02 PROCEDURE — 71260 CT THORAX DX C+: CPT

## 2025-04-02 PROCEDURE — 70491 CT SOFT TISSUE NECK W/DYE: CPT

## 2025-04-02 PROCEDURE — 74177 CT ABD & PELVIS W/CONTRAST: CPT

## 2025-04-02 PROCEDURE — 25510000002 DIATRIZOATE MEGLUMINE & SODIUM PER 1 ML: Performed by: INTERNAL MEDICINE

## 2025-04-02 PROCEDURE — 25510000001 IOPAMIDOL 61 % SOLUTION: Performed by: INTERNAL MEDICINE

## 2025-04-02 RX ORDER — DIATRIZOATE MEGLUMINE AND DIATRIZOATE SODIUM 660; 100 MG/ML; MG/ML
30 SOLUTION ORAL; RECTAL
Status: COMPLETED | OUTPATIENT
Start: 2025-04-02 | End: 2025-04-02

## 2025-04-02 RX ORDER — IOPAMIDOL 612 MG/ML
100 INJECTION, SOLUTION INTRAVASCULAR
Status: COMPLETED | OUTPATIENT
Start: 2025-04-02 | End: 2025-04-02

## 2025-04-02 RX ADMIN — IOPAMIDOL 85 ML: 612 INJECTION, SOLUTION INTRAVENOUS at 09:10

## 2025-04-02 RX ADMIN — DIATRIZOATE MEGLUMINE AND DIATRIZOATE SODIUM 30 ML: 660; 100 LIQUID ORAL; RECTAL at 09:10

## 2025-04-09 ENCOUNTER — OFFICE VISIT (OUTPATIENT)
Dept: ONCOLOGY | Facility: CLINIC | Age: 80
End: 2025-04-09
Payer: MEDICARE

## 2025-04-09 VITALS
OXYGEN SATURATION: 96 % | HEIGHT: 61 IN | TEMPERATURE: 97.9 F | RESPIRATION RATE: 16 BRPM | SYSTOLIC BLOOD PRESSURE: 145 MMHG | BODY MASS INDEX: 28.7 KG/M2 | HEART RATE: 66 BPM | WEIGHT: 152 LBS | DIASTOLIC BLOOD PRESSURE: 78 MMHG

## 2025-04-09 DIAGNOSIS — C34.81 MALIGNANT NEOPLASM OF OVERLAPPING SITES OF RIGHT BRONCHUS AND LUNG: ICD-10-CM

## 2025-04-09 DIAGNOSIS — C34.92 NSCLC OF LEFT LUNG: Primary | ICD-10-CM

## 2025-04-09 PROCEDURE — 99214 OFFICE O/P EST MOD 30 MIN: CPT | Performed by: INTERNAL MEDICINE

## 2025-04-09 PROCEDURE — 1126F AMNT PAIN NOTED NONE PRSNT: CPT | Performed by: INTERNAL MEDICINE

## 2025-04-09 PROCEDURE — 3078F DIAST BP <80 MM HG: CPT | Performed by: INTERNAL MEDICINE

## 2025-04-09 PROCEDURE — 3077F SYST BP >= 140 MM HG: CPT | Performed by: INTERNAL MEDICINE

## 2025-04-09 NOTE — LETTER
April 9, 2025     Amy Guillory MD  [address]  [facility] [address]  [place] [id number]    Patient: Kenia N Sample   YOB: 1945   Date of Visit: 4/9/2025     Dear Amy Guillory MD:       Thank you for referring Ada Sample to me for evaluation. Below are the relevant portions of my assessment and plan of care.    If you have questions, please do not hesitate to call me. I look forward to following Ada along with you.         Sincerely,        Shabbir Mann MD        CC: MD Mackenzie Valdovinos Michael D., MD  04/09/25 1527  Sign when Signing Visit        REASON FOR FOLLOW-UP: Left lower lobe lung mass-squamous of carcinoma                               History of Present Illness     The patient is an 80-year-old female with below medical history recently admitted 12/4 - 6/20/2023 with a history of right intertrochanteric femoral fracture and osteoarthritis presenting from a fall at home.  She had a stress fracture left foot that affected her balance, chronic low back pain that is also worsened with a fall.  Upon presentation orthopedic surgery was consulted finding a right intramedullary nail fixation and healing without abnormal findings.  No additional surgery was planned though physical therapy continued.  Spinal films demonstrated compression deformity T12 and L2 but fortunately she did further improve and was able to be discharged.    The patient had follow-up with several physicians including orthopedic physicians with an eventual chest exam that revealed an unexpected nodularity.  She was then referred to pulmonary medicine 1/8/2024 with a history of smoking for 35 years quitting 8 years ago and indication that there was a lung nodule found incidentally on imaging for the spine.  Through the [place] system a CT of the chest demonstrated a 2.0 x 1.9 cm noncalcified nodule involving the posterior medial aspect left lower lobe abutting the pleura, more smoothly marginated 0.9 cm  noncalcified right middle lobe nodule and a few additional smaller noncalcified nodules including 2 adjacent nodules measuring less than 0.4 cm the right middle lobe along with mild emphysema with mild to moderate subpleural and bibasilar scarring.  There is no pleural effusion or pneumothorax.    A PET/CT was obtained 2/6/2024 with a solid subpleural irregular nodule left lower lobe medially measuring 1.7 x 1.8 intensely hypermetabolic with SUV of 12.8, no additional nodular mass, had metabolic right superior mediastinal lymph node lateral measuring 0.9 cm to 3.9, diffuse lower lobe tracer uptake with mediastinal right hilar lymph nodes partially calcified thought to be inflammatory or granulomatous disease.  There is also low-grade tracer uptake associated subacute healing left anterior rib fractures.    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    The patient was referred urgently from [facility] for assessment.  It is not certain why she has not been seen in the [place] system though she now states that she wished to be seen here.     Patient's case discussed during thoracic conference with the finding of a right supraclavicular node.  This was unknown to us when seen her initially in consultation.  Request made to obtain ultrasound-guided biopsy of the right supraclavicular node and this is discussed with the patient's daughter who indicates the patient would be willing to proceed.  We will go and have this scheduled even though she is to be seen within the next week and the result may not yet be available.    3/6/2023 FNA obtained, discussed with IR physician Dr. Courtney with findings of a few atypical clusters present suspicious for involvement by non-small cell carcinoma.  He is offered to rebiopsy quickly if necessary.    The patient is seen 3/13/2024 with rebiopsy requested as  well as radiation therapy consultation.  The case is discussed with interventional radiology and a repeat biopsy is possible.  The patient is willing to proceed in this fashion and we have also discussed radiation therapy consultation in the interval.  She continues have symptoms of chronic back pain and left lid lesion that is going to be reviewed by ophthalmology.    The patient went on to have a follow-up exam with repeat right supraclavicular lymph node biopsy 3/18/2024.  This was found positive for metastatic carcinoma.  The patient was seen by Dr. Hopkins with the conclusion that she has a 2 cm left lower lobe squamous cell carcinoma abutting the pleura additional contralateral supraclavicular lymph node in the thyroid.  This is to be discussed in conference 3/21/2024.    This was discussed 3/21/2024 recognizing the patient's frailty.  Options for treating the areas that are known positive including the left lower lobe and right supraclavicular node would include radiation therapy and Dr. Hopkins will see the patient concerning this as we also follow her for consideration of systemic therapy including a [id number] considering the genomic studies done thus far and the inability to obtain additional genomics from the scant tissue that we have even from recent biopsy.  Finally the patient's overall status including an older adult assessment is going to be requested.    The patient's [id number] revealed NF1  detected with possible indication of Selumetinib effectiveness.  Additionally TMB was 10.4 mutations per megabase.  Concurrently the patient was seen by radiation therapy treating the left lower lobe and right neck site via SBRT.  Further MRI of the brain revealed no evidence of metastatic disease.    She is seen back 4/11/2024.  She had been seen 4/8/2024 with SBRT to left lower lobe and right neck lesion.  This has not been completed and she is to follow-up with radiation therapy mid July.  We have  discussed her findings today with the patient seen along with her granddaughter.  She did well with radiation therapy and has no additional symptoms currently respiratorily.  Her genomic testing suggested possible use of Selumetinib (not currently available) though we will follow this as she is subsequently reevaluated.    A follow-up CT series was obtained 7/1/2024.  This reveals a slight decrease in the hypermetabolic right supraclavicular node measuring 0.9 x 0.7 cm previously 1.2 x 1.1 cm.  In the chest there is decrease in the mass in the posterior medial aspect left lower lobe and evidence of radiation pneumonitis adjacently.  Margins are difficult to appreciate completely but maximal diameter is 2 cm previously 2.2 cm as compared to previous.  There is a pleural-based 1.0 x 1.6 cm nodule medial aspect of the right lower lobe which which was less conspicuous previously, multiple nodule opacities right middle lobe that are larger and a 1.3 cm density possibly mucous plugging otherwise seen.  These findings suggest a follow-up series of scans will still be necessary.    The patient is seen with her daughter and they both agree with this follow-up plan.    The patient required admission 10/7 - 9/20/2024 after mechanical fall with T12-L2 compression fractures treated conservatively.    The patient's follow-up exams obtained 10/4/2024.  She is seen back 10/11/2024 and there is serial decrease in the small right supraclavicular node when compared to previous now down to 7 x 5 mm, evidence of consolidation left lower lobe with tumor developed likely seen measuring 16 x 2 cm, 2 right middle lobe nodules-19 x 13 mm previously 15 x 10, adjacent 9 mm previously 4 to 5 mm and unchanged third right middle lobe noncalcified nodule, other nodule stable and there is no evidence of abdominal/pelvic or osseous metastasis.    The patient is seen 10/11/2024 slowly recovering from the mechanical fall described above.  She does not  "require additional treatment at this point after review of the scans with follow-up exams again are anticipated and she is willing to proceed.    The patient is seen 4/9/2025 and evaluated by CT of soft tissue neck, chest, abdomen, pelvis performed 4/2 2025.  There is now seen increase in the right middle lobe cavitary thick-walled nodule measuring 3.2 x 2.6 compared to previous 2.1 x 1.4.  Other sites are stable and no additional lesions have developed.  The patient is seen with her daughter both indicating that she is still able to carry out daily activities without severe difficulty though she \"has slowed up a bit\".  He does have back pain periodically that improves with rest and does not notice worsening respiratory symptoms.  She may well have disease that would be amenable to local therapy and it is requested that she undergo a PET/CT.       Past Surgical History:   Procedure Laterality Date   • APPENDECTOMY     • CARDIAC CATHETERIZATION     • EXTERNAL EAR SURGERY Right     had tumor excised behind right ear lobe benign   • EYE LESION EXCISION Left 6/6/2024    Procedure: LEFT LOWER EYELID BASEL CELL CARCINOMA EXCISIONAL AND REPAIR WITH FROZEN SECTIONS,;  Surgeon: [place] Kamara MD;  Location: [facility] OR;  Service: Ophthalmology;  Laterality: Left;   • FEMUR IM NAILING/RODDING Right 06/15/2022    Procedure: RIGHT HIP INTRAMEDULLARY NAILING/RODDING;  Surgeon: Marc Crawford MD;  Location: [facility];  Service: Orthopedics;  Laterality: Right;   • GALLBLADDER SURGERY     • HYSTERECTOMY     • KNEE ARTHROPLASTY Right    • KNEE ARTHROSCOPY Right    • DC REVJ TOTAL KNEE ARTHRP W/WO ALGRFT 1 COMPONENT Right 09/07/2016    Procedure: RT ILIOTIBIAL BAND RELEASE RT TOTAL KNEE POLY EXCHANGE;  Surgeon: Toy Adame MD;  Location: [facility];  Service: Orthopedics   • ROTATOR CUFF REPAIR Bilateral    • US GUIDED LYMPH NODE BIOPSY  3/18/2024   • WRIST MASS EXCISION Right         Current Outpatient " Medications on File Prior to Visit   Medication Sig Dispense Refill   • acetaminophen (TYLENOL) 325 MG tablet Take 2 tablets by mouth Every 4 (Four) Hours As Needed for Mild Pain .     • albuterol (PROVENTIL HFA;VENTOLIN HFA) 108 (90 BASE) MCG/ACT inhaler Inhale 2 puffs Every 4 (Four) Hours As Needed. Indications: Asthma     • allopurinol (ZYLOPRIM) 100 MG tablet Take 1 tablet by mouth Daily.     • bisacodyl (DULCOLAX) 5 MG EC tablet Take 1 tablet by mouth Daily As Needed for Constipation (Use if polyethylene glycol is ineffective).     • fluticasone (FLONASE) 50 MCG/ACT nasal spray Administer 2 sprays into the nostril(s) as directed by provider Daily.     • Magnesium 500 MG capsule Take 500 mg by mouth 2 (Two) Times a Day. Indications: Acid Indigestion     • methocarbamol (ROBAXIN) 500 MG tablet Take 1 tablet by mouth Every 8 (Eight) Hours As Needed for Muscle Spasms (back pain). 30 tablet 0   • metoprolol tartrate (LOPRESSOR) 25 MG tablet Take 1 tablet by mouth 2 (Two) Times a Day. Indications: High Blood Pressure     • naloxone (NARCAN) 4 MG/0.1ML nasal spray Call 911. Don't prime. [place] in 1 nostril for overdose. Repeat in 2-3 minutes in other nostril if no or minimal breathing/responsiveness. 2 each 0   • omeprazole (PriLOSEC) 40 MG capsule Take 1 capsule by mouth Every Morning. Indications: Heartburn     • polyethylene glycol (MIRALAX) 17 g packet Take 17 g by mouth Daily As Needed (Use if senna-docusate is ineffective).     • potassium chloride (K-DUR,KLOR-CON) 20 MEQ CR tablet Take 1 tablet by mouth 2 (Two) Times a Day.     • pravastatin (PRAVACHOL) 20 MG tablet Take 1 tablet by mouth Every Morning.     • temazepam (RESTORIL) 15 MG capsule Take 1 capsule by mouth At Night As Needed for Sleep.     • vitamin B-12 (CYANOCOBALAMIN) 1000 MCG tablet Take 1 tablet by mouth Daily. Indications: Inadequate Vitamin B12     • vitamin D (ERGOCALCIFEROL) 1.25 MG (22097 UT) capsule capsule Take 1 capsule by mouth Every 7  "(Seven) Days. 5 capsule 0   • sennosides-docusate sodium (SENOKOT-S) 8.6-50 MG tablet Take 2 tablets by mouth 2 (two) times a day. (Patient not taking: Reported on 4/9/2025) 100 tablet 1     No current facility-administered medications on file prior to visit.        ALLERGIES:    Allergies   Allergen Reactions   • Levaquin [Levofloxacin] Hives   • Zocor [Simvastatin] Myalgia   • Atorvastatin Other (See Comments)   • Codeine GI Intolerance   • Penicillins Hives and Swelling     Pt reports tongue swelling   • Percocet [Oxycodone-Acetaminophen] Itching and Nausea And Vomiting        Social History     Socioeconomic History   • Marital status:      Spouse name: Nabil   Tobacco Use   • Smoking status: Former     Current packs/day: 1.00     Average packs/day: 1 pack/day for 30.0 years (30.0 ttl pk-yrs)     Types: Cigarettes     Passive exposure: Past   • Smokeless tobacco: Never   Vaping Use   • Vaping status: Never Used   Substance and Sexual Activity   • Alcohol use: Yes     Comment: 1 wine drink q 3-4 months   • Drug use: Never   • Sexual activity: Defer        Family History   Problem Relation Age of Onset   • Hypertension Mother    • Heart failure Mother    • Heart attack Maternal Grandfather         Review of Systems   Constitutional:  Positive for activity change. Negative for appetite change, fatigue and unexpected weight change.   HENT: Negative.     Eyes: Negative.    Respiratory:  Positive for shortness of breath (Chronic).    Cardiovascular:  Negative for chest pain.   Gastrointestinal: Negative.    Endocrine: Negative.    Genitourinary: Negative.    Musculoskeletal:  Positive for back pain (Acute on chronic back pain).   Neurological: Negative.    Psychiatric/Behavioral: Negative.          Objective    Vitals:    04/09/25 1336   BP: 145/78   Pulse: 66   Resp: 16   Temp: 97.9 °F (36.6 °C)   TempSrc: Oral   SpO2: 96%   Weight: 68.9 kg (152 lb)   Height: 154.9 cm (60.98\")   PainSc: 0-No pain "                     4/9/2025     1:36 PM   Current Status   ECOG score 1       Physical Exam  Constitutional:       Appearance: Normal appearance.   HENT:      Head: Normocephalic and atraumatic.      Mouth/Throat:      Mouth: Mucous membranes are moist.      Pharynx: Oropharynx is clear.   Eyes:      Extraocular Movements: Extraocular movements intact.      Conjunctiva/sclera: Conjunctivae normal.      Pupils: Pupils are equal, round, and reactive to light.      Comments: Left lid nodular lesion   Cardiovascular:      Rate and Rhythm: Normal rate and regular rhythm.      Pulses: Normal pulses.      Heart sounds: Normal heart sounds.   Pulmonary:      Effort: Pulmonary effort is normal.      Breath sounds: Normal breath sounds.   Abdominal:      General: Bowel sounds are normal.      Palpations: Abdomen is soft.   Musculoskeletal:         General: Normal range of motion.      Cervical back: Normal range of motion and neck supple.   Skin:     General: Skin is warm and dry.   Neurological:      General: No focal deficit present.      Mental Status: She is alert and oriented to person, place, and time.   Psychiatric:         Mood and Affect: Mood normal.         Behavior: Behavior normal.           RECENT LABS:  Hematology WBC   Date Value Ref Range Status   04/02/2025 4.14 3.40 - 10.80 10*3/mm3 Final   02/06/2024 4.15 (L) 4.5 - 11.0 10*3/uL Final     RBC   Date Value Ref Range Status   04/02/2025 3.42 (L) 3.77 - 5.28 10*6/mm3 Final   02/06/2024 3.70 (L) 4.0 - 5.2 10*6/uL Final     Hemoglobin   Date Value Ref Range Status   04/02/2025 11.9 (L) 12.0 - 15.9 g/dL Final   02/06/2024 12.4 12.0 - 16.0 g/dL Final     Hematocrit   Date Value Ref Range Status   04/02/2025 35.3 34.0 - 46.6 % Final   02/06/2024 36.6 36.0 - 46.0 % Final     Platelets   Date Value Ref Range Status   04/02/2025 77 (L) 140 - 450 10*3/mm3 Final   02/06/2024 77 (L) 140 - 440 10*3/uL Final          Assessment & Plan     80 year-old female with a  orthopedic history as described in particular involving right hip, bilateral knees and bilateral rotator cuffs recently falling and sustaining a contusion involving her right hip and evidence of compression deformities T12 and L2.  Upon discharge she was seen by orthopedics and, unexpectedly, on additional back studies a left lung nodule was determined.    This was reviewed by pulmonary medicine at [facility] leading to a  a CT of the chest which demonstrated a 2.0 x 1.9 cm noncalcified nodule involving the posterior medial aspect left lower lobe abutting the pleura, more smoothly marginated 0.9 cm noncalcified right middle lobe nodule and a few additional smaller noncalcified nodules including 2 adjacent nodules measuring less than 0.4 cm the right middle lobe along with mild emphysema with mild to moderate subpleural and bibasilar scarring.  There is no pleural effusion or pneumothorax.    A PET/CT was obtained 2/6/2024 with a solid subpleural irregular nodule left lower lobe medially measuring 1.7 x 1.8 intensely hypermetabolic with SUV of 12.8, no additional nodular mass, had metabolic right superior mediastinal lymph node lateral measuring 0.9 cm to 3.9, diffuse lower lobe tracer uptake with mediastinal right hilar lymph nodes partially calcified thought to be inflammatory or granulomatous disease.  There is also low-grade tracer uptake associated subacute healing left anterior rib fractures.    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    The patient is now seen in CBC office.  There has not been assessment by surgery or, apparently, by pulmonary medicine formally though the studies above are available for review.  After discussion we will need to have her presented in the thoracic conference so that opinions from radiology, thoracic surgery radiation therapy as well as pathology  can be obtained.    Patient's case discussed during thoracic conference with the finding of a right supraclavicular node.  This was unknown to us when seen her initially in consultation.  Request made to obtain ultrasound-guided biopsy of the right supraclavicular node and this is discussed with the patient's daughter who indicates the patient would be willing to proceed.  We will go and have this scheduled even though she is to be seen within the next week and the result may not yet be available.    3/6/2023 FNA obtained, discussed with IR physician Dr. Courtney with findings of a few atypical clusters present suspicious for involvement by non-small cell carcinoma.  He is offered to rebiopsy quickly if necessary.    The patient is seen 3/13/2024 with rebiopsy requested as well as radiation therapy consultation.  The case is discussed with interventional radiology and a repeat biopsy is possible.  The patient is willing to proceed in this fashion and we have also discussed radiation therapy consultation in the interval.  She continues have symptoms of chronic back pain and left lid lesion that is going to be reviewed by ophthalmology.    This was discussed 3/21/2024 recognizing the patient's frailty.  Options for treating the areas that are known positive including the left lower lobe and right supraclavicular node would include radiation therapy and Dr. Hopkins will see the patient concerning this as we also follow her for consideration of systemic therapy including a [id number] considering the genomic studies done thus far and the inability to obtain additional genomics from the scant tissue that we have even from recent biopsy.  Finally the patient's overall status including an older adult assessment is going to be requested.    She is seen back 4/11/2024.  She had been seen 4/8/2024 with SBRT to left lower lobe and right neck lesion.  This has not been completed and she is to follow-up with radiation therapy mid  July.  We have discussed her findings today with the patient seen along with her granddaughter.  She did well with radiation therapy and has no additional symptoms currently respiratorily.    Her genomic testing suggested possible use of Selumetinib (not currently available) though we will follow this as she is subsequently reevaluated.      A follow-up CT series was obtained 7/1/2024.  This reveals a slight decrease in the hypermetabolic right supraclavicular node measuring 0.9 x 0.7 cm previously 1.2 x 1.1 cm.  In the chest there is decrease in the mass in the posterior medial aspect left lower lobe and evidence of radiation pneumonitis adjacently.  Margins are difficult to appreciate completely but maximal diameter is 2 cm previously 2.2 cm as compared to previous.  There is a pleural-based 1.0 x 1.6 cm nodule medial aspect of the right lower lobe which which was less conspicuous previously, multiple nodule opacities right middle lobe that are larger and a 1.3 cm density possibly mucous plugging otherwise seen.  These findings suggest a follow-up series of scans will still be necessary.    The patient is seen in office 7/12/2024 with a reasonably stable performance status.  At this point no additional intervention is necessary but a follow-up scan is requested.    The patient required admission 10/7 - 9/20/2024 after mechanical fall with T12-L2 compression fractures treated conservatively.      The patient's follow-up exams obtained 10/4/2024.  She is seen back 10/11/2024 and there is serial decrease in the small right supraclavicular node when compared to previous now down to 7 x 5 mm, evidence of consolidation left lower lobe with tumor developed likely seen measuring 16 x 2 cm, 2 right middle lobe nodules-19 x 13 mm previously 15 x 10, adjacent 9 mm previously 4 to 5 mm and unchanged third right middle lobe noncalcified nodule, other nodule stable and there is no evidence of abdominal/pelvic or osseous  "metastasis.    The patient required admission 10/7 - 9/20/2024 after mechanical fall with T12-L2 compression fractures treated conservatively.    The patient's follow-up exams obtained 10/4/2024.  She is seen back 10/11/2024 and there is serial decrease in the small right supraclavicular node when compared to previous now down to 7 x 5 mm, evidence of consolidation left lower lobe with tumor developed likely seen measuring 16 x 2 cm, 2 right middle lobe nodules-19 x 13 mm previously 15 x 10, adjacent 9 mm previously 4 to 5 mm and unchanged third right middle lobe noncalcified nodule, other nodule stable and there is no evidence of abdominal/pelvic or osseous metastasis.    The patient is seen 10/11/2024 slowly recovering from the mechanical fall described above.  She does not require additional treatment at this point after review of the scans with follow-up exams again are anticipated and she is willing to proceed.     The patient is seen 4/9/2025 and evaluated by CT of soft tissue neck, chest, abdomen, pelvis performed 4/2 2025.  There is now seen increase in the right middle lobe cavitary thick-walled nodule measuring 3.2 x 2.6 compared to previous 2.1 x 1.4.  Other sites are stable and no additional lesions have developed.      The patient is seen with her daughter both indicating that she is still able to carry out daily activities without severe difficulty though she \"has slowed up a bit\".  He does have back pain periodically that improves with rest and does not notice worsening respiratory symptoms.  She may well have disease that would be amenable to local therapy and it is requested that she undergo a PET/CT.    Plan:  *PET/CT next available    *MD 1 week later    "

## 2025-04-09 NOTE — PROGRESS NOTES
REASON FOR FOLLOW-UP: Left lower lobe lung mass-squamous of carcinoma                               History of Present Illness     The patient is an 80-year-old female with below medical history recently admitted 12/4 - 6/20/2023 with a history of right intertrochanteric femoral fracture and osteoarthritis presenting from a fall at home.  She had a stress fracture left foot that affected her balance, chronic low back pain that is also worsened with a fall.  Upon presentation orthopedic surgery was consulted finding a right intramedullary nail fixation and healing without abnormal findings.  No additional surgery was planned though physical therapy continued.  Spinal films demonstrated compression deformity T12 and L2 but fortunately she did further improve and was able to be discharged.    The patient had follow-up with several physicians including orthopedic physicians with an eventual chest exam that revealed an unexpected nodularity.  She was then referred to pulmonary medicine 1/8/2024 with a history of smoking for 35 years quitting 8 years ago and indication that there was a lung nodule found incidentally on imaging for the spine.  Through the [facility] system a CT of the chest demonstrated a 2.0 x 1.9 cm noncalcified nodule involving the posterior medial aspect left lower lobe abutting the pleura, more smoothly marginated 0.9 cm noncalcified right middle lobe nodule and a few additional smaller noncalcified nodules including 2 adjacent nodules measuring less than 0.4 cm the right middle lobe along with mild emphysema with mild to moderate subpleural and bibasilar scarring.  There is no pleural effusion or pneumothorax.    A PET/CT was obtained 2/6/2024 with a solid subpleural irregular nodule left lower lobe medially measuring 1.7 x 1.8 intensely hypermetabolic with SUV of 12.8, no additional nodular mass, had metabolic right superior mediastinal lymph node lateral measuring 0.9 cm to 3.9, diffuse lower  lobe tracer uptake with mediastinal right hilar lymph nodes partially calcified thought to be inflammatory or granulomatous disease.  There is also low-grade tracer uptake associated subacute healing left anterior rib fractures.    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    The patient was referred urgently from [facility] for assessment.  It is not certain why she has not been seen in the [facility] system though she now states that she wished to be seen here.     Patient's case discussed during thoracic conference with the finding of a right supraclavicular node.  This was unknown to us when seen her initially in consultation.  Request made to obtain ultrasound-guided biopsy of the right supraclavicular node and this is discussed with the patient's daughter who indicates the patient would be willing to proceed.  We will go and have this scheduled even though she is to be seen within the next week and the result may not yet be available.    3/6/2023 FNA obtained, discussed with IR physician Dr. Courtney with findings of a few atypical clusters present suspicious for involvement by non-small cell carcinoma.  He is offered to rebiopsy quickly if necessary.    The patient is seen 3/13/2024 with rebiopsy requested as well as radiation therapy consultation.  The case is discussed with interventional radiology and a repeat biopsy is possible.  The patient is willing to proceed in this fashion and we have also discussed radiation therapy consultation in the interval.  She continues have symptoms of chronic back pain and left lid lesion that is going to be reviewed by ophthalmology.    The patient went on to have a follow-up exam with repeat right supraclavicular lymph node biopsy 3/18/2024.  This was found positive for metastatic carcinoma.  The patient was seen by Dr. Hopkins with the conclusion  that she has a 2 cm left lower lobe squamous cell carcinoma abutting the pleura additional contralateral supraclavicular lymph node in the thyroid.  This is to be discussed in conference 3/21/2024.    This was discussed 3/21/2024 recognizing the patient's frailty.  Options for treating the areas that are known positive including the left lower lobe and right supraclavicular node would include radiation therapy and Dr. Hopkins will see the patient concerning this as we also follow her for consideration of systemic therapy including a [id number] considering the genomic studies done thus far and the inability to obtain additional genomics from the scant tissue that we have even from recent biopsy.  Finally the patient's overall status including an older adult assessment is going to be requested.    The patient's [id number] revealed NF1  detected with possible indication of Selumetinib effectiveness.  Additionally TMB was 10.4 mutations per megabase.  Concurrently the patient was seen by radiation therapy treating the left lower lobe and right neck site via SBRT.  Further MRI of the brain revealed no evidence of metastatic disease.    She is seen back 4/11/2024.  She had been seen 4/8/2024 with SBRT to left lower lobe and right neck lesion.  This has not been completed and she is to follow-up with radiation therapy mid July.  We have discussed her findings today with the patient seen along with her granddaughter.  She did well with radiation therapy and has no additional symptoms currently respiratorily.  Her genomic testing suggested possible use of Selumetinib (not currently available) though we will follow this as she is subsequently reevaluated.    A follow-up CT series was obtained 7/1/2024.  This reveals a slight decrease in the hypermetabolic right supraclavicular node measuring 0.9 x 0.7 cm previously 1.2 x 1.1 cm.  In the chest there is decrease in the mass in the posterior medial aspect left lower lobe and  evidence of radiation pneumonitis adjacently.  Margins are difficult to appreciate completely but maximal diameter is 2 cm previously 2.2 cm as compared to previous.  There is a pleural-based 1.0 x 1.6 cm nodule medial aspect of the right lower lobe which which was less conspicuous previously, multiple nodule opacities right middle lobe that are larger and a 1.3 cm density possibly mucous plugging otherwise seen.  These findings suggest a follow-up series of scans will still be necessary.    The patient is seen with her daughter and they both agree with this follow-up plan.    The patient required admission 10/7 - 9/20/2024 after mechanical fall with T12-L2 compression fractures treated conservatively.    The patient's follow-up exams obtained 10/4/2024.  She is seen back 10/11/2024 and there is serial decrease in the small right supraclavicular node when compared to previous now down to 7 x 5 mm, evidence of consolidation left lower lobe with tumor developed likely seen measuring 16 x 2 cm, 2 right middle lobe nodules-19 x 13 mm previously 15 x 10, adjacent 9 mm previously 4 to 5 mm and unchanged third right middle lobe noncalcified nodule, other nodule stable and there is no evidence of abdominal/pelvic or osseous metastasis.    The patient is seen 10/11/2024 slowly recovering from the mechanical fall described above.  She does not require additional treatment at this point after review of the scans with follow-up exams again are anticipated and she is willing to proceed.    The patient is seen 4/9/2025 and evaluated by CT of soft tissue neck, chest, abdomen, pelvis performed 4/2 2025.  There is now seen increase in the right middle lobe cavitary thick-walled nodule measuring 3.2 x 2.6 compared to previous 2.1 x 1.4.  Other sites are stable and no additional lesions have developed.  The patient is seen with her daughter both indicating that she is still able to carry out daily activities without severe difficulty  "though she \"has slowed up a bit\".  He does have back pain periodically that improves with rest and does not notice worsening respiratory symptoms.  She may well have disease that would be amenable to local therapy and it is requested that she undergo a PET/CT.       Past Surgical History:   Procedure Laterality Date    APPENDECTOMY      CARDIAC CATHETERIZATION      EXTERNAL EAR SURGERY Right     had tumor excised behind right ear lobe benign    EYE LESION EXCISION Left 6/6/2024    Procedure: LEFT LOWER EYELID BASEL CELL CARCINOMA EXCISIONAL AND REPAIR WITH FROZEN SECTIONS,;  Surgeon: Austin Kamara MD;  Location: [facility] MAIN OR;  Service: Ophthalmology;  Laterality: Left;    FEMUR IM NAILING/RODDING Right 06/15/2022    Procedure: RIGHT HIP INTRAMEDULLARY NAILING/RODDING;  Surgeon: Marc Crawford MD;  Location: [facility] OR;  Service: Orthopedics;  Laterality: Right;    GALLBLADDER SURGERY      HYSTERECTOMY      KNEE ARTHROPLASTY Right     KNEE ARTHROSCOPY Right     NE REVJ TOTAL KNEE ARTHRP W/WO ALGRFT 1 COMPONENT Right 09/07/2016    Procedure: RT ILIOTIBIAL BAND RELEASE RT TOTAL KNEE POLY EXCHANGE;  Surgeon: Toy Adame MD;  Location: [facility];  Service: Orthopedics    ROTATOR CUFF REPAIR Bilateral     US GUIDED LYMPH NODE BIOPSY  3/18/2024    WRIST MASS EXCISION Right         Current Outpatient Medications on File Prior to Visit   Medication Sig Dispense Refill    acetaminophen (TYLENOL) 325 MG tablet Take 2 tablets by mouth Every 4 (Four) Hours As Needed for Mild Pain .      albuterol (PROVENTIL HFA;VENTOLIN HFA) 108 (90 BASE) MCG/ACT inhaler Inhale 2 puffs Every 4 (Four) Hours As Needed. Indications: Asthma      allopurinol (ZYLOPRIM) 100 MG tablet Take 1 tablet by mouth Daily.      bisacodyl (DULCOLAX) 5 MG EC tablet Take 1 tablet by mouth Daily As Needed for Constipation (Use if polyethylene glycol is ineffective).      fluticasone (FLONASE) 50 MCG/ACT nasal spray Administer 2 sprays into the " nostril(s) as directed by provider Daily.      Magnesium 500 MG capsule Take 500 mg by mouth 2 (Two) Times a Day. Indications: Acid Indigestion      methocarbamol (ROBAXIN) 500 MG tablet Take 1 tablet by mouth Every 8 (Eight) Hours As Needed for Muscle Spasms (back pain). 30 tablet 0    metoprolol tartrate (LOPRESSOR) 25 MG tablet Take 1 tablet by mouth 2 (Two) Times a Day. Indications: High Blood Pressure      naloxone (NARCAN) 4 MG/0.1ML nasal spray Call 911. Don't prime. [place] in 1 nostril for overdose. Repeat in 2-3 minutes in other nostril if no or minimal breathing/responsiveness. 2 each 0    omeprazole (PriLOSEC) 40 MG capsule Take 1 capsule by mouth Every Morning. Indications: Heartburn      polyethylene glycol (MIRALAX) 17 g packet Take 17 g by mouth Daily As Needed (Use if senna-docusate is ineffective).      potassium chloride (K-DUR,KLOR-CON) 20 MEQ CR tablet Take 1 tablet by mouth 2 (Two) Times a Day.      pravastatin (PRAVACHOL) 20 MG tablet Take 1 tablet by mouth Every Morning.      temazepam (RESTORIL) 15 MG capsule Take 1 capsule by mouth At Night As Needed for Sleep.      vitamin B-12 (CYANOCOBALAMIN) 1000 MCG tablet Take 1 tablet by mouth Daily. Indications: Inadequate Vitamin B12      vitamin D (ERGOCALCIFEROL) 1.25 MG (68592 UT) capsule capsule Take 1 capsule by mouth Every 7 (Seven) Days. 5 capsule 0    sennosides-docusate sodium (SENOKOT-S) 8.6-50 MG tablet Take 2 tablets by mouth 2 (two) times a day. (Patient not taking: Reported on 4/9/2025) 100 tablet 1     No current facility-administered medications on file prior to visit.        ALLERGIES:    Allergies   Allergen Reactions    Levaquin [Levofloxacin] Hives    Zocor [Simvastatin] Myalgia    Atorvastatin Other (See Comments)    Codeine GI Intolerance    Penicillins Hives and Swelling     Pt reports tongue swelling    Percocet [Oxycodone-Acetaminophen] Itching and Nausea And Vomiting        Social History     Socioeconomic History    Marital  "status:      Spouse name: Nabil   Tobacco Use    Smoking status: Former     Current packs/day: 1.00     Average packs/day: 1 pack/day for 30.0 years (30.0 ttl pk-yrs)     Types: Cigarettes     Passive exposure: Past    Smokeless tobacco: Never   Vaping Use    Vaping status: Never Used   Substance and Sexual Activity    Alcohol use: Yes     Comment: 1 wine drink q 3-4 months    Drug use: Never    Sexual activity: Defer        Family History   Problem Relation Age of Onset    Hypertension Mother     Heart failure Mother     Heart attack Maternal Grandfather         Review of Systems   Constitutional:  Positive for activity change. Negative for appetite change, fatigue and unexpected weight change.   HENT: Negative.     Eyes: Negative.    Respiratory:  Positive for shortness of breath (Chronic).    Cardiovascular:  Negative for chest pain.   Gastrointestinal: Negative.    Endocrine: Negative.    Genitourinary: Negative.    Musculoskeletal:  Positive for back pain (Acute on chronic back pain).   Neurological: Negative.    Psychiatric/Behavioral: Negative.          Objective     Vitals:    04/09/25 1336   BP: 145/78   Pulse: 66   Resp: 16   Temp: 97.9 °F (36.6 °C)   TempSrc: Oral   SpO2: 96%   Weight: 68.9 kg (152 lb)   Height: 154.9 cm (60.98\")   PainSc: 0-No pain                     4/9/2025     1:36 PM   Current Status   ECOG score 1       Physical Exam  Constitutional:       Appearance: Normal appearance.   HENT:      Head: Normocephalic and atraumatic.      Mouth/Throat:      Mouth: Mucous membranes are moist.      Pharynx: Oropharynx is clear.   Eyes:      Extraocular Movements: Extraocular movements intact.      Conjunctiva/sclera: Conjunctivae normal.      Pupils: Pupils are equal, round, and reactive to light.      Comments: Left lid nodular lesion   Cardiovascular:      Rate and Rhythm: Normal rate and regular rhythm.      Pulses: Normal pulses.      Heart sounds: Normal heart sounds.   Pulmonary:      " Effort: Pulmonary effort is normal.      Breath sounds: Normal breath sounds.   Abdominal:      General: Bowel sounds are normal.      Palpations: Abdomen is soft.   Musculoskeletal:         General: Normal range of motion.      Cervical back: Normal range of motion and neck supple.   Skin:     General: Skin is warm and dry.   Neurological:      General: No focal deficit present.      Mental Status: She is alert and oriented to person, place, and time.   Psychiatric:         Mood and Affect: Mood normal.         Behavior: Behavior normal.           RECENT LABS:  Hematology WBC   Date Value Ref Range Status   04/02/2025 4.14 3.40 - 10.80 10*3/mm3 Final   02/06/2024 4.15 (L) 4.5 - 11.0 10*3/uL Final     RBC   Date Value Ref Range Status   04/02/2025 3.42 (L) 3.77 - 5.28 10*6/mm3 Final   02/06/2024 3.70 (L) 4.0 - 5.2 10*6/uL Final     Hemoglobin   Date Value Ref Range Status   04/02/2025 11.9 (L) 12.0 - 15.9 g/dL Final   02/06/2024 12.4 12.0 - 16.0 g/dL Final     Hematocrit   Date Value Ref Range Status   04/02/2025 35.3 34.0 - 46.6 % Final   02/06/2024 36.6 36.0 - 46.0 % Final     Platelets   Date Value Ref Range Status   04/02/2025 77 (L) 140 - 450 10*3/mm3 Final   02/06/2024 77 (L) 140 - 440 10*3/uL Final          Assessment & Plan      80 year-old female with a orthopedic history as described in particular involving right hip, bilateral knees and bilateral rotator cuffs recently falling and sustaining a contusion involving her right hip and evidence of compression deformities T12 and L2.  Upon discharge she was seen by orthopedics and, unexpectedly, on additional back studies a left lung nodule was determined.    This was reviewed by pulmonary medicine at [facility] leading to a  a CT of the chest which demonstrated a 2.0 x 1.9 cm noncalcified nodule involving the posterior medial aspect left lower lobe abutting the pleura, more smoothly marginated 0.9 cm noncalcified right middle lobe nodule and a few additional  smaller noncalcified nodules including 2 adjacent nodules measuring less than 0.4 cm the right middle lobe along with mild emphysema with mild to moderate subpleural and bibasilar scarring.  There is no pleural effusion or pneumothorax.    A PET/CT was obtained 2/6/2024 with a solid subpleural irregular nodule left lower lobe medially measuring 1.7 x 1.8 intensely hypermetabolic with SUV of 12.8, no additional nodular mass, had metabolic right superior mediastinal lymph node lateral measuring 0.9 cm to 3.9, diffuse lower lobe tracer uptake with mediastinal right hilar lymph nodes partially calcified thought to be inflammatory or granulomatous disease.  There is also low-grade tracer uptake associated subacute healing left anterior rib fractures.    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    The patient is now seen in CBC office.  There has not been assessment by surgery or, apparently, by pulmonary medicine formally though the studies above are available for review.  After discussion we will need to have her presented in the thoracic conference so that opinions from radiology, thoracic surgery radiation therapy as well as pathology can be obtained.    Patient's case discussed during thoracic conference with the finding of a right supraclavicular node.  This was unknown to us when seen her initially in consultation.  Request made to obtain ultrasound-guided biopsy of the right supraclavicular node and this is discussed with the patient's daughter who indicates the patient would be willing to proceed.  We will go and have this scheduled even though she is to be seen within the next week and the result may not yet be available.    3/6/2023 FNA obtained, discussed with IR physician Dr. Courtney with findings of a few atypical clusters present suspicious for involvement by non-small cell  carcinoma.  He is offered to rebiopsy quickly if necessary.    The patient is seen 3/13/2024 with rebiopsy requested as well as radiation therapy consultation.  The case is discussed with interventional radiology and a repeat biopsy is possible.  The patient is willing to proceed in this fashion and we have also discussed radiation therapy consultation in the interval.  She continues have symptoms of chronic back pain and left lid lesion that is going to be reviewed by ophthalmology.    This was discussed 3/21/2024 recognizing the patient's frailty.  Options for treating the areas that are known positive including the left lower lobe and right supraclavicular node would include radiation therapy and Dr. Hopkins will see the patient concerning this as we also follow her for consideration of systemic therapy including a [id number] considering the genomic studies done thus far and the inability to obtain additional genomics from the scant tissue that we have even from recent biopsy.  Finally the patient's overall status including an older adult assessment is going to be requested.    She is seen back 4/11/2024.  She had been seen 4/8/2024 with SBRT to left lower lobe and right neck lesion.  This has not been completed and she is to follow-up with radiation therapy mid July.  We have discussed her findings today with the patient seen along with her granddaughter.  She did well with radiation therapy and has no additional symptoms currently respiratorily.    Her genomic testing suggested possible use of Selumetinib (not currently available) though we will follow this as she is subsequently reevaluated.      A follow-up CT series was obtained 7/1/2024.  This reveals a slight decrease in the hypermetabolic right supraclavicular node measuring 0.9 x 0.7 cm previously 1.2 x 1.1 cm.  In the chest there is decrease in the mass in the posterior medial aspect left lower lobe and evidence of radiation pneumonitis adjacently.   Margins are difficult to appreciate completely but maximal diameter is 2 cm previously 2.2 cm as compared to previous.  There is a pleural-based 1.0 x 1.6 cm nodule medial aspect of the right lower lobe which which was less conspicuous previously, multiple nodule opacities right middle lobe that are larger and a 1.3 cm density possibly mucous plugging otherwise seen.  These findings suggest a follow-up series of scans will still be necessary.    The patient is seen in office 7/12/2024 with a reasonably stable performance status.  At this point no additional intervention is necessary but a follow-up scan is requested.    The patient required admission 10/7 - 9/20/2024 after mechanical fall with T12-L2 compression fractures treated conservatively.      The patient's follow-up exams obtained 10/4/2024.  She is seen back 10/11/2024 and there is serial decrease in the small right supraclavicular node when compared to previous now down to 7 x 5 mm, evidence of consolidation left lower lobe with tumor developed likely seen measuring 16 x 2 cm, 2 right middle lobe nodules-19 x 13 mm previously 15 x 10, adjacent 9 mm previously 4 to 5 mm and unchanged third right middle lobe noncalcified nodule, other nodule stable and there is no evidence of abdominal/pelvic or osseous metastasis.    The patient required admission 10/7 - 9/20/2024 after mechanical fall with T12-L2 compression fractures treated conservatively.    The patient's follow-up exams obtained 10/4/2024.  She is seen back 10/11/2024 and there is serial decrease in the small right supraclavicular node when compared to previous now down to 7 x 5 mm, evidence of consolidation left lower lobe with tumor developed likely seen measuring 16 x 2 cm, 2 right middle lobe nodules-19 x 13 mm previously 15 x 10, adjacent 9 mm previously 4 to 5 mm and unchanged third right middle lobe noncalcified nodule, other nodule stable and there is no evidence of abdominal/pelvic or osseous  "metastasis.    The patient is seen 10/11/2024 slowly recovering from the mechanical fall described above.  She does not require additional treatment at this point after review of the scans with follow-up exams again are anticipated and she is willing to proceed.     The patient is seen 4/9/2025 and evaluated by CT of soft tissue neck, chest, abdomen, pelvis performed 4/2 2025.  There is now seen increase in the right middle lobe cavitary thick-walled nodule measuring 3.2 x 2.6 compared to previous 2.1 x 1.4.  Other sites are stable and no additional lesions have developed.      The patient is seen with her daughter both indicating that she is still able to carry out daily activities without severe difficulty though she \"has slowed up a bit\".  He does have back pain periodically that improves with rest and does not notice worsening respiratory symptoms.  She may well have disease that would be amenable to local therapy and it is requested that she undergo a PET/CT.    Plan:  *PET/CT next available    *MD 1 week later    "

## 2025-04-10 ENCOUNTER — TELEPHONE (OUTPATIENT)
Dept: ONCOLOGY | Facility: CLINIC | Age: 80
End: 2025-04-10
Payer: MEDICARE

## 2025-04-10 NOTE — TELEPHONE ENCOUNTER
Caller: Kaylie Gutierrez (ON  [facility])    Relationship: Emergency Contact    Best call back number:   Telephone Information:   Mobile [phone]     Who are you requesting to speak with (clinical staff, provider,  specific staff member): NURSE     What was the call regarding: PLEASE HAVE DR. IBARRA'S NURSE CALL HER. NEEDS TO DISCUSS HER MOM ADA AS SHE COULD NOT DISCUSS IN FRONT OF HER. HAVE NOTICED BEHAVIORAL CHANGES, DEPRESSED, FORGETTING AS SOON AS SHE SPEAK TO  SOMEONE (NOT EVERYDAY), COMPLAINING A LOT WHICH IS NOT LIKE HER..

## 2025-04-11 ENCOUNTER — TELEPHONE (OUTPATIENT)
Dept: ONCOLOGY | Facility: CLINIC | Age: 80
End: 2025-04-11
Payer: MEDICARE

## 2025-04-11 DIAGNOSIS — C34.92 NSCLC OF LEFT LUNG: Primary | ICD-10-CM

## 2025-04-11 DIAGNOSIS — R41.89 COGNITIVE AND BEHAVIORAL CHANGES: ICD-10-CM

## 2025-04-11 DIAGNOSIS — R46.89 COGNITIVE AND BEHAVIORAL CHANGES: ICD-10-CM

## 2025-04-11 NOTE — TELEPHONE ENCOUNTER
Hub staff attempted to follow warm transfer process and was unsuccessful     Caller: Kaylie Gutierrez - DAUGHTER    Relationship to patient: Emergency Contact    Best call back number: [phone]    Patient is needing: JONG ROJAS RETURNED YOUR CALL - SEE PRIOR TE

## 2025-04-11 NOTE — TELEPHONE ENCOUNTER
Returned call to pt's daughter. She states that over the last 3-4 months, she has noticed a change in pt's behavior. She has become more forgetful, depressed and tearful. She is not sure if she is starting to get dementia. Advised her that our cancer resource center may be of help in identifying exactly what the concern is an how to best treat. She v/u. Will send message to Dr. Mann and get back to her on his response.

## 2025-04-14 ENCOUNTER — HOSPITAL ENCOUNTER (OUTPATIENT)
Dept: PET IMAGING | Facility: HOSPITAL | Age: 80
Discharge: HOME | End: 2025-04-14
Payer: MEDICARE

## 2025-04-14 DIAGNOSIS — C34.92 NSCLC OF LEFT LUNG: ICD-10-CM

## 2025-04-14 DIAGNOSIS — C34.81 MALIGNANT NEOPLASM OF OVERLAPPING SITES OF RIGHT BRONCHUS AND LUNG: ICD-10-CM

## 2025-04-14 LAB — GLUCOSE BLDC GLUCOMTR-MCNC: 102 MG/DL (ref 70–130)

## 2025-04-14 PROCEDURE — A9552 F18 FDG: HCPCS | Performed by: INTERNAL MEDICINE

## 2025-04-14 PROCEDURE — 78815 PET IMAGE W/CT SKULL-THIGH: CPT

## 2025-04-14 PROCEDURE — 34310000005 FLUDEOXYGLUCOSE F18 SOLUTION: Performed by: INTERNAL MEDICINE

## 2025-04-14 PROCEDURE — 82948 REAGENT STRIP/BLOOD GLUCOSE: CPT

## 2025-04-14 RX ADMIN — FLUDEOXYGLUCOSE F 18 1 DOSE: 200 INJECTION, SOLUTION INTRAVENOUS at 13:21

## 2025-04-14 NOTE — TELEPHONE ENCOUNTER
Referral to Zoe reyna and LVM with pt's daughter Melonie to let her know referral has been placed.

## 2025-04-18 NOTE — PROGRESS NOTES
REASON FOR FOLLOW-UP: Left lower lobe lung mass-squamous of carcinoma                               History of Present Illness     The patient is an 80-year-old female with below medical history recently admitted 12/4 - 6/20/2023 with a history of right intertrochanteric femoral fracture and osteoarthritis presenting from a fall at home.  She had a stress fracture left foot that affected her balance, chronic low back pain that is also worsened with a fall.  Upon presentation orthopedic surgery was consulted finding a right intramedullary nail fixation and healing without abnormal findings.  No additional surgery was planned though physical therapy continued.  Spinal films demonstrated compression deformity T12 and L2 but fortunately she did further improve and was able to be discharged.    The patient had follow-up with several physicians including orthopedic physicians with an eventual chest exam that revealed an unexpected nodularity.  She was then referred to pulmonary medicine 1/8/2024 with a history of smoking for 35 years quitting 8 years ago and indication that there was a lung nodule found incidentally on imaging for the spine.  Through the [facility] system a CT of the chest demonstrated a 2.0 x 1.9 cm noncalcified nodule involving the posterior medial aspect left lower lobe abutting the pleura, more smoothly marginated 0.9 cm noncalcified right middle lobe nodule and a few additional smaller noncalcified nodules including 2 adjacent nodules measuring less than 0.4 cm the right middle lobe along with mild emphysema with mild to moderate subpleural and bibasilar scarring.  There is no pleural effusion or pneumothorax.    A PET/CT was obtained 2/6/2024 with a solid subpleural irregular nodule left lower lobe medially measuring 1.7 x 1.8 intensely hypermetabolic with SUV of 12.8, no additional nodular mass, had metabolic right superior mediastinal lymph node lateral measuring 0.9 cm to 3.9, diffuse lower  lobe tracer uptake with mediastinal right hilar lymph nodes partially calcified thought to be inflammatory or granulomatous disease.  There is also low-grade tracer uptake associated subacute healing left anterior rib fractures.    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    The patient was referred urgently from [facility] for assessment.  It is not certain why she has not been seen in the [facility] system though she now states that she wished to be seen here.     Patient's case discussed during thoracic conference with the finding of a right supraclavicular node.  This was unknown to us when seen her initially in consultation.  Request made to obtain ultrasound-guided biopsy of the right supraclavicular node and this is discussed with the patient's daughter who indicates the patient would be willing to proceed.  We will go and have this scheduled even though she is to be seen within the next week and the result may not yet be available.    3/6/2023 FNA obtained, discussed with IR physician Dr. Courtney with findings of a few atypical clusters present suspicious for involvement by non-small cell carcinoma.  He is offered to rebiopsy quickly if necessary.    The patient is seen 3/13/2024 with rebiopsy requested as well as radiation therapy consultation.  The case is discussed with interventional radiology and a repeat biopsy is possible.  The patient is willing to proceed in this fashion and we have also discussed radiation therapy consultation in the interval.  She continues have symptoms of chronic back pain and left lid lesion that is going to be reviewed by ophthalmology.    The patient went on to have a follow-up exam with repeat right supraclavicular lymph node biopsy 3/18/2024.  This was found positive for metastatic carcinoma.  The patient was seen by Dr. Hopkins with the conclusion  that she has a 2 cm left lower lobe squamous cell carcinoma abutting the pleura additional contralateral supraclavicular lymph node in the thyroid.  This is to be discussed in conference 3/21/2024.    This was discussed 3/21/2024 recognizing the patient's frailty.  Options for treating the areas that are known positive including the left lower lobe and right supraclavicular node would include radiation therapy and Dr. Hopkins will see the patient concerning this as we also follow her for consideration of systemic therapy including a [id number] considering the genomic studies done thus far and the inability to obtain additional genomics from the scant tissue that we have even from recent biopsy.  Finally the patient's overall status including an older adult assessment is going to be requested.    The patient's [id number] revealed NF1  detected with possible indication of Selumetinib effectiveness.  Additionally TMB was 10.4 mutations per megabase.  Concurrently the patient was seen by radiation therapy treating the left lower lobe and right neck site via SBRT.  Further MRI of the brain revealed no evidence of metastatic disease.    She is seen back 4/11/2024.  She had been seen 4/8/2024 with SBRT to left lower lobe and right neck lesion.  This has not been completed and she is to follow-up with radiation therapy mid July.  We have discussed her findings today with the patient seen along with her granddaughter.  She did well with radiation therapy and has no additional symptoms currently respiratorily.  Her genomic testing suggested possible use of Selumetinib (not currently available) though we will follow this as she is subsequently reevaluated.    A follow-up CT series was obtained 7/1/2024.  This reveals a slight decrease in the hypermetabolic right supraclavicular node measuring 0.9 x 0.7 cm previously 1.2 x 1.1 cm.  In the chest there is decrease in the mass in the posterior medial aspect left lower lobe and  evidence of radiation pneumonitis adjacently.  Margins are difficult to appreciate completely but maximal diameter is 2 cm previously 2.2 cm as compared to previous.  There is a pleural-based 1.0 x 1.6 cm nodule medial aspect of the right lower lobe which which was less conspicuous previously, multiple nodule opacities right middle lobe that are larger and a 1.3 cm density possibly mucous plugging otherwise seen.  These findings suggest a follow-up series of scans will still be necessary.    The patient is seen with her daughter and they both agree with this follow-up plan.    The patient required admission 10/7 - 9/20/2024 after mechanical fall with T12-L2 compression fractures treated conservatively.    The patient's follow-up exams obtained 10/4/2024.  She is seen back 10/11/2024 and there is serial decrease in the small right supraclavicular node when compared to previous now down to 7 x 5 mm, evidence of consolidation left lower lobe with tumor developed likely seen measuring 16 x 2 cm, 2 right middle lobe nodules-19 x 13 mm previously 15 x 10, adjacent 9 mm previously 4 to 5 mm and unchanged third right middle lobe noncalcified nodule, other nodule stable and there is no evidence of abdominal/pelvic or osseous metastasis.    The patient is seen 10/11/2024 slowly recovering from the mechanical fall described above.  She does not require additional treatment at this point after review of the scans with follow-up exams again are anticipated and she is willing to proceed.    The patient is seen 4/9/2025 and evaluated by CT of soft tissue neck, chest, abdomen, pelvis performed 4/2 2025.  There is now seen increase in the right middle lobe cavitary thick-walled nodule measuring 3.2 x 2.6 compared to previous 2.1 x 1.4.  Other sites are stable and no additional lesions have developed.  The patient is seen with her daughter both indicating that she is still able to carry out daily activities without severe difficulty  "though she \"has slowed up a bit\".  He does have back pain periodically that improves with rest and does not notice worsening respiratory symptoms.  She may well have disease that would be amenable to local therapy and it is requested that she undergo a PET/CT.    PET/CT demonstrates left lower lobe radiation changes, right middle lobe hypermetabolic 3.1 x 2.3 central cavitary mass that is larger from previous, hypermetabolic right upper lobe 1 cm solid nodule unchanged adjacent right middle lobe 9 mm solid nodule as well as hypermetabolic right hilar and low paratracheal lymphadenopathy concerning for osiel metastatic disease.    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  Radiation therapy would not be expected to control this disease and systemic therapy is going to be necessary.  Considering the tissue type she would be a candidate for Alimta single agent at this point.       Past Surgical History:   Procedure Laterality Date    APPENDECTOMY      CARDIAC CATHETERIZATION      EXTERNAL EAR SURGERY Right     had tumor excised behind right ear lobe benign    EYE LESION EXCISION Left 6/6/2024    Procedure: LEFT LOWER EYELID BASEL CELL CARCINOMA EXCISIONAL AND REPAIR WITH FROZEN SECTIONS,;  Surgeon: Austin Kamara MD;  Location: [facility] OR;  Service: Ophthalmology;  Laterality: Left;    FEMUR IM NAILING/RODDING Right 06/15/2022    Procedure: RIGHT HIP INTRAMEDULLARY NAILING/RODDING;  Surgeon: Marc Crawford MD;  Location: [facility] OR;  Service: Orthopedics;  Laterality: Right;    GALLBLADDER SURGERY      HYSTERECTOMY      KNEE ARTHROPLASTY Right     KNEE ARTHROSCOPY Right     MO REVJ TOTAL KNEE ARTHRP W/WO ALGRFT 1 COMPONENT Right 09/07/2016    Procedure: RT ILIOTIBIAL BAND RELEASE RT TOTAL KNEE POLY EXCHANGE;  Surgeon: Toy Adame MD;  Location: [facility];  Service: Orthopedics    ROTATOR CUFF REPAIR " Bilateral     US GUIDED LYMPH NODE BIOPSY  3/18/2024    WRIST MASS EXCISION Right         Current Outpatient Medications on File Prior to Visit   Medication Sig Dispense Refill    acetaminophen (TYLENOL) 325 MG tablet Take 2 tablets by mouth Every 4 (Four) Hours As Needed for Mild Pain .      albuterol (PROVENTIL HFA;VENTOLIN HFA) 108 (90 BASE) MCG/ACT inhaler Inhale 2 puffs Every 4 (Four) Hours As Needed. Indications: Asthma      allopurinol (ZYLOPRIM) 100 MG tablet Take 1 tablet by mouth Daily.      bisacodyl (DULCOLAX) 5 MG EC tablet Take 1 tablet by mouth Daily As Needed for Constipation (Use if polyethylene glycol is ineffective).      fluticasone (FLONASE) 50 MCG/ACT nasal spray Administer 2 sprays into the nostril(s) as directed by provider Daily.      Magnesium 500 MG capsule Take 500 mg by mouth 2 (Two) Times a Day. Indications: Acid Indigestion      methocarbamol (ROBAXIN) 500 MG tablet Take 1 tablet by mouth Every 8 (Eight) Hours As Needed for Muscle Spasms (back pain). 30 tablet 0    metoprolol tartrate (LOPRESSOR) 25 MG tablet Take 1 tablet by mouth 2 (Two) Times a Day. Indications: High Blood Pressure      naloxone (NARCAN) 4 MG/0.1ML nasal spray Call 911. Don't prime. [place] in 1 nostril for overdose. Repeat in 2-3 minutes in other nostril if no or minimal breathing/responsiveness. 2 each 0    omeprazole (PriLOSEC) 40 MG capsule Take 1 capsule by mouth Every Morning. Indications: Heartburn      polyethylene glycol (MIRALAX) 17 g packet Take 17 g by mouth Daily As Needed (Use if senna-docusate is ineffective).      potassium chloride (K-DUR,KLOR-CON) 20 MEQ CR tablet Take 1 tablet by mouth 2 (Two) Times a Day.      pravastatin (PRAVACHOL) 20 MG tablet Take 1 tablet by mouth Every Morning.      sennosides-docusate sodium (SENOKOT-S) 8.6-50 MG tablet Take 2 tablets by mouth 2 (two) times a day. 100 tablet 1    temazepam (RESTORIL) 15 MG capsule Take 1 capsule by mouth At Night As Needed for Sleep.       vitamin B-12 (CYANOCOBALAMIN) 1000 MCG tablet Take 1 tablet by mouth Daily. Indications: Inadequate Vitamin B12      vitamin D (ERGOCALCIFEROL) 1.25 MG (73581 UT) capsule capsule Take 1 capsule by mouth Every 7 (Seven) Days. 5 capsule 0     No current facility-administered medications on file prior to visit.        ALLERGIES:    Allergies   Allergen Reactions    Levaquin [Levofloxacin] Hives    Zocor [Simvastatin] Myalgia    Atorvastatin Other (See Comments)    Codeine GI Intolerance    Penicillins Hives and Swelling     Pt reports tongue swelling    Percocet [Oxycodone-Acetaminophen] Itching and Nausea And Vomiting        Social History     Socioeconomic History    Marital status:      Spouse name: Nabil   Tobacco Use    Smoking status: Former     Current packs/day: 1.00     Average packs/day: 1 pack/day for 30.0 years (30.0 ttl pk-yrs)     Types: Cigarettes     Passive exposure: Past    Smokeless tobacco: Never   Vaping Use    Vaping status: Never Used   Substance and Sexual Activity    Alcohol use: Yes     Comment: 1 wine drink q 3-4 months    Drug use: Never    Sexual activity: Defer        Family History   Problem Relation Age of Onset    Hypertension Mother     Heart failure Mother     Heart attack Maternal Grandfather         Review of Systems   Constitutional:  Positive for activity change. Negative for appetite change, fatigue and unexpected weight change.   HENT: Negative.     Eyes: Negative.    Respiratory:  Positive for shortness of breath (Chronic).    Cardiovascular:  Negative for chest pain.   Gastrointestinal: Negative.    Endocrine: Negative.    Genitourinary: Negative.    Musculoskeletal:  Positive for back pain (Acute on chronic back pain).   Neurological: Negative.    Psychiatric/Behavioral: Negative.          Objective     Vitals:    04/21/25 1206   BP: 134/78   Pulse: 78   Resp: 16   Temp: 98.2 °F (36.8 °C)   TempSrc: Oral   SpO2: 96%   Weight: 69 kg (152 lb 3.2 oz)   Height: 154 cm  "(60.63\")   PainSc: 0-No pain           4/21/2025    12:09 PM   Current Status   ECOG score 2       Physical Exam  Constitutional:       Appearance: Normal appearance.   HENT:      Head: Normocephalic and atraumatic.      Mouth/Throat:      Mouth: Mucous membranes are moist.      Pharynx: Oropharynx is clear.   Eyes:      Extraocular Movements: Extraocular movements intact.      Conjunctiva/sclera: Conjunctivae normal.      Pupils: Pupils are equal, round, and reactive to light.      Comments: Left lid nodular lesion   Cardiovascular:      Rate and Rhythm: Normal rate and regular rhythm.      Pulses: Normal pulses.      Heart sounds: Normal heart sounds.   Pulmonary:      Effort: Pulmonary effort is normal.      Breath sounds: Normal breath sounds.   Abdominal:      General: Bowel sounds are normal.      Palpations: Abdomen is soft.   Musculoskeletal:         General: Normal range of motion.      Cervical back: Normal range of motion and neck supple.   Skin:     General: Skin is warm and dry.   Neurological:      General: No focal deficit present.      Mental Status: She is alert and oriented to person, place, and time.   Psychiatric:         Mood and Affect: Mood normal.         Behavior: Behavior normal.           RECENT LABS:  Hematology WBC   Date Value Ref Range Status   04/21/2025 3.30 (L) 3.40 - 10.80 10*3/mm3 Final   02/06/2024 4.15 (L) 4.5 - 11.0 10*3/uL Final     RBC   Date Value Ref Range Status   04/21/2025 3.19 (L) 3.77 - 5.28 10*6/mm3 Final   02/06/2024 3.70 (L) 4.0 - 5.2 10*6/uL Final     Hemoglobin   Date Value Ref Range Status   04/21/2025 11.2 (L) 12.0 - 15.9 g/dL Final   02/06/2024 12.4 12.0 - 16.0 g/dL Final     Hematocrit   Date Value Ref Range Status   04/21/2025 33.3 (L) 34.0 - 46.6 % Final   02/06/2024 36.6 36.0 - 46.0 % Final     Platelets   Date Value Ref Range Status   04/21/2025 69 (L) 140 - 450 10*3/mm3 Final   02/06/2024 77 (L) 140 - 440 10*3/uL Final          Assessment & Plan      80 " year-old female with a orthopedic history as described in particular involving right hip, bilateral knees and bilateral rotator cuffs recently falling and sustaining a contusion involving her right hip and evidence of compression deformities T12 and L2.  Upon discharge she was seen by orthopedics and, unexpectedly, on additional back studies a left lung nodule was determined.    This was reviewed by pulmonary medicine at [facility] leading to a  a CT of the chest which demonstrated a 2.0 x 1.9 cm noncalcified nodule involving the posterior medial aspect left lower lobe abutting the pleura, more smoothly marginated 0.9 cm noncalcified right middle lobe nodule and a few additional smaller noncalcified nodules including 2 adjacent nodules measuring less than 0.4 cm the right middle lobe along with mild emphysema with mild to moderate subpleural and bibasilar scarring.  There is no pleural effusion or pneumothorax.    A PET/CT was obtained 2/6/2024 with a solid subpleural irregular nodule left lower lobe medially measuring 1.7 x 1.8 intensely hypermetabolic with SUV of 12.8, no additional nodular mass, had metabolic right superior mediastinal lymph node lateral measuring 0.9 cm to 3.9, diffuse lower lobe tracer uptake with mediastinal right hilar lymph nodes partially calcified thought to be inflammatory or granulomatous disease.  There is also low-grade tracer uptake associated subacute healing left anterior rib fractures.    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    The patient is now seen in CBC office.  There has not been assessment by surgery or, apparently, by pulmonary medicine formally though the studies above are available for review.  After discussion we will need to have her presented in the thoracic conference so that opinions from radiology, thoracic surgery radiation  therapy as well as pathology can be obtained.    Patient's case discussed during thoracic conference with the finding of a right supraclavicular node.  This was unknown to us when seen her initially in consultation.  Request made to obtain ultrasound-guided biopsy of the right supraclavicular node and this is discussed with the patient's daughter who indicates the patient would be willing to proceed.  We will go and have this scheduled even though she is to be seen within the next week and the result may not yet be available.    3/6/2023 FNA obtained, discussed with IR physician Dr. Courtney with findings of a few atypical clusters present suspicious for involvement by non-small cell carcinoma.  He is offered to rebiopsy quickly if necessary.    The patient is seen 3/13/2024 with rebiopsy requested as well as radiation therapy consultation.  The case is discussed with interventional radiology and a repeat biopsy is possible.  The patient is willing to proceed in this fashion and we have also discussed radiation therapy consultation in the interval.  She continues have symptoms of chronic back pain and left lid lesion that is going to be reviewed by ophthalmology.    This was discussed 3/21/2024 recognizing the patient's frailty.  Options for treating the areas that are known positive including the left lower lobe and right supraclavicular node would include radiation therapy and Dr. Hopkins will see the patient concerning this as we also follow her for consideration of systemic therapy including a [id number] considering the genomic studies done thus far and the inability to obtain additional genomics from the scant tissue that we have even from recent biopsy.  Finally the patient's overall status including an older adult assessment is going to be requested.    She is seen back 4/11/2024.  She had been seen 4/8/2024 with SBRT to left lower lobe and right neck lesion.  This has not been completed and she is to follow-up  with radiation therapy mid July.  We have discussed her findings today with the patient seen along with her granddaughter.  She did well with radiation therapy and has no additional symptoms currently respiratorily.    Her genomic testing suggested possible use of Selumetinib (not currently available) though we will follow this as she is subsequently reevaluated.      A follow-up CT series was obtained 7/1/2024.  This reveals a slight decrease in the hypermetabolic right supraclavicular node measuring 0.9 x 0.7 cm previously 1.2 x 1.1 cm.  In the chest there is decrease in the mass in the posterior medial aspect left lower lobe and evidence of radiation pneumonitis adjacently.  Margins are difficult to appreciate completely but maximal diameter is 2 cm previously 2.2 cm as compared to previous.  There is a pleural-based 1.0 x 1.6 cm nodule medial aspect of the right lower lobe which which was less conspicuous previously, multiple nodule opacities right middle lobe that are larger and a 1.3 cm density possibly mucous plugging otherwise seen.  These findings suggest a follow-up series of scans will still be necessary.    The patient is seen in office 7/12/2024 with a reasonably stable performance status.  At this point no additional intervention is necessary but a follow-up scan is requested.    The patient required admission 10/7 - 9/20/2024 after mechanical fall with T12-L2 compression fractures treated conservatively.      The patient's follow-up exams obtained 10/4/2024.  She is seen back 10/11/2024 and there is serial decrease in the small right supraclavicular node when compared to previous now down to 7 x 5 mm, evidence of consolidation left lower lobe with tumor developed likely seen measuring 16 x 2 cm, 2 right middle lobe nodules-19 x 13 mm previously 15 x 10, adjacent 9 mm previously 4 to 5 mm and unchanged third right middle lobe noncalcified nodule, other nodule stable and there is no evidence of  "abdominal/pelvic or osseous metastasis.    The patient required admission 10/7 - 9/20/2024 after mechanical fall with T12-L2 compression fractures treated conservatively.    The patient's follow-up exams obtained 10/4/2024.  She is seen back 10/11/2024 and there is serial decrease in the small right supraclavicular node when compared to previous now down to 7 x 5 mm, evidence of consolidation left lower lobe with tumor developed likely seen measuring 16 x 2 cm, 2 right middle lobe nodules-19 x 13 mm previously 15 x 10, adjacent 9 mm previously 4 to 5 mm and unchanged third right middle lobe noncalcified nodule, other nodule stable and there is no evidence of abdominal/pelvic or osseous metastasis.    The patient is seen 10/11/2024 slowly recovering from the mechanical fall described above.  She does not require additional treatment at this point after review of the scans with follow-up exams again are anticipated and she is willing to proceed.     The patient is seen 4/9/2025 and evaluated by CT of soft tissue neck, chest, abdomen, pelvis performed 4/2 2025.  There is now seen increase in the right middle lobe cavitary thick-walled nodule measuring 3.2 x 2.6 compared to previous 2.1 x 1.4.  Other sites are stable and no additional lesions have developed.      The patient is seen with her daughter both indicating that she is still able to carry out daily activities without severe difficulty though she \"has slowed up a bit\".  He does have back pain periodically that improves with rest and does not notice worsening respiratory symptoms.  She may well have disease that would be amenable to local therapy and it is requested that she undergo a PET/CT.    PET/CT demonstrates left lower lobe radiation changes, right middle lobe hypermetabolic 3.1 x 2.3 central cavitary mass that is larger from previous, hypermetabolic right upper lobe 1 cm solid nodule unchanged adjacent right middle lobe 9 mm solid nodule as well as " hypermetabolic right hilar and low paratracheal lymphadenopathy concerning for osiel metastatic disease.    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  Radiation therapy would not be expected to control this disease and systemic therapy is going to be necessary.  Considering the tissue type she would be a candidate for Alimta single agent at this point.  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    I spent 50 minutes caring for Ada on this date of service. This time includes time spent by me in the following activities: preparing for the visit, reviewing tests, obtaining and/or reviewing a separately obtained history, performing a medically appropriate examination and/or evaluation, counseling and educating the patient/family/caregiver, ordering medications, tests, or procedures, referring and communicating with other health care professionals, documenting information in the medical record, independently interpreting results and communicating that information with the patient/family/caregiver, and care coordination.

## 2025-04-21 ENCOUNTER — LAB (OUTPATIENT)
Dept: LAB | Facility: HOSPITAL | Age: 80
End: 2025-04-21
Payer: MEDICARE

## 2025-04-21 ENCOUNTER — OFFICE VISIT (OUTPATIENT)
Dept: ONCOLOGY | Facility: CLINIC | Age: 80
End: 2025-04-21
Payer: MEDICARE

## 2025-04-21 VITALS
TEMPERATURE: 98.2 F | OXYGEN SATURATION: 96 % | BODY MASS INDEX: 28.74 KG/M2 | HEART RATE: 78 BPM | WEIGHT: 152.2 LBS | RESPIRATION RATE: 16 BRPM | HEIGHT: 61 IN | DIASTOLIC BLOOD PRESSURE: 78 MMHG | SYSTOLIC BLOOD PRESSURE: 134 MMHG

## 2025-04-21 DIAGNOSIS — C34.81 MALIGNANT NEOPLASM OF OVERLAPPING SITES OF RIGHT BRONCHUS AND LUNG: ICD-10-CM

## 2025-04-21 DIAGNOSIS — C34.92 NSCLC OF LEFT LUNG: Primary | ICD-10-CM

## 2025-04-21 DIAGNOSIS — C34.92 NSCLC OF LEFT LUNG: ICD-10-CM

## 2025-04-21 LAB
ALBUMIN SERPL-MCNC: 3.6 G/DL (ref 3.5–5.2)
ALBUMIN/GLOB SERPL: 1 G/DL
ALP SERPL-CCNC: 127 U/L (ref 39–117)
ALT SERPL W P-5'-P-CCNC: 21 U/L (ref 1–33)
ANION GAP SERPL CALCULATED.3IONS-SCNC: 11.6 MMOL/L (ref 5–15)
AST SERPL-CCNC: 35 U/L (ref 1–32)
BASOPHILS # BLD AUTO: 0.04 10*3/MM3 (ref 0–0.2)
BASOPHILS NFR BLD AUTO: 1.2 % (ref 0–1.5)
BILIRUB SERPL-MCNC: 0.5 MG/DL (ref 0–1.2)
BUN SERPL-MCNC: 21 MG/DL (ref 8–23)
BUN/CREAT SERPL: 15.3 (ref 7–25)
CALCIUM SPEC-SCNC: 9.6 MG/DL (ref 8.6–10.5)
CHLORIDE SERPL-SCNC: 96 MMOL/L (ref 98–107)
CO2 SERPL-SCNC: 23.4 MMOL/L (ref 22–29)
CREAT SERPL-MCNC: 1.37 MG/DL (ref 0.57–1)
DEPRECATED RDW RBC AUTO: 48.1 FL (ref 37–54)
EGFRCR SERPLBLD CKD-EPI 2021: 38.6 ML/MIN/1.73
EOSINOPHIL # BLD AUTO: 0.1 10*3/MM3 (ref 0–0.4)
EOSINOPHIL NFR BLD AUTO: 3 % (ref 0.3–6.2)
ERYTHROCYTE [DISTWIDTH] IN BLOOD BY AUTOMATED COUNT: 12.6 % (ref 12.3–15.4)
GLOBULIN UR ELPH-MCNC: 3.7 GM/DL
GLUCOSE SERPL-MCNC: 100 MG/DL (ref 65–99)
HCT VFR BLD AUTO: 33.3 % (ref 34–46.6)
HGB BLD-MCNC: 11.2 G/DL (ref 12–15.9)
IMM GRANULOCYTES # BLD AUTO: 0 10*3/MM3 (ref 0–0.05)
IMM GRANULOCYTES NFR BLD AUTO: 0 % (ref 0–0.5)
LYMPHOCYTES # BLD AUTO: 1.13 10*3/MM3 (ref 0.7–3.1)
LYMPHOCYTES NFR BLD AUTO: 34.2 % (ref 19.6–45.3)
MCH RBC QN AUTO: 35.1 PG (ref 26.6–33)
MCHC RBC AUTO-ENTMCNC: 33.6 G/DL (ref 31.5–35.7)
MCV RBC AUTO: 104.4 FL (ref 79–97)
MONOCYTES # BLD AUTO: 0.44 10*3/MM3 (ref 0.1–0.9)
MONOCYTES NFR BLD AUTO: 13.3 % (ref 5–12)
NEUTROPHILS NFR BLD AUTO: 1.59 10*3/MM3 (ref 1.7–7)
NEUTROPHILS NFR BLD AUTO: 48.3 % (ref 42.7–76)
NRBC BLD AUTO-RTO: 0 /100 WBC (ref 0–0.2)
PLATELET # BLD AUTO: 69 10*3/MM3 (ref 140–450)
PMV BLD AUTO: 10.2 FL (ref 6–12)
POTASSIUM SERPL-SCNC: 5.2 MMOL/L (ref 3.5–5.2)
PROT SERPL-MCNC: 7.3 G/DL (ref 6–8.5)
RBC # BLD AUTO: 3.19 10*6/MM3 (ref 3.77–5.28)
SODIUM SERPL-SCNC: 131 MMOL/L (ref 136–145)
WBC NRBC COR # BLD AUTO: 3.3 10*3/MM3 (ref 3.4–10.8)

## 2025-04-21 PROCEDURE — 3075F SYST BP GE 130 - 139MM HG: CPT | Performed by: INTERNAL MEDICINE

## 2025-04-21 PROCEDURE — 80053 COMPREHEN METABOLIC PANEL: CPT

## 2025-04-21 PROCEDURE — 36415 COLL VENOUS BLD VENIPUNCTURE: CPT

## 2025-04-21 PROCEDURE — 1126F AMNT PAIN NOTED NONE PRSNT: CPT | Performed by: INTERNAL MEDICINE

## 2025-04-21 PROCEDURE — 85025 COMPLETE CBC W/AUTO DIFF WBC: CPT

## 2025-04-21 PROCEDURE — 3078F DIAST BP <80 MM HG: CPT | Performed by: INTERNAL MEDICINE

## 2025-04-21 PROCEDURE — 99215 OFFICE O/P EST HI 40 MIN: CPT | Performed by: INTERNAL MEDICINE

## 2025-04-21 NOTE — LETTER
April 21, 2025     Amy Guillory MD  [address]  [facility] [address]  [facility] [address]    Patient: Kenia N Sample   YOB: 1945   Date of Visit: 4/21/2025     Dear Amy Guillory MD:       Thank you for referring Ada Sample to me for evaluation. Below are the relevant portions of my assessment and plan of care.    If you have questions, please do not hesitate to call me. I look forward to following Ada along with you.         Sincerely,        Shabbir Mann MD        CC: MD Mackenzie Valdovinos Michael D., MD  04/21/25 1530  Sign when Signing Visit        REASON FOR FOLLOW-UP: Left lower lobe lung mass-squamous of carcinoma                               History of Present Illness     The patient is an 80-year-old female with below medical history recently admitted 12/4 - 6/20/2023 with a history of right intertrochanteric femoral fracture and osteoarthritis presenting from a fall at home.  She had a stress fracture left foot that affected her balance, chronic low back pain that is also worsened with a fall.  Upon presentation orthopedic surgery was consulted finding a right intramedullary nail fixation and healing without abnormal findings.  No additional surgery was planned though physical therapy continued.  Spinal films demonstrated compression deformity T12 and L2 but fortunately she did further improve and was able to be discharged.    The patient had follow-up with several physicians including orthopedic physicians with an eventual chest exam that revealed an unexpected nodularity.  She was then referred to pulmonary medicine 1/8/2024 with a history of smoking for 35 years quitting 8 years ago and indication that there was a lung nodule found incidentally on imaging for the spine.  Through the [facility] system a CT of the chest demonstrated a 2.0 x 1.9 cm noncalcified nodule involving the posterior medial aspect left lower lobe abutting the pleura, more smoothly marginated 0.9 cm  noncalcified right middle lobe nodule and a few additional smaller noncalcified nodules including 2 adjacent nodules measuring less than 0.4 cm the right middle lobe along with mild emphysema with mild to moderate subpleural and bibasilar scarring.  There is no pleural effusion or pneumothorax.    A PET/CT was obtained 2/6/2024 with a solid subpleural irregular nodule left lower lobe medially measuring 1.7 x 1.8 intensely hypermetabolic with SUV of 12.8, no additional nodular mass, had metabolic right superior mediastinal lymph node lateral measuring 0.9 cm to 3.9, diffuse lower lobe tracer uptake with mediastinal right hilar lymph nodes partially calcified thought to be inflammatory or granulomatous disease.  There is also low-grade tracer uptake associated subacute healing left anterior rib fractures.    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    The patient was referred urgently from [facility] for assessment.  It is not certain why she has not been seen in the [place] system though she now states that she wished to be seen here.     Patient's case discussed during thoracic conference with the finding of a right supraclavicular node.  This was unknown to us when seen her initially in consultation.  Request made to obtain ultrasound-guided biopsy of the right supraclavicular node and this is discussed with the patient's daughter who indicates the patient would be willing to proceed.  We will go and have this scheduled even though she is to be seen within the next week and the result may not yet be available.    3/6/2023 FNA obtained, discussed with IR physician Dr. Courtney with findings of a few atypical clusters present suspicious for involvement by non-small cell carcinoma.  He is offered to rebiopsy quickly if necessary.    The patient is seen 3/13/2024 with rebiopsy requested as  well as radiation therapy consultation.  The case is discussed with interventional radiology and a repeat biopsy is possible.  The patient is willing to proceed in this fashion and we have also discussed radiation therapy consultation in the interval.  She continues have symptoms of chronic back pain and left lid lesion that is going to be reviewed by ophthalmology.    The patient went on to have a follow-up exam with repeat right supraclavicular lymph node biopsy 3/18/2024.  This was found positive for metastatic carcinoma.  The patient was seen by Dr. Hopkins with the conclusion that she has a 2 cm left lower lobe squamous cell carcinoma abutting the pleura additional contralateral supraclavicular lymph node in the thyroid.  This is to be discussed in conference 3/21/2024.    This was discussed 3/21/2024 recognizing the patient's frailty.  Options for treating the areas that are known positive including the left lower lobe and right supraclavicular node would include radiation therapy and Dr. Hopkins will see the patient concerning this as we also follow her for consideration of systemic therapy including a [id number] considering the genomic studies done thus far and the inability to obtain additional genomics from the scant tissue that we have even from recent biopsy.  Finally the patient's overall status including an older adult assessment is going to be requested.    The patient's [id number] revealed NF1  detected with possible indication of Selumetinib effectiveness.  Additionally TMB was 10.4 mutations per megabase.  Concurrently the patient was seen by radiation therapy treating the left lower lobe and right neck site via SBRT.  Further MRI of the brain revealed no evidence of metastatic disease.    She is seen back 4/11/2024.  She had been seen 4/8/2024 with SBRT to left lower lobe and right neck lesion.  This has not been completed and she is to follow-up with radiation therapy mid July.  We have  discussed her findings today with the patient seen along with her granddaughter.  She did well with radiation therapy and has no additional symptoms currently respiratorily.  Her genomic testing suggested possible use of Selumetinib (not currently available) though we will follow this as she is subsequently reevaluated.    A follow-up CT series was obtained 7/1/2024.  This reveals a slight decrease in the hypermetabolic right supraclavicular node measuring 0.9 x 0.7 cm previously 1.2 x 1.1 cm.  In the chest there is decrease in the mass in the posterior medial aspect left lower lobe and evidence of radiation pneumonitis adjacently.  Margins are difficult to appreciate completely but maximal diameter is 2 cm previously 2.2 cm as compared to previous.  There is a pleural-based 1.0 x 1.6 cm nodule medial aspect of the right lower lobe which which was less conspicuous previously, multiple nodule opacities right middle lobe that are larger and a 1.3 cm density possibly mucous plugging otherwise seen.  These findings suggest a follow-up series of scans will still be necessary.    The patient is seen with her daughter and they both agree with this follow-up plan.    The patient required admission 10/7 - 9/20/2024 after mechanical fall with T12-L2 compression fractures treated conservatively.    The patient's follow-up exams obtained 10/4/2024.  She is seen back 10/11/2024 and there is serial decrease in the small right supraclavicular node when compared to previous now down to 7 x 5 mm, evidence of consolidation left lower lobe with tumor developed likely seen measuring 16 x 2 cm, 2 right middle lobe nodules-19 x 13 mm previously 15 x 10, adjacent 9 mm previously 4 to 5 mm and unchanged third right middle lobe noncalcified nodule, other nodule stable and there is no evidence of abdominal/pelvic or osseous metastasis.    The patient is seen 10/11/2024 slowly recovering from the mechanical fall described above.  She does not  "require additional treatment at this point after review of the scans with follow-up exams again are anticipated and she is willing to proceed.    The patient is seen 4/9/2025 and evaluated by CT of soft tissue neck, chest, abdomen, pelvis performed 4/2 2025.  There is now seen increase in the right middle lobe cavitary thick-walled nodule measuring 3.2 x 2.6 compared to previous 2.1 x 1.4.  Other sites are stable and no additional lesions have developed.  The patient is seen with her daughter both indicating that she is still able to carry out daily activities without severe difficulty though she \"has slowed up a bit\".  He does have back pain periodically that improves with rest and does not notice worsening respiratory symptoms.  She may well have disease that would be amenable to local therapy and it is requested that she undergo a PET/CT.    PET/CT demonstrates left lower lobe radiation changes, right middle lobe hypermetabolic 3.1 x 2.3 central cavitary mass that is larger from previous, hypermetabolic right upper lobe 1 cm solid nodule unchanged adjacent right middle lobe 9 mm solid nodule as well as hypermetabolic right hilar and low paratracheal lymphadenopathy concerning for osiel metastatic disease.    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  Radiation therapy would not be expected to control this disease and systemic therapy is going to be necessary.  Considering the tissue type she would be a candidate for Alimta single agent at this point.       Past Surgical History:   Procedure Laterality Date   • APPENDECTOMY     • CARDIAC CATHETERIZATION     • EXTERNAL EAR SURGERY Right     had tumor excised behind right ear lobe benign   • EYE LESION EXCISION Left 6/6/2024    Procedure: LEFT LOWER EYELID BASEL CELL CARCINOMA EXCISIONAL AND REPAIR WITH FROZEN SECTIONS,;  Surgeon: Austin Kamara MD;  [facility]" MAIN OR;  Service: Ophthalmology;  Laterality: Left;   • FEMUR IM NAILING/RODDING Right 06/15/2022    Procedure: RIGHT HIP INTRAMEDULLARY NAILING/RODDING;  Surgeon: Marc Crawford MD;  Location: [facility] MAIN OR;  Service: Orthopedics;  Laterality: Right;   • GALLBLADDER SURGERY     • HYSTERECTOMY     • KNEE ARTHROPLASTY Right    • KNEE ARTHROSCOPY Right    • TN REVJ TOTAL KNEE ARTHRP W/WO ALGRFT 1 COMPONENT Right 09/07/2016    Procedure: RT ILIOTIBIAL BAND RELEASE RT TOTAL KNEE POLY EXCHANGE;  Surgeon: Toy Adame MD;  Location: [facility] MAIN OR;  Service: Orthopedics   • ROTATOR CUFF REPAIR Bilateral    • US GUIDED LYMPH NODE BIOPSY  3/18/2024   • WRIST MASS EXCISION Right         Current Outpatient Medications on File Prior to Visit   Medication Sig Dispense Refill   • acetaminophen (TYLENOL) 325 MG tablet Take 2 tablets by mouth Every 4 (Four) Hours As Needed for Mild Pain .     • albuterol (PROVENTIL HFA;VENTOLIN HFA) 108 (90 BASE) MCG/ACT inhaler Inhale 2 puffs Every 4 (Four) Hours As Needed. Indications: Asthma     • allopurinol (ZYLOPRIM) 100 MG tablet Take 1 tablet by mouth Daily.     • bisacodyl (DULCOLAX) 5 MG EC tablet Take 1 tablet by mouth Daily As Needed for Constipation (Use if polyethylene glycol is ineffective).     • fluticasone (FLONASE) 50 MCG/ACT nasal spray Administer 2 sprays into the nostril(s) as directed by provider Daily.     • Magnesium 500 MG capsule Take 500 mg by mouth 2 (Two) Times a Day. Indications: Acid Indigestion     • methocarbamol (ROBAXIN) 500 MG tablet Take 1 tablet by mouth Every 8 (Eight) Hours As Needed for Muscle Spasms (back pain). 30 tablet 0   • metoprolol tartrate (LOPRESSOR) 25 MG tablet Take 1 tablet by mouth 2 (Two) Times a Day. Indications: High Blood Pressure     • naloxone (NARCAN) 4 MG/0.1ML nasal spray Call 911. Don't prime. Anderson in 1 nostril for overdose. Repeat in 2-3 minutes in other nostril if no or minimal breathing/responsiveness. 2 each 0   • omeprazole  (PriLOSEC) 40 MG capsule Take 1 capsule by mouth Every Morning. Indications: Heartburn     • polyethylene glycol (MIRALAX) 17 g packet Take 17 g by mouth Daily As Needed (Use if senna-docusate is ineffective).     • potassium chloride (K-DUR,KLOR-CON) 20 MEQ CR tablet Take 1 tablet by mouth 2 (Two) Times a Day.     • pravastatin (PRAVACHOL) 20 MG tablet Take 1 tablet by mouth Every Morning.     • sennosides-docusate sodium (SENOKOT-S) 8.6-50 MG tablet Take 2 tablets by mouth 2 (two) times a day. 100 tablet 1   • temazepam (RESTORIL) 15 MG capsule Take 1 capsule by mouth At Night As Needed for Sleep.     • vitamin B-12 (CYANOCOBALAMIN) 1000 MCG tablet Take 1 tablet by mouth Daily. Indications: Inadequate Vitamin B12     • vitamin D (ERGOCALCIFEROL) 1.25 MG (65657 UT) capsule capsule Take 1 capsule by mouth Every 7 (Seven) Days. 5 capsule 0     No current facility-administered medications on file prior to visit.        ALLERGIES:    Allergies   Allergen Reactions   • Levaquin [Levofloxacin] Hives   • Zocor [Simvastatin] Myalgia   • Atorvastatin Other (See Comments)   • Codeine GI Intolerance   • Penicillins Hives and Swelling     Pt reports tongue swelling   • Percocet [Oxycodone-Acetaminophen] Itching and Nausea And Vomiting        Social History     Socioeconomic History   • Marital status:      Spouse name: Nabil   Tobacco Use   • Smoking status: Former     Current packs/day: 1.00     Average packs/day: 1 pack/day for 30.0 years (30.0 ttl pk-yrs)     Types: Cigarettes     Passive exposure: Past   • Smokeless tobacco: Never   Vaping Use   • Vaping status: Never Used   Substance and Sexual Activity   • Alcohol use: Yes     Comment: 1 wine drink q 3-4 months   • Drug use: Never   • Sexual activity: Defer        Family History   Problem Relation Age of Onset   • Hypertension Mother    • Heart failure Mother    • Heart attack Maternal Grandfather         Review of Systems   Constitutional:  Positive for  "activity change. Negative for appetite change, fatigue and unexpected weight change.   HENT: Negative.     Eyes: Negative.    Respiratory:  Positive for shortness of breath (Chronic).    Cardiovascular:  Negative for chest pain.   Gastrointestinal: Negative.    Endocrine: Negative.    Genitourinary: Negative.    Musculoskeletal:  Positive for back pain (Acute on chronic back pain).   Neurological: Negative.    Psychiatric/Behavioral: Negative.          Objective    Vitals:    04/21/25 1206   BP: 134/78   Pulse: 78   Resp: 16   Temp: 98.2 °F (36.8 °C)   TempSrc: Oral   SpO2: 96%   Weight: 69 kg (152 lb 3.2 oz)   Height: 154 cm (60.63\")   PainSc: 0-No pain           4/21/2025    12:09 PM   Current Status   ECOG score 2       Physical Exam  Constitutional:       Appearance: Normal appearance.   HENT:      Head: Normocephalic and atraumatic.      Mouth/Throat:      Mouth: Mucous membranes are moist.      Pharynx: Oropharynx is clear.   Eyes:      Extraocular Movements: Extraocular movements intact.      Conjunctiva/sclera: Conjunctivae normal.      Pupils: Pupils are equal, round, and reactive to light.      Comments: Left lid nodular lesion   Cardiovascular:      Rate and Rhythm: Normal rate and regular rhythm.      Pulses: Normal pulses.      Heart sounds: Normal heart sounds.   Pulmonary:      Effort: Pulmonary effort is normal.      Breath sounds: Normal breath sounds.   Abdominal:      General: Bowel sounds are normal.      Palpations: Abdomen is soft.   Musculoskeletal:         General: Normal range of motion.      Cervical back: Normal range of motion and neck supple.   Skin:     General: Skin is warm and dry.   Neurological:      General: No focal deficit present.      Mental Status: She is alert and oriented to person, place, and time.   Psychiatric:         Mood and Affect: Mood normal.         Behavior: Behavior normal.           RECENT LABS:  Hematology WBC   Date Value Ref Range Status   04/21/2025 3.30 (L) " 3.40 - 10.80 10*3/mm3 Final   02/06/2024 4.15 (L) 4.5 - 11.0 10*3/uL Final     RBC   Date Value Ref Range Status   04/21/2025 3.19 (L) 3.77 - 5.28 10*6/mm3 Final   02/06/2024 3.70 (L) 4.0 - 5.2 10*6/uL Final     Hemoglobin   Date Value Ref Range Status   04/21/2025 11.2 (L) 12.0 - 15.9 g/dL Final   02/06/2024 12.4 12.0 - 16.0 g/dL Final     Hematocrit   Date Value Ref Range Status   04/21/2025 33.3 (L) 34.0 - 46.6 % Final   02/06/2024 36.6 36.0 - 46.0 % Final     Platelets   Date Value Ref Range Status   04/21/2025 69 (L) 140 - 450 10*3/mm3 Final   02/06/2024 77 (L) 140 - 440 10*3/uL Final          Assessment & Plan     80 year-old female with a orthopedic history as described in particular involving right hip, bilateral knees and bilateral rotator cuffs recently falling and sustaining a contusion involving her right hip and evidence of compression deformities T12 and L2.  Upon discharge she was seen by orthopedics and, unexpectedly, on additional back studies a left lung nodule was determined.    This was reviewed by pulmonary medicine at [facility] leading to a  a CT of the chest which demonstrated a 2.0 x 1.9 cm noncalcified nodule involving the posterior medial aspect left lower lobe abutting the pleura, more smoothly marginated 0.9 cm noncalcified right middle lobe nodule and a few additional smaller noncalcified nodules including 2 adjacent nodules measuring less than 0.4 cm the right middle lobe along with mild emphysema with mild to moderate subpleural and bibasilar scarring.  There is no pleural effusion or pneumothorax.    A PET/CT was obtained 2/6/2024 with a solid subpleural irregular nodule left lower lobe medially measuring 1.7 x 1.8 intensely hypermetabolic with SUV of 12.8, no additional nodular mass, had metabolic right superior mediastinal lymph node lateral measuring 0.9 cm to 3.9, diffuse lower lobe tracer uptake with mediastinal right hilar lymph nodes partially calcified thought to be  inflammatory or granulomatous disease.  There is also low-grade tracer uptake associated subacute healing left anterior rib fractures.    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    The patient is now seen in CBC office.  There has not been assessment by surgery or, apparently, by pulmonary medicine formally though the studies above are available for review.  After discussion we will need to have her presented in the thoracic conference so that opinions from radiology, thoracic surgery radiation therapy as well as pathology can be obtained.    Patient's case discussed during thoracic conference with the finding of a right supraclavicular node.  This was unknown to us when seen her initially in consultation.  Request made to obtain ultrasound-guided biopsy of the right supraclavicular node and this is discussed with the patient's daughter who indicates the patient would be willing to proceed.  We will go and have this scheduled even though she is to be seen within the next week and the result may not yet be available.    3/6/2023 FNA obtained, discussed with IR physician Dr. Courtney with findings of a few atypical clusters present suspicious for involvement by non-small cell carcinoma.  He is offered to rebiopsy quickly if necessary.    The patient is seen 3/13/2024 with rebiopsy requested as well as radiation therapy consultation.  The case is discussed with interventional radiology and a repeat biopsy is possible.  The patient is willing to proceed in this fashion and we have also discussed radiation therapy consultation in the interval.  She continues have symptoms of chronic back pain and left lid lesion that is going to be reviewed by ophthalmology.    This was discussed 3/21/2024 recognizing the patient's frailty.  Options for treating the areas that are known positive including  the left lower lobe and right supraclavicular node would include radiation therapy and Dr. Hopkins will see the patient concerning this as we also follow her for consideration of systemic therapy including a [id number] considering the genomic studies done thus far and the inability to obtain additional genomics from the scant tissue that we have even from recent biopsy.  Finally the patient's overall status including an older adult assessment is going to be requested.    She is seen back 4/11/2024.  She had been seen 4/8/2024 with SBRT to left lower lobe and right neck lesion.  This has not been completed and she is to follow-up with radiation therapy mid July.  We have discussed her findings today with the patient seen along with her granddaughter.  She did well with radiation therapy and has no additional symptoms currently respiratorily.    Her genomic testing suggested possible use of Selumetinib (not currently available) though we will follow this as she is subsequently reevaluated.      A follow-up CT series was obtained 7/1/2024.  This reveals a slight decrease in the hypermetabolic right supraclavicular node measuring 0.9 x 0.7 cm previously 1.2 x 1.1 cm.  In the chest there is decrease in the mass in the posterior medial aspect left lower lobe and evidence of radiation pneumonitis adjacently.  Margins are difficult to appreciate completely but maximal diameter is 2 cm previously 2.2 cm as compared to previous.  There is a pleural-based 1.0 x 1.6 cm nodule medial aspect of the right lower lobe which which was less conspicuous previously, multiple nodule opacities right middle lobe that are larger and a 1.3 cm density possibly mucous plugging otherwise seen.  These findings suggest a follow-up series of scans will still be necessary.    The patient is seen in office 7/12/2024 with a reasonably stable performance status.  At this point no additional intervention is necessary but a follow-up scan is  requested.    The patient required admission 10/7 - 9/20/2024 after mechanical fall with T12-L2 compression fractures treated conservatively.      The patient's follow-up exams obtained 10/4/2024.  She is seen back 10/11/2024 and there is serial decrease in the small right supraclavicular node when compared to previous now down to 7 x 5 mm, evidence of consolidation left lower lobe with tumor developed likely seen measuring 16 x 2 cm, 2 right middle lobe nodules-19 x 13 mm previously 15 x 10, adjacent 9 mm previously 4 to 5 mm and unchanged third right middle lobe noncalcified nodule, other nodule stable and there is no evidence of abdominal/pelvic or osseous metastasis.    The patient required admission 10/7 - 9/20/2024 after mechanical fall with T12-L2 compression fractures treated conservatively.    The patient's follow-up exams obtained 10/4/2024.  She is seen back 10/11/2024 and there is serial decrease in the small right supraclavicular node when compared to previous now down to 7 x 5 mm, evidence of consolidation left lower lobe with tumor developed likely seen measuring 16 x 2 cm, 2 right middle lobe nodules-19 x 13 mm previously 15 x 10, adjacent 9 mm previously 4 to 5 mm and unchanged third right middle lobe noncalcified nodule, other nodule stable and there is no evidence of abdominal/pelvic or osseous metastasis.    The patient is seen 10/11/2024 slowly recovering from the mechanical fall described above.  She does not require additional treatment at this point after review of the scans with follow-up exams again are anticipated and she is willing to proceed.     The patient is seen 4/9/2025 and evaluated by CT of soft tissue neck, chest, abdomen, pelvis performed 4/2 2025.  There is now seen increase in the right middle lobe cavitary thick-walled nodule measuring 3.2 x 2.6 compared to previous 2.1 x 1.4.  Other sites are stable and no additional lesions have developed.      The patient is seen with her  "daughter both indicating that she is still able to carry out daily activities without severe difficulty though she \"has slowed up a bit\".  He does have back pain periodically that improves with rest and does not notice worsening respiratory symptoms.  She may well have disease that would be amenable to local therapy and it is requested that she undergo a PET/CT.    PET/CT demonstrates left lower lobe radiation changes, right middle lobe hypermetabolic 3.1 x 2.3 central cavitary mass that is larger from previous, hypermetabolic right upper lobe 1 cm solid nodule unchanged adjacent right middle lobe 9 mm solid nodule as well as hypermetabolic right hilar and low paratracheal lymphadenopathy concerning for osiel metastatic disease.    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  Radiation therapy would not be expected to control this disease and systemic therapy is going to be necessary.  Considering the tissue type she would be a candidate for Alimta single agent at this point.  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    Plan:  *Request vascular surgery appointment for port placement    *Request genomic testing on patient's initial tumor    *1 week-teach for Alimta chemotherapy    *2 to 3 weeks MD, Wild krishnamurthy at which time we will review her findings once again for potential targeted therapies.    I spent 50 minutes caring for Ada on this date of service. This time includes time spent by me in the following activities: preparing for the visit, reviewing tests, obtaining and/or reviewing a separately obtained history, performing a medically appropriate examination and/or evaluation, counseling and educating the patient/family/caregiver, ordering medications, tests, " or procedures, referring and communicating with other health care professionals, documenting information in the medical record, independently interpreting results and communicating that information with the patient/family/caregiver, and care coordination.

## 2025-04-23 ENCOUNTER — TELEPHONE (OUTPATIENT)
Dept: ONCOLOGY | Facility: CLINIC | Age: 80
End: 2025-04-23
Payer: MEDICARE

## 2025-04-23 LAB
DX PRELIMINARY: NORMAL
LAB AP CASE REPORT: NORMAL
LAB AP CLINICAL INFORMATION: NORMAL
LAB AP DIAGNOSIS COMMENT: NORMAL
LAB AP SPECIAL STAINS: NORMAL
PATH REPORT.ADDENDUM SPEC: NORMAL
PATH REPORT.FINAL DX SPEC: NORMAL
PATH REPORT.GROSS SPEC: NORMAL

## 2025-04-23 NOTE — TELEPHONE ENCOUNTER
Caller: Kaylie Gutierrez - DAUGHTER    Relationship to patient: Emergency Contact    Best call back number: [phone]    Chief complaint: CANC. - R/S     Type of visit: VITALS ONLY, CHEMO EDUCATION & INJECTION     Requested date: 4/29/25 AFTER HER VASCULAR APPT. AT 10:45 TO SAVE A TRIP     If rescheduling, when is the original appointment: 4/30/25

## 2025-04-29 ENCOUNTER — OFFICE VISIT (OUTPATIENT)
Age: 80
End: 2025-04-29
Payer: MEDICARE

## 2025-04-29 VITALS
SYSTOLIC BLOOD PRESSURE: 151 MMHG | HEART RATE: 59 BPM | HEIGHT: 61 IN | BODY MASS INDEX: 28.89 KG/M2 | RESPIRATION RATE: 16 BRPM | DIASTOLIC BLOOD PRESSURE: 83 MMHG | WEIGHT: 153 LBS

## 2025-04-29 DIAGNOSIS — C34.92 NSCLC OF LEFT LUNG: Primary | ICD-10-CM

## 2025-04-29 RX ORDER — METRONIDAZOLE 500 MG/100ML
500 INJECTION, SOLUTION INTRAVENOUS ONCE
Status: CANCELLED | OUTPATIENT
Start: 2025-04-29 | End: 2025-04-29

## 2025-04-29 NOTE — PROGRESS NOTES
Chief Complaint  port placement (New pt)    Subjective        Kenia Palomo presents to [facility] VASCULAR SURGERY  HPI   Kenia Palomo is a 80 y.o. female that has been referred to our office for a Mediport placement.  She has a history of lung cancer and has recently had progression of disease.  She will be starting Alimta in May 7, 2025.  We have been asked to place a Mediport prior to this.  Review of Systems   Constitutional:  Negative for fever.   Eyes:  Negative for visual disturbance.   Cardiovascular:  Negative for leg swelling.   Gastrointestinal:  Negative for abdominal pain.   Musculoskeletal:  Negative for back pain.   Skin:  Negative for color change, pallor and wound.   Neurological:  Negative for dizziness, facial asymmetry, speech difficulty and weakness.        Kenia Palomo  reports that she has quit smoking. Her smoking use included cigarettes. She has a 30 pack-year smoking history. She has been exposed to tobacco smoke. She has never used smokeless tobacco..        Objective   Vital Signs:  Vitals:    04/29/25 1057   BP: 151/83   Pulse: 59   Resp: 16      Body mass index is 29.26 kg/m².   BMI is >= 25 and <30. (Overweight) The following options were offered after discussion;: information on healthy weight added to patient's after visit summary        Physical Exam  Vitals reviewed.   Constitutional:       Appearance: Normal appearance.   HENT:      Head: Normocephalic.   Cardiovascular:      Rate and Rhythm: Normal rate and regular rhythm.      Pulses:           Dorsalis pedis pulses are 3+ on the right side and 3+ on the left side.        Posterior tibial pulses are 3+ on the right side and 3+ on the left side.   Pulmonary:      Effort: Pulmonary effort is normal.   Skin:     General: Skin is warm.   Neurological:      General: No focal deficit present.      Mental Status: She is alert and oriented to person, place, and time.   Psychiatric:         Mood and Affect: Mood normal.           Result Review :                         Assessment and Plan     Diagnoses and all orders for this visit:    1. NSCLC of left lung (Primary)  -     Case Request; Standing  -     metroNIDAZOLE (FLAGYL) IVPB 500 mg  -     Case Request    Other orders  -     Follow Anesthesia Guidelines / Protocol; Future  -     Follow Anesthesia Guidelines / Protocol; Standing  -     Provide Patient With Instructions on NPO Status; Future  -     Place Sequential Compression Device; Standing  -     Maintain Sequential Compression Device; Standing             Presents today for a Mediport placement for chemotherapy for lung cancer. She  has no contraindications for Mediport placement including no previous pacemakers, port placement, upper extremity trauma or thrombosis.  We discussed the risk of the procedure including infection, migration of the access device, venous thrombosis, and pneumothorax.  She is agreeable to proceed and we will get this scheduled at her earliest convenience.       Follow Up     No follow-ups on file.  Patient was given instructions and counseling regarding her condition or for health maintenance advice. Please see specific information pulled into the AVS if appropriate.     Earnestine Jarvis, BLAKE

## 2025-04-29 NOTE — H&P (VIEW-ONLY)
Chief Complaint  port placement (New pt)    Subjective        Kenia Palomo presents to [facility] VASCULAR SURGERY  HPI   Kenia Palomo is a 80 y.o. female that has been referred to our office for a Mediport placement.  She has a history of lung cancer and has recently had progression of disease.  She will be starting Alimta in May 7, 2025.  We have been asked to place a Mediport prior to this.  Review of Systems   Constitutional:  Negative for fever.   Eyes:  Negative for visual disturbance.   Cardiovascular:  Negative for leg swelling.   Gastrointestinal:  Negative for abdominal pain.   Musculoskeletal:  Negative for back pain.   Skin:  Negative for color change, pallor and wound.   Neurological:  Negative for dizziness, facial asymmetry, speech difficulty and weakness.        Kenia Palomo  reports that she has quit smoking. Her smoking use included cigarettes. She has a 30 pack-year smoking history. She has been exposed to tobacco smoke. She has never used smokeless tobacco..        Objective   Vital Signs:  Vitals:    04/29/25 1057   BP: 151/83   Pulse: 59   Resp: 16      Body mass index is 29.26 kg/m².   BMI is >= 25 and <30. (Overweight) The following options were offered after discussion;: information on healthy weight added to patient's after visit summary        Physical Exam  Vitals reviewed.   Constitutional:       Appearance: Normal appearance.   HENT:      Head: Normocephalic.   Cardiovascular:      Rate and Rhythm: Normal rate and regular rhythm.      Pulses:           Dorsalis pedis pulses are 3+ on the right side and 3+ on the left side.        Posterior tibial pulses are 3+ on the right side and 3+ on the left side.   Pulmonary:      Effort: Pulmonary effort is normal.   Skin:     General: Skin is warm.   Neurological:      General: No focal deficit present.      Mental Status: She is alert and oriented to person, place, and time.   Psychiatric:         Mood and Affect: Mood normal.           Result Review :                         Assessment and Plan     Diagnoses and all orders for this visit:    1. NSCLC of left lung (Primary)  -     Case Request; Standing  -     metroNIDAZOLE (FLAGYL) IVPB 500 mg  -     Case Request    Other orders  -     Follow Anesthesia Guidelines / Protocol; Future  -     Follow Anesthesia Guidelines / Protocol; Standing  -     Provide Patient With Instructions on NPO Status; Future  -     Place Sequential Compression Device; Standing  -     Maintain Sequential Compression Device; Standing             Presents today for a Mediport placement for chemotherapy for lung cancer. She  has no contraindications for Mediport placement including no previous pacemakers, port placement, upper extremity trauma or thrombosis.  We discussed the risk of the procedure including infection, migration of the access device, venous thrombosis, and pneumothorax.  She is agreeable to proceed and we will get this scheduled at her earliest convenience.       Follow Up     No follow-ups on file.  Patient was given instructions and counseling regarding her condition or for health maintenance advice. Please see specific information pulled into the AVS if appropriate.     Earnestine Jarvis, BLAKE

## 2025-04-30 ENCOUNTER — INFUSION (OUTPATIENT)
Dept: ONCOLOGY | Facility: HOSPITAL | Age: 80
End: 2025-04-30
Payer: MEDICARE

## 2025-04-30 ENCOUNTER — APPOINTMENT (OUTPATIENT)
Dept: LAB | Facility: HOSPITAL | Age: 80
End: 2025-04-30
Payer: MEDICARE

## 2025-04-30 ENCOUNTER — OFFICE VISIT (OUTPATIENT)
Dept: ONCOLOGY | Facility: CLINIC | Age: 80
End: 2025-04-30
Payer: MEDICARE

## 2025-04-30 VITALS
OXYGEN SATURATION: 96 % | HEIGHT: 61 IN | RESPIRATION RATE: 16 BRPM | BODY MASS INDEX: 29.15 KG/M2 | WEIGHT: 154.4 LBS | SYSTOLIC BLOOD PRESSURE: 139 MMHG | TEMPERATURE: 98.2 F | DIASTOLIC BLOOD PRESSURE: 80 MMHG | HEART RATE: 58 BPM

## 2025-04-30 DIAGNOSIS — C34.92 NSCLC OF LEFT LUNG: Primary | ICD-10-CM

## 2025-04-30 PROCEDURE — 25010000002 CYANOCOBALAMIN PER 1000 MCG: Performed by: NURSE PRACTITIONER

## 2025-04-30 PROCEDURE — 96372 THER/PROPH/DIAG INJ SC/IM: CPT

## 2025-04-30 RX ORDER — LIDOCAINE AND PRILOCAINE 25; 25 MG/G; MG/G
1 CREAM TOPICAL
Qty: 30 G | Refills: 2 | Status: SHIPPED | OUTPATIENT
Start: 2025-04-30

## 2025-04-30 RX ORDER — FUROSEMIDE 40 MG/1
1 TABLET ORAL 2 TIMES DAILY
COMMUNITY
Start: 2025-03-05

## 2025-04-30 RX ORDER — FOLIC ACID 1 MG/1
1 TABLET ORAL DAILY
Qty: 30 TABLET | Refills: 6 | Status: SHIPPED | OUTPATIENT
Start: 2025-04-30

## 2025-04-30 RX ORDER — OLMESARTAN MEDOXOMIL 40 MG/1
40 TABLET ORAL DAILY
COMMUNITY
End: 2025-05-01 | Stop reason: HOSPADM

## 2025-04-30 RX ORDER — ONDANSETRON 8 MG/1
8 TABLET, FILM COATED ORAL 3 TIMES DAILY PRN
Qty: 30 TABLET | Refills: 5 | Status: SHIPPED | OUTPATIENT
Start: 2025-04-30

## 2025-04-30 RX ORDER — CYANOCOBALAMIN 1000 UG/ML
1000 INJECTION, SOLUTION INTRAMUSCULAR; SUBCUTANEOUS
Status: DISPENSED | OUTPATIENT
Start: 2025-04-30

## 2025-04-30 RX ORDER — ROPINIROLE 0.5 MG/1
0.5 TABLET, FILM COATED ORAL NIGHTLY
COMMUNITY
Start: 2025-04-28 | End: 2025-07-27

## 2025-04-30 RX ORDER — CYANOCOBALAMIN 1000 UG/ML
1000 INJECTION, SOLUTION INTRAMUSCULAR; SUBCUTANEOUS ONCE
OUTPATIENT
Start: 2025-04-30 | End: 2025-04-30

## 2025-04-30 RX ORDER — DEXAMETHASONE 4 MG/1
TABLET ORAL
Qty: 30 TABLET | Refills: 2 | Status: SHIPPED | OUTPATIENT
Start: 2025-04-30

## 2025-04-30 RX ADMIN — CYANOCOBALAMIN 1000 MCG: 1000 INJECTION, SOLUTION INTRAMUSCULAR at 12:06

## 2025-04-30 NOTE — PROGRESS NOTES
TREATMENT  PREPARATION    Kenia BRIAN Sample  [id number]  1945    Chief Complaint: Treatment preparation and needs assessment    History of present illness:  Kenia BRIAN Sample is a 80 y.o. year old female who is here today for treatment preparation and needs assessment.  The patient has been diagnosed with   Encounter Diagnosis   Name Primary?    NSCLC of left lung Yes    and is scheduled to begin treatment with:     Oncology History:    Oncology/Hematology History   NSCLC of left lung   2/19/2024 Initial Diagnosis    NSCLC of left lung     4/28/2025 -  Chemotherapy    OP LUNG PEMEtrexed         The current medication list and allergy list were reviewed and reconciled.     Past Medical History, Past Surgical History, Social History, Family History have been reviewed and are without significant changes except as mentioned.    Physical Exam:    Vitals:    04/30/25 1124   BP: 139/80   Pulse: 58   Resp: 16   Temp: 98.2 °F (36.8 °C)   SpO2: 96%     Vitals:    04/30/25 1124   PainSc: 4         ECOG score: 1           Physical Exam  HENT:      Head: Normocephalic and atraumatic.   Eyes:      Extraocular Movements: Extraocular movements intact.      Conjunctiva/sclera: Conjunctivae normal.   Pulmonary:      Effort: Pulmonary effort is normal. No respiratory distress.   Neurological:      General: No focal deficit present.      Mental Status: She is alert and oriented to person, place, and time.   Psychiatric:         Mood and Affect: Mood normal.         Behavior: Behavior normal.           NEEDS ASSESSMENTS    Genetics  The patient's new diagnosis and family history have been reviewed for genetic counseling needs. The patient will not be referred..     Psychosocial and Barriers to care  The patient has completed a PHQ-9 Depression Screening and the Distress Thermometer (DT) today.  PHQ-9 results show PHQ-2 Total Score:   PHQ-9 Total Score:        The patient scored their distress today as Distress Level: 3 on a scale of 0-10  with 0 being no distress and 10 being extreme distress. Problems marked by the patient as being an issue for them within the last week include Practical Problems  : No  Housing/Utilities: No  Transportation: No  Treatment decisions: No  Family Concerns  Ability to have children: No  Physical concerns  Fatigue: No  Pain: Yes  Sleep: No  Substance abuse: No .      Results were reviewed along with psychosocial resources offered by our cancer center.  Our Supportive Oncology team will be flagged for a score of 4 or above, and a same day call will be made for a score of 9 or 10.  A mental health referral is offered at that time. Patients who score less than 4 have been educated on our support services and can be referred to our  upon request.  The patient has already been referred to our .     Nutrition  The patient has completed the malnutrition screening today. They scored Malnutrition Screening Tool  Have you recently lost weight without trying?  If yes, how much weight have you lost?: 0--> No  Have you been eating poorly because of a decreased appetite?: 0--> No  MST score: 0   with a score of 0-1 meaning not at risk in a score of 2 or greater meaning at risk.  Patients with a score of 3 or higher will be referred to our oncology dietitian for support. Patients beginning at risk treatment regimens or who have dietary concerns will also be referred to our oncology dietitian. The patient will not be referred.    Intravenous Access Assessment  The patient and I discussed planned intravenous chemo/biotherapy as well as other IV treatments that are often needed throughout the course of treatment. These may include, but are not limited to blood transfusions, antibiotics, and IV hydration. Discussed that depending on selected treatment and vein assessment, patient may require venous access device (VAD) which could include but not limited to a Mediport or PICC line. Risks and benefits of  "VADs reviewed. The patient will be treated via Port.    Reproductive/Sexual Activity   People should avoid becoming pregnant and should not get a partner pregnant while undergoing chemo/biotherapy.  People of childbearing age should use effective contraception during active therapy. The best recommendation for all people is to use a barrier method for a minimum of 1 week after the last infusion of chemo/biotherapy to prevent your partner being exposed to byproducts from treatment medications in bodily fluids. Effective contraception should be discussed with your oncology team to make sure it is safe to take based on your diagnosis. Possible options include oral contraceptives, barrier methods. Chemo/biotherapy can change your ability to reproduce children in the future.  There are options for fertility preservation. NOT APPLICABLE    Advanced Care Planning  Advance Care Planning   The patient and I discussed advanced care planning, \"Conversations that Matter\".   This service is offered for development of advance directives with a certified ACP facilitator.  The patient does have an up-to-date advanced directive. This document is not on file with our office. The patient is not interested in an appointment with one of our facilitators to create or update their advanced directives.    Have you reviewed your Advance Directive and is it valid for this stay?: No  Patient Requests Assistance on Advance Directives: Patient Declined          Smoking cessation  Tobacco Use: Medium Risk (4/30/2025)    Patient History     Smoking Tobacco Use: Former     Smokeless Tobacco Use: Never     Passive Exposure: Past       Patient and I discussed their tobacco use history. Referral will not be made for smoking cessation.      Palliative Care  When appropriate, the patient and I discussed the availability palliative care services and when appropriate Hospice care. Palliative care is not the same as Hospice care which was explained to the " patient.The patient is not interested in additional information from our  on these services.    Survivorship   When appropriate, we discussed that we will refer the patient to survivorship clinic to discuss next steps following completion of planned treatment.  Reviewed this visit will include assessment of your physical, psychological, functional, and spiritual needs as a survivor and the need at attend this visit when scheduled.    TREATMENT EDUCATION    Today I met with the patient to discuss the chemo/biotherapy regimen recommended for treatment of NSCLC of left lung  - folic acid (FOLVITE) 1 MG tablet  - dexAMETHasone (DECADRON) 4 MG tablet  - ondansetron (ZOFRAN) 8 MG tablet  .  The patient was given explanation of treatment premed side effects including office policy that prohibits patients to drive if sedating medications are administered, MD explanation given regarding benefits, side effects, toxicities and goals of treatment.  The patient received a Chemotherapy/Biotherapy Plan Summary including diagnosis and explanation of specific treatment plan.    SIDE EFFECTS:  Common side effects were discussed with the patient and/or significant other.  Discussion included where applicable hair loss/discoloration, anemia/fatigue, infection/chills/fever, appetite, bleeding risk/precautions, constipation, diarrhea, mouth sores, taste alteration, loss of appetite, nausea/vomiting, peripheral neuropathy, skin/nail changes, rash, muscle aches/weakness, photosensitivity, weight gain/loss, hearing loss, dizziness, menopausal symptoms, menstrual irregularity, sterility, high blood pressure, heart damage, liver damage, lung damage, kidney damage, DVT/PE risk, fluid retention, pleural/pericardial effusion, somnolence, electrolyte/LFT imbalance, vein exercises and/or the possible need for vascular access/port placement.  The patient was advised that although uncommon, leakage of an infused medication from the vein  or venous access device may lead to skin breakdown and/or other tissue damage.  The patient was advised that he/she may have pain, bleeding, and/or bruising from the insertion of a needle in their vein or venous access device (port).  The patient was further advised that, in spite of proper technique, infection with redness and irritation may rarely occur at the site where the needle was inserted.  The patient was advised that if complications occur, additional medical treatment is available.  Finally, where applicable we have reviewed rare but potential immune mediated side effects including shortness of breath, cough, chest pain (pneumonitis), abdominal pain, diarrhea (colitis), thyroiditis (hypothyroid or hyperthyroid), hepatitis and liver dysfunction, nephritis and renal dysfunction.    Discussion also included side effects specific to drugs in the treatment plan, specifically:    Treatment Plans       Name Type Plan Dates Plan Provider         Active    OP LUNG PEMEtrexed ONCOLOGY TREATMENT 4/19/2025 - Present Shabbir Mann MD                      Questions answered and additional information discussed on topics including:  Anemia, Thrombocytopenia, Neutropenia, Nutrition and appetite changes, Diarrhea, Nausea & vomiting, Alopecia, Nervous system changes, Pain, Skin & nail changes, and Organ toxicities       Assessment and Plan:    Diagnoses and all orders for this visit:    1. NSCLC of left lung (Primary)  -     folic acid (FOLVITE) 1 MG tablet; Take 1 tablet by mouth Daily. Start at least 7 days prior to chemotherapy until at least 3 weeks after all chemotherapy.  Dispense: 30 tablet; Refill: 6  -     dexAMETHasone (DECADRON) 4 MG tablet; Take 1 tablet oral twice a day for 3 consecutive days beginning the day before chemotherapy and continue for 6 doses.Take with food.  Dispense: 30 tablet; Refill: 2  -     ondansetron (ZOFRAN) 8 MG tablet; Take 1 tablet by mouth 3 (Three) Times a Day As Needed for  Nausea or Vomiting.  Dispense: 30 tablet; Refill: 5    Other orders  -     cyanocobalamin injection 1,000 mcg      No orders of the defined types were placed in this encounter.        The patient and I have reviewed their diagnosis and scheduled treatment plan. Needs assessment was completed where applicable including genetics, psychosocial needs, barriers to care, VAD evaluation, advanced care planning, survivorship, and palliative care services where indicated. Referrals have been ordered as appropriate based upon evaluation today and patient desires.   Chemo/biotherapy teaching was completed today and consent obtained. See separate documentation for further details.  Adequate time was given to answer questions.  Patient made aware of their care team members and contact information if they have questions or problems throughout the treatment course.  Discussion held and written information provided describing frequency of office visits and ongoing monitoring throughout the treatment plan.     Reviewed with patient any prescribed medication sent to pharmacy.  Education provided regarding proper storage, safe handling, and proper disposal of unused medication.  Proper handling of body fluids and waste discussed and written information provided.  If appropriate, patient had pretreatment labs drawn today.    Learning assessment completed at initial patient encounter. See separate flowsheet. Chemo/biotherapy education comprehension assessed at today's visit.    I spent 40 minutes caring for Ada on this date of service. This time includes time spent by me in the following activities: preparing for the visit, reviewing tests, obtaining and/or reviewing a separately obtained history, performing a medically appropriate examination and/or evaluation, counseling and educating the patient/family/caregiver, ordering medications, tests, or procedures, documenting information in the medical record, and care coordination.      Gali Marshall, APRN   04/30/25

## 2025-04-30 NOTE — PROGRESS NOTES
[facility] Hematology/Oncology Treatment Plan Summary    Name: Kenia N Sample  Acct# [id number]  MD: Dr. Mann    Diagnosis:     ICD-10-CM ICD-9-CM   1. NSCLC of left lung  C34.92 162.9     Stage: IV      Goal of treatment: disease control    Treatment Medication(s):   Alimta    Frequency: every 3 weeks    Number of cycles: To be determined.     Starting on: 5/7/2025    Repeat after 4-5 cycles: CT Scan    Items for home use: Imodium AD (for diarrhea), Tylenol (for fever and/or pain), and Thermometer    Rx written for: [x] Nausea    [] Pre-Treatment   dexamethasone 4 mg by mouth twice daily on the day before, day of, and day after treatment, folic acid 1 mg daily by mouth, and ondansetron 8 mg by mouth every 8 hours as needed for nausea    Notes:     Next Steps: PORT     Completing Provider: BLAKE Kumar             Date/time: 04/30/2025      Please note: You will be seen by a provider frequently with your treatment plan. This plan may change depending on many factors, if so, this will be discussed with you by your physician.  Last update 03/2022.

## 2025-05-01 ENCOUNTER — ANESTHESIA (OUTPATIENT)
Dept: PERIOP | Facility: HOSPITAL | Age: 80
End: 2025-05-01
Payer: MEDICARE

## 2025-05-01 ENCOUNTER — HOSPITAL ENCOUNTER (OUTPATIENT)
Facility: HOSPITAL | Age: 80
Setting detail: HOSPITAL OUTPATIENT SURGERY
Discharge: HOME | End: 2025-05-01
Attending: STUDENT IN AN ORGANIZED HEALTH CARE EDUCATION/TRAINING PROGRAM | Admitting: STUDENT IN AN ORGANIZED HEALTH CARE EDUCATION/TRAINING PROGRAM
Payer: MEDICARE

## 2025-05-01 ENCOUNTER — APPOINTMENT (OUTPATIENT)
Dept: GENERAL RADIOLOGY | Facility: HOSPITAL | Age: 80
End: 2025-05-01
Payer: MEDICARE

## 2025-05-01 ENCOUNTER — ANESTHESIA EVENT (OUTPATIENT)
Dept: PERIOP | Facility: HOSPITAL | Age: 80
End: 2025-05-01
Payer: MEDICARE

## 2025-05-01 VITALS
SYSTOLIC BLOOD PRESSURE: 141 MMHG | RESPIRATION RATE: 16 BRPM | HEART RATE: 58 BPM | TEMPERATURE: 97.6 F | DIASTOLIC BLOOD PRESSURE: 71 MMHG | OXYGEN SATURATION: 93 %

## 2025-05-01 DIAGNOSIS — G89.18 ACUTE POST-OPERATIVE PAIN: Primary | ICD-10-CM

## 2025-05-01 DIAGNOSIS — C34.92 NSCLC OF LEFT LUNG: ICD-10-CM

## 2025-05-01 PROCEDURE — C1894 INTRO/SHEATH, NON-LASER: HCPCS | Performed by: STUDENT IN AN ORGANIZED HEALTH CARE EDUCATION/TRAINING PROGRAM

## 2025-05-01 PROCEDURE — 76000 FLUOROSCOPY <1 HR PHYS/QHP: CPT

## 2025-05-01 PROCEDURE — 36561 INSERT TUNNELED CV CATH: CPT | Performed by: STUDENT IN AN ORGANIZED HEALTH CARE EDUCATION/TRAINING PROGRAM

## 2025-05-01 PROCEDURE — 25010000002 BUPIVACAINE (PF) 0.25 % SOLUTION 30 ML VIAL: Performed by: STUDENT IN AN ORGANIZED HEALTH CARE EDUCATION/TRAINING PROGRAM

## 2025-05-01 PROCEDURE — 25010000002 HYDROMORPHONE PER 4 MG

## 2025-05-01 PROCEDURE — 25010000002 DEXAMETHASONE SODIUM PHOSPHATE 20 MG/5ML SOLUTION

## 2025-05-01 PROCEDURE — 77001 FLUOROGUIDE FOR VEIN DEVICE: CPT | Performed by: STUDENT IN AN ORGANIZED HEALTH CARE EDUCATION/TRAINING PROGRAM

## 2025-05-01 PROCEDURE — 25010000002 HEPARIN (PORCINE) PER 1000 UNITS: Performed by: STUDENT IN AN ORGANIZED HEALTH CARE EDUCATION/TRAINING PROGRAM

## 2025-05-01 PROCEDURE — 76937 US GUIDE VASCULAR ACCESS: CPT | Performed by: STUDENT IN AN ORGANIZED HEALTH CARE EDUCATION/TRAINING PROGRAM

## 2025-05-01 PROCEDURE — 25010000002 FAMOTIDINE 10 MG/ML SOLUTION: Performed by: ANESTHESIOLOGY

## 2025-05-01 PROCEDURE — 25010000002 LIDOCAINE 1 % SOLUTION 20 ML VIAL: Performed by: STUDENT IN AN ORGANIZED HEALTH CARE EDUCATION/TRAINING PROGRAM

## 2025-05-01 PROCEDURE — 25010000002 LIDOCAINE 2% SOLUTION

## 2025-05-01 PROCEDURE — 25010000002 FENTANYL CITRATE (PF) 50 MCG/ML SOLUTION

## 2025-05-01 PROCEDURE — 25010000002 PROPOFOL 10 MG/ML EMULSION

## 2025-05-01 PROCEDURE — C1788 PORT, INDWELLING, IMP: HCPCS | Performed by: STUDENT IN AN ORGANIZED HEALTH CARE EDUCATION/TRAINING PROGRAM

## 2025-05-01 PROCEDURE — 25010000002 MIDAZOLAM PER 1 MG: Performed by: ANESTHESIOLOGY

## 2025-05-01 PROCEDURE — 25810000003 LACTATED RINGERS PER 1000 ML: Performed by: ANESTHESIOLOGY

## 2025-05-01 PROCEDURE — 25010000002 ONDANSETRON PER 1 MG

## 2025-05-01 DEVICE — POWERPORT CLEARVUE ISP IMPLANTABLE PORT WITH ATTACHABLE 8F POLYURETHANE OPEN-ENDED SINGLE-LUMEN VENOUS CATHETER PROCEDURAL KIT
Type: IMPLANTABLE DEVICE | Site: CHEST | Status: FUNCTIONAL
Brand: POWERPORT CLEARVUE

## 2025-05-01 RX ORDER — ONDANSETRON 2 MG/ML
INJECTION INTRAMUSCULAR; INTRAVENOUS AS NEEDED
Status: DISCONTINUED | OUTPATIENT
Start: 2025-05-01 | End: 2025-05-01 | Stop reason: SURG

## 2025-05-01 RX ORDER — METRONIDAZOLE 500 MG/100ML
500 INJECTION, SOLUTION INTRAVENOUS ONCE
Status: DISCONTINUED | OUTPATIENT
Start: 2025-05-01 | End: 2025-05-01 | Stop reason: HOSPADM

## 2025-05-01 RX ORDER — LABETALOL HYDROCHLORIDE 5 MG/ML
5 INJECTION, SOLUTION INTRAVENOUS
Status: DISCONTINUED | OUTPATIENT
Start: 2025-05-01 | End: 2025-05-01 | Stop reason: HOSPADM

## 2025-05-01 RX ORDER — HYDRALAZINE HYDROCHLORIDE 20 MG/ML
5 INJECTION INTRAMUSCULAR; INTRAVENOUS
Status: DISCONTINUED | OUTPATIENT
Start: 2025-05-01 | End: 2025-05-01 | Stop reason: HOSPADM

## 2025-05-01 RX ORDER — HYDROCODONE BITARTRATE AND ACETAMINOPHEN 7.5; 325 MG/1; MG/1
1 TABLET ORAL ONCE AS NEEDED
Status: DISCONTINUED | OUTPATIENT
Start: 2025-05-01 | End: 2025-05-01 | Stop reason: HOSPADM

## 2025-05-01 RX ORDER — TRAMADOL HYDROCHLORIDE 50 MG/1
50 TABLET ORAL EVERY 12 HOURS PRN
Qty: 10 TABLET | Refills: 0 | Status: SHIPPED | OUTPATIENT
Start: 2025-05-01

## 2025-05-01 RX ORDER — HYDROCODONE BITARTRATE AND ACETAMINOPHEN 7.5; 325 MG/1; MG/1
1 TABLET ORAL EVERY 4 HOURS PRN
Status: DISCONTINUED | OUTPATIENT
Start: 2025-05-01 | End: 2025-05-01 | Stop reason: HOSPADM

## 2025-05-01 RX ORDER — LIDOCAINE HYDROCHLORIDE 10 MG/ML
0.5 INJECTION, SOLUTION INFILTRATION; PERINEURAL ONCE AS NEEDED
Status: DISCONTINUED | OUTPATIENT
Start: 2025-05-01 | End: 2025-05-01 | Stop reason: HOSPADM

## 2025-05-01 RX ORDER — SODIUM CHLORIDE 0.9 % (FLUSH) 0.9 %
3 SYRINGE (ML) INJECTION EVERY 12 HOURS SCHEDULED
Status: DISCONTINUED | OUTPATIENT
Start: 2025-05-01 | End: 2025-05-01 | Stop reason: HOSPADM

## 2025-05-01 RX ORDER — DROPERIDOL 2.5 MG/ML
0.62 INJECTION, SOLUTION INTRAMUSCULAR; INTRAVENOUS
Status: DISCONTINUED | OUTPATIENT
Start: 2025-05-01 | End: 2025-05-01 | Stop reason: HOSPADM

## 2025-05-01 RX ORDER — NALOXONE HCL 0.4 MG/ML
0.2 VIAL (ML) INJECTION AS NEEDED
Status: DISCONTINUED | OUTPATIENT
Start: 2025-05-01 | End: 2025-05-01 | Stop reason: HOSPADM

## 2025-05-01 RX ORDER — FENTANYL CITRATE 50 UG/ML
INJECTION, SOLUTION INTRAMUSCULAR; INTRAVENOUS AS NEEDED
Status: DISCONTINUED | OUTPATIENT
Start: 2025-05-01 | End: 2025-05-01 | Stop reason: SURG

## 2025-05-01 RX ORDER — FAMOTIDINE 10 MG/ML
20 INJECTION, SOLUTION INTRAVENOUS ONCE
Status: COMPLETED | OUTPATIENT
Start: 2025-05-01 | End: 2025-05-01

## 2025-05-01 RX ORDER — SODIUM CHLORIDE, SODIUM LACTATE, POTASSIUM CHLORIDE, CALCIUM CHLORIDE 600; 310; 30; 20 MG/100ML; MG/100ML; MG/100ML; MG/100ML
9 INJECTION, SOLUTION INTRAVENOUS CONTINUOUS
Status: DISCONTINUED | OUTPATIENT
Start: 2025-05-01 | End: 2025-05-01 | Stop reason: HOSPADM

## 2025-05-01 RX ORDER — ATROPINE SULFATE 0.4 MG/ML
0.4 INJECTION, SOLUTION INTRAMUSCULAR; INTRAVENOUS; SUBCUTANEOUS ONCE AS NEEDED
Status: DISCONTINUED | OUTPATIENT
Start: 2025-05-01 | End: 2025-05-01 | Stop reason: HOSPADM

## 2025-05-01 RX ORDER — PROPOFOL 10 MG/ML
VIAL (ML) INTRAVENOUS CONTINUOUS PRN
Status: DISCONTINUED | OUTPATIENT
Start: 2025-05-01 | End: 2025-05-01 | Stop reason: SURG

## 2025-05-01 RX ORDER — MIDAZOLAM HYDROCHLORIDE 1 MG/ML
0.5 INJECTION, SOLUTION INTRAMUSCULAR; INTRAVENOUS
Status: DISCONTINUED | OUTPATIENT
Start: 2025-05-01 | End: 2025-05-01 | Stop reason: HOSPADM

## 2025-05-01 RX ORDER — LIDOCAINE HYDROCHLORIDE 20 MG/ML
INJECTION, SOLUTION INFILTRATION; PERINEURAL AS NEEDED
Status: DISCONTINUED | OUTPATIENT
Start: 2025-05-01 | End: 2025-05-01 | Stop reason: SURG

## 2025-05-01 RX ORDER — ACETAMINOPHEN 500 MG
1000 TABLET ORAL EVERY 8 HOURS SCHEDULED
Qty: 30 TABLET | Refills: 0 | Status: SHIPPED | OUTPATIENT
Start: 2025-05-01 | End: 2025-05-06

## 2025-05-01 RX ORDER — HEPARIN SODIUM 1000 [USP'U]/ML
INJECTION, SOLUTION INTRAVENOUS; SUBCUTANEOUS AS NEEDED
Status: DISCONTINUED | OUTPATIENT
Start: 2025-05-01 | End: 2025-05-01 | Stop reason: HOSPADM

## 2025-05-01 RX ORDER — HYDROCODONE BITARTRATE AND ACETAMINOPHEN 5; 325 MG/1; MG/1
1 TABLET ORAL ONCE AS NEEDED
Status: COMPLETED | OUTPATIENT
Start: 2025-05-01 | End: 2025-05-01

## 2025-05-01 RX ORDER — HYDROMORPHONE HYDROCHLORIDE 1 MG/ML
0.5 INJECTION, SOLUTION INTRAMUSCULAR; INTRAVENOUS; SUBCUTANEOUS
Status: DISCONTINUED | OUTPATIENT
Start: 2025-05-01 | End: 2025-05-01 | Stop reason: HOSPADM

## 2025-05-01 RX ORDER — SODIUM CHLORIDE 0.9 % (FLUSH) 0.9 %
3-10 SYRINGE (ML) INJECTION AS NEEDED
Status: DISCONTINUED | OUTPATIENT
Start: 2025-05-01 | End: 2025-05-01 | Stop reason: HOSPADM

## 2025-05-01 RX ORDER — FLUMAZENIL 0.1 MG/ML
0.2 INJECTION INTRAVENOUS AS NEEDED
Status: DISCONTINUED | OUTPATIENT
Start: 2025-05-01 | End: 2025-05-01 | Stop reason: HOSPADM

## 2025-05-01 RX ORDER — HYDROMORPHONE HYDROCHLORIDE 1 MG/ML
0.25 INJECTION, SOLUTION INTRAMUSCULAR; INTRAVENOUS; SUBCUTANEOUS
Status: DISCONTINUED | OUTPATIENT
Start: 2025-05-01 | End: 2025-05-01 | Stop reason: HOSPADM

## 2025-05-01 RX ORDER — PROMETHAZINE HYDROCHLORIDE 25 MG/1
25 SUPPOSITORY RECTAL ONCE AS NEEDED
Status: DISCONTINUED | OUTPATIENT
Start: 2025-05-01 | End: 2025-05-01 | Stop reason: HOSPADM

## 2025-05-01 RX ORDER — FENTANYL CITRATE 50 UG/ML
50 INJECTION, SOLUTION INTRAMUSCULAR; INTRAVENOUS ONCE AS NEEDED
Status: DISCONTINUED | OUTPATIENT
Start: 2025-05-01 | End: 2025-05-01 | Stop reason: HOSPADM

## 2025-05-01 RX ORDER — EPHEDRINE SULFATE 50 MG/ML
5 INJECTION, SOLUTION INTRAVENOUS ONCE AS NEEDED
Status: DISCONTINUED | OUTPATIENT
Start: 2025-05-01 | End: 2025-05-01 | Stop reason: HOSPADM

## 2025-05-01 RX ORDER — DIPHENHYDRAMINE HYDROCHLORIDE 50 MG/ML
12.5 INJECTION, SOLUTION INTRAMUSCULAR; INTRAVENOUS
Status: DISCONTINUED | OUTPATIENT
Start: 2025-05-01 | End: 2025-05-01 | Stop reason: HOSPADM

## 2025-05-01 RX ORDER — DEXAMETHASONE SODIUM PHOSPHATE 4 MG/ML
INJECTION, SOLUTION INTRA-ARTICULAR; INTRALESIONAL; INTRAMUSCULAR; INTRAVENOUS; SOFT TISSUE AS NEEDED
Status: DISCONTINUED | OUTPATIENT
Start: 2025-05-01 | End: 2025-05-01 | Stop reason: SURG

## 2025-05-01 RX ORDER — PROMETHAZINE HYDROCHLORIDE 25 MG/1
25 TABLET ORAL ONCE AS NEEDED
Status: DISCONTINUED | OUTPATIENT
Start: 2025-05-01 | End: 2025-05-01 | Stop reason: HOSPADM

## 2025-05-01 RX ORDER — IPRATROPIUM BROMIDE AND ALBUTEROL SULFATE 2.5; .5 MG/3ML; MG/3ML
3 SOLUTION RESPIRATORY (INHALATION) ONCE AS NEEDED
Status: DISCONTINUED | OUTPATIENT
Start: 2025-05-01 | End: 2025-05-01 | Stop reason: HOSPADM

## 2025-05-01 RX ORDER — FENTANYL CITRATE 50 UG/ML
25 INJECTION, SOLUTION INTRAMUSCULAR; INTRAVENOUS
Status: DISCONTINUED | OUTPATIENT
Start: 2025-05-01 | End: 2025-05-01 | Stop reason: HOSPADM

## 2025-05-01 RX ORDER — ONDANSETRON 2 MG/ML
4 INJECTION INTRAMUSCULAR; INTRAVENOUS ONCE AS NEEDED
Status: DISCONTINUED | OUTPATIENT
Start: 2025-05-01 | End: 2025-05-01 | Stop reason: HOSPADM

## 2025-05-01 RX ADMIN — HYDROMORPHONE HYDROCHLORIDE 0.25 MG: 1 INJECTION, SOLUTION INTRAMUSCULAR; INTRAVENOUS; SUBCUTANEOUS at 11:01

## 2025-05-01 RX ADMIN — MIDAZOLAM HYDROCHLORIDE 0.5 MG: 1 INJECTION, SOLUTION INTRAMUSCULAR; INTRAVENOUS at 08:52

## 2025-05-01 RX ADMIN — PROPOFOL 50 MCG/KG/MIN: 10 INJECTION, EMULSION INTRAVENOUS at 09:46

## 2025-05-01 RX ADMIN — PROPOFOL 100 MG: 10 INJECTION, EMULSION INTRAVENOUS at 09:35

## 2025-05-01 RX ADMIN — ONDANSETRON 4 MG: 2 INJECTION, SOLUTION INTRAMUSCULAR; INTRAVENOUS at 10:23

## 2025-05-01 RX ADMIN — HYDROMORPHONE HYDROCHLORIDE 0.25 MG: 1 INJECTION, SOLUTION INTRAMUSCULAR; INTRAVENOUS; SUBCUTANEOUS at 10:55

## 2025-05-01 RX ADMIN — LIDOCAINE HYDROCHLORIDE 60 MG: 20 INJECTION, SOLUTION INFILTRATION; PERINEURAL at 09:35

## 2025-05-01 RX ADMIN — DEXAMETHASONE SODIUM PHOSPHATE 4 MG: 4 INJECTION, SOLUTION INTRAMUSCULAR; INTRAVENOUS at 09:54

## 2025-05-01 RX ADMIN — PROPOFOL 50 MG: 10 INJECTION, EMULSION INTRAVENOUS at 09:38

## 2025-05-01 RX ADMIN — PROPOFOL 30 MG: 10 INJECTION, EMULSION INTRAVENOUS at 10:01

## 2025-05-01 RX ADMIN — SODIUM CHLORIDE, POTASSIUM CHLORIDE, SODIUM LACTATE AND CALCIUM CHLORIDE 9 ML/HR: 600; 310; 30; 20 INJECTION, SOLUTION INTRAVENOUS at 08:53

## 2025-05-01 RX ADMIN — FENTANYL CITRATE 25 MCG: 50 INJECTION, SOLUTION INTRAMUSCULAR; INTRAVENOUS at 09:48

## 2025-05-01 RX ADMIN — PROPOFOL 20 MG: 10 INJECTION, EMULSION INTRAVENOUS at 10:17

## 2025-05-01 RX ADMIN — PROPOFOL 50 MG: 10 INJECTION, EMULSION INTRAVENOUS at 09:43

## 2025-05-01 RX ADMIN — HYDROCODONE BITARTRATE AND ACETAMINOPHEN 1 TABLET: 5; 325 TABLET ORAL at 11:16

## 2025-05-01 RX ADMIN — FENTANYL CITRATE 25 MCG: 50 INJECTION, SOLUTION INTRAMUSCULAR; INTRAVENOUS at 10:02

## 2025-05-01 RX ADMIN — FAMOTIDINE 20 MG: 10 INJECTION, SOLUTION INTRAVENOUS at 08:52

## 2025-05-01 NOTE — OP NOTE
Date of Admission:  5/1/2025 05/01/25  Saloni Verdugo MD  [facility]    Preoperative Diagnosis:   Lung cancer    Postoperative Diagnosis:   Same    Procedure Performed:   1)  Port insertion into the Right internal jugular vein  2)  Ultrasound guided vascular access   3)  Radiologic supervision of catheter tip placement at the superior vena caval / right atrial junction    Surgeon:   Saloni Verdugo MD    Assistant:    Katy HUGHES, Provided critical assistance in exposure, retraction, and suction that overall decrease blood loss and operative time.    Anesthesia:   General    Estimated Blood Loss:    Minimal    Findings:    Successful placement of Right internal jugular vein single lumen Power Port  Port both flushes and draws back blood without issue.    Implants:    Implant Name Type Inv. Item Serial No.  Lot No. LRB No. Used Action   KT PRT POWERPORT CLEARVUE MOD/BUMP INTERMD 8F 45CM - [id number] Implant KT PRT POWERPORT CLEARVUE MOD/BUMP INTERMD 8F 45CM  [id number] 1 Implanted       Specimen:   none    Complications:   none    Dispo:   to PACU    Indication for procedure:   80 y.o. female with recurrent lung cancer in need of chemotherapy.  She presents for port placement.    Description of procedure:     The patient was taken to the operating room. Internal jugular vein on the Right was interrogated with an ultrasound which appeared to be adequate for port placement. Surgical sites were prepped and draped in the usual sterile fashion. A full surgical timeout was performed. Under ultrasound guidance, the Right internal jugular vein was accessed after local anesthetic was infiltrated into the surgical field. Wire was placed under fluoroscopic guidance into the inferior vena cava. A small incision was made 3 fingerbreadths below the clavicular margin. Bovie electrocautery was used to dissect down to the level of the investing fascia of the chest.  Subcutaneous pocket was made in the inferior aspect of the incision to accommodate the port.  Port catheter was tunneled through the incision connecting the infraclavicular incision with the jugular stab incision. The dilator was used and this was advanced under fluoroscopic guidance down into the right atrium. Dilator and wire were removed, leaving behind the peel-away sheath.  End of the catheter was cut. This was placed through the introducer down to the right ventricle under fluoroscopic guidance. After the peel-away sheath was removed, the catheter was backed up under fluroscopic guidance to the junction of the superior vena cava in the right atrium. Catheter was marked and cut in the infraclavicular incision and attached to the PowerPort with the supplied connectors; 3-0 Prolene sutures were used to attach the port to the investing fascia.  Sutures secured to the investing fascia of the chest. Meticulous hemostasis was obtained throughout. Deep tissues were closed using 3-0 Vicryl suture and the skin was then closed using a 4-0 Vicryl suture in a subcuticular fashion. Sterile dressing was performed with Dermabond. Patient awoke from anesthesia, was transferred to outpatient recovery where a chest x-ray will be obtained.     Saloni Verdugo MD  05/01/25    Active Hospital Problems    Diagnosis  POA    **NSCLC of left lung [C34.92]  Unknown      Resolved Hospital Problems   No resolved problems to display.

## 2025-05-01 NOTE — ANESTHESIA PROCEDURE NOTES
Airway  Reason: elective    Date/Time: 5/1/2025 9:40 AM    General Information and Staff    Patient location during procedure: OR  Anesthesiologist: Javad Morgan MD  CRNA/CAA: Amie Chow CRNA    Indications and Patient Condition  Indications for airway management: airway protection    Preoxygenated: yes    Mask difficulty assessment: 0 - not attempted    Final Airway Details    Final airway type: supraglottic airway      Successful airway: unique  Size: 4   Number of attempts at approach: 1  Assessment: lips, teeth, and gum same as pre-op

## 2025-05-01 NOTE — ANESTHESIA PREPROCEDURE EVALUATION
Anesthesia Evaluation     NPO Solid Status: > 8 hours             Airway   Mallampati: II  TM distance: >3 FB  Neck ROM: full  Dental    (+) upper dentures and lower dentures    Pulmonary - normal exam   (+) lung cancer,  Cardiovascular - normal exam    (+) hypertension, valvular problems/murmurs (2023 echo normal EF, mild aortic stenosis) AS, hyperlipidemia      Neuro/Psych  GI/Hepatic/Renal/Endo    (+) hiatal hernia    Musculoskeletal     Abdominal    Substance History      OB/GYN          Other                    Anesthesia Plan    ASA 3     MAC     (Explained to patient that we'd remove her dentures if needed to place LMA or ETT if GA required.)    Anesthetic plan, risks, benefits, and alternatives have been provided, discussed and informed consent has been obtained with: patient.    CODE STATUS:

## 2025-05-01 NOTE — ANESTHESIA POSTPROCEDURE EVALUATION
Patient: Ada N Sample    Procedure Summary       Date: 05/01/25 Room / Location: [facility] OR  / [facility] MAIN OR    Anesthesia Start: 0928 Anesthesia Stop: 1039    Procedure: INSERTION VENOUS ACCESS DEVICE (Right) Diagnosis:       NSCLC of left lung      (NSCLC of left lung [C34.92])    Surgeons: Saloni Verdugo MD Provider: Javad Morgan MD    Anesthesia Type: general ASA Status: 3            Anesthesia Type: general    Vitals  Vitals Value Taken Time   /108 05/01/25 11:30   Temp 36.4 °C (97.6 °F) 05/01/25 10:45   Pulse 58 05/01/25 11:35   Resp 16 05/01/25 11:15   SpO2 99 % 05/01/25 11:35   Vitals shown include unfiled device data.        Post Anesthesia Care and Evaluation    Anesthetic complications: No anesthetic complications

## 2025-05-05 LAB
DX PRELIMINARY: NORMAL
LAB AP CASE REPORT: NORMAL
LAB AP CLINICAL INFORMATION: NORMAL
LAB AP DIAGNOSIS COMMENT: NORMAL
LAB AP SPECIAL STAINS: NORMAL
Lab: NORMAL
PATH REPORT.ADDENDUM SPEC: NORMAL
PATH REPORT.FINAL DX SPEC: NORMAL
PATH REPORT.GROSS SPEC: NORMAL

## 2025-05-07 ENCOUNTER — INFUSION (OUTPATIENT)
Dept: ONCOLOGY | Facility: HOSPITAL | Age: 80
End: 2025-05-07
Payer: MEDICARE

## 2025-05-07 ENCOUNTER — APPOINTMENT (OUTPATIENT)
Dept: ONCOLOGY | Facility: HOSPITAL | Age: 80
End: 2025-05-07
Payer: MEDICARE

## 2025-05-07 ENCOUNTER — OFFICE VISIT (OUTPATIENT)
Dept: ONCOLOGY | Facility: CLINIC | Age: 80
End: 2025-05-07
Payer: MEDICARE

## 2025-05-07 VITALS
HEIGHT: 61 IN | WEIGHT: 152.9 LBS | DIASTOLIC BLOOD PRESSURE: 77 MMHG | HEART RATE: 59 BPM | TEMPERATURE: 97.5 F | OXYGEN SATURATION: 97 % | SYSTOLIC BLOOD PRESSURE: 158 MMHG | BODY MASS INDEX: 28.87 KG/M2

## 2025-05-07 DIAGNOSIS — C34.92 NSCLC OF LEFT LUNG: ICD-10-CM

## 2025-05-07 DIAGNOSIS — C34.92 NSCLC OF LEFT LUNG: Primary | ICD-10-CM

## 2025-05-07 PROBLEM — Z45.2 FITTING AND ADJUSTMENT OF VASCULAR CATHETER: Status: ACTIVE | Noted: 2025-05-07

## 2025-05-07 LAB
ALBUMIN SERPL-MCNC: 4.2 G/DL (ref 3.5–5.2)
ALBUMIN/GLOB SERPL: 1.1 G/DL
ALP SERPL-CCNC: 123 U/L (ref 39–117)
ALT SERPL W P-5'-P-CCNC: 22 U/L (ref 1–33)
ANION GAP SERPL CALCULATED.3IONS-SCNC: 10.7 MMOL/L (ref 5–15)
AST SERPL-CCNC: 31 U/L (ref 1–32)
BASOPHILS # BLD AUTO: 0.03 10*3/MM3 (ref 0–0.2)
BASOPHILS NFR BLD AUTO: 0.7 % (ref 0–1.5)
BILIRUB SERPL-MCNC: 0.6 MG/DL (ref 0–1.2)
BUN SERPL-MCNC: 27 MG/DL (ref 8–23)
BUN/CREAT SERPL: 19.3 (ref 7–25)
CALCIUM SPEC-SCNC: 10.2 MG/DL (ref 8.6–10.5)
CHLORIDE SERPL-SCNC: 98 MMOL/L (ref 98–107)
CO2 SERPL-SCNC: 26.3 MMOL/L (ref 22–29)
CREAT SERPL-MCNC: 1.4 MG/DL (ref 0.57–1)
DEPRECATED RDW RBC AUTO: 47.8 FL (ref 37–54)
EGFRCR SERPLBLD CKD-EPI 2021: 37.6 ML/MIN/1.73
EOSINOPHIL # BLD AUTO: 0.07 10*3/MM3 (ref 0–0.4)
EOSINOPHIL NFR BLD AUTO: 1.7 % (ref 0.3–6.2)
ERYTHROCYTE [DISTWIDTH] IN BLOOD BY AUTOMATED COUNT: 12.8 % (ref 12.3–15.4)
GLOBULIN UR ELPH-MCNC: 3.8 GM/DL
GLUCOSE SERPL-MCNC: 104 MG/DL (ref 65–99)
HCT VFR BLD AUTO: 33.6 % (ref 34–46.6)
HGB BLD-MCNC: 11.1 G/DL (ref 12–15.9)
IMM GRANULOCYTES # BLD AUTO: 0.01 10*3/MM3 (ref 0–0.05)
IMM GRANULOCYTES NFR BLD AUTO: 0.2 % (ref 0–0.5)
LYMPHOCYTES # BLD AUTO: 1.56 10*3/MM3 (ref 0.7–3.1)
LYMPHOCYTES NFR BLD AUTO: 38.9 % (ref 19.6–45.3)
MCH RBC QN AUTO: 34 PG (ref 26.6–33)
MCHC RBC AUTO-ENTMCNC: 33 G/DL (ref 31.5–35.7)
MCV RBC AUTO: 103.1 FL (ref 79–97)
MONOCYTES # BLD AUTO: 0.4 10*3/MM3 (ref 0.1–0.9)
MONOCYTES NFR BLD AUTO: 10 % (ref 5–12)
NEUTROPHILS NFR BLD AUTO: 1.94 10*3/MM3 (ref 1.7–7)
NEUTROPHILS NFR BLD AUTO: 48.5 % (ref 42.7–76)
NRBC BLD AUTO-RTO: 0 /100 WBC (ref 0–0.2)
PLATELET # BLD AUTO: 92 10*3/MM3 (ref 140–450)
PMV BLD AUTO: 9.5 FL (ref 6–12)
POTASSIUM SERPL-SCNC: 4.6 MMOL/L (ref 3.5–5.2)
PROT SERPL-MCNC: 8 G/DL (ref 6–8.5)
RBC # BLD AUTO: 3.26 10*6/MM3 (ref 3.77–5.28)
SODIUM SERPL-SCNC: 135 MMOL/L (ref 136–145)
WBC NRBC COR # BLD AUTO: 4.01 10*3/MM3 (ref 3.4–10.8)

## 2025-05-07 PROCEDURE — 85025 COMPLETE CBC W/AUTO DIFF WBC: CPT

## 2025-05-07 PROCEDURE — 96409 CHEMO IV PUSH SNGL DRUG: CPT

## 2025-05-07 PROCEDURE — 25010000002 PEMETREXED 100 MG RECONSTITUTED SOLUTION 1 EACH VIAL: Performed by: INTERNAL MEDICINE

## 2025-05-07 PROCEDURE — 3077F SYST BP >= 140 MM HG: CPT | Performed by: INTERNAL MEDICINE

## 2025-05-07 PROCEDURE — 25010000002 PEMETREXED PER 10 MG: Performed by: INTERNAL MEDICINE

## 2025-05-07 PROCEDURE — 99214 OFFICE O/P EST MOD 30 MIN: CPT | Performed by: INTERNAL MEDICINE

## 2025-05-07 PROCEDURE — 3078F DIAST BP <80 MM HG: CPT | Performed by: INTERNAL MEDICINE

## 2025-05-07 PROCEDURE — 1126F AMNT PAIN NOTED NONE PRSNT: CPT | Performed by: INTERNAL MEDICINE

## 2025-05-07 PROCEDURE — 80053 COMPREHEN METABOLIC PANEL: CPT

## 2025-05-07 RX ORDER — SODIUM CHLORIDE 9 MG/ML
20 INJECTION, SOLUTION INTRAVENOUS ONCE
Status: DISCONTINUED | OUTPATIENT
Start: 2025-05-07 | End: 2025-05-07 | Stop reason: HOSPADM

## 2025-05-07 RX ORDER — SODIUM CHLORIDE 9 MG/ML
20 INJECTION, SOLUTION INTRAVENOUS ONCE
Status: CANCELLED | OUTPATIENT
Start: 2025-05-07

## 2025-05-07 RX ORDER — HEPARIN SODIUM (PORCINE) LOCK FLUSH IV SOLN 100 UNIT/ML 100 UNIT/ML
500 SOLUTION INTRAVENOUS AS NEEDED
OUTPATIENT
Start: 2025-05-07

## 2025-05-07 RX ORDER — SODIUM CHLORIDE 0.9 % (FLUSH) 0.9 %
10 SYRINGE (ML) INJECTION AS NEEDED
OUTPATIENT
Start: 2025-05-07

## 2025-05-07 RX ADMIN — PEMETREXED DISODIUM 630 MG: 500 INJECTION, POWDER, LYOPHILIZED, FOR SOLUTION INTRAVENOUS at 13:49

## 2025-05-07 NOTE — PROGRESS NOTES
REASON FOR FOLLOW-UP: Left lower lobe lung mass-squamous of carcinoma                               History of Present Illness     The patient is an 80-year-old female with below medical history recently admitted 12/4 - 6/20/2023 with a history of right intertrochanteric femoral fracture and osteoarthritis presenting from a fall at home.  She had a stress fracture left foot that affected her balance, chronic low back pain that is also worsened with a fall.  Upon presentation orthopedic surgery was consulted finding a right intramedullary nail fixation and healing without abnormal findings.  No additional surgery was planned though physical therapy continued.  Spinal films demonstrated compression deformity T12 and L2 but fortunately she did further improve and was able to be discharged.    The patient had follow-up with several physicians including orthopedic physicians with an eventual chest exam that revealed an unexpected nodularity.  She was then referred to pulmonary medicine 1/8/2024 with a history of smoking for 35 years quitting 8 years ago and indication that there was a lung nodule found incidentally on imaging for the spine.  Through the [facility] system a CT of the chest demonstrated a 2.0 x 1.9 cm noncalcified nodule involving the posterior medial aspect left lower lobe abutting the pleura, more smoothly marginated 0.9 cm noncalcified right middle lobe nodule and a few additional smaller noncalcified nodules including 2 adjacent nodules measuring less than 0.4 cm the right middle lobe along with mild emphysema with mild to moderate subpleural and bibasilar scarring.  There is no pleural effusion or pneumothorax.    A PET/CT was obtained 2/6/2024 with a solid subpleural irregular nodule left lower lobe medially measuring 1.7 x 1.8 intensely hypermetabolic with SUV of 12.8, no additional nodular mass, had metabolic right superior mediastinal lymph node lateral measuring 0.9 cm to 3.9, diffuse lower  lobe tracer uptake with mediastinal right hilar lymph nodes partially calcified thought to be inflammatory or granulomatous disease.  There is also low-grade tracer uptake associated subacute healing left anterior rib fractures.    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    The patient was referred urgently from [facility] for assessment.  It is not certain why she has not been seen in the [place] system though she now states that she wished to be seen here.     Patient's case discussed during thoracic conference with the finding of a right supraclavicular node.  This was unknown to us when seen her initially in consultation.  Request made to obtain ultrasound-guided biopsy of the right supraclavicular node and this is discussed with the patient's daughter who indicates the patient would be willing to proceed.  We will go and have this scheduled even though she is to be seen within the next week and the result may not yet be available.    3/6/2023 FNA obtained, discussed with IR physician Dr. Courtney with findings of a few atypical clusters present suspicious for involvement by non-small cell carcinoma.  He is offered to rebiopsy quickly if necessary.    The patient is seen 3/13/2024 with rebiopsy requested as well as radiation therapy consultation.  The case is discussed with interventional radiology and a repeat biopsy is possible.  The patient is willing to proceed in this fashion and we have also discussed radiation therapy consultation in the interval.  She continues have symptoms of chronic back pain and left lid lesion that is going to be reviewed by ophthalmology.    The patient went on to have a follow-up exam with repeat right supraclavicular lymph node biopsy 3/18/2024.  This was found positive for metastatic carcinoma.  The patient was seen by Dr. Hopkins with the conclusion  that she has a 2 cm left lower lobe squamous cell carcinoma abutting the pleura additional contralateral supraclavicular lymph node in the thyroid.  This is to be discussed in conference 3/21/2024.    This was discussed 3/21/2024 recognizing the patient's frailty.  Options for treating the areas that are known positive including the left lower lobe and right supraclavicular node would include radiation therapy and Dr. Hopkins will see the patient concerning this as we also follow her for consideration of systemic therapy including a [id number] considering the genomic studies done thus far and the inability to obtain additional genomics from the scant tissue that we have even from recent biopsy.  Finally the patient's overall status including an older adult assessment is going to be requested.    The patient's [id number] revealed NF1  detected with possible indication of Selumetinib effectiveness.  Additionally TMB was 10.4 mutations per megabase.  Concurrently the patient was seen by radiation therapy treating the left lower lobe and right neck site via SBRT.  Further MRI of the brain revealed no evidence of metastatic disease.    She is seen back 4/11/2024.  She had been seen 4/8/2024 with SBRT to left lower lobe and right neck lesion.  This has not been completed and she is to follow-up with radiation therapy mid July.  We have discussed her findings today with the patient seen along with her granddaughter.  She did well with radiation therapy and has no additional symptoms currently respiratorily.  Her genomic testing suggested possible use of Selumetinib (not currently available) though we will follow this as she is subsequently reevaluated.    A follow-up CT series was obtained 7/1/2024.  This reveals a slight decrease in the hypermetabolic right supraclavicular node measuring 0.9 x 0.7 cm previously 1.2 x 1.1 cm.  In the chest there is decrease in the mass in the posterior medial aspect left lower lobe and  evidence of radiation pneumonitis adjacently.  Margins are difficult to appreciate completely but maximal diameter is 2 cm previously 2.2 cm as compared to previous.  There is a pleural-based 1.0 x 1.6 cm nodule medial aspect of the right lower lobe which which was less conspicuous previously, multiple nodule opacities right middle lobe that are larger and a 1.3 cm density possibly mucous plugging otherwise seen.  These findings suggest a follow-up series of scans will still be necessary.    The patient is seen with her daughter and they both agree with this follow-up plan.    The patient required admission 10/7 - 9/20/2024 after mechanical fall with T12-L2 compression fractures treated conservatively.    The patient's follow-up exams obtained 10/4/2024.  She is seen back 10/11/2024 and there is serial decrease in the small right supraclavicular node when compared to previous now down to 7 x 5 mm, evidence of consolidation left lower lobe with tumor developed likely seen measuring 16 x 2 cm, 2 right middle lobe nodules-19 x 13 mm previously 15 x 10, adjacent 9 mm previously 4 to 5 mm and unchanged third right middle lobe noncalcified nodule, other nodule stable and there is no evidence of abdominal/pelvic or osseous metastasis.    The patient is seen 10/11/2024 slowly recovering from the mechanical fall described above.  She does not require additional treatment at this point after review of the scans with follow-up exams again are anticipated and she is willing to proceed.    The patient is seen 4/9/2025 and evaluated by CT of soft tissue neck, chest, abdomen, pelvis performed 4/2 2025.  There is now seen increase in the right middle lobe cavitary thick-walled nodule measuring 3.2 x 2.6 compared to previous 2.1 x 1.4.  Other sites are stable and no additional lesions have developed.  The patient is seen with her daughter both indicating that she is still able to carry out daily activities without severe difficulty  "though she \"has slowed up a bit\".  He does have back pain periodically that improves with rest and does not notice worsening respiratory symptoms.  She may well have disease that would be amenable to local therapy and it is requested that she undergo a PET/CT.    PET/CT demonstrates left lower lobe radiation changes, right middle lobe hypermetabolic 3.1 x 2.3 central cavitary mass that is larger from previous, hypermetabolic right upper lobe 1 cm solid nodule unchanged adjacent right middle lobe 9 mm solid nodule as well as hypermetabolic right hilar and low paratracheal lymphadenopathy concerning for osiel metastatic disease.    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  Radiation therapy would not be expected to control this disease and systemic therapy is going to be necessary.  Considering the tissue type she would be a candidate for Alimta single agent at this point.    We have proceeded to have the patient's tumor further assessed by Caris examination with insufficient tumor in the block.  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   it was recognized the patient's clearance is somewhat marginal and her dose was adjusted down by 25%.  She underwent port placement 5/1/2025 and is seen back in office 5/7/2025 to continue with therapy.  She is agreeable to do so.       Past Surgical History:   Procedure Laterality Date    APPENDECTOMY      CARDIAC CATHETERIZATION      EXTERNAL EAR SURGERY Right     had tumor excised behind right ear lobe benign    EYE LESION EXCISION Left 6/6/2024    Procedure: LEFT LOWER EYELID BASEL CELL CARCINOMA EXCISIONAL AND REPAIR WITH FROZEN SECTIONS,;  Surgeon: Austin Kamara MD;  Location: [facility];  Service: Ophthalmology;  Laterality: Left;    FEMUR IM NAILING/RODDING Right 06/15/2022    Procedure: RIGHT HIP INTRAMEDULLARY " NAILING/RODDING;  Surgeon: Marc Crawford MD;  Location: [facility] MAIN OR;  Service: Orthopedics;  Laterality: Right;    GALLBLADDER SURGERY      HYSTERECTOMY      KNEE ARTHROPLASTY Right     KNEE ARTHROSCOPY Right     MT REVJ TOTAL KNEE ARTHRP W/WO ALGRFT 1 COMPONENT Right 09/07/2016    Procedure: RT ILIOTIBIAL BAND RELEASE RT TOTAL KNEE POLY EXCHANGE;  Surgeon: Toy Adame MD;  Location: [facility] MAIN OR;  Service: Orthopedics    ROTATOR CUFF REPAIR Bilateral     US GUIDED LYMPH NODE BIOPSY  3/18/2024    VENOUS ACCESS DEVICE (PORT) INSERTION Right 5/1/2025    Procedure: INSERTION VENOUS ACCESS DEVICE;  Surgeon: Saloni Vredugo MD;  Location: [facility] MAIN OR;  Service: Vascular;  Laterality: Right;    WRIST MASS EXCISION Right         Current Outpatient Medications on File Prior to Visit   Medication Sig Dispense Refill    albuterol (PROVENTIL HFA;VENTOLIN HFA) 108 (90 BASE) MCG/ACT inhaler Inhale 2 puffs Every 4 (Four) Hours As Needed. Indications: Asthma      allopurinol (ZYLOPRIM) 100 MG tablet Take 1 tablet by mouth Daily.      bisacodyl (DULCOLAX) 5 MG EC tablet Take 1 tablet by mouth Daily As Needed for Constipation (Use if polyethylene glycol is ineffective).      dexAMETHasone (DECADRON) 4 MG tablet Take 1 tablet oral twice a day for 3 consecutive days beginning the day before chemotherapy and continue for 6 doses.Take with food. 30 tablet 2    fluticasone (FLONASE) 50 MCG/ACT nasal spray Administer 2 sprays into the nostril(s) as directed by provider Daily.      folic acid (FOLVITE) 1 MG tablet Take 1 tablet by mouth Daily. Start at least 7 days prior to chemotherapy until at least 3 weeks after all chemotherapy. 30 tablet 6    furosemide (LASIX) 40 MG tablet Take 1 tablet by mouth 2 (Two) Times a Day.      lidocaine-prilocaine (EMLA) 2.5-2.5 % cream Apply 1 Application topically to the appropriate area as directed Every 2 (Two) Hours As Needed for Mild Pain. Apply a nickel size amount to port 30 minutes  prior to access. 30 g 2    Magnesium 500 MG capsule Take 500 mg by mouth 2 (Two) Times a Day. Indications: Acid Indigestion      methocarbamol (ROBAXIN) 500 MG tablet Take 1 tablet by mouth Every 8 (Eight) Hours As Needed for Muscle Spasms (back pain). 30 tablet 0    metoprolol tartrate (LOPRESSOR) 25 MG tablet Take 1 tablet by mouth 2 (Two) Times a Day. Indications: High Blood Pressure      naloxone (NARCAN) 4 MG/0.1ML nasal spray Call 911. Don't prime. [place] in 1 nostril for overdose. Repeat in 2-3 minutes in other nostril if no or minimal breathing/responsiveness. 2 each 0    omeprazole (PriLOSEC) 40 MG capsule Take 1 capsule by mouth Every Morning. Indications: Heartburn      ondansetron (ZOFRAN) 8 MG tablet Take 1 tablet by mouth 3 (Three) Times a Day As Needed for Nausea or Vomiting. 30 tablet 5    polyethylene glycol (MIRALAX) 17 g packet Take 17 g by mouth Daily As Needed (Use if senna-docusate is ineffective).      potassium chloride (K-DUR,KLOR-CON) 20 MEQ CR tablet Take 1 tablet by mouth 2 (Two) Times a Day.      rOPINIRole (REQUIP) 0.5 MG tablet Take 1 tablet by mouth Every Night.      sennosides-docusate sodium (SENOKOT-S) 8.6-50 MG tablet Take 2 tablets by mouth 2 (two) times a day. 100 tablet 1    temazepam (RESTORIL) 15 MG capsule Take 1 capsule by mouth At Night As Needed for Sleep.      traMADol (Ultram) 50 MG tablet Take 1 tablet by mouth Every 12 (Twelve) Hours As Needed for Severe Pain for up to 10 doses. 10 tablet 0    vitamin B-12 (CYANOCOBALAMIN) 1000 MCG tablet Take 1 tablet by mouth Daily. Indications: Inadequate Vitamin B12      vitamin D (ERGOCALCIFEROL) 1.25 MG (05426 UT) capsule capsule Take 1 capsule by mouth Every 7 (Seven) Days. 5 capsule 0    [] acetaminophen (TYLENOL) 500 MG tablet Take 2 tablets by mouth Every 8 (Eight) Hours for 5 days. 30 tablet 0     Current Facility-Administered Medications on File Prior to Visit   Medication Dose Route Frequency Provider Last Rate Last  "Admin    cyanocobalamin injection 1,000 mcg  1,000 mcg Intramuscular Q28 Days Gali Marshall, APRN   1,000 mcg at 04/30/25 1206        ALLERGIES:    Allergies   Allergen Reactions    Levaquin [Levofloxacin] Hives    Zocor [Simvastatin] Myalgia    Atorvastatin Other (See Comments)    Codeine GI Intolerance    Penicillins Hives and Swelling     Pt reports tongue swelling    Percocet [Oxycodone-Acetaminophen] Itching and Nausea And Vomiting        Social History     Socioeconomic History    Marital status:      Spouse name: Nabil   Tobacco Use    Smoking status: Former     Current packs/day: 1.00     Average packs/day: 1 pack/day for 30.0 years (30.0 ttl pk-yrs)     Types: Cigarettes     Passive exposure: Past    Smokeless tobacco: Never   Vaping Use    Vaping status: Never Used   Substance and Sexual Activity    Alcohol use: Yes     Comment: 1 wine drink q 3-4 months    Drug use: Never    Sexual activity: Defer        Family History   Problem Relation Age of Onset    Hypertension Mother     Heart failure Mother     Heart attack Maternal Grandfather     Malig Hyperthermia Neg Hx         Review of Systems   Constitutional:  Positive for activity change. Negative for appetite change, fatigue and unexpected weight change.   HENT: Negative.     Eyes: Negative.    Respiratory:  Positive for shortness of breath (Chronic).    Cardiovascular:  Negative for chest pain.   Gastrointestinal: Negative.    Endocrine: Negative.    Genitourinary: Negative.    Musculoskeletal:  Positive for back pain (Acute on chronic back pain).   Neurological: Negative.    Psychiatric/Behavioral: Negative.          Objective     Vitals:    05/07/25 1145   BP: 158/77   Pulse: 59   Temp: 97.5 °F (36.4 °C)   TempSrc: Oral   SpO2: 97%   Weight: 69.4 kg (152 lb 14.4 oz)   Height: 154 cm (60.63\")   PainSc: 0-No pain             5/7/2025    11:45 AM   Current Status   ECOG score 0       Physical Exam  Constitutional:       Appearance: " Normal appearance.   HENT:      Head: Normocephalic and atraumatic.      Mouth/Throat:      Mouth: Mucous membranes are moist.      Pharynx: Oropharynx is clear.   Eyes:      Extraocular Movements: Extraocular movements intact.      Conjunctiva/sclera: Conjunctivae normal.      Pupils: Pupils are equal, round, and reactive to light.      Comments: Left lid nodular lesion   Cardiovascular:      Rate and Rhythm: Normal rate and regular rhythm.      Pulses: Normal pulses.      Heart sounds: Normal heart sounds.   Pulmonary:      Effort: Pulmonary effort is normal.      Breath sounds: Normal breath sounds.   Abdominal:      General: Bowel sounds are normal.      Palpations: Abdomen is soft.   Musculoskeletal:         General: Normal range of motion.      Cervical back: Normal range of motion and neck supple.   Skin:     General: Skin is warm and dry.   Neurological:      General: No focal deficit present.      Mental Status: She is alert and oriented to person, place, and time.   Psychiatric:         Mood and Affect: Mood normal.         Behavior: Behavior normal.           RECENT LABS:  Hematology WBC   Date Value Ref Range Status   05/07/2025 4.01 3.40 - 10.80 10*3/mm3 Final   02/06/2024 4.15 (L) 4.5 - 11.0 10*3/uL Final     RBC   Date Value Ref Range Status   05/07/2025 3.26 (L) 3.77 - 5.28 10*6/mm3 Final   02/06/2024 3.70 (L) 4.0 - 5.2 10*6/uL Final     Hemoglobin   Date Value Ref Range Status   05/07/2025 11.1 (L) 12.0 - 15.9 g/dL Final   02/06/2024 12.4 12.0 - 16.0 g/dL Final     Hematocrit   Date Value Ref Range Status   05/07/2025 33.6 (L) 34.0 - 46.6 % Final   02/06/2024 36.6 36.0 - 46.0 % Final     Platelets   Date Value Ref Range Status   05/07/2025 92 (L) 140 - 450 10*3/mm3 Final   02/06/2024 77 (L) 140 - 440 10*3/uL Final          Assessment & Plan      80 year-old female with a orthopedic history as described in particular involving right hip, bilateral knees and bilateral rotator cuffs recently falling  and sustaining a contusion involving her right hip and evidence of compression deformities T12 and L2.  Upon discharge she was seen by orthopedics and, unexpectedly, on additional back studies a left lung nodule was determined.    This was reviewed by pulmonary medicine at [facility] leading to a  a CT of the chest which demonstrated a 2.0 x 1.9 cm noncalcified nodule involving the posterior medial aspect left lower lobe abutting the pleura, more smoothly marginated 0.9 cm noncalcified right middle lobe nodule and a few additional smaller noncalcified nodules including 2 adjacent nodules measuring less than 0.4 cm the right middle lobe along with mild emphysema with mild to moderate subpleural and bibasilar scarring.  There is no pleural effusion or pneumothorax.    A PET/CT was obtained 2/6/2024 with a solid subpleural irregular nodule left lower lobe medially measuring 1.7 x 1.8 intensely hypermetabolic with SUV of 12.8, no additional nodular mass, had metabolic right superior mediastinal lymph node lateral measuring 0.9 cm to 3.9, diffuse lower lobe tracer uptake with mediastinal right hilar lymph nodes partially calcified thought to be inflammatory or granulomatous disease.  There is also low-grade tracer uptake associated subacute healing left anterior rib fractures.    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    The patient is now seen in CBC office.  There has not been assessment by surgery or, apparently, by pulmonary medicine formally though the studies above are available for review.  After discussion we will need to have her presented in the thoracic conference so that opinions from radiology, thoracic surgery radiation therapy as well as pathology can be obtained.    Patient's case discussed during thoracic conference with the finding of a right supraclavicular node.  This  was unknown to us when seen her initially in consultation.  Request made to obtain ultrasound-guided biopsy of the right supraclavicular node and this is discussed with the patient's daughter who indicates the patient would be willing to proceed.  We will go and have this scheduled even though she is to be seen within the next week and the result may not yet be available.    3/6/2023 FNA obtained, discussed with IR physician Dr. Courtney with findings of a few atypical clusters present suspicious for involvement by non-small cell carcinoma.  He is offered to rebiopsy quickly if necessary.    The patient is seen 3/13/2024 with rebiopsy requested as well as radiation therapy consultation.  The case is discussed with interventional radiology and a repeat biopsy is possible.  The patient is willing to proceed in this fashion and we have also discussed radiation therapy consultation in the interval.  She continues have symptoms of chronic back pain and left lid lesion that is going to be reviewed by ophthalmology.    This was discussed 3/21/2024 recognizing the patient's frailty.  Options for treating the areas that are known positive including the left lower lobe and right supraclavicular node would include radiation therapy and Dr. Hopkins will see the patient concerning this as we also follow her for consideration of systemic therapy including a [id number] considering the genomic studies done thus far and the inability to obtain additional genomics from the scant tissue that we have even from recent biopsy.  Finally the patient's overall status including an older adult assessment is going to be requested.    She is seen back 4/11/2024.  She had been seen 4/8/2024 with SBRT to left lower lobe and right neck lesion.  This has not been completed and she is to follow-up with radiation therapy mid July.  We have discussed her findings today with the patient seen along with her granddaughter.  She did well with radiation  therapy and has no additional symptoms currently respiratorily.    Her genomic testing suggested possible use of Selumetinib (not currently available) though we will follow this as she is subsequently reevaluated.      A follow-up CT series was obtained 7/1/2024.  This reveals a slight decrease in the hypermetabolic right supraclavicular node measuring 0.9 x 0.7 cm previously 1.2 x 1.1 cm.  In the chest there is decrease in the mass in the posterior medial aspect left lower lobe and evidence of radiation pneumonitis adjacently.  Margins are difficult to appreciate completely but maximal diameter is 2 cm previously 2.2 cm as compared to previous.  There is a pleural-based 1.0 x 1.6 cm nodule medial aspect of the right lower lobe which which was less conspicuous previously, multiple nodule opacities right middle lobe that are larger and a 1.3 cm density possibly mucous plugging otherwise seen.  These findings suggest a follow-up series of scans will still be necessary.    The patient is seen in office 7/12/2024 with a reasonably stable performance status.  At this point no additional intervention is necessary but a follow-up scan is requested.    The patient required admission 10/7 - 9/20/2024 after mechanical fall with T12-L2 compression fractures treated conservatively.      The patient's follow-up exams obtained 10/4/2024.  She is seen back 10/11/2024 and there is serial decrease in the small right supraclavicular node when compared to previous now down to 7 x 5 mm, evidence of consolidation left lower lobe with tumor developed likely seen measuring 16 x 2 cm, 2 right middle lobe nodules-19 x 13 mm previously 15 x 10, adjacent 9 mm previously 4 to 5 mm and unchanged third right middle lobe noncalcified nodule, other nodule stable and there is no evidence of abdominal/pelvic or osseous metastasis.    The patient required admission 10/7 - 9/20/2024 after mechanical fall with T12-L2 compression fractures treated  "conservatively.    The patient's follow-up exams obtained 10/4/2024.  She is seen back 10/11/2024 and there is serial decrease in the small right supraclavicular node when compared to previous now down to 7 x 5 mm, evidence of consolidation left lower lobe with tumor developed likely seen measuring 16 x 2 cm, 2 right middle lobe nodules-19 x 13 mm previously 15 x 10, adjacent 9 mm previously 4 to 5 mm and unchanged third right middle lobe noncalcified nodule, other nodule stable and there is no evidence of abdominal/pelvic or osseous metastasis.    The patient is seen 10/11/2024 slowly recovering from the mechanical fall described above.  She does not require additional treatment at this point after review of the scans with follow-up exams again are anticipated and she is willing to proceed.     The patient is seen 4/9/2025 and evaluated by CT of soft tissue neck, chest, abdomen, pelvis performed 4/2 2025.  There is now seen increase in the right middle lobe cavitary thick-walled nodule measuring 3.2 x 2.6 compared to previous 2.1 x 1.4.  Other sites are stable and no additional lesions have developed.      The patient is seen with her daughter both indicating that she is still able to carry out daily activities without severe difficulty though she \"has slowed up a bit\".  He does have back pain periodically that improves with rest and does not notice worsening respiratory symptoms.  She may well have disease that would be amenable to local therapy and it is requested that she undergo a PET/CT.    PET/CT demonstrates left lower lobe radiation changes, right middle lobe hypermetabolic 3.1 x 2.3 central cavitary mass that is larger from previous, hypermetabolic right upper lobe 1 cm solid nodule unchanged adjacent right middle lobe 9 mm solid nodule as well as hypermetabolic right hilar and low paratracheal lymphadenopathy concerning for osiel metastatic disease.    These findings were discussed with the patient and " her daughter as consistent with recurrent disease that is now becoming progressive.  Radiation therapy would not be expected to control this disease and systemic therapy is going to be necessary.  Considering the tissue type she would be a candidate for Alimta single agent at this point.  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    We have proceeded to have the patient's tumor further assessed by Caris examination with insufficient tumor in the block.  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   it was recognized the patient's clearance is somewhat marginal and her dose was adjusted down by 25%.  She underwent port placement 5/1/2025 and is seen back in office 5/7/2025 to continue with therapy.  She is agreeable to do so.       *Proceed with day 1 cycle 1 Alimta at 25% dose reduction    *2 weeks NP, CBC, CMP, toxicity assessment

## 2025-05-07 NOTE — NURSING NOTE
Per Dr. Kavita shah to treat with plt count and crcl. Starting alimta at a 25% dose reduction due to elevated creat

## 2025-05-11 ENCOUNTER — HOSPITAL ENCOUNTER (INPATIENT)
Facility: HOSPITAL | Age: 80
LOS: 5 days | Discharge: HOME | DRG: 391 | End: 2025-05-18
Attending: EMERGENCY MEDICINE | Admitting: HOSPITALIST
Payer: MEDICARE

## 2025-05-11 ENCOUNTER — APPOINTMENT (OUTPATIENT)
Dept: CT IMAGING | Facility: HOSPITAL | Age: 80
DRG: 391 | End: 2025-05-11
Payer: MEDICARE

## 2025-05-11 DIAGNOSIS — D69.6 THROMBOCYTOPENIA: ICD-10-CM

## 2025-05-11 DIAGNOSIS — R93.89 ABNORMAL CT SCAN: ICD-10-CM

## 2025-05-11 DIAGNOSIS — K52.9 ENTERITIS: Primary | ICD-10-CM

## 2025-05-11 LAB
ADV 40+41 DNA STL QL NAA+NON-PROBE: NOT DETECTED
ALBUMIN SERPL-MCNC: 3.8 G/DL (ref 3.5–5.2)
ALBUMIN/GLOB SERPL: 0.9 G/DL
ALP SERPL-CCNC: 113 U/L (ref 39–117)
ALT SERPL W P-5'-P-CCNC: 21 U/L (ref 1–33)
ANION GAP SERPL CALCULATED.3IONS-SCNC: 14.9 MMOL/L (ref 5–15)
AST SERPL-CCNC: 31 U/L (ref 1–32)
ASTRO TYP 1-8 RNA STL QL NAA+NON-PROBE: NOT DETECTED
BACTERIA UR QL AUTO: NORMAL /HPF
BASOPHILS # BLD MANUAL: 0 10*3/MM3 (ref 0–0.2)
BASOPHILS NFR BLD MANUAL: 0 % (ref 0–1.5)
BILIRUB SERPL-MCNC: 0.5 MG/DL (ref 0–1.2)
BILIRUB UR QL STRIP: NEGATIVE
BUN SERPL-MCNC: 21 MG/DL (ref 8–23)
BUN/CREAT SERPL: 17.5 (ref 7–25)
C CAYETANENSIS DNA STL QL NAA+NON-PROBE: NOT DETECTED
C COLI+JEJ+UPSA DNA STL QL NAA+NON-PROBE: NOT DETECTED
C DIFF TOX GENS STL QL NAA+PROBE: NEGATIVE
CALCIUM SPEC-SCNC: 9.2 MG/DL (ref 8.6–10.5)
CHLORIDE SERPL-SCNC: 101 MMOL/L (ref 98–107)
CLARITY UR: CLEAR
CO2 SERPL-SCNC: 18.1 MMOL/L (ref 22–29)
COLOR UR: YELLOW
CREAT SERPL-MCNC: 1.2 MG/DL (ref 0.57–1)
CRYPTOSP DNA STL QL NAA+NON-PROBE: NOT DETECTED
D-LACTATE SERPL-SCNC: 1.1 MMOL/L (ref 0.5–2)
DEPRECATED RDW RBC AUTO: 46.3 FL (ref 37–54)
E HISTOLYT DNA STL QL NAA+NON-PROBE: NOT DETECTED
EAEC PAA PLAS AGGR+AATA ST NAA+NON-PRB: NOT DETECTED
EC STX1+STX2 GENES STL QL NAA+NON-PROBE: NOT DETECTED
EGFRCR SERPLBLD CKD-EPI 2021: 45.3 ML/MIN/1.73
EOSINOPHIL # BLD MANUAL: 0.04 10*3/MM3 (ref 0–0.4)
EOSINOPHIL NFR BLD MANUAL: 1 % (ref 0.3–6.2)
EPEC EAE GENE STL QL NAA+NON-PROBE: NOT DETECTED
ERYTHROCYTE [DISTWIDTH] IN BLOOD BY AUTOMATED COUNT: 12.3 % (ref 12.3–15.4)
ETEC LTA+ST1A+ST1B TOX ST NAA+NON-PROBE: NOT DETECTED
G LAMBLIA DNA STL QL NAA+NON-PROBE: NOT DETECTED
GLOBULIN UR ELPH-MCNC: 4.1 GM/DL
GLUCOSE SERPL-MCNC: 108 MG/DL (ref 65–99)
GLUCOSE UR STRIP-MCNC: NEGATIVE MG/DL
HCT VFR BLD AUTO: 36.6 % (ref 34–46.6)
HGB BLD-MCNC: 12.7 G/DL (ref 12–15.9)
HGB UR QL STRIP.AUTO: NEGATIVE
HOLD SPECIMEN: NORMAL
HOLD SPECIMEN: NORMAL
HYALINE CASTS UR QL AUTO: NORMAL /LPF
KETONES UR QL STRIP: ABNORMAL
LEUKOCYTE ESTERASE UR QL STRIP.AUTO: ABNORMAL
LIPASE SERPL-CCNC: 44 U/L (ref 13–60)
LYMPHOCYTES # BLD MANUAL: 0.45 10*3/MM3 (ref 0.7–3.1)
LYMPHOCYTES NFR BLD MANUAL: 1 % (ref 5–12)
MAGNESIUM SERPL-MCNC: 1.5 MG/DL (ref 1.6–2.4)
MCH RBC QN AUTO: 35.2 PG (ref 26.6–33)
MCHC RBC AUTO-ENTMCNC: 34.7 G/DL (ref 31.5–35.7)
MCV RBC AUTO: 101.4 FL (ref 79–97)
MONOCYTES # BLD: 0.04 10*3/MM3 (ref 0.1–0.9)
NEUTROPHILS # BLD AUTO: 3.9 10*3/MM3 (ref 1.7–7)
NEUTROPHILS NFR BLD MANUAL: 87.9 % (ref 42.7–76)
NITRITE UR QL STRIP: NEGATIVE
NOROVIRUS GI+II RNA STL QL NAA+NON-PROBE: DETECTED
P SHIGELLOIDES DNA STL QL NAA+NON-PROBE: NOT DETECTED
PH UR STRIP.AUTO: 5.5 [PH] (ref 5–8)
PLAT MORPH BLD: NORMAL
PLATELET # BLD AUTO: 41 10*3/MM3 (ref 140–450)
PMV BLD AUTO: 11.5 FL (ref 6–12)
POTASSIUM SERPL-SCNC: 4.6 MMOL/L (ref 3.5–5.2)
PROT SERPL-MCNC: 7.9 G/DL (ref 6–8.5)
PROT UR QL STRIP: NEGATIVE
RBC # BLD AUTO: 3.61 10*6/MM3 (ref 3.77–5.28)
RBC # UR STRIP: NORMAL /HPF
RBC MORPH BLD: NORMAL
REF LAB TEST METHOD: NORMAL
RVA RNA STL QL NAA+NON-PROBE: NOT DETECTED
S ENT+BONG DNA STL QL NAA+NON-PROBE: NOT DETECTED
SAPO I+II+IV+V RNA STL QL NAA+NON-PROBE: NOT DETECTED
SHIGELLA SP+EIEC IPAH ST NAA+NON-PROBE: NOT DETECTED
SODIUM SERPL-SCNC: 134 MMOL/L (ref 136–145)
SP GR UR STRIP: >1.03 (ref 1–1.03)
SQUAMOUS #/AREA URNS HPF: NORMAL /HPF
UROBILINOGEN UR QL STRIP: ABNORMAL
V CHOL+PARA+VUL DNA STL QL NAA+NON-PROBE: NOT DETECTED
V CHOLERAE DNA STL QL NAA+NON-PROBE: NOT DETECTED
VARIANT LYMPHS NFR BLD MANUAL: 10.1 % (ref 19.6–45.3)
WBC # UR STRIP: NORMAL /HPF
WBC MORPH BLD: NORMAL
WBC NRBC COR # BLD AUTO: 4.44 10*3/MM3 (ref 3.4–10.8)
WHOLE BLOOD HOLD COAG: NORMAL
WHOLE BLOOD HOLD SPECIMEN: NORMAL
Y ENTEROCOL DNA STL QL NAA+NON-PROBE: NOT DETECTED

## 2025-05-11 PROCEDURE — 74177 CT ABD & PELVIS W/CONTRAST: CPT

## 2025-05-11 PROCEDURE — 85007 BL SMEAR W/DIFF WBC COUNT: CPT | Performed by: PHYSICIAN ASSISTANT

## 2025-05-11 PROCEDURE — 83605 ASSAY OF LACTIC ACID: CPT | Performed by: PHYSICIAN ASSISTANT

## 2025-05-11 PROCEDURE — 81001 URINALYSIS AUTO W/SCOPE: CPT | Performed by: PHYSICIAN ASSISTANT

## 2025-05-11 PROCEDURE — G0378 HOSPITAL OBSERVATION PER HR: HCPCS

## 2025-05-11 PROCEDURE — 80053 COMPREHEN METABOLIC PANEL: CPT | Performed by: PHYSICIAN ASSISTANT

## 2025-05-11 PROCEDURE — 85025 COMPLETE CBC W/AUTO DIFF WBC: CPT | Performed by: PHYSICIAN ASSISTANT

## 2025-05-11 PROCEDURE — 87493 C DIFF AMPLIFIED PROBE: CPT | Performed by: PHYSICIAN ASSISTANT

## 2025-05-11 PROCEDURE — 87507 IADNA-DNA/RNA PROBE TQ 12-25: CPT | Performed by: EMERGENCY MEDICINE

## 2025-05-11 PROCEDURE — 25010000002 MAGNESIUM SULFATE 2 GM/50ML SOLUTION: Performed by: PHYSICIAN ASSISTANT

## 2025-05-11 PROCEDURE — 25010000002 ONDANSETRON PER 1 MG: Performed by: PHYSICIAN ASSISTANT

## 2025-05-11 PROCEDURE — 83690 ASSAY OF LIPASE: CPT | Performed by: PHYSICIAN ASSISTANT

## 2025-05-11 PROCEDURE — 99285 EMERGENCY DEPT VISIT HI MDM: CPT

## 2025-05-11 PROCEDURE — 25810000003 SODIUM CHLORIDE 0.9 % SOLUTION: Performed by: EMERGENCY MEDICINE

## 2025-05-11 PROCEDURE — 25010000002 MORPHINE PER 10 MG: Performed by: EMERGENCY MEDICINE

## 2025-05-11 PROCEDURE — 25010000002 MAGNESIUM SULFATE 2 GM/50ML SOLUTION: Performed by: EMERGENCY MEDICINE

## 2025-05-11 PROCEDURE — 83735 ASSAY OF MAGNESIUM: CPT | Performed by: EMERGENCY MEDICINE

## 2025-05-11 PROCEDURE — 25510000001 IOPAMIDOL 61 % SOLUTION: Performed by: EMERGENCY MEDICINE

## 2025-05-11 PROCEDURE — 25810000003 SODIUM CHLORIDE 0.9 % SOLUTION: Performed by: PHYSICIAN ASSISTANT

## 2025-05-11 PROCEDURE — 63710000001 ONDANSETRON ODT 4 MG TABLET DISPERSIBLE: Performed by: EMERGENCY MEDICINE

## 2025-05-11 RX ORDER — FOLIC ACID 1 MG/1
1 TABLET ORAL DAILY
Status: DISCONTINUED | OUTPATIENT
Start: 2025-05-12 | End: 2025-05-18 | Stop reason: HOSPADM

## 2025-05-11 RX ORDER — SODIUM CHLORIDE 0.9 % (FLUSH) 0.9 %
10 SYRINGE (ML) INJECTION AS NEEDED
Status: DISCONTINUED | OUTPATIENT
Start: 2025-05-11 | End: 2025-05-18

## 2025-05-11 RX ORDER — ACETAMINOPHEN 160 MG/5ML
650 SOLUTION ORAL EVERY 4 HOURS PRN
Status: DISCONTINUED | OUTPATIENT
Start: 2025-05-11 | End: 2025-05-13

## 2025-05-11 RX ORDER — MORPHINE SULFATE 2 MG/ML
4 INJECTION, SOLUTION INTRAMUSCULAR; INTRAVENOUS ONCE
Status: COMPLETED | OUTPATIENT
Start: 2025-05-11 | End: 2025-05-11

## 2025-05-11 RX ORDER — ACETAMINOPHEN 325 MG/1
650 TABLET ORAL EVERY 4 HOURS PRN
Status: DISCONTINUED | OUTPATIENT
Start: 2025-05-11 | End: 2025-05-18

## 2025-05-11 RX ORDER — IOPAMIDOL 612 MG/ML
100 INJECTION, SOLUTION INTRAVASCULAR
Status: COMPLETED | OUTPATIENT
Start: 2025-05-11 | End: 2025-05-11

## 2025-05-11 RX ORDER — ACETAMINOPHEN 650 MG/1
650 SUPPOSITORY RECTAL EVERY 4 HOURS PRN
Status: DISCONTINUED | OUTPATIENT
Start: 2025-05-11 | End: 2025-05-13

## 2025-05-11 RX ORDER — ONDANSETRON 4 MG/1
4 TABLET, ORALLY DISINTEGRATING ORAL EVERY 6 HOURS PRN
Status: DISCONTINUED | OUTPATIENT
Start: 2025-05-11 | End: 2025-05-18

## 2025-05-11 RX ORDER — TRAMADOL HYDROCHLORIDE 50 MG/1
50 TABLET ORAL EVERY 12 HOURS PRN
Status: DISCONTINUED | OUTPATIENT
Start: 2025-05-11 | End: 2025-05-13

## 2025-05-11 RX ORDER — AMOXICILLIN 250 MG
2 CAPSULE ORAL 2 TIMES DAILY
Status: DISCONTINUED | OUTPATIENT
Start: 2025-05-11 | End: 2025-05-13

## 2025-05-11 RX ORDER — DICYCLOMINE HYDROCHLORIDE 10 MG/1
10 CAPSULE ORAL EVERY 8 HOURS PRN
Status: DISCONTINUED | OUTPATIENT
Start: 2025-05-11 | End: 2025-05-18

## 2025-05-11 RX ORDER — FLUTICASONE PROPIONATE 50 MCG
2 SPRAY, SUSPENSION (ML) NASAL DAILY
Status: DISCONTINUED | OUTPATIENT
Start: 2025-05-11 | End: 2025-05-13

## 2025-05-11 RX ORDER — DICYCLOMINE HYDROCHLORIDE 10 MG/1
20 CAPSULE ORAL ONCE
Status: COMPLETED | OUTPATIENT
Start: 2025-05-11 | End: 2025-05-11

## 2025-05-11 RX ORDER — MAGNESIUM SULFATE HEPTAHYDRATE 40 MG/ML
2 INJECTION, SOLUTION INTRAVENOUS
Status: COMPLETED | OUTPATIENT
Start: 2025-05-11 | End: 2025-05-11

## 2025-05-11 RX ORDER — ALLOPURINOL 100 MG/1
100 TABLET ORAL DAILY
Status: DISCONTINUED | OUTPATIENT
Start: 2025-05-12 | End: 2025-05-18 | Stop reason: HOSPADM

## 2025-05-11 RX ORDER — DICYCLOMINE HCL 20 MG
20 TABLET ORAL EVERY 6 HOURS
Qty: 20 TABLET | Refills: 0 | Status: SHIPPED | OUTPATIENT
Start: 2025-05-11

## 2025-05-11 RX ORDER — TEMAZEPAM 15 MG/1
15 CAPSULE ORAL NIGHTLY PRN
Status: DISCONTINUED | OUTPATIENT
Start: 2025-05-11 | End: 2025-05-18

## 2025-05-11 RX ORDER — MULTIVITAMIN WITH IRON
1000 TABLET ORAL DAILY
Status: DISCONTINUED | OUTPATIENT
Start: 2025-05-12 | End: 2025-05-18 | Stop reason: HOSPADM

## 2025-05-11 RX ORDER — ALBUTEROL SULFATE 0.83 MG/ML
2.5 SOLUTION RESPIRATORY (INHALATION) EVERY 6 HOURS PRN
Status: DISCONTINUED | OUTPATIENT
Start: 2025-05-11 | End: 2025-05-18

## 2025-05-11 RX ORDER — ONDANSETRON 2 MG/ML
4 INJECTION INTRAMUSCULAR; INTRAVENOUS EVERY 6 HOURS PRN
Status: DISCONTINUED | OUTPATIENT
Start: 2025-05-11 | End: 2025-05-18

## 2025-05-11 RX ORDER — SODIUM CHLORIDE 9 MG/ML
40 INJECTION, SOLUTION INTRAVENOUS AS NEEDED
Status: DISCONTINUED | OUTPATIENT
Start: 2025-05-11 | End: 2025-05-18

## 2025-05-11 RX ORDER — SODIUM CHLORIDE 9 MG/ML
75 INJECTION, SOLUTION INTRAVENOUS ONCE
Status: COMPLETED | OUTPATIENT
Start: 2025-05-11 | End: 2025-05-11

## 2025-05-11 RX ORDER — POLYETHYLENE GLYCOL 3350 17 G/17G
17 POWDER, FOR SOLUTION ORAL DAILY PRN
Status: DISCONTINUED | OUTPATIENT
Start: 2025-05-11 | End: 2025-05-13

## 2025-05-11 RX ORDER — PANTOPRAZOLE SODIUM 40 MG/1
40 TABLET, DELAYED RELEASE ORAL
Status: DISCONTINUED | OUTPATIENT
Start: 2025-05-12 | End: 2025-05-13

## 2025-05-11 RX ORDER — POTASSIUM CHLORIDE 1500 MG/1
20 TABLET, EXTENDED RELEASE ORAL 2 TIMES DAILY
Status: DISCONTINUED | OUTPATIENT
Start: 2025-05-11 | End: 2025-05-13

## 2025-05-11 RX ORDER — METHOCARBAMOL 500 MG/1
500 TABLET, FILM COATED ORAL EVERY 8 HOURS PRN
Status: DISCONTINUED | OUTPATIENT
Start: 2025-05-11 | End: 2025-05-18

## 2025-05-11 RX ORDER — MORPHINE SULFATE 2 MG/ML
4 INJECTION, SOLUTION INTRAMUSCULAR; INTRAVENOUS EVERY 4 HOURS PRN
Status: DISCONTINUED | OUTPATIENT
Start: 2025-05-11 | End: 2025-05-15

## 2025-05-11 RX ORDER — ERGOCALCIFEROL 1.25 MG/1
50000 CAPSULE, LIQUID FILLED ORAL
Status: DISCONTINUED | OUTPATIENT
Start: 2025-05-18 | End: 2025-05-13

## 2025-05-11 RX ORDER — METOPROLOL TARTRATE 25 MG/1
25 TABLET, FILM COATED ORAL EVERY 12 HOURS SCHEDULED
Status: DISCONTINUED | OUTPATIENT
Start: 2025-05-11 | End: 2025-05-18 | Stop reason: HOSPADM

## 2025-05-11 RX ORDER — ROPINIROLE 1 MG/1
0.5 TABLET, FILM COATED ORAL NIGHTLY
Status: DISCONTINUED | OUTPATIENT
Start: 2025-05-11 | End: 2025-05-18 | Stop reason: HOSPADM

## 2025-05-11 RX ORDER — ONDANSETRON 2 MG/ML
4 INJECTION INTRAMUSCULAR; INTRAVENOUS ONCE
Status: COMPLETED | OUTPATIENT
Start: 2025-05-11 | End: 2025-05-11

## 2025-05-11 RX ORDER — SODIUM CHLORIDE 0.9 % (FLUSH) 0.9 %
10 SYRINGE (ML) INJECTION EVERY 12 HOURS SCHEDULED
Status: DISCONTINUED | OUTPATIENT
Start: 2025-05-11 | End: 2025-05-13

## 2025-05-11 RX ORDER — ONDANSETRON 4 MG/1
4 TABLET, ORALLY DISINTEGRATING ORAL 4 TIMES DAILY PRN
Qty: 20 TABLET | Refills: 0 | Status: SHIPPED | OUTPATIENT
Start: 2025-05-11

## 2025-05-11 RX ORDER — BISACODYL 5 MG/1
5 TABLET, DELAYED RELEASE ORAL DAILY PRN
Status: DISCONTINUED | OUTPATIENT
Start: 2025-05-11 | End: 2025-05-13

## 2025-05-11 RX ADMIN — MAGNESIUM SULFATE HEPTAHYDRATE 2 G: 40 INJECTION, SOLUTION INTRAVENOUS at 18:40

## 2025-05-11 RX ADMIN — SODIUM CHLORIDE 1000 ML: 9 INJECTION, SOLUTION INTRAVENOUS at 11:26

## 2025-05-11 RX ADMIN — MAGNESIUM SULFATE HEPTAHYDRATE 2 G: 40 INJECTION, SOLUTION INTRAVENOUS at 20:19

## 2025-05-11 RX ADMIN — SODIUM CHLORIDE 75 ML/HR: 9 INJECTION, SOLUTION INTRAVENOUS at 16:34

## 2025-05-11 RX ADMIN — Medication 10 ML: at 16:40

## 2025-05-11 RX ADMIN — POTASSIUM CHLORIDE 20 MEQ: 1500 TABLET, EXTENDED RELEASE ORAL at 20:18

## 2025-05-11 RX ADMIN — ROPINIROLE 0.5 MG: 1 TABLET, FILM COATED ORAL at 20:19

## 2025-05-11 RX ADMIN — MORPHINE SULFATE 4 MG: 2 INJECTION, SOLUTION INTRAMUSCULAR; INTRAVENOUS at 11:27

## 2025-05-11 RX ADMIN — Medication 10 ML: at 20:21

## 2025-05-11 RX ADMIN — ONDANSETRON 4 MG: 2 INJECTION, SOLUTION INTRAMUSCULAR; INTRAVENOUS at 11:27

## 2025-05-11 RX ADMIN — IOPAMIDOL 85 ML: 612 INJECTION, SOLUTION INTRAVENOUS at 10:50

## 2025-05-11 RX ADMIN — DICYCLOMINE HYDROCHLORIDE 10 MG: 10 CAPSULE ORAL at 16:48

## 2025-05-11 RX ADMIN — ACETAMINOPHEN 650 MG: 325 TABLET, FILM COATED ORAL at 20:19

## 2025-05-11 RX ADMIN — DICYCLOMINE HYDROCHLORIDE 20 MG: 10 CAPSULE ORAL at 12:03

## 2025-05-11 RX ADMIN — METOPROLOL TARTRATE 25 MG: 25 TABLET, FILM COATED ORAL at 20:19

## 2025-05-11 RX ADMIN — MAGNESIUM SULFATE HEPTAHYDRATE 2 G: 40 INJECTION, SOLUTION INTRAVENOUS at 16:35

## 2025-05-11 RX ADMIN — ONDANSETRON 4 MG: 4 TABLET, ORALLY DISINTEGRATING ORAL at 17:48

## 2025-05-11 NOTE — ED NOTES
Patient to ER via car from home for abd pain with not eating and diarrhea   Patient had chemo on Wednesday   Was exposed to people with the same s/s

## 2025-05-11 NOTE — PLAN OF CARE
Problem: Adult Inpatient Plan of Care  Goal: Plan of Care Review  Outcome: Progressing  Flowsheets (Taken 5/11/2025 1802)  Progress: no change  Plan of Care Reviewed With:   patient   child  Goal: Patient-Specific Goal (Individualized)  Outcome: Progressing  Goal: Absence of Hospital-Acquired Illness or Injury  Outcome: Progressing  Intervention: Identify and Manage Fall Risk  Recent Flowsheet Documentation  Taken 5/11/2025 1748 by Melissa Artis RN  Safety Promotion/Fall Prevention: safety round/check completed  Taken 5/11/2025 1630 by Melissa Artis RN  Safety Promotion/Fall Prevention:   assistive device/personal items within reach   clutter free environment maintained   fall prevention program maintained   lighting adjusted   nonskid shoes/slippers when out of bed   safety round/check completed  Intervention: Prevent Infection  Recent Flowsheet Documentation  Taken 5/11/2025 1630 by Melissa Artis RN  Infection Prevention:   hand hygiene promoted   personal protective equipment utilized   rest/sleep promoted   single patient room provided  Goal: Optimal Comfort and Wellbeing  Outcome: Progressing  Goal: Readiness for Transition of Care  Outcome: Progressing  Intervention: Mutually Develop Transition Plan  Recent Flowsheet Documentation  Taken 5/11/2025 1651 by Melissa Artis RN  Transportation Anticipated: family or friend will provide  Patient/Family Anticipated Services at Transition: none  Patient/Family Anticipates Transition to: home with family  Taken 5/11/2025 1649 by Melissa Artis RN  Equipment Currently Used at Home: walker, rolling     Problem: Fall Injury Risk  Goal: Absence of Fall and Fall-Related Injury  Outcome: Progressing  Intervention: Identify and Manage Contributors  Recent Flowsheet Documentation  Taken 5/11/2025 1630 by Melissa Artis RN  Medication Review/Management:   medications reviewed   high-risk medications identified  Intervention: Promote Injury-Free  Environment  Recent Flowsheet Documentation  Taken 5/11/2025 1748 by Melissa Artis, RN  Safety Promotion/Fall Prevention: safety round/check completed  Taken 5/11/2025 1630 by Melissa Artis, RN  Safety Promotion/Fall Prevention:   assistive device/personal items within reach   clutter free environment maintained   fall prevention program maintained   lighting adjusted   nonskid shoes/slippers when out of bed   safety round/check completed   Goal Outcome Evaluation:  Plan of Care Reviewed With: patient, child        Progress: no change

## 2025-05-11 NOTE — CASE MANAGEMENT/SOCIAL WORK
Discharge Planning Assessment  [facility]     Patient Name: Kenia N Sample  MRN: [id number]  Today's Date: 5/11/2025    Admit Date: 5/11/2025    Plan: pt states christie plans to go home upon discharg, her daughter will provide transportation.   Discharge Needs Assessment       Row Name 05/11/25 1942       Living Environment    People in Home spouse    Name(s) of People in Home Aggie Sample    Current Living Arrangements home    Potentially Unsafe Housing Conditions none    In the past 12 months has the electric, gas, oil, or water company threatened to shut off services in your home? No    Primary Care Provided by self    Provides Primary Care For spouse    Caregiving Concerns She has diarrhea now, he  had a stroke, but has little residual problems.    Family Caregiver if Needed child(tirso), adult;grandchild(tirso), adult    Family Caregiver Names Kaylie or Colleen Gutierrez    Quality of Family Relationships helpful;involved;supportive    Able to Return to Prior Arrangements yes    Living Arrangement Comments none       Resource/Environmental Concerns    Resource/Environmental Concerns none    Transportation Concerns none       Transportation Needs    In the past 12 months, has lack of transportation kept you from medical appointments or from getting medications? no    In the past 12 months, has lack of transportation kept you from meetings, work, or from getting things needed for daily living? No       Food Insecurity    Within the past 12 months, you worried that your food would run out before you got the money to buy more. Never true    Within the past 12 months, the food you bought just didn't last and you didn't have money to get more. Never true       Transition Planning    Patient/Family Anticipates Transition to home with family    Patient/Family Anticipated Services at Transition none    Transportation Anticipated family or friend will provide       Discharge Needs Assessment    Readmission Within the  Last 30 Days no previous admission in last 30 days    Equipment Currently Used at Home shower chair;cane, quad;walker, rolling    Concerns to be Addressed no discharge needs identified;denies needs/concerns at this time    Do you want help finding or keeping work or a job? Patient declined    Do you want help with school or training? For example, starting or completing job training or getting a high school diploma, GED or equivalent Patient declined    Anticipated Changes Related to Illness none    Equipment Needed After Discharge none    Provided Post Acute Provider List? N/A    Provided Post Acute Provider Quality & Resource List? N/A                   Discharge Plan       Row Name 05/11/25 1944       Plan    Plan pt states christie plans to go home upon discharg, her daughter will provide transportation.    Patient/Family in Agreement with Plan yes    Provided Post Acute Provider List? N/A    Provided Post Acute Provider Quality & Resource List? N/A    Plan Comments Entered room, identified self and role of CCP nurse/discharge planner. Verified demographics pt resides at [address] [place] [address]. Her doctor is Amy Guillory MD. Her pharmacy is [facility]. She lives in a single family home with her . She does not drive, her daughter or granddaughter ferrera the driving and shopping. Denies any need for DME, denies any financial concerns. There are 3 steps to enter her home. SHe state [facility] and her spouse manage to  take care of all their needs together, or with help from family.  Pt admitted for diarrhea, reports she still has symptoms of diarrhea.  CCP to follow for any new needs that arise. BRIE Brown RN ccp.                    Expected Discharge Date and Time       Expected Discharge Date Expected Discharge Time    May 12, 2025            Demographic Summary       Row Name 05/11/25 1939       General Information    Admission Type observation    Arrived From emergency department     Required Notices Provided Observation Status Notice    Referral Source emergency department    Reason for Consult discharge planning;care coordination/care conference    Preferred Language English    General Information Comments Pt lives withher spouse at [address] [facility]. [place] [address]. Phone number is [phone].       Contact Information    Permission Granted to Share Info With family/designee    Contact Information Comments Nabil farr [phone] or Kaylie Gutierrez, daughter [phone].                   Functional Status       Row Name 05/11/25 1940       Functional Status    Usual Activity Tolerance good    Current Activity Tolerance good    Functional Status Comments Pt is independent with all her ADLs other than driving, her granddaughter does her driving/shopping       Physical Activity    On average, how many days per week do you engage in moderate to strenuous exercise (like a brisk walk)? 0 days    On average, how many minutes do you engage in exercise at this level? 0 min    Number of minutes of exercise per week 0       Assessment of Health Literacy    How often do you have someone help you read hospital materials? Occasionally    How often do you have problems learning about your medical condition because of difficulty understanding written information? Occasionally    How often do you have a problem understanding what is told to you about your medical condition? Occasionally    How confident are you filling out medical forms by yourself? Quite a bit    Health Literacy Good       Functional Status, IADL    Medications independent    Meal Preparation independent    Housekeeping independent    Laundry independent    Shopping assistive equipment and person    If for any reason you need help with day-to-day activities such as bathing, preparing meals, shopping, managing finances, etc., do you get the help you need? I get all the help I need    IADL Comments Pt reports she is ablae to take care  of herself and provide care for her spouse with help from family.       Mental Status    General Appearance WDL WDL       Mental Status Summary    Recent Changes in Mental Status/Cognitive Functioning no changes    Mental Status Comments AAOx4                   Psychosocial    No documentation.                  Abuse/Neglect    No documentation.                  Legal    No documentation.                  Substance Abuse    No documentation.                  Patient Forms    No documentation.                     Malathi Brown RN

## 2025-05-11 NOTE — ED NOTES
Nursing report ED to floor  Ada N Sample  80 y.o.  female    HPI :  HPI  Stated Reason for Visit: abd pain    Chief Complaint  Chief Complaint   Patient presents with    Abdominal Pain    Diarrhea       Admitting doctor:   Ketan Dunaway II, MD    Admitting diagnosis:   The primary encounter diagnosis was Enteritis. Diagnoses of Abnormal CT scan and Thrombocytopenia were also pertinent to this visit.    Code status:   Current Code Status       Date Active Code Status Order ID Comments User Context       Prior            Allergies:   Levaquin [levofloxacin], Zocor [simvastatin], Atorvastatin, Codeine, Penicillins, and Percocet [oxycodone-acetaminophen]    Isolation:   Contact Spore    Intake and Output    Intake/Output Summary (Last 24 hours) at 5/11/2025 1515  Last data filed at 5/11/2025 1345  Gross per 24 hour   Intake 1000 ml   Output --   Net 1000 ml       Weight:       05/11/25  1034   Weight: 68 kg (150 lb)       Most recent vitals:   Vitals:    05/11/25 1301 05/11/25 1331 05/11/25 1401 05/11/25 1431   BP: 128/87 126/83 135/75 125/72   Pulse: 67 65 75 70   Resp:   16    Temp:       SpO2: 98% 98% 98% 98%   Weight:       Height:           Active LDAs/IV Access:   Lines, Drains & Airways       Active LDAs       Name Placement date Placement time Site Days    Peripheral IV 05/11/25 1043 20 G Right Antecubital 05/11/25  1043  Antecubital  less than 1    Single Lumen Implantable Port 05/01/25 Right Internal jugular 05/01/25  1000  Internal jugular  10                    Labs (abnormal labs have a star):   Labs Reviewed   COMPREHENSIVE METABOLIC PANEL - Abnormal; Notable for the following components:       Result Value    Glucose 108 (*)     Creatinine 1.20 (*)     Sodium 134 (*)     CO2 18.1 (*)     eGFR 45.3 (*)     All other components within normal limits    Narrative:     GFR Categories in Chronic Kidney Disease (CKD)              GFR Category          GFR (mL/min/1.73)    Interpretation  G1                     90 or greater        Normal or high (1)  G2                    60-89                Mild decrease (1)  G3a                   45-59                Mild to moderate decrease  G3b                   30-44                Moderate to severe decrease  G4                    15-29                Severe decrease  G5                    14 or less           Kidney failure    (1)In the absence of evidence of kidney disease, neither GFR category G1 or G2 fulfill the criteria for CKD.    eGFR calculation 2021 CKD-EPI creatinine equation, which does not include race as a factor   URINALYSIS W/ MICROSCOPIC IF INDICATED (NO CULTURE) - Abnormal; Notable for the following components:    Specific Gravity, UA >1.030 (*)     Ketones, UA Trace (*)     Leuk Esterase, UA Trace (*)     All other components within normal limits   CBC WITH AUTO DIFFERENTIAL - Abnormal; Notable for the following components:    RBC 3.61 (*)     .4 (*)     MCH 35.2 (*)     Platelets 41 (*)     All other components within normal limits   MANUAL DIFFERENTIAL - Abnormal; Notable for the following components:    Neutrophil % 87.9 (*)     Lymphocyte % 10.1 (*)     Monocyte % 1.0 (*)     Lymphocytes Absolute 0.45 (*)     Monocytes Absolute 0.04 (*)     All other components within normal limits   LIPASE - Normal   LACTIC ACID, PLASMA - Normal   CLOSTRIDIOIDES DIFFICILE TOXIN    Narrative:     The following orders were created for panel order Clostridioides difficile Toxin - Stool, Per Rectum.  Procedure                               Abnormality         Status                     ---------                               -----------         ------                     Clostridioides difficile...[[id number]]                                                   Please view results for these tests on the individual orders.   GASTROINTESTINAL PANEL, PCR (PREFERRED) DOES NOT INCLUDE CDIFF   CLOSTRIDIOIDES DIFFICILE TOXIN, PCR   RAINBOW DRAW    Narrative:     The following  orders were created for panel order [place] Draw.  Procedure                               Abnormality         Status                     ---------                               -----------         ------                     Green Top (Gel)[[id number]]                                  Final result               Lavender Top[[id number]]                                     Final result               Gold Top - SST[[id number]]                                   Final result               Light Blue Top[[id number]]                                   Final result                 Please view results for these tests on the individual orders.   URINALYSIS, MICROSCOPIC ONLY   MAGNESIUM   CBC AND DIFFERENTIAL    Narrative:     The following orders were created for panel order CBC & Differential.  Procedure                               Abnormality         Status                     ---------                               -----------         ------                     CBC Auto Differential[[id number]]        Abnormal            Final result                 Please view results for these tests on the individual orders.   GREEN TOP   LAVENDER TOP   GOLD TOP - SST   LIGHT BLUE TOP       EKG:   No orders to display       Meds given in ED:   Medications   sodium chloride 0.9 % flush 10 mL (has no administration in time range)   sodium chloride 0.9 % flush 10 mL (has no administration in time range)   sodium chloride 0.9 % flush 10 mL (has no administration in time range)   sodium chloride 0.9 % infusion 40 mL (has no administration in time range)   Potassium Replacement - Follow Nurse / BPA Driven Protocol (has no administration in time range)   Magnesium Standard Dose Replacement - Follow Nurse / BPA Driven Protocol (has no administration in time range)   Phosphorus Replacement - Follow Nurse / BPA Driven Protocol (has no administration in time range)   Calcium Replacement - Follow Nurse / BPA Driven Protocol (has no administration in  time range)   sodium chloride 0.9 % infusion (has no administration in time range)   acetaminophen (TYLENOL) tablet 650 mg (has no administration in time range)     Or   acetaminophen (TYLENOL) 160 MG/5ML oral solution 650 mg (has no administration in time range)     Or   acetaminophen (TYLENOL) suppository 650 mg (has no administration in time range)   dicyclomine (BENTYL) capsule 10 mg (has no administration in time range)   sodium chloride 0.9 % bolus 1,000 mL (0 mL Intravenous Stopped 5/11/25 1345)   ondansetron (ZOFRAN) injection 4 mg (4 mg Intravenous Given 5/11/25 1127)   morphine injection 4 mg (4 mg Intravenous Given 5/11/25 1127)   iopamidol (ISOVUE-300) 61 % injection 100 mL (85 mL Intravenous Given by Other 5/11/25 1050)   dicyclomine (BENTYL) capsule 20 mg (20 mg Oral Given 5/11/25 1203)       Imaging results:  CT Abdomen Pelvis With Contrast  Result Date: 5/11/2025   IMPRESSION:   1. Areas of perienteric fat stranding and some mesenteric edema and prominent perienteric vasculature. Suspect enteritis. 2. Colonic diverticulosis. 3. Small amount of gas and fluid in the colon suggestive of underlying diarrhea. 4. Stable findings at the lung bases, incompletely imaged  This report was finalized on 5/11/2025 11:30 AM by Dr. Shabbir Booker M.D on Workstation: [id number]        Ambulatory status:   - assist    Social issues:   Social History     Socioeconomic History    Marital status:      Spouse name: Nabil   Tobacco Use    Smoking status: Former     Current packs/day: 1.00     Average packs/day: 1 pack/day for 30.0 years (30.0 ttl pk-yrs)     Types: Cigarettes     Passive exposure: Past    Smokeless tobacco: Never   Vaping Use    Vaping status: Never Used   Substance and Sexual Activity    Alcohol use: Yes     Comment: 1 wine drink q 3-4 months    Drug use: Never    Sexual activity: Defer       Peripheral Neurovascular  Peripheral Neurovascular (Adult)  Peripheral Neurovascular WDL:  WDL    Neuro Cognitive  Neuro Cognitive (Adult)  Cognitive/Neuro/Behavioral WDL: WDL  Sedation Group  POSS (Pasero Opioid-Induced Sed Scale): 1 - Awake and alert    Learning  Learning Assessment  Learning Readiness and Ability: no barriers identified    Respiratory  Respiratory WDL  Respiratory WDL: WDL    Abdominal Pain       Pain Assessments  Pain (Adult)  (0-10) Pain Rating: Rest: 3  (0-10) Pain Rating: Activity: 10  Pain Location: abdomen  Pain Side/Orientation: generalized  Pain Description: intermittent, spasm, cramping  Response to Pain Interventions: nonverbal indicators absent/decreased, interventions effective per patient    NIH Stroke Scale       Leeann Waite, RN  05/11/25 15:15 EDT

## 2025-05-11 NOTE — H&P
[facility]   HISTORY AND PHYSICAL    Patient Name: Kenia Palomo  : 1945  MRN: [id number]  Primary Care Physician:  Amy Guillory MD  Date of admission: 2025    Subjective   Subjective     Chief Complaint:   Chief Complaint   Patient presents with    Abdominal Pain    Diarrhea         HPI:    Kenia Palomo is a 80 y.o. female with past medical history of NSCLC of left lung currently on chemotherapy with last treatment on 2025, GERD, hyperlipidemia, hypertension, AV stenosis, RLS, and arthritis who is admitted for abdominal pain associated with nausea and diarrhea since 2025.  Patient reports that multiple people in her family about a recent GI infection with her son and daughter-in-law still actively symptomatic and grandson newly symptomatic in the last few days with patient having had recent exposure to them.  Patient reports associated chills, unable to keep any food down since 2025 and minimal fluid intake since that time.  Patient also reports associated abdominal bloating.  Patient describes the pain as soreness and tender with being worse in the left lower quadrant but generally all over.  Patient reports that her nausea has improved since the last 2 days but her the diarrhea has been persistent.  Patient denies any melena or bright red blood per rectum.    Review of Systems   All systems were reviewed and negative except for: All pertinent findings listed in the above HPI    Personal History     Past Medical History:   Diagnosis Date    Anesthesia complication     slow to wake up    Anginal pain     Arthritis     Cancer     Lung    Gastric reflux syndrome     Gout     Hiatal hernia     High cholesterol     History of bronchitis     Hyperlipidemia     Hypertension     Irregular heart beat     Mild aortic valve stenosis     per echo 2023    PONV (postoperative nausea and vomiting)     RLS (restless legs syndrome)        Past Surgical History:   Procedure Laterality Date     APPENDECTOMY      CARDIAC CATHETERIZATION      EXTERNAL EAR SURGERY Right     had tumor excised behind right ear lobe benign    EYE LESION EXCISION Left 6/6/2024    Procedure: LEFT LOWER EYELID BASEL CELL CARCINOMA EXCISIONAL AND REPAIR WITH FROZEN SECTIONS,;  Surgeon: Austin Kamara MD;  Location: [facility] OR;  Service: Ophthalmology;  Laterality: Left;    FEMUR IM NAILING/RODDING Right 06/15/2022    Procedure: RIGHT HIP INTRAMEDULLARY NAILING/RODDING;  Surgeon: Marc Crawford MD;  Location: [facility];  Service: Orthopedics;  Laterality: Right;    GALLBLADDER SURGERY      HYSTERECTOMY      KNEE ARTHROPLASTY Right     KNEE ARTHROSCOPY Right     OR REVJ TOTAL KNEE ARTHRP W/WO ALGRFT 1 COMPONENT Right 09/07/2016    Procedure: RT ILIOTIBIAL BAND RELEASE RT TOTAL KNEE POLY EXCHANGE;  Surgeon: Toy Adame MD;  Location: [facility];  Service: Orthopedics    ROTATOR CUFF REPAIR Bilateral     US GUIDED LYMPH NODE BIOPSY  3/18/2024    VENOUS ACCESS DEVICE (PORT) INSERTION Right 5/1/2025    Procedure: INSERTION VENOUS ACCESS DEVICE;  Surgeon: Saloni Verdugo MD;  Location: [facility];  Service: Vascular;  Laterality: Right;    WRIST MASS EXCISION Right        Family History: family history includes Heart attack in her maternal grandfather; Heart failure in her mother; Hypertension in her mother. Otherwise pertinent FHx was reviewed and not pertinent to current issue.    Social History:  reports that she has quit smoking. Her smoking use included cigarettes. She has a 30 pack-year smoking history. She has been exposed to tobacco smoke. She has never used smokeless tobacco. She reports current alcohol use. She reports that she does not use drugs.    Home Medications:  Magnesium, albuterol sulfate HFA, allopurinol, bisacodyl, dexAMETHasone, dicyclomine, fluticasone, folic acid, furosemide, lidocaine-prilocaine, methocarbamol, metoprolol tartrate, naloxone, omeprazole, ondansetron, ondansetron ODT,  polyethylene glycol, potassium chloride, rOPINIRole, sennosides-docusate, temazepam, traMADol, vitamin B-12, and vitamin D    Allergies:  Allergies   Allergen Reactions    Levaquin [Levofloxacin] Hives    Zocor [Simvastatin] Myalgia    Atorvastatin Other (See Comments)    Codeine GI Intolerance    Penicillins Hives and Swelling     Pt reports tongue swelling    Percocet [Oxycodone-Acetaminophen] Itching and Nausea And Vomiting       Objective   Objective     Vitals:   Temp:  [97.7 °F (36.5 °C)-97.9 °F (36.6 °C)] 97.7 °F (36.5 °C)  Heart Rate:  [61-78] 67  Resp:  [16-18] 18  BP: (112-135)/(58-87) 120/66  Physical Exam    Constitutional: Awake, alert, sick but not acutely toxic   Eyes: PERRL, sclerae anicteric, no conjunctival injection, EOMI   HENT: NCAT, mucous membranes dry, normal hearing   Neck: Supple, nontender, trachea midline   Respiratory: Clear to auscultation bilaterally, nonlabored respirations on room air   Cardiovascular: RRR, no murmurs, palpable pedal pulses bilaterally   Gastrointestinal: Positive bowel sounds, soft, tender throughout lower abdomen but worse on left side no rebound or guarding appreciated, nondistended   Musculoskeletal: No bilateral ankle edema, no clubbing or cyanosis to extremities   Psychiatric: Appropriate affect, cooperative   Neurologic: Oriented x 3, strength symmetric in all extremities, Cranial Nerves grossly intact to confrontation, speech clear   Skin: No rashes, dry    Result Review    Result Review:  I have personally reviewed the results from the time of this admission to 5/11/2025 17:19 EDT and agree with these findings:  [x]  Laboratory list / accordion  []  Microbiology  [x]  Radiology  []  EKG/Telemetry   []  Cardiology/Vascular   []  Pathology  []  Old records  []  Other:  Most notable findings include: Sodium 134, potassium 4.6, serum creatinine 1.2, magnesium 1.5, AST ALT within normal limits, lactic 1.1, lipase 44, WBC 4.44, hemoglobin 12.7, and UA appears  clear with trace ketones, negative nitrates, trace leukocytes, negative protein, negative WBC and bacteria.  CT abdomen pelvis with shows areas of perienteric fat stranding and some mesenteric edema and prominent perienteric vasculature or suspect enteritis, colonic diverticulosis, small amount of gas and fluid in the colon suggestive of underlying diarrhea, stable findings at the lung bases incompletely imaged.      Assessment & Plan   The ASCVD Risk score (Khloe CALLE, et al., 2019) failed to calculate for the following reasons:    The 2019 ASCVD risk score is only valid for ages 40 to 79    Assessment / Plan     Brief Patient Summary:  Ada N Sample is a 80 y.o. female who is admitted for gastroenteritis    Active Hospital Problems:  Active Hospital Problems    Diagnosis     **Gastroenteritis      Plan:     Gastroenteritis:  - GI PCR pending  - Supportive care with IV fluids and as needed antiemetics and pain medications  - Encourage good p.o. hydration and advance diet as tolerated  - Continue to monitor labs and reevaluate in the morning    Hypomagnesia:  - Replacement per protocol    NSCLC of left lung:  - Status post chemotherapy on 5/7/2025  - Platelets low at 41 (baseline about 80); continue to follow and if worsens may need heme-onc consult    Hyperlipidemia  Hypertension:  - Continue home regimen    GERD:  - Continue home regimen    RLS:  - Continue home regimen      VTE Prophylaxis:  Mechanical VTE prophylaxis orders are present.        CODE STATUS:    Code Status (Patient has no pulse and is not breathing): CPR (Attempt to Resuscitate)  Medical Interventions (Patient has pulse or is breathing): Full Support  Level Of Support Discussed With: Patient    Admission Status:  I believe this patient meets observation status.    Electronically signed by Elmira Marcano PA-C, 05/11/25, 5:19 PM EDT.        75 minutes has been spent by [facility] providers in the care of this patient  while under observation status on this date 05/11/25        I have worn appropriate PPE during this patient encounter, sanitized my hands both with entering and exiting patient's room.    I have discussed plan of care with patient including advance care plan and/or surrogate decision maker.  Patient advises that their  Nabil will be their primary surrogate decision maker

## 2025-05-11 NOTE — ED PROVIDER NOTES
MD ATTESTATION NOTE  I supervised care provided by the APC. We have discussed this patient's history, physical exam, and treatment plan. I have reviewed the APC's note and I agree with the APC's findings and plan of care.   SHARED VISIT: This visit was performed by BOTH a physician and an APC. The substantive portion of the medical decision making was performed by this attesting physician who made or approved the management plan and takes responsibility for patient management. All studies in the APC note (if performed) were independently interpreted by me.   I have personally had a face to face encounter with the patient.     PCP: Amy Guillory MD  Patient Care Team:  Amy Guillory MD as PCP - General (Family Medicine)  Brayden Jackson MD as PCP - Family Medicine  Naif York MD as Consulting Physician (Cardiology)  Narendra Banda MD as Surgeon (General Surgery)  Shabbir Mann MD as Consulting Physician (Hematology and Oncology)  Gali Taveras APRN as Referring Physician ([facility])  Fernando Hopkins MD as Consulting Physician (Radiation Oncology)     Kenia N Sample is a 80 y.o. female who presents to the ED c/o nausea, vomiting and abdominal cramping.  Patient has had 4 to 5 days of lower abdominal cramping, nausea, vomiting and diarrhea.  She received her first chemo treatment the day prior to the symptoms starting.  Her also multiple people in her household who have similar symptoms.  She states she has had nothing to eat or drink in the past 3 to 4 days.  She has had no associated fevers.  No changes in urination.    On exam:  General: NAD.  Head: NCAT.  ENT: nares patent, no scleral icterus  Neck: Supple, trachea midline.  Cardiac: regular rate and rhythm.  Lungs: normal effort, clear to auscultation bilaterally  Abdomen: Soft, nondistended, tender to palpation in the suprapubic area, no rebound tenderness, no guarding or rigidity.   Extremities: Moves all  extremities well, no peripheral edema  Neuro: alert, MAEW, follows commands  Psych: calm, cooperative  Skin: Warm, dry.    Medical Decision Making:  After the initial H&P, I discussed pertinent information from history and physical exam with patient/family.  Discussed differential diagnosis.  Discussed plan for ED evaluation/work-up/treatment.  All questions answered.  Patient/family is agreeable with plan.    ED Course as of 05/11/25 1524   Sun May 11, 2025   1102 CT Abdomen Pelvis With Contrast  My independent interpretation of the imaging study is no bowel obstruction, no free air [AB]   1128 Creatinine(!): 1.20  At baseline [DC]   1152 Patient reassessed, feeling much improved.  Plan for administration of Bentyl and reassessment with p.o. challenge.  Likely discharging if her symptoms are well-controlled. [DC]   1321 Patient presentation and evaluation consistent with acute gastroenteritis.  Plan for supportive care as an outpatient and PCP follow-up.  She tolerates p.o. intake and has had good improvement of symptoms here in the ED.  She is thrombocytopenic and has been encouraged to follow-up with her oncologist in regards to repeat lab work.  Close return precautions given and all questions answered at this time. [DC]   1455 Patient was reassessed at this time after calling out for some new abdominal discomfort.  She had another episode of diarrhea and is apprehensive about leaving with the symptoms not improved.  She improved with Zofran and some pain medication and fluids initially and I plan to admit her for continued symptom control in the observation unit overnight.  She is in agreement with this plan. [DC]   1504 I discussed the case with Elmira, HOMA with [facility] at this time regarding the patient.  I discussed work-up, results, concerns.  I discussed the consulting provider's desire for obs admit.   [DC]      ED Course User Index  [AB] John Ruiz MD  [DC] Toy Stinson PA       Diagnosis  Final  diagnoses:   Enteritis   Abnormal CT scan   Thrombocytopenia        John Ruiz MD  05/11/25 [id number]

## 2025-05-11 NOTE — ED PROVIDER NOTES
EMERGENCY DEPARTMENT ENCOUNTER      Room Number:  01/01  PCP: Amy Guillory MD  Independent Historians: Patient/Family  Patient Care Team:  Amy Guillory MD as PCP - General (Family Medicine)  Brayden Jackson MD as PCP - Family Medicine  Naif York MD as Consulting Physician (Cardiology)  Narendra Banda MD as Surgeon (General Surgery)  Shabbir Mann MD as Consulting Physician (Hematology and Oncology)  Gali Taveras APRN as Referring Physician ([facility])  Fernando Hopkins MD as Consulting Physician (Radiation Oncology)       HPI:  Chief Complaint: Abdominal pain    A complete HPI/ROS/PMH/PSH/SH/FH are unobtainable due to: None    Chronic or social conditions impacting patient care (Social Determinants of Health): None      Context: Ada N Sample is a 80 y.o. female with a PMH significant for hypertension, hyperlipidemia, GERD who presents to the ED c/o acute generalized abdominal cramping with decreased appetite and diarrhea for the past several days.  The patient had chemotherapy on Wednesday for breast cancer and started noticing symptoms the following day.  She also reports that she been exposed to people in her household who have very similar symptoms.  She denies fever, chills, vomiting.  Reports 10-15 episodes of diarrhea in the past 24 hours.  No blood in the stool.      Upon review of prior external notes (non-ED) -and- Review of prior external test results outside of this encounter it appears the patient was evaluated in the office with oncology for non-squamous cell carcinoma of the lung on May 7, 2025.  The patient had a normal creatinine on 7/1/2024 and a normal glucose on/14/25.      PAST MEDICAL HISTORY  Active Ambulatory Problems     Diagnosis Date Noted    Snapping right knee 09/07/2016    HTN (hypertension) 06/15/2022    HLD (hyperlipidemia) 06/15/2022    GERD (gastroesophageal reflux disease) 06/15/2022    Closed comminuted intertrochanteric  fracture of proximal end of right femur 06/14/2022    Thrombocytopenia 06/16/2022    Unable to ambulate 12/04/2023    Right hip pain 12/04/2023    Degenerative disc disease, thoracic and lumbar 12/06/2023    NSCLC of left lung 02/19/2024    Unable to bear weight 10/08/2024    Fall 10/08/2024    T12 compression fracture 10/08/2024    Closed compression fracture of L1 vertebra 10/08/2024    Compression fracture of L2 lumbar vertebra 10/09/2024    Lumbar pain 10/09/2024    Fitting and adjustment of vascular catheter 05/07/2025     Resolved Ambulatory Problems     Diagnosis Date Noted    No Resolved Ambulatory Problems     Past Medical History:   Diagnosis Date    Anesthesia complication     Anginal pain     Arthritis     Cancer     Gastric reflux syndrome     Gout     Hiatal hernia     High cholesterol     History of bronchitis     Hyperlipidemia     Hypertension     Irregular heart beat     Mild aortic valve stenosis     PONV (postoperative nausea and vomiting)     RLS (restless legs syndrome)          PAST SURGICAL HISTORY  Past Surgical History:   Procedure Laterality Date    APPENDECTOMY      CARDIAC CATHETERIZATION      EXTERNAL EAR SURGERY Right     had tumor excised behind right ear lobe benign    EYE LESION EXCISION Left 6/6/2024    Procedure: LEFT LOWER EYELID BASEL CELL CARCINOMA EXCISIONAL AND REPAIR WITH FROZEN SECTIONS,;  Surgeon: Austin Kamara MD;  Location: [facility] OR;  Service: Ophthalmology;  Laterality: Left;    FEMUR IM NAILING/RODDING Right 06/15/2022    Procedure: RIGHT HIP INTRAMEDULLARY NAILING/RODDING;  Surgeon: Marc Crawford MD;  Location: [facility] OR;  Service: Orthopedics;  Laterality: Right;    GALLBLADDER SURGERY      HYSTERECTOMY      KNEE ARTHROPLASTY Right     KNEE ARTHROSCOPY Right     UT REVJ TOTAL KNEE ARTHRP W/WO ALGRFT 1 COMPONENT Right 09/07/2016    Procedure: RT ILIOTIBIAL BAND RELEASE RT TOTAL KNEE POLY EXCHANGE;  Surgeon: Toy Adame MD;  Location: [facility]  OR;  Service: Orthopedics    ROTATOR CUFF REPAIR Bilateral     US GUIDED LYMPH NODE BIOPSY  3/18/2024    VENOUS ACCESS DEVICE (PORT) INSERTION Right 5/1/2025    Procedure: INSERTION VENOUS ACCESS DEVICE;  Surgeon: Saloni Verdugo MD;  Location: [facility] OR;  Service: Vascular;  Laterality: Right;    WRIST MASS EXCISION Right          FAMILY HISTORY  Family History   Problem Relation Age of Onset    Hypertension Mother     Heart failure Mother     Heart attack Maternal Grandfather     Malig Hyperthermia Neg Hx          SOCIAL HISTORY  Social History     Socioeconomic History    Marital status:      Spouse name: Nabil   Tobacco Use    Smoking status: Former     Current packs/day: 1.00     Average packs/day: 1 pack/day for 30.0 years (30.0 ttl pk-yrs)     Types: Cigarettes     Passive exposure: Past    Smokeless tobacco: Never   Vaping Use    Vaping status: Never Used   Substance and Sexual Activity    Alcohol use: Yes     Comment: 1 wine drink q 3-4 months    Drug use: Never    Sexual activity: Defer         ALLERGIES  Levaquin [levofloxacin], Zocor [simvastatin], Atorvastatin, Codeine, Penicillins, and Percocet [oxycodone-acetaminophen]      REVIEW OF SYSTEMS  Included in HPI  All systems reviewed and negative except for those discussed in HPI.      PHYSICAL EXAM    I have reviewed the triage vital signs and nursing notes.    ED Triage Vitals   Temp Heart Rate Resp BP SpO2   05/11/25 1023 05/11/25 1022 05/11/25 1022 -- 05/11/25 1022   97.9 °F (36.6 °C) 78 16  96 %      Temp src Heart Rate Source Patient Position BP Location FiO2 (%)   -- -- -- -- --              Physical Exam  Constitutional:       General: She is not in acute distress.     Appearance: She is well-developed. She is ill-appearing (Chronically). She is not toxic-appearing.   HENT:      Head: Normocephalic and atraumatic.   Eyes:      General: No scleral icterus.     Conjunctiva/sclera: Conjunctivae normal.   Neck:      Trachea: No  tracheal deviation.   Cardiovascular:      Rate and Rhythm: Normal rate and regular rhythm.   Pulmonary:      Effort: Pulmonary effort is normal.      Breath sounds: Normal breath sounds.   Chest:       Abdominal:      Palpations: Abdomen is soft.      Tenderness: There is generalized abdominal tenderness. There is no guarding.   Musculoskeletal:         General: No deformity.      Cervical back: Normal range of motion.   Lymphadenopathy:      Cervical: No cervical adenopathy.   Skin:     General: Skin is warm and dry.   Neurological:      Mental Status: She is alert and oriented to person, place, and time.   Psychiatric:         Behavior: Behavior normal.         Vital signs and nursing notes reviewed.      PPE: I wore a surgical mask throughout my encounters with the pt. I performed hand hygiene on entry into the pt room and upon exit.     LAB RESULTS  Recent Results (from the past 24 hours)   Comprehensive Metabolic Panel    Collection Time: 05/11/25 10:41 AM    Specimen: Arm, Right; Blood   Result Value Ref Range    Glucose 108 (H) 65 - 99 mg/dL    BUN 21 8 - 23 mg/dL    Creatinine 1.20 (H) 0.57 - 1.00 mg/dL    Sodium 134 (L) 136 - 145 mmol/L    Potassium 4.6 3.5 - 5.2 mmol/L    Chloride 101 98 - 107 mmol/L    CO2 18.1 (L) 22.0 - 29.0 mmol/L    Calcium 9.2 8.6 - 10.5 mg/dL    Total Protein 7.9 6.0 - 8.5 g/dL    Albumin 3.8 3.5 - 5.2 g/dL    ALT (SGPT) 21 1 - 33 U/L    AST (SGOT) 31 1 - 32 U/L    Alkaline Phosphatase 113 39 - 117 U/L    Total Bilirubin 0.5 0.0 - 1.2 mg/dL    Globulin 4.1 gm/dL    A/G Ratio 0.9 g/dL    BUN/Creatinine Ratio 17.5 7.0 - 25.0    Anion Gap 14.9 5.0 - 15.0 mmol/L    eGFR 45.3 (L) >60.0 mL/min/1.73   Lipase    Collection Time: 05/11/25 10:41 AM    Specimen: Arm, Right; Blood   Result Value Ref Range    Lipase 44 13 - 60 U/L   Lactic Acid, Plasma    Collection Time: 05/11/25 10:41 AM    Specimen: Arm, Right; Blood   Result Value Ref Range    Lactate 1.1 0.5 - 2.0 mmol/L   Green Top (Gel)     Collection Time: 05/11/25 10:41 AM   Result Value Ref Range    Extra Tube Hold for add-ons.    Lavender Top    Collection Time: 05/11/25 10:41 AM   Result Value Ref Range    Extra Tube hold for add-on    Gold Top - SST    Collection Time: 05/11/25 10:41 AM   Result Value Ref Range    Extra Tube Hold for add-ons.    Light Blue Top    Collection Time: 05/11/25 10:41 AM   Result Value Ref Range    Extra Tube Hold for add-ons.    CBC Auto Differential    Collection Time: 05/11/25 10:41 AM    Specimen: Arm, Right; Blood   Result Value Ref Range    WBC 4.44 3.40 - 10.80 10*3/mm3    RBC 3.61 (L) 3.77 - 5.28 10*6/mm3    Hemoglobin 12.7 12.0 - 15.9 g/dL    Hematocrit 36.6 34.0 - 46.6 %    .4 (H) 79.0 - 97.0 fL    MCH 35.2 (H) 26.6 - 33.0 pg    MCHC 34.7 31.5 - 35.7 g/dL    RDW 12.3 12.3 - 15.4 %    RDW-SD 46.3 37.0 - 54.0 fl    MPV 11.5 6.0 - 12.0 fL    Platelets 41 (C) 140 - 450 10*3/mm3   Manual Differential    Collection Time: 05/11/25 10:41 AM    Specimen: Arm, Right; Blood   Result Value Ref Range    Neutrophil % 87.9 (H) 42.7 - 76.0 %    Lymphocyte % 10.1 (L) 19.6 - 45.3 %    Monocyte % 1.0 (L) 5.0 - 12.0 %    Eosinophil % 1.0 0.3 - 6.2 %    Basophil % 0.0 0.0 - 1.5 %    Neutrophils Absolute 3.90 1.70 - 7.00 10*3/mm3    Lymphocytes Absolute 0.45 (L) 0.70 - 3.10 10*3/mm3    Monocytes Absolute 0.04 (L) 0.10 - 0.90 10*3/mm3    Eosinophils Absolute 0.04 0.00 - 0.40 10*3/mm3    Basophils Absolute 0.00 0.00 - 0.20 10*3/mm3    RBC Morphology Normal Normal    WBC Morphology Normal Normal    Platelet Morphology Normal Normal   Urinalysis With Microscopic If Indicated (No Culture) - Urine, Clean Catch    Collection Time: 05/11/25  1:54 PM    Specimen: Urine, Clean Catch   Result Value Ref Range    Color, UA Yellow Yellow, Straw    Appearance, UA Clear Clear    pH, UA 5.5 5.0 - 8.0    Specific Gravity, UA >1.030 (H) 1.005 - 1.030    Glucose, UA Negative Negative    Ketones, UA Trace (A) Negative    Bilirubin, UA  Negative Negative    Blood, UA Negative Negative    Protein, UA Negative Negative    Leuk Esterase, UA Trace (A) Negative    Nitrite, UA Negative Negative    Urobilinogen, UA 0.2 E.U./dL 0.2 - 1.0 E.U./dL   Urinalysis, Microscopic Only - Urine, Clean Catch    Collection Time: 05/11/25  1:54 PM    Specimen: Urine, Clean Catch   Result Value Ref Range    RBC, UA 0-2 None Seen, 0-2 /HPF    WBC, UA 0-2 None Seen, 0-2 /HPF    Bacteria, UA None Seen None Seen /HPF    Squamous Epithelial Cells, UA 0-2 None Seen, 0-2 /HPF    Hyaline Casts, UA 0-2 None Seen /LPF    Methodology Automated Microscopy          RADIOLOGY  CT Abdomen Pelvis With Contrast  Result Date: 5/11/2025  CT ABDOMEN PELVIS W CONTRAST-  INDICATION: Diarrhea, nausea, abdominal pain. Being treated for cancer.  COMPARISON: CT abdomen pelvis April 2, 2025  TECHNIQUE: Routine CT abdomen/pelvis with IV contrast. Coronal and sagittal reformats. Radiation dose reduction techniques were utilized, including automated exposure control and exposure modulation based on body size.  FINDINGS:  Lung bases: Subsegmental atelectasis or scarring at the bases. Calcified pulmonary nodules in the right lower lobe, consistent with prior granulomatous infection. Spiculated cavitating nodule in the right middle lobe, series 2, axial mage 4, measuring 3.0 cm, appears stable. Solid pulmonary nodule the right middle lobe, series 2, axial mage 1, incompletely imaged, measuring 9 mm, appears stable. Small airways wall thickening at the bases. Paramediastinal heterogeneous opacity in the left lower lobe, incompletely imaged, similar to prior. Coronary artery atherosclerotic calcifications.  ABDOMEN: Small cyst seen in the lateral segment left hepatic lobe, segment 3. Calcifications in the liver and spleen, consistent with prior granulomatous infection. Pneumobilia. Cholecystectomy. Common bile duct is at upper limits in size, measuring 1 cm, unchanged. Spleen is normal in size. Mild  pancreatic atrophy. No pancreatic ductal dilatation or mass seen. No adrenal nodules. Couple small left renal cyst. No solid-appearing renal mass or hydronephrosis.  Pelvis: Streak artifact from right femoral gamma nail limits evaluation of the pelvis. Bladder is collapsed. No bladder calculus. Hysterectomy. No adnexal mass.  Bowel: Stomach is under distended. Incidental duodenal diverticulum's. Some loose stool seen in the rectum. Colonic diverticulosis. Colon is under distended. Some gas and fluid in the colonic lumen. Appendix not identified though no secondary findings of appendicitis. Areas of perienteric stranding greatest in the pelvis and some mesenteric edema with prominent perienteric vasculature, with the small bowel overall under distended though concerning for enteritis. No extraluminal gas. No fluid collection.  Abdominal wall: Rectus diastases. Pelvic wall scarring. Fat-containing inguinal hernias.  Retroperitoneum: No lymphadenopathy.  Vasculature: Patent. Moderate to severe aortoiliac atherosclerotic calcification. No abdominal aortic aneurysm.  Osseous structures: Open reduction internal fixation of the right proximal femur with gamma nail and interlocking femoral neck dynamic compression screw. Decreased bone mineralization. Compression deformity seen at T12, L1, L2 and at L4, appears stable. No new compression deformities. Degenerative changes seen between the lumbar spinous processes.       IMPRESSION:   1. Areas of perienteric fat stranding and some mesenteric edema and prominent perienteric vasculature. Suspect enteritis. 2. Colonic diverticulosis. 3. Small amount of gas and fluid in the colon suggestive of underlying diarrhea. 4. Stable findings at the lung bases, incompletely imaged  This report was finalized on 5/11/2025 11:30 AM by Dr. Shabbir Booker M.D on Workstation: [id number]          MEDICATIONS GIVEN IN ER  Medications   sodium chloride 0.9 % flush 10 mL (has no administration  in time range)   sodium chloride 0.9 % bolus 1,000 mL (0 mL Intravenous Stopped 5/11/25 1345)   ondansetron (ZOFRAN) injection 4 mg (4 mg Intravenous Given 5/11/25 1127)   morphine injection 4 mg (4 mg Intravenous Given 5/11/25 1127)   iopamidol (ISOVUE-300) 61 % injection 100 mL (85 mL Intravenous Given by Other 5/11/25 1050)   dicyclomine (BENTYL) capsule 20 mg (20 mg Oral Given 5/11/25 1203)         ORDERS PLACED DURING THIS VISIT:  Orders Placed This Encounter   Procedures    Clostridioides difficile Toxin - Stool, Per Rectum    Gastrointestinal Panel, PCR - Stool, Per Rectum    Clostridioides difficile Toxin, PCR - Stool, Per Rectum    CT Abdomen Pelvis With Contrast    [place] Draw    Comprehensive Metabolic Panel    Lipase    Urinalysis With Microscopic If Indicated (No Culture) - Urine, Clean Catch    Lactic Acid, Plasma    CBC Auto Differential    Manual Differential    Urinalysis, Microscopic Only - Urine, Clean Catch    Patient Isolation Contact Spore    Insert Peripheral IV    Initiate Emergency Department Observation Status    CBC & Differential    Green Top (Gel)    Lavender Top    Gold Top - SST    Light Blue Top         OUTPATIENT MEDICATION MANAGEMENT:  Current Facility-Administered Medications Ordered in Epic   Medication Dose Route Frequency Provider Last Rate Last Admin    cyanocobalamin injection 1,000 mcg  1,000 mcg Intramuscular Q28 Days Gali Marshall APRN   1,000 mcg at 04/30/25 1206    sodium chloride 0.9 % flush 10 mL  10 mL Intravenous PRN Toy Stinson PA         Current Outpatient Medications Ordered in Epic   Medication Sig Dispense Refill    albuterol (PROVENTIL HFA;VENTOLIN HFA) 108 (90 BASE) MCG/ACT inhaler Inhale 2 puffs Every 4 (Four) Hours As Needed. Indications: Asthma      allopurinol (ZYLOPRIM) 100 MG tablet Take 1 tablet by mouth Daily.      bisacodyl (DULCOLAX) 5 MG EC tablet Take 1 tablet by mouth Daily As Needed for Constipation (Use if polyethylene glycol is  ineffective).      dexAMETHasone (DECADRON) 4 MG tablet Take 1 tablet oral twice a day for 3 consecutive days beginning the day before chemotherapy and continue for 6 doses.Take with food. 30 tablet 2    dicyclomine (BENTYL) 20 MG tablet Take 1 tablet by mouth Every 6 (Six) Hours. 20 tablet 0    fluticasone (FLONASE) 50 MCG/ACT nasal spray Administer 2 sprays into the nostril(s) as directed by provider Daily.      folic acid (FOLVITE) 1 MG tablet Take 1 tablet by mouth Daily. Start at least 7 days prior to chemotherapy until at least 3 weeks after all chemotherapy. 30 tablet 6    furosemide (LASIX) 40 MG tablet Take 1 tablet by mouth 2 (Two) Times a Day.      lidocaine-prilocaine (EMLA) 2.5-2.5 % cream Apply 1 Application topically to the appropriate area as directed Every 2 (Two) Hours As Needed for Mild Pain. Apply a nickel size amount to port 30 minutes prior to access. 30 g 2    Magnesium 500 MG capsule Take 500 mg by mouth 2 (Two) Times a Day. Indications: Acid Indigestion      methocarbamol (ROBAXIN) 500 MG tablet Take 1 tablet by mouth Every 8 (Eight) Hours As Needed for Muscle Spasms (back pain). 30 tablet 0    metoprolol tartrate (LOPRESSOR) 25 MG tablet Take 1 tablet by mouth 2 (Two) Times a Day. Indications: High Blood Pressure      naloxone (NARCAN) 4 MG/0.1ML nasal spray Call 911. Don't prime. [place] in 1 nostril for overdose. Repeat in 2-3 minutes in other nostril if no or minimal breathing/responsiveness. 2 each 0    omeprazole (PriLOSEC) 40 MG capsule Take 1 capsule by mouth Every Morning. Indications: Heartburn      ondansetron (ZOFRAN) 8 MG tablet Take 1 tablet by mouth 3 (Three) Times a Day As Needed for Nausea or Vomiting. 30 tablet 5    ondansetron ODT (ZOFRAN-ODT) 4 MG disintegrating tablet Place 1 tablet on the tongue 4 (Four) Times a Day As Needed for Nausea. 20 tablet 0    polyethylene glycol (MIRALAX) 17 g packet Take 17 g by mouth Daily As Needed (Use if senna-docusate is ineffective).       potassium chloride (K-DUR,KLOR-CON) 20 MEQ CR tablet Take 1 tablet by mouth 2 (Two) Times a Day.      rOPINIRole (REQUIP) 0.5 MG tablet Take 1 tablet by mouth Every Night.      sennosides-docusate sodium (SENOKOT-S) 8.6-50 MG tablet Take 2 tablets by mouth 2 (two) times a day. 100 tablet 1    temazepam (RESTORIL) 15 MG capsule Take 1 capsule by mouth At Night As Needed for Sleep.      traMADol (Ultram) 50 MG tablet Take 1 tablet by mouth Every 12 (Twelve) Hours As Needed for Severe Pain for up to 10 doses. 10 tablet 0    vitamin B-12 (CYANOCOBALAMIN) 1000 MCG tablet Take 1 tablet by mouth Daily. Indications: Inadequate Vitamin B12      vitamin D (ERGOCALCIFEROL) 1.25 MG (36690 UT) capsule capsule Take 1 capsule by mouth Every 7 (Seven) Days. 5 capsule 0           PROGRESS, DATA ANALYSIS, CONSULTS, AND MEDICAL DECISION MAKING  All labs have been independently interpreted by me.  All radiology studies have been reviewed by me. All EKG's have been independently viewed and interpreted by me.  Discussion below represents my analysis of pertinent findings related to patient's condition, differential diagnosis, treatment plan and final disposition.      DIFFERENTIAL DIAGNOSIS INCLUDE BUT NOT LIMITED TO:     Differential diagnosis includes but is not limited to:  - Hepatobiliary pathology such as cholecystitis, cholangitis, and symptomatic cholelithiasis  - Pancreatitis  - Dyspepsia  - Small bowel obstruction  - Appendicitis  - Diverticulitis  - UTI including pyelonephritis  - Ureteral stone  - Zoster  - Colitis, including infectious and ischemic  - Atypical ACS      Clinical Scores: N/A      ED Course as of 05/11/25 1507   Sun May 11, 2025   1102 CT Abdomen Pelvis With Contrast  My independent interpretation of the imaging study is no bowel obstruction, no free air [AB]   1128 Creatinine(!): 1.20  At baseline [DC]   1152 Patient reassessed, feeling much improved.  Plan for administration of Bentyl and reassessment with  p.o. challenge.  Likely discharging if her symptoms are well-controlled. [DC]   1321 Patient presentation and evaluation consistent with acute gastroenteritis.  Plan for supportive care as an outpatient and PCP follow-up.  She tolerates p.o. intake and has had good improvement of symptoms here in the ED.  She is thrombocytopenic and has been encouraged to follow-up with her oncologist in regards to repeat lab work.  Close return precautions given and all questions answered at this time. [DC]   1455 Patient was reassessed at this time after calling out for some new abdominal discomfort.  She had another episode of diarrhea and is apprehensive about leaving with the symptoms not improved.  She improved with Zofran and some pain medication and fluids initially and I plan to admit her for continued symptom control in the observation unit overnight.  She is in agreement with this plan. [DC]   1504 I discussed the case with HOMA Ku with [facility] at this time regarding the patient.  I discussed work-up, results, concerns.  I discussed the consulting provider's desire for obs admit.   [DC]      ED Course User Index  [AB] John Ruiz MD  [DC] Toy Stinson, PA         1507 I rechecked the patient.  I discussed the patient's labs, radiology findings (including all incidental findings), diagnosis, and plan for admission. The patient understands and agrees with the plan.      AS OF 15:07 EDT VITALS:    BP - 125/72  HR - 70  TEMP - 97.9 °F (36.6 °C)  O2 SATS - 98%    COMPLEXITY OF CARE  The patient requires admission.      DIAGNOSIS  Final diagnoses:   Enteritis   Abnormal CT scan   Thrombocytopenia         DISPOSITION  ED Disposition       ED Disposition   Decision to Admit    Condition   --    Comment   --                Please note that portions of this document were completed with a voice recognition program.    Note Disclaimer: At [facility], we believe that sharing information builds trust and better  relationships. You are receiving this note because you recently visited [facility]. It is possible you will see health information before a provider has talked with you about it. This kind of information can be easy to misunderstand. To help you fully understand what it means for your health, we urge you to discuss this note with your provider.         Tyo Stinson PA  05/12/25 0508

## 2025-05-12 LAB
ALBUMIN SERPL-MCNC: 3.2 G/DL (ref 3.5–5.2)
ALBUMIN/GLOB SERPL: 0.9 G/DL
ALP SERPL-CCNC: 95 U/L (ref 39–117)
ALT SERPL W P-5'-P-CCNC: 16 U/L (ref 1–33)
ANION GAP SERPL CALCULATED.3IONS-SCNC: 11.1 MMOL/L (ref 5–15)
AST SERPL-CCNC: 29 U/L (ref 1–32)
BASOPHILS # BLD MANUAL: 0 10*3/MM3 (ref 0–0.2)
BASOPHILS NFR BLD MANUAL: 0 % (ref 0–1.5)
BILIRUB SERPL-MCNC: 0.6 MG/DL (ref 0–1.2)
BUN SERPL-MCNC: 17 MG/DL (ref 8–23)
BUN/CREAT SERPL: 17.5 (ref 7–25)
CALCIUM SPEC-SCNC: 8.9 MG/DL (ref 8.6–10.5)
CHLORIDE SERPL-SCNC: 104 MMOL/L (ref 98–107)
CO2 SERPL-SCNC: 18.9 MMOL/L (ref 22–29)
CREAT SERPL-MCNC: 0.97 MG/DL (ref 0.57–1)
DEPRECATED RDW RBC AUTO: 45.1 FL (ref 37–54)
EGFRCR SERPLBLD CKD-EPI 2021: 58.5 ML/MIN/1.73
EOSINOPHIL # BLD MANUAL: 0.12 10*3/MM3 (ref 0–0.4)
EOSINOPHIL NFR BLD MANUAL: 4.1 % (ref 0.3–6.2)
ERYTHROCYTE [DISTWIDTH] IN BLOOD BY AUTOMATED COUNT: 12.2 % (ref 12.3–15.4)
GLOBULIN UR ELPH-MCNC: 3.7 GM/DL
GLUCOSE SERPL-MCNC: 92 MG/DL (ref 65–99)
HCT VFR BLD AUTO: 32.1 % (ref 34–46.6)
HGB BLD-MCNC: 11 G/DL (ref 12–15.9)
LYMPHOCYTES # BLD MANUAL: 0.5 10*3/MM3 (ref 0.7–3.1)
LYMPHOCYTES NFR BLD MANUAL: 1 % (ref 5–12)
MAGNESIUM SERPL-MCNC: 2.6 MG/DL (ref 1.6–2.4)
MCH RBC QN AUTO: 34.7 PG (ref 26.6–33)
MCHC RBC AUTO-ENTMCNC: 34.3 G/DL (ref 31.5–35.7)
MCV RBC AUTO: 101.3 FL (ref 79–97)
MONOCYTES # BLD: 0.03 10*3/MM3 (ref 0.1–0.9)
NEUTROPHILS # BLD AUTO: 2.26 10*3/MM3 (ref 1.7–7)
NEUTROPHILS NFR BLD MANUAL: 77.6 % (ref 42.7–76)
PLAT MORPH BLD: NORMAL
PLATELET # BLD AUTO: 29 10*3/MM3 (ref 140–450)
PMV BLD AUTO: 10.9 FL (ref 6–12)
POTASSIUM SERPL-SCNC: 4.8 MMOL/L (ref 3.5–5.2)
PROT SERPL-MCNC: 6.9 G/DL (ref 6–8.5)
RBC # BLD AUTO: 3.17 10*6/MM3 (ref 3.77–5.28)
RBC MORPH BLD: NORMAL
SODIUM SERPL-SCNC: 134 MMOL/L (ref 136–145)
VARIANT LYMPHS NFR BLD MANUAL: 17.3 % (ref 19.6–45.3)
WBC MORPH BLD: NORMAL
WBC NRBC COR # BLD AUTO: 2.91 10*3/MM3 (ref 3.4–10.8)

## 2025-05-12 PROCEDURE — 99214 OFFICE O/P EST MOD 30 MIN: CPT | Performed by: INTERNAL MEDICINE

## 2025-05-12 PROCEDURE — 25810000003 SODIUM CHLORIDE 0.9 % SOLUTION: Performed by: PHYSICIAN ASSISTANT

## 2025-05-12 PROCEDURE — 85007 BL SMEAR W/DIFF WBC COUNT: CPT | Performed by: EMERGENCY MEDICINE

## 2025-05-12 PROCEDURE — 80053 COMPREHEN METABOLIC PANEL: CPT | Performed by: EMERGENCY MEDICINE

## 2025-05-12 PROCEDURE — 25010000002 ONDANSETRON PER 1 MG: Performed by: EMERGENCY MEDICINE

## 2025-05-12 PROCEDURE — 85025 COMPLETE CBC W/AUTO DIFF WBC: CPT | Performed by: EMERGENCY MEDICINE

## 2025-05-12 PROCEDURE — G0378 HOSPITAL OBSERVATION PER HR: HCPCS

## 2025-05-12 PROCEDURE — 83735 ASSAY OF MAGNESIUM: CPT | Performed by: EMERGENCY MEDICINE

## 2025-05-12 RX ORDER — SODIUM CHLORIDE 9 MG/ML
75 INJECTION, SOLUTION INTRAVENOUS CONTINUOUS
Status: ACTIVE | OUTPATIENT
Start: 2025-05-12 | End: 2025-05-13

## 2025-05-12 RX ADMIN — FLUTICASONE PROPIONATE 2 SPRAY: 50 SPRAY, METERED NASAL at 08:42

## 2025-05-12 RX ADMIN — Medication 10 ML: at 20:10

## 2025-05-12 RX ADMIN — METOPROLOL TARTRATE 25 MG: 25 TABLET, FILM COATED ORAL at 20:10

## 2025-05-12 RX ADMIN — METOPROLOL TARTRATE 25 MG: 25 TABLET, FILM COATED ORAL at 08:41

## 2025-05-12 RX ADMIN — ONDANSETRON 4 MG: 2 INJECTION, SOLUTION INTRAMUSCULAR; INTRAVENOUS at 21:46

## 2025-05-12 RX ADMIN — ONDANSETRON 4 MG: 2 INJECTION, SOLUTION INTRAMUSCULAR; INTRAVENOUS at 08:41

## 2025-05-12 RX ADMIN — TRAMADOL HYDROCHLORIDE 50 MG: 50 TABLET, COATED ORAL at 20:09

## 2025-05-12 RX ADMIN — Medication 10 ML: at 10:48

## 2025-05-12 RX ADMIN — PANTOPRAZOLE SODIUM 40 MG: 40 TABLET, DELAYED RELEASE ORAL at 06:04

## 2025-05-12 RX ADMIN — DICYCLOMINE HYDROCHLORIDE 10 MG: 10 CAPSULE ORAL at 11:46

## 2025-05-12 RX ADMIN — FOLIC ACID 1 MG: 1 TABLET ORAL at 08:41

## 2025-05-12 RX ADMIN — Medication 1000 MCG: at 08:41

## 2025-05-12 RX ADMIN — POTASSIUM CHLORIDE 20 MEQ: 1500 TABLET, EXTENDED RELEASE ORAL at 20:10

## 2025-05-12 RX ADMIN — ROPINIROLE 0.5 MG: 1 TABLET, FILM COATED ORAL at 20:10

## 2025-05-12 RX ADMIN — SODIUM CHLORIDE 75 ML/HR: 9 INJECTION, SOLUTION INTRAVENOUS at 22:24

## 2025-05-12 RX ADMIN — POTASSIUM CHLORIDE 20 MEQ: 1500 TABLET, EXTENDED RELEASE ORAL at 08:41

## 2025-05-12 RX ADMIN — ALLOPURINOL 100 MG: 100 TABLET ORAL at 08:41

## 2025-05-12 NOTE — PLAN OF CARE
Goal Outcome Evaluation:  Plan of Care Reviewed With: patient           Outcome Evaluation: Assumed care of patient. Stool specimen sent and noted cdiff was negative but positive for NOROVIRUS, maintained on contact precaution. Patient is assistx1 via BSC, still having loose stools and intermittent abdominal discomfort-supportive treatment with pain meds and antiemetic PRN. Promoted rest and sleep. Pending AM labs. Plan of care ongoing.

## 2025-05-12 NOTE — PROGRESS NOTES
MD ATTESTATION NOTE    The HOMA and I have discussed this patient's history, physical exam, and treatment plan.  I have reviewed the documentation and personally had a face to face interaction with the patient. I affirm the documentation and agree with the treatment and plan.  The attached note describes my personal findings.      I provided a substantive portion of the care of the patient.  I personally performed the physical exam in its entirety, and below are my findings.        Brief HPI: This patient is an 80-year-old female admitted to the observation unit for further further evaluation and treatment of abdominal pain with associated nausea/vomiting/diarrhea.  Her GI PCR did show positive for the norovirus and she has had family members ill with similar symptoms.  She was also found to have a worsening thrombocytopenia this morning as well.  Currently, she complains of ongoing diarrhea however the nausea and vomiting has improved.      PHYSICAL EXAM  ED Triage Vitals   Temp Heart Rate Resp BP SpO2   05/11/25 1023 05/11/25 1022 05/11/25 1022 05/11/25 1034 05/11/25 1022   97.9 °F (36.6 °C) 78 16 118/76 96 %      Temp src Heart Rate Source Patient Position BP Location FiO2 (%)   05/11/25 1620 05/11/25 1620 05/11/25 1620 05/11/25 1620 --   Oral Monitor Lying Right arm          GENERAL: Resting comfortably and in no acute distress, nontoxic in appearance  HENT: nares patent  EYES: no scleral icterus  CV: regular rhythm, normal rate, no M/R/G  RESPIRATORY: normal effort, lungs clear bilaterally  ABDOMEN: soft, nontender, no rebound or guarding  MUSCULOSKELETAL: no deformity, no edema  NEURO: alert, moves all extremities, follows commands  PSYCH:  calm, cooperative  SKIN: warm, dry    Vital signs and nursing notes reviewed.        Plan: We will continue fluid hydration as well as progressing the diet as tolerated.  We will ask hematology to evaluate for the worsening thrombocytopenia.  Further plan to follow.

## 2025-05-12 NOTE — CONSULTS
[facility] INITIAL INPATIENT CONSULTATION NOTE    REASON FOR CONSULTATION:    Thrombocytopenia  Non-small cell lung cancer, on therapy with pemetrexed    HISTORY OF PRESENT ILLNESS:  Kenia BRIAN Sample is a 80 y.o. female who we are asked to see today in consultation for thrombocytopenia non-small cell lung cancer.    History of falls, right hip fracture, vertebral compression fractures, osteoarthritis.    Biopsy of the left lower lobe nodule 3/18/2024 with non-small cell carcinoma.  Initial treatment with SBRT in April 20 24-2 different lesions, 1 in the left lower lobe and 1 in the right neck.    Subsequent progression.  Decision made to pursue palliative therapy with single agent pemetrexed with a 25% dose reduction.    She has baseline thrombocytopenia.  She received pemetrexed on 5/7/2025.  Platelets dropped from 92,009 today to 41,000-29,000.    The patient presented with anorexia, abdominal pain, diarrhea.  Some family members have had the same issue.  GI PCR positive for norovirus.       She is feeling a little bit better at this point.  Diarrhea persist.    Past Medical History:   Diagnosis Date    Anesthesia complication     slow to wake up    Anginal pain     Arthritis     Cancer     Lung    Gastric reflux syndrome     Gout     Hiatal hernia     High cholesterol     History of bronchitis     Hyperlipidemia     Hypertension     Irregular heart beat     Mild aortic valve stenosis     per echo 6/2023    PONV (postoperative nausea and vomiting)     RLS (restless legs syndrome)        Past Surgical History:   Procedure Laterality Date    APPENDECTOMY      CARDIAC CATHETERIZATION      EXTERNAL EAR SURGERY Right     had tumor excised behind right ear lobe benign    EYE LESION EXCISION Left 6/6/2024    Procedure: LEFT LOWER EYELID BASEL CELL CARCINOMA EXCISIONAL AND REPAIR WITH FROZEN SECTIONS,;  Surgeon: Austin Kamara MD;  Location: [facility];  Service: Ophthalmology;  Laterality: Left;     FEMUR IM NAILING/RODDING Right 06/15/2022    Procedure: RIGHT HIP INTRAMEDULLARY NAILING/RODDING;  Surgeon: Marc Crawford MD;  Location: [facility];  Service: Orthopedics;  Laterality: Right;    GALLBLADDER SURGERY      HYSTERECTOMY      KNEE ARTHROPLASTY Right     KNEE ARTHROSCOPY Right     DE REVJ TOTAL KNEE ARTHRP W/WO ALGRFT 1 COMPONENT Right 09/07/2016    Procedure: RT ILIOTIBIAL BAND RELEASE RT TOTAL KNEE POLY EXCHANGE;  Surgeon: Toy Adame MD;  Location: [facility];  Service: Orthopedics    ROTATOR CUFF REPAIR Bilateral     US GUIDED LYMPH NODE BIOPSY  3/18/2024    VENOUS ACCESS DEVICE (PORT) INSERTION Right 5/1/2025    Procedure: INSERTION VENOUS ACCESS DEVICE;  Surgeon: Saloni Verdugo MD;  Location: [facility];  Service: Vascular;  Laterality: Right;    WRIST MASS EXCISION Right        SOCIAL HISTORY:   reports that she has quit smoking. Her smoking use included cigarettes. She has a 30 pack-year smoking history. She has been exposed to tobacco smoke. She has never used smokeless tobacco. She reports current alcohol use. She reports that she does not use drugs.    FAMILY HISTORY:  family history includes Heart attack in her maternal grandfather; Heart failure in her mother; Hypertension in her mother.    ALLERGIES:  Allergies   Allergen Reactions    Levaquin [Levofloxacin] Hives    Zocor [Simvastatin] Myalgia    Atorvastatin Other (See Comments)    Codeine GI Intolerance    Penicillins Hives and Swelling     Pt reports tongue swelling    Percocet [Oxycodone-Acetaminophen] Itching and Nausea And Vomiting       MEDICATIONS:  As listed in the electronic medical record.    Review of Systems   Constitutional:  Positive for fatigue.   Gastrointestinal:  Positive for abdominal distention, abdominal pain and diarrhea.   All other systems reviewed and are negative.      Vitals:    05/11/25 2331 05/12/25 0341 05/12/25 0352 05/12/25 0700   BP: 96/67 94/55 116/57 119/68   BP Location: Left arm  Left arm Left arm Left arm   Patient Position: Lying Lying Lying Lying   Pulse: 57 59 62 64   Resp: 16 16  17   Temp: 97.2 °F (36.2 °C) 97.5 °F (36.4 °C)  97.6 °F (36.4 °C)   TempSrc: Oral Oral  Oral   SpO2: 96% 99% 100% 98%   Weight:       Height:           Physical Exam  Constitutional:       Appearance: Normal appearance.   HENT:      Head: Normocephalic and atraumatic.      Nose: Nose normal.   Pulmonary:      Effort: Pulmonary effort is normal.      Comments: Port present in the right subclavian area with some ecchymosis surrounding, patient states improving  Abdominal:      General: There is distension.      Tenderness: There is abdominal tenderness.   Neurological:      Mental Status: She is alert.         DIAGNOSTIC DATA:  Results from last 7 days   Lab Units 05/12/25  0349   WBC 10*3/mm3 2.91*   HEMOGLOBIN g/dL 11.0*   HEMATOCRIT % 32.1*   PLATELETS 10*3/mm3 29*     Lab Results   Component Value Date    NEUTROABS 2.26 05/12/2025     Results from last 7 days   Lab Units 05/12/25  0349   SODIUM mmol/L 134*   POTASSIUM mmol/L 4.8   CHLORIDE mmol/L 104   CO2 mmol/L 18.9*   BUN mg/dL 17   CREATININE mg/dL 0.97   GLUCOSE mg/dL 92   CALCIUM mg/dL 8.9         Results from last 7 days   Lab Units 05/12/25  0349   MAGNESIUM mg/dL 2.6*       IMAGING:      CT Abdomen Pelvis With Contrast (05/11/2025 10:50)     CT images personally reviewed.  Consistent with enteritis.    ASSESSMENT:  This is a 80 y.o. female with:    *Non-small cell lung cancer  Initially treated with SBRT to the left lower lobe and right supraclavicular cancers, suspected to be non-squamous  Subsequent progression  Palliative pemetrexed with a 25% dose reduction administered on 5/7/2025    *Thrombocytopenia  Platelets were 92,000 on 5/7 on the day of chemotherapy  Thrombocytopenia secondary to chemotherapy with pemetrexed  Platelets 29,000, from 41,000, from 92,000    *Acute gastroenteritis, norovirus infection  Presented with abdominal pain,  anorexia, diarrhea  Symptoms slowly improving    *Leukopenia  White blood cell count 2.91, from 4.44  ANC 2.26    *Macrocytic anemia  Hemoglobin 11.0, .3    *Mild hyponatremia    *Hypomagnesemia with improvement    *History of falls, vertebral, right hip fracture    RECOMMENDATIONS/PLAN:   Daily CBC  Transfuse platelets less than 10,000 or as needed for bleeding  Supportive care for norovirus infection  Scheduled for follow-up in the office on 5/21  We will follow    Lobo Schaeffer MD

## 2025-05-13 PROBLEM — A08.11 ENTERITIS DUE TO NOROVIRUS: Status: ACTIVE | Noted: 2025-05-13

## 2025-05-13 LAB
ALBUMIN SERPL-MCNC: 3.3 G/DL (ref 3.5–5.2)
ALBUMIN/GLOB SERPL: 0.8 G/DL
ALP SERPL-CCNC: 118 U/L (ref 39–117)
ALT SERPL W P-5'-P-CCNC: 14 U/L (ref 1–33)
ANION GAP SERPL CALCULATED.3IONS-SCNC: 7.9 MMOL/L (ref 5–15)
AST SERPL-CCNC: 25 U/L (ref 1–32)
BASOPHILS # BLD MANUAL: 0.03 10*3/MM3 (ref 0–0.2)
BASOPHILS NFR BLD MANUAL: 2 % (ref 0–1.5)
BILIRUB SERPL-MCNC: 0.9 MG/DL (ref 0–1.2)
BUN SERPL-MCNC: 15 MG/DL (ref 8–23)
BUN/CREAT SERPL: 17.4 (ref 7–25)
CALCIUM SPEC-SCNC: 9.3 MG/DL (ref 8.6–10.5)
CHLORIDE SERPL-SCNC: 103 MMOL/L (ref 98–107)
CO2 SERPL-SCNC: 20.1 MMOL/L (ref 22–29)
CREAT SERPL-MCNC: 0.86 MG/DL (ref 0.57–1)
DEPRECATED RDW RBC AUTO: 43.4 FL (ref 37–54)
EGFRCR SERPLBLD CKD-EPI 2021: 67.5 ML/MIN/1.73
EOSINOPHIL # BLD MANUAL: 0.01 10*3/MM3 (ref 0–0.4)
EOSINOPHIL NFR BLD MANUAL: 1 % (ref 0.3–6.2)
ERYTHROCYTE [DISTWIDTH] IN BLOOD BY AUTOMATED COUNT: 12 % (ref 12.3–15.4)
GLOBULIN UR ELPH-MCNC: 4.3 GM/DL
GLUCOSE SERPL-MCNC: 94 MG/DL (ref 65–99)
HCT VFR BLD AUTO: 33.4 % (ref 34–46.6)
HGB BLD-MCNC: 11.2 G/DL (ref 12–15.9)
LYMPHOCYTES # BLD MANUAL: 0.79 10*3/MM3 (ref 0.7–3.1)
LYMPHOCYTES NFR BLD MANUAL: 0 % (ref 5–12)
MCH RBC QN AUTO: 33.8 PG (ref 26.6–33)
MCHC RBC AUTO-ENTMCNC: 33.5 G/DL (ref 31.5–35.7)
MCV RBC AUTO: 100.9 FL (ref 79–97)
MONOCYTES # BLD: 0 10*3/MM3 (ref 0.1–0.9)
NEUTROPHILS # BLD AUTO: 0.66 10*3/MM3 (ref 1.7–7)
NEUTROPHILS NFR BLD MANUAL: 44 % (ref 42.7–76)
PLAT MORPH BLD: NORMAL
PLATELET # BLD AUTO: 19 10*3/MM3 (ref 140–450)
PMV BLD AUTO: 10.9 FL (ref 6–12)
POTASSIUM SERPL-SCNC: 5.4 MMOL/L (ref 3.5–5.2)
PROT SERPL-MCNC: 7.6 G/DL (ref 6–8.5)
RBC # BLD AUTO: 3.31 10*6/MM3 (ref 3.77–5.28)
RBC MORPH BLD: NORMAL
SODIUM SERPL-SCNC: 131 MMOL/L (ref 136–145)
VARIANT LYMPHS NFR BLD MANUAL: 53 % (ref 19.6–45.3)
WBC MORPH BLD: NORMAL
WBC NRBC COR # BLD AUTO: 1.49 10*3/MM3 (ref 3.4–10.8)

## 2025-05-13 PROCEDURE — 80053 COMPREHEN METABOLIC PANEL: CPT | Performed by: PHYSICIAN ASSISTANT

## 2025-05-13 PROCEDURE — 99232 SBSQ HOSP IP/OBS MODERATE 35: CPT | Performed by: INTERNAL MEDICINE

## 2025-05-13 PROCEDURE — 63710000001 ONDANSETRON ODT 4 MG TABLET DISPERSIBLE: Performed by: EMERGENCY MEDICINE

## 2025-05-13 PROCEDURE — 25810000003 SODIUM CHLORIDE 0.9 % SOLUTION: Performed by: PHYSICIAN ASSISTANT

## 2025-05-13 PROCEDURE — 85007 BL SMEAR W/DIFF WBC COUNT: CPT | Performed by: INTERNAL MEDICINE

## 2025-05-13 PROCEDURE — 85025 COMPLETE CBC W/AUTO DIFF WBC: CPT | Performed by: INTERNAL MEDICINE

## 2025-05-13 RX ORDER — FAMOTIDINE 20 MG/1
20 TABLET, FILM COATED ORAL 2 TIMES DAILY
Status: DISCONTINUED | OUTPATIENT
Start: 2025-05-13 | End: 2025-05-18 | Stop reason: HOSPADM

## 2025-05-13 RX ADMIN — POTASSIUM CHLORIDE 20 MEQ: 1500 TABLET, EXTENDED RELEASE ORAL at 10:46

## 2025-05-13 RX ADMIN — SODIUM CHLORIDE 75 ML/HR: 9 INJECTION, SOLUTION INTRAVENOUS at 18:28

## 2025-05-13 RX ADMIN — Medication 1000 MCG: at 10:46

## 2025-05-13 RX ADMIN — ONDANSETRON 4 MG: 4 TABLET, ORALLY DISINTEGRATING ORAL at 18:27

## 2025-05-13 RX ADMIN — FOLIC ACID 1 MG: 1 TABLET ORAL at 10:46

## 2025-05-13 RX ADMIN — FLUTICASONE PROPIONATE 2 SPRAY: 50 SPRAY, METERED NASAL at 10:48

## 2025-05-13 RX ADMIN — PANTOPRAZOLE SODIUM 40 MG: 40 TABLET, DELAYED RELEASE ORAL at 05:18

## 2025-05-13 RX ADMIN — ROPINIROLE 0.5 MG: 1 TABLET, FILM COATED ORAL at 21:01

## 2025-05-13 RX ADMIN — DICYCLOMINE HYDROCHLORIDE 10 MG: 10 CAPSULE ORAL at 21:00

## 2025-05-13 RX ADMIN — FAMOTIDINE 20 MG: 20 TABLET, FILM COATED ORAL at 21:01

## 2025-05-13 RX ADMIN — METOPROLOL TARTRATE 25 MG: 25 TABLET, FILM COATED ORAL at 10:46

## 2025-05-13 RX ADMIN — ALLOPURINOL 100 MG: 100 TABLET ORAL at 10:46

## 2025-05-13 RX ADMIN — METOPROLOL TARTRATE 25 MG: 25 TABLET, FILM COATED ORAL at 21:01

## 2025-05-13 RX ADMIN — Medication 10 ML: at 10:48

## 2025-05-13 RX ADMIN — ACETAMINOPHEN 650 MG: 325 TABLET, FILM COATED ORAL at 20:59

## 2025-05-13 RX ADMIN — Medication 10 ML: at 18:29

## 2025-05-13 NOTE — PLAN OF CARE
Goal Outcome Evaluation:              Outcome Evaluation: VSS. Admit from observation unit. To monitor WBC and platelets. PO zofran given for Nausea

## 2025-05-13 NOTE — PROGRESS NOTES
ED OBSERVATION PROGRESS/DISCHARGE SUMMARY    Date of Admission: 5/11/2025   LOS: 0 days   PCP: Amy Guillory MD    Final Diagnosis gastroenteritis secondary to norovirus infection      Subjective     Hospital Outcome:     Kenia BRIAN Sample is a 80 y.o. female with known NSCLC currently on cycle 1 of Alimta infusion, last treatment was 5/7/2025.  She presents with abdominal pain with associated chills, nausea and diarrhea.  She reports multiple family members have similar symptoms.  In the ED WC BC was 4.44, platelet 41, serum creatinine is 1.20, and CT A/P show perienteric fat stranding and mesenteric edema that is suspicious for enteritis.  UA is unremarkable, GI PCR is positive for norovirus.     05/12/25  Continue to have intermittent abdominal discomfort with loose stools.  Overnight her platelet dropped from 41,000-29,000.  There is no overt hemorrhage, melena.  She is currently on Alimta, last treatment was 5/7/2025.  Dr. Schaeffer with oncology evaluated patient recommends daily CBC and transfuse platelets less than 10,000 or as needed for bleeding.  Patient continued to struggle with diarrhea therefore we will keep an additional night and will reevaluate in AM.    5/13/25  Patient still has several bouts of diarrhea overnight.  Patient did have some nausea but no vomiting.  Patient reports overall that she feels like she is a little bit better but still symptomatic.  Patient's white blood cell and platelets continue to drop.  Heme-onc continue to following no current interventions but recommends continue daily monitoring.  Patient discussed with hospitalist who has graciously accepted the patient for further inpatient monitoring and management.    ROS:  General: no fevers, chills  Respiratory: no cough, dyspnea  Cardiovascular: no chest pain, palpitations  Abdomen: No abdominal pain, nausea, vomiting, or diarrhea  Neurologic: No focal weakness    Objective   Physical Exam:  I have reviewed the vital  signs.  Temp:  [97.3 °F (36.3 °C)-98.4 °F (36.9 °C)] 97.3 °F (36.3 °C)  Heart Rate:  [59-93] 65  Resp:  [16-17] 17  BP: ()/(54-68) 102/57  General Appearance:    Alert, cooperative, no distress  Head:    Normocephalic, atraumatic, mild hard of hearing  Eyes:    Sclerae anicteric, EOMI  Neck:   Supple, nontender  Lungs: Clear to auscultation bilaterally, respirations unlabored on room air  Heart: Regular rate and rhythm, S1 and S2 normal, no murmur  Abdomen:  Soft, mild tender without guarding or rebound, bowel sounds active, nondistended  Extremities: No clubbing, cyanosis, or edema to lower extremities  Pulses:  2+ and symmetric in distal lower extremities  Skin: No rashes   Neurologic: Oriented x3, Normal strength to extremities    Results Review:    I have reviewed the labs, radiology results and diagnostic studies.    Results from last 7 days   Lab Units 05/12/25  0349   WBC 10*3/mm3 2.91*   HEMOGLOBIN g/dL 11.0*   HEMATOCRIT % 32.1*   PLATELETS 10*3/mm3 29*     Results from last 7 days   Lab Units 05/12/25  0349 05/11/25  1041 05/07/25  1108   SODIUM mmol/L 134* 134* 135*   POTASSIUM mmol/L 4.8 4.6 4.6   CHLORIDE mmol/L 104 101 98   CO2 mmol/L 18.9* 18.1* 26.3   BUN mg/dL 17 21 27*   CREATININE mg/dL 0.97 1.20* 1.40*   CALCIUM mg/dL 8.9 9.2 10.2   BILIRUBIN mg/dL 0.6 0.5 0.6   ALK PHOS U/L 95 113 123*   ALT (SGPT) U/L 16 21 22   AST (SGOT) U/L 29 31 31   GLUCOSE mg/dL 92 108* 104*     Imaging Results (Last 24 Hours)       ** No results found for the last 24 hours. **            I have reviewed the medications.     Discharge Medications        New Medications        Instructions Start Date   dicyclomine 20 MG tablet  Commonly known as: BENTYL   20 mg, Oral, Every 6 Hours      ondansetron ODT 4 MG disintegrating tablet  Commonly known as: ZOFRAN-ODT   4 mg, Translingual, 4 Times Daily PRN             Continue These Medications        Instructions Start Date   albuterol sulfate  (90 Base) MCG/ACT  inhaler  Commonly known as: PROVENTIL HFA;VENTOLIN HFA;PROAIR HFA   2 puffs, Every 4 Hours PRN      allopurinol 100 MG tablet  Commonly known as: ZYLOPRIM   1 tablet, Daily      bisacodyl 5 MG EC tablet  Commonly known as: DULCOLAX   5 mg, Oral, Daily PRN      dexAMETHasone 4 MG tablet  Commonly known as: DECADRON   Take 1 tablet oral twice a day for 3 consecutive days beginning the day before chemotherapy and continue for 6 doses.Take with food.      fluticasone 50 MCG/ACT nasal spray  Commonly known as: FLONASE   2 sprays, Daily      folic acid 1 MG tablet  Commonly known as: FOLVITE   1 mg, Oral, Daily, Start at least 7 days prior to chemotherapy until at least 3 weeks after all chemotherapy.      furosemide 40 MG tablet  Commonly known as: LASIX   1 tablet, 2 Times Daily      lidocaine-prilocaine 2.5-2.5 % cream  Commonly known as: EMLA   1 Application, Topical, Every 2 Hours PRN, Apply a nickel size amount to port 30 minutes prior to access.      Magnesium 500 MG capsule   500 mg, 2 Times Daily      methocarbamol 500 MG tablet  Commonly known as: ROBAXIN   500 mg, Oral, Every 8 Hours PRN      metoprolol tartrate 25 MG tablet  Commonly known as: LOPRESSOR   1 tablet, 2 Times Daily      naloxone 4 MG/0.1ML nasal spray  Commonly known as: NARCAN   Call 911. Don't prime. [place] in 1 nostril for overdose. Repeat in 2-3 minutes in other nostril if no or minimal breathing/responsiveness.      omeprazole 40 MG capsule  Commonly known as: priLOSEC   1 capsule, Every Morning      ondansetron 8 MG tablet  Commonly known as: ZOFRAN   8 mg, Oral, 3 Times Daily PRN      polyethylene glycol 17 g packet  Commonly known as: MIRALAX   17 g, Oral, Daily PRN      potassium chloride 20 MEQ CR tablet  Commonly known as: KLOR-CON M20   1 tablet, 2 Times Daily      rOPINIRole 0.5 MG tablet  Commonly known as: REQUIP   0.5 mg, Nightly      sennosides-docusate 8.6-50 MG per tablet  Commonly known as: PERICOLACE   2 tablets, Oral, 2 Times  Daily      temazepam 15 MG capsule  Commonly known as: RESTORIL   15 mg, Nightly PRN      traMADol 50 MG tablet  Commonly known as: Ultram   50 mg, Oral, Every 12 Hours PRN      vitamin B-12 1000 MCG tablet  Commonly known as: CYANOCOBALAMIN   1 tablet, Daily      vitamin D 1.25 MG (85392 UT) capsule capsule  Commonly known as: ERGOCALCIFEROL   50,000 Units, Oral, Every 7 Days              ---------------------------------------------------------------------------------------------  Assessment & Plan   Assessment/Problem List    Gastroenteritis      Plan:    Gastroenteritis:  - GI PCR positive for norovirus  - Supportive care with IV fluids and as needed antiemetics and pain medications  - Encourage good p.o. hydration and advance diet as tolerated  - Continue to monitor labs and reevaluate in the morning     Hypomagnesia:  - Replacement per protocol     NSCLC of left lung:  - Status post chemotherapy on 5/7/2025  - Platelets low at 41 (baseline about 80); dropped to 29,000 overnight.  -Heme-onc following  -Monitor platelets and WBCs     Hyperlipidemia  Hypertension:  - Continue home regimen     GERD:  - Continue home regimen     RLS:  - Continue home regimen    Disposition: Dependent on hospital course    Follow-up after Discharge: Dependent on hospital course    This note will serve as a progress note    Elmira Marcnao PA-C 05/13/25 07:28 EDT    I have worn appropriate PPE during this patient encounter, sanitized my hands both with entering and exiting patient's room.      55 minutes has been spent by [facility] providers in the care of this patient while under observation status on this date 05/13/25

## 2025-05-13 NOTE — PLAN OF CARE
Goal Outcome Evaluation:  Plan of Care Reviewed With: patient admitted to observation for Gastroenteritis isolation in placed   Patient alert and oriented x4, stand by assist,NSR on the monitor, RA. Nausea x1 meds given effective and diarrhea x 2 noted during shift NS @75ml/hrs ordered. oncology consult.         Progress: improving

## 2025-05-13 NOTE — CONSULTS
Internal medicine consult    Referring physician   Dr. Marcano    Chief complaint  Abdominal pain    Reason for consult  Follow medical problems and take over the care    History of present illness  80-year-old white female with history of osteoarthritis osteoporosis non-small cell lung carcinoma on chemotherapy with chronic anemia and thrombocytopenia other medical problems hypertension hyperlipidemia gastroesophageal disease admitted to emergency room with abdominal pain with nausea vomiting diarrhea for last several days prior to admission.  Patient admitted to the observation unit and treated with IV fluid supportive care as she found to have norovirus gastroenteritis and also followed by oncology.  Patient required full admission as she continued to have symptoms and I am asked to follow her intake over the care.  Patient still has abdominal pain with nausea vomiting diarrhea and feeling weak but no other complaints.    PAST MEDICAL HISTORY   Anesthesia complication      Anginal pain      Arthritis      Cancer      Gastric reflux syndrome      Gout      Hiatal hernia      High cholesterol      History of bronchitis      Hyperlipidemia      Hypertension      Irregular heart beat      Mild aortic valve stenosis      PONV (postoperative nausea and vomiting)      RLS (restless legs syndrome)        PAST SURGICAL HISTORY              Procedure Laterality Date    APPENDECTOMY        CARDIAC CATHETERIZATION        EXTERNAL EAR SURGERY Right       had tumor excised behind right ear lobe benign    EYE LESION EXCISION Left 6/6/2024     Procedure: LEFT LOWER EYELID BASEL CELL CARCINOMA EXCISIONAL AND REPAIR WITH FROZEN SECTIONS,;  Surgeon: Austin Kamara MD;  Location: [facility] MAIN OR;  Service: Ophthalmology;  Laterality: Left;    FEMUR IM NAILING/RODDING Right 06/15/2022     Procedure: RIGHT HIP INTRAMEDULLARY NAILING/RODDING;  Surgeon: Marc Crawford MD;  Location: [facility] MAIN OR;  Service: Orthopedics;   "Laterality: Right;    GALLBLADDER SURGERY        HYSTERECTOMY        KNEE ARTHROPLASTY Right      KNEE ARTHROSCOPY Right      TN REVJ TOTAL KNEE ARTHRP W/WO ALGRFT 1 COMPONENT Right 09/07/2016     Procedure: RT ILIOTIBIAL BAND RELEASE RT TOTAL KNEE POLY EXCHANGE;  Surgeon: Toy Adame MD;  Location: [facility] OR;  Service: Orthopedics    ROTATOR CUFF REPAIR Bilateral      US GUIDED LYMPH NODE BIOPSY   3/18/2024    VENOUS ACCESS DEVICE (PORT) INSERTION Right 5/1/2025     Procedure: INSERTION VENOUS ACCESS DEVICE;  Surgeon: Saloni Verdugo MD;  Location: [facility] OR;  Service: Vascular;  Laterality: Right;    WRIST MASS EXCISION Right           FAMILY HISTORY           Problem Relation Age of Onset    Hypertension Mother      Heart failure Mother      Heart attack Maternal Grandfather      Malig Hyperthermia Neg Hx        SOCIAL HISTORY                 Socioeconomic History    Marital status:        Spouse name: Nabil   Tobacco Use    Smoking status: Former       Current packs/day: 1.00       Average packs/day: 1 pack/day for 30.0 years (30.0 ttl pk-yrs)       Types: Cigarettes       Passive exposure: Past    Smokeless tobacco: Never   Vaping Use    Vaping status: Never Used   Substance and Sexual Activity    Alcohol use: Yes       Comment: 1 wine drink q 3-4 months    Drug use: Never    Sexual activity: Defer         ALLERGIES  Levaquin [levofloxacin], Zocor [simvastatin], Atorvastatin, Codeine, Penicillins, and Percocet [oxycodone-acetaminophen]  Home medications reviewed     REVIEW OF SYSTEMS  All systems reviewed and negative except for those discussed in HPI.     PHYSICAL EXAM   Blood pressure 123/68, pulse 70, temperature 97.7 °F (36.5 °C), temperature source Oral, resp. rate 17, height 160 cm (63\"), weight 61 kg (134 lb 8 oz), SpO2 98%.    Constitutional:       General: She is not in acute distress.     Appearance: She is well-developed. She is ill-appearing .   HENT:      Head: " Normocephalic and atraumatic.   Eyes:      General: No scleral icterus.     Conjunctiva/sclera: Conjunctivae normal.   Neck:      Trachea: No tracheal deviation.   Cardiovascular:      Rate and Rhythm: Normal rate and regular rhythm.   Pulmonary:      Effort: Pulmonary effort is normal.      Breath sounds: Normal breath sounds.   Abdominal:      Palpations: Abdomen is soft.      Tenderness: There is generalized abdominal tenderness. There is no guarding.   Musculoskeletal:         General: No deformity.      Cervical back: Normal range of motion.   Lymphadenopathy:      Cervical: No cervical adenopathy.   Skin:     General: Skin is warm and dry.   Neurological:      Mental Status: She is alert and oriented to person, place, and time.   Psychiatric:         Behavior: Behavior normal.      LAB RESULTS  Lab Results (last 24 hours)       Procedure Component Value Units Date/Time    Comprehensive Metabolic Panel [[id number]]  (Abnormal) Collected: 05/13/25 0530    Specimen: Blood from Arm, Left Updated: 05/13/25 0644     Glucose 94 mg/dL      BUN 15 mg/dL      Creatinine 0.86 mg/dL      Sodium 131 mmol/L      Potassium 5.4 mmol/L      Chloride 103 mmol/L      CO2 20.1 mmol/L      Calcium 9.3 mg/dL      Total Protein 7.6 g/dL      Albumin 3.3 g/dL      ALT (SGPT) 14 U/L      AST (SGOT) 25 U/L      Alkaline Phosphatase 118 U/L      Total Bilirubin 0.9 mg/dL      Globulin 4.3 gm/dL      A/G Ratio 0.8 g/dL      BUN/Creatinine Ratio 17.4     Anion Gap 7.9 mmol/L      eGFR 67.5 mL/min/1.73     Narrative:      GFR Categories in Chronic Kidney Disease (CKD)              GFR Category          GFR (mL/min/1.73)    Interpretation  G1                    90 or greater        Normal or high (1)  G2                    60-89                Mild decrease (1)  G3a                   45-59                Mild to moderate decrease  G3b                   30-44                Moderate to severe decrease  G4                    15-29                 Severe decrease  G5                    14 or less           Kidney failure    (1)In the absence of evidence of kidney disease, neither GFR category G1 or G2 fulfill the criteria for CKD.    eGFR calculation 2021 CKD-EPI creatinine equation, which does not include race as a factor    Manual Differential [[id number]]  (Abnormal) Collected: 05/13/25 0530    Specimen: Blood from Arm, Left Updated: 05/13/25 0642     Neutrophil % 44.0 %      Lymphocyte % 53.0 %      Monocyte % 0.0 %      Eosinophil % 1.0 %      Basophil % 2.0 %      Neutrophils Absolute 0.66 10*3/mm3      Lymphocytes Absolute 0.79 10*3/mm3      Monocytes Absolute 0.00 10*3/mm3      Eosinophils Absolute 0.01 10*3/mm3      Basophils Absolute 0.03 10*3/mm3      RBC Morphology Normal     WBC Morphology Normal     Platelet Morphology Normal    CBC & Differential [[id number]]  (Abnormal) Collected: 05/13/25 0530    Specimen: Blood from Arm, Left Updated: 05/13/25 0615    Narrative:      The following orders were created for panel order CBC & Differential.  Procedure                               Abnormality         Status                     ---------                               -----------         ------                     CBC Auto Differential[[id number]]        Abnormal            Final result                 Please view results for these tests on the individual orders.    CBC Auto Differential [[id number]]  (Abnormal) Collected: 05/13/25 0530    Specimen: Blood from Arm, Left Updated: 05/13/25 0615     WBC 1.49 10*3/mm3      RBC 3.31 10*6/mm3      Hemoglobin 11.2 g/dL      Hematocrit 33.4 %      .9 fL      MCH 33.8 pg      MCHC 33.5 g/dL      RDW 12.0 %      RDW-SD 43.4 fl      MPV 10.9 fL      Platelets 19 10*3/mm3           Imaging Results (Last 24 Hours)       ** No results found for the last 24 hours. **            Current Facility-Administered Medications:     acetaminophen (TYLENOL) tablet 650 mg, 650 mg, Oral, Q4H PRN, 650 mg at 05/11/25 2019  **OR** acetaminophen (TYLENOL) 160 MG/5ML oral solution 650 mg, 650 mg, Oral, Q4H PRN **OR** acetaminophen (TYLENOL) suppository 650 mg, 650 mg, Rectal, Q4H PRN, Ketan Dunaway II, MD    albuterol (PROVENTIL) nebulizer solution 0.083% 2.5 mg/3mL, 2.5 mg, Nebulization, Q6H PRN, Ketan Dunaway II, MD    allopurinol (ZYLOPRIM) tablet 100 mg, 100 mg, Oral, Daily, Ketan Dunaway II, MD, 100 mg at 05/13/25 1046    bisacodyl (DULCOLAX) EC tablet 5 mg, 5 mg, Oral, Daily PRN, Ketan Dunaway II, MD    Calcium Replacement - Follow Nurse / BPA Driven Protocol, , Not Applicable, PRN, Ketan Dunaway II, MD    dicyclomine (BENTYL) capsule 10 mg, 10 mg, Oral, Q8H PRN, Ketan Dunaway II, MD, 10 mg at 05/12/25 1146    fluticasone (FLONASE) 50 MCG/ACT nasal spray 2 spray, 2 spray, Nasal, Daily, Ketan Dunaway II, MD, 2 spray at 05/13/25 1048    folic acid (FOLVITE) tablet 1 mg, 1 mg, Oral, Daily, Ketan Dunaway II, MD, 1 mg at 05/13/25 1046    Magnesium Standard Dose Replacement - Follow Nurse / BPA Driven Protocol, , Not Applicable, PRN, Ketan Dunaway II, MD    methocarbamol (ROBAXIN) tablet 500 mg, 500 mg, Oral, Q8H PRN, Ketan Dunaway II, MD    metoprolol tartrate (LOPRESSOR) tablet 25 mg, 25 mg, Oral, Q12H, Ketan Dunaway II, MD, 25 mg at 05/13/25 1046    morphine injection 4 mg, 4 mg, Intravenous, Q4H PRN, Ketan Dunaway II, MD    ondansetron ODT (ZOFRAN-ODT) disintegrating tablet 4 mg, 4 mg, Oral, Q6H PRN, 4 mg at 05/11/25 1748 **OR** ondansetron (ZOFRAN) injection 4 mg, 4 mg, Intravenous, Q6H PRN, Ketan Dunaway II, MD, 4 mg at 05/12/25 2146    pantoprazole (PROTONIX) EC tablet 40 mg, 40 mg, Oral, Q AM, Ketan Dunaway II, MD, 40 mg at 05/13/25 0518    Phosphorus Replacement - Follow Nurse / BPA Driven Protocol, , Not Applicable, PRN, Ketan Dunaway II, MD    polyethylene glycol (MIRALAX) packet 17 g, 17 g, Oral, Daily PRN, Ketan Dunaway  Michael MARMOLEJO MD    potassium chloride (KLOR-CON M20) CR tablet 20 mEq, 20 mEq, Oral, BID, Ketan Dunaway II, MD, 20 mEq at 05/13/25 1046    Potassium Replacement - Follow Nurse / BPA Driven Protocol, , Not Applicable, PRN, Ketan Dunaway II, MD    rOPINIRole (REQUIP) tablet 0.5 mg, 0.5 mg, Oral, Nightly, Ketan Dunaway II, MD, 0.5 mg at 05/12/25 2010    sennosides-docusate (PERICOLACE) 8.6-50 MG per tablet 2 tablet, 2 tablet, Oral, BID, Ketan Dunaway II, MD    sodium chloride 0.9 % flush 10 mL, 10 mL, Intravenous, PRN, Ketan Dunaway II, MD, 10 mL at 05/11/25 1640    sodium chloride 0.9 % flush 10 mL, 10 mL, Intravenous, Q12H, Ketan Dunaway II, MD, 10 mL at 05/13/25 1048    sodium chloride 0.9 % flush 10 mL, 10 mL, Intravenous, PRN, Ketan Dunaway II, MD    sodium chloride 0.9 % infusion 40 mL, 40 mL, Intravenous, PRN, Ketan Dunaway II, MD    sodium chloride 0.9 % infusion, 75 mL/hr, Intravenous, Continuous, Keaton Almaguer PA, Last Rate: 75 mL/hr at 05/12/25 2224, 75 mL/hr at 05/12/25 2224    temazepam (RESTORIL) capsule 15 mg, 15 mg, Oral, Nightly PRN, Ketan Dunaway II, MD    traMADol (ULTRAM) tablet 50 mg, 50 mg, Oral, Q12H PRN, Ketan Dunaway II, MD, 50 mg at 05/12/25 2009    vitamin B-12 (CYANOCOBALAMIN) tablet 1,000 mcg, 1,000 mcg, Oral, Daily, Ketan Dunaway II, MD, 1,000 mcg at 05/13/25 1046    [START ON 5/18/2025] vitamin D (ERGOCALCIFEROL) capsule 50,000 Units, 50,000 Units, Oral, Q7 Days, Ketan Dunaway II, MD     ASSESSMENT  Acute norovirus gastroenteritis  Pancytopenia secondary to chemotherapy  Non-small cell lung carcinoma on chemotherapy  Hypertension  Hyperlipidemia  Restless leg syndrome  Gastroesophageal reflux disease    PLAN  Agree with current care  Continue IV fluid  Transfuse as needed per hematology  Monitor H&H and platelets  Symptomatic treatment for abdominal pain nausea vomiting   Change Protonix to Pepcid  Stop all  stool softeners  Stress ulcer DVT prophylaxis  Supportive care  PT OT  Patient is full code  Discussed with nursing staff  Follow closely further recommendation according to hospital course    KASHMIR JIMENEZ MD

## 2025-05-13 NOTE — PROGRESS NOTES
[facility] INPATIENT PROGRESS NOTE    Length of Stay:  0 days    CHIEF COMPLAINT/REASON FOR VISIT:  Thrombocytopenia  Non-small cell lung cancer, on therapy with pemetrexed    SUBJECTIVE: Afebrile.  Blood pressure running a little bit low.  On room air.  States nausea, diarrhea continue to improve. Less abdominal bloating. No bleeding     ROS:  14 systems reviewed with pertinent positives and negatives in the HPI. Reviewed today.    OBJECTIVE:  Vitals:    05/12/25 1956 05/12/25 2341 05/13/25 0455 05/13/25 0700   BP: 115/54 102/57 113/62 103/59   BP Location: Left arm Left arm Left arm Left arm   Patient Position: Lying Lying Lying Lying   Pulse: 93 65 66 69   Resp: 17 17 17 17   Temp: 97.5 °F (36.4 °C) 97.3 °F (36.3 °C) 97.8 °F (36.6 °C) 98.1 °F (36.7 °C)   TempSrc: Oral Oral Oral Oral   SpO2: 96% 96% 97% 99%   Weight:       Height:             PHYSICAL EXAMINATION:   General:  No acute distress, awake, alert and oriented  Skin:  Warm and dry, no visible rash  HEENT:  Normocephalic/atraumatic.    Chest:  Normal respiratory effort  Abdomen appears less distended today  Extremities:  No visible clubbing, cyanosis, or edema  Neuro/psych:  Grossly nonfocal.  Normal mood and affect.       DIAGNOSTIC DATA:  Results Review:     I reviewed the patient's new clinical results.    Results from last 7 days   Lab Units 05/13/25  0530   WBC 10*3/mm3 1.49*   HEMOGLOBIN g/dL 11.2*   HEMATOCRIT % 33.4*   PLATELETS 10*3/mm3 19*     Lab Results   Component Value Date    NEUTROABS 0.66 (L) 05/13/2025     Results from last 7 days   Lab Units 05/13/25  0530   SODIUM mmol/L 131*   POTASSIUM mmol/L 5.4*   CHLORIDE mmol/L 103   CO2 mmol/L 20.1*   BUN mg/dL 15   CREATININE mg/dL 0.86   GLUCOSE mg/dL 94   CALCIUM mg/dL 9.3         Results from last 7 days   Lab Units 05/12/25  0349   MAGNESIUM mg/dL 2.6*         IMAGING:    None reviewed    ASSESSMENT:  This is a 80 y.o. female with:     *Non-small cell lung cancer  Initially  treated with SBRT to the left lower lobe and right supraclavicular cancers, suspected to be non-squamous  Subsequent progression  Palliative pemetrexed with a 25% dose reduction administered on 5/7/2025     *Thrombocytopenia  Platelets were 92,000 on 5/7 on the day of chemotherapy  Thrombocytopenia secondary to chemotherapy with pemetrexed  Platelets 19,000, from 29,000, from 41,000, from 92,000     *Acute gastroenteritis, norovirus infection  Presented with abdominal pain, anorexia, diarrhea  Symptoms slowly improving     *Leukopenia  Also secondary to chemotherapy  White blood cell count 1.49, from 2.91, from 4.44  ANC 0.66     *Macrocytic anemia  Hemoglobin 11.2, from 11.0     *Mild hyponatremia  Sodium 131, from 134, from 134     *Hypomagnesemia with improvement     *History of falls, vertebral, right hip fracture     RECOMMENDATIONS/PLAN:   Daily CBC while admitted  No G-CSF for now  Transfuse platelets as needed for less than 10,000 or as needed for bleeding  Supportive care for norovirus infection  Scheduled for follow-up in the office on 5/21  With cycle #2 she will certainly need a dose reduction  We will follow  Discussed with the patient    Lobo Schaeffer MD

## 2025-05-14 ENCOUNTER — TELEPHONE (OUTPATIENT)
Dept: ONCOLOGY | Facility: CLINIC | Age: 80
End: 2025-05-14

## 2025-05-14 LAB
ALBUMIN SERPL-MCNC: 3.1 G/DL (ref 3.5–5.2)
ALBUMIN/GLOB SERPL: 0.8 G/DL
ALP SERPL-CCNC: 137 U/L (ref 39–117)
ALT SERPL W P-5'-P-CCNC: 19 U/L (ref 1–33)
ANION GAP SERPL CALCULATED.3IONS-SCNC: 10 MMOL/L (ref 5–15)
AST SERPL-CCNC: 29 U/L (ref 1–32)
BILIRUB SERPL-MCNC: 0.7 MG/DL (ref 0–1.2)
BUN SERPL-MCNC: 12 MG/DL (ref 8–23)
BUN/CREAT SERPL: 15.8 (ref 7–25)
CALCIUM SPEC-SCNC: 8.8 MG/DL (ref 8.6–10.5)
CHLORIDE SERPL-SCNC: 107 MMOL/L (ref 98–107)
CHOLEST SERPL-MCNC: 119 MG/DL (ref 0–200)
CO2 SERPL-SCNC: 16 MMOL/L (ref 22–29)
CREAT SERPL-MCNC: 0.76 MG/DL (ref 0.57–1)
DEPRECATED RDW RBC AUTO: 44.8 FL (ref 37–54)
EGFRCR SERPLBLD CKD-EPI 2021: 78.3 ML/MIN/1.73
ERYTHROCYTE [DISTWIDTH] IN BLOOD BY AUTOMATED COUNT: 12.1 % (ref 12.3–15.4)
GLOBULIN UR ELPH-MCNC: 4 GM/DL
GLUCOSE SERPL-MCNC: 75 MG/DL (ref 65–99)
HBA1C MFR BLD: 5.2 % (ref 4.8–5.6)
HCT VFR BLD AUTO: 32 % (ref 34–46.6)
HDLC SERPL-MCNC: 53 MG/DL (ref 40–60)
HGB BLD-MCNC: 11.2 G/DL (ref 12–15.9)
LDLC SERPL CALC-MCNC: 51 MG/DL (ref 0–100)
LDLC/HDLC SERPL: 0.97 {RATIO}
MAGNESIUM SERPL-MCNC: 1.5 MG/DL (ref 1.6–2.4)
MCH RBC QN AUTO: 35.7 PG (ref 26.6–33)
MCHC RBC AUTO-ENTMCNC: 35 G/DL (ref 31.5–35.7)
MCV RBC AUTO: 101.9 FL (ref 79–97)
NT-PROBNP SERPL-MCNC: 927 PG/ML (ref 0–1800)
PLATELET # BLD AUTO: 10 10*3/MM3 (ref 140–450)
PMV BLD AUTO: 9.5 FL (ref 6–12)
POTASSIUM SERPL-SCNC: 4.1 MMOL/L (ref 3.5–5.2)
PROT SERPL-MCNC: 7.1 G/DL (ref 6–8.5)
RBC # BLD AUTO: 3.14 10*6/MM3 (ref 3.77–5.28)
SODIUM SERPL-SCNC: 133 MMOL/L (ref 136–145)
TRIGL SERPL-MCNC: 74 MG/DL (ref 0–150)
TSH SERPL DL<=0.05 MIU/L-ACNC: 4.97 UIU/ML (ref 0.27–4.2)
VLDLC SERPL-MCNC: 15 MG/DL (ref 5–40)
WBC NRBC COR # BLD AUTO: 0.69 10*3/MM3 (ref 3.4–10.8)

## 2025-05-14 PROCEDURE — 63710000001 ONDANSETRON ODT 4 MG TABLET DISPERSIBLE: Performed by: EMERGENCY MEDICINE

## 2025-05-14 PROCEDURE — 36430 TRANSFUSION BLD/BLD COMPNT: CPT

## 2025-05-14 PROCEDURE — 25010000002 FILGRASTIM 300 MCG/0.5ML SOLUTION PREFILLED SYRINGE: Performed by: INTERNAL MEDICINE

## 2025-05-14 PROCEDURE — P9035 PLATELET PHERES LEUKOREDUCED: HCPCS

## 2025-05-14 PROCEDURE — 80061 LIPID PANEL: CPT | Performed by: HOSPITALIST

## 2025-05-14 PROCEDURE — 85025 COMPLETE CBC W/AUTO DIFF WBC: CPT | Performed by: INTERNAL MEDICINE

## 2025-05-14 PROCEDURE — 25010000002 MAGNESIUM SULFATE 2 GM/50ML SOLUTION: Performed by: HOSPITALIST

## 2025-05-14 PROCEDURE — 83880 ASSAY OF NATRIURETIC PEPTIDE: CPT | Performed by: HOSPITALIST

## 2025-05-14 PROCEDURE — 83735 ASSAY OF MAGNESIUM: CPT | Performed by: HOSPITALIST

## 2025-05-14 PROCEDURE — P9100 PATHOGEN TEST FOR PLATELETS: HCPCS

## 2025-05-14 PROCEDURE — 84443 ASSAY THYROID STIM HORMONE: CPT | Performed by: HOSPITALIST

## 2025-05-14 PROCEDURE — 63710000001 DIPHENHYDRAMINE PER 50 MG: Performed by: HOSPITALIST

## 2025-05-14 PROCEDURE — 80053 COMPREHEN METABOLIC PANEL: CPT | Performed by: HOSPITALIST

## 2025-05-14 PROCEDURE — 99233 SBSQ HOSP IP/OBS HIGH 50: CPT | Performed by: INTERNAL MEDICINE

## 2025-05-14 PROCEDURE — 83036 HEMOGLOBIN GLYCOSYLATED A1C: CPT | Performed by: HOSPITALIST

## 2025-05-14 RX ORDER — DIPHENHYDRAMINE HCL 25 MG
25 CAPSULE ORAL ONCE
Status: COMPLETED | OUTPATIENT
Start: 2025-05-14 | End: 2025-05-14

## 2025-05-14 RX ORDER — SODIUM BICARBONATE 650 MG/1
650 TABLET ORAL 3 TIMES DAILY
Status: DISCONTINUED | OUTPATIENT
Start: 2025-05-14 | End: 2025-05-16

## 2025-05-14 RX ORDER — MAGNESIUM SULFATE HEPTAHYDRATE 40 MG/ML
2 INJECTION, SOLUTION INTRAVENOUS
Status: COMPLETED | OUTPATIENT
Start: 2025-05-14 | End: 2025-05-14

## 2025-05-14 RX ORDER — DIPHENHYDRAMINE HCL 25 MG
25 CAPSULE ORAL EVERY 4 HOURS PRN
Status: DISCONTINUED | OUTPATIENT
Start: 2025-05-14 | End: 2025-05-15

## 2025-05-14 RX ADMIN — ROPINIROLE 0.5 MG: 1 TABLET, FILM COATED ORAL at 20:33

## 2025-05-14 RX ADMIN — MAGNESIUM SULFATE HEPTAHYDRATE 2 G: 40 INJECTION, SOLUTION INTRAVENOUS at 10:17

## 2025-05-14 RX ADMIN — FAMOTIDINE 20 MG: 20 TABLET, FILM COATED ORAL at 08:09

## 2025-05-14 RX ADMIN — MAGNESIUM SULFATE HEPTAHYDRATE 2 G: 40 INJECTION, SOLUTION INTRAVENOUS at 08:08

## 2025-05-14 RX ADMIN — ALLOPURINOL 100 MG: 100 TABLET ORAL at 08:09

## 2025-05-14 RX ADMIN — DIPHENHYDRAMINE HYDROCHLORIDE 25 MG: 25 CAPSULE ORAL at 20:33

## 2025-05-14 RX ADMIN — Medication 1000 MCG: at 08:09

## 2025-05-14 RX ADMIN — METOPROLOL TARTRATE 25 MG: 25 TABLET, FILM COATED ORAL at 20:33

## 2025-05-14 RX ADMIN — DIPHENHYDRAMINE HYDROCHLORIDE 25 MG: 25 CAPSULE ORAL at 17:31

## 2025-05-14 RX ADMIN — ACETAMINOPHEN 650 MG: 325 TABLET, FILM COATED ORAL at 20:36

## 2025-05-14 RX ADMIN — SODIUM BICARBONATE 650 MG: 650 TABLET ORAL at 16:10

## 2025-05-14 RX ADMIN — FAMOTIDINE 20 MG: 20 TABLET, FILM COATED ORAL at 20:33

## 2025-05-14 RX ADMIN — METOPROLOL TARTRATE 25 MG: 25 TABLET, FILM COATED ORAL at 08:09

## 2025-05-14 RX ADMIN — SODIUM BICARBONATE 650 MG: 650 TABLET ORAL at 08:09

## 2025-05-14 RX ADMIN — MAGNESIUM SULFATE HEPTAHYDRATE 2 G: 40 INJECTION, SOLUTION INTRAVENOUS at 11:45

## 2025-05-14 RX ADMIN — ONDANSETRON 4 MG: 4 TABLET, ORALLY DISINTEGRATING ORAL at 18:04

## 2025-05-14 RX ADMIN — FOLIC ACID 1 MG: 1 TABLET ORAL at 08:09

## 2025-05-14 RX ADMIN — DICYCLOMINE HYDROCHLORIDE 10 MG: 10 CAPSULE ORAL at 11:45

## 2025-05-14 RX ADMIN — FILGRASTIM 300 MCG: 300 INJECTION, SOLUTION INTRAVENOUS; SUBCUTANEOUS at 16:11

## 2025-05-14 RX ADMIN — SODIUM BICARBONATE 650 MG: 650 TABLET ORAL at 20:33

## 2025-05-14 NOTE — PLAN OF CARE
Goal Outcome Evaluation:  Plan of Care Reviewed With: patient        Progress: improving  Outcome Evaluation: Alert and oriented. VSS. Tylenol given for headache. Bentyl for abdominal cramping. Up with assist, voided freely , had loose BM X2. IV Fluids on flow. Falls and Isolation precaution maintained. Rested well in between care.

## 2025-05-14 NOTE — PAYOR COMM NOTE
"Sample, Kenia N (80 y.o. Female)    PLEASE SEE ATTACHED FOR INPT AUTH  REF#[id number]    THANK YOU   LANDON HUERTAS RN/ DEPT   [facility]   PH: [phone]   FAX:  DEPT [phone]     Date of Birth   1945    Social Security Number       Address   [address] [id number]    Home Phone   [phone]    MRN   [id number]       Jainism   [facility]    Marital Status                               Admission Date   5/11/2025    Admission Type   Emergency    Admitting Provider   Bryson Burns MD    Attending Provider   Bryson Burns MD    Department, Room/Bed   [facility] [address], 76/1       Discharge Date       Discharge Disposition       Discharge Destination                                 Attending Provider: Bryson Burns MD    Allergies: Levaquin [Levofloxacin], Zocor [Simvastatin], Atorvastatin, Codeine, Penicillins, Percocet [Oxycodone-acetaminophen]    Isolation: Spore   Infection: Norovirus (05/11/25)   Code Status: CPR    Ht: 160 cm (63\")   Wt: 61 kg (134 lb 8 oz)    Admission Cmt: None   Principal Problem: Gastroenteritis [K52.9]                   Active Insurance as of 5/11/2025       Primary Coverage       Payor Plan Insurance Group Employer/Plan Group    MEDICARE MEDICARE A & B        Payor Plan Address Payor Plan Phone Number Payor Plan Fax Number Effective Dates    [address] [phone]  5/1/1993 - None Entered    [facility] [id number]         Subscriber Name Subscriber Birth Date Member ID       SAMPLE,ADA N 1945 [id number]               Secondary Coverage       Payor Plan Insurance Group Employer/Plan Group    [place]        Payor Plan Address Payor Plan Phone Number Payor Plan Fax Number Effective Dates    [address] [phone]  8/29/2016 - None Entered    [place] [address]         Subscriber Name Subscriber Birth Date Member ID       SAMPLE,ADA N 1945 [id number]                     Emergency " Contacts        (Rel.) Home Phone Work Phone Mobile Phone    SampleNabil (Spouse) [phone] -- [phone]    Kaylie Gutierrez (Daughter) -- -- [phone]              [place]: [place] [id number]  Tax ID [id number]     History & Physical        Elmira Marcano PA-C at 05/11/25 1719       Attestation signed by Ketan Dunaway II, MD at 05/11/25 2027        SHARED APC FACE TO FACE: I performed a substantive part of the MDM during the patient's E/M visit. I personally evaluated and examined the patient. I personally made or approved the documented management plan and acknowledge its risk of complications.   Ketan Dunaway II, MD 5/11/2025 20:26 EDT         MD ATTESTATION NOTE    SHARED VISIT: This visit was performed by BOTH a physician and an APC. The substantive portion of the medical decision making was performed by this attesting physician who made or approved the management plan and takes responsibility for patient management. All studies in the APC note (if performed) were independently interpreted by me.     I provided a substantive portion of the care of the patient.  I personally performed the physical exam in its entirety, and below are my findings.      Brief HPI: Patient presents with vomiting and diarrhea since Wednesday.  Associated chills but no fever.    PHYSICAL EXAM  ED Triage Vitals   Temp Heart Rate Resp BP SpO2   05/11/25 1023 05/11/25 1022 05/11/25 1022 05/11/25 1034 05/11/25 1022   97.9 °F (36.6 °C) 78 16 118/76 96 %      Temp src Heart Rate Source Patient Position BP Location FiO2 (%)   05/11/25 1620 05/11/25 1620 05/11/25 1620 05/11/25 1620 --   Oral Monitor Lying Right arm          GENERAL: no acute distress  HENT: nares patent, mucous brains are dry  EYES: no scleral icterus  CV: regular rhythm, normal rate  RESPIRATORY: normal effort  ABDOMEN: soft, nontender  MUSCULOSKELETAL: no deformity  NEURO: alert, moves all extremities, follows commands  PSYCH:  calm,  cooperative  SKIN: warm, dry    Vital signs and nursing notes reviewed.        Plan:   Continue IV fluids.  GI PCR pending.  Electrolyte replacement ordered.  Monitor labs in the morning.  Patient appears to have history of chronic thrombocytopenia.  She is on chemotherapy.                         [facility]   HISTORY AND PHYSICAL    Patient Name: Kenia Palomo  : 1945  MRN: [id number]  Primary Care Physician:  Amy Guillory MD  Date of admission: 2025    Subjective  Subjective     Chief Complaint:   Chief Complaint   Patient presents with    Abdominal Pain    Diarrhea         HPI:    Kenia Palomo is a 80 y.o. female with past medical history of NSCLC of left lung currently on chemotherapy with last treatment on 2025, GERD, hyperlipidemia, hypertension, AV stenosis, RLS, and arthritis who is admitted for abdominal pain associated with nausea and diarrhea since 2025.  Patient reports that multiple people in her family about a recent GI infection with her son and daughter-in-law still actively symptomatic and grandson newly symptomatic in the last few days with patient having had recent exposure to them.  Patient reports associated chills, unable to keep any food down since 2025 and minimal fluid intake since that time.  Patient also reports associated abdominal bloating.  Patient describes the pain as soreness and tender with being worse in the left lower quadrant but generally all over.  Patient reports that her nausea has improved since the last 2 days but her the diarrhea has been persistent.  Patient denies any melena or bright red blood per rectum.    Review of Systems   All systems were reviewed and negative except for: All pertinent findings listed in the above HPI    Personal History     Past Medical History:   Diagnosis Date    Anesthesia complication     slow to wake up    Anginal pain     Arthritis     Cancer     Lung    Gastric reflux syndrome     Gout     Hiatal hernia      High cholesterol     History of bronchitis     Hyperlipidemia     Hypertension     Irregular heart beat     Mild aortic valve stenosis     per echo 6/2023    PONV (postoperative nausea and vomiting)     RLS (restless legs syndrome)        Past Surgical History:   Procedure Laterality Date    APPENDECTOMY      CARDIAC CATHETERIZATION      EXTERNAL EAR SURGERY Right     had tumor excised behind right ear lobe benign    EYE LESION EXCISION Left 6/6/2024    Procedure: LEFT LOWER EYELID BASEL CELL CARCINOMA EXCISIONAL AND REPAIR WITH FROZEN SECTIONS,;  Surgeon: Austin Kamara MD;  Location: [facility] OR;  Service: Ophthalmology;  Laterality: Left;    FEMUR IM NAILING/RODDING Right 06/15/2022    Procedure: RIGHT HIP INTRAMEDULLARY NAILING/RODDING;  Surgeon: Marc Crawford MD;  Location: [facility] OR;  Service: Orthopedics;  Laterality: Right;    GALLBLADDER SURGERY      HYSTERECTOMY      KNEE ARTHROPLASTY Right     KNEE ARTHROSCOPY Right     OR REVJ TOTAL KNEE ARTHRP W/WO ALGRFT 1 COMPONENT Right 09/07/2016    Procedure: RT ILIOTIBIAL BAND RELEASE RT TOTAL KNEE POLY EXCHANGE;  Surgeon: Toy Adame MD;  Location: [facility];  Service: Orthopedics    ROTATOR CUFF REPAIR Bilateral     US GUIDED LYMPH NODE BIOPSY  3/18/2024    VENOUS ACCESS DEVICE (PORT) INSERTION Right 5/1/2025    Procedure: INSERTION VENOUS ACCESS DEVICE;  Surgeon: Saloni Verdugo MD;  Location: [facility];  Service: Vascular;  Laterality: Right;    WRIST MASS EXCISION Right        Family History: family history includes Heart attack in her maternal grandfather; Heart failure in her mother; Hypertension in her mother. Otherwise pertinent FHx was reviewed and not pertinent to current issue.    Social History:  reports that she has quit smoking. Her smoking use included cigarettes. She has a 30 pack-year smoking history. She has been exposed to tobacco smoke. She has never used smokeless tobacco. She reports current alcohol use.  She reports that she does not use drugs.    Home Medications:  Magnesium, albuterol sulfate HFA, allopurinol, bisacodyl, dexAMETHasone, dicyclomine, fluticasone, folic acid, furosemide, lidocaine-prilocaine, methocarbamol, metoprolol tartrate, naloxone, omeprazole, ondansetron, ondansetron ODT, polyethylene glycol, potassium chloride, rOPINIRole, sennosides-docusate, temazepam, traMADol, vitamin B-12, and vitamin D    Allergies:  Allergies   Allergen Reactions    Levaquin [Levofloxacin] Hives    Zocor [Simvastatin] Myalgia    Atorvastatin Other (See Comments)    Codeine GI Intolerance    Penicillins Hives and Swelling     Pt reports tongue swelling    Percocet [Oxycodone-Acetaminophen] Itching and Nausea And Vomiting       Objective  Objective     Vitals:   Temp:  [97.7 °F (36.5 °C)-97.9 °F (36.6 °C)] 97.7 °F (36.5 °C)  Heart Rate:  [61-78] 67  Resp:  [16-18] 18  BP: (112-135)/(58-87) 120/66  Physical Exam    Constitutional: Awake, alert, sick but not acutely toxic   Eyes: PERRL, sclerae anicteric, no conjunctival injection, EOMI   HENT: NCAT, mucous membranes dry, normal hearing   Neck: Supple, nontender, trachea midline   Respiratory: Clear to auscultation bilaterally, nonlabored respirations on room air   Cardiovascular: RRR, no murmurs, palpable pedal pulses bilaterally   Gastrointestinal: Positive bowel sounds, soft, tender throughout lower abdomen but worse on left side no rebound or guarding appreciated, nondistended   Musculoskeletal: No bilateral ankle edema, no clubbing or cyanosis to extremities   Psychiatric: Appropriate affect, cooperative   Neurologic: Oriented x 3, strength symmetric in all extremities, Cranial Nerves grossly intact to confrontation, speech clear   Skin: No rashes, dry    Result Review   Result Review:  I have personally reviewed the results from the time of this admission to 5/11/2025 17:19 EDT and agree with these findings:  [x]  Laboratory list / accordion  []  Microbiology  [x]   Radiology  []  EKG/Telemetry   []  Cardiology/Vascular   []  Pathology  []  Old records  []  Other:  Most notable findings include: Sodium 134, potassium 4.6, serum creatinine 1.2, magnesium 1.5, AST ALT within normal limits, lactic 1.1, lipase 44, WBC 4.44, hemoglobin 12.7, and UA appears clear with trace ketones, negative nitrates, trace leukocytes, negative protein, negative WBC and bacteria.  CT abdomen pelvis with shows areas of perienteric fat stranding and some mesenteric edema and prominent perienteric vasculature or suspect enteritis, colonic diverticulosis, small amount of gas and fluid in the colon suggestive of underlying diarrhea, stable findings at the lung bases incompletely imaged.      Assessment & Plan  The ASCVD Risk score (Khloe CALLE, et al., 2019) failed to calculate for the following reasons:    The 2019 ASCVD risk score is only valid for ages 40 to 79    Assessment / Plan     Brief Patient Summary:  Ada N Sample is a 80 y.o. female who is admitted for gastroenteritis    Active Hospital Problems:  Active Hospital Problems    Diagnosis     **Gastroenteritis      Plan:     Gastroenteritis:  - GI PCR pending  - Supportive care with IV fluids and as needed antiemetics and pain medications  - Encourage good p.o. hydration and advance diet as tolerated  - Continue to monitor labs and reevaluate in the morning    Hypomagnesia:  - Replacement per protocol    NSCLC of left lung:  - Status post chemotherapy on 5/7/2025  - Platelets low at 41 (baseline about 80); continue to follow and if worsens may need heme-onc consult    Hyperlipidemia  Hypertension:  - Continue home regimen    GERD:  - Continue home regimen    RLS:  - Continue home regimen      VTE Prophylaxis:  Mechanical VTE prophylaxis orders are present.        CODE STATUS:    Code Status (Patient has no pulse and is not breathing): CPR (Attempt to Resuscitate)  Medical Interventions (Patient has pulse or is breathing): Full Support  Level Of  Support Discussed With: Patient    Admission Status:  I believe this patient meets observation status.    Electronically signed by Elmira Marcano PA-C, 05/11/25, 5:19 PM EDT.        75 minutes has been spent by [facility] providers in the care of this patient while under observation status on this date 05/11/25        I have worn appropriate PPE during this patient encounter, sanitized my hands both with entering and exiting patient's room.    I have discussed plan of care with patient including advance care plan and/or surrogate decision maker.  Patient advises that their  Nabil will be their primary surrogate decision maker         Electronically signed by Ketan Dunaway II, MD at 05/11/25 2027          Emergency Department Notes        Leeann Waite, RN at 05/11/25 1514          Nursing report ED to floor  Ada N Sample  80 y.o.  female    HPI :  HPI  Stated Reason for Visit: abd pain    Chief Complaint  Chief Complaint   Patient presents with    Abdominal Pain    Diarrhea       Admitting doctor:   Ketan Dunaway II, MD    Admitting diagnosis:   The primary encounter diagnosis was Enteritis. Diagnoses of Abnormal CT scan and Thrombocytopenia were also pertinent to this visit.    Code status:   Current Code Status       Date Active Code Status Order ID Comments User Context       Prior            Allergies:   Levaquin [levofloxacin], Zocor [simvastatin], Atorvastatin, Codeine, Penicillins, and Percocet [oxycodone-acetaminophen]    Isolation:   Contact Spore    Intake and Output    Intake/Output Summary (Last 24 hours) at 5/11/2025 1515  Last data filed at 5/11/2025 1345  Gross per 24 hour   Intake 1000 ml   Output --   Net 1000 ml       Weight:       05/11/25  1034   Weight: 68 kg (150 lb)       Most recent vitals:   Vitals:    05/11/25 1301 05/11/25 1331 05/11/25 1401 05/11/25 1431   BP: 128/87 126/83 135/75 125/72   Pulse: 67 65 75 70   Resp:   16     Temp:       SpO2: 98% 98% 98% 98%   Weight:       Height:           Active LDAs/IV Access:   Lines, Drains & Airways       Active LDAs       Name Placement date Placement time Site Days    Peripheral IV 05/11/25 1043 20 G Right Antecubital 05/11/25  1043  Antecubital  less than 1    Single Lumen Implantable Port 05/01/25 Right Internal jugular 05/01/25  1000  Internal jugular  10                    Imaging results:  CT Abdomen Pelvis With Contrast  Result Date: 5/11/2025   IMPRESSION:   1. Areas of perienteric fat stranding and some mesenteric edema and prominent perienteric vasculature. Suspect enteritis. 2. Colonic diverticulosis. 3. Small amount of gas and fluid in the colon suggestive of underlying diarrhea. 4. Stable findings at the lung bases, incompletely imaged  This report was finalized on 5/11/2025 11:30 AM by Dr. Shabbir Booker M.D on Workstation: [id number]        Ambulatory status:   - assist    Social issues:   Social History     Socioeconomic History    Marital status:      Spouse name: Nabil   Tobacco Use    Smoking status: Former     Current packs/day: 1.00     Average packs/day: 1 pack/day for 30.0 years (30.0 ttl pk-yrs)     Types: Cigarettes     Passive exposure: Past    Smokeless tobacco: Never   Vaping Use    Vaping status: Never Used   Substance and Sexual Activity    Alcohol use: Yes     Comment: 1 wine drink q 3-4 months    Drug use: Never    Sexual activity: Defer       Peripheral Neurovascular  Peripheral Neurovascular (Adult)  Peripheral Neurovascular WDL: WDL    Neuro Cognitive  Neuro Cognitive (Adult)  Cognitive/Neuro/Behavioral WDL: WDL  Sedation Group  POSS (Pasero Opioid-Induced Sed Scale): 1 - Awake and alert    Learning  Learning Assessment  Learning Readiness and Ability: no barriers identified    Respiratory  Respiratory WDL  Respiratory WDL: WDL    Abdominal Pain       Pain Assessments  Pain (Adult)  (0-10) Pain Rating: Rest: 3  (0-10) Pain Rating: Activity:  10  Pain Location: abdomen  Pain Side/Orientation: generalized  Pain Description: intermittent, spasm, cramping  Response to Pain Interventions: nonverbal indicators absent/decreased, interventions effective per patient    NIH Stroke Scale       Leeann Waite RN  05/11/25 15:15 EDT      Electronically signed by Leeann Waite RN at 05/11/25 1974       John Ruiz MD at 05/11/25 1105          MD ATTESTATION NOTE  I supervised care provided by the APC. We have discussed this patient's history, physical exam, and treatment plan. I have reviewed the APC's note and I agree with the APC's findings and plan of care.   SHARED VISIT: This visit was performed by BOTH a physician and an APC. The substantive portion of the medical decision making was performed by this attesting physician who made or approved the management plan and takes responsibility for patient management. All studies in the APC note (if performed) were independently interpreted by me.   I have personally had a face to face encounter with the patient.     PCP: Amy Guillory MD  Patient Care Team:  Amy Guillory MD as PCP - General (Family Medicine)  Brayden Jackson MD as PCP - Family Medicine  Naif York MD as Consulting Physician (Cardiology)  Narendra Banda MD as Surgeon (General Surgery)  Shabbir Mann MD as Consulting Physician (Hematology and Oncology)  Gali Taveras APRN as Referring Physician ([facility])  Fernando Hopkins MD as Consulting Physician (Radiation Oncology)     Ada N Sample is a 80 y.o. female who presents to the ED c/o nausea, vomiting and abdominal cramping.  Patient has had 4 to 5 days of lower abdominal cramping, nausea, vomiting and diarrhea.  She received her first chemo treatment the day prior to the symptoms starting.  Her also multiple people in her household who have similar symptoms.  She states she has had nothing to eat or drink in the past 3 to 4 days.   She has had no associated fevers.  No changes in urination.    On exam:  General: NAD.  Head: NCAT.  ENT: nares patent, no scleral icterus  Neck: Supple, trachea midline.  Cardiac: regular rate and rhythm.  Lungs: normal effort, clear to auscultation bilaterally  Abdomen: Soft, nondistended, tender to palpation in the suprapubic area, no rebound tenderness, no guarding or rigidity.   Extremities: Moves all extremities well, no peripheral edema  Neuro: alert, MAEW, follows commands  Psych: calm, cooperative  Skin: Warm, dry.    Medical Decision Making:  After the initial H&P, I discussed pertinent information from history and physical exam with patient/family.  Discussed differential diagnosis.  Discussed plan for ED evaluation/work-up/treatment.  All questions answered.  Patient/family is agreeable with plan.    ED Course as of 05/11/25 1524   Sun May 11, 2025   1102 CT Abdomen Pelvis With Contrast  My independent interpretation of the imaging study is no bowel obstruction, no free air [AB]   1128 Creatinine(!): 1.20  At baseline [DC]   1152 Patient reassessed, feeling much improved.  Plan for administration of Bentyl and reassessment with p.o. challenge.  Likely discharging if her symptoms are well-controlled. [DC]   1321 Patient presentation and evaluation consistent with acute gastroenteritis.  Plan for supportive care as an outpatient and PCP follow-up.  She tolerates p.o. intake and has had good improvement of symptoms here in the ED.  She is thrombocytopenic and has been encouraged to follow-up with her oncologist in regards to repeat lab work.  Close return precautions given and all questions answered at this time. [DC]   1455 Patient was reassessed at this time after calling out for some new abdominal discomfort.  She had another episode of diarrhea and is apprehensive about leaving with the symptoms not improved.  She improved with Zofran and some pain medication and fluids initially and I plan to admit her  for continued symptom control in the observation unit overnight.  She is in agreement with this plan. [DC]   8076 I discussed the case with HOMA Ku with [facility] at this time regarding the patient.  I discussed work-up, results, concerns.  I discussed the consulting provider's desire for obs admit.   [DC]      ED Course User Index  [AB] John Ruiz MD  [DC] Toy Stinson PA       Diagnosis  Final diagnoses:   Enteritis   Abnormal CT scan   Thrombocytopenia        John Ruiz MD  05/11/25 1524      Electronically signed by John Ruiz MD at 05/11/25 1524       Toy Stinson PA at 05/11/25 1024       Attestation signed by John Ruiz MD at 05/12/25 1602          SHARED APC FACE TO FACE: I performed a substantive part of the MDM during the patient's E/M visit. I personally evaluated and examined the patient. I personally made or approved the documented management plan and acknowledge its risk of complications.   John Ruiz MD 5/12/2025 16:02 EDT                    EMERGENCY DEPARTMENT ENCOUNTER      Room Number:  01/01  PCP: Amy Guillory MD  Independent Historians: Patient/Family  Patient Care Team:  Amy Guillory MD as PCP - General (Family Medicine)  Brayden Jackson MD as PCP - Family Medicine  Naif York MD as Consulting Physician (Cardiology)  Narendra Banda MD as Surgeon (General Surgery)  Shabbir Mann MD as Consulting Physician (Hematology and Oncology)  Gali Taveras APRN as Referring Physician ([facility])  Fernando Hopkins MD as Consulting Physician (Radiation Oncology)       HPI:  Chief Complaint: Abdominal pain    A complete HPI/ROS/PMH/PSH/SH/FH are unobtainable due to: None    Chronic or social conditions impacting patient care (Social Determinants of Health): None      Context: Ada N Sample is a 80 y.o. female with a PMH significant for hypertension, hyperlipidemia, GERD who presents to the ED c/o acute generalized  abdominal cramping with decreased appetite and diarrhea for the past several days.  The patient had chemotherapy on Wednesday for breast cancer and started noticing symptoms the following day.  She also reports that she been exposed to people in her household who have very similar symptoms.  She denies fever, chills, vomiting.  Reports 10-15 episodes of diarrhea in the past 24 hours.  No blood in the stool.      Upon review of prior external notes (non-ED) -and- Review of prior external test results outside of this encounter it appears the patient was evaluated in the office with oncology for non-squamous cell carcinoma of the lung on May 7, 2025.  The patient had a normal creatinine on 7/1/2024 and a normal glucose on/14/25.      PAST MEDICAL HISTORY  Active Ambulatory Problems     Diagnosis Date Noted    Snapping right knee 09/07/2016    HTN (hypertension) 06/15/2022    HLD (hyperlipidemia) 06/15/2022    GERD (gastroesophageal reflux disease) 06/15/2022    Closed comminuted intertrochanteric fracture of proximal end of right femur 06/14/2022    Thrombocytopenia 06/16/2022    Unable to ambulate 12/04/2023    Right hip pain 12/04/2023    Degenerative disc disease, thoracic and lumbar 12/06/2023    NSCLC of left lung 02/19/2024    Unable to bear weight 10/08/2024    Fall 10/08/2024    T12 compression fracture 10/08/2024    Closed compression fracture of L1 vertebra 10/08/2024    Compression fracture of L2 lumbar vertebra 10/09/2024    Lumbar pain 10/09/2024    Fitting and adjustment of vascular catheter 05/07/2025     Resolved Ambulatory Problems     Diagnosis Date Noted    No Resolved Ambulatory Problems     Past Medical History:   Diagnosis Date    Anesthesia complication     Anginal pain     Arthritis     Cancer     Gastric reflux syndrome     Gout     Hiatal hernia     High cholesterol     History of bronchitis     Hyperlipidemia     Hypertension     Irregular heart beat     Mild aortic valve stenosis     PONV  (postoperative nausea and vomiting)     RLS (restless legs syndrome)        ALLERGIES  Levaquin [levofloxacin], Zocor [simvastatin], Atorvastatin, Codeine, Penicillins, and Percocet [oxycodone-acetaminophen]      REVIEW OF SYSTEMS  Included in HPI  All systems reviewed and negative except for those discussed in HPI.      PHYSICAL EXAM    I have reviewed the triage vital signs and nursing notes.    ED Triage Vitals   Temp Heart Rate Resp BP SpO2   05/11/25 1023 05/11/25 1022 05/11/25 1022 -- 05/11/25 1022   97.9 °F (36.6 °C) 78 16  96 %      Temp src Heart Rate Source Patient Position BP Location FiO2 (%)   -- -- -- -- --              Physical Exam  Constitutional:       General: She is not in acute distress.     Appearance: She is well-developed. She is ill-appearing (Chronically). She is not toxic-appearing.   HENT:      Head: Normocephalic and atraumatic.   Eyes:      General: No scleral icterus.     Conjunctiva/sclera: Conjunctivae normal.   Neck:      Trachea: No tracheal deviation.   Cardiovascular:      Rate and Rhythm: Normal rate and regular rhythm.   Pulmonary:      Effort: Pulmonary effort is normal.      Breath sounds: Normal breath sounds.   Chest:       Abdominal:      Palpations: Abdomen is soft.      Tenderness: There is generalized abdominal tenderness. There is no guarding.   Musculoskeletal:         General: No deformity.      Cervical back: Normal range of motion.   Lymphadenopathy:      Cervical: No cervical adenopathy.   Skin:     General: Skin is warm and dry.   Neurological:      Mental Status: She is alert and oriented to person, place, and time.   Psychiatric:         Behavior: Behavior normal.         Vital signs and nursing notes reviewed.      PPE: I wore a surgical mask throughout my encounters with the pt. I performed hand hygiene on entry into the pt room and upon exit.       RADIOLOGY  CT Abdomen Pelvis With Contrast  Result Date: 5/11/2025  CT ABDOMEN PELVIS W CONTRAST-  INDICATION:  Diarrhea, nausea, abdominal pain. Being treated for cancer.  COMPARISON: CT abdomen pelvis April 2, 2025  TECHNIQUE: Routine CT abdomen/pelvis with IV contrast. Coronal and sagittal reformats. Radiation dose reduction techniques were utilized, including automated exposure control and exposure modulation based on body size.  FINDINGS:  Lung bases: Subsegmental atelectasis or scarring at the bases. Calcified pulmonary nodules in the right lower lobe, consistent with prior granulomatous infection. Spiculated cavitating nodule in the right middle lobe, series 2, axial mage 4, measuring 3.0 cm, appears stable. Solid pulmonary nodule the right middle lobe, series 2, axial mage 1, incompletely imaged, measuring 9 mm, appears stable. Small airways wall thickening at the bases. Paramediastinal heterogeneous opacity in the left lower lobe, incompletely imaged, similar to prior. Coronary artery atherosclerotic calcifications.  ABDOMEN: Small cyst seen in the lateral segment left hepatic lobe, segment 3. Calcifications in the liver and spleen, consistent with prior granulomatous infection. Pneumobilia. Cholecystectomy. Common bile duct is at upper limits in size, measuring 1 cm, unchanged. Spleen is normal in size. Mild pancreatic atrophy. No pancreatic ductal dilatation or mass seen. No adrenal nodules. Couple small left renal cyst. No solid-appearing renal mass or hydronephrosis.  Pelvis: Streak artifact from right femoral gamma nail limits evaluation of the pelvis. Bladder is collapsed. No bladder calculus. Hysterectomy. No adnexal mass.  Bowel: Stomach is under distended. Incidental duodenal diverticulum's. Some loose stool seen in the rectum. Colonic diverticulosis. Colon is under distended. Some gas and fluid in the colonic lumen. Appendix not identified though no secondary findings of appendicitis. Areas of perienteric stranding greatest in the pelvis and some mesenteric edema with prominent perienteric vasculature, with  the small bowel overall under distended though concerning for enteritis. No extraluminal gas. No fluid collection.  Abdominal wall: Rectus diastases. Pelvic wall scarring. Fat-containing inguinal hernias.  Retroperitoneum: No lymphadenopathy.  Vasculature: Patent. Moderate to severe aortoiliac atherosclerotic calcification. No abdominal aortic aneurysm.  Osseous structures: Open reduction internal fixation of the right proximal femur with gamma nail and interlocking femoral neck dynamic compression screw. Decreased bone mineralization. Compression deformity seen at T12, L1, L2 and at L4, appears stable. No new compression deformities. Degenerative changes seen between the lumbar spinous processes.       IMPRESSION:   1. Areas of perienteric fat stranding and some mesenteric edema and prominent perienteric vasculature. Suspect enteritis. 2. Colonic diverticulosis. 3. Small amount of gas and fluid in the colon suggestive of underlying diarrhea. 4. Stable findings at the lung bases, incompletely imaged  This report was finalized on 5/11/2025 11:30 AM by Dr. Shabbir Booker M.D on Workstation: [id number]          MEDICATIONS GIVEN IN ER  Medications   sodium chloride 0.9 % flush 10 mL (has no administration in time range)   sodium chloride 0.9 % bolus 1,000 mL (0 mL Intravenous Stopped 5/11/25 1345)   ondansetron (ZOFRAN) injection 4 mg (4 mg Intravenous Given 5/11/25 1127)   morphine injection 4 mg (4 mg Intravenous Given 5/11/25 1127)   iopamidol (ISOVUE-300) 61 % injection 100 mL (85 mL Intravenous Given by Other 5/11/25 1050)   dicyclomine (BENTYL) capsule 20 mg (20 mg Oral Given 5/11/25 1203)         ORDERS PLACED DURING THIS VISIT:  Orders Placed This Encounter   Procedures    Clostridioides difficile Toxin - Stool, Per Rectum    Gastrointestinal Panel, PCR - Stool, Per Rectum    Clostridioides difficile Toxin, PCR - Stool, Per Rectum    CT Abdomen Pelvis With Contrast    [place] Draw    Comprehensive Metabolic  Panel    Lipase    Urinalysis With Microscopic If Indicated (No Culture) - Urine, Clean Catch    Lactic Acid, Plasma    CBC Auto Differential    Manual Differential    Urinalysis, Microscopic Only - Urine, Clean Catch    Patient Isolation Contact Spore    Insert Peripheral IV    Initiate Emergency Department Observation Status    CBC & Differential    Green Top (Gel)    Lavender Top    Gold Top - SST    Light Blue Top         OUTPATIENT MEDICATION MANAGEMENT:  Current Facility-Administered Medications Ordered in Epic   Medication Dose Route Frequency Provider Last Rate Last Admin    cyanocobalamin injection 1,000 mcg  1,000 mcg Intramuscular Q28 Days Gali Marshall APRN   1,000 mcg at 04/30/25 1206    sodium chloride 0.9 % flush 10 mL  10 mL Intravenous PRN Toy Stinson PA         Current Outpatient Medications Ordered in Epic   Medication Sig Dispense Refill    albuterol (PROVENTIL HFA;VENTOLIN HFA) 108 (90 BASE) MCG/ACT inhaler Inhale 2 puffs Every 4 (Four) Hours As Needed. Indications: Asthma      allopurinol (ZYLOPRIM) 100 MG tablet Take 1 tablet by mouth Daily.      bisacodyl (DULCOLAX) 5 MG EC tablet Take 1 tablet by mouth Daily As Needed for Constipation (Use if polyethylene glycol is ineffective).      dexAMETHasone (DECADRON) 4 MG tablet Take 1 tablet oral twice a day for 3 consecutive days beginning the day before chemotherapy and continue for 6 doses.Take with food. 30 tablet 2    dicyclomine (BENTYL) 20 MG tablet Take 1 tablet by mouth Every 6 (Six) Hours. 20 tablet 0    fluticasone (FLONASE) 50 MCG/ACT nasal spray Administer 2 sprays into the nostril(s) as directed by provider Daily.      folic acid (FOLVITE) 1 MG tablet Take 1 tablet by mouth Daily. Start at least 7 days prior to chemotherapy until at least 3 weeks after all chemotherapy. 30 tablet 6    furosemide (LASIX) 40 MG tablet Take 1 tablet by mouth 2 (Two) Times a Day.      lidocaine-prilocaine (EMLA) 2.5-2.5 % cream Apply 1  Application topically to the appropriate area as directed Every 2 (Two) Hours As Needed for Mild Pain. Apply a nickel size amount to port 30 minutes prior to access. 30 g 2    Magnesium 500 MG capsule Take 500 mg by mouth 2 (Two) Times a Day. Indications: Acid Indigestion      methocarbamol (ROBAXIN) 500 MG tablet Take 1 tablet by mouth Every 8 (Eight) Hours As Needed for Muscle Spasms (back pain). 30 tablet 0    metoprolol tartrate (LOPRESSOR) 25 MG tablet Take 1 tablet by mouth 2 (Two) Times a Day. Indications: High Blood Pressure      naloxone (NARCAN) 4 MG/0.1ML nasal spray Call 911. Don't prime. [place] in 1 nostril for overdose. Repeat in 2-3 minutes in other nostril if no or minimal breathing/responsiveness. 2 each 0    omeprazole (PriLOSEC) 40 MG capsule Take 1 capsule by mouth Every Morning. Indications: Heartburn      ondansetron (ZOFRAN) 8 MG tablet Take 1 tablet by mouth 3 (Three) Times a Day As Needed for Nausea or Vomiting. 30 tablet 5    ondansetron ODT (ZOFRAN-ODT) 4 MG disintegrating tablet Place 1 tablet on the tongue 4 (Four) Times a Day As Needed for Nausea. 20 tablet 0    polyethylene glycol (MIRALAX) 17 g packet Take 17 g by mouth Daily As Needed (Use if senna-docusate is ineffective).      potassium chloride (K-DUR,KLOR-CON) 20 MEQ CR tablet Take 1 tablet by mouth 2 (Two) Times a Day.      rOPINIRole (REQUIP) 0.5 MG tablet Take 1 tablet by mouth Every Night.      sennosides-docusate sodium (SENOKOT-S) 8.6-50 MG tablet Take 2 tablets by mouth 2 (two) times a day. 100 tablet 1    temazepam (RESTORIL) 15 MG capsule Take 1 capsule by mouth At Night As Needed for Sleep.      traMADol (Ultram) 50 MG tablet Take 1 tablet by mouth Every 12 (Twelve) Hours As Needed for Severe Pain for up to 10 doses. 10 tablet 0    vitamin B-12 (CYANOCOBALAMIN) 1000 MCG tablet Take 1 tablet by mouth Daily. Indications: Inadequate Vitamin B12      vitamin D (ERGOCALCIFEROL) 1.25 MG (32508 UT) capsule capsule Take 1  capsule by mouth Every 7 (Seven) Days. 5 capsule 0           PROGRESS, DATA ANALYSIS, CONSULTS, AND MEDICAL DECISION MAKING  All labs have been independently interpreted by me.  All radiology studies have been reviewed by me. All EKG's have been independently viewed and interpreted by me.  Discussion below represents my analysis of pertinent findings related to patient's condition, differential diagnosis, treatment plan and final disposition.      DIFFERENTIAL DIAGNOSIS INCLUDE BUT NOT LIMITED TO:     Differential diagnosis includes but is not limited to:  - Hepatobiliary pathology such as cholecystitis, cholangitis, and symptomatic cholelithiasis  - Pancreatitis  - Dyspepsia  - Small bowel obstruction  - Appendicitis  - Diverticulitis  - UTI including pyelonephritis  - Ureteral stone  - Zoster  - Colitis, including infectious and ischemic  - Atypical ACS      Clinical Scores: N/A      ED Course as of 05/11/25 1507   Sun May 11, 2025   1102 CT Abdomen Pelvis With Contrast  My independent interpretation of the imaging study is no bowel obstruction, no free air [AB]   1128 Creatinine(!): 1.20  At baseline [DC]   1152 Patient reassessed, feeling much improved.  Plan for administration of Bentyl and reassessment with p.o. challenge.  Likely discharging if her symptoms are well-controlled. [DC]   1321 Patient presentation and evaluation consistent with acute gastroenteritis.  Plan for supportive care as an outpatient and PCP follow-up.  She tolerates p.o. intake and has had good improvement of symptoms here in the ED.  She is thrombocytopenic and has been encouraged to follow-up with her oncologist in regards to repeat lab work.  Close return precautions given and all questions answered at this time. [DC]   1455 Patient was reassessed at this time after calling out for some new abdominal discomfort.  She had another episode of diarrhea and is apprehensive about leaving with the symptoms not improved.  She improved with  Zofran and some pain medication and fluids initially and I plan to admit her for continued symptom control in the observation unit overnight.  She is in agreement with this plan. [DC]   2748 I discussed the case with HOMA Ku with [facility] at this time regarding the patient.  I discussed work-up, results, concerns.  I discussed the consulting provider's desire for obs admit.   [DC]      ED Course User Index  [AB] John Ruiz MD  [DC] Toy Stinson, PA         1504 I rechecked the patient.  I discussed the patient's labs, radiology findings (including all incidental findings), diagnosis, and plan for admission. The patient understands and agrees with the plan.      AS OF 15:07 EDT VITALS:    BP - 125/72  HR - 70  TEMP - 97.9 °F (36.6 °C)  O2 SATS - 98%    COMPLEXITY OF CARE  The patient requires admission.      DIAGNOSIS  Final diagnoses:   Enteritis   Abnormal CT scan   Thrombocytopenia         DISPOSITION  ED Disposition       ED Disposition   Decision to Admit    Condition   --    Comment   --                Please note that portions of this document were completed with a voice recognition program.    Note Disclaimer: At [facility], we believe that sharing information builds trust and better relationships. You are receiving this note because you recently visited [facility]. It is possible you will see health information before a provider has talked with you about it. This kind of information can be easy to misunderstand. To help you fully understand what it means for your health, we urge you to discuss this note with your provider.         Toy Stinson PA  05/12/25 0502      Electronically signed by John Ruiz MD at 05/12/25 1602       Nadia Loomis, RN at 05/11/25 1021          Patient to ER via car from home for abd pain with not eating and diarrhea   Patient had chemo on Wednesday   Was exposed to people with the same s/s    Electronically signed by Nadia Loomis, RN at 05/11/25  "1022       Medication Administration Report for Sample, Ada N as of 5/14/25 through 5/14/25     Legend:    Given Hold Not Given Due Canceled Entry Other Actions    Time Time (Time) Time Time-Action         Discontinued     Completed     Future     MAR Hold     Linked             Medications 05/14/25      acetaminophen (TYLENOL) tablet 650 mg  Dose: 650 mg  Freq: Every 4 Hours PRN Route: PO  PRN Reason: Mild Pain  Start: 05/11/25 1506               Or   acetaminophen (TYLENOL) 160 MG/5ML oral solution 650 mg  Dose: 650 mg  Freq: Every 4 Hours PRN Route: PO  PRN Reason: Mild Pain  Start: 05/11/25 1506 End: 05/13/25 1410               Or   acetaminophen (TYLENOL) suppository 650 mg  Dose: 650 mg  Freq: Every 4 Hours PRN Route: RE  PRN Reason: Mild Pain  Start: 05/11/25 1506 End: 05/13/25 1410               albuterol (PROVENTIL) nebulizer solution 0.083% 2.5 mg/3mL  Dose: 2.5 mg  Freq: Every 6 Hours PRN Route: NEBULIZATION  PRN Reasons: Shortness of Air,Wheezing  Start: 05/11/25 1737         allopurinol (ZYLOPRIM) tablet 100 mg  Dose: 100 mg  Freq: Daily Route: PO  Start: 05/12/25 0900     Admin Instructions:   ([facility]) Take with food if GI upset occurs.      0809-Given                   Calcium Replacement - Follow Nurse / BPA Driven Protocol  Freq: As Needed Route: XX  PRN Reason: Other  Start: 05/11/25 1505     Admin Instructions:   Open Order & Select \"[facility] Electrolyte Replacement Protocol Algorithm\" to View Details         dicyclomine (BENTYL) capsule 10 mg  Dose: 10 mg  Freq: Every 8 Hours PRN Route: PO  PRN Comment: abdominal cramping  Start: 05/11/25 1507      1145-Given                   famotidine (PEPCID) tablet 20 mg  Dose: 20 mg  Freq: 2 Times Daily Route: PO  Start: 05/13/25 2100      0809-Given     2100                  Filgrastim (NEUPOGEN) injection 300 mcg  Dose: 300 mcg  Freq: Every Evening Route: SC  Start: 05/14/25 1130      1130                   folic acid (FOLVITE) tablet 1 mg  Dose: 1 mg  Freq: " "Daily Route: PO  Start: 05/12/25 0900      0809-Given                   Magnesium Standard Dose Replacement - Follow Nurse / BPA Driven Protocol  Freq: As Needed Route: XX  PRN Reason: Other  Start: 05/11/25 1505     Admin Instructions:   Open Order & Select \"[facility] Electrolyte Replacement Protocol Algorithm\" to View Details         magnesium sulfate 2g/50 mL (PREMIX) infusion  Dose: 2 g  Freq: Every 2 Hours Route: IV  Start: 05/14/25 0745 End: 05/14/25 1345      0808-New Bag     1017-New Bag     1145-New Bag                 methocarbamol (ROBAXIN) tablet 500 mg  Dose: 500 mg  Freq: Every 8 Hours PRN Route: PO  PRN Reason: Muscle Spasms  PRN Comment: back pain  Start: 05/11/25 1737         metoprolol tartrate (LOPRESSOR) tablet 25 mg  Dose: 25 mg  Freq: Every 12 Hours Scheduled Route: PO  Indications of Use: HYPERTENSION  Start: 05/11/25 2100     Admin Instructions:   Hold for SBP less than 100, DBP less than 60, or heart rate less than 50. If a dose is held, please contact the provider.      0809-Given     2100                  morphine injection 4 mg  Dose: 4 mg  Freq: Every 4 Hours PRN Route: IV  PRN Reason: Severe Pain  Start: 05/11/25 1740 End: 05/16/25 1739                ondansetron ODT (ZOFRAN-ODT) disintegrating tablet 4 mg  Dose: 4 mg  Freq: Every 6 Hours PRN Route: PO  PRN Reasons: Nausea,Vomiting  Start: 05/11/25 1642               Or   ondansetron (ZOFRAN) injection 4 mg  Dose: 4 mg  Freq: Every 6 Hours PRN Route: IV  PRN Reasons: Nausea,Vomiting  Start: 05/11/25 1642     Admin Instructions:   \"If multiple N/V medications ordered, use in the following order: Ondansetron, Prochlorperazine, Promethazine. Use PO unless patient refuses or patient unable to swallow.\"         Phosphorus Replacement - Follow Nurse / BPA Driven Protocol  Freq: As Needed Route: XX  PRN Reason: Other  Start: 05/11/25 1505     Admin Instructions:   Open Order & Select \"[facility] Electrolyte Replacement Protocol Algorithm\" to View Details   " "      Potassium Replacement - Follow Nurse / BPA Driven Protocol  Freq: As Needed Route: XX  PRN Reason: Other  Start: 05/11/25 1505     Admin Instructions:   Open Order & Select \"[facility] Electrolyte Replacement Protocol Algorithm\" to View Details         rOPINIRole (REQUIP) tablet 0.5 mg  Dose: 0.5 mg  Freq: Nightly Route: PO  Start: 05/11/25 2100 End: 07/27/25 2059     Admin Instructions:   Caution: Look alike/sound alike drug alert      2100                   sodium bicarbonate tablet 650 mg  Dose: 650 mg  Freq: 3 Times Daily Route: PO  Start: 05/14/25 0900      0809-Given     1600     2100                 sodium chloride 0.9 % flush 10 mL  Dose: 10 mL  Freq: As Needed Route: IV  PRN Reason: Line Care  Start: 05/11/25 1505         sodium chloride 0.9 % flush 10 mL  Dose: 10 mL  Freq: As Needed Route: IV  PRN Reason: Line Care  Start: 05/11/25 1029         sodium chloride 0.9 % infusion 40 mL  Dose: 40 mL  Freq: As Needed Route: IV  PRN Reason: Line Care  Start: 05/11/25 1505     Admin Instructions:   Following administration of an IV intermittent medication, flush line with 40mL NS at 100mL/hr.         temazepam (RESTORIL) capsule 15 mg  Dose: 15 mg  Freq: Nightly PRN Route: PO  PRN Reason: Sleep  Start: 05/11/25 1738     Admin Instructions:   Group 2 (Pink) Hazardous Drug - Reproductive Risk Only - See Handling Guide         vitamin B-12 (CYANOCOBALAMIN) tablet 1,000 mcg  Dose: 1,000 mcg  Freq: Daily Route: PO  Indications of Use: VITAMIN B12 DEFICIENCY  Start: 05/12/25 0900      0809-Given                   Completed Medications  Medications 05/14/25      dicyclomine (BENTYL) capsule 20 mg  Dose: 20 mg  Freq: Once Route: PO  Start: 05/11/25 1208 End: 05/11/25 1203         iopamidol (ISOVUE-300) 61 % injection 100 mL  Dose: 100 mL  Freq: Once in Imaging Route: IV  Start: 05/11/25 1102 End: 05/11/25 1050         magnesium sulfate 2g/50 mL (PREMIX) infusion  Dose: 2 g  Freq: Every 2 Hours Route: IV  Start: 05/11/25 " 1609 End: 05/11/25 2219         morphine injection 4 mg  Dose: 4 mg  Freq: Once Route: IV  Start: 05/11/25 1046 End: 05/11/25 1127               ondansetron (ZOFRAN) injection 4 mg  Dose: 4 mg  Freq: Once Route: IV  Start: 05/11/25 1045 End: 05/11/25 1127               sodium chloride 0.9 % bolus 1,000 mL  Dose: 1,000 mL  Freq: Once Route: IV  Start: 05/11/25 1045 End: 05/11/25 1345         sodium chloride 0.9 % infusion  Dose: 75 mL/hr  Freq: Once Route: IV  Start: 05/11/25 1523 End: 05/11/25 1634         Discontinued Medications  Medications 05/14/25      bisacodyl (DULCOLAX) EC tablet 5 mg  Dose: 5 mg  Freq: Daily PRN Route: PO  PRN Reason: Constipation  PRN Comment: Use if polyethylene glycol is ineffective  Indications of Use: CONSTIPATION  Start: 05/11/25 1737 End: 05/13/25 1413               fluticasone (FLONASE) 50 MCG/ACT nasal spray 2 spray  Dose: 2 spray  Freq: Daily Route: NA  Start: 05/11/25 1830 End: 05/13/25 1409         pantoprazole (PROTONIX) EC tablet 40 mg  Dose: 40 mg  Freq: Every Early Morning Route: PO  Start: 05/12/25 0600 End: 05/13/25 1408     Admin Instructions:   Swallow whole; do not crush, split, or chew.         polyethylene glycol (MIRALAX) packet 17 g  Dose: 17 g  Freq: Daily PRN Route: PO  PRN Comment: Use if senna-docusate is ineffective  Indications of Use: CONSTIPATION  Start: 05/11/25 1738 End: 05/13/25 1413     Admin Instructions:   Use 4-8 ounces of water, tea, or juice for each 17 gram dose.         potassium chloride (KLOR-CON M20) CR tablet 20 mEq  Dose: 20 mEq  Freq: 2 Times Daily Route: PO  Start: 05/11/25 2100 End: 05/13/25 1409     Admin Instructions:   Do not crush or chew the capsules or tablets. The drug may not work as designed if the capsule or tablet is crushed or chewed. Swallow whole.  Take with food.         sennosides-docusate (PERICOLACE) 8.6-50 MG per tablet 2 tablet  Dose: 2 tablet  Freq: 2 Times Daily Route: PO  Start: 05/11/25 2100 End: 05/13/25 6089          sodium chloride 0.9 % flush 10 mL  Dose: 10 mL  Freq: Every 12 Hours Scheduled Route: IV  Start: 05/11/25 2100 End: 05/13/25 1409         sodium chloride 0.9 % infusion  Rate: 75 mL/hr Dose: 75 mL/hr  Freq: Continuous Route: IV  Start: 05/12/25 2245 End: 05/13/25 2223      0810-Stopped [C]                   traMADol (ULTRAM) tablet 50 mg  Dose: 50 mg  Freq: Every 12 Hours PRN Route: PO  PRN Reason: Severe Pain  Start: 05/11/25 1738 End: 05/13/25 1413               vitamin D (ERGOCALCIFEROL) capsule 50,000 Units  Dose: 50,000 Units  Freq: Every 7 Days Route: PO  Indications of Use: OSTEOPOROSIS,VITAMIN D DEFICIENCY  Start: 05/18/25 0900 End: 05/13/25 1409                        Physician Progress Notes (last 24 hours)        Lobo Schaeffer MD at 05/14/25 1028          [facility] GROUP INPATIENT PROGRESS NOTE    Length of Stay:  1 days    CHIEF COMPLAINT/REASON FOR VISIT:  Thrombocytopenia  Non-small cell lung cancer, on therapy with pemetrexed    SUBJECTIVE: Doing well without bleeding.  Overall feeling better.  Less nausea.  Less diarrhea.    ROS:  14 systems reviewed with pertinent positives and negatives in the HPI. Reviewed today.    OBJECTIVE:  Vitals:    05/13/25 1719 05/13/25 2100 05/14/25 0500 05/14/25 0906   BP: 124/62 109/76 124/68 117/62   BP Location: Left arm Left arm Left arm Left arm   Patient Position: Lying Lying Lying Lying   Pulse: 77 76 65 62   Resp: 17 17 17 18   Temp: 98.6 °F (37 °C) 97.3 °F (36.3 °C) 97.3 °F (36.3 °C) 97 °F (36.1 °C)   TempSrc: Oral Oral Oral Oral   SpO2: 98% 96% 99% 98%   Weight:       Height:             PHYSICAL EXAMINATION:   General:  No acute distress, awake, alert and oriented  Skin:  Warm and dry, no visible rash  HEENT: Normocephalic and atraumatic  Chest:  Normal respiratory effort  Abdomen appears less distended today  Extremities:  No visible clubbing, cyanosis, or edema  Neuro/psych:  Grossly nonfocal.  Normal mood and affect.       DIAGNOSTIC  DATA:  Results Review:     I reviewed the patient's new clinical results.    Results from last 7 days   Lab Units 05/14/25  0539   WBC 10*3/mm3 0.69*   HEMOGLOBIN g/dL 11.2*   HEMATOCRIT % 32.0*   PLATELETS 10*3/mm3 10*     Lab Results   Component Value Date    NEUTROABS 0.66 (L) 05/13/2025     Results from last 7 days   Lab Units 05/14/25  0539   SODIUM mmol/L 133*   POTASSIUM mmol/L 4.1   CHLORIDE mmol/L 107   CO2 mmol/L 16.0*   BUN mg/dL 12   CREATININE mg/dL 0.76   GLUCOSE mg/dL 75   CALCIUM mg/dL 8.8         Results from last 7 days   Lab Units 05/14/25  0539   MAGNESIUM mg/dL 1.5*         IMAGING:    None reviewed    ASSESSMENT:  This is a 80 y.o. female with:     *Non-small cell lung cancer  Initially treated with SBRT to the left lower lobe and right supraclavicular cancers, suspected to be non-squamous  Subsequent progression  Palliative pemetrexed with a 25% dose reduction administered on 5/7/2025     *Thrombocytopenia  Platelets were 92,000 on 5/7 on the day of chemotherapy  Thrombocytopenia secondary to chemotherapy with pemetrexed  Platelets 10,000, from 19,000, from 29,000, from 41,000, from 92,000     *Acute gastroenteritis, norovirus infection  Presented with abdominal pain, anorexia, diarrhea  Symptoms slowly improving     *Leukopenia  Also secondary to chemotherapy  White blood cell count 0.69, from1.49, from 2.91, from 4.44     *Macrocytic anemia  Hemoglobin 11.2, from 11.0     *Mild hyponatremia  Sodium 133, from 131, from 134, from 134     *Hypomagnesemia with improvement     *History of falls, vertebral, right hip fracture     RECOMMENDATIONS/PLAN:   Transfuse 1 unit platelets for platelet count of 10,000  Start G- mcg daily for severe leukopenia and neutropenia  Daily CBC while admitted  Supportive care for norovirus infection  Scheduled for follow-up in the office on 5/21  With cycle #2 she will certainly need a dose reduction  We will follow  Discussed with the patient today    Severe  cytopenias including neutropenia is a severe and life-threatening complication of her chemotherapy.  We discussed high risk therapy requiring intensive monitoring today.    Lobo Schaeffer MD     Electronically signed by Lobo Schaeffer MD at 05/14/25 2086

## 2025-05-14 NOTE — PROGRESS NOTES
[facility] GROUP INPATIENT PROGRESS NOTE    Length of Stay:  1 days    CHIEF COMPLAINT/REASON FOR VISIT:  Thrombocytopenia  Non-small cell lung cancer, on therapy with pemetrexed    SUBJECTIVE: Doing well without bleeding.  Overall feeling better.  Less nausea.  Less diarrhea.    ROS:  14 systems reviewed with pertinent positives and negatives in the HPI. Reviewed today.    OBJECTIVE:  Vitals:    05/13/25 1719 05/13/25 2100 05/14/25 0500 05/14/25 0906   BP: 124/62 109/76 124/68 117/62   BP Location: Left arm Left arm Left arm Left arm   Patient Position: Lying Lying Lying Lying   Pulse: 77 76 65 62   Resp: 17 17 17 18   Temp: 98.6 °F (37 °C) 97.3 °F (36.3 °C) 97.3 °F (36.3 °C) 97 °F (36.1 °C)   TempSrc: Oral Oral Oral Oral   SpO2: 98% 96% 99% 98%   Weight:       Height:             PHYSICAL EXAMINATION:   General:  No acute distress, awake, alert and oriented  Skin:  Warm and dry, no visible rash  HEENT: Normocephalic and atraumatic  Chest:  Normal respiratory effort  Abdomen appears less distended today  Extremities:  No visible clubbing, cyanosis, or edema  Neuro/psych:  Grossly nonfocal.  Normal mood and affect.       DIAGNOSTIC DATA:  Results Review:     I reviewed the patient's new clinical results.    Results from last 7 days   Lab Units 05/14/25  0539   WBC 10*3/mm3 0.69*   HEMOGLOBIN g/dL 11.2*   HEMATOCRIT % 32.0*   PLATELETS 10*3/mm3 10*     Lab Results   Component Value Date    NEUTROABS 0.66 (L) 05/13/2025     Results from last 7 days   Lab Units 05/14/25  0539   SODIUM mmol/L 133*   POTASSIUM mmol/L 4.1   CHLORIDE mmol/L 107   CO2 mmol/L 16.0*   BUN mg/dL 12   CREATININE mg/dL 0.76   GLUCOSE mg/dL 75   CALCIUM mg/dL 8.8         Results from last 7 days   Lab Units 05/14/25  0539   MAGNESIUM mg/dL 1.5*         IMAGING:    None reviewed    ASSESSMENT:  This is a 80 y.o. female with:     *Non-small cell lung cancer  Initially treated with SBRT to the left lower lobe and right supraclavicular  cancers, suspected to be non-squamous  Subsequent progression  Palliative pemetrexed with a 25% dose reduction administered on 5/7/2025     *Thrombocytopenia  Platelets were 92,000 on 5/7 on the day of chemotherapy  Thrombocytopenia secondary to chemotherapy with pemetrexed  Platelets 10,000, from 19,000, from 29,000, from 41,000, from 92,000     *Acute gastroenteritis, norovirus infection  Presented with abdominal pain, anorexia, diarrhea  Symptoms slowly improving     *Leukopenia  Also secondary to chemotherapy  White blood cell count 0.69, from1.49, from 2.91, from 4.44     *Macrocytic anemia  Hemoglobin 11.2, from 11.0     *Mild hyponatremia  Sodium 133, from 131, from 134, from 134     *Hypomagnesemia with improvement     *History of falls, vertebral, right hip fracture     RECOMMENDATIONS/PLAN:   Transfuse 1 unit platelets for platelet count of 10,000  Start G- mcg daily for severe leukopenia and neutropenia  Daily CBC while admitted  Supportive care for norovirus infection  Scheduled for follow-up in the office on 5/21  With cycle #2 she will certainly need a dose reduction  We will follow  Discussed with the patient today    Severe cytopenias including neutropenia is a severe and life-threatening complication of her chemotherapy.  We discussed high risk therapy requiring intensive monitoring today.    Lobo Schaeffer MD

## 2025-05-14 NOTE — PLAN OF CARE
Goal Outcome Evaluation:  Plan of Care Reviewed With: patient        Progress: improving  Outcome Evaluation: Patient denies abdominal pain/nausea/vomiting. Up with assist of walker to bathroom and up in chair. Fall precautions maintained. Contact spore precautions maintained for Norovirus. Magnesium 1.5 this AM, replaced via IV, re-check in AM. Platelets low this AM, CBC notified, 1 unit platelets ordered to be given this afternoon. VSS.

## 2025-05-14 NOTE — PROGRESS NOTES
"Daily progress note    Referring physician   Dr. Marcano    Subjective  Awake and alert and feeling same like yesterday with no new complaints    History of present illness  80-year-old white female with history of osteoarthritis osteoporosis non-small cell lung carcinoma on chemotherapy with chronic anemia and thrombocytopenia other medical problems hypertension hyperlipidemia gastroesophageal disease admitted to emergency room with abdominal pain with nausea vomiting diarrhea for last several days prior to admission.  Patient admitted to the observation unit and treated with IV fluid supportive care as she found to have norovirus gastroenteritis and also followed by oncology.  Patient required full admission as she continued to have symptoms and I am asked to follow her intake over the care.  Patient still has abdominal pain with nausea vomiting diarrhea and feeling weak but no other complaints.     REVIEW OF SYSTEMS  All systems reviewed and negative except for those discussed in HPI.     PHYSICAL EXAM   Blood pressure 117/62, pulse 62, temperature 97 °F (36.1 °C), temperature source Oral, resp. rate 18, height 160 cm (63\"), weight 61 kg (134 lb 8 oz), SpO2 98%.    Constitutional:       General: She is not in acute distress.     Appearance: She is well-developed. She is ill-appearing .   HENT:      Head: Normocephalic and atraumatic.   Eyes:      General: No scleral icterus.     Conjunctiva/sclera: Conjunctivae normal.   Neck:      Trachea: No tracheal deviation.   Cardiovascular:      Rate and Rhythm: Normal rate and regular rhythm.   Pulmonary:      Effort: Pulmonary effort is normal.      Breath sounds: Normal breath sounds.   Abdominal:      Palpations: Abdomen is soft.      Tenderness: There is generalized abdominal tenderness. There is no guarding.   Musculoskeletal:         General: No deformity.      Cervical back: Normal range of motion.   Lymphadenopathy:      Cervical: No cervical adenopathy.   Skin:    "  General: Skin is warm and dry.   Neurological:      Mental Status: She is alert and oriented to person, place, and time.   Psychiatric:         Behavior: Behavior normal.      LAB RESULTS  Lab Results (last 24 hours)       Procedure Component Value Units Date/Time    Hemoglobin A1c [[id number]]  (Normal) Collected: 05/14/25 0539    Specimen: Blood Updated: 05/14/25 0922     Hemoglobin A1C 5.20 %     Narrative:      Hemoglobin A1C Ranges:    Increased Risk for Diabetes  5.7% to 6.4%  Diabetes                     >= 6.5%  Diabetic Goal                < 7.0%    Magnesium [[id number]]  (Abnormal) Collected: 05/14/25 0539    Specimen: Blood Updated: 05/14/25 0651     Magnesium 1.5 mg/dL     BNP [[id number]]  (Normal) Collected: 05/14/25 0539    Specimen: Blood Updated: 05/14/25 0644     proBNP 927.0 pg/mL     Narrative:      This assay is used as an aid in the diagnosis of individuals suspected of having heart failure. It can be used as an aid in the diagnosis of acute decompensated heart failure (ADHF) in patients presenting with signs and symptoms of ADHF to the emergency department (ED). In addition, NT-proBNP of <300 pg/mL indicates ADHF is not likely.    Age Range Result Interpretation  NT-proBNP Concentration (pg/mL:      <50             Positive            >450                   Gray                 300-450                    Negative             <300    50-75           Positive            >900                  Gray                300-900                  Negative            <300      >75             Positive            >1800                  Gray                300-1800                  Negative            <300    TSH [[id number]]  (Abnormal) Collected: 05/14/25 0539    Specimen: Blood Updated: 05/14/25 0644     TSH 4.970 uIU/mL     Comprehensive Metabolic Panel [[id number]]  (Abnormal) Collected: 05/14/25 0539    Specimen: Blood Updated: 05/14/25 0643     Glucose 75 mg/dL      BUN 12 mg/dL      Creatinine 0.76 mg/dL       Sodium 133 mmol/L      Potassium 4.1 mmol/L      Chloride 107 mmol/L      CO2 16.0 mmol/L      Calcium 8.8 mg/dL      Total Protein 7.1 g/dL      Albumin 3.1 g/dL      ALT (SGPT) 19 U/L      AST (SGOT) 29 U/L      Alkaline Phosphatase 137 U/L      Total Bilirubin 0.7 mg/dL      Globulin 4.0 gm/dL      A/G Ratio 0.8 g/dL      BUN/Creatinine Ratio 15.8     Anion Gap 10.0 mmol/L      eGFR 78.3 mL/min/1.73     Narrative:      GFR Categories in Chronic Kidney Disease (CKD)              GFR Category          GFR (mL/min/1.73)    Interpretation  G1                    90 or greater        Normal or high (1)  G2                    60-89                Mild decrease (1)  G3a                   45-59                Mild to moderate decrease  G3b                   30-44                Moderate to severe decrease  G4                    15-29                Severe decrease  G5                    14 or less           Kidney failure    (1)In the absence of evidence of kidney disease, neither GFR category G1 or G2 fulfill the criteria for CKD.    eGFR calculation 2021 CKD-EPI creatinine equation, which does not include race as a factor    Lipid Panel [[id number]] Collected: 05/14/25 0539    Specimen: Blood Updated: 05/14/25 0638     Total Cholesterol 119 mg/dL      Triglycerides 74 mg/dL      HDL Cholesterol 53 mg/dL      LDL Cholesterol  51 mg/dL      VLDL Cholesterol 15 mg/dL      LDL/HDL Ratio 0.97    Narrative:      Cholesterol Reference Ranges  (U.S. Department of Health and Human Services ATP III Classifications)    Desirable          <200 mg/dL  Borderline High    200-239 mg/dL  High Risk          >240 mg/dL      Triglyceride Reference Ranges  (U.S. Department of Health and Human Services ATP III Classifications)    Normal           <150 mg/dL  Borderline High  150-199 mg/dL  High             200-499 mg/dL  Very High        >500 mg/dL    HDL Reference Ranges  (U.S. Department of Health and Human Services ATP III  Classifications)    Low     <40 mg/dl (major risk factor for CHD)  High    >60 mg/dl ('negative' risk factor for CHD)        LDL Reference Ranges  (U.S. Department of Health and Human Services ATP III Classifications)    Optimal          <100 mg/dL  Near Optimal     100-129 mg/dL  Borderline High  130-159 mg/dL  High             160-189 mg/dL  Very High        >189 mg/dL    LDL is calculated using the NIH LDL-C calculation.      CBC & Differential [[id number]]  (Abnormal) Collected: 05/14/25 0539    Specimen: Blood Updated: 05/14/25 0623    Narrative:      The following orders were created for panel order CBC & Differential.  Procedure                               Abnormality         Status                     ---------                               -----------         ------                     CBC Auto Differential[[id number]]        Abnormal            Final result                 Please view results for these tests on the individual orders.    CBC Auto Differential [[id number]]  (Abnormal) Collected: 05/14/25 0539    Specimen: Blood Updated: 05/14/25 0623     WBC 0.69 10*3/mm3      RBC 3.14 10*6/mm3      Hemoglobin 11.2 g/dL      Hematocrit 32.0 %      .9 fL      MCH 35.7 pg      MCHC 35.0 g/dL      RDW 12.1 %      RDW-SD 44.8 fl      MPV 9.5 fL      Platelets 10 10*3/mm3           Imaging Results (Last 24 Hours)       ** No results found for the last 24 hours. **            Current Facility-Administered Medications:     acetaminophen (TYLENOL) tablet 650 mg, 650 mg, Oral, Q4H PRN, 650 mg at 05/13/25 2059 **OR** [DISCONTINUED] acetaminophen (TYLENOL) 160 MG/5ML oral solution 650 mg, 650 mg, Oral, Q4H PRN **OR** [DISCONTINUED] acetaminophen (TYLENOL) suppository 650 mg, 650 mg, Rectal, Q4H PRN, Ketan Dunaway II, MD    albuterol (PROVENTIL) nebulizer solution 0.083% 2.5 mg/3mL, 2.5 mg, Nebulization, Q6H PRN, Ketan Dunaway II, MD    allopurinol (ZYLOPRIM) tablet 100 mg, 100 mg, Oral, Daily,  Ketan Dunaway II, MD, 100 mg at 05/14/25 0809    Calcium Replacement - Follow Nurse / BPA Driven Protocol, , Not Applicable, Gume LAZCANO Thomas Edward II, MD    dicyclomine (BENTYL) capsule 10 mg, 10 mg, Oral, Q8H PRN, Ketan Dunaway II, MD, 10 mg at 05/14/25 1145    famotidine (PEPCID) tablet 20 mg, 20 mg, Oral, BID, Bryson Burns MD, 20 mg at 05/14/25 0809    Filgrastim (NEUPOGEN) injection 300 mcg, 300 mcg, Subcutaneous, Q PM, Lobo Schaeffer MD    folic acid (FOLVITE) tablet 1 mg, 1 mg, Oral, Daily, Ketan Dunaway II, MD, 1 mg at 05/14/25 0809    Magnesium Standard Dose Replacement - Follow Nurse / BPA Driven Protocol, , Not Applicable, Gume LAZCANO Thomas Edward II, MD    magnesium sulfate 2g/50 mL (PREMIX) infusion, 2 g, Intravenous, Q2H, Bryson Burns MD, 2 g at 05/14/25 1145    methocarbamol (ROBAXIN) tablet 500 mg, 500 mg, Oral, Q8H PRN, Ketan Dunaway II, MD    metoprolol tartrate (LOPRESSOR) tablet 25 mg, 25 mg, Oral, Q12H, Ketan Dunaway II, MD, 25 mg at 05/14/25 0809    morphine injection 4 mg, 4 mg, Intravenous, Q4H PRN, Ketan Dunaway II, MD    ondansetron ODT (ZOFRAN-ODT) disintegrating tablet 4 mg, 4 mg, Oral, Q6H PRN, 4 mg at 05/13/25 1827 **OR** ondansetron (ZOFRAN) injection 4 mg, 4 mg, Intravenous, Q6H PRN, Ketan Dunaway II, MD, 4 mg at 05/12/25 2146    Phosphorus Replacement - Follow Nurse / BPA Driven Protocol, , Not Applicable, Gume LAZCANO Thomas Edward II, MD    Potassium Replacement - Follow Nurse / BPA Driven Protocol, , Not Applicable, Gume LAZCANO Thomas Edward II, MD    rOPINIRole (REQUIP) tablet 0.5 mg, 0.5 mg, Oral, Nightly, Ketan Dunaway II, MD, 0.5 mg at 05/13/25 2101    sodium bicarbonate tablet 650 mg, 650 mg, Oral, TID, Bryson Burns MD, 650 mg at 05/14/25 0809    sodium chloride 0.9 % flush 10 mL, 10 mL, Intravenous, PRN, Ketan Dunaway II, MD, 10 mL at 05/11/25 1640    sodium chloride 0.9 % flush 10 mL, 10 mL, Intravenous,  PRN, Ketan Dunaway II, MD, 10 mL at 05/13/25 1829    sodium chloride 0.9 % infusion 40 mL, 40 mL, Intravenous, PRN, Ketan Dunaway II, MD    temazepam (RESTORIL) capsule 15 mg, 15 mg, Oral, Nightly PRN, Ketan Dunaway II, MD    vitamin B-12 (CYANOCOBALAMIN) tablet 1,000 mcg, 1,000 mcg, Oral, Daily, Ketan Dunaway II, MD, 1,000 mcg at 05/14/25 0809     ASSESSMENT  Acute norovirus gastroenteritis  Pancytopenia secondary to chemotherapy  Non-small cell lung carcinoma on chemotherapy  Hypertension  Hyperlipidemia  Restless leg syndrome  Gastroesophageal reflux disease    PLAN  CPM  Continue IV fluid  Transfuse as needed per hematology  Monitor H&H and platelets  Symptomatic treatment for abdominal pain nausea vomiting   Stress ulcer DVT prophylaxis  Supportive care  PT OT  Discussed with nursing staff  Follow closely further recommendation according to hospital course    KASHMIR JIMENEZ MD    Copied text in this note has been reviewed and is accurate as of 05/14/25

## 2025-05-14 NOTE — TELEPHONE ENCOUNTER
Reached out to elba's daughter to address her concerns about mom's depression and anxiety. Informed her that I would reach out to her mom;s nurse in the hospital and relay her concerns.

## 2025-05-14 NOTE — TELEPHONE ENCOUNTER
Caller: Kaylie Gutierrez    Relationship: Emergency Contact    Best call back number: [phone]     What is the best time to reach you: ANYTIME TODAY IF POSSIBLE    Who are you requesting to speak with (clinical staff, provider,  specific staff member): YUNIEL TY        What was the call regarding: PATIENT IS INPATIENT AT [facility], HER DAUGHTER JONG IS ASKING TO SPEAK WITH YUNIEL ABOUT THE TREATMENT THEY WILL BE DOING AND HAS SOME QUESTIONS ABOUT THE CURRENT PLAN, PLEASE CALL JONG TO DISCUSS.

## 2025-05-15 LAB
ANION GAP SERPL CALCULATED.3IONS-SCNC: 13.4 MMOL/L (ref 5–15)
BASOPHILS # BLD AUTO: 0 10*3/MM3 (ref 0–0.2)
BASOPHILS NFR BLD AUTO: 0 % (ref 0–1.5)
BH BB BLOOD EXPIRATION DATE: NORMAL
BH BB BLOOD TYPE BARCODE: 2800
BH BB DISPENSE STATUS: NORMAL
BH BB PRODUCT CODE: NORMAL
BH BB UNIT NUMBER: NORMAL
BUN SERPL-MCNC: 10 MG/DL (ref 8–23)
BUN/CREAT SERPL: 13.7 (ref 7–25)
CALCIUM SPEC-SCNC: 8.9 MG/DL (ref 8.6–10.5)
CHLORIDE SERPL-SCNC: 103 MMOL/L (ref 98–107)
CO2 SERPL-SCNC: 17.6 MMOL/L (ref 22–29)
CREAT SERPL-MCNC: 0.73 MG/DL (ref 0.57–1)
DEPRECATED RDW RBC AUTO: 44.4 FL (ref 37–54)
EGFRCR SERPLBLD CKD-EPI 2021: 82.2 ML/MIN/1.73
EOSINOPHIL # BLD AUTO: 0.01 10*3/MM3 (ref 0–0.4)
EOSINOPHIL NFR BLD AUTO: 1 % (ref 0.3–6.2)
ERYTHROCYTE [DISTWIDTH] IN BLOOD BY AUTOMATED COUNT: 12.1 % (ref 12.3–15.4)
GLUCOSE SERPL-MCNC: 73 MG/DL (ref 65–99)
HCT VFR BLD AUTO: 30.4 % (ref 34–46.6)
HGB BLD-MCNC: 10.2 G/DL (ref 12–15.9)
IMM GRANULOCYTES # BLD AUTO: 0.14 10*3/MM3 (ref 0–0.05)
IMM GRANULOCYTES NFR BLD AUTO: 14.4 % (ref 0–0.5)
LYMPHOCYTES # BLD AUTO: 0.49 10*3/MM3 (ref 0.7–3.1)
LYMPHOCYTES NFR BLD AUTO: 50.5 % (ref 19.6–45.3)
MAGNESIUM SERPL-MCNC: 2 MG/DL (ref 1.6–2.4)
MCH RBC QN AUTO: 34.1 PG (ref 26.6–33)
MCHC RBC AUTO-ENTMCNC: 33.6 G/DL (ref 31.5–35.7)
MCV RBC AUTO: 101.7 FL (ref 79–97)
MONOCYTES # BLD AUTO: 0.14 10*3/MM3 (ref 0.1–0.9)
MONOCYTES NFR BLD AUTO: 14.4 % (ref 5–12)
NEUTROPHILS NFR BLD AUTO: 0.19 10*3/MM3 (ref 1.7–7)
NEUTROPHILS NFR BLD AUTO: 19.7 % (ref 42.7–76)
PLATELET # BLD AUTO: 16 10*3/MM3 (ref 140–450)
PMV BLD AUTO: 12 FL (ref 6–12)
POTASSIUM SERPL-SCNC: 3.6 MMOL/L (ref 3.5–5.2)
POTASSIUM SERPL-SCNC: 4.5 MMOL/L (ref 3.5–5.2)
RBC # BLD AUTO: 2.99 10*6/MM3 (ref 3.77–5.28)
SODIUM SERPL-SCNC: 134 MMOL/L (ref 136–145)
UNIT  ABO: NORMAL
UNIT  RH: NORMAL
WBC NRBC COR # BLD AUTO: 0.97 10*3/MM3 (ref 3.4–10.8)

## 2025-05-15 PROCEDURE — 80048 BASIC METABOLIC PNL TOTAL CA: CPT | Performed by: HOSPITALIST

## 2025-05-15 PROCEDURE — 25010000002 FILGRASTIM 300 MCG/0.5ML SOLUTION PREFILLED SYRINGE: Performed by: INTERNAL MEDICINE

## 2025-05-15 PROCEDURE — 85025 COMPLETE CBC W/AUTO DIFF WBC: CPT | Performed by: INTERNAL MEDICINE

## 2025-05-15 PROCEDURE — 63710000001 PREDNISONE PER 1 MG: Performed by: INTERNAL MEDICINE

## 2025-05-15 PROCEDURE — 99232 SBSQ HOSP IP/OBS MODERATE 35: CPT | Performed by: INTERNAL MEDICINE

## 2025-05-15 PROCEDURE — 84132 ASSAY OF SERUM POTASSIUM: CPT | Performed by: HOSPITALIST

## 2025-05-15 PROCEDURE — 63710000001 DIPHENHYDRAMINE PER 50 MG: Performed by: HOSPITALIST

## 2025-05-15 PROCEDURE — 97165 OT EVAL LOW COMPLEX 30 MIN: CPT

## 2025-05-15 PROCEDURE — 97535 SELF CARE MNGMENT TRAINING: CPT

## 2025-05-15 PROCEDURE — 83735 ASSAY OF MAGNESIUM: CPT | Performed by: HOSPITALIST

## 2025-05-15 RX ORDER — HYDROCODONE BITARTRATE AND ACETAMINOPHEN 5; 325 MG/1; MG/1
1 TABLET ORAL EVERY 6 HOURS PRN
Refills: 0 | Status: DISCONTINUED | OUTPATIENT
Start: 2025-05-15 | End: 2025-05-15

## 2025-05-15 RX ORDER — POTASSIUM CHLORIDE 1500 MG/1
40 TABLET, EXTENDED RELEASE ORAL EVERY 4 HOURS
Status: COMPLETED | OUTPATIENT
Start: 2025-05-15 | End: 2025-05-15

## 2025-05-15 RX ORDER — PREDNISONE 20 MG/1
20 TABLET ORAL ONCE
Status: COMPLETED | OUTPATIENT
Start: 2025-05-15 | End: 2025-05-15

## 2025-05-15 RX ORDER — HYDROCODONE BITARTRATE AND ACETAMINOPHEN 5; 325 MG/1; MG/1
1 TABLET ORAL EVERY 4 HOURS PRN
Refills: 0 | Status: DISCONTINUED | OUTPATIENT
Start: 2025-05-15 | End: 2025-05-18

## 2025-05-15 RX ORDER — HYDROXYZINE HYDROCHLORIDE 25 MG/1
25 TABLET, FILM COATED ORAL 3 TIMES DAILY PRN
Status: DISCONTINUED | OUTPATIENT
Start: 2025-05-15 | End: 2025-05-18

## 2025-05-15 RX ADMIN — POTASSIUM CHLORIDE 40 MEQ: 1500 TABLET, EXTENDED RELEASE ORAL at 08:21

## 2025-05-15 RX ADMIN — SODIUM BICARBONATE 650 MG: 650 TABLET ORAL at 16:16

## 2025-05-15 RX ADMIN — SODIUM BICARBONATE 650 MG: 650 TABLET ORAL at 08:22

## 2025-05-15 RX ADMIN — FOLIC ACID 1 MG: 1 TABLET ORAL at 08:22

## 2025-05-15 RX ADMIN — Medication 1000 MCG: at 08:21

## 2025-05-15 RX ADMIN — FILGRASTIM 300 MCG: 300 INJECTION, SOLUTION INTRAVENOUS; SUBCUTANEOUS at 16:16

## 2025-05-15 RX ADMIN — ALLOPURINOL 100 MG: 100 TABLET ORAL at 08:22

## 2025-05-15 RX ADMIN — FAMOTIDINE 20 MG: 20 TABLET, FILM COATED ORAL at 20:13

## 2025-05-15 RX ADMIN — POTASSIUM CHLORIDE 40 MEQ: 1500 TABLET, EXTENDED RELEASE ORAL at 11:57

## 2025-05-15 RX ADMIN — DICYCLOMINE HYDROCHLORIDE 10 MG: 10 CAPSULE ORAL at 20:13

## 2025-05-15 RX ADMIN — SODIUM BICARBONATE 650 MG: 650 TABLET ORAL at 20:13

## 2025-05-15 RX ADMIN — METOPROLOL TARTRATE 25 MG: 25 TABLET, FILM COATED ORAL at 20:13

## 2025-05-15 RX ADMIN — ACETAMINOPHEN 650 MG: 325 TABLET, FILM COATED ORAL at 08:21

## 2025-05-15 RX ADMIN — PREDNISONE 20 MG: 20 TABLET ORAL at 14:05

## 2025-05-15 RX ADMIN — DIPHENHYDRAMINE HYDROCHLORIDE 25 MG: 25 CAPSULE ORAL at 04:21

## 2025-05-15 RX ADMIN — ROPINIROLE 0.5 MG: 1 TABLET, FILM COATED ORAL at 20:13

## 2025-05-15 RX ADMIN — CAMPHOR, MENTHOL: .5; .5 LOTION TOPICAL at 16:16

## 2025-05-15 RX ADMIN — FAMOTIDINE 20 MG: 20 TABLET, FILM COATED ORAL at 08:22

## 2025-05-15 RX ADMIN — METOPROLOL TARTRATE 25 MG: 25 TABLET, FILM COATED ORAL at 08:22

## 2025-05-15 NOTE — PLAN OF CARE
Goal Outcome Evaluation:  Plan of Care Reviewed With: patient           Outcome Evaluation: VSS, no c/o pain or nausea, conaced MD for benadryl d/t itching, up with asst and walker, saline locked, voiding freely, some loose stools, on falls with bed alarm, maintained contact spore isolation, labs in am to recheck mag and platelets, will continue to monitor

## 2025-05-15 NOTE — PLAN OF CARE
Goal Outcome Evaluation:  Plan of Care Reviewed With: patient           Outcome Evaluation: Pt admitted to [facility] for nausea, vomiting, and diarrhea. Dx includes gastroenteritis 2/2 Norovirus. Medical hx includes NSCLC on chemotherapy. Pt lives w/ her spouse in a 1 story home, 3 steps to enter (stays at her daughters house intermittently). Pt is (I)<>Mod (I) at baseline, uses a shower chair and has a RW that she uses for functional mobility. Today, pt was SPV for bed mobility, LBD, STS transfers, functional mobility, and standing grooming/hygiene tasks w/ use of RW. No balance, strength, or activity tolerance deficits noted. Pt reports feeling generally unwell, but states she is performing near her baseline and denies need for additional therapy while inpatient. Recommend continued OOB at least 3x/daily. No further therapy needs identified and OT will sign off.    Anticipated Discharge Disposition (OT): home

## 2025-05-15 NOTE — THERAPY EVALUATION
Patient Name: Kenia BRIAN Sample  : 1945    MRN: [id number]                              Today's Date: 5/15/2025       Admit Date: 2025    Visit Dx:     ICD-10-CM ICD-9-CM   1. Enteritis  K52.9 558.9   2. Abnormal CT scan  R93.89 793.99   3. Thrombocytopenia  D69.6 287.5     Patient Active Problem List   Diagnosis    Snapping right knee    HTN (hypertension)    HLD (hyperlipidemia)    GERD (gastroesophageal reflux disease)    Closed comminuted intertrochanteric fracture of proximal end of right femur    Thrombocytopenia    Unable to ambulate    Right hip pain    Degenerative disc disease, thoracic and lumbar    NSCLC of left lung    Unable to bear weight    Fall    T12 compression fracture    Closed compression fracture of L1 vertebra    Compression fracture of L2 lumbar vertebra    Lumbar pain    Fitting and adjustment of vascular catheter    Gastroenteritis    Enteritis due to Norovirus     Past Medical History:   Diagnosis Date    Anesthesia complication     slow to wake up    Anginal pain     Arthritis     Cancer     Lung    Gastric reflux syndrome     Gout     Hiatal hernia     High cholesterol     History of bronchitis     Hyperlipidemia     Hypertension     Irregular heart beat     Mild aortic valve stenosis     per echo 2023    PONV (postoperative nausea and vomiting)     RLS (restless legs syndrome)      Past Surgical History:   Procedure Laterality Date    APPENDECTOMY      CARDIAC CATHETERIZATION      EXTERNAL EAR SURGERY Right     had tumor excised behind right ear lobe benign    EYE LESION EXCISION Left 2024    Procedure: LEFT LOWER EYELID BASEL CELL CARCINOMA EXCISIONAL AND REPAIR WITH FROZEN SECTIONS,;  Surgeon: Austin Kamara MD;  Location: [facility] MAIN OR;  Service: Ophthalmology;  Laterality: Left;    FEMUR IM NAILING/RODDING Right 06/15/2022    Procedure: RIGHT HIP INTRAMEDULLARY NAILING/RODDING;  Surgeon: Marc Crawford MD;  Location: [facility] MAIN OR;  Service: Orthopedics;   Laterality: Right;    GALLBLADDER SURGERY      HYSTERECTOMY      KNEE ARTHROPLASTY Right     KNEE ARTHROSCOPY Right     AZ REVJ TOTAL KNEE ARTHRP W/WO ALGRFT 1 COMPONENT Right 09/07/2016    Procedure: RT ILIOTIBIAL BAND RELEASE RT TOTAL KNEE POLY EXCHANGE;  Surgeon: Toy Adame MD;  Location: [facility] OR;  Service: Orthopedics    ROTATOR CUFF REPAIR Bilateral     US GUIDED LYMPH NODE BIOPSY  3/18/2024    VENOUS ACCESS DEVICE (PORT) INSERTION Right 5/1/2025    Procedure: INSERTION VENOUS ACCESS DEVICE;  Surgeon: Saloni Verdugo MD;  Location: [facility] OR;  Service: Vascular;  Laterality: Right;    WRIST MASS EXCISION Right       General Information       Row Name 05/15/25 1102          OT Time and Intention    Subjective Information no complaints  -KG     Document Type evaluation  -KG     Mode of Treatment individual therapy;occupational therapy  -KG     Patient Effort excellent  -KG     Symptoms Noted During/After Treatment none  -KG       Row Name 05/15/25 1102          General Information    Patient Profile Reviewed yes  -KG     Prior Level of Function independent:;ADL's;all household mobility;community mobility;home management  -KG     Existing Precautions/Restrictions no known precautions/restrictions  -KG     Barriers to Rehab none identified  -KG       Row Name 05/15/25 1102          Living Environment    Current Living Arrangements home  -KG     People in Home spouse  -KG       Row Name 05/15/25 1102          Home Main Entrance    Number of Stairs, Main Entrance three  -KG       Row Name 05/15/25 1102          Stairs Within Home, Primary    Number of Stairs, Within Home, Primary none  -KG       Row Name 05/15/25 1102          Cognition    Orientation Status (Cognition) oriented x 4  -KG               User Key  (r) = Recorded By, (t) = Taken By, (c) = Cosigned By      Initials Name Provider Type    KG Richi Reich OT Occupational Therapist                     Mobility/ADL's       Row Name  05/15/25 1103          Bed Mobility    Bed Mobility supine-sit  -KG     Supine-Sit [place] (Bed Mobility) supervision  -KG       Row Name 05/15/25 1103          Transfers    Transfers sit-stand transfer;stand-sit transfer  -KG       Row Name 05/15/25 1103          Sit-Stand Transfer    Sit-Stand [place] (Transfers) supervision  -KG     Assistive Device (Sit-Stand Transfers) walker, front-wheeled  -KG       Row Name 05/15/25 1103          Stand-Sit Transfer    Stand-Sit [place] (Transfers) supervision  -KG     Assistive Device (Stand-Sit Transfers) walker, front-wheeled  -KG       Row Name 05/15/25 1103          Functional Mobility    Functional Mobility- Ind. Level supervision required  -KG     Functional Mobility- Device walker, front-wheeled  -KG     Functional Mobility- Comment x 5 minutes within room (simulating > household distance) -- no balance, strength, or endurance deficits noted.  -KG       Row Name 05/15/25 1103          Activities of Daily Living    BADL Assessment/Intervention lower body dressing;grooming  -KG       Row Name 05/15/25 1103          Lower Body Dressing Assessment/Training    [place] Level (Lower Body Dressing) don;socks;supervision  -KG     Position (Lower Body Dressing) edge of bed sitting  -KG       Row Name 05/15/25 1103          Grooming Assessment/Training    [place] Level (Grooming) grooming skills;supervision  -KG     Position (Grooming) unsupported standing  -KG               User Key  (r) = Recorded By, (t) = Taken By, (c) = Cosigned By      Initials Name Provider Type    KG Richi Reich OT Occupational Therapist                   Obj/Interventions       Row Name 05/15/25 1104          Sensory Assessment (Somatosensory)    Sensory Assessment (Somatosensory) sensation intact  -KG       Row Name 05/15/25 1104          Vision Assessment/Intervention    Visual Impairment/Limitations WNL  -KG       Row Name 05/15/25 1104          Range of Motion  Comprehensive    General Range of Motion no range of motion deficits identified  -KG       [facility] Name 05/15/25 1104          Strength Comprehensive (MMT)    Comment, General Manual Muscle Testing (MMT) Assessment BUE strength 4+/5 grossly  -KG       Row Name 05/15/25 1104          Motor Skills    Motor Skills functional endurance  -KG     Functional Endurance WFL  -KG       Row Name 05/15/25 1104          Balance    Balance Assessment sitting static balance;sitting dynamic balance;sit to stand dynamic balance;standing static balance;standing dynamic balance  -KG     Static Sitting Balance independent  -KG     Dynamic Sitting Balance independent  -KG     Position, Sitting Balance unsupported;sitting edge of bed  -KG     Sit to Stand Dynamic Balance supervision  -KG     Static Standing Balance supervision  -KG     Dynamic Standing Balance supervision  -KG     Position/Device Used, Standing Balance walker, front-wheeled  -KG     Balance Interventions sitting;standing;sit to stand;supported;static;dynamic;occupation based/functional task  -KG               User Key  (r) = Recorded By, (t) = Taken By, (c) = Cosigned By      Initials Name Provider Type    KG Richi Reich OT Occupational Therapist                   Goals/Plan       Row Name 05/15/25 1110          Bed Mobility Goal 1 (OT)    Activity/Assistive Device (Bed Mobility Goal 1, OT) supine to sit  -KG     [place] Level/Cues Needed (Bed Mobility Goal 1, OT) supervision required  -KG     Time Frame (Bed Mobility Goal 1, OT) short term goal (STG);2 weeks  -KG     Progress/Outcomes (Bed Mobility Goal 1, OT) new goal;goal met  -KG               User Key  (r) = Recorded By, (t) = Taken By, (c) = Cosigned By      Initials Name Provider Type    KG Richi Reich OT Occupational Therapist                   Clinical Impression       Row Name 05/15/25 1106          Pain Assessment    Pretreatment Pain Rating 0/10 - no pain  -KG     Posttreatment Pain Rating 0/10 - no  pain  -KG       Row Name 05/15/25 1106          Plan of Care Review    Plan of Care Reviewed With patient  -KG     Outcome Evaluation Pt admitted to [facility] for nausea, vomiting, and diarrhea. Dx includes gastroenteritis 2/2 Norovirus. Medical hx includes NSCLC on chemotherapy. Pt lives w/ her spouse in a 1 story home, 3 steps to enter (stays at her daughters house intermittently). Pt is (I)<>Mod (I) at baseline, uses a shower chair and has a RW that she uses for functional mobility. Today, pt was SPV for bed mobility, LBD, STS transfers, functional mobility, and standing grooming/hygiene tasks w/ use of RW. No balance, strength, or activity tolerance deficits noted. Pt reports feeling generally unwell, but states she is performing near her baseline and denies need for additional therapy while inpatient. Recommend continued OOB at least 3x/daily. No further therapy needs identified and OT will sign off.  -KG       Row Name 05/15/25 1106          Therapy Assessment/Plan (OT)    Criteria for Skilled Therapeutic Interventions Met (OT) no problems identified which require skilled intervention  -KG     Therapy Frequency (OT) evaluation only  -KG       Row Name 05/15/25 1106          Therapy Plan Review/Discharge Plan (OT)    Anticipated Discharge Disposition (OT) home  -KG       Row Name 05/15/25 1106          Vital Signs    Pre Patient Position Supine  -KG     Intra Patient Position Standing  -KG     Post Patient Position Sitting  -KG       Row Name 05/15/25 1106          Positioning and Restraints    Pre-Treatment Position in bed  -KG     Post Treatment Position chair  -KG     In Chair notified nsg;reclined;call light within reach;encouraged to call for assist;exit alarm on  -KG               User Key  (r) = Recorded By, (t) = Taken By, (c) = Cosigned By      Initials Name Provider Type    KG Richi Reich, OT Occupational Therapist                   Outcome Measures       Row Name 05/15/25 1110          How much help  from another is currently needed...    Putting on and taking off regular lower body clothing? 4  -KG     Bathing (including washing, rinsing, and drying) 4  -KG     Toileting (which includes using toilet bed pan or urinal) 4  -KG     Putting on and taking off regular upper body clothing 4  -KG     Taking care of personal grooming (such as brushing teeth) 4  -KG     Eating meals 4  -KG     AM-PAC 6 Clicks Score (OT) 24  -KG       Row Name 05/15/25 0821          How much help from another person do you currently need...    Turning from your back to your side while in flat bed without using bedrails? 4  -MA     Moving from lying on back to sitting on the side of a flat bed without bedrails? 3  -MA     Moving to and from a bed to a chair (including a wheelchair)? 3  -MA     Standing up from a chair using your arms (e.g., wheelchair, bedside chair)? 3  -MA     Climbing 3-5 steps with a railing? 3  -MA     To walk in hospital room? 3  -MA     AM-PAC 6 Clicks Score (PT) 19  -MA       Row Name 05/15/25 1110          Modified [place] Scale    Modified [place] Scale 1 - No significant disability despite symptoms.  Able to carry out all usual duties and activities.  -KG       Row Name 05/15/25 1110          Functional Assessment    Outcome Measure Options AM-PAC 6 Clicks Daily Activity (OT);Modified [place]  -KG               User Key  (r) = Recorded By, (t) = Taken By, (c) = Cosigned By      Initials Name Provider Type    Sarah Omer RN Registered Nurse    Richi Purdy, GERONIMO Occupational Therapist                      OT Recommendation and Plan  Therapy Frequency (OT): evaluation only  Plan of Care Review  Plan of Care Reviewed With: patient  Outcome Evaluation: Pt admitted to [facility] for nausea, vomiting, and diarrhea. Dx includes gastroenteritis 2/2 Norovirus. Medical hx includes NSCLC on chemotherapy. Pt lives w/ her spouse in a 1 story home, 3 steps to enter (stays at her daughters house intermittently). Pt is  (I)<>Mod (I) at baseline, uses a shower chair and has a RW that she uses for functional mobility. Today, pt was SPV for bed mobility, LBD, STS transfers, functional mobility, and standing grooming/hygiene tasks w/ use of RW. No balance, strength, or activity tolerance deficits noted. Pt reports feeling generally unwell, but states she is performing near her baseline and denies need for additional therapy while inpatient. Recommend continued OOB at least 3x/daily. No further therapy needs identified and OT will sign off.     Time Calculation:   Evaluation Complexity (OT)  Review Occupational Profile/Medical/Therapy History Complexity: brief/low complexity  Assessment, Occupational Performance/Identification of Deficit Complexity: 1-3 performance deficits  Clinical Decision Making Complexity (OT): problem focused assessment/low complexity  Overall Complexity of Evaluation (OT): low complexity     Time Calculation- OT       Row Name 05/15/25 1111             Time Calculation- OT    OT Start Time 0830  -KG      OT Stop Time 0850  -KG      OT Time Calculation (min) 20 min  -KG      Total Timed Code Minutes- OT 12 minute(s)  -KG      OT Non-Billable Time (min) 8 min  -KG      OT Received On 05/15/25  -KG         Timed Charges    27110 - OT Self Care/Mgmt Minutes 12  -KG         Untimed Charges    OT Eval/Re-eval Minutes 8  -KG         Total Minutes    Timed Charges Total Minutes 12  -KG      Untimed Charges Total Minutes 8  -KG       Total Minutes 20  -KG                User Key  (r) = Recorded By, (t) = Taken By, (c) = Cosigned By      Initials Name Provider Type    KG Richi Reich OT Occupational Therapist                  Therapy Charges for Today       Code Description Service Date Service Provider Modifiers Qty    [id number]  OT SELF CARE/MGMT/TRAIN EA 15 MIN 5/15/2025 Richi Reich OT GO 1    [id number]  OT EVAL LOW COMPLEXITY 2 5/15/2025 Richi Reich OT GO 1                 Richi Reich  OT  5/15/2025

## 2025-05-15 NOTE — PROGRESS NOTES
"Daily progress note    Referring physician   Dr. Marcano    Subjective  Doing same with no new complaints except itching and making slow progress    History of present illness  80-year-old white female with history of osteoarthritis osteoporosis non-small cell lung carcinoma on chemotherapy with chronic anemia and thrombocytopenia other medical problems hypertension hyperlipidemia gastroesophageal disease admitted to emergency room with abdominal pain with nausea vomiting diarrhea for last several days prior to admission.  Patient admitted to the observation unit and treated with IV fluid supportive care as she found to have norovirus gastroenteritis and also followed by oncology.  Patient required full admission as she continued to have symptoms and I am asked to follow her intake over the care.  Patient still has abdominal pain with nausea vomiting diarrhea and feeling weak but no other complaints.     REVIEW OF SYSTEMS  Unremarkable except weakness     PHYSICAL EXAM   Blood pressure 111/72, pulse 75, temperature 97.4 °F (36.3 °C), temperature source Oral, resp. rate 16, height 160 cm (63\"), weight 61 kg (134 lb 8 oz), SpO2 98%.    Constitutional:       General: She is not in acute distress.     Appearance: She is well-developed. She is ill-appearing .   HENT:      Head: Normocephalic and atraumatic.   Eyes:      General: No scleral icterus.     Conjunctiva/sclera: Conjunctivae normal.   Neck:      Trachea: No tracheal deviation.   Cardiovascular:      Rate and Rhythm: Normal rate and regular rhythm.   Pulmonary:      Effort: Pulmonary effort is normal.      Breath sounds: Normal breath sounds.   Abdominal:      Palpations: Abdomen is soft.      Tenderness: There is generalized abdominal tenderness. There is no guarding.   Musculoskeletal:         General: No deformity.      Cervical back: Normal range of motion.   Lymphadenopathy:      Cervical: No cervical adenopathy.   Skin:     General: Skin is warm and dry. "   Neurological:      Mental Status: She is alert and oriented to person, place, and time.   Psychiatric:         Behavior: Behavior normal.      LAB RESULTS  Lab Results (last 24 hours)       Procedure Component Value Units Date/Time    Magnesium [[id number]]  (Normal) Collected: 05/15/25 0632    Specimen: Blood Updated: 05/15/25 0755     Magnesium 2.0 mg/dL     Basic Metabolic Panel [[id number]]  (Abnormal) Collected: 05/15/25 0632    Specimen: Blood Updated: 05/15/25 0745     Glucose 73 mg/dL      BUN 10 mg/dL      Creatinine 0.73 mg/dL      Sodium 134 mmol/L      Potassium 3.6 mmol/L      Chloride 103 mmol/L      CO2 17.6 mmol/L      Calcium 8.9 mg/dL      BUN/Creatinine Ratio 13.7     Anion Gap 13.4 mmol/L      eGFR 82.2 mL/min/1.73     Narrative:      GFR Categories in Chronic Kidney Disease (CKD)              GFR Category          GFR (mL/min/1.73)    Interpretation  G1                    90 or greater        Normal or high (1)  G2                    60-89                Mild decrease (1)  G3a                   45-59                Mild to moderate decrease  G3b                   30-44                Moderate to severe decrease  G4                    15-29                Severe decrease  G5                    14 or less           Kidney failure    (1)In the absence of evidence of kidney disease, neither GFR category G1 or G2 fulfill the criteria for CKD.    eGFR calculation 2021 CKD-EPI creatinine equation, which does not include race as a factor    CBC & Differential [[id number]]  (Abnormal) Collected: 05/15/25 0632    Specimen: Blood Updated: 05/15/25 0738    Narrative:      The following orders were created for panel order CBC & Differential.  Procedure                               Abnormality         Status                     ---------                               -----------         ------                     CBC Auto Differential[[id number]]        Abnormal            Final result                 Please  view results for these tests on the individual orders.    CBC Auto Differential [[id number]]  (Abnormal) Collected: 05/15/25 0632    Specimen: Blood Updated: 05/15/25 0738     WBC 0.97 10*3/mm3      RBC 2.99 10*6/mm3      Hemoglobin 10.2 g/dL      Hematocrit 30.4 %      .7 fL      MCH 34.1 pg      MCHC 33.6 g/dL      RDW 12.1 %      RDW-SD 44.4 fl      MPV 12.0 fL      Platelets 16 10*3/mm3      Neutrophil % 19.7 %      Lymphocyte % 50.5 %      Monocyte % 14.4 %      Eosinophil % 1.0 %      Basophil % 0.0 %      Immature Grans % 14.4 %      Neutrophils, Absolute 0.19 10*3/mm3      Lymphocytes, Absolute 0.49 10*3/mm3      Monocytes, Absolute 0.14 10*3/mm3      Eosinophils, Absolute 0.01 10*3/mm3      Basophils, Absolute 0.00 10*3/mm3      Immature Grans, Absolute 0.14 10*3/mm3           Imaging Results (Last 24 Hours)       ** No results found for the last 24 hours. **            Current Facility-Administered Medications:     acetaminophen (TYLENOL) tablet 650 mg, 650 mg, Oral, Q4H PRN, 650 mg at 05/15/25 0821 **OR** [DISCONTINUED] acetaminophen (TYLENOL) 160 MG/5ML oral solution 650 mg, 650 mg, Oral, Q4H PRN **OR** [DISCONTINUED] acetaminophen (TYLENOL) suppository 650 mg, 650 mg, Rectal, Q4H PRN, Ketan Dunaway II, MD    albuterol (PROVENTIL) nebulizer solution 0.083% 2.5 mg/3mL, 2.5 mg, Nebulization, Q6H PRN, Ketan Dunaway II, MD    allopurinol (ZYLOPRIM) tablet 100 mg, 100 mg, Oral, Daily, Ketan Dunaway II, MD, 100 mg at 05/15/25 0822    Calcium Replacement - Follow Nurse / BPA Driven Protocol, , Not Applicable, PRN, Ketan Dunaway II, MD    dicyclomine (BENTYL) capsule 10 mg, 10 mg, Oral, Q8H PRN, Ketan Dunaway II, MD, 10 mg at 05/14/25 1145    diphenhydrAMINE (BENADRYL) capsule 25 mg, 25 mg, Oral, Q4H PRN, Bryson Burns MD, 25 mg at 05/15/25 0421    famotidine (PEPCID) tablet 20 mg, 20 mg, Oral, BID, Bryson Burns MD, 20 mg at 05/15/25 0822    Filgrastim (NEUPOGEN)  injection 300 mcg, 300 mcg, Subcutaneous, Q PM, Lobo Schaeffer MD, 300 mcg at 05/14/25 1611    folic acid (FOLVITE) tablet 1 mg, 1 mg, Oral, Daily, Ketan Dunaway II, MD, 1 mg at 05/15/25 0822    Magnesium Standard Dose Replacement - Follow Nurse / BPA Driven Protocol, , Not Applicable, Gume LAZCANO Thomas Edward II, MD    methocarbamol (ROBAXIN) tablet 500 mg, 500 mg, Oral, Q8H PRN, Ketan Dunaway II, MD    metoprolol tartrate (LOPRESSOR) tablet 25 mg, 25 mg, Oral, Q12H, Ketan Dunaway II, MD, 25 mg at 05/15/25 0822    morphine injection 4 mg, 4 mg, Intravenous, Q4H PRN, Ketan Dunaway II, MD    ondansetron ODT (ZOFRAN-ODT) disintegrating tablet 4 mg, 4 mg, Oral, Q6H PRN, 4 mg at 05/14/25 1804 **OR** ondansetron (ZOFRAN) injection 4 mg, 4 mg, Intravenous, Q6H PRN, Ketan Dunaway II, MD, 4 mg at 05/12/25 2146    Phosphorus Replacement - Follow Nurse / BPA Driven Protocol, , Not Applicable, Gume LAZCANO Thomas Edward II, MD    Potassium Replacement - Follow Nurse / BPA Driven Protocol, , Not Applicable, Gume LAZCANO Thomas Edward II, MD    rOPINIRole (REQUIP) tablet 0.5 mg, 0.5 mg, Oral, Nightly, Ketan Dunaway II, MD, 0.5 mg at 05/14/25 2033    sodium bicarbonate tablet 650 mg, 650 mg, Oral, TID, Bryson Burns MD, 650 mg at 05/15/25 0822    sodium chloride 0.9 % flush 10 mL, 10 mL, Intravenous, PRN, Ketan Dunaway II, MD, 10 mL at 05/11/25 1640    sodium chloride 0.9 % flush 10 mL, 10 mL, Intravenous, PRN, Ketan Dunaway II, MD, 10 mL at 05/13/25 1829    sodium chloride 0.9 % infusion 40 mL, 40 mL, Intravenous, PRN, Ketan Dunaway II, MD    temazepam (RESTORIL) capsule 15 mg, 15 mg, Oral, Nightly PRN, Ketan Dunaway II, MD    vitamin B-12 (CYANOCOBALAMIN) tablet 1,000 mcg, 1,000 mcg, Oral, Daily, Ketan Dunaway II, MD, 1,000 mcg at 05/15/25 0821     ASSESSMENT  Acute norovirus gastroenteritis resolving  Pancytopenia secondary to chemotherapy  Non-small  cell lung carcinoma on chemotherapy  Hypertension  Hyperlipidemia  Restless leg syndrome  Gastroesophageal reflux disease    PLAN  CPM  Discontinue IV fluid  Neupogen per hematology  Transfuse as needed   Benadryl as needed  Symptomatic treatment for abdominal pain nausea vomiting   Stress ulcer DVT prophylaxis  Supportive care  PT OT  Discussed with nursing staff  Follow closely further recommendation according to hospital course    KASHMIR JIMENEZ MD    Copied text in this note has been reviewed and is accurate as of 05/15/25

## 2025-05-15 NOTE — PROGRESS NOTES
[facility] GROUP INPATIENT PROGRESS NOTE    Length of Stay:  2 days    CHIEF COMPLAINT/REASON FOR VISIT:  Thrombocytopenia  Non-small cell lung cancer, on therapy with pemetrexed    SUBJECTIVE: Eating better.  No further nausea vomiting or diarrhea.  She received platelets yesterday.  She states that she had some pruritus before this but it worsened after the platelet transfusion.  She was given Benadryl without improvement.    ROS:  14 systems reviewed with pertinent positives and negatives in the HPI. Reviewed today.    OBJECTIVE:  Vitals:    05/14/25 1729 05/14/25 2154 05/15/25 0411 05/15/25 0906   BP: 125/73 109/67 129/73 111/72   BP Location: Left arm Left arm Left arm Right arm   Patient Position: Lying Lying Lying Sitting   Pulse: 74 73 71 75   Resp: 16 14 16 16   Temp: 97.5 °F (36.4 °C) 97.4 °F (36.3 °C) 97.5 °F (36.4 °C) 97.4 °F (36.3 °C)   TempSrc: Oral Oral Oral Oral   SpO2: 98% 95% 96% 98%   Weight:       Height:             PHYSICAL EXAMINATION:   General:  No acute distress, awake, alert and oriented  Skin: Patchy erythema present on her back and chest  HEENT: Normocephalic and atraumatic  Chest:  Normal respiratory effort  Abdomen soft and nontender.  Extremities:  No visible clubbing, cyanosis, or edema  Neuro/psych:  Grossly nonfocal.  Normal mood and affect.       DIAGNOSTIC DATA:  Results Review:     I reviewed the patient's new clinical results.    Results from last 7 days   Lab Units 05/15/25  0632   WBC 10*3/mm3 0.97*   HEMOGLOBIN g/dL 10.2*   HEMATOCRIT % 30.4*   PLATELETS 10*3/mm3 16*     Lab Results   Component Value Date    NEUTROABS 0.19 (L) 05/15/2025     Results from last 7 days   Lab Units 05/15/25  0632   SODIUM mmol/L 134*   POTASSIUM mmol/L 3.6   CHLORIDE mmol/L 103   CO2 mmol/L 17.6*   BUN mg/dL 10   CREATININE mg/dL 0.73   GLUCOSE mg/dL 73   CALCIUM mg/dL 8.9         Results from last 7 days   Lab Units 05/15/25  0632   MAGNESIUM mg/dL 2.0         IMAGING:    None  reviewed    ASSESSMENT:  This is a 80 y.o. female with:     *Non-small cell lung cancer  Initially treated with SBRT to the left lower lobe and right supraclavicular cancers, suspected to be non-squamous  Subsequent progression  Palliative pemetrexed with a 25% dose reduction administered on 5/7/2025     *Thrombocytopenia  Platelets were 92,000 on 5/7 on the day of chemotherapy  Thrombocytopenia secondary to chemotherapy with pemetrexed  Platelets 16,000 after transfusion, from 10,000, from 19,000, from 29,000, from 41,000, from 92,000     *Acute gastroenteritis, norovirus infection  Presented with abdominal pain, anorexia, diarrhea  Symptoms slowly improving     *Leukopenia  Also secondary to chemotherapy  White blood cell count 0.91 after G-CSF, from0.69, from1.49, from 2.91, from 4.44     *Macrocytic anemia  Hemoglobin 10.2, from 11.2, from 11.0     *Mild hyponatremia  Sodium 134, from 133, from 131, from 134, from 134     *Hypomagnesemia with improvement     *History of falls, vertebral, right hip fracture     *Pruritic rash  Could certainly be secondary to pemetrexed but this seemed to worsen after platelet transfusion as well.  She states that Benadryl was ineffective    RECOMMENDATIONS/PLAN:   No platelet transfusion today  Continue G- mcg daily for severe leukopenia and neutropenia  Will give her 1 dose of prednisone 20 mg for the rash and also start Sarna lotion and Atarax as needed.  Discontinue Benadryl.  Revisit this tomorrow.  Daily CBC while admitted  Supportive care for norovirus infection which has improved significantly  Scheduled for follow-up in the office on 5/21  With cycle #2 she will certainly need a dose reduction  We will follow  Discussed with the patient today      Lobo Schaeffer MD

## 2025-05-15 NOTE — PLAN OF CARE
Goal Outcome Evaluation:  Plan of Care Reviewed With: patient        Progress: improving  Outcome Evaluation: Patient denies any pain/nausea/vomiting this shift. Had mild headache this AM relieved with Tylenol. C/o generalized itching and new development of rash, MD aware. Prednisone given, Atarax & cream ordered PRN. Up with assist x 1 and use of walker to bathroom, fall precautions maintained. Heart healthy diet. Saline locked. Maintain contact spore precautions. VSS. WBC & Platelets improved today. K+ 3.6, PO replacement given, re-check ordered this afternoon.

## 2025-05-16 LAB
ANION GAP SERPL CALCULATED.3IONS-SCNC: 10.6 MMOL/L (ref 5–15)
BASOPHILS # BLD MANUAL: 0 10*3/MM3 (ref 0–0.2)
BASOPHILS NFR BLD MANUAL: 0 % (ref 0–1.5)
BUN SERPL-MCNC: 10 MG/DL (ref 8–23)
BUN/CREAT SERPL: 14.5 (ref 7–25)
CALCIUM SPEC-SCNC: 9 MG/DL (ref 8.6–10.5)
CHLORIDE SERPL-SCNC: 105 MMOL/L (ref 98–107)
CO2 SERPL-SCNC: 17.4 MMOL/L (ref 22–29)
CREAT SERPL-MCNC: 0.69 MG/DL (ref 0.57–1)
DEPRECATED RDW RBC AUTO: 43.5 FL (ref 37–54)
EGFRCR SERPLBLD CKD-EPI 2021: 86.8 ML/MIN/1.73
EOSINOPHIL # BLD MANUAL: 0 10*3/MM3 (ref 0–0.4)
EOSINOPHIL NFR BLD MANUAL: 0 % (ref 0.3–6.2)
ERYTHROCYTE [DISTWIDTH] IN BLOOD BY AUTOMATED COUNT: 12 % (ref 12.3–15.4)
GLUCOSE SERPL-MCNC: 95 MG/DL (ref 65–99)
HCT VFR BLD AUTO: 29.2 % (ref 34–46.6)
HGB BLD-MCNC: 10 G/DL (ref 12–15.9)
LYMPHOCYTES # BLD MANUAL: 0.4 10*3/MM3 (ref 0.7–3.1)
LYMPHOCYTES NFR BLD MANUAL: 6.3 % (ref 5–12)
MCH RBC QN AUTO: 34.7 PG (ref 26.6–33)
MCHC RBC AUTO-ENTMCNC: 34.2 G/DL (ref 31.5–35.7)
MCV RBC AUTO: 101.4 FL (ref 79–97)
MONOCYTES # BLD: 0.22 10*3/MM3 (ref 0.1–0.9)
NEUTROPHILS # BLD AUTO: 2.9 10*3/MM3 (ref 1.7–7)
NEUTROPHILS NFR BLD MANUAL: 82.3 % (ref 42.7–76)
NRBC SPEC MANUAL: 2.1 /100 WBC (ref 0–0.2)
PLAT MORPH BLD: NORMAL
PLATELET # BLD AUTO: 22 10*3/MM3 (ref 140–450)
PMV BLD AUTO: 12.9 FL (ref 6–12)
POTASSIUM SERPL-SCNC: 4.8 MMOL/L (ref 3.5–5.2)
RBC # BLD AUTO: 2.88 10*6/MM3 (ref 3.77–5.28)
RBC MORPH BLD: NORMAL
SODIUM SERPL-SCNC: 133 MMOL/L (ref 136–145)
VARIANT LYMPHS NFR BLD MANUAL: 1 % (ref 0–5)
VARIANT LYMPHS NFR BLD MANUAL: 10.4 % (ref 19.6–45.3)
WBC MORPH BLD: NORMAL
WBC NRBC COR # BLD AUTO: 3.52 10*3/MM3 (ref 3.4–10.8)

## 2025-05-16 PROCEDURE — 97161 PT EVAL LOW COMPLEX 20 MIN: CPT

## 2025-05-16 PROCEDURE — 85025 COMPLETE CBC W/AUTO DIFF WBC: CPT | Performed by: INTERNAL MEDICINE

## 2025-05-16 PROCEDURE — 97110 THERAPEUTIC EXERCISES: CPT

## 2025-05-16 PROCEDURE — 80048 BASIC METABOLIC PNL TOTAL CA: CPT | Performed by: HOSPITALIST

## 2025-05-16 PROCEDURE — 85007 BL SMEAR W/DIFF WBC COUNT: CPT | Performed by: INTERNAL MEDICINE

## 2025-05-16 PROCEDURE — 99232 SBSQ HOSP IP/OBS MODERATE 35: CPT | Performed by: INTERNAL MEDICINE

## 2025-05-16 RX ORDER — SODIUM BICARBONATE 650 MG/1
1300 TABLET ORAL 3 TIMES DAILY
Status: DISCONTINUED | OUTPATIENT
Start: 2025-05-16 | End: 2025-05-17

## 2025-05-16 RX ADMIN — ACETAMINOPHEN 650 MG: 325 TABLET, FILM COATED ORAL at 22:49

## 2025-05-16 RX ADMIN — Medication 1000 MCG: at 09:06

## 2025-05-16 RX ADMIN — FAMOTIDINE 20 MG: 20 TABLET, FILM COATED ORAL at 22:49

## 2025-05-16 RX ADMIN — ALLOPURINOL 100 MG: 100 TABLET ORAL at 09:07

## 2025-05-16 RX ADMIN — ACETAMINOPHEN 650 MG: 325 TABLET, FILM COATED ORAL at 03:33

## 2025-05-16 RX ADMIN — ROPINIROLE 0.5 MG: 1 TABLET, FILM COATED ORAL at 22:49

## 2025-05-16 RX ADMIN — FAMOTIDINE 20 MG: 20 TABLET, FILM COATED ORAL at 09:07

## 2025-05-16 RX ADMIN — SODIUM BICARBONATE 650 MG TABLET 1300 MG: at 16:37

## 2025-05-16 RX ADMIN — SODIUM BICARBONATE 650 MG: 650 TABLET ORAL at 09:06

## 2025-05-16 RX ADMIN — FOLIC ACID 1 MG: 1 TABLET ORAL at 09:07

## 2025-05-16 RX ADMIN — HYDROXYZINE HYDROCHLORIDE 25 MG: 25 TABLET, FILM COATED ORAL at 12:51

## 2025-05-16 RX ADMIN — METOPROLOL TARTRATE 25 MG: 25 TABLET, FILM COATED ORAL at 22:49

## 2025-05-16 RX ADMIN — METOPROLOL TARTRATE 25 MG: 25 TABLET, FILM COATED ORAL at 09:07

## 2025-05-16 NOTE — PLAN OF CARE
Goal Outcome Evaluation:           Progress: no change       Problem: Adult Inpatient Plan of Care  Goal: Absence of Hospital-Acquired Illness or Injury  Intervention: Identify and Manage Fall Risk  Description: Perform standard risk assessment on admission using a validated tool or comprehensive approach appropriate to the patient; reassess fall risk frequently, with change in status or transfer to another level of care. Communicate risk to interprofessional healthcare team; ensure fall risk visible cue. Determine need for increased observation, equipment and environmental modification, as well as use of supportive, nonskid footwear. Adjust safety measures to individual needs and identified risk factors. Reinforce the importance of active participation with fall risk prevention, safety, and physical activity with the patient and family. Perform regular intentional rounding to assess need for position change, pain assessment and personal needs, including assistance with toileting.  Recent Flowsheet Documentation  Taken 5/16/2025 1425 by Jyoti Short RN  Safety Promotion/Fall Prevention:   activity supervised   safety round/check completed  Taken 5/16/2025 1240 by Jyoti Short RN  Safety Promotion/Fall Prevention:   activity supervised   safety round/check completed  Taken 5/16/2025 1040 by Jyoti Short RN  Safety Promotion/Fall Prevention:   activity supervised   safety round/check completed  Taken 5/16/2025 0840 by Jyoti Short RN  Safety Promotion/Fall Prevention:   activity supervised   safety round/check completed

## 2025-05-16 NOTE — THERAPY DISCHARGE NOTE
Patient Name: Kenia BRIAN Sample  : 1945    MRN: [id number]                              Today's Date: 2025       Admit Date: 2025    Visit Dx:     ICD-10-CM ICD-9-CM   1. Enteritis  K52.9 558.9   2. Abnormal CT scan  R93.89 793.99   3. Thrombocytopenia  D69.6 287.5     Patient Active Problem List   Diagnosis    Snapping right knee    HTN (hypertension)    HLD (hyperlipidemia)    GERD (gastroesophageal reflux disease)    Closed comminuted intertrochanteric fracture of proximal end of right femur    Thrombocytopenia    Unable to ambulate    Right hip pain    Degenerative disc disease, thoracic and lumbar    NSCLC of left lung    Unable to bear weight    Fall    T12 compression fracture    Closed compression fracture of L1 vertebra    Compression fracture of L2 lumbar vertebra    Lumbar pain    Fitting and adjustment of vascular catheter    Gastroenteritis    Enteritis due to Norovirus     Past Medical History:   Diagnosis Date    Anesthesia complication     slow to wake up    Anginal pain     Arthritis     Cancer     Lung    Gastric reflux syndrome     Gout     Hiatal hernia     High cholesterol     History of bronchitis     Hyperlipidemia     Hypertension     Irregular heart beat     Mild aortic valve stenosis     per echo 2023    PONV (postoperative nausea and vomiting)     RLS (restless legs syndrome)      Past Surgical History:   Procedure Laterality Date    APPENDECTOMY      CARDIAC CATHETERIZATION      EXTERNAL EAR SURGERY Right     had tumor excised behind right ear lobe benign    EYE LESION EXCISION Left 2024    Procedure: LEFT LOWER EYELID BASEL CELL CARCINOMA EXCISIONAL AND REPAIR WITH FROZEN SECTIONS,;  Surgeon: Austin Kamara MD;  Location: [facility] MAIN OR;  Service: Ophthalmology;  Laterality: Left;    FEMUR IM NAILING/RODDING Right 06/15/2022    Procedure: RIGHT HIP INTRAMEDULLARY NAILING/RODDING;  Surgeon: Marc Crawford MD;  Location: [facility] MAIN OR;  Service: Orthopedics;   Laterality: Right;    GALLBLADDER SURGERY      HYSTERECTOMY      KNEE ARTHROPLASTY Right     KNEE ARTHROSCOPY Right     NC REVJ TOTAL KNEE ARTHRP W/WO ALGRFT 1 COMPONENT Right 09/07/2016    Procedure: RT ILIOTIBIAL BAND RELEASE RT TOTAL KNEE POLY EXCHANGE;  Surgeon: Toy Adame MD;  Location: [facility] OR;  Service: Orthopedics    ROTATOR CUFF REPAIR Bilateral     US GUIDED LYMPH NODE BIOPSY  3/18/2024    VENOUS ACCESS DEVICE (PORT) INSERTION Right 5/1/2025    Procedure: INSERTION VENOUS ACCESS DEVICE;  Surgeon: Saloni Verdugo MD;  Location: [facility] OR;  Service: Vascular;  Laterality: Right;    WRIST MASS EXCISION Right       General Information       Row Name 05/16/25 1201          Physical Therapy Time and Intention    Document Type discharge evaluation/summary  -MS     Mode of Treatment physical therapy;individual therapy  -MS       Row Name 05/16/25 1201          General Information    Patient Profile Reviewed yes  -MS     Existing Precautions/Restrictions --   Exit alarm  -MS     Barriers to Rehab none identified  -MS       Row Name 05/16/25 1201          Cognition    Orientation Status (Cognition) oriented x 3  -MS       Row Name 05/16/25 1201          Safety Issues/Impairments Affecting Functional Mobility    Comment, Safety Issues/Impairments (Mobility) Gait belt used for safety.  -MS               User Key  (r) = Recorded By, (t) = Taken By, (c) = Cosigned By      Initials Name Provider Type    Jimy Paredes, PT Physical Therapist                   Mobility       Row Name 05/16/25 1201          Bed Mobility    Bed Mobility supine-sit;sit-supine  -MS     Supine-Sit [place] (Bed Mobility) independent  -MS     Sit-Supine [place] (Bed Mobility) independent  -MS       Row Name 05/16/25 1201          Sit-Stand Transfer    Sit-Stand [place] (Transfers) independent  -MS     Assistive Device (Sit-Stand Transfers) walker, front-wheeled  -MS       Row Name 05/16/25 1201           Gait/Stairs (Locomotion)    [place] (Gait) independent  -MS     Assistive Device (Gait) walker, front-wheeled  -MS     Distance in Feet (Gait) 80  -MS     Comment, (Gait/Stairs) No balance deficits noted.  -MS               User Key  (r) = Recorded By, (t) = Taken By, (c) = Cosigned By      Initials Name Provider Type    Jiym Paredes, PT Physical Therapist                   Obj/Interventions       Row Name 05/16/25 1201          Range of Motion Comprehensive    Comment, General Range of Motion BUE/LE (WFL's)  -MS       Row Name 05/16/25 1201          Strength Comprehensive (MMT)    Comment, General Manual Muscle Testing (MMT) Assessment BUE/LE (4-/5)  -MS       [facility] Name 05/16/25 1201          Motor Skills    Therapeutic Exercise --  BLE (standing) ther. ex. program x 10 reps completed (Heel raises, Mini-squats)  -MS               User Key  (r) = Recorded By, (t) = Taken By, (c) = Cosigned By      Initials Name Provider Type    Jimy Paredes, PT Physical Therapist                   Goals/Plan    No documentation.                  Clinical Impression       [facility] Name 05/16/25 1202          Pain    Pretreatment Pain Rating 0/10 - no pain  -MS     Posttreatment Pain Rating 0/10 - no pain  -MS       Row Name 05/16/25 [facility]2          Plan of Care Review    Plan of Care Reviewed With patient  -MS       Row Name 05/16/25 1202          Therapy Assessment/Plan (PT)    Criteria for Skilled Interventions Met (PT) no problems identified which require skilled intervention  -MS       [facility] Name 05/16/25 1202          Positioning and Restraints    Pre-Treatment Position in bed  -MS     Post Treatment Position bed  -MS     In Bed notified nsg;supine;call light within reach;encouraged to call for assist;exit alarm on  -MS               User Key  (r) = Recorded By, (t) = Taken By, (c) = Cosigned By      Initials Name Provider Type    Jimy Paredes, PT Physical Therapist                   Outcome Measures        Row Name 05/16/25 1202          How much help from another person do you currently need...    Turning from your back to your side while in flat bed without using bedrails? 4  -MS     Moving from lying on back to sitting on the side of a flat bed without bedrails? 4  -MS     Moving to and from a bed to a chair (including a wheelchair)? 4  -MS     Standing up from a chair using your arms (e.g., wheelchair, bedside chair)? 4  -MS     Climbing 3-5 steps with a railing? 4  -MS     To walk in hospital room? 4  -MS     AM-PAC 6 Clicks Score (PT) 24  -MS     Highest Level of Mobility Goal Walk 250 Feet or More - 8  -MS       Row Name 05/16/25 1202          Functional Assessment    Outcome Measure Options AM-PAC 6 Clicks Basic Mobility (PT)  -MS               User Key  (r) = Recorded By, (t) = Taken By, (c) = Cosigned By      Initials Name Provider Type    MS Jimy Erickson PT Physical Therapist                  Physical Therapy Education       Title: PT OT SLP Therapies (In Progress)       Topic: Physical Therapy (Done)       Point: Mobility training (Done)       Learning Progress Summary            Patient Acceptance, E,D, VU,DU by MS at 5/16/2025 1203                      Point: Home exercise program (Done)       Learning Progress Summary            Patient Acceptance, E,D, VU,DU by MS at 5/16/2025 1203                      Point: Body mechanics (Done)       Learning Progress Summary            Patient Acceptance, E,D, VU,DU by MS at 5/16/2025 1203                      Point: Precautions (Done)       Learning Progress Summary            Patient Acceptance, E,D, VU,DU by MS at 5/16/2025 1203                                      User Key       Initials Effective Dates Name Provider Type Discipline    MS 06/16/21 -  Jimy Erickson PT Physical Therapist PT                  PT Recommendation and Plan     Outcome Evaluation: Pt. is currently independent with functional mobility and has no further questions/concerns  regarding functional mobility or home safety.  Encouraged pt. to continue ambulation with nursing staff while here in the hospital.  Otherwise, P.T. will sign off.     Time Calculation:         PT Charges       Row Name 05/16/25 1203             Time Calculation    Start Time 0750  -MS      Stop Time 0804  -MS      Time Calculation (min) 14 min  -MS      PT Received On 05/16/25  -MS         Time Calculation- PT    Total Timed Code Minutes- PT 14 minute(s)  -MS                User Key  (r) = Recorded By, (t) = Taken By, (c) = Cosigned By      Initials Name Provider Type    Jimy Paredes, PT Physical Therapist                  Therapy Charges for Today       Code Description Service Date Service Provider Modifiers Qty    47168869931 HC PT EVAL LOW COMPLEXITY 2 5/16/2025 Jimy Erickson, PT GP 1    81750245664 HC PT THER PROC EA 15 MIN 5/16/2025 Jimy Erickson, PT GP 1            PT G-Codes  Outcome Measure Options: AM-PAC 6 Clicks Basic Mobility (PT)  AM-PAC 6 Clicks Score (PT): 24  AM-PAC 6 Clicks Score (OT): 24  Modified Dean Scale: 1 - No significant disability despite symptoms.  Able to carry out all usual duties and activities.    PT Discharge Summary  Anticipated Discharge Disposition (PT): home  Reason for Discharge: Independent, At baseline function  Discharge Destination: Home    Jimy Erickson, PT  5/16/2025

## 2025-05-16 NOTE — CASE MANAGEMENT/SOCIAL WORK
Continued Stay Note  [facility]     Patient Name: Kenia N Sample  MRN: [id number]  Today's Date: 5/16/2025    Admit Date: 5/11/2025    Plan: Home with daughter to transport.   Discharge Plan       Row Name 05/16/25 1419       Plan    Plan Home with daughter to transport.    Patient/Family in Agreement with Plan yes    Plan Comments CCP met with pt at bedside to confirm DC plan. Pt confirms plan remains the same. Pt will return home and daughter will provide transport. CCP will continue to follow for any DC needs. RLutes RN/CCP                   Discharge Codes    No documentation.                 Expected Discharge Date and Time       Expected Discharge Date Expected Discharge Time    May 17, 2025               Josselyn Otto RN

## 2025-05-16 NOTE — PLAN OF CARE
Goal Outcome Evaluation:  Plan of Care Reviewed With: patient           Outcome Evaluation: Pt. is currently independent with functional mobility and has no further questions/concerns regarding functional mobility or home safety.  Encouraged pt. to continue ambulation with nursing staff while here in the hospital.  Otherwise, P.T. will sign off.    Anticipated Discharge Disposition (PT): home

## 2025-05-16 NOTE — PROGRESS NOTES
"Daily progress note    Referring physician   Dr. Marcano    Subjective  Doing better with no new complaint except weakness    History of present illness  80-year-old white female with history of osteoarthritis osteoporosis non-small cell lung carcinoma on chemotherapy with chronic anemia and thrombocytopenia other medical problems hypertension hyperlipidemia gastroesophageal disease admitted to emergency room with abdominal pain with nausea vomiting diarrhea for last several days prior to admission.  Patient admitted to the observation unit and treated with IV fluid supportive care as she found to have norovirus gastroenteritis and also followed by oncology.  Patient required full admission as she continued to have symptoms and I am asked to follow her intake over the care.  Patient still has abdominal pain with nausea vomiting diarrhea and feeling weak but no other complaints.     REVIEW OF SYSTEMS  Unremarkable except weakness     PHYSICAL EXAM   Blood pressure 123/67, pulse 75, temperature 98.2 °F (36.8 °C), temperature source Oral, resp. rate 18, height 160 cm (63\"), weight 65.8 kg (145 lb 1 oz), SpO2 95%.    Constitutional:       General: She is not in acute distress.     Appearance: She is well-developed. She is ill-appearing .   HENT:      Head: Normocephalic and atraumatic.   Eyes:      General: No scleral icterus.     Conjunctiva/sclera: Conjunctivae normal.   Neck:      Trachea: No tracheal deviation.   Cardiovascular:      Rate and Rhythm: Normal rate and regular rhythm.   Pulmonary:      Effort: Pulmonary effort is normal.      Breath sounds: Normal breath sounds.   Abdominal:      Palpations: Abdomen is soft.      Tenderness: There is generalized abdominal tenderness. There is no guarding.   Musculoskeletal:         General: No deformity.      Cervical back: Normal range of motion.   Lymphadenopathy:      Cervical: No cervical adenopathy.   Skin:     General: Skin is warm and dry.   Neurological:      " Mental Status: She is alert and oriented to person, place, and time.   Psychiatric:         Behavior: Behavior normal.      LAB RESULTS  Lab Results (last 24 hours)       Procedure Component Value Units Date/Time    Manual Differential [[id number]]  (Abnormal) Collected: 05/16/25 0609    Specimen: Blood Updated: 05/16/25 0825     Neutrophil % 82.3 %      Lymphocyte % 10.4 %      Monocyte % 6.3 %      Eosinophil % 0.0 %      Basophil % 0.0 %      Atypical Lymphocyte % 1.0 %      Neutrophils Absolute 2.90 10*3/mm3      Lymphocytes Absolute 0.40 10*3/mm3      Monocytes Absolute 0.22 10*3/mm3      Eosinophils Absolute 0.00 10*3/mm3      Basophils Absolute 0.00 10*3/mm3      nRBC 2.1 /100 WBC      RBC Morphology Normal     WBC Morphology Normal     Platelet Morphology Normal    Basic Metabolic Panel [[id number]]  (Abnormal) Collected: 05/16/25 0609    Specimen: Blood Updated: 05/16/25 0713     Glucose 95 mg/dL      BUN 10 mg/dL      Creatinine 0.69 mg/dL      Sodium 133 mmol/L      Potassium 4.8 mmol/L      Comment: Slight hemolysis detected by analyzer. Result may be falsely elevated.        Chloride 105 mmol/L      CO2 17.4 mmol/L      Calcium 9.0 mg/dL      BUN/Creatinine Ratio 14.5     Anion Gap 10.6 mmol/L      eGFR 86.8 mL/min/1.73     Narrative:      GFR Categories in Chronic Kidney Disease (CKD)              GFR Category          GFR (mL/min/1.73)    Interpretation  G1                    90 or greater        Normal or high (1)  G2                    60-89                Mild decrease (1)  G3a                   45-59                Mild to moderate decrease  G3b                   30-44                Moderate to severe decrease  G4                    15-29                Severe decrease  G5                    14 or less           Kidney failure    (1)In the absence of evidence of kidney disease, neither GFR category G1 or G2 fulfill the criteria for CKD.    eGFR calculation 2021 CKD-EPI creatinine equation, which  does not include race as a factor    CBC & Differential [[id number]]  (Abnormal) Collected: 05/16/25 0609    Specimen: Blood Updated: 05/16/25 0657    Narrative:      The following orders were created for panel order CBC & Differential.  Procedure                               Abnormality         Status                     ---------                               -----------         ------                     CBC Auto Differential[[id number]]        Abnormal            Final result                 Please view results for these tests on the individual orders.    CBC Auto Differential [[id number]]  (Abnormal) Collected: 05/16/25 0609    Specimen: Blood Updated: 05/16/25 0657     WBC 3.52 10*3/mm3      RBC 2.88 10*6/mm3      Hemoglobin 10.0 g/dL      Hematocrit 29.2 %      .4 fL      MCH 34.7 pg      MCHC 34.2 g/dL      RDW 12.0 %      RDW-SD 43.5 fl      MPV 12.9 fL      Platelets 22 10*3/mm3     Potassium [[id number]]  (Normal) Collected: 05/15/25 1714    Specimen: Blood Updated: 05/15/25 1804     Potassium 4.5 mmol/L           Imaging Results (Last 24 Hours)       ** No results found for the last 24 hours. **            Current Facility-Administered Medications:     acetaminophen (TYLENOL) tablet 650 mg, 650 mg, Oral, Q4H PRN, 650 mg at 05/16/25 0333 **OR** [DISCONTINUED] acetaminophen (TYLENOL) 160 MG/5ML oral solution 650 mg, 650 mg, Oral, Q4H PRN **OR** [DISCONTINUED] acetaminophen (TYLENOL) suppository 650 mg, 650 mg, Rectal, Q4H PRN, Ketan Dunaway II, MD    albuterol (PROVENTIL) nebulizer solution 0.083% 2.5 mg/3mL, 2.5 mg, Nebulization, Q6H PRN, Ketan Dunaway II, MD    allopurinol (ZYLOPRIM) tablet 100 mg, 100 mg, Oral, Daily, Ketan Dunaway II, MD, 100 mg at 05/16/25 0907    Calcium Replacement - Follow Nurse / BPA Driven Protocol, , Not Applicable, PRN, Ketan Dunaway II, MD    camphor-menthol (SARNA) 0.5-0.5 % lotion, , Topical, 4x Daily PRN, Lobo Schaeffer MD, Given at 05/15/25  1616    dicyclomine (BENTYL) capsule 10 mg, 10 mg, Oral, Q8H PRN, Ketan Dunaway II, MD, 10 mg at 05/15/25 2013    famotidine (PEPCID) tablet 20 mg, 20 mg, Oral, BID, Bryson Burns MD, 20 mg at 05/16/25 0907    folic acid (FOLVITE) tablet 1 mg, 1 mg, Oral, Daily, Ketan Dunaway II, MD, 1 mg at 05/16/25 0907    HYDROcodone-acetaminophen (NORCO) 5-325 MG per tablet 1 tablet, 1 tablet, Oral, Q4H PRN, Bryson Burns MD    hydrOXYzine (ATARAX) tablet 25 mg, 25 mg, Oral, TID PRN, Lobo Schaeffer MD, 25 mg at 05/16/25 1251    Magnesium Standard Dose Replacement - Follow Nurse / BPA Driven Protocol, , Not Applicable, Gume LAZCANO Thomas Edward II, MD    methocarbamol (ROBAXIN) tablet 500 mg, 500 mg, Oral, Q8H PRN, Ketan Dunaway II, MD    metoprolol tartrate (LOPRESSOR) tablet 25 mg, 25 mg, Oral, Q12H, Ketan Dunaway II, MD, 25 mg at 05/16/25 0907    ondansetron ODT (ZOFRAN-ODT) disintegrating tablet 4 mg, 4 mg, Oral, Q6H PRN, 4 mg at 05/14/25 1804 **OR** ondansetron (ZOFRAN) injection 4 mg, 4 mg, Intravenous, Q6H PRN, Ketan Dunaway II, MD, 4 mg at 05/12/25 2146    Phosphorus Replacement - Follow Nurse / BPA Driven Protocol, , Not Applicable, PRNGume Thomas Edward II, MD    Potassium Replacement - Follow Nurse / BPA Driven Protocol, , Not Applicable, Gume LAZCANO Thomas Edward II, MD    rOPINIRole (REQUIP) tablet 0.5 mg, 0.5 mg, Oral, Nightly, Ketan Dunaway II, MD, 0.5 mg at 05/15/25 2013    sodium bicarbonate tablet 650 mg, 650 mg, Oral, TID, Bryson Burns MD, 650 mg at 05/16/25 0906    sodium chloride 0.9 % flush 10 mL, 10 mL, Intravenous, PRN, Ketan Dunaway II, MD, 10 mL at 05/11/25 1640    sodium chloride 0.9 % flush 10 mL, 10 mL, Intravenous, PRGume BRIAN Thomas Edward II, MD, 10 mL at 05/13/25 1829    sodium chloride 0.9 % infusion 40 mL, 40 mL, Intravenous, Gume LAZCANO Thomas Edward II, MD    temazepam (RESTORIL) capsule 15 mg, 15 mg, Oral, Nightly Gume LAZCANO Thomas Edward  MD JALEEL    vitamin B-12 (CYANOCOBALAMIN) tablet 1,000 mcg, 1,000 mcg, Oral, Daily, Ketan Dunaway II, MD, 1,000 mcg at 05/16/25 0906     ASSESSMENT  Acute norovirus gastroenteritis resolving  Pancytopenia   Non-small cell lung carcinoma on chemotherapy  Hypertension  Hyperlipidemia  Restless leg syndrome  Gastroesophageal reflux disease    PLAN  CPM  Discontinue G-CSF per hematology  Transfuse as needed   Symptomatic treatment for abdominal pain nausea vomiting   Stress ulcer DVT prophylaxis  Supportive care  PT OT  Discussed with nursing staff  Discharge planning    KASHMIR JIMENEZ MD    Copied text in this note has been reviewed and is accurate as of 05/16/25

## 2025-05-16 NOTE — PROGRESS NOTES
"[facility] GROUP INPATIENT PROGRESS NOTE    Length of Stay:  3 days    CHIEF COMPLAINT/REASON FOR VISIT:  Thrombocytopenia  Non-small cell lung cancer, on therapy with pemetrexed    SUBJECTIVE: Abdominal cramping is improving.  She still has a little bit of diarrhea but overall this is significantly improved.  She has been ambulatory.  Rash is better after prednisone.    ROS:  14 systems reviewed with pertinent positives and negatives in the HPI. Reviewed today.    OBJECTIVE:  Vitals:    05/15/25 2153 05/16/25 0000 05/16/25 0314 05/16/25 0728   BP: 139/68 136/67 114/67 128/73   BP Location: Right arm Left arm Left arm Left arm   Patient Position: Lying Lying Lying Lying   Pulse: 69 71 80 70   Resp: 16 16 16 18   Temp: 98 °F (36.7 °C) 97.9 °F (36.6 °C) 97.9 °F (36.6 °C) 98.2 °F (36.8 °C)   TempSrc: Oral Oral Oral Oral   SpO2: 98% 96% 95% 97%   Weight:  65.8 kg (145 lb 1 oz)     Height:  160 cm (63\")           PHYSICAL EXAMINATION:   General:  No acute distress, awake, alert and oriented  Skin: Patchy erythema present on her back and chest, improved today but remains present  HEENT: Normocephalic and atraumatic  Chest:  Normal respiratory effort  Abdomen soft and nontender.  Extremities:  No visible clubbing, cyanosis, or edema  Neuro/psych:  Grossly nonfocal.  Normal mood and affect.       DIAGNOSTIC DATA:  Results Review:     I reviewed the patient's new clinical results.    Results from last 7 days   Lab Units 05/16/25  0609   WBC 10*3/mm3 3.52   HEMOGLOBIN g/dL 10.0*   HEMATOCRIT % 29.2*   PLATELETS 10*3/mm3 22*     Lab Results   Component Value Date    NEUTROABS 2.90 05/16/2025     Results from last 7 days   Lab Units 05/16/25  0609   SODIUM mmol/L 133*   POTASSIUM mmol/L 4.8   CHLORIDE mmol/L 105   CO2 mmol/L 17.4*   BUN mg/dL 10   CREATININE mg/dL 0.69   GLUCOSE mg/dL 95   CALCIUM mg/dL 9.0         Results from last 7 days   Lab Units 05/15/25  0632   MAGNESIUM mg/dL 2.0         IMAGING:    None " reviewed    ASSESSMENT:  This is a 80 y.o. female with:     *Non-small cell lung cancer  Initially treated with SBRT to the left lower lobe and right supraclavicular cancers, suspected to be non-squamous  Subsequent progression  Palliative pemetrexed with a 25% dose reduction administered on 5/7/2025     *Thrombocytopenia  Platelets were 92,000 on 5/7 on the day of chemotherapy  Thrombocytopenia secondary to chemotherapy with pemetrexed  Platelets 22,000, from 16,000 after transfusion, from 10,000, from 19,000, from 29,000, from 41,000, from 92,000     *Acute gastroenteritis, norovirus infection  Presented with abdominal pain, anorexia, diarrhea  Symptoms slowly improving     *Leukopenia  Also secondary to chemotherapy  White blood cell count has normalized at 3.52 with an ANC of 2.90 after 2 doses of G- mcg.  Discontinue G-CSF today.    *Macrocytic anemia  Hemoglobin 10.0, from 10.2, from 11.2, from 11.0     *Mild hyponatremia  Sodium 133, from 134, from 133, from 131, from 134, from 134     *Hypomagnesemia with improvement     *History of falls, vertebral, right hip fracture     *Pruritic rash  Could certainly be secondary to pemetrexed but this seemed to worsen after platelet transfusion as well.  She states that Benadryl was ineffective  She received a dose of 20 mg of prednisone on 5/15 and we started hydroxyzine as needed which she did not take    RECOMMENDATIONS/PLAN:   Discontinue G-CSF  No platelet transfusion necessary  Hydroxyzine as needed for pruritus but she is not having any issues at this time  Daily CBC while admitted  Supportive care for norovirus infection which has improved significantly  Scheduled for follow-up in the office on 5/21  With cycle #2 she will certainly need a dose reduction  If her platelet count has improved again tomorrow and she continues to improve clinically I think discharge at that point is reasonable  I will see her tomorrow.  Discussed with the patient.      Lobo  MD Maksim

## 2025-05-16 NOTE — PLAN OF CARE
Goal Outcome Evaluation:   This patient arrived at 3p shortly before midnight, alert and oriented, on room air, requested tylenol. No loose stools this shift, 20g to right ac which is saline locked, pt receiving platelets intermittently r/t counts, k+ replaced, rechecked, and noted to be 4.5 prior to arriving to 3p. Pt received cemo last on Wednesday for right lung cancer. Pt reports seeing Dr Mann w/CBC group. Norco (pain), atarax (for itching), tylenol (pain), Temazepam (sleep) ordered. Cardiac HH diet, up w/assist x1 w/walker. Fall precautions in place, bed alarm in place.     This nurse agrees with the assessment of the previous nurse. Plan of care is ongoing.

## 2025-05-17 LAB
ANION GAP SERPL CALCULATED.3IONS-SCNC: 6.9 MMOL/L (ref 5–15)
BASOPHILS # BLD AUTO: 0.03 10*3/MM3 (ref 0–0.2)
BASOPHILS NFR BLD AUTO: 0.5 % (ref 0–1.5)
BUN SERPL-MCNC: 11 MG/DL (ref 8–23)
BUN/CREAT SERPL: 14.3 (ref 7–25)
CALCIUM SPEC-SCNC: 9.4 MG/DL (ref 8.6–10.5)
CHLORIDE SERPL-SCNC: 106 MMOL/L (ref 98–107)
CO2 SERPL-SCNC: 23.1 MMOL/L (ref 22–29)
CREAT SERPL-MCNC: 0.77 MG/DL (ref 0.57–1)
DEPRECATED RDW RBC AUTO: 43.8 FL (ref 37–54)
EGFRCR SERPLBLD CKD-EPI 2021: 77.1 ML/MIN/1.73
EOSINOPHIL # BLD AUTO: 0.02 10*3/MM3 (ref 0–0.4)
EOSINOPHIL NFR BLD AUTO: 0.3 % (ref 0.3–6.2)
ERYTHROCYTE [DISTWIDTH] IN BLOOD BY AUTOMATED COUNT: 12.2 % (ref 12.3–15.4)
GLUCOSE SERPL-MCNC: 88 MG/DL (ref 65–99)
HCT VFR BLD AUTO: 29.8 % (ref 34–46.6)
HGB BLD-MCNC: 10.5 G/DL (ref 12–15.9)
IMM GRANULOCYTES # BLD AUTO: 0.05 10*3/MM3 (ref 0–0.05)
IMM GRANULOCYTES NFR BLD AUTO: 0.8 % (ref 0–0.5)
LYMPHOCYTES # BLD AUTO: 1.16 10*3/MM3 (ref 0.7–3.1)
LYMPHOCYTES NFR BLD AUTO: 18.3 % (ref 19.6–45.3)
MCH RBC QN AUTO: 35.2 PG (ref 26.6–33)
MCHC RBC AUTO-ENTMCNC: 35.2 G/DL (ref 31.5–35.7)
MCV RBC AUTO: 100 FL (ref 79–97)
MONOCYTES # BLD AUTO: 0.81 10*3/MM3 (ref 0.1–0.9)
MONOCYTES NFR BLD AUTO: 12.8 % (ref 5–12)
NEUTROPHILS NFR BLD AUTO: 4.27 10*3/MM3 (ref 1.7–7)
NEUTROPHILS NFR BLD AUTO: 67.3 % (ref 42.7–76)
PLATELET # BLD AUTO: 31 10*3/MM3 (ref 140–450)
PMV BLD AUTO: 11.4 FL (ref 6–12)
POTASSIUM SERPL-SCNC: 4.1 MMOL/L (ref 3.5–5.2)
RBC # BLD AUTO: 2.98 10*6/MM3 (ref 3.77–5.28)
SODIUM SERPL-SCNC: 136 MMOL/L (ref 136–145)
WBC NRBC COR # BLD AUTO: 6.34 10*3/MM3 (ref 3.4–10.8)

## 2025-05-17 PROCEDURE — 80048 BASIC METABOLIC PNL TOTAL CA: CPT | Performed by: HOSPITALIST

## 2025-05-17 PROCEDURE — 99232 SBSQ HOSP IP/OBS MODERATE 35: CPT | Performed by: INTERNAL MEDICINE

## 2025-05-17 PROCEDURE — 85025 COMPLETE CBC W/AUTO DIFF WBC: CPT | Performed by: INTERNAL MEDICINE

## 2025-05-17 RX ORDER — SODIUM BICARBONATE 650 MG/1
650 TABLET ORAL 3 TIMES DAILY
Status: DISCONTINUED | OUTPATIENT
Start: 2025-05-17 | End: 2025-05-18

## 2025-05-17 RX ADMIN — FAMOTIDINE 20 MG: 20 TABLET, FILM COATED ORAL at 08:44

## 2025-05-17 RX ADMIN — ACETAMINOPHEN 650 MG: 325 TABLET, FILM COATED ORAL at 16:03

## 2025-05-17 RX ADMIN — ROPINIROLE 0.5 MG: 1 TABLET, FILM COATED ORAL at 20:28

## 2025-05-17 RX ADMIN — SODIUM BICARBONATE 650 MG TABLET 1300 MG: at 00:24

## 2025-05-17 RX ADMIN — SODIUM BICARBONATE 650 MG: 650 TABLET ORAL at 20:29

## 2025-05-17 RX ADMIN — FOLIC ACID 1 MG: 1 TABLET ORAL at 08:44

## 2025-05-17 RX ADMIN — SODIUM BICARBONATE 650 MG: 650 TABLET ORAL at 15:53

## 2025-05-17 RX ADMIN — METOPROLOL TARTRATE 25 MG: 25 TABLET, FILM COATED ORAL at 20:29

## 2025-05-17 RX ADMIN — SODIUM BICARBONATE 650 MG TABLET 1300 MG: at 08:44

## 2025-05-17 RX ADMIN — ACETAMINOPHEN 650 MG: 325 TABLET, FILM COATED ORAL at 20:28

## 2025-05-17 RX ADMIN — ALLOPURINOL 100 MG: 100 TABLET ORAL at 08:44

## 2025-05-17 RX ADMIN — FAMOTIDINE 20 MG: 20 TABLET, FILM COATED ORAL at 20:29

## 2025-05-17 RX ADMIN — Medication 1000 MCG: at 08:44

## 2025-05-17 RX ADMIN — METOPROLOL TARTRATE 25 MG: 25 TABLET, FILM COATED ORAL at 08:44

## 2025-05-17 NOTE — PROGRESS NOTES
[facility] GROUP INPATIENT PROGRESS NOTE    Length of Stay:  4 days    CHIEF COMPLAINT/REASON FOR VISIT:  Thrombocytopenia  Non-small cell lung cancer, on therapy with pemetrexed    SUBJECTIVE: She states that she is eating much better and overall feeling much better.  She did not complain of rash today.    ROS:  14 systems reviewed with pertinent positives and negatives in the HPI. Reviewed today.    OBJECTIVE:  Vitals:    05/16/25 1538 05/16/25 1939 05/17/25 0451 05/17/25 0708   BP: 121/71 127/72 134/75 124/68   BP Location: Left arm Left arm Left arm Left arm   Patient Position: Lying Lying Lying Lying   Pulse: 69 76 67 73   Resp: 18 18 16 16   Temp: 97.9 °F (36.6 °C) 98.2 °F (36.8 °C) 97.5 °F (36.4 °C) 97.7 °F (36.5 °C)   TempSrc: Oral Oral Oral Oral   SpO2: 97% 96% 96% 95%   Weight:       Height:             PHYSICAL EXAMINATION:   General:  No acute distress, awake, alert and oriented  Skin: Mild patchy erythema on her chest  HEENT: Normocephalic and atraumatic  Chest:  Normal respiratory effort  Abdomen soft and nontender.  Extremities:  No visible clubbing, cyanosis, or edema  Neuro/psych:  Grossly nonfocal.  Normal mood and affect.       DIAGNOSTIC DATA:  Results Review:     I reviewed the patient's new clinical results.    Results from last 7 days   Lab Units 05/17/25  0627   WBC 10*3/mm3 6.34   HEMOGLOBIN g/dL 10.5*   HEMATOCRIT % 29.8*   PLATELETS 10*3/mm3 31*     Lab Results   Component Value Date    NEUTROABS 4.27 05/17/2025     Results from last 7 days   Lab Units 05/17/25  0627   SODIUM mmol/L 136   POTASSIUM mmol/L 4.1   CHLORIDE mmol/L 106   CO2 mmol/L 23.1   BUN mg/dL 11   CREATININE mg/dL 0.77   GLUCOSE mg/dL 88   CALCIUM mg/dL 9.4         Results from last 7 days   Lab Units 05/15/25  0632   MAGNESIUM mg/dL 2.0         IMAGING:    None reviewed    ASSESSMENT:  This is a 80 y.o. female with:     *Non-small cell lung cancer  Initially treated with SBRT to the left lower lobe and right  supraclavicular cancers, suspected to be non-squamous  Subsequent progression  Palliative pemetrexed with a 25% dose reduction administered on 5/7/2025     *Thrombocytopenia  Platelets were 92,000 on 5/7 on the day of chemotherapy  Thrombocytopenia secondary to chemotherapy with pemetrexed  Platelets 31,000, from 22,000, from 16,000 after transfusion, from 10,000, from 19,000, from 29,000, from 41,000, from 92,000     *Acute gastroenteritis, norovirus infection  Presented with abdominal pain, anorexia, diarrhea  Symptoms slowly improving     *Leukopenia  Also secondary to chemotherapy  White blood cell count has normalized at 6.34 with an ANC of 4.27 after 2 doses of G- mcg on 5/14 and 5/15.  No further G-CSF.    *Macrocytic anemia  Hemoglobin 10.5, from 10.0, from 10.2, from 11.2, from 11.0     *Mild hyponatremia  Sodium 133, from 134, from 133, from 131, from 134, from 134     *Hypomagnesemia with improvement     *History of falls, vertebral, right hip fracture     *Pruritic rash  Could certainly be secondary to pemetrexed but this seemed to worsen after platelet transfusion as well.  She states that Benadryl was ineffective  She received a dose of 20 mg of prednisone on 5/15 and we started hydroxyzine as needed and she took 1 dose of this on 5/16    RECOMMENDATIONS/PLAN:   No further G-CSF is needed.  No platelet transfusion is necessary.  Platelets are improving.  Hydroxyzine as needed for pruritus  Daily CBC while admitted  Supportive care for norovirus infection which has improved significantly  With cycle #2 she will certainly need a dose reduction  Discharge when appropriate.  I will sign off.  Follow-up in the office is currently scheduled on 5/21.      Lobo Schaeffer MD

## 2025-05-17 NOTE — PLAN OF CARE
Goal Outcome Evaluation:   Patient alert and oriented, on room air, right shest port not accessed, on chemo for right lung cancer, sees dr brown at [facility] group. Hh diet, up with assist x1 and gait belt. Fall precautions maintained, bed alarm in on position, atarax ordered for itching, transferred to  for neutropenic precautions. Plan of care is ongoing.

## 2025-05-17 NOTE — PLAN OF CARE
Goal Outcome Evaluation:                 Pt A/Ox4, RA, up with assist x1. Platelets improved, discharge held due to pt having diarrhea. Plan of care ongoing, VSS.

## 2025-05-17 NOTE — PROGRESS NOTES
"Daily progress note    Referring physician   Dr. Marcano    Subjective  Doing same with no new complaints but still multiple BMs    History of present illness  80-year-old white female with history of osteoarthritis osteoporosis non-small cell lung carcinoma on chemotherapy with chronic anemia and thrombocytopenia other medical problems hypertension hyperlipidemia gastroesophageal disease admitted to emergency room with abdominal pain with nausea vomiting diarrhea for last several days prior to admission.  Patient admitted to the observation unit and treated with IV fluid supportive care as she found to have norovirus gastroenteritis and also followed by oncology.  Patient required full admission as she continued to have symptoms and I am asked to follow her intake over the care.  Patient still has abdominal pain with nausea vomiting diarrhea and feeling weak but no other complaints.     REVIEW OF SYSTEMS  Unremarkable except diarrhea     PHYSICAL EXAM   Blood pressure 124/70, pulse 73, temperature 97.5 °F (36.4 °C), temperature source Oral, resp. rate 16, height 160 cm (63\"), weight 65.8 kg (145 lb 1 oz), SpO2 95%.    Constitutional:       General: She is not in acute distress.     Appearance: She is well-developed. She is ill-appearing .   HENT:      Head: Normocephalic and atraumatic.   Eyes:      General: No scleral icterus.     Conjunctiva/sclera: Conjunctivae normal.   Neck:      Trachea: No tracheal deviation.   Cardiovascular:      Rate and Rhythm: Normal rate and regular rhythm.   Pulmonary:      Effort: Pulmonary effort is normal.      Breath sounds: Normal breath sounds.   Abdominal:      Palpations: Abdomen is soft.      Tenderness: There is generalized abdominal tenderness. There is no guarding.   Musculoskeletal:         General: No deformity.      Cervical back: Normal range of motion.   Lymphadenopathy:      Cervical: No cervical adenopathy.   Skin:     General: Skin is warm and dry.   Neurological: "      Mental Status: She is alert and oriented to person, place, and time.   Psychiatric:         Behavior: Behavior normal.      LAB RESULTS  Lab Results (last 24 hours)       Procedure Component Value Units Date/Time    Basic Metabolic Panel [[id number]]  (Normal) Collected: 05/17/25 0627    Specimen: Blood Updated: 05/17/25 0715     Glucose 88 mg/dL      BUN 11 mg/dL      Creatinine 0.77 mg/dL      Sodium 136 mmol/L      Potassium 4.1 mmol/L      Chloride 106 mmol/L      CO2 23.1 mmol/L      Calcium 9.4 mg/dL      BUN/Creatinine Ratio 14.3     Anion Gap 6.9 mmol/L      eGFR 77.1 mL/min/1.73     Narrative:      GFR Categories in Chronic Kidney Disease (CKD)              GFR Category          GFR (mL/min/1.73)    Interpretation  G1                    90 or greater        Normal or high (1)  G2                    60-89                Mild decrease (1)  G3a                   45-59                Mild to moderate decrease  G3b                   30-44                Moderate to severe decrease  G4                    15-29                Severe decrease  G5                    14 or less           Kidney failure    (1)In the absence of evidence of kidney disease, neither GFR category G1 or G2 fulfill the criteria for CKD.    eGFR calculation 2021 CKD-EPI creatinine equation, which does not include race as a factor    CBC & Differential [[id number]]  (Abnormal) Collected: 05/17/25 0627    Specimen: Blood Updated: 05/17/25 0712    Narrative:      The following orders were created for panel order CBC & Differential.  Procedure                               Abnormality         Status                     ---------                               -----------         ------                     CBC Auto Differential[[id number]]        Abnormal            Final result                 Please view results for these tests on the individual orders.    CBC Auto Differential [[id number]]  (Abnormal) Collected: 05/17/25 0627    Specimen: Blood  Updated: 05/17/25 0712     WBC 6.34 10*3/mm3      RBC 2.98 10*6/mm3      Hemoglobin 10.5 g/dL      Hematocrit 29.8 %      .0 fL      MCH 35.2 pg      MCHC 35.2 g/dL      RDW 12.2 %      RDW-SD 43.8 fl      MPV 11.4 fL      Platelets 31 10*3/mm3      Neutrophil % 67.3 %      Lymphocyte % 18.3 %      Monocyte % 12.8 %      Eosinophil % 0.3 %      Basophil % 0.5 %      Immature Grans % 0.8 %      Neutrophils, Absolute 4.27 10*3/mm3      Lymphocytes, Absolute 1.16 10*3/mm3      Monocytes, Absolute 0.81 10*3/mm3      Eosinophils, Absolute 0.02 10*3/mm3      Basophils, Absolute 0.03 10*3/mm3      Immature Grans, Absolute 0.05 10*3/mm3           Imaging Results (Last 24 Hours)       ** No results found for the last 24 hours. **            Current Facility-Administered Medications:     acetaminophen (TYLENOL) tablet 650 mg, 650 mg, Oral, Q4H PRN, 650 mg at 05/16/25 2249 **OR** [DISCONTINUED] acetaminophen (TYLENOL) 160 MG/5ML oral solution 650 mg, 650 mg, Oral, Q4H PRN **OR** [DISCONTINUED] acetaminophen (TYLENOL) suppository 650 mg, 650 mg, Rectal, Q4H PRN, Ketan Dunaway II, MD    albuterol (PROVENTIL) nebulizer solution 0.083% 2.5 mg/3mL, 2.5 mg, Nebulization, Q6H PRN, Ketan Dunaway II, MD    allopurinol (ZYLOPRIM) tablet 100 mg, 100 mg, Oral, Daily, Ketan Dunaway II, MD, 100 mg at 05/17/25 0844    Calcium Replacement - Follow Nurse / BPA Driven Protocol, , Not Applicable, PRN, Ketan Dunaway II, MD    camphor-menthol (SARNA) 0.5-0.5 % lotion, , Topical, 4x Daily PRN, Lobo Schaeffer MD, Given at 05/15/25 1616    dicyclomine (BENTYL) capsule 10 mg, 10 mg, Oral, Q8H PRN, Ketan Dunaway II, MD, 10 mg at 05/15/25 2013    famotidine (PEPCID) tablet 20 mg, 20 mg, Oral, BID, Bryson Burns MD, 20 mg at 05/17/25 0844    folic acid (FOLVITE) tablet 1 mg, 1 mg, Oral, Daily, Ketan Dunaway II, MD, 1 mg at 05/17/25 0844    HYDROcodone-acetaminophen (NORCO) 5-325 MG per tablet 1 tablet, 1  tablet, Oral, Q4H PRN, Bryson Burns MD    hydrOXYzine (ATARAX) tablet 25 mg, 25 mg, Oral, TID PRN, Lobo Schaeffer MD, 25 mg at 05/16/25 1251    Magnesium Standard Dose Replacement - Follow Nurse / BPA Driven Protocol, , Not Applicable, Gume LAZCANO Thomas Edward II, MD    methocarbamol (ROBAXIN) tablet 500 mg, 500 mg, Oral, Q8H PRN, Ketan Dunaway II, MD    metoprolol tartrate (LOPRESSOR) tablet 25 mg, 25 mg, Oral, Q12H, Ketan Dunaway II, MD, 25 mg at 05/17/25 0844    ondansetron ODT (ZOFRAN-ODT) disintegrating tablet 4 mg, 4 mg, Oral, Q6H PRN, 4 mg at 05/14/25 1804 **OR** ondansetron (ZOFRAN) injection 4 mg, 4 mg, Intravenous, Q6H PRN, Ketan Dunaway II, MD, 4 mg at 05/12/25 2146    Phosphorus Replacement - Follow Nurse / BPA Driven Protocol, , Not Applicable, Gume LAZCANO Thomas Edward II, MD    Potassium Replacement - Follow Nurse / BPA Driven Protocol, , Not Applicable, Gume LAZCANO Thomas Edward II, MD    rOPINIRole (REQUIP) tablet 0.5 mg, 0.5 mg, Oral, Nightly, Ketan Dunaway II, MD, 0.5 mg at 05/16/25 2249    sodium bicarbonate tablet 1,300 mg, 1,300 mg, Oral, TID, Bryson Bruns MD, 1,300 mg at 05/17/25 0844    sodium chloride 0.9 % flush 10 mL, 10 mL, Intravenous, PRNGume Thomas Edward II, MD, 10 mL at 05/11/25 1640    sodium chloride 0.9 % flush 10 mL, 10 mL, Intravenous, PRNGume Thomas Edward II, MD, 10 mL at 05/13/25 1829    sodium chloride 0.9 % infusion 40 mL, 40 mL, Intravenous, PRNGume Thomas Edward II, MD    temazepam (RESTORIL) capsule 15 mg, 15 mg, Oral, Nightly PRNGume Thomas Edward II, MD    vitamin B-12 (CYANOCOBALAMIN) tablet 1,000 mcg, 1,000 mcg, Oral, Daily, Ketan Dunaway II, MD, 1,000 mcg at 05/17/25 0844     ASSESSMENT  Acute norovirus gastroenteritis resolving  Pancytopenia improving  Non-small cell lung carcinoma on chemotherapy  Hypertension  Hyperlipidemia  Restless leg syndrome  Gastroesophageal reflux disease    PLAN  CPM  Discontinue G-CSF  per hematology  Transfuse as needed   Symptomatic treatment for abdominal pain nausea vomiting   Check GI panel and C. difficile toxin  Stress ulcer DVT prophylaxis  Supportive care  PT OT  Discussed with nursing staff  Hold discharge today    KASHMIR JIMENEZ MD    Copied text in this note has been reviewed and is accurate as of 05/17/25

## 2025-05-18 ENCOUNTER — READMISSION MANAGEMENT (OUTPATIENT)
Dept: CALL CENTER | Facility: HOSPITAL | Age: 80
End: 2025-05-18
Payer: MEDICARE

## 2025-05-18 VITALS
WEIGHT: 145.06 LBS | HEIGHT: 63 IN | SYSTOLIC BLOOD PRESSURE: 133 MMHG | HEART RATE: 74 BPM | BODY MASS INDEX: 25.7 KG/M2 | RESPIRATION RATE: 16 BRPM | DIASTOLIC BLOOD PRESSURE: 69 MMHG | OXYGEN SATURATION: 95 % | TEMPERATURE: 98.4 F

## 2025-05-18 LAB
ANION GAP SERPL CALCULATED.3IONS-SCNC: 11.3 MMOL/L (ref 5–15)
BASOPHILS # BLD AUTO: 0.01 10*3/MM3 (ref 0–0.2)
BASOPHILS NFR BLD AUTO: 0.2 % (ref 0–1.5)
BUN SERPL-MCNC: 11 MG/DL (ref 8–23)
BUN/CREAT SERPL: 14.7 (ref 7–25)
CALCIUM SPEC-SCNC: 9.6 MG/DL (ref 8.6–10.5)
CHLORIDE SERPL-SCNC: 101 MMOL/L (ref 98–107)
CO2 SERPL-SCNC: 23.7 MMOL/L (ref 22–29)
CREAT SERPL-MCNC: 0.75 MG/DL (ref 0.57–1)
DEPRECATED RDW RBC AUTO: 43.8 FL (ref 37–54)
EGFRCR SERPLBLD CKD-EPI 2021: 79.6 ML/MIN/1.73
EOSINOPHIL # BLD AUTO: 0.03 10*3/MM3 (ref 0–0.4)
EOSINOPHIL NFR BLD AUTO: 0.7 % (ref 0.3–6.2)
ERYTHROCYTE [DISTWIDTH] IN BLOOD BY AUTOMATED COUNT: 11.9 % (ref 12.3–15.4)
GLUCOSE SERPL-MCNC: 93 MG/DL (ref 65–99)
HCT VFR BLD AUTO: 30.1 % (ref 34–46.6)
HGB BLD-MCNC: 10.4 G/DL (ref 12–15.9)
IMM GRANULOCYTES # BLD AUTO: 0.05 10*3/MM3 (ref 0–0.05)
IMM GRANULOCYTES NFR BLD AUTO: 1.1 % (ref 0–0.5)
LYMPHOCYTES # BLD AUTO: 1.09 10*3/MM3 (ref 0.7–3.1)
LYMPHOCYTES NFR BLD AUTO: 23.7 % (ref 19.6–45.3)
MCH RBC QN AUTO: 35 PG (ref 26.6–33)
MCHC RBC AUTO-ENTMCNC: 34.6 G/DL (ref 31.5–35.7)
MCV RBC AUTO: 101.3 FL (ref 79–97)
MONOCYTES # BLD AUTO: 0.67 10*3/MM3 (ref 0.1–0.9)
MONOCYTES NFR BLD AUTO: 14.6 % (ref 5–12)
NEUTROPHILS NFR BLD AUTO: 2.75 10*3/MM3 (ref 1.7–7)
NEUTROPHILS NFR BLD AUTO: 59.7 % (ref 42.7–76)
PLATELET # BLD AUTO: 34 10*3/MM3 (ref 140–450)
PMV BLD AUTO: 12.2 FL (ref 6–12)
POTASSIUM SERPL-SCNC: 3.7 MMOL/L (ref 3.5–5.2)
RBC # BLD AUTO: 2.97 10*6/MM3 (ref 3.77–5.28)
SODIUM SERPL-SCNC: 136 MMOL/L (ref 136–145)
WBC NRBC COR # BLD AUTO: 4.6 10*3/MM3 (ref 3.4–10.8)

## 2025-05-18 PROCEDURE — 85025 COMPLETE CBC W/AUTO DIFF WBC: CPT | Performed by: INTERNAL MEDICINE

## 2025-05-18 PROCEDURE — 80048 BASIC METABOLIC PNL TOTAL CA: CPT | Performed by: HOSPITALIST

## 2025-05-18 RX ORDER — FAMOTIDINE 20 MG/1
20 TABLET, FILM COATED ORAL 2 TIMES DAILY
Qty: 60 TABLET | Refills: 0 | Status: SHIPPED | OUTPATIENT
Start: 2025-05-18 | End: 2025-06-17

## 2025-05-18 RX ADMIN — METOPROLOL TARTRATE 25 MG: 25 TABLET, FILM COATED ORAL at 08:20

## 2025-05-18 RX ADMIN — ALLOPURINOL 100 MG: 100 TABLET ORAL at 08:20

## 2025-05-18 RX ADMIN — Medication 1000 MCG: at 08:20

## 2025-05-18 RX ADMIN — SODIUM BICARBONATE 650 MG: 650 TABLET ORAL at 08:20

## 2025-05-18 RX ADMIN — FOLIC ACID 1 MG: 1 TABLET ORAL at 08:20

## 2025-05-18 RX ADMIN — FAMOTIDINE 20 MG: 20 TABLET, FILM COATED ORAL at 08:20

## 2025-05-18 NOTE — PROGRESS NOTES
"Daily progress note    Referring physician   Dr. Marcano    Subjective  Doing     History of present illness  80-year-old white female with history of osteoarthritis osteoporosis non-small cell lung carcinoma on chemotherapy with chronic anemia and thrombocytopenia other medical problems hypertension hyperlipidemia gastroesophageal disease admitted to emergency room with abdominal pain with nausea vomiting diarrhea for last several days prior to admission.  Patient admitted to the observation unit and treated with IV fluid supportive care as she found to have norovirus gastroenteritis and also followed by oncology.  Patient required full admission as she continued to have symptoms and I am asked to follow her intake over the care.  Patient still has abdominal pain with nausea vomiting diarrhea and feeling weak but no other complaints.     REVIEW OF SYSTEMS  Unremarkable better with no new complaints and wants to go home.  Patient has no more diarrhea.       PHYSICAL EXAM   Blood pressure 133/69, pulse 74, temperature 98.4 °F (36.9 °C), temperature source Oral, resp. rate 16, height 160 cm (63\"), weight 65.8 kg (145 lb 1 oz), SpO2 95%.    Constitutional:       General: She is not in acute distress.     Appearance: She is well-developed.   HENT:      Head: Normocephalic and atraumatic.   Eyes:      General: No scleral icterus.     Conjunctiva/sclera: Conjunctivae normal.   Neck:      Trachea: No tracheal deviation.   Cardiovascular:      Rate and Rhythm: Normal rate and regular rhythm.   Pulmonary:      Effort: Pulmonary effort is normal.      Breath sounds: Normal breath sounds.   Abdominal:      Palpations: Abdomen is soft.      Tenderness: Nontender bowel sounds positive  Musculoskeletal:         General: No deformity.      Cervical back: Normal range of motion.   Lymphadenopathy:      Cervical: No cervical adenopathy.   Skin:     General: Skin is warm and dry.   Neurological:      Mental Status: She is alert and " oriented to person, place, and time.   Psychiatric:         Behavior: Behavior normal.      LAB RESULTS  Lab Results (last 24 hours)       Procedure Component Value Units Date/Time    Basic Metabolic Panel [[id number]]  (Normal) Collected: 05/18/25 0759    Specimen: Blood Updated: 05/18/25 0849     Glucose 93 mg/dL      BUN 11 mg/dL      Creatinine 0.75 mg/dL      Sodium 136 mmol/L      Potassium 3.7 mmol/L      Comment: Slight hemolysis detected by analyzer. Result may be falsely elevated.        Chloride 101 mmol/L      CO2 23.7 mmol/L      Calcium 9.6 mg/dL      BUN/Creatinine Ratio 14.7     Anion Gap 11.3 mmol/L      eGFR 79.6 mL/min/1.73     Narrative:      GFR Categories in Chronic Kidney Disease (CKD)              GFR Category          GFR (mL/min/1.73)    Interpretation  G1                    90 or greater        Normal or high (1)  G2                    60-89                Mild decrease (1)  G3a                   45-59                Mild to moderate decrease  G3b                   30-44                Moderate to severe decrease  G4                    15-29                Severe decrease  G5                    14 or less           Kidney failure    (1)In the absence of evidence of kidney disease, neither GFR category G1 or G2 fulfill the criteria for CKD.    eGFR calculation 2021 CKD-EPI creatinine equation, which does not include race as a factor    CBC & Differential [[id number]]  (Abnormal) Collected: 05/18/25 0759    Specimen: Blood Updated: 05/18/25 0838    Narrative:      The following orders were created for panel order CBC & Differential.  Procedure                               Abnormality         Status                     ---------                               -----------         ------                     CBC Auto Differential[[id number]]        Abnormal            Final result                 Please view results for these tests on the individual orders.    CBC Auto Differential [[id number]]   (Abnormal) Collected: 05/18/25 0759    Specimen: Blood Updated: 05/18/25 0838     WBC 4.60 10*3/mm3      RBC 2.97 10*6/mm3      Hemoglobin 10.4 g/dL      Hematocrit 30.1 %      .3 fL      MCH 35.0 pg      MCHC 34.6 g/dL      RDW 11.9 %      RDW-SD 43.8 fl      MPV 12.2 fL      Platelets 34 10*3/mm3      Neutrophil % 59.7 %      Lymphocyte % 23.7 %      Monocyte % 14.6 %      Eosinophil % 0.7 %      Basophil % 0.2 %      Immature Grans % 1.1 %      Neutrophils, Absolute 2.75 10*3/mm3      Lymphocytes, Absolute 1.09 10*3/mm3      Monocytes, Absolute 0.67 10*3/mm3      Eosinophils, Absolute 0.03 10*3/mm3      Basophils, Absolute 0.01 10*3/mm3      Immature Grans, Absolute 0.05 10*3/mm3           Imaging Results (Last 24 Hours)       ** No results found for the last 24 hours. **            Current Facility-Administered Medications:     allopurinol (ZYLOPRIM) tablet 100 mg, 100 mg, Oral, Daily, Ketan Dunaway II, MD, 100 mg at 05/18/25 0820    famotidine (PEPCID) tablet 20 mg, 20 mg, Oral, BID, Kashmir Burns MD, 20 mg at 05/18/25 0820    folic acid (FOLVITE) tablet 1 mg, 1 mg, Oral, Daily, Ketan Dunaway II, MD, 1 mg at 05/18/25 0820    metoprolol tartrate (LOPRESSOR) tablet 25 mg, 25 mg, Oral, Q12H, Ketan Dunaway II, MD, 25 mg at 05/18/25 0820    rOPINIRole (REQUIP) tablet 0.5 mg, 0.5 mg, Oral, Nightly, Ketan Dunaway II, MD, 0.5 mg at 05/17/25 2028    vitamin B-12 (CYANOCOBALAMIN) tablet 1,000 mcg, 1,000 mcg, Oral, Daily, Ketan Dunaway II, MD, 1,000 mcg at 05/18/25 0820     ASSESSMENT  Acute norovirus gastroenteritis resolving  Pancytopenia improving  Non-small cell lung carcinoma on chemotherapy  Hypertension  Hyperlipidemia  Restless leg syndrome  Gastroesophageal reflux disease    PLAN  Discharge home  Discharge summary dictated    KASHMIR AHMED MD    Copied text in this note has been reviewed and is accurate as of 05/18/25

## 2025-05-18 NOTE — NURSING NOTE
Patient alert and oriented, on room air, up with assist, pt reports diarrhea has nearly subsided. VSS. Plan of care is ongoing.

## 2025-05-18 NOTE — DISCHARGE SUMMARY
Discharge summary    Date of admission 5/11/2025  Date of discharge 5/18/2025    Final diagnosis  Acute norovirus gastroenteritis resolved   Pancytopenia improving  Non-small cell lung carcinoma on chemotherapy  Hypertension  Hyperlipidemia  Restless leg syndrome  Gastroesophageal reflux disease    Discharge medications    Current Facility-Administered Medications:     allopurinol (ZYLOPRIM) tablet 100 mg, 100 mg, Oral, Daily, Ketan Dunaway II, MD, 100 mg at 05/18/25 0820    famotidine (PEPCID) tablet 20 mg, 20 mg, Oral, BID, Bryson Burns MD, 20 mg at 05/18/25 0820    folic acid (FOLVITE) tablet 1 mg, 1 mg, Oral, Daily, Ketan Dunaway II, MD, 1 mg at 05/18/25 0820    metoprolol tartrate (LOPRESSOR) tablet 25 mg, 25 mg, Oral, Q12H, Ketan Dunaway II, MD, 25 mg at 05/18/25 0820    rOPINIRole (REQUIP) tablet 0.5 mg, 0.5 mg, Oral, Nightly, Ketan Dunaway II, MD, 0.5 mg at 05/17/25 2028    vitamin B-12 (CYANOCOBALAMIN) tablet 1,000 mcg, 1,000 mcg, Oral, Daily, Ketan Dunaway II, MD, 1,000 mcg at 05/18/25 0820 consults obtained    Consults obtained  Hematology oncology    Procedures  None     Hospital course  80-year-old white female with history of non-small cell lung carcinoma on chemotherapy hypertension hyperlipidemia restless leg syndrome and gastroesophageal reflux disease admitted to emergency room with abdominal pain with nausea vomiting diarrhea for last several days prior to admission.  Patient workup in ER revealed severe pancytopenia admitted for management.  Patient admitted treated with IV fluid supportive care and followed by hematology oncology treated with transfusion as needed and followed CBC closely.  Patient found to have acute norovirus gastroenteritis which is also treated symptomatically.  Patient symptoms improved and CBC also improved and she is tolerating regular diet and cleared for discharge.    Discharge diet regular    Activity as tolerated    Medication as  above    Follow-up with primary doctor in 1 week and follow-up with oncology per the instructions and take medication as directed.    KASHMIR JIMENEZ MD

## 2025-05-19 NOTE — OUTREACH NOTE
Prep Survey      Flowsheet Row Responses   [facility] facility patient discharged from? [facility]   Is LACE score < 7 ? No   Eligibility Readm Mgmt   Discharge diagnosis Acute norovirus gastroenteritis   Does the patient have one of the following disease processes/diagnoses(primary or secondary)? Other   Does the patient have Home health ordered? No   Is there a DME ordered? No   Prep survey completed? Yes            Francoise CORRALES - Registered Nurse

## 2025-05-19 NOTE — CASE MANAGEMENT/SOCIAL WORK
Case Management Discharge Note      Final Note: Home.    Provided Post Acute Provider List?: N/A  Provided Post Acute Provider Quality & Resource List?: N/A    Selected Continued Care - Discharged on 5/18/2025 Admission date: 5/11/2025 - Discharge disposition: Home or Self Care      Destination    No services have been selected for the patient.                Durable Medical Equipment    No services have been selected for the patient.                Dialysis/Infusion    No services have been selected for the patient.                Home Medical Care    No services have been selected for the patient.                Therapy    No services have been selected for the patient.                Community Resources    No services have been selected for the patient.                Community & DME    No services have been selected for the patient.                    Transportation Services  Private: Car    Final Discharge Disposition Code: 01 - home or self-care

## 2025-05-21 ENCOUNTER — OFFICE VISIT (OUTPATIENT)
Dept: ONCOLOGY | Facility: CLINIC | Age: 80
End: 2025-05-21
Payer: MEDICARE

## 2025-05-21 ENCOUNTER — LAB (OUTPATIENT)
Dept: LAB | Facility: HOSPITAL | Age: 80
End: 2025-05-21
Payer: MEDICARE

## 2025-05-21 VITALS
RESPIRATION RATE: 16 BRPM | HEART RATE: 88 BPM | DIASTOLIC BLOOD PRESSURE: 75 MMHG | BODY MASS INDEX: 25.52 KG/M2 | OXYGEN SATURATION: 98 % | HEIGHT: 63 IN | TEMPERATURE: 97.8 F | WEIGHT: 144 LBS | SYSTOLIC BLOOD PRESSURE: 113 MMHG

## 2025-05-21 DIAGNOSIS — C34.92 NSCLC OF LEFT LUNG: Primary | ICD-10-CM

## 2025-05-21 DIAGNOSIS — D69.6 THROMBOCYTOPENIA: ICD-10-CM

## 2025-05-21 DIAGNOSIS — C34.92 NSCLC OF LEFT LUNG: ICD-10-CM

## 2025-05-21 DIAGNOSIS — D70.3 NEUTROPENIA ASSOCIATED WITH INFECTION: ICD-10-CM

## 2025-05-21 LAB
ALBUMIN SERPL-MCNC: 3.8 G/DL (ref 3.5–5.2)
ALBUMIN/GLOB SERPL: 1.1 G/DL
ALP SERPL-CCNC: 192 U/L (ref 39–117)
ALT SERPL W P-5'-P-CCNC: 47 U/L (ref 1–33)
ANION GAP SERPL CALCULATED.3IONS-SCNC: 12.4 MMOL/L (ref 5–15)
AST SERPL-CCNC: 49 U/L (ref 1–32)
BASOPHILS # BLD AUTO: 0.02 10*3/MM3 (ref 0–0.2)
BASOPHILS NFR BLD AUTO: 0.6 % (ref 0–1.5)
BILIRUB SERPL-MCNC: 0.4 MG/DL (ref 0–1.2)
BUN SERPL-MCNC: 18 MG/DL (ref 8–23)
BUN/CREAT SERPL: 17.8 (ref 7–25)
CALCIUM SPEC-SCNC: 9.4 MG/DL (ref 8.6–10.5)
CHLORIDE SERPL-SCNC: 101 MMOL/L (ref 98–107)
CO2 SERPL-SCNC: 22.6 MMOL/L (ref 22–29)
CREAT SERPL-MCNC: 1.01 MG/DL (ref 0.57–1)
DEPRECATED RDW RBC AUTO: 46.1 FL (ref 37–54)
EGFRCR SERPLBLD CKD-EPI 2021: 55.7 ML/MIN/1.73
EOSINOPHIL # BLD AUTO: 0.07 10*3/MM3 (ref 0–0.4)
EOSINOPHIL NFR BLD AUTO: 2.1 % (ref 0.3–6.2)
ERYTHROCYTE [DISTWIDTH] IN BLOOD BY AUTOMATED COUNT: 12.4 % (ref 12.3–15.4)
GLOBULIN UR ELPH-MCNC: 3.6 GM/DL
GLUCOSE SERPL-MCNC: 113 MG/DL (ref 65–99)
HCT VFR BLD AUTO: 35.5 % (ref 34–46.6)
HGB BLD-MCNC: 11.7 G/DL (ref 12–15.9)
IMM GRANULOCYTES # BLD AUTO: 0.11 10*3/MM3 (ref 0–0.05)
IMM GRANULOCYTES NFR BLD AUTO: 3.3 % (ref 0–0.5)
LYMPHOCYTES # BLD AUTO: 0.97 10*3/MM3 (ref 0.7–3.1)
LYMPHOCYTES NFR BLD AUTO: 29.2 % (ref 19.6–45.3)
MCH RBC QN AUTO: 34.1 PG (ref 26.6–33)
MCHC RBC AUTO-ENTMCNC: 33 G/DL (ref 31.5–35.7)
MCV RBC AUTO: 103.5 FL (ref 79–97)
MONOCYTES # BLD AUTO: 0.57 10*3/MM3 (ref 0.1–0.9)
MONOCYTES NFR BLD AUTO: 17.2 % (ref 5–12)
NEUTROPHILS NFR BLD AUTO: 1.58 10*3/MM3 (ref 1.7–7)
NEUTROPHILS NFR BLD AUTO: 47.6 % (ref 42.7–76)
NRBC BLD AUTO-RTO: 0 /100 WBC (ref 0–0.2)
PLATELET # BLD AUTO: 96 10*3/MM3 (ref 140–450)
PMV BLD AUTO: 11.2 FL (ref 6–12)
POTASSIUM SERPL-SCNC: 4.9 MMOL/L (ref 3.5–5.2)
PROT SERPL-MCNC: 7.4 G/DL (ref 6–8.5)
RBC # BLD AUTO: 3.43 10*6/MM3 (ref 3.77–5.28)
SODIUM SERPL-SCNC: 136 MMOL/L (ref 136–145)
WBC NRBC COR # BLD AUTO: 3.32 10*3/MM3 (ref 3.4–10.8)

## 2025-05-21 PROCEDURE — 36415 COLL VENOUS BLD VENIPUNCTURE: CPT

## 2025-05-21 PROCEDURE — 80053 COMPREHEN METABOLIC PANEL: CPT

## 2025-05-21 PROCEDURE — 85025 COMPLETE CBC W/AUTO DIFF WBC: CPT

## 2025-05-21 RX ORDER — AZITHROMYCIN 250 MG/1
TABLET, FILM COATED ORAL
Qty: 6 TABLET | Refills: 0 | Status: SHIPPED | OUTPATIENT
Start: 2025-05-21

## 2025-05-21 RX ORDER — BENZONATATE 100 MG/1
100 CAPSULE ORAL 3 TIMES DAILY PRN
Qty: 45 CAPSULE | Refills: 1 | Status: SHIPPED | OUTPATIENT
Start: 2025-05-21

## 2025-05-21 NOTE — PROGRESS NOTES
REASON FOR FOLLOW-UP: Left lower lobe lung mass-squamous of carcinoma                               History of Present Illness    The patient is a 80 y.o. female with the above-mentioned history, who returns to the office today for hospital follow-up and lab review.  She was recently admitted 5/20/2025 through 5/18/2025 secondary to acute [place] gastroenteritis.  She had associated pancytopenia.  She struggled with severe abdominal pain, nausea vomiting and diarrhea.  She received G-CSF support due to neutropenia.    Since discharge, the patient reports she has continued to struggle with diarrhea which finally improved just yesterday.  She is now developed productive cough with purulent sputum and thick drainage.  She has no fevers or chills    ONCOLOGY HISTORY:  The patient is an 80-year-old female with below medical history recently admitted 12/4 - 6/20/2023 with a history of right intertrochanteric femoral fracture and osteoarthritis presenting from a fall at home.  She had a stress fracture left foot that affected her balance, chronic low back pain that is also worsened with a fall.  Upon presentation orthopedic surgery was consulted finding a right intramedullary nail fixation and healing without abnormal findings.  No additional surgery was planned though physical therapy continued.  Spinal films demonstrated compression deformity T12 and L2 but fortunately she did further improve and was able to be discharged.    The patient had follow-up with several physicians including orthopedic physicians with an eventual chest exam that revealed an unexpected nodularity.  She was then referred to pulmonary medicine 1/8/2024 with a history of smoking for 35 years quitting 8 years ago and indication that there was a lung nodule found incidentally on imaging for the spine.  Through the [place] system a CT of the chest demonstrated a 2.0 x 1.9 cm noncalcified nodule involving the posterior medial aspect left lower  lobe abutting the pleura, more smoothly marginated 0.9 cm noncalcified right middle lobe nodule and a few additional smaller noncalcified nodules including 2 adjacent nodules measuring less than 0.4 cm the right middle lobe along with mild emphysema with mild to moderate subpleural and bibasilar scarring.  There is no pleural effusion or pneumothorax.    A PET/CT was obtained 2/6/2024 with a solid subpleural irregular nodule left lower lobe medially measuring 1.7 x 1.8 intensely hypermetabolic with SUV of 12.8, no additional nodular mass, had metabolic right superior mediastinal lymph node lateral measuring 0.9 cm to 3.9, diffuse lower lobe tracer uptake with mediastinal right hilar lymph nodes partially calcified thought to be inflammatory or granulomatous disease.  There is also low-grade tracer uptake associated subacute healing left anterior rib fractures.    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    The patient was referred urgently from [facility] for assessment.  It is not certain why she has not been seen in the [place] system though she now states that she wished to be seen here.     Patient's case discussed during thoracic conference with the finding of a right supraclavicular node.  This was unknown to us when seen her initially in consultation.  Request made to obtain ultrasound-guided biopsy of the right supraclavicular node and this is discussed with the patient's daughter who indicates the patient would be willing to proceed.  We will go and have this scheduled even though she is to be seen within the next week and the result may not yet be available.    3/6/2023 FNA obtained, discussed with IR physician Dr. Courtney with findings of a few atypical clusters present suspicious for involvement by non-small cell carcinoma.  He is offered to rebiopsy quickly if  necessary.    The patient is seen 3/13/2024 with rebiopsy requested as well as radiation therapy consultation.  The case is discussed with interventional radiology and a repeat biopsy is possible.  The patient is willing to proceed in this fashion and we have also discussed radiation therapy consultation in the interval.  She continues have symptoms of chronic back pain and left lid lesion that is going to be reviewed by ophthalmology.    The patient went on to have a follow-up exam with repeat right supraclavicular lymph node biopsy 3/18/2024.  This was found positive for metastatic carcinoma.  The patient was seen by Dr. Hopkins with the conclusion that she has a 2 cm left lower lobe squamous cell carcinoma abutting the pleura additional contralateral supraclavicular lymph node in the thyroid.  This is to be discussed in conference 3/21/2024.    This was discussed 3/21/2024 recognizing the patient's frailty.  Options for treating the areas that are known positive including the left lower lobe and right supraclavicular node would include radiation therapy and Dr. Hopkins will see the patient concerning this as we also follow her for consideration of systemic therapy including a [id number] considering the genomic studies done thus far and the inability to obtain additional genomics from the scant tissue that we have even from recent biopsy.  Finally the patient's overall status including an older adult assessment is going to be requested.    The patient's [id number] revealed NF1  detected with possible indication of Selumetinib effectiveness.  Additionally TMB was 10.4 mutations per megabase.  Concurrently the patient was seen by radiation therapy treating the left lower lobe and right neck site via SBRT.  Further MRI of the brain revealed no evidence of metastatic disease.    She is seen back 4/11/2024.  She had been seen 4/8/2024 with SBRT to left lower lobe and right neck lesion.  This has not been completed and  she is to follow-up with radiation therapy mid July.  We have discussed her findings today with the patient seen along with her granddaughter.  She did well with radiation therapy and has no additional symptoms currently respiratorily.  Her genomic testing suggested possible use of Selumetinib (not currently available) though we will follow this as she is subsequently reevaluated.    A follow-up CT series was obtained 7/1/2024.  This reveals a slight decrease in the hypermetabolic right supraclavicular node measuring 0.9 x 0.7 cm previously 1.2 x 1.1 cm.  In the chest there is decrease in the mass in the posterior medial aspect left lower lobe and evidence of radiation pneumonitis adjacently.  Margins are difficult to appreciate completely but maximal diameter is 2 cm previously 2.2 cm as compared to previous.  There is a pleural-based 1.0 x 1.6 cm nodule medial aspect of the right lower lobe which which was less conspicuous previously, multiple nodule opacities right middle lobe that are larger and a 1.3 cm density possibly mucous plugging otherwise seen.  These findings suggest a follow-up series of scans will still be necessary.    The patient is seen with her daughter and they both agree with this follow-up plan.    The patient required admission 10/7 - 9/20/2024 after mechanical fall with T12-L2 compression fractures treated conservatively.    The patient's follow-up exams obtained 10/4/2024.  She is seen back 10/11/2024 and there is serial decrease in the small right supraclavicular node when compared to previous now down to 7 x 5 mm, evidence of consolidation left lower lobe with tumor developed likely seen measuring 16 x 2 cm, 2 right middle lobe nodules-19 x 13 mm previously 15 x 10, adjacent 9 mm previously 4 to 5 mm and unchanged third right middle lobe noncalcified nodule, other nodule stable and there is no evidence of abdominal/pelvic or osseous metastasis.    The patient is seen 10/11/2024 slowly  "recovering from the mechanical fall described above.  She does not require additional treatment at this point after review of the scans with follow-up exams again are anticipated and she is willing to proceed.    The patient is seen 4/9/2025 and evaluated by CT of soft tissue neck, chest, abdomen, pelvis performed 4/2 2025.  There is now seen increase in the right middle lobe cavitary thick-walled nodule measuring 3.2 x 2.6 compared to previous 2.1 x 1.4.  Other sites are stable and no additional lesions have developed.  The patient is seen with her daughter both indicating that she is still able to carry out daily activities without severe difficulty though she \"has slowed up a bit\".  He does have back pain periodically that improves with rest and does not notice worsening respiratory symptoms.  She may well have disease that would be amenable to local therapy and it is requested that she undergo a PET/CT.    PET/CT demonstrates left lower lobe radiation changes, right middle lobe hypermetabolic 3.1 x 2.3 central cavitary mass that is larger from previous, hypermetabolic right upper lobe 1 cm solid nodule unchanged adjacent right middle lobe 9 mm solid nodule as well as hypermetabolic right hilar and low paratracheal lymphadenopathy concerning for osiel metastatic disease.    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  Radiation therapy would not be expected to control this disease and systemic therapy is going to be necessary.  Considering the tissue type she would be a candidate for Alimta single agent at this point.    We have proceeded to have the patient's tumor further assessed by Caris examination with insufficient tumor in the block.  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   it was recognized the patient's clearance is somewhat marginal and her dose was adjusted down by " 25%.  She underwent port placement 5/1/2025 and is seen back in office 5/7/2025 to continue with therapy.  She is agreeable to do so.       Past Surgical History:   Procedure Laterality Date    APPENDECTOMY      CARDIAC CATHETERIZATION      EXTERNAL EAR SURGERY Right     had tumor excised behind right ear lobe benign    EYE LESION EXCISION Left 6/6/2024    Procedure: LEFT LOWER EYELID BASEL CELL CARCINOMA EXCISIONAL AND REPAIR WITH FROZEN SECTIONS,;  Surgeon: Austin Kamara MD;  Location: [facility] MAIN OR;  Service: Ophthalmology;  Laterality: Left;    FEMUR IM NAILING/RODDING Right 06/15/2022    Procedure: RIGHT HIP INTRAMEDULLARY NAILING/RODDING;  Surgeon: Marc Crawford MD;  Location: [facility] OR;  Service: Orthopedics;  Laterality: Right;    GALLBLADDER SURGERY      HYSTERECTOMY      KNEE ARTHROPLASTY Right     KNEE ARTHROSCOPY Right     AZ REVJ TOTAL KNEE ARTHRP W/WO ALGRFT 1 COMPONENT Right 09/07/2016    Procedure: RT ILIOTIBIAL BAND RELEASE RT TOTAL KNEE POLY EXCHANGE;  Surgeon: Toy Adame MD;  Location: [facility];  Service: Orthopedics    ROTATOR CUFF REPAIR Bilateral     US GUIDED LYMPH NODE BIOPSY  3/18/2024    VENOUS ACCESS DEVICE (PORT) INSERTION Right 5/1/2025    Procedure: INSERTION VENOUS ACCESS DEVICE;  Surgeon: Saloni Verdugo MD;  Location: [facility];  Service: Vascular;  Laterality: Right;    WRIST MASS EXCISION Right         Current Outpatient Medications on File Prior to Visit   Medication Sig Dispense Refill    albuterol (PROVENTIL HFA;VENTOLIN HFA) 108 (90 BASE) MCG/ACT inhaler Inhale 2 puffs Every 4 (Four) Hours As Needed. Indications: Asthma      allopurinol (ZYLOPRIM) 100 MG tablet Take 1 tablet by mouth Daily.      dexAMETHasone (DECADRON) 4 MG tablet Take 1 tablet oral twice a day for 3 consecutive days beginning the day before chemotherapy and continue for 6 doses.Take with food. 30 tablet 2    dicyclomine (BENTYL) 20 MG tablet Take 1 tablet by mouth Every 6  (Six) Hours. 20 tablet 0    famotidine (PEPCID) 20 MG tablet Take 1 tablet by mouth 2 (Two) Times a Day 60 tablet 0    fluticasone (FLONASE) 50 MCG/ACT nasal spray Administer 2 sprays into the nostril(s) as directed by provider Daily.      folic acid (FOLVITE) 1 MG tablet Take 1 tablet by mouth Daily. Start at least 7 days prior to chemotherapy until at least 3 weeks after all chemotherapy. 30 tablet 6    lidocaine-prilocaine (EMLA) 2.5-2.5 % cream Apply 1 Application topically to the appropriate area as directed Every 2 (Two) Hours As Needed for Mild Pain. Apply a nickel size amount to port 30 minutes prior to access. 30 g 2    Magnesium 500 MG capsule Take 500 mg by mouth 2 (Two) Times a Day. Indications: Acid Indigestion      methocarbamol (ROBAXIN) 500 MG tablet Take 1 tablet by mouth Every 8 (Eight) Hours As Needed for Muscle Spasms (back pain). 30 tablet 0    metoprolol tartrate (LOPRESSOR) 25 MG tablet Take 1 tablet by mouth 2 (Two) Times a Day. Indications: High Blood Pressure      naloxone (NARCAN) 4 MG/0.1ML nasal spray Call 911. Don't prime. Fulda in 1 nostril for overdose. Repeat in 2-3 minutes in other nostril if no or minimal breathing/responsiveness. 2 each 0    ondansetron (ZOFRAN) 8 MG tablet Take 1 tablet by mouth 3 (Three) Times a Day As Needed for Nausea or Vomiting. 30 tablet 5    ondansetron ODT (ZOFRAN-ODT) 4 MG disintegrating tablet Place 1 tablet on the tongue 4 (Four) Times a Day As Needed for Nausea. 20 tablet 0    rOPINIRole (REQUIP) 0.5 MG tablet Take 1 tablet by mouth Every Night.      temazepam (RESTORIL) 15 MG capsule Take 1 capsule by mouth At Night As Needed for Sleep.      traMADol (Ultram) 50 MG tablet Take 1 tablet by mouth Every 12 (Twelve) Hours As Needed for Severe Pain for up to 10 doses. 10 tablet 0    vitamin B-12 (CYANOCOBALAMIN) 1000 MCG tablet Take 1 tablet by mouth Daily. Indications: Inadequate Vitamin B12      vitamin D (ERGOCALCIFEROL) 1.25 MG (83758 UT) capsule  capsule Take 1 capsule by mouth Every 7 (Seven) Days. 5 capsule 0     Current Facility-Administered Medications on File Prior to Visit   Medication Dose Route Frequency Provider Last Rate Last Admin    cyanocobalamin injection 1,000 mcg  1,000 mcg Intramuscular Q28 Days Gali Marshall APRN   1,000 mcg at 04/30/25 1206        ALLERGIES:    Allergies   Allergen Reactions    Levaquin [Levofloxacin] Hives    Zocor [Simvastatin] Myalgia    Atorvastatin Other (See Comments)    Codeine GI Intolerance    Penicillins Hives and Swelling     Pt reports tongue swelling    Percocet [Oxycodone-Acetaminophen] Itching and Nausea And Vomiting        Social History     Socioeconomic History    Marital status:      Spouse name: Nabil   Tobacco Use    Smoking status: Former     Current packs/day: 1.00     Average packs/day: 1 pack/day for 30.0 years (30.0 ttl pk-yrs)     Types: Cigarettes     Passive exposure: Past    Smokeless tobacco: Never   Vaping Use    Vaping status: Never Used   Substance and Sexual Activity    Alcohol use: Yes     Comment: 1 wine drink q 3-4 months    Drug use: Never    Sexual activity: Defer        Family History   Problem Relation Age of Onset    Hypertension Mother     Heart failure Mother     Heart attack Maternal Grandfather     Malig Hyperthermia Neg Hx         Review of Systems     Objective     There were no vitals filed for this visit.            5/7/2025    11:45 AM   Current Status   ECOG score 0       Physical Exam  Constitutional:       Appearance: Normal appearance.   HENT:      Head: Normocephalic and atraumatic.      Mouth/Throat:      Mouth: Mucous membranes are moist.      Pharynx: Oropharynx is clear.   Eyes:      Extraocular Movements: Extraocular movements intact.      Conjunctiva/sclera: Conjunctivae normal.      Pupils: Pupils are equal, round, and reactive to light.      Comments: Left lid nodular lesion   Cardiovascular:      Rate and Rhythm: Normal rate and regular  rhythm.      Pulses: Normal pulses.      Heart sounds: Normal heart sounds.   Pulmonary:      Effort: Pulmonary effort is normal.      Breath sounds: Rhonchi present.   Abdominal:      General: Bowel sounds are normal.      Palpations: Abdomen is soft.   Musculoskeletal:         General: Normal range of motion.      Cervical back: Normal range of motion and neck supple.   Skin:     General: Skin is warm and dry.   Neurological:      General: No focal deficit present.      Mental Status: She is alert and oriented to person, place, and time.   Psychiatric:         Mood and Affect: Mood normal.         Behavior: Behavior normal.           RECENT LABS:  Results from last 7 days   Lab Units 05/18/25  0759 05/17/25  0627 05/16/25  0609   WBC 10*3/mm3 4.60 6.34 3.52   NEUTROS ABS 10*3/mm3 2.75 4.27 2.90   LYMPHS ABS 10*3/mm3  --   --  0.40*   HEMOGLOBIN g/dL 10.4* 10.5* 10.0*   HEMATOCRIT % 30.1* 29.8* 29.2*   PLATELETS 10*3/mm3 34* 31* 22*     Results from last 7 days   Lab Units 05/18/25  0759 05/17/25  0627 05/16/25  0609 05/15/25  1714 05/15/25  0632   SODIUM mmol/L 136 136 133*  --  134*   POTASSIUM mmol/L 3.7 4.1 4.8   < > 3.6   CHLORIDE mmol/L 101 106 105  --  103   CO2 mmol/L 23.7 23.1 17.4*  --  17.6*   BUN mg/dL 11 11 10  --  10   CREATININE mg/dL 0.75 0.77 0.69  --  0.73   CALCIUM mg/dL 9.6 9.4 9.0  --  8.9   GLUCOSE mg/dL 93 88 95  --  73   MAGNESIUM mg/dL  --   --   --   --  2.0    < > = values in this interval not displayed.             Assessment & Plan      80 year-old female with a orthopedic history as described in particular involving right hip, bilateral knees and bilateral rotator cuffs recently falling and sustaining a contusion involving her right hip and evidence of compression deformities T12 and L2.  Upon discharge she was seen by orthopedics and, unexpectedly, on additional back studies a left lung nodule was determined.    This was reviewed by pulmonary medicine at [facility] leading to a  a CT of  the chest which demonstrated a 2.0 x 1.9 cm noncalcified nodule involving the posterior medial aspect left lower lobe abutting the pleura, more smoothly marginated 0.9 cm noncalcified right middle lobe nodule and a few additional smaller noncalcified nodules including 2 adjacent nodules measuring less than 0.4 cm the right middle lobe along with mild emphysema with mild to moderate subpleural and bibasilar scarring.  There is no pleural effusion or pneumothorax.    A PET/CT was obtained 2/6/2024 with a solid subpleural irregular nodule left lower lobe medially measuring 1.7 x 1.8 intensely hypermetabolic with SUV of 12.8, no additional nodular mass, had metabolic right superior mediastinal lymph node lateral measuring 0.9 cm to 3.9, diffuse lower lobe tracer uptake with mediastinal right hilar lymph nodes partially calcified thought to be inflammatory or granulomatous disease.  There is also low-grade tracer uptake associated subacute healing left anterior rib fractures.    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    The patient is now seen in CBC office.  There has not been assessment by surgery or, apparently, by pulmonary medicine formally though the studies above are available for review.  After discussion we will need to have her presented in the thoracic conference so that opinions from radiology, thoracic surgery radiation therapy as well as pathology can be obtained.    Patient's case discussed during thoracic conference with the finding of a right supraclavicular node.  This was unknown to us when seen her initially in consultation.  Request made to obtain ultrasound-guided biopsy of the right supraclavicular node and this is discussed with the patient's daughter who indicates the patient would be willing to proceed.  We will go and have this scheduled even though she is to  be seen within the next week and the result may not yet be available.    3/6/2023 FNA obtained, discussed with IR physician Dr. Courntey with findings of a few atypical clusters present suspicious for involvement by non-small cell carcinoma.  He is offered to rebiopsy quickly if necessary.    The patient is seen 3/13/2024 with rebiopsy requested as well as radiation therapy consultation.  The case is discussed with interventional radiology and a repeat biopsy is possible.  The patient is willing to proceed in this fashion and we have also discussed radiation therapy consultation in the interval.  She continues have symptoms of chronic back pain and left lid lesion that is going to be reviewed by ophthalmology.    This was discussed 3/21/2024 recognizing the patient's frailty.  Options for treating the areas that are known positive including the left lower lobe and right supraclavicular node would include radiation therapy and Dr. Hopkins will see the patient concerning this as we also follow her for consideration of systemic therapy including a [id number] considering the genomic studies done thus far and the inability to obtain additional genomics from the scant tissue that we have even from recent biopsy.  Finally the patient's overall status including an older adult assessment is going to be requested.    She is seen back 4/11/2024.  She had been seen 4/8/2024 with SBRT to left lower lobe and right neck lesion.  This has not been completed and she is to follow-up with radiation therapy mid July.  We have discussed her findings today with the patient seen along with her granddaughter.  She did well with radiation therapy and has no additional symptoms currently respiratorily.    Her genomic testing suggested possible use of Selumetinib (not currently available) though we will follow this as she is subsequently reevaluated.      A follow-up CT series was obtained 7/1/2024.  This reveals a slight decrease in the  hypermetabolic right supraclavicular node measuring 0.9 x 0.7 cm previously 1.2 x 1.1 cm.  In the chest there is decrease in the mass in the posterior medial aspect left lower lobe and evidence of radiation pneumonitis adjacently.  Margins are difficult to appreciate completely but maximal diameter is 2 cm previously 2.2 cm as compared to previous.  There is a pleural-based 1.0 x 1.6 cm nodule medial aspect of the right lower lobe which which was less conspicuous previously, multiple nodule opacities right middle lobe that are larger and a 1.3 cm density possibly mucous plugging otherwise seen.  These findings suggest a follow-up series of scans will still be necessary.    The patient is seen in office 7/12/2024 with a reasonably stable performance status.  At this point no additional intervention is necessary but a follow-up scan is requested.    The patient required admission 10/7 - 9/20/2024 after mechanical fall with T12-L2 compression fractures treated conservatively.      The patient's follow-up exams obtained 10/4/2024.  She is seen back 10/11/2024 and there is serial decrease in the small right supraclavicular node when compared to previous now down to 7 x 5 mm, evidence of consolidation left lower lobe with tumor developed likely seen measuring 16 x 2 cm, 2 right middle lobe nodules-19 x 13 mm previously 15 x 10, adjacent 9 mm previously 4 to 5 mm and unchanged third right middle lobe noncalcified nodule, other nodule stable and there is no evidence of abdominal/pelvic or osseous metastasis.    The patient required admission 10/7 - 9/20/2024 after mechanical fall with T12-L2 compression fractures treated conservatively.    The patient's follow-up exams obtained 10/4/2024.  She is seen back 10/11/2024 and there is serial decrease in the small right supraclavicular node when compared to previous now down to 7 x 5 mm, evidence of consolidation left lower lobe with tumor developed likely seen measuring 16 x 2  "cm, 2 right middle lobe nodules-19 x 13 mm previously 15 x 10, adjacent 9 mm previously 4 to 5 mm and unchanged third right middle lobe noncalcified nodule, other nodule stable and there is no evidence of abdominal/pelvic or osseous metastasis.    The patient is seen 10/11/2024 slowly recovering from the mechanical fall described above.  She does not require additional treatment at this point after review of the scans with follow-up exams again are anticipated and she is willing to proceed.     The patient is seen 4/9/2025 and evaluated by CT of soft tissue neck, chest, abdomen, pelvis performed 4/2 2025.  There is now seen increase in the right middle lobe cavitary thick-walled nodule measuring 3.2 x 2.6 compared to previous 2.1 x 1.4.  Other sites are stable and no additional lesions have developed.      The patient is seen with her daughter both indicating that she is still able to carry out daily activities without severe difficulty though she \"has slowed up a bit\".  He does have back pain periodically that improves with rest and does not notice worsening respiratory symptoms.  She may well have disease that would be amenable to local therapy and it is requested that she undergo a PET/CT.    PET/CT demonstrates left lower lobe radiation changes, right middle lobe hypermetabolic 3.1 x 2.3 central cavitary mass that is larger from previous, hypermetabolic right upper lobe 1 cm solid nodule unchanged adjacent right middle lobe 9 mm solid nodule as well as hypermetabolic right hilar and low paratracheal lymphadenopathy concerning for osiel metastatic disease.    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  Radiation therapy would not be expected to control this disease and systemic therapy is going to be necessary.  Considering the tissue type she would be a candidate for Alimta single agent at this point.  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    We have proceeded to have the patient's tumor further assessed by Caris examination with insufficient tumor in the block.  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   it was recognized the patient's clearance is somewhat marginal and her dose was adjusted down by 25%.  She underwent port placement 5/1/2025 and is seen back in office 5/7/2025 to continue with therapy.  She is agreeable to do so.     Hospitalization 5/11/2025 through 5/18/2025 to acute viral norovirus gastroenteritis.  Profound pancytopenia related to both viral infection and chemotherapy despite dose reduction.    Patient seen for hospital follow-up 5/21/2025 with improvement in pancytopenia.  Now with chest congestion productive purulent sputum.  We will treat with azithromycin as well as Tessalon Perles.  The patient will return in 1 week for MD follow-up and consideration of resumption of chemotherapy with further dose reduction.    PLAN:  Begin Z-Denny   Begin Tessalon 100 mg 3 times daily as needed for cough  MD follow up in 1 week with Dr. Mann with CBC CMP and reevaluation regarding potential chemotherapy with further dose reduction    Today's plan of care was reviewed with Dr. Mann who is in agreement    Gali Marshall, BLAKE  05/21/2025

## 2025-05-22 ENCOUNTER — READMISSION MANAGEMENT (OUTPATIENT)
Dept: CALL CENTER | Facility: HOSPITAL | Age: 80
End: 2025-05-22
Payer: MEDICARE

## 2025-05-22 NOTE — OUTREACH NOTE
Medical Week 1 Survey      Flowsheet Row Responses   [facility] patient discharged from? [facility]   Does the patient have one of the following disease processes/diagnoses(primary or secondary)? Other   Week 1 attempt successful? Yes   Call start time 1111   Call end time 1119   Discharge diagnosis Acute norovirus gastroenteritis   Person spoke with today (if not patient) and relationship Daughter   Meds reviewed with patient/caregiver? Yes   Is the patient having any side effects they believe may be caused by any medication additions or changes? No   Does the patient have all medications ordered at discharge? Yes   Is the patient taking all medications as directed (includes completed medication regime)? Yes   Does the patient have a primary care provider?  Yes   Does the patient have an appointment with their PCP within 7 days of discharge? Yes   Has the patient kept scheduled appointments due by today? Yes   Psychosocial issues? No   Did the patient receive a copy of their discharge instructions? Yes   Nursing interventions Reviewed instructions with patient   What is the patient's perception of their health status since discharge? Improving   Is the patient/caregiver able to teach back signs and symptoms related to disease process for when to call PCP? Yes   Is the patient/caregiver able to teach back signs and symptoms related to disease process for when to call 911? Yes   Is the patient/caregiver able to teach back the hierarchy of who to call/visit for symptoms/problems? PCP, Specialist, Home health nurse, Urgent Care, ED, 911 Yes   If the patient is a current smoker, are they able to teach back resources for cessation? Not a smoker   Week 1 call completed? Yes   Graduated Yes   Would this patient benefit from a Referral to Amb Social Work? No   Is the patient interested in additional calls from an ambulatory ? No   Graduated/Revoked comments Patient is doing well. Patient has been to her  followup with PCP and no needs at this time.   Call end time 1119            Gilbert W - Registered Nurse

## 2025-05-27 NOTE — PROGRESS NOTES
REASON FOR FOLLOW-UP: Left lower lobe lung mass-squamous of carcinoma                               History of Present Illness    The patient is a 80 y.o. female with the above-mentioned history, who returns to the office 5/21/2025 for hospital follow-up and lab review.  She was recently admitted 5/11/2025 through 5/18/2025 secondary to acute norovirus gastroenteritis.  She had associated pancytopenia.  She struggled with severe abdominal pain, nausea vomiting and diarrhea.  She received G-CSF support due to neutropenia.        Since discharge, the patient reports she has continued to struggle with diarrhea which finally improved just yesterday.  She is now developed productive cough with purulent sputum and thick drainage.  She has no fevers or chills.  She was treated subsequently with a Z-Denny, Tessalon Perles and then plans were made for follow-up 5/28/2025 and possible chemotherapy with dose reduction.    She is next seen, however, 5/28/2025 with continued cough and general weakness.  Appetite is slowly returning as is her stamina though she states she is having difficulty sleeping from the cough.  Finally, additionally, her cytopenias are not yet resolved enough to proceed with chemotherapy.    ONCOLOGY HISTORY:  The patient is an 80-year-old female with below medical history recently admitted 12/4 - 6/20/2023 with a history of right intertrochanteric femoral fracture and osteoarthritis presenting from a fall at home.  She had a stress fracture left foot that affected her balance, chronic low back pain that is also worsened with a fall.  Upon presentation orthopedic surgery was consulted finding a right intramedullary nail fixation and healing without abnormal findings.  No additional surgery was planned though physical therapy continued.  Spinal films demonstrated compression deformity T12 and L2 but fortunately she did further improve and was able to be discharged.    The patient had follow-up with  several physicians including orthopedic physicians with an eventual chest exam that revealed an unexpected nodularity.  She was then referred to pulmonary medicine 1/8/2024 with a history of smoking for 35 years quitting 8 years ago and indication that there was a lung nodule found incidentally on imaging for the spine.  Through the [facility] a CT of the chest demonstrated a 2.0 x 1.9 cm noncalcified nodule involving the posterior medial aspect left lower lobe abutting the pleura, more smoothly marginated 0.9 cm noncalcified right middle lobe nodule and a few additional smaller noncalcified nodules including 2 adjacent nodules measuring less than 0.4 cm the right middle lobe along with mild emphysema with mild to moderate subpleural and bibasilar scarring.  There is no pleural effusion or pneumothorax.    A PET/CT was obtained 2/6/2024 with a solid subpleural irregular nodule left lower lobe medially measuring 1.7 x 1.8 intensely hypermetabolic with SUV of 12.8, no additional nodular mass, had metabolic right superior mediastinal lymph node lateral measuring 0.9 cm to 3.9, diffuse lower lobe tracer uptake with mediastinal right hilar lymph nodes partially calcified thought to be inflammatory or granulomatous disease.  There is also low-grade tracer uptake associated subacute healing left anterior rib fractures.    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    The patient was referred urgently from [facility] for assessment.  It is not certain why she has not been seen in the [facility] though she now states that she wished to be seen here.     Patient's case discussed during thoracic conference with the finding of a right supraclavicular node.  This was unknown to us when seen her initially in consultation.  Request made to obtain ultrasound-guided biopsy of the right  supraclavicular node and this is discussed with the patient's daughter who indicates the patient would be willing to proceed.  We will go and have this scheduled even though she is to be seen within the next week and the result may not yet be available.    3/6/2023 FNA obtained, discussed with IR physician Dr. Courtney with findings of a few atypical clusters present suspicious for involvement by non-small cell carcinoma.  He is offered to rebiopsy quickly if necessary.    The patient is seen 3/13/2024 with rebiopsy requested as well as radiation therapy consultation.  The case is discussed with interventional radiology and a repeat biopsy is possible.  The patient is willing to proceed in this fashion and we have also discussed radiation therapy consultation in the interval.  She continues have symptoms of chronic back pain and left lid lesion that is going to be reviewed by ophthalmology.    The patient went on to have a follow-up exam with repeat right supraclavicular lymph node biopsy 3/18/2024.  This was found positive for metastatic carcinoma.  The patient was seen by Dr. Hopkins with the conclusion that she has a 2 cm left lower lobe squamous cell carcinoma abutting the pleura additional contralateral supraclavicular lymph node in the thyroid.  This is to be discussed in conference 3/21/2024.    This was discussed 3/21/2024 recognizing the patient's frailty.  Options for treating the areas that are known positive including the left lower lobe and right supraclavicular node would include radiation therapy and Dr. Hopkins will see the patient concerning this as we also follow her for consideration of systemic therapy including a [id number] considering the genomic studies done thus far and the inability to obtain additional genomics from the scant tissue that we have even from recent biopsy.  Finally the patient's overall status including an older adult assessment is going to be requested.    The patient's [id number]  revealed NF1  detected with possible indication of Selumetinib effectiveness.  Additionally TMB was 10.4 mutations per megabase.  Concurrently the patient was seen by radiation therapy treating the left lower lobe and right neck site via SBRT.  Further MRI of the brain revealed no evidence of metastatic disease.    She is seen back 4/11/2024.  She had been seen 4/8/2024 with SBRT to left lower lobe and right neck lesion.  This has not been completed and she is to follow-up with radiation therapy mid July.  We have discussed her findings today with the patient seen along with her granddaughter.  She did well with radiation therapy and has no additional symptoms currently respiratorily.  Her genomic testing suggested possible use of Selumetinib (not currently available) though we will follow this as she is subsequently reevaluated.    A follow-up CT series was obtained 7/1/2024.  This reveals a slight decrease in the hypermetabolic right supraclavicular node measuring 0.9 x 0.7 cm previously 1.2 x 1.1 cm.  In the chest there is decrease in the mass in the posterior medial aspect left lower lobe and evidence of radiation pneumonitis adjacently.  Margins are difficult to appreciate completely but maximal diameter is 2 cm previously 2.2 cm as compared to previous.  There is a pleural-based 1.0 x 1.6 cm nodule medial aspect of the right lower lobe which which was less conspicuous previously, multiple nodule opacities right middle lobe that are larger and a 1.3 cm density possibly mucous plugging otherwise seen.  These findings suggest a follow-up series of scans will still be necessary.    The patient is seen with her daughter and they both agree with this follow-up plan.    The patient required admission 10/7 - 9/20/2024 after mechanical fall with T12-L2 compression fractures treated conservatively.    The patient's follow-up exams obtained 10/4/2024.  She is seen back 10/11/2024 and there is serial decrease in the  "small right supraclavicular node when compared to previous now down to 7 x 5 mm, evidence of consolidation left lower lobe with tumor developed likely seen measuring 16 x 2 cm, 2 right middle lobe nodules-19 x 13 mm previously 15 x 10, adjacent 9 mm previously 4 to 5 mm and unchanged third right middle lobe noncalcified nodule, other nodule stable and there is no evidence of abdominal/pelvic or osseous metastasis.    The patient is seen 10/11/2024 slowly recovering from the mechanical fall described above.  She does not require additional treatment at this point after review of the scans with follow-up exams again are anticipated and she is willing to proceed.    The patient is seen 4/9/2025 and evaluated by CT of soft tissue neck, chest, abdomen, pelvis performed 4/2 2025.  There is now seen increase in the right middle lobe cavitary thick-walled nodule measuring 3.2 x 2.6 compared to previous 2.1 x 1.4.  Other sites are stable and no additional lesions have developed.  The patient is seen with her daughter both indicating that she is still able to carry out daily activities without severe difficulty though she \"has slowed up a bit\".  He does have back pain periodically that improves with rest and does not notice worsening respiratory symptoms.  She may well have disease that would be amenable to local therapy and it is requested that she undergo a PET/CT.    PET/CT demonstrates left lower lobe radiation changes, right middle lobe hypermetabolic 3.1 x 2.3 central cavitary mass that is larger from previous, hypermetabolic right upper lobe 1 cm solid nodule unchanged adjacent right middle lobe 9 mm solid nodule as well as hypermetabolic right hilar and low paratracheal lymphadenopathy concerning for osiel metastatic disease.    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  Radiation therapy would not be expected to control this " disease and systemic therapy is going to be necessary.  Considering the tissue type she would be a candidate for Alimta single agent at this point.    We have proceeded to have the patient's tumor further assessed by Caris examination with insufficient tumor in the block.  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   it was recognized the patient's clearance is somewhat marginal and her dose was adjusted down by 25%.  She underwent port placement 5/1/2025 and is seen back in office 5/7/2025 to continue with therapy.  She is agreeable to do so.     Unfortunately, she was recently admitted 5/11/2025 through 5/18/2025 secondary to acute norovirus gastroenteritis.  She had associated pancytopenia.  She struggled with severe abdominal pain, nausea vomiting and diarrhea.  She received G-CSF support due to neutropenia.        Since discharge, the patient reports she has continued to struggle with diarrhea which finally improved just yesterday.  She is now developed productive cough with purulent sputum and thick drainage.  She has no fevers or chills.  She was treated subsequently with a Z-Denny, Tessalon Perles and then plans were made for follow-up 5/28/2025 and possible chemotherapy with dose reduction.    She is next seen, however, 5/28/2025 with continued cough and general weakness.  Appetite is slowly returning as is her stamina though she states she is having difficulty sleeping from the cough.  Finally, additionally, her cytopenias are not yet resolved enough to proceed with chemotherapy.  Past Surgical History:   Procedure Laterality Date    APPENDECTOMY      CARDIAC CATHETERIZATION      EXTERNAL EAR SURGERY Right     had tumor excised behind right ear lobe benign    EYE LESION EXCISION Left 6/6/2024    Procedure: LEFT LOWER EYELID BASEL CELL CARCINOMA EXCISIONAL AND REPAIR WITH FROZEN SECTIONS,;  Surgeon: Austin Kamara MD;  Location: [facility] OR;  Service:  Ophthalmology;  Laterality: Left;    FEMUR IM NAILING/RODDING Right 06/15/2022    Procedure: RIGHT HIP INTRAMEDULLARY NAILING/RODDING;  Surgeon: Marc Crawford MD;  Location: [facility] OR;  Service: Orthopedics;  Laterality: Right;    GALLBLADDER SURGERY      HYSTERECTOMY      KNEE ARTHROPLASTY Right     KNEE ARTHROSCOPY Right     MS REVJ TOTAL KNEE ARTHRP W/WO ALGRFT 1 COMPONENT Right 09/07/2016    Procedure: RT ILIOTIBIAL BAND RELEASE RT TOTAL KNEE POLY EXCHANGE;  Surgeon: Toy Adame MD;  Location: [facility] OR;  Service: Orthopedics    ROTATOR CUFF REPAIR Bilateral     US GUIDED LYMPH NODE BIOPSY  3/18/2024    VENOUS ACCESS DEVICE (PORT) INSERTION Right 5/1/2025    Procedure: INSERTION VENOUS ACCESS DEVICE;  Surgeon: Saloni Verdugo MD;  Location: [facility] OR;  Service: Vascular;  Laterality: Right;    WRIST MASS EXCISION Right         Current Outpatient Medications on File Prior to Visit   Medication Sig Dispense Refill    albuterol (PROVENTIL HFA;VENTOLIN HFA) 108 (90 BASE) MCG/ACT inhaler Inhale 2 puffs Every 4 (Four) Hours As Needed. Indications: Asthma      allopurinol (ZYLOPRIM) 100 MG tablet Take 1 tablet by mouth Daily.      azithromycin (Zithromax Z-Denny) 250 MG tablet Take 2 tablets by mouth on day 1, then 1 tablet daily on days 2-5 6 tablet 0    benzonatate (Tessalon Perles) 100 MG capsule Take 1 capsule by mouth 3 (Three) Times a Day As Needed for Cough. 45 capsule 1    dexAMETHasone (DECADRON) 4 MG tablet Take 1 tablet oral twice a day for 3 consecutive days beginning the day before chemotherapy and continue for 6 doses.Take with food. 30 tablet 2    dicyclomine (BENTYL) 20 MG tablet Take 1 tablet by mouth Every 6 (Six) Hours. 20 tablet 0    famotidine (PEPCID) 20 MG tablet Take 1 tablet by mouth 2 (Two) Times a Day 60 tablet 0    fluticasone (FLONASE) 50 MCG/ACT nasal spray Administer 2 sprays into the nostril(s) as directed by provider Daily.      folic acid (FOLVITE) 1 MG tablet Take  1 tablet by mouth Daily. Start at least 7 days prior to chemotherapy until at least 3 weeks after all chemotherapy. 30 tablet 6    lidocaine-prilocaine (EMLA) 2.5-2.5 % cream Apply 1 Application topically to the appropriate area as directed Every 2 (Two) Hours As Needed for Mild Pain. Apply a nickel size amount to port 30 minutes prior to access. 30 g 2    Magnesium 500 MG capsule Take 500 mg by mouth 2 (Two) Times a Day. Indications: Acid Indigestion      methocarbamol (ROBAXIN) 500 MG tablet Take 1 tablet by mouth Every 8 (Eight) Hours As Needed for Muscle Spasms (back pain). 30 tablet 0    metoprolol tartrate (LOPRESSOR) 25 MG tablet Take 1 tablet by mouth 2 (Two) Times a Day. Indications: High Blood Pressure      naloxone (NARCAN) 4 MG/0.1ML nasal spray Call 911. Don't prime. [place] in 1 nostril for overdose. Repeat in 2-3 minutes in other nostril if no or minimal breathing/responsiveness. 2 each 0    ondansetron (ZOFRAN) 8 MG tablet Take 1 tablet by mouth 3 (Three) Times a Day As Needed for Nausea or Vomiting. 30 tablet 5    ondansetron ODT (ZOFRAN-ODT) 4 MG disintegrating tablet Place 1 tablet on the tongue 4 (Four) Times a Day As Needed for Nausea. 20 tablet 0    rOPINIRole (REQUIP) 0.5 MG tablet Take 1 tablet by mouth Every Night.      temazepam (RESTORIL) 15 MG capsule Take 1 capsule by mouth At Night As Needed for Sleep.      traMADol (Ultram) 50 MG tablet Take 1 tablet by mouth Every 12 (Twelve) Hours As Needed for Severe Pain for up to 10 doses. 10 tablet 0    vitamin B-12 (CYANOCOBALAMIN) 1000 MCG tablet Take 1 tablet by mouth Daily. Indications: Inadequate Vitamin B12      vitamin D (ERGOCALCIFEROL) 1.25 MG (41156 UT) capsule capsule Take 1 capsule by mouth Every 7 (Seven) Days. 5 capsule 0     Current Facility-Administered Medications on File Prior to Visit   Medication Dose Route Frequency Provider Last Rate Last Admin    cyanocobalamin injection 1,000 mcg  1,000 mcg Intramuscular Q28 Days O[place]in,  "Gali Gagnon, APRN   1,000 mcg at 04/30/25 1206        ALLERGIES:    Allergies   Allergen Reactions    Levaquin [Levofloxacin] Hives    Zocor [Simvastatin] Myalgia    Atorvastatin Other (See Comments)    Codeine GI Intolerance    Penicillins Hives and Swelling     Pt reports tongue swelling    Percocet [Oxycodone-Acetaminophen] Itching and Nausea And Vomiting        Social History     Socioeconomic History    Marital status:      Spouse name: Nabil   Tobacco Use    Smoking status: Former     Current packs/day: 1.00     Average packs/day: 1 pack/day for 30.0 years (30.0 ttl pk-yrs)     Types: Cigarettes     Passive exposure: Past    Smokeless tobacco: Never   Vaping Use    Vaping status: Never Used   Substance and Sexual Activity    Alcohol use: Yes     Comment: 1 wine drink q 3-4 months    Drug use: Never    Sexual activity: Defer        Family History   Problem Relation Age of Onset    Hypertension Mother     Heart failure Mother     Heart attack Maternal Grandfather     Malig Hyperthermia Neg Hx         Review of Systems     Objective     Vitals:    05/28/25 1030   BP: 134/82   Pulse: 83   Temp: 97.2 °F (36.2 °C)   TempSrc: Oral   SpO2: 97%   Weight: 64.5 kg (142 lb 3.2 oz)   Height: 160 cm (62.99\")   PainSc: 0-No pain               5/28/2025    10:34 AM   Current Status   ECOG score 0       Physical Exam  Constitutional:       Appearance: Normal appearance.   HENT:      Head: Normocephalic and atraumatic.      Mouth/Throat:      Mouth: Mucous membranes are moist.      Pharynx: Oropharynx is clear.   Eyes:      Extraocular Movements: Extraocular movements intact.      Conjunctiva/sclera: Conjunctivae normal.      Pupils: Pupils are equal, round, and reactive to light.      Comments: Left lid nodular lesion   Cardiovascular:      Rate and Rhythm: Normal rate and regular rhythm.      Pulses: Normal pulses.      Heart sounds: Normal heart sounds.   Pulmonary:      Effort: Pulmonary effort is normal.      " Breath sounds: Wheezing present. No rhonchi.   Abdominal:      General: Bowel sounds are normal.      Palpations: Abdomen is soft.   Musculoskeletal:         General: Normal range of motion.      Cervical back: Normal range of motion and neck supple.   Skin:     General: Skin is warm and dry.   Neurological:      General: No focal deficit present.      Mental Status: She is alert and oriented to person, place, and time.   Psychiatric:         Mood and Affect: Mood normal.         Behavior: Behavior normal.           RECENT LABS:  Results from last 7 days   Lab Units 05/28/25  1002 05/21/25  1539   WBC 10*3/mm3 2.62* 3.32*   NEUTROS ABS 10*3/mm3 0.88* 1.58*   HEMOGLOBIN g/dL 10.1* 11.7*   HEMATOCRIT % 30.7* 35.5   PLATELETS 10*3/mm3 106* 96*     Results from last 7 days   Lab Units 05/28/25  1002 05/21/25  1539   SODIUM mmol/L 140 136   POTASSIUM mmol/L 4.6 4.9   CHLORIDE mmol/L 107 101   CO2 mmol/L 21.7* 22.6   BUN mg/dL 16.4 18   CREATININE mg/dL 1.06* 1.01*   CALCIUM mg/dL 9.0 9.4   ALBUMIN g/dL 3.6 3.8   BILIRUBIN mg/dL 0.4 0.4   ALK PHOS U/L 148* 192*   ALT (SGPT) U/L 25 47*   AST (SGOT) U/L 43* 49*   GLUCOSE mg/dL 103* 113*             Assessment & Plan      80 year-old female with a orthopedic history as described in particular involving right hip, bilateral knees and bilateral rotator cuffs recently falling and sustaining a contusion involving her right hip and evidence of compression deformities T12 and L2.  Upon discharge she was seen by orthopedics and, unexpectedly, on additional back studies a left lung nodule was determined.    This was reviewed by pulmonary medicine at [facility] leading to a  a CT of the chest which demonstrated a 2.0 x 1.9 cm noncalcified nodule involving the posterior medial aspect left lower lobe abutting the pleura, more smoothly marginated 0.9 cm noncalcified right middle lobe nodule and a few additional smaller noncalcified nodules including 2 adjacent nodules measuring less than  0.4 cm the right middle lobe along with mild emphysema with mild to moderate subpleural and bibasilar scarring.  There is no pleural effusion or pneumothorax.    A PET/CT was obtained 2/6/2024 with a solid subpleural irregular nodule left lower lobe medially measuring 1.7 x 1.8 intensely hypermetabolic with SUV of 12.8, no additional nodular mass, had metabolic right superior mediastinal lymph node lateral measuring 0.9 cm to 3.9, diffuse lower lobe tracer uptake with mediastinal right hilar lymph nodes partially calcified thought to be inflammatory or granulomatous disease.  There is also low-grade tracer uptake associated subacute healing left anterior rib fractures.    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    The patient is now seen in CBC office.  There has not been assessment by surgery or, apparently, by pulmonary medicine formally though the studies above are available for review.  After discussion we will need to have her presented in the thoracic conference so that opinions from radiology, thoracic surgery radiation therapy as well as pathology can be obtained.    Patient's case discussed during thoracic conference with the finding of a right supraclavicular node.  This was unknown to us when seen her initially in consultation.  Request made to obtain ultrasound-guided biopsy of the right supraclavicular node and this is discussed with the patient's daughter who indicates the patient would be willing to proceed.  We will go and have this scheduled even though she is to be seen within the next week and the result may not yet be available.    3/6/2023 FNA obtained, discussed with IR physician Dr. Courtney with findings of a few atypical clusters present suspicious for involvement by non-small cell carcinoma.  He is offered to rebiopsy quickly if necessary.    The patient is  seen 3/13/2024 with rebiopsy requested as well as radiation therapy consultation.  The case is discussed with interventional radiology and a repeat biopsy is possible.  The patient is willing to proceed in this fashion and we have also discussed radiation therapy consultation in the interval.  She continues have symptoms of chronic back pain and left lid lesion that is going to be reviewed by ophthalmology.    This was discussed 3/21/2024 recognizing the patient's frailty.  Options for treating the areas that are known positive including the left lower lobe and right supraclavicular node would include radiation therapy and Dr. Hopkins will see the patient concerning this as we also follow her for consideration of systemic therapy including a [id number] considering the genomic studies done thus far and the inability to obtain additional genomics from the scant tissue that we have even from recent biopsy.  Finally the patient's overall status including an older adult assessment is going to be requested.    She is seen back 4/11/2024.  She had been seen 4/8/2024 with SBRT to left lower lobe and right neck lesion.  This has not been completed and she is to follow-up with radiation therapy mid July.  We have discussed her findings today with the patient seen along with her granddaughter.  She did well with radiation therapy and has no additional symptoms currently respiratorily.    Her genomic testing suggested possible use of Selumetinib (not currently available) though we will follow this as she is subsequently reevaluated.      A follow-up CT series was obtained 7/1/2024.  This reveals a slight decrease in the hypermetabolic right supraclavicular node measuring 0.9 x 0.7 cm previously 1.2 x 1.1 cm.  In the chest there is decrease in the mass in the posterior medial aspect left lower lobe and evidence of radiation pneumonitis adjacently.  Margins are difficult to appreciate completely but maximal diameter is 2 cm  previously 2.2 cm as compared to previous.  There is a pleural-based 1.0 x 1.6 cm nodule medial aspect of the right lower lobe which which was less conspicuous previously, multiple nodule opacities right middle lobe that are larger and a 1.3 cm density possibly mucous plugging otherwise seen.  These findings suggest a follow-up series of scans will still be necessary.    The patient is seen in office 7/12/2024 with a reasonably stable performance status.  At this point no additional intervention is necessary but a follow-up scan is requested.    The patient required admission 10/7 - 9/20/2024 after mechanical fall with T12-L2 compression fractures treated conservatively.      The patient's follow-up exams obtained 10/4/2024.  She is seen back 10/11/2024 and there is serial decrease in the small right supraclavicular node when compared to previous now down to 7 x 5 mm, evidence of consolidation left lower lobe with tumor developed likely seen measuring 16 x 2 cm, 2 right middle lobe nodules-19 x 13 mm previously 15 x 10, adjacent 9 mm previously 4 to 5 mm and unchanged third right middle lobe noncalcified nodule, other nodule stable and there is no evidence of abdominal/pelvic or osseous metastasis.    The patient required admission 10/7 - 9/20/2024 after mechanical fall with T12-L2 compression fractures treated conservatively.    The patient's follow-up exams obtained 10/4/2024.  She is seen back 10/11/2024 and there is serial decrease in the small right supraclavicular node when compared to previous now down to 7 x 5 mm, evidence of consolidation left lower lobe with tumor developed likely seen measuring 16 x 2 cm, 2 right middle lobe nodules-19 x 13 mm previously 15 x 10, adjacent 9 mm previously 4 to 5 mm and unchanged third right middle lobe noncalcified nodule, other nodule stable and there is no evidence of abdominal/pelvic or osseous metastasis.    The patient is seen 10/11/2024 slowly recovering from the  "mechanical fall described above.  She does not require additional treatment at this point after review of the scans with follow-up exams again are anticipated and she is willing to proceed.     The patient is seen 4/9/2025 and evaluated by CT of soft tissue neck, chest, abdomen, pelvis performed 4/2 2025.  There is now seen increase in the right middle lobe cavitary thick-walled nodule measuring 3.2 x 2.6 compared to previous 2.1 x 1.4.  Other sites are stable and no additional lesions have developed.      The patient is seen with her daughter both indicating that she is still able to carry out daily activities without severe difficulty though she \"has slowed up a bit\".  He does have back pain periodically that improves with rest and does not notice worsening respiratory symptoms.  She may well have disease that would be amenable to local therapy and it is requested that she undergo a PET/CT.    PET/CT demonstrates left lower lobe radiation changes, right middle lobe hypermetabolic 3.1 x 2.3 central cavitary mass that is larger from previous, hypermetabolic right upper lobe 1 cm solid nodule unchanged adjacent right middle lobe 9 mm solid nodule as well as hypermetabolic right hilar and low paratracheal lymphadenopathy concerning for osiel metastatic disease.    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  Radiation therapy would not be expected to control this disease and systemic therapy is going to be necessary.  Considering the tissue type she would be a candidate for Alimta single agent at this point.  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    We have proceeded to have the patient's tumor further assessed by Caris examination with insufficient tumor in the " block.  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   it was recognized the patient's clearance is somewhat marginal and her dose was adjusted down by 25%.  She underwent port placement 5/1/2025 and is seen back in office 5/7/2025 to continue with therapy.  She is agreeable to do so.     Hospitalization 5/11/2025 through 5/18/2025 to acute viral norovirus gastroenteritis.  Profound pancytopenia related to both viral infection and chemotherapy despite dose reduction.    Patient seen for hospital follow-up 5/21/2025 with improvement in pancytopenia.  Now with chest congestion productive purulent sputum.  We will treat with azithromycin as well as Tessalon Perles.  The patient will return in 1 week for MD follow-up and consideration of resumption of chemotherapy with further dose reduction.    The patient went on to start a Z-Denny and Tessalon and is seen back in office 5/28/2025, unfortunately, improved enough to proceed with chemotherapy.    PLAN:    Continue Tessalon 100 mg 3 times daily as needed for cough  Medrol Dosepak E scribed to pharmacy  Chest x-ray today  Return in 1 week, MD or NP, possible  Alimta chemotherapy.  We would be reducing her dose to 50% of previous if we proceed with treatment at that point.    Shabbir Mann MD  05/28/2025

## 2025-05-28 ENCOUNTER — HOSPITAL ENCOUNTER (OUTPATIENT)
Dept: GENERAL RADIOLOGY | Facility: HOSPITAL | Age: 80
Discharge: HOME | End: 2025-05-28
Admitting: INTERNAL MEDICINE
Payer: MEDICARE

## 2025-05-28 ENCOUNTER — OFFICE VISIT (OUTPATIENT)
Dept: ONCOLOGY | Facility: CLINIC | Age: 80
End: 2025-05-28
Payer: MEDICARE

## 2025-05-28 ENCOUNTER — INFUSION (OUTPATIENT)
Dept: ONCOLOGY | Facility: HOSPITAL | Age: 80
End: 2025-05-28
Payer: MEDICARE

## 2025-05-28 VITALS
HEIGHT: 63 IN | WEIGHT: 142.2 LBS | HEART RATE: 83 BPM | BODY MASS INDEX: 25.2 KG/M2 | SYSTOLIC BLOOD PRESSURE: 134 MMHG | TEMPERATURE: 97.2 F | OXYGEN SATURATION: 97 % | DIASTOLIC BLOOD PRESSURE: 82 MMHG

## 2025-05-28 DIAGNOSIS — C34.92 NSCLC OF LEFT LUNG: Primary | ICD-10-CM

## 2025-05-28 DIAGNOSIS — C34.92 NSCLC OF LEFT LUNG: ICD-10-CM

## 2025-05-28 DIAGNOSIS — Z45.2 FITTING AND ADJUSTMENT OF VASCULAR CATHETER: Primary | ICD-10-CM

## 2025-05-28 LAB
ALBUMIN SERPL-MCNC: 3.6 G/DL (ref 3.5–5.2)
ALBUMIN/GLOB SERPL: 0.9 G/DL
ALP SERPL-CCNC: 148 U/L (ref 39–117)
ALT SERPL W P-5'-P-CCNC: 25 U/L (ref 1–33)
ANION GAP SERPL CALCULATED.3IONS-SCNC: 11.3 MMOL/L (ref 5–15)
AST SERPL-CCNC: 43 U/L (ref 1–32)
BASOPHILS # BLD AUTO: 0.03 10*3/MM3 (ref 0–0.2)
BASOPHILS NFR BLD AUTO: 1.1 % (ref 0–1.5)
BILIRUB SERPL-MCNC: 0.4 MG/DL (ref 0–1.2)
BUN SERPL-MCNC: 16.4 MG/DL (ref 8–23)
BUN/CREAT SERPL: 15.5 (ref 7–25)
CALCIUM SPEC-SCNC: 9 MG/DL (ref 8.6–10.5)
CHLORIDE SERPL-SCNC: 107 MMOL/L (ref 98–107)
CO2 SERPL-SCNC: 21.7 MMOL/L (ref 22–29)
CREAT SERPL-MCNC: 1.06 MG/DL (ref 0.57–1)
DEPRECATED RDW RBC AUTO: 47.2 FL (ref 37–54)
EGFRCR SERPLBLD CKD-EPI 2021: 52.6 ML/MIN/1.73
EOSINOPHIL # BLD AUTO: 0.19 10*3/MM3 (ref 0–0.4)
EOSINOPHIL NFR BLD AUTO: 7.3 % (ref 0.3–6.2)
ERYTHROCYTE [DISTWIDTH] IN BLOOD BY AUTOMATED COUNT: 12.7 % (ref 12.3–15.4)
GLOBULIN UR ELPH-MCNC: 3.8 GM/DL
GLUCOSE SERPL-MCNC: 103 MG/DL (ref 65–99)
HCT VFR BLD AUTO: 30.7 % (ref 34–46.6)
HGB BLD-MCNC: 10.1 G/DL (ref 12–15.9)
IMM GRANULOCYTES # BLD AUTO: 0.01 10*3/MM3 (ref 0–0.05)
IMM GRANULOCYTES NFR BLD AUTO: 0.4 % (ref 0–0.5)
LYMPHOCYTES # BLD AUTO: 1.02 10*3/MM3 (ref 0.7–3.1)
LYMPHOCYTES NFR BLD AUTO: 38.9 % (ref 19.6–45.3)
MCH RBC QN AUTO: 34 PG (ref 26.6–33)
MCHC RBC AUTO-ENTMCNC: 32.9 G/DL (ref 31.5–35.7)
MCV RBC AUTO: 103.4 FL (ref 79–97)
MONOCYTES # BLD AUTO: 0.49 10*3/MM3 (ref 0.1–0.9)
MONOCYTES NFR BLD AUTO: 18.7 % (ref 5–12)
NEUTROPHILS NFR BLD AUTO: 0.88 10*3/MM3 (ref 1.7–7)
NEUTROPHILS NFR BLD AUTO: 33.6 % (ref 42.7–76)
NRBC BLD AUTO-RTO: 0 /100 WBC (ref 0–0.2)
PLATELET # BLD AUTO: 106 10*3/MM3 (ref 140–450)
PMV BLD AUTO: 9.7 FL (ref 6–12)
POTASSIUM SERPL-SCNC: 4.6 MMOL/L (ref 3.5–5.2)
PROT SERPL-MCNC: 7.4 G/DL (ref 6–8.5)
RBC # BLD AUTO: 2.97 10*6/MM3 (ref 3.77–5.28)
SODIUM SERPL-SCNC: 140 MMOL/L (ref 136–145)
WBC NRBC COR # BLD AUTO: 2.62 10*3/MM3 (ref 3.4–10.8)

## 2025-05-28 PROCEDURE — 80053 COMPREHEN METABOLIC PANEL: CPT

## 2025-05-28 PROCEDURE — 3079F DIAST BP 80-89 MM HG: CPT | Performed by: INTERNAL MEDICINE

## 2025-05-28 PROCEDURE — 3075F SYST BP GE 130 - 139MM HG: CPT | Performed by: INTERNAL MEDICINE

## 2025-05-28 PROCEDURE — 36593 DECLOT VASCULAR DEVICE: CPT

## 2025-05-28 PROCEDURE — 85025 COMPLETE CBC W/AUTO DIFF WBC: CPT

## 2025-05-28 PROCEDURE — 1126F AMNT PAIN NOTED NONE PRSNT: CPT | Performed by: INTERNAL MEDICINE

## 2025-05-28 PROCEDURE — 99214 OFFICE O/P EST MOD 30 MIN: CPT | Performed by: INTERNAL MEDICINE

## 2025-05-28 PROCEDURE — 25010000002 HEPARIN LOCK FLUSH PER 10 UNITS: Performed by: INTERNAL MEDICINE

## 2025-05-28 PROCEDURE — 71046 X-RAY EXAM CHEST 2 VIEWS: CPT

## 2025-05-28 PROCEDURE — 36592 COLLECT BLOOD FROM PICC: CPT

## 2025-05-28 RX ORDER — SODIUM CHLORIDE 0.9 % (FLUSH) 0.9 %
10 SYRINGE (ML) INJECTION AS NEEDED
OUTPATIENT
Start: 2025-05-28

## 2025-05-28 RX ORDER — HEPARIN SODIUM (PORCINE) LOCK FLUSH IV SOLN 100 UNIT/ML 100 UNIT/ML
500 SOLUTION INTRAVENOUS AS NEEDED
Status: DISCONTINUED | OUTPATIENT
Start: 2025-05-28 | End: 2025-05-28 | Stop reason: HOSPADM

## 2025-05-28 RX ORDER — SODIUM CHLORIDE 0.9 % (FLUSH) 0.9 %
10 SYRINGE (ML) INJECTION AS NEEDED
Status: DISCONTINUED | OUTPATIENT
Start: 2025-05-28 | End: 2025-05-28 | Stop reason: HOSPADM

## 2025-05-28 RX ORDER — METHYLPREDNISOLONE 4 MG/1
TABLET ORAL
Qty: 21 TABLET | Refills: 1 | Status: SHIPPED | OUTPATIENT
Start: 2025-05-28

## 2025-05-28 RX ORDER — HEPARIN SODIUM (PORCINE) LOCK FLUSH IV SOLN 100 UNIT/ML 100 UNIT/ML
500 SOLUTION INTRAVENOUS AS NEEDED
OUTPATIENT
Start: 2025-05-28

## 2025-05-28 RX ADMIN — Medication 500 UNITS: at 11:01

## 2025-05-28 RX ADMIN — Medication 10 ML: at 11:01

## 2025-05-29 ENCOUNTER — TELEMEDICINE - AUDIO (OUTPATIENT)
Dept: OTHER | Facility: HOSPITAL | Age: 80
End: 2025-05-29
Payer: MEDICARE

## 2025-05-29 NOTE — PROGRESS NOTES
OUTPATIENT ONCOLOGY NUTRITION ASSESSMENT    Patient Name: Kenia BRIAN Sample  YOB: 1945  MRN: [id number]  Assessment Date: 5/29/2025    COMMENTS:  Nutrition screen trigger for weight loss and decreased appetite. Spoke to patients daughter today on the phone. Treatment was held yesterday as patient was recently discharged from the hospital. Admitted with acute novovirus gastroenteritis. She is feeling better and starting to eat more. Her daughter says that they have a lot going on right now with their family but they are trying to keep a close eye on her and making sure that she is eating. The patient voiced that it is slowly improving. Plan is to resume Alimta next week.   Let them know that we are available if they need anything from a nutrition standpoint and that we will follow up.                 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   Treatment Plan Immunotherapy     Medical/Surgical History Past Medical History:   Diagnosis Date    Anesthesia complication     slow to wake up    Anginal pain     Arthritis     Cancer     Lung    Gastric reflux syndrome     Gout     Hiatal hernia     High cholesterol     History of bronchitis     Hyperlipidemia     Hypertension     Irregular heart beat     Mild aortic valve stenosis     per echo 6/2023    PONV (postoperative nausea and vomiting)     RLS (restless legs syndrome)        Past Surgical History:   Procedure Laterality Date    APPENDECTOMY      CARDIAC CATHETERIZATION      EXTERNAL EAR SURGERY Right     had tumor excised behind right ear lobe benign    EYE LESION EXCISION Left 6/6/2024    Procedure: LEFT LOWER EYELID BASEL CELL CARCINOMA EXCISIONAL AND REPAIR WITH FROZEN SECTIONS,;  Surgeon: Austin Kamara MD;  Location: [facility] OR;  Service: Ophthalmology;  Laterality: Left;    FEMUR IM NAILING/RODDING Right 06/15/2022    Procedure: RIGHT HIP INTRAMEDULLARY  "NAILING/RODDING;  Surgeon: Marc Crawford MD;  Location: [facility] MAIN OR;  Service: Orthopedics;  Laterality: Right;    GALLBLADDER SURGERY      HYSTERECTOMY      KNEE ARTHROPLASTY Right     KNEE ARTHROSCOPY Right     LA REVJ TOTAL KNEE ARTHRP W/WO ALGRFT 1 COMPONENT Right 09/07/2016    Procedure: RT ILIOTIBIAL BAND RELEASE RT TOTAL KNEE POLY EXCHANGE;  Surgeon: Toy Adame MD;  Location: [facility] OR;  Service: Orthopedics    ROTATOR CUFF REPAIR Bilateral     US GUIDED LYMPH NODE BIOPSY  3/18/2024    VENOUS ACCESS DEVICE (PORT) INSERTION Right 5/1/2025    Procedure: INSERTION VENOUS ACCESS DEVICE;  Surgeon: Saloni Verdugo MD;  Location: [facility] OR;  Service: Vascular;  Laterality: Right;    WRIST MASS EXCISION Right                Anthropometrics        Current Height Ht Readings from Last 1 Encounters:   05/28/25 160 cm (62.99\")      Current Weight Wt Readings from Last 1 Encounters:   05/28/25 64.5 kg (142 lb 3.2 oz)      Weight History Wt Readings from Last 30 Encounters:   05/28/25 1030 64.5 kg (142 lb 3.2 oz)   05/21/25 1558 65.3 kg (144 lb)   05/16/25 0000 65.8 kg (145 lb 1 oz)   05/11/25 1620 61 kg (134 lb 8 oz)   05/11/25 1034 68 kg (150 lb)   05/07/25 1145 69.4 kg (152 lb 14.4 oz)   04/30/25 1124 70 kg (154 lb 6.4 oz)   04/29/25 1057 69.4 kg (153 lb)   04/21/25 1206 69 kg (152 lb 3.2 oz)   04/09/25 1336 68.9 kg (152 lb)   10/11/24 1346 68.2 kg (150 lb 6.4 oz)   10/07/24 2112 66.2 kg (146 lb)   10/08/24 1449 66.2 kg (146 lb)   07/12/24 0859 66.7 kg (147 lb)   07/12/24 0812 67 kg (147 lb 12.8 oz)   06/06/24 0713 66.1 kg (145 lb 12.8 oz)   05/30/24 0952 66.7 kg (147 lb)   04/11/24 1002 66.8 kg (147 lb 3.2 oz)   04/08/24 1028 67.2 kg (148 lb 3.2 oz)   03/21/24 0818 66.9 kg (147 lb 8 oz)   03/20/24 0953 68.1 kg (150 lb 3.2 oz)   03/18/24 0800 67.1 kg (148 lb)   03/13/24 0827 67.2 kg (148 lb 3.2 oz)   03/06/24 0701 65.8 kg (145 lb)   02/19/24 1454 66.1 kg (145 lb 11.2 oz)   12/04/23 0653 68.4 kg " (150 lb 12.7 oz)   12/03/23 2145 63.5 kg (140 lb)   06/15/22 0142 72 kg (158 lb 12.8 oz)   08/31/16 1528 74.4 kg (164 lb)          Medications           Current medications: Magnesium, albuterol sulfate HFA, allopurinol, azithromycin, benzonatate, dexAMETHasone, dicyclomine, famotidine, fluticasone, folic acid, lidocaine-prilocaine, methocarbamol, methylPREDNISolone, metoprolol tartrate, naloxone, ondansetron, ondansetron ODT, rOPINIRole, temazepam, traMADol, vitamin B-12, and vitamin D                 Labs       Pertinent Labs    Results from last 7 days   Lab Units 05/28/25  1002   SODIUM mmol/L 140   POTASSIUM mmol/L 4.6   CHLORIDE mmol/L 107   CO2 mmol/L 21.7*   BUN mg/dL 16.4   CREATININE mg/dL 1.06*   CALCIUM mg/dL 9.0   BILIRUBIN mg/dL 0.4   ALK PHOS U/L 148*   ALT (SGPT) U/L 25   AST (SGOT) U/L 43*   GLUCOSE mg/dL 103*     Results from last 7 days   Lab Units 05/28/25  1002   HEMOGLOBIN g/dL 10.1*   HEMATOCRIT % 30.7*   WBC 10*3/mm3 2.62*   ALBUMIN g/dL 3.6     Results from last 7 days   Lab Units 05/28/25  1002   PLATELETS 10*3/mm3 106*     COVID19   Date Value Ref Range Status   06/14/2022 Not Detected Not Detected - Ref. Range Final     Lab Results   Component Value Date    HGBA1C 5.20 05/14/2025            NUTRITION INTERVENTION / PLAN OF CARE        Monitor/Evaluation PO intake, Supplement intake, Pertinent labs, Weight, Symptoms, Follow up as needed       Electronically signed by:  Margaret Cantor RD, LD  05/29/25 14:10 EDT

## 2025-05-29 NOTE — ADDENDUM NOTE
Addended by: BOB PONCE on: 5/29/2025 09:58 AM     Modules accepted: Orders     INPATIENT BEHAVIORAL HEALTH PROGRESS NOTE/EVALUATION    Patient: Tony Zavala Date: 2020   : 1983 Attending: Chuy Banks MD   37 year old male      Chief Complaint: \"still seeing things\"    Subjective: Mr Zavala endorses seeing shadows in his peripheral vision.  He denies auditory hallucinations but has experienced them in the past. He has also been experiencing paranoia where he feels that he is being followed or that people are watching him all the time.  At this time, he would like to continue Seroquel as is.  I may consider optimizing the dose if clinically necessary.  He continues to struggle with suicidal thoughts and the trigger for such thoughts was that his stepmother passed away last month and his major support system.  He promises to keep himself safe while in the hospital.  He has hope that Lexapro will help with his depression.     Medication changes: None.     Psychiatric ROS:  - Depression: Patient reports feeling hopeless, helpless, decreased concentration, decreased energy, guilty feelings, decreased interest.  - Anxiety: Patient reports feeling tense at shoulders/neck, worrying about day to day things, feeling fatigue, restless, irritable  - Appetite: poor  - Sleep: poor  - Auditory/Visual hallucinations/Paranoia: AH/VH, paranoia  - Suicidal thoughts:  Present  - Homicidal thoughts: Denies      Precautions: q 15 minutes - Reason: Safekeeping    Treatment: individual tx group tx    Laboratory Studies:     Lab Results   Component Value Date    WBC 7.5 2020    HGB 16.8 2020    HCT 49.5 2020     2020    SODIUM 138 2020    POTASSIUM 3.7 2020    CHLORIDE 97 (L) 2020    CO2 32 2020    GLUCOSE 104 (H) 2020    BUN 16 2020    CREATININE 1.34 (H) 2020    CALCIUM 9.5 2020    ALBUMIN 3.8 2020    MG 1.8 2020    BILIRUBIN 1.1 (H) 2020    ALKPT 67 2020    AST 32 2020    GPT 43 2020      CIWA Scores:   Patient Vitals for the past 1000 hrs:   CIWA-Ar Total Score   09/05/20 0615 0   09/04/20 2000 0   09/04/20 1800 0   09/04/20 1400 0   09/04/20 1005 1   09/04/20 0605 1   09/03/20 2235 4       Medication Side Effects: Absent    Medical ROS: Denies shortness of breath, chest pain, nausea and vomiting.      Social History:  Family/Friends: Limited support system  Relationships/children: Single  Job: on disability  Living situation: shelter  Current stressors: See HPI for details.  Guns access: denies  Past/current legal issues: denies     Assessment for Harm to Self/Others: Moderate to severe risk unless remains hospitalized.    Vital 24 Hour Range Most Recent Value   Temperature Temp  Min: 97.9 °F (36.6 °C)  Max: 98.3 °F (36.8 °C) 97.9 °F (36.6 °C) (09/05/20 0615)   Pulse Pulse  Min: 93  Max: 116 (!) 116 (09/05/20 0615)   Respiratory Resp  Min: 15  Max: 16 16 (09/05/20 0615)   Non-Invasive  Blood Pressure BP  Min: 111/72  Max: 127/84 117/70 (09/05/20 0615)   Pulse Oximetry SpO2  Min: 96 %  Max: 99 % 99 % (09/05/20 0615)   O2 No data recorded       Mental Status Exam:   General appearance: Distressed  Speech  soft  Volume: soft  Latency: prolonged  Mood/affect: depressed, anxious  Psychomotor: slow  Associations: intact  Thought process: intact  Thought content: Denies HI.  Occasional SI. Paranoia  Perceptual disorders/hallucinations: AH/VH  Orientation: oriented to person, place, time, and general circumstances  Attention: decreased  Concentration: decreased  Language: Normal  Fund of knowledge: average  Memory: no apparent impairments in short term memory and no apparent impairments in long term memory   Insight: poor, as evidenced by patient's lack of insight into his own illness  Judgement: poor, as evidenced by lack of engagement in treatment    Assessment:  Unspecified psychotic disorder  Rule out cannabis use disorder  Rule out alcohol use disorder  Rule out stimulant use disorder  Rule out  opiate use disorder       Case discussed in: treatment team    Patient Progress and Treatment Changes: Slow    Reason for continued hospitalization: SI, Psychosis    Anticipated discharge to: PHP vs IOP vs Outpatient care.     Anticipated Discharge Date:  9/10/20    This information is confidential and disclosure without patient consent or statutory authorization is prohibited by law.    Chuy Banks MD  Inpatient Psychiatry  Pager: [phone]

## 2025-06-04 ENCOUNTER — OFFICE VISIT (OUTPATIENT)
Dept: ONCOLOGY | Facility: CLINIC | Age: 80
End: 2025-06-04
Payer: MEDICARE

## 2025-06-04 ENCOUNTER — INFUSION (OUTPATIENT)
Dept: ONCOLOGY | Facility: HOSPITAL | Age: 80
End: 2025-06-04
Payer: MEDICARE

## 2025-06-04 VITALS
OXYGEN SATURATION: 96 % | HEART RATE: 88 BPM | RESPIRATION RATE: 17 BRPM | SYSTOLIC BLOOD PRESSURE: 133 MMHG | HEIGHT: 63 IN | DIASTOLIC BLOOD PRESSURE: 85 MMHG | BODY MASS INDEX: 25.41 KG/M2 | TEMPERATURE: 98.3 F | WEIGHT: 143.4 LBS

## 2025-06-04 DIAGNOSIS — C34.92 NSCLC OF LEFT LUNG: ICD-10-CM

## 2025-06-04 DIAGNOSIS — Z45.2 FITTING AND ADJUSTMENT OF VASCULAR CATHETER: Primary | ICD-10-CM

## 2025-06-04 DIAGNOSIS — C34.92 NSCLC OF LEFT LUNG: Primary | ICD-10-CM

## 2025-06-04 LAB
ALBUMIN SERPL-MCNC: 3.7 G/DL (ref 3.5–5.2)
ALBUMIN/GLOB SERPL: 0.9 G/DL
ALP SERPL-CCNC: 115 U/L (ref 39–117)
ALT SERPL W P-5'-P-CCNC: 26 U/L (ref 1–33)
ANION GAP SERPL CALCULATED.3IONS-SCNC: 11 MMOL/L (ref 5–15)
AST SERPL-CCNC: 36 U/L (ref 1–32)
BASOPHILS # BLD AUTO: 0.01 10*3/MM3 (ref 0–0.2)
BASOPHILS NFR BLD AUTO: 0.1 % (ref 0–1.5)
BILIRUB SERPL-MCNC: 0.3 MG/DL (ref 0–1.2)
BUN SERPL-MCNC: 32.6 MG/DL (ref 8–23)
BUN/CREAT SERPL: 27.2 (ref 7–25)
CALCIUM SPEC-SCNC: 9.1 MG/DL (ref 8.6–10.5)
CHLORIDE SERPL-SCNC: 98 MMOL/L (ref 98–107)
CO2 SERPL-SCNC: 27 MMOL/L (ref 22–29)
CREAT SERPL-MCNC: 1.2 MG/DL (ref 0.57–1)
DEPRECATED RDW RBC AUTO: 48.2 FL (ref 37–54)
EGFRCR SERPLBLD CKD-EPI 2021: 45.3 ML/MIN/1.73
EOSINOPHIL # BLD AUTO: 0.01 10*3/MM3 (ref 0–0.4)
EOSINOPHIL NFR BLD AUTO: 0.1 % (ref 0.3–6.2)
ERYTHROCYTE [DISTWIDTH] IN BLOOD BY AUTOMATED COUNT: 13.6 % (ref 12.3–15.4)
GLOBULIN UR ELPH-MCNC: 4 GM/DL
GLUCOSE SERPL-MCNC: 91 MG/DL (ref 65–99)
HCT VFR BLD AUTO: 32.9 % (ref 34–46.6)
HGB BLD-MCNC: 11.1 G/DL (ref 12–15.9)
IMM GRANULOCYTES # BLD AUTO: 0.08 10*3/MM3 (ref 0–0.05)
IMM GRANULOCYTES NFR BLD AUTO: 1.2 % (ref 0–0.5)
LYMPHOCYTES # BLD AUTO: 1.16 10*3/MM3 (ref 0.7–3.1)
LYMPHOCYTES NFR BLD AUTO: 17.1 % (ref 19.6–45.3)
MCH RBC QN AUTO: 34.4 PG (ref 26.6–33)
MCHC RBC AUTO-ENTMCNC: 33.7 G/DL (ref 31.5–35.7)
MCV RBC AUTO: 101.9 FL (ref 79–97)
MONOCYTES # BLD AUTO: 1.06 10*3/MM3 (ref 0.1–0.9)
MONOCYTES NFR BLD AUTO: 15.7 % (ref 5–12)
NEUTROPHILS NFR BLD AUTO: 4.45 10*3/MM3 (ref 1.7–7)
NEUTROPHILS NFR BLD AUTO: 65.8 % (ref 42.7–76)
NRBC BLD AUTO-RTO: 0 /100 WBC (ref 0–0.2)
PLATELET # BLD AUTO: 186 10*3/MM3 (ref 140–450)
PMV BLD AUTO: 11.1 FL (ref 6–12)
POTASSIUM SERPL-SCNC: 4.5 MMOL/L (ref 3.5–5.2)
PROT SERPL-MCNC: 7.7 G/DL (ref 6–8.5)
RBC # BLD AUTO: 3.23 10*6/MM3 (ref 3.77–5.28)
SODIUM SERPL-SCNC: 136 MMOL/L (ref 136–145)
WBC NRBC COR # BLD AUTO: 6.77 10*3/MM3 (ref 3.4–10.8)

## 2025-06-04 PROCEDURE — 25010000002 HEPARIN LOCK FLUSH PER 10 UNITS: Performed by: INTERNAL MEDICINE

## 2025-06-04 PROCEDURE — 85025 COMPLETE CBC W/AUTO DIFF WBC: CPT | Performed by: INTERNAL MEDICINE

## 2025-06-04 PROCEDURE — 80053 COMPREHEN METABOLIC PANEL: CPT | Performed by: INTERNAL MEDICINE

## 2025-06-04 PROCEDURE — 25810000003 SODIUM CHLORIDE 0.9 % SOLUTION

## 2025-06-04 PROCEDURE — 96360 HYDRATION IV INFUSION INIT: CPT

## 2025-06-04 RX ORDER — SODIUM CHLORIDE 0.9 % (FLUSH) 0.9 %
10 SYRINGE (ML) INJECTION AS NEEDED
OUTPATIENT
Start: 2025-06-04

## 2025-06-04 RX ORDER — HEPARIN SODIUM (PORCINE) LOCK FLUSH IV SOLN 100 UNIT/ML 100 UNIT/ML
500 SOLUTION INTRAVENOUS AS NEEDED
OUTPATIENT
Start: 2025-06-04

## 2025-06-04 RX ORDER — SODIUM CHLORIDE 9 MG/ML
500 INJECTION, SOLUTION INTRAVENOUS ONCE
Status: COMPLETED | OUTPATIENT
Start: 2025-06-04 | End: 2025-06-04

## 2025-06-04 RX ORDER — SODIUM CHLORIDE 0.9 % (FLUSH) 0.9 %
10 SYRINGE (ML) INJECTION AS NEEDED
Status: DISCONTINUED | OUTPATIENT
Start: 2025-06-04 | End: 2025-06-04 | Stop reason: HOSPADM

## 2025-06-04 RX ORDER — SODIUM CHLORIDE 9 MG/ML
20 INJECTION, SOLUTION INTRAVENOUS ONCE
OUTPATIENT
Start: 2025-06-04

## 2025-06-04 RX ORDER — HEPARIN SODIUM (PORCINE) LOCK FLUSH IV SOLN 100 UNIT/ML 100 UNIT/ML
500 SOLUTION INTRAVENOUS AS NEEDED
Status: DISCONTINUED | OUTPATIENT
Start: 2025-06-04 | End: 2025-06-04 | Stop reason: HOSPADM

## 2025-06-04 RX ADMIN — Medication 10 ML: at 14:45

## 2025-06-04 RX ADMIN — SODIUM CHLORIDE 500 ML: 9 INJECTION, SOLUTION INTRAVENOUS at 14:00

## 2025-06-04 RX ADMIN — Medication 500 UNITS: at 14:45

## 2025-06-04 NOTE — NURSING NOTE
Pt seen per BLAKE Issa with no treatment for today as creatinine and BUN elevated with calculated creatinine clearance 32.8mL/min. Pt to receive 500 mL NS per Lesly. Order placed.

## 2025-06-04 NOTE — PROGRESS NOTES
REASON FOR FOLLOW-UP: Left lower lobe lung mass-squamous of carcinoma                               History of Present Illness    She returns today for 1 week follow-up on 6/4/2025. She is due for Alimta, however,this has been on hold following recent admission from 5/11-5/18 for norovirus, ongoing generalized weakness, cytopenias, and productive cough. Last week she was given a medrol dose josué and states this significantly helped. She reports her stamina continues to improve. She also reports her cough is improving and nearly resolved. She is no longer coughing up mucous. She denies fever or chills.         ONCOLOGY HISTORY:  The patient is an 80-year-old female with below medical history recently admitted 12/4 - 6/20/2023 with a history of right intertrochanteric femoral fracture and osteoarthritis presenting from a fall at home.  She had a stress fracture left foot that affected her balance, chronic low back pain that is also worsened with a fall.  Upon presentation orthopedic surgery was consulted finding a right intramedullary nail fixation and healing without abnormal findings.  No additional surgery was planned though physical therapy continued.  Spinal films demonstrated compression deformity T12 and L2 but fortunately she did further improve and was able to be discharged.    The patient had follow-up with several physicians including orthopedic physicians with an eventual chest exam that revealed an unexpected nodularity.  She was then referred to pulmonary medicine 1/8/2024 with a history of smoking for 35 years quitting 8 years ago and indication that there was a lung nodule found incidentally on imaging for the spine.  Through the [place] system a CT of the chest demonstrated a 2.0 x 1.9 cm noncalcified nodule involving the posterior medial aspect left lower lobe abutting the pleura, more smoothly marginated 0.9 cm noncalcified right middle lobe nodule and a few additional smaller noncalcified nodules  including 2 adjacent nodules measuring less than 0.4 cm the right middle lobe along with mild emphysema with mild to moderate subpleural and bibasilar scarring.  There is no pleural effusion or pneumothorax.    A PET/CT was obtained 2/6/2024 with a solid subpleural irregular nodule left lower lobe medially measuring 1.7 x 1.8 intensely hypermetabolic with SUV of 12.8, no additional nodular mass, had metabolic right superior mediastinal lymph node lateral measuring 0.9 cm to 3.9, diffuse lower lobe tracer uptake with mediastinal right hilar lymph nodes partially calcified thought to be inflammatory or granulomatous disease.  There is also low-grade tracer uptake associated subacute healing left anterior rib fractures.    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    The patient was referred urgently from [facility] for assessment.  It is not certain why she has not been seen in the [facility] system though she now states that she wished to be seen here.     Patient's case discussed during thoracic conference with the finding of a right supraclavicular node.  This was unknown to us when seen her initially in consultation.  Request made to obtain ultrasound-guided biopsy of the right supraclavicular node and this is discussed with the patient's daughter who indicates the patient would be willing to proceed.  We will go and have this scheduled even though she is to be seen within the next week and the result may not yet be available.    3/6/2023 FNA obtained, discussed with IR physician Dr. Courtney with findings of a few atypical clusters present suspicious for involvement by non-small cell carcinoma.  He is offered to rebiopsy quickly if necessary.    The patient is seen 3/13/2024 with rebiopsy requested as well as radiation therapy consultation.  The case is discussed with interventional  radiology and a repeat biopsy is possible.  The patient is willing to proceed in this fashion and we have also discussed radiation therapy consultation in the interval.  She continues have symptoms of chronic back pain and left lid lesion that is going to be reviewed by ophthalmology.    The patient went on to have a follow-up exam with repeat right supraclavicular lymph node biopsy 3/18/2024.  This was found positive for metastatic carcinoma.  The patient was seen by Dr. Hopkins with the conclusion that she has a 2 cm left lower lobe squamous cell carcinoma abutting the pleura additional contralateral supraclavicular lymph node in the thyroid.  This is to be discussed in conference 3/21/2024.    This was discussed 3/21/2024 recognizing the patient's frailty.  Options for treating the areas that are known positive including the left lower lobe and right supraclavicular node would include radiation therapy and Dr. Hopkins will see the patient concerning this as we also follow her for consideration of systemic therapy including a [id number] considering the genomic studies done thus far and the inability to obtain additional genomics from the scant tissue that we have even from recent biopsy.  Finally the patient's overall status including an older adult assessment is going to be requested.    The patient's [id number] revealed NF1  detected with possible indication of Selumetinib effectiveness.  Additionally TMB was 10.4 mutations per megabase.  Concurrently the patient was seen by radiation therapy treating the left lower lobe and right neck site via SBRT.  Further MRI of the brain revealed no evidence of metastatic disease.    She is seen back 4/11/2024.  She had been seen 4/8/2024 with SBRT to left lower lobe and right neck lesion.  This has not been completed and she is to follow-up with radiation therapy mid July.  We have discussed her findings today with the patient seen along with her granddaughter.  She did  well with radiation therapy and has no additional symptoms currently respiratorily.  Her genomic testing suggested possible use of Selumetinib (not currently available) though we will follow this as she is subsequently reevaluated.    A follow-up CT series was obtained 7/1/2024.  This reveals a slight decrease in the hypermetabolic right supraclavicular node measuring 0.9 x 0.7 cm previously 1.2 x 1.1 cm.  In the chest there is decrease in the mass in the posterior medial aspect left lower lobe and evidence of radiation pneumonitis adjacently.  Margins are difficult to appreciate completely but maximal diameter is 2 cm previously 2.2 cm as compared to previous.  There is a pleural-based 1.0 x 1.6 cm nodule medial aspect of the right lower lobe which which was less conspicuous previously, multiple nodule opacities right middle lobe that are larger and a 1.3 cm density possibly mucous plugging otherwise seen.  These findings suggest a follow-up series of scans will still be necessary.    The patient is seen with her daughter and they both agree with this follow-up plan.    The patient required admission 10/7 - 9/20/2024 after mechanical fall with T12-L2 compression fractures treated conservatively.    The patient's follow-up exams obtained 10/4/2024.  She is seen back 10/11/2024 and there is serial decrease in the small right supraclavicular node when compared to previous now down to 7 x 5 mm, evidence of consolidation left lower lobe with tumor developed likely seen measuring 16 x 2 cm, 2 right middle lobe nodules-19 x 13 mm previously 15 x 10, adjacent 9 mm previously 4 to 5 mm and unchanged third right middle lobe noncalcified nodule, other nodule stable and there is no evidence of abdominal/pelvic or osseous metastasis.    The patient is seen 10/11/2024 slowly recovering from the mechanical fall described above.  She does not require additional treatment at this point after review of the scans with follow-up exams  "again are anticipated and she is willing to proceed.    The patient is seen 4/9/2025 and evaluated by CT of soft tissue neck, chest, abdomen, pelvis performed 4/2 2025.  There is now seen increase in the right middle lobe cavitary thick-walled nodule measuring 3.2 x 2.6 compared to previous 2.1 x 1.4.  Other sites are stable and no additional lesions have developed.  The patient is seen with her daughter both indicating that she is still able to carry out daily activities without severe difficulty though she \"has slowed up a bit\".  He does have back pain periodically that improves with rest and does not notice worsening respiratory symptoms.  She may well have disease that would be amenable to local therapy and it is requested that she undergo a PET/CT.    PET/CT demonstrates left lower lobe radiation changes, right middle lobe hypermetabolic 3.1 x 2.3 central cavitary mass that is larger from previous, hypermetabolic right upper lobe 1 cm solid nodule unchanged adjacent right middle lobe 9 mm solid nodule as well as hypermetabolic right hilar and low paratracheal lymphadenopathy concerning for osiel metastatic disease.    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  Radiation therapy would not be expected to control this disease and systemic therapy is going to be necessary.  Considering the tissue type she would be a candidate for Alimta single agent at this point.    We have proceeded to have the patient's tumor further assessed by Caris examination with insufficient tumor in the block.  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   it was recognized the patient's clearance is somewhat marginal and her dose was adjusted down by 25%.  She underwent port placement 5/1/2025 and is seen back in office 5/7/2025 to continue with therapy.  She is agreeable to do so.     Unfortunately, she " was recently admitted 5/11/2025 through 5/18/2025 secondary to acute norovirus gastroenteritis.  She had associated pancytopenia.  She struggled with severe abdominal pain, nausea vomiting and diarrhea.  She received G-CSF support due to neutropenia.        Since discharge, the patient reports she has continued to struggle with diarrhea which finally improved just yesterday.  She is now developed productive cough with purulent sputum and thick drainage.  She has no fevers or chills.  She was treated subsequently with a Z-Denny, Tessalon Perles and then plans were made for follow-up 5/28/2025 and possible chemotherapy with dose reduction.    She is next seen, however, 5/28/2025 with continued cough and general weakness.  Appetite is slowly returning as is her stamina though she states she is having difficulty sleeping from the cough.  Finally, additionally, her cytopenias are not yet resolved enough to proceed with chemotherapy.      Past Surgical History:   Procedure Laterality Date    APPENDECTOMY      CARDIAC CATHETERIZATION      EXTERNAL EAR SURGERY Right     had tumor excised behind right ear lobe benign    EYE LESION EXCISION Left 6/6/2024    Procedure: LEFT LOWER EYELID BASEL CELL CARCINOMA EXCISIONAL AND REPAIR WITH FROZEN SECTIONS,;  Surgeon: Austin Kamara MD;  Location: [facility] OR;  Service: Ophthalmology;  Laterality: Left;    FEMUR IM NAILING/RODDING Right 06/15/2022    Procedure: RIGHT HIP INTRAMEDULLARY NAILING/RODDING;  Surgeon: Marc Crawford MD;  Location: [facility] OR;  Service: Orthopedics;  Laterality: Right;    GALLBLADDER SURGERY      HYSTERECTOMY      KNEE ARTHROPLASTY Right     KNEE ARTHROSCOPY Right     TX REVJ TOTAL KNEE ARTHRP W/WO ALGRFT 1 COMPONENT Right 09/07/2016    Procedure: RT ILIOTIBIAL BAND RELEASE RT TOTAL KNEE POLY EXCHANGE;  Surgeon: Toy Adame MD;  Location: [facility] OR;  Service: Orthopedics    ROTATOR CUFF REPAIR Bilateral     US GUIDED LYMPH NODE BIOPSY   3/18/2024    VENOUS ACCESS DEVICE (PORT) INSERTION Right 5/1/2025    Procedure: INSERTION VENOUS ACCESS DEVICE;  Surgeon: Saloni Verdugo MD;  Location: [facility];  Service: Vascular;  Laterality: Right;    WRIST MASS EXCISION Right         Current Outpatient Medications on File Prior to Visit   Medication Sig Dispense Refill    albuterol (PROVENTIL HFA;VENTOLIN HFA) 108 (90 BASE) MCG/ACT inhaler Inhale 2 puffs Every 4 (Four) Hours As Needed. Indications: Asthma      allopurinol (ZYLOPRIM) 100 MG tablet Take 1 tablet by mouth Daily.      azithromycin (Zithromax Z-Denny) 250 MG tablet Take 2 tablets by mouth on day 1, then 1 tablet daily on days 2-5 6 tablet 0    benzonatate (Tessalon Perles) 100 MG capsule Take 1 capsule by mouth 3 (Three) Times a Day As Needed for Cough. 45 capsule 1    dexAMETHasone (DECADRON) 4 MG tablet Take 1 tablet oral twice a day for 3 consecutive days beginning the day before chemotherapy and continue for 6 doses.Take with food. 30 tablet 2    dicyclomine (BENTYL) 20 MG tablet Take 1 tablet by mouth Every 6 (Six) Hours. 20 tablet 0    famotidine (PEPCID) 20 MG tablet Take 1 tablet by mouth 2 (Two) Times a Day 60 tablet 0    fluticasone (FLONASE) 50 MCG/ACT nasal spray Administer 2 sprays into the nostril(s) as directed by provider Daily.      folic acid (FOLVITE) 1 MG tablet Take 1 tablet by mouth Daily. Start at least 7 days prior to chemotherapy until at least 3 weeks after all chemotherapy. 30 tablet 6    lidocaine-prilocaine (EMLA) 2.5-2.5 % cream Apply 1 Application topically to the appropriate area as directed Every 2 (Two) Hours As Needed for Mild Pain. Apply a nickel size amount to port 30 minutes prior to access. 30 g 2    Magnesium 500 MG capsule Take 500 mg by mouth 2 (Two) Times a Day. Indications: Acid Indigestion      methocarbamol (ROBAXIN) 500 MG tablet Take 1 tablet by mouth Every 8 (Eight) Hours As Needed for Muscle Spasms (back pain). 30 tablet 0     methylPREDNISolone (MEDROL) 4 MG dose pack Take as directed on package instructions. 21 tablet 1    metoprolol tartrate (LOPRESSOR) 25 MG tablet Take 1 tablet by mouth 2 (Two) Times a Day. Indications: High Blood Pressure      naloxone (NARCAN) 4 MG/0.1ML nasal spray Call 911. Don't prime. Tea in 1 nostril for overdose. Repeat in 2-3 minutes in other nostril if no or minimal breathing/responsiveness. 2 each 0    ondansetron (ZOFRAN) 8 MG tablet Take 1 tablet by mouth 3 (Three) Times a Day As Needed for Nausea or Vomiting. 30 tablet 5    ondansetron ODT (ZOFRAN-ODT) 4 MG disintegrating tablet Place 1 tablet on the tongue 4 (Four) Times a Day As Needed for Nausea. 20 tablet 0    rOPINIRole (REQUIP) 0.5 MG tablet Take 1 tablet by mouth Every Night.      temazepam (RESTORIL) 15 MG capsule Take 1 capsule by mouth At Night As Needed for Sleep.      traMADol (Ultram) 50 MG tablet Take 1 tablet by mouth Every 12 (Twelve) Hours As Needed for Severe Pain for up to 10 doses. 10 tablet 0    vitamin B-12 (CYANOCOBALAMIN) 1000 MCG tablet Take 1 tablet by mouth Daily. Indications: Inadequate Vitamin B12      vitamin D (ERGOCALCIFEROL) 1.25 MG (46270 UT) capsule capsule Take 1 capsule by mouth Every 7 (Seven) Days. 5 capsule 0     Current Facility-Administered Medications on File Prior to Visit   Medication Dose Route Frequency Provider Last Rate Last Admin    cyanocobalamin injection 1,000 mcg  1,000 mcg Intramuscular Q28 Days Gali Marshall APRN   1,000 mcg at 04/30/25 1206        ALLERGIES:    Allergies   Allergen Reactions    Levaquin [Levofloxacin] Hives    Zocor [Simvastatin] Myalgia    Atorvastatin Other (See Comments)    Codeine GI Intolerance    Penicillins Hives and Swelling     Pt reports tongue swelling    Percocet [Oxycodone-Acetaminophen] Itching and Nausea And Vomiting        Social History     Socioeconomic History    Marital status:      Spouse name: Nabil   Tobacco Use    Smoking status:  "Former     Current packs/day: 1.00     Average packs/day: 1 pack/day for 30.0 years (30.0 ttl pk-yrs)     Types: Cigarettes     Passive exposure: Past    Smokeless tobacco: Never   Vaping Use    Vaping status: Never Used   Substance and Sexual Activity    Alcohol use: Yes     Comment: 1 wine drink q 3-4 months    Drug use: Never    Sexual activity: Defer        Family History   Problem Relation Age of Onset    Hypertension Mother     Heart failure Mother     Heart attack Maternal Grandfather     Malig Hyperthermia Neg Hx       Objective   Vitals:    06/04/25 1250   BP: 133/85   Pulse: 88   Resp: 17   Temp: 98.3 °F (36.8 °C)   TempSrc: Oral   SpO2: 96%   Height: 160 cm (62.99\")   PainSc: 0-No pain         6/4/2025     1:01 PM   Current Status   ECOG score 1     Physical Exam  Constitutional:       Appearance: Normal appearance.   Cardiovascular:      Rate and Rhythm: Normal rate.      Heart sounds: Normal heart sounds.   Pulmonary:      Effort: Pulmonary effort is normal.      Comments: Mild expiratory wheeze TWYLA, otherwise lungs clear to auscultation.   Abdominal:      General: Bowel sounds are normal.      Palpations: Abdomen is soft.   Musculoskeletal:      Right lower leg: No edema.      Left lower leg: No edema.   Skin:     General: Skin is warm and dry.   Neurological:      Mental Status: She is oriented to person, place, and time.   Psychiatric:         Mood and Affect: Mood normal.         Behavior: Behavior normal.           RECENT LABS:  Results from last 7 days   Lab Units 06/04/25  1311   WBC 10*3/mm3 6.77   NEUTROS ABS 10*3/mm3 4.45   HEMOGLOBIN g/dL 11.1*   HEMATOCRIT % 32.9*   PLATELETS 10*3/mm3 186     Results from last 7 days   Lab Units 06/04/25  1311   SODIUM mmol/L 136   POTASSIUM mmol/L 4.5   CHLORIDE mmol/L 98   CO2 mmol/L 27.0   BUN mg/dL 32.6*   CREATININE mg/dL 1.20*   CALCIUM mg/dL 9.1   ALBUMIN g/dL 3.7   BILIRUBIN mg/dL 0.3   ALK PHOS U/L 115   ALT (SGPT) U/L 26   AST (SGOT) U/L 36* "   GLUCOSE mg/dL 91               Assessment & Plan    80 year-old female with a orthopedic history as described in particular involving right hip, bilateral knees and bilateral rotator cuffs recently falling and sustaining a contusion involving her right hip and evidence of compression deformities T12 and L2.  Upon discharge she was seen by orthopedics and, unexpectedly, on additional back studies a left lung nodule was determined.    This was reviewed by pulmonary medicine at [facility] leading to a  a CT of the chest which demonstrated a 2.0 x 1.9 cm noncalcified nodule involving the posterior medial aspect left lower lobe abutting the pleura, more smoothly marginated 0.9 cm noncalcified right middle lobe nodule and a few additional smaller noncalcified nodules including 2 adjacent nodules measuring less than 0.4 cm the right middle lobe along with mild emphysema with mild to moderate subpleural and bibasilar scarring.  There is no pleural effusion or pneumothorax.    A PET/CT was obtained 2/6/2024 with a solid subpleural irregular nodule left lower lobe medially measuring 1.7 x 1.8 intensely hypermetabolic with SUV of 12.8, no additional nodular mass, had metabolic right superior mediastinal lymph node lateral measuring 0.9 cm to 3.9, diffuse lower lobe tracer uptake with mediastinal right hilar lymph nodes partially calcified thought to be inflammatory or granulomatous disease.  There is also low-grade tracer uptake associated subacute healing left anterior rib fractures.    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    The patient is now seen in CBC office.  There has not been assessment by surgery or, apparently, by pulmonary medicine formally though the studies above are available for review.  After discussion we will need to have her presented in the thoracic conference so that  opinions from radiology, thoracic surgery radiation therapy as well as pathology can be obtained.    Patient's case discussed during thoracic conference with the finding of a right supraclavicular node.  This was unknown to us when seen her initially in consultation.  Request made to obtain ultrasound-guided biopsy of the right supraclavicular node and this is discussed with the patient's daughter who indicates the patient would be willing to proceed.  We will go and have this scheduled even though she is to be seen within the next week and the result may not yet be available.    3/6/2023 FNA obtained, discussed with IR physician Dr. Courtney with findings of a few atypical clusters present suspicious for involvement by non-small cell carcinoma.  He is offered to rebiopsy quickly if necessary.    The patient is seen 3/13/2024 with rebiopsy requested as well as radiation therapy consultation.  The case is discussed with interventional radiology and a repeat biopsy is possible.  The patient is willing to proceed in this fashion and we have also discussed radiation therapy consultation in the interval.  She continues have symptoms of chronic back pain and left lid lesion that is going to be reviewed by ophthalmology.    This was discussed 3/21/2024 recognizing the patient's frailty.  Options for treating the areas that are known positive including the left lower lobe and right supraclavicular node would include radiation therapy and Dr. Hopkins will see the patient concerning this as we also follow her for consideration of systemic therapy including a [id number] considering the genomic studies done thus far and the inability to obtain additional genomics from the scant tissue that we have even from recent biopsy.  Finally the patient's overall status including an older adult assessment is going to be requested.    She is seen back 4/11/2024.  She had been seen 4/8/2024 with SBRT to left lower lobe and right neck lesion.   This has not been completed and she is to follow-up with radiation therapy mid July.  We have discussed her findings today with the patient seen along with her granddaughter.  She did well with radiation therapy and has no additional symptoms currently respiratorily.    Her genomic testing suggested possible use of Selumetinib (not currently available) though we will follow this as she is subsequently reevaluated.    A follow-up CT series was obtained 7/1/2024.  This reveals a slight decrease in the hypermetabolic right supraclavicular node measuring 0.9 x 0.7 cm previously 1.2 x 1.1 cm.  In the chest there is decrease in the mass in the posterior medial aspect left lower lobe and evidence of radiation pneumonitis adjacently.  Margins are difficult to appreciate completely but maximal diameter is 2 cm previously 2.2 cm as compared to previous.  There is a pleural-based 1.0 x 1.6 cm nodule medial aspect of the right lower lobe which which was less conspicuous previously, multiple nodule opacities right middle lobe that are larger and a 1.3 cm density possibly mucous plugging otherwise seen.  These findings suggest a follow-up series of scans will still be necessary.    The patient is seen in office 7/12/2024 with a reasonably stable performance status.  At this point no additional intervention is necessary but a follow-up scan is requested.    The patient required admission 10/7 - 9/20/2024 after mechanical fall with T12-L2 compression fractures treated conservatively.      The patient's follow-up exams obtained 10/4/2024.  She is seen back 10/11/2024 and there is serial decrease in the small right supraclavicular node when compared to previous now down to 7 x 5 mm, evidence of consolidation left lower lobe with tumor developed likely seen measuring 16 x 2 cm, 2 right middle lobe nodules-19 x 13 mm previously 15 x 10, adjacent 9 mm previously 4 to 5 mm and unchanged third right middle lobe noncalcified nodule, other  "nodule stable and there is no evidence of abdominal/pelvic or osseous metastasis.    The patient required admission 10/7 - 9/20/2024 after mechanical fall with T12-L2 compression fractures treated conservatively.    The patient's follow-up exams obtained 10/4/2024.  She is seen back 10/11/2024 and there is serial decrease in the small right supraclavicular node when compared to previous now down to 7 x 5 mm, evidence of consolidation left lower lobe with tumor developed likely seen measuring 16 x 2 cm, 2 right middle lobe nodules-19 x 13 mm previously 15 x 10, adjacent 9 mm previously 4 to 5 mm and unchanged third right middle lobe noncalcified nodule, other nodule stable and there is no evidence of abdominal/pelvic or osseous metastasis.    The patient is seen 10/11/2024 slowly recovering from the mechanical fall described above.  She does not require additional treatment at this point after review of the scans with follow-up exams again are anticipated and she is willing to proceed.     The patient is seen 4/9/2025 and evaluated by CT of soft tissue neck, chest, abdomen, pelvis performed 4/2 2025.  There is now seen increase in the right middle lobe cavitary thick-walled nodule measuring 3.2 x 2.6 compared to previous 2.1 x 1.4.  Other sites are stable and no additional lesions have developed.      The patient is seen with her daughter both indicating that she is still able to carry out daily activities without severe difficulty though she \"has slowed up a bit\".  He does have back pain periodically that improves with rest and does not notice worsening respiratory symptoms.  She may well have disease that would be amenable to local therapy and it is requested that she undergo a PET/CT.    PET/CT demonstrates left lower lobe radiation changes, right middle lobe hypermetabolic 3.1 x 2.3 central cavitary mass that is larger from previous, hypermetabolic right upper lobe 1 cm solid nodule unchanged adjacent right " middle lobe 9 mm solid nodule as well as hypermetabolic right hilar and low paratracheal lymphadenopathy concerning for osiel metastatic disease.    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  Radiation therapy would not be expected to control this disease and systemic therapy is going to be necessary.  Considering the tissue type she would be a candidate for Alimta single agent at this point.  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    We have proceeded to have the patient's tumor further assessed by Caris examination with insufficient tumor in the block.  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   it was recognized the patient's clearance is somewhat marginal and her dose was adjusted down by 25%.  She underwent port placement 5/1/2025 and is seen back in office 5/7/2025 to continue with therapy.  She is agreeable to do so.     Hospitalization 5/11/2025 through 5/18/2025 to acute viral norovirus gastroenteritis.  Profound pancytopenia related to both viral infection and chemotherapy despite dose reduction.    Patient seen for hospital follow-up 5/21/2025 with improvement in pancytopenia.  Now with chest congestion productive purulent sputum.  We will treat with azithromycin as well as Tessalon Perles.  The patient will return in 1 week for MD follow-up and consideration of resumption of chemotherapy with further dose reduction.    The patient went on to start a Z-Denny and Tessalon and is seen back in office 5/28/2025, unfortunately, not improved enough to proceed with chemotherapy.    She returns today 6/4/2025 in anticipation of Alimta which has been delayed several weeks due to admission, ongoing generalized weakness,  cough, and cytopenias. She reports she is back to her baseline and feeling much better. Appetite has improved and cough has nearly resolved. WBC 6.77, hemoglobin 11.1, platelets 186,000. Unfortunately, BUN is eleavted to 32.6 and creatinine is up to 1.2 today. Creatinine clearance is 32.8mL/min and therefore we will postpone Alimta. Proceed with 500mL NS bolus today and patient will return to clinic in 1 week for NP or MD visit, Alimta with labs per protocol. Encouraged her to increase hydration.    PLAN:  Hold Alimta today due to creatinine clearance (Alimta has been further dose reduced by 50% from previous dose, adjustment reflected in treatment plan)  Proceed with 500mL NS bolus   Return to clinic in 1 week for MD or NP visit, Alimta, cbc and cmp.  Instructed to reach out sooner with any concerns.     Lesly Davila, BLAKE  06/04/2025

## 2025-06-06 NOTE — PROGRESS NOTES
REASON FOR FOLLOW-UP: Left lower lobe lung mass-squamous of carcinoma                               History of Present Illness    She returns today for reconsideration of Alimta.  This was previously held for norovirus and then decreased for creatinine clearance.  She states that she feels well.  Respiratory symptoms have completely resolved.  No signs of infection.  She endorses drinking much water as she can tolerate.  Her creatinine clearance is 44.  Her port incision site has a small open area less than 1 cm.  No drainage, no redness, no warmth.      ONCOLOGY HISTORY:  The patient is an 80-year-old female with below medical history recently admitted 12/4 - 6/20/2023 with a history of right intertrochanteric femoral fracture and osteoarthritis presenting from a fall at home.  She had a stress fracture left foot that affected her balance, chronic low back pain that is also worsened with a fall.  Upon presentation orthopedic surgery was consulted finding a right intramedullary nail fixation and healing without abnormal findings.  No additional surgery was planned though physical therapy continued.  Spinal films demonstrated compression deformity T12 and L2 but fortunately she did further improve and was able to be discharged.    The patient had follow-up with several physicians including orthopedic physicians with an eventual chest exam that revealed an unexpected nodularity.  She was then referred to pulmonary medicine 1/8/2024 with a history of smoking for 35 years quitting 8 years ago and indication that there was a lung nodule found incidentally on imaging for the spine.  Through the [facility] system a CT of the chest demonstrated a 2.0 x 1.9 cm noncalcified nodule involving the posterior medial aspect left lower lobe abutting the pleura, more smoothly marginated 0.9 cm noncalcified right middle lobe nodule and a few additional smaller noncalcified nodules including 2 adjacent nodules measuring less than 0.4 cm  the right middle lobe along with mild emphysema with mild to moderate subpleural and bibasilar scarring.  There is no pleural effusion or pneumothorax.    A PET/CT was obtained 2/6/2024 with a solid subpleural irregular nodule left lower lobe medially measuring 1.7 x 1.8 intensely hypermetabolic with SUV of 12.8, no additional nodular mass, had metabolic right superior mediastinal lymph node lateral measuring 0.9 cm to 3.9, diffuse lower lobe tracer uptake with mediastinal right hilar lymph nodes partially calcified thought to be inflammatory or granulomatous disease.  There is also low-grade tracer uptake associated subacute healing left anterior rib fractures.    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    The patient was referred urgently from [facility] for assessment.  It is not certain why she has not been seen in the [place] system though she now states that she wished to be seen here.     Patient's case discussed during thoracic conference with the finding of a right supraclavicular node.  This was unknown to us when seen her initially in consultation.  Request made to obtain ultrasound-guided biopsy of the right supraclavicular node and this is discussed with the patient's daughter who indicates the patient would be willing to proceed.  We will go and have this scheduled even though she is to be seen within the next week and the result may not yet be available.    3/6/2023 FNA obtained, discussed with IR physician Dr. Courtney with findings of a few atypical clusters present suspicious for involvement by non-small cell carcinoma.  He is offered to rebiopsy quickly if necessary.    The patient is seen 3/13/2024 with rebiopsy requested as well as radiation therapy consultation.  The case is discussed with interventional radiology and a repeat biopsy is possible.  The patient is  willing to proceed in this fashion and we have also discussed radiation therapy consultation in the interval.  She continues have symptoms of chronic back pain and left lid lesion that is going to be reviewed by ophthalmology.    The patient went on to have a follow-up exam with repeat right supraclavicular lymph node biopsy 3/18/2024.  This was found positive for metastatic carcinoma.  The patient was seen by Dr. Hopkins with the conclusion that she has a 2 cm left lower lobe squamous cell carcinoma abutting the pleura additional contralateral supraclavicular lymph node in the thyroid.  This is to be discussed in conference 3/21/2024.    This was discussed 3/21/2024 recognizing the patient's frailty.  Options for treating the areas that are known positive including the left lower lobe and right supraclavicular node would include radiation therapy and Dr. Hopkins will see the patient concerning this as we also follow her for consideration of systemic therapy including a [id number] considering the genomic studies done thus far and the inability to obtain additional genomics from the scant tissue that we have even from recent biopsy.  Finally the patient's overall status including an older adult assessment is going to be requested.    The patient's [id number] revealed NF1  detected with possible indication of Selumetinib effectiveness.  Additionally TMB was 10.4 mutations per megabase.  Concurrently the patient was seen by radiation therapy treating the left lower lobe and right neck site via SBRT.  Further MRI of the brain revealed no evidence of metastatic disease.    She is seen back 4/11/2024.  She had been seen 4/8/2024 with SBRT to left lower lobe and right neck lesion.  This has not been completed and she is to follow-up with radiation therapy mid July.  We have discussed her findings today with the patient seen along with her granddaughter.  She did well with radiation therapy and has no additional symptoms  currently respiratorily.  Her genomic testing suggested possible use of Selumetinib (not currently available) though we will follow this as she is subsequently reevaluated.    A follow-up CT series was obtained 7/1/2024.  This reveals a slight decrease in the hypermetabolic right supraclavicular node measuring 0.9 x 0.7 cm previously 1.2 x 1.1 cm.  In the chest there is decrease in the mass in the posterior medial aspect left lower lobe and evidence of radiation pneumonitis adjacently.  Margins are difficult to appreciate completely but maximal diameter is 2 cm previously 2.2 cm as compared to previous.  There is a pleural-based 1.0 x 1.6 cm nodule medial aspect of the right lower lobe which which was less conspicuous previously, multiple nodule opacities right middle lobe that are larger and a 1.3 cm density possibly mucous plugging otherwise seen.  These findings suggest a follow-up series of scans will still be necessary.    The patient is seen with her daughter and they both agree with this follow-up plan.    The patient required admission 10/7 - 9/20/2024 after mechanical fall with T12-L2 compression fractures treated conservatively.    The patient's follow-up exams obtained 10/4/2024.  She is seen back 10/11/2024 and there is serial decrease in the small right supraclavicular node when compared to previous now down to 7 x 5 mm, evidence of consolidation left lower lobe with tumor developed likely seen measuring 16 x 2 cm, 2 right middle lobe nodules-19 x 13 mm previously 15 x 10, adjacent 9 mm previously 4 to 5 mm and unchanged third right middle lobe noncalcified nodule, other nodule stable and there is no evidence of abdominal/pelvic or osseous metastasis.    The patient is seen 10/11/2024 slowly recovering from the mechanical fall described above.  She does not require additional treatment at this point after review of the scans with follow-up exams again are anticipated and she is willing to  "proceed.    The patient is seen 4/9/2025 and evaluated by CT of soft tissue neck, chest, abdomen, pelvis performed 4/2 2025.  There is now seen increase in the right middle lobe cavitary thick-walled nodule measuring 3.2 x 2.6 compared to previous 2.1 x 1.4.  Other sites are stable and no additional lesions have developed.  The patient is seen with her daughter both indicating that she is still able to carry out daily activities without severe difficulty though she \"has slowed up a bit\".  He does have back pain periodically that improves with rest and does not notice worsening respiratory symptoms.  She may well have disease that would be amenable to local therapy and it is requested that she undergo a PET/CT.    PET/CT demonstrates left lower lobe radiation changes, right middle lobe hypermetabolic 3.1 x 2.3 central cavitary mass that is larger from previous, hypermetabolic right upper lobe 1 cm solid nodule unchanged adjacent right middle lobe 9 mm solid nodule as well as hypermetabolic right hilar and low paratracheal lymphadenopathy concerning for osiel metastatic disease.    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  Radiation therapy would not be expected to control this disease and systemic therapy is going to be necessary.  Considering the tissue type she would be a candidate for Alimta single agent at this point.    We have proceeded to have the patient's tumor further assessed by Caris examination with insufficient tumor in the block.  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   it was recognized the patient's clearance is somewhat marginal and her dose was adjusted down by 25%.  She underwent port placement 5/1/2025 and is seen back in office 5/7/2025 to continue with therapy.  She is agreeable to do so.     Unfortunately, she was recently admitted 5/11/2025 through " 5/18/2025 secondary to acute norovirus gastroenteritis.  She had associated pancytopenia.  She struggled with severe abdominal pain, nausea vomiting and diarrhea.  She received G-CSF support due to neutropenia.        Since discharge, the patient reports she has continued to struggle with diarrhea which finally improved just yesterday.  She is now developed productive cough with purulent sputum and thick drainage.  She has no fevers or chills.  She was treated subsequently with a Z-Denny, Tessalon Perles and then plans were made for follow-up 5/28/2025 and possible chemotherapy with dose reduction.    She is next seen, however, 5/28/2025 with continued cough and general weakness.  Appetite is slowly returning as is her stamina though she states she is having difficulty sleeping from the cough.  Finally, additionally, her cytopenias are not yet resolved enough to proceed with chemotherapy.      Past Surgical History:   Procedure Laterality Date    APPENDECTOMY      CARDIAC CATHETERIZATION      EXTERNAL EAR SURGERY Right     had tumor excised behind right ear lobe benign    EYE LESION EXCISION Left 6/6/2024    Procedure: LEFT LOWER EYELID BASEL CELL CARCINOMA EXCISIONAL AND REPAIR WITH FROZEN SECTIONS,;  Surgeon: Austin Kamara MD;  Location: [facility] OR;  Service: Ophthalmology;  Laterality: Left;    FEMUR IM NAILING/RODDING Right 06/15/2022    Procedure: RIGHT HIP INTRAMEDULLARY NAILING/RODDING;  Surgeon: Marc Crawford MD;  Location: [facility] OR;  Service: Orthopedics;  Laterality: Right;    GALLBLADDER SURGERY      HYSTERECTOMY      KNEE ARTHROPLASTY Right     KNEE ARTHROSCOPY Right     NE REVJ TOTAL KNEE ARTHRP W/WO ALGRFT 1 COMPONENT Right 09/07/2016    Procedure: RT ILIOTIBIAL BAND RELEASE RT TOTAL KNEE POLY EXCHANGE;  Surgeon: Toy Adame MD;  Location: [facility];  Service: Orthopedics    ROTATOR CUFF REPAIR Bilateral     US GUIDED LYMPH NODE BIOPSY  3/18/2024    VENOUS ACCESS DEVICE (PORT)  INSERTION Right 5/1/2025    Procedure: INSERTION VENOUS ACCESS DEVICE;  Surgeon: Saloni Verdugo MD;  Location: [facility] MAIN OR;  Service: Vascular;  Laterality: Right;    WRIST MASS EXCISION Right         Current Outpatient Medications on File Prior to Visit   Medication Sig Dispense Refill    albuterol (PROVENTIL HFA;VENTOLIN HFA) 108 (90 BASE) MCG/ACT inhaler Inhale 2 puffs Every 4 (Four) Hours As Needed. Indications: Asthma      allopurinol (ZYLOPRIM) 100 MG tablet Take 1 tablet by mouth Daily.      azithromycin (Zithromax Z-Denny) 250 MG tablet Take 2 tablets by mouth on day 1, then 1 tablet daily on days 2-5 6 tablet 0    benzonatate (Tessalon Perles) 100 MG capsule Take 1 capsule by mouth 3 (Three) Times a Day As Needed for Cough. 45 capsule 1    dexAMETHasone (DECADRON) 4 MG tablet Take 1 tablet oral twice a day for 3 consecutive days beginning the day before chemotherapy and continue for 6 doses.Take with food. 30 tablet 2    dicyclomine (BENTYL) 20 MG tablet Take 1 tablet by mouth Every 6 (Six) Hours. 20 tablet 0    famotidine (PEPCID) 20 MG tablet Take 1 tablet by mouth 2 (Two) Times a Day 60 tablet 0    fluticasone (FLONASE) 50 MCG/ACT nasal spray Administer 2 sprays into the nostril(s) as directed by provider Daily.      folic acid (FOLVITE) 1 MG tablet Take 1 tablet by mouth Daily. Start at least 7 days prior to chemotherapy until at least 3 weeks after all chemotherapy. 30 tablet 6    lidocaine-prilocaine (EMLA) 2.5-2.5 % cream Apply 1 Application topically to the appropriate area as directed Every 2 (Two) Hours As Needed for Mild Pain. Apply a nickel size amount to port 30 minutes prior to access. 30 g 2    Magnesium 500 MG capsule Take 500 mg by mouth 2 (Two) Times a Day. Indications: Acid Indigestion      methocarbamol (ROBAXIN) 500 MG tablet Take 1 tablet by mouth Every 8 (Eight) Hours As Needed for Muscle Spasms (back pain). 30 tablet 0    methylPREDNISolone (MEDROL) 4 MG dose pack Take as  directed on package instructions. 21 tablet 1    metoprolol tartrate (LOPRESSOR) 25 MG tablet Take 1 tablet by mouth 2 (Two) Times a Day. Indications: High Blood Pressure      naloxone (NARCAN) 4 MG/0.1ML nasal spray Call 911. Don't prime. Norcatur in 1 nostril for overdose. Repeat in 2-3 minutes in other nostril if no or minimal breathing/responsiveness. 2 each 0    ondansetron (ZOFRAN) 8 MG tablet Take 1 tablet by mouth 3 (Three) Times a Day As Needed for Nausea or Vomiting. 30 tablet 5    ondansetron ODT (ZOFRAN-ODT) 4 MG disintegrating tablet Place 1 tablet on the tongue 4 (Four) Times a Day As Needed for Nausea. 20 tablet 0    rOPINIRole (REQUIP) 0.5 MG tablet Take 1 tablet by mouth Every Night.      temazepam (RESTORIL) 15 MG capsule Take 1 capsule by mouth At Night As Needed for Sleep.      traMADol (Ultram) 50 MG tablet Take 1 tablet by mouth Every 12 (Twelve) Hours As Needed for Severe Pain for up to 10 doses. 10 tablet 0    vitamin B-12 (CYANOCOBALAMIN) 1000 MCG tablet Take 1 tablet by mouth Daily. Indications: Inadequate Vitamin B12      vitamin D (ERGOCALCIFEROL) 1.25 MG (34080 UT) capsule capsule Take 1 capsule by mouth Every 7 (Seven) Days. 5 capsule 0     Current Facility-Administered Medications on File Prior to Visit   Medication Dose Route Frequency Provider Last Rate Last Admin    cyanocobalamin injection 1,000 mcg  1,000 mcg Intramuscular Q28 Days Gali Marshall APRN   1,000 mcg at 04/30/25 1206        ALLERGIES:    Allergies   Allergen Reactions    Levaquin [Levofloxacin] Hives    Zocor [Simvastatin] Myalgia    Atorvastatin Other (See Comments)    Codeine GI Intolerance    Penicillins Hives and Swelling     Pt reports tongue swelling    Percocet [Oxycodone-Acetaminophen] Itching and Nausea And Vomiting        Social History     Socioeconomic History    Marital status:      Spouse name: Nabil   Tobacco Use    Smoking status: Former     Current packs/day: 1.00     Average  packs/day: 1 pack/day for 30.0 years (30.0 ttl pk-yrs)     Types: Cigarettes     Passive exposure: Past    Smokeless tobacco: Never   Vaping Use    Vaping status: Never Used   Substance and Sexual Activity    Alcohol use: Yes     Comment: 1 wine drink q 3-4 months    Drug use: Never    Sexual activity: Defer        Family History   Problem Relation Age of Onset    Hypertension Mother     Heart failure Mother     Heart attack Maternal Grandfather     Malig Hyperthermia Neg Hx       Objective   There were no vitals filed for this visit.        6/4/2025     1:01 PM   Current Status   ECOG score 1     Physical Exam  Constitutional:       Appearance: Normal appearance.   Cardiovascular:      Rate and Rhythm: Normal rate.      Heart sounds: Normal heart sounds.   Pulmonary:      Effort: Pulmonary effort is normal.      Comments: Mild expiratory wheeze TWYLA, otherwise lungs clear to auscultation.   Abdominal:      General: Bowel sounds are normal.      Palpations: Abdomen is soft.   Musculoskeletal:      Right lower leg: No edema.      Left lower leg: No edema.   Skin:     General: Skin is warm and dry.      Comments: Small opening at edge of port incision site.  No redness, warmth, drainage.   Neurological:      Mental Status: She is oriented to person, place, and time.   Psychiatric:         Mood and Affect: Mood normal.         Behavior: Behavior normal.           RECENT LABS:  Results from last 7 days   Lab Units 06/04/25  1311   WBC 10*3/mm3 6.77   NEUTROS ABS 10*3/mm3 4.45   HEMOGLOBIN g/dL 11.1*   HEMATOCRIT % 32.9*   PLATELETS 10*3/mm3 186     Results from last 7 days   Lab Units 06/04/25  1311   SODIUM mmol/L 136   POTASSIUM mmol/L 4.5   CHLORIDE mmol/L 98   CO2 mmol/L 27.0   BUN mg/dL 32.6*   CREATININE mg/dL 1.20*   CALCIUM mg/dL 9.1   ALBUMIN g/dL 3.7   BILIRUBIN mg/dL 0.3   ALK PHOS U/L 115   ALT (SGPT) U/L 26   AST (SGOT) U/L 36*   GLUCOSE mg/dL 91               Assessment & Plan    80 year-old female with a  orthopedic history as described in particular involving right hip, bilateral knees and bilateral rotator cuffs recently falling and sustaining a contusion involving her right hip and evidence of compression deformities T12 and L2.  Upon discharge she was seen by orthopedics and, unexpectedly, on additional back studies a left lung nodule was determined.    This was reviewed by pulmonary medicine at [facility] leading to a  a CT of the chest which demonstrated a 2.0 x 1.9 cm noncalcified nodule involving the posterior medial aspect left lower lobe abutting the pleura, more smoothly marginated 0.9 cm noncalcified right middle lobe nodule and a few additional smaller noncalcified nodules including 2 adjacent nodules measuring less than 0.4 cm the right middle lobe along with mild emphysema with mild to moderate subpleural and bibasilar scarring.  There is no pleural effusion or pneumothorax.    A PET/CT was obtained 2/6/2024 with a solid subpleural irregular nodule left lower lobe medially measuring 1.7 x 1.8 intensely hypermetabolic with SUV of 12.8, no additional nodular mass, had metabolic right superior mediastinal lymph node lateral measuring 0.9 cm to 3.9, diffuse lower lobe tracer uptake with mediastinal right hilar lymph nodes partially calcified thought to be inflammatory or granulomatous disease.  There is also low-grade tracer uptake associated subacute healing left anterior rib fractures.    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    The patient is now seen in CBC office.  There has not been assessment by surgery or, apparently, by pulmonary medicine formally though the studies above are available for review.  After discussion we will need to have her presented in the thoracic conference so that opinions from radiology, thoracic surgery radiation therapy as well as pathology  can be obtained.    Patient's case discussed during thoracic conference with the finding of a right supraclavicular node.  This was unknown to us when seen her initially in consultation.  Request made to obtain ultrasound-guided biopsy of the right supraclavicular node and this is discussed with the patient's daughter who indicates the patient would be willing to proceed.  We will go and have this scheduled even though she is to be seen within the next week and the result may not yet be available.    3/6/2023 FNA obtained, discussed with IR physician Dr. Courtney with findings of a few atypical clusters present suspicious for involvement by non-small cell carcinoma.  He is offered to rebiopsy quickly if necessary.    The patient is seen 3/13/2024 with rebiopsy requested as well as radiation therapy consultation.  The case is discussed with interventional radiology and a repeat biopsy is possible.  The patient is willing to proceed in this fashion and we have also discussed radiation therapy consultation in the interval.  She continues have symptoms of chronic back pain and left lid lesion that is going to be reviewed by ophthalmology.    This was discussed 3/21/2024 recognizing the patient's frailty.  Options for treating the areas that are known positive including the left lower lobe and right supraclavicular node would include radiation therapy and Dr. Hopkins will see the patient concerning this as we also follow her for consideration of systemic therapy including a [id number] considering the genomic studies done thus far and the inability to obtain additional genomics from the scant tissue that we have even from recent biopsy.  Finally the patient's overall status including an older adult assessment is going to be requested.    She is seen back 4/11/2024.  She had been seen 4/8/2024 with SBRT to left lower lobe and right neck lesion.  This has not been completed and she is to follow-up with radiation therapy mid  July.  We have discussed her findings today with the patient seen along with her granddaughter.  She did well with radiation therapy and has no additional symptoms currently respiratorily.    Her genomic testing suggested possible use of Selumetinib (not currently available) though we will follow this as she is subsequently reevaluated.    A follow-up CT series was obtained 7/1/2024.  This reveals a slight decrease in the hypermetabolic right supraclavicular node measuring 0.9 x 0.7 cm previously 1.2 x 1.1 cm.  In the chest there is decrease in the mass in the posterior medial aspect left lower lobe and evidence of radiation pneumonitis adjacently.  Margins are difficult to appreciate completely but maximal diameter is 2 cm previously 2.2 cm as compared to previous.  There is a pleural-based 1.0 x 1.6 cm nodule medial aspect of the right lower lobe which which was less conspicuous previously, multiple nodule opacities right middle lobe that are larger and a 1.3 cm density possibly mucous plugging otherwise seen.  These findings suggest a follow-up series of scans will still be necessary.    The patient is seen in office 7/12/2024 with a reasonably stable performance status.  At this point no additional intervention is necessary but a follow-up scan is requested.    The patient required admission 10/7 - 9/20/2024 after mechanical fall with T12-L2 compression fractures treated conservatively.      The patient's follow-up exams obtained 10/4/2024.  She is seen back 10/11/2024 and there is serial decrease in the small right supraclavicular node when compared to previous now down to 7 x 5 mm, evidence of consolidation left lower lobe with tumor developed likely seen measuring 16 x 2 cm, 2 right middle lobe nodules-19 x 13 mm previously 15 x 10, adjacent 9 mm previously 4 to 5 mm and unchanged third right middle lobe noncalcified nodule, other nodule stable and there is no evidence of abdominal/pelvic or osseous  "metastasis.    The patient required admission 10/7 - 9/20/2024 after mechanical fall with T12-L2 compression fractures treated conservatively.    The patient's follow-up exams obtained 10/4/2024.  She is seen back 10/11/2024 and there is serial decrease in the small right supraclavicular node when compared to previous now down to 7 x 5 mm, evidence of consolidation left lower lobe with tumor developed likely seen measuring 16 x 2 cm, 2 right middle lobe nodules-19 x 13 mm previously 15 x 10, adjacent 9 mm previously 4 to 5 mm and unchanged third right middle lobe noncalcified nodule, other nodule stable and there is no evidence of abdominal/pelvic or osseous metastasis.    The patient is seen 10/11/2024 slowly recovering from the mechanical fall described above.  She does not require additional treatment at this point after review of the scans with follow-up exams again are anticipated and she is willing to proceed.     The patient is seen 4/9/2025 and evaluated by CT of soft tissue neck, chest, abdomen, pelvis performed 4/2 2025.  There is now seen increase in the right middle lobe cavitary thick-walled nodule measuring 3.2 x 2.6 compared to previous 2.1 x 1.4.  Other sites are stable and no additional lesions have developed.      The patient is seen with her daughter both indicating that she is still able to carry out daily activities without severe difficulty though she \"has slowed up a bit\".  He does have back pain periodically that improves with rest and does not notice worsening respiratory symptoms.  She may well have disease that would be amenable to local therapy and it is requested that she undergo a PET/CT.    PET/CT demonstrates left lower lobe radiation changes, right middle lobe hypermetabolic 3.1 x 2.3 central cavitary mass that is larger from previous, hypermetabolic right upper lobe 1 cm solid nodule unchanged adjacent right middle lobe 9 mm solid nodule as well as hypermetabolic right hilar and low " paratracheal lymphadenopathy concerning for osiel metastatic disease.    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  Radiation therapy would not be expected to control this disease and systemic therapy is going to be necessary.  Considering the tissue type she would be a candidate for Alimta single agent at this point.  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    We have proceeded to have the patient's tumor further assessed by Caris examination with insufficient tumor in the block.  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   it was recognized the patient's clearance is somewhat marginal and her dose was adjusted down by 25%.  She underwent port placement 5/1/2025 and is seen back in office 5/7/2025 to continue with therapy.  She is agreeable to do so.     Hospitalization 5/11/2025 through 5/18/2025 to acute viral norovirus gastroenteritis.  Profound pancytopenia related to both viral infection and chemotherapy despite dose reduction.    Patient seen for hospital follow-up 5/21/2025 with improvement in pancytopenia.  Now with chest congestion productive purulent sputum.  We will treat with azithromycin as well as Tessalon Perles.  The patient will return in 1 week for MD follow-up and consideration of resumption of chemotherapy with further dose reduction.    The patient went on to start a Z-Denny and Tessalon and is seen back in office 5/28/2025, unfortunately, not improved enough to proceed with chemotherapy.    She returns today 6/4/2025 in anticipation of Alimta which has been delayed several weeks due to admission, ongoing generalized weakness, cough, and cytopenias. She reports she is back to her baseline and feeling  much better. Appetite has improved and cough has nearly resolved. WBC 6.77, hemoglobin 11.1, platelets 186,000. Unfortunately, BUN is eleavted to 32.6 and creatinine is up to 1.2 today. Creatinine clearance is 32.8mL/min and therefore we will postpone Alimta. Proceed with 500mL NS bolus today and patient will return to clinic in 1 week for NP or MD visit, Alimta with labs per protocol. Encouraged her to increase hydration.    She returns today for reconsideration of Alimta.  This was previously held for norovirus and then decreased for creatinine clearance.  She states that she feels well.  Respiratory symptoms have completely resolved.  No signs of infection.  She endorses drinking much water as she can tolerate.  Her creatinine clearance is 44.  Her port incision site has a small open area less than 1 cm.  No drainage, no redness, no warmth.  Discussed with Dr. Mann and pharmacy.  Proceeded with 37.5% pemetrexed today.    PLAN:  Proceed with Alimta today (Alimta has been further dose reduced by 50% from previous dose.patient ultimately getting 37.5 percent of original dose.  Adjustment reflected in treatment plan)  Return to clinic in 3 week for MD or NP visit, Alimta, cbc and cmp.  Instructed to reach out sooner with any concerns.     I spent 41 minutes caring for Ada on this date of service. This time includes time spent by me in the following activities: preparing for the visit, reviewing tests, performing a medically appropriate examination and/or evaluation, counseling and educating the patient/family/caregiver, ordering medications, tests, or procedures, referring and communicating with other health care professionals, documenting information in the medical record, independently interpreting results and communicating that information with the patient/family/caregiver, and care coordination.     Nadia Garza, BLAKE  06/11/2025

## 2025-06-11 ENCOUNTER — INFUSION (OUTPATIENT)
Dept: ONCOLOGY | Facility: HOSPITAL | Age: 80
End: 2025-06-11
Payer: MEDICARE

## 2025-06-11 ENCOUNTER — OFFICE VISIT (OUTPATIENT)
Dept: ONCOLOGY | Facility: CLINIC | Age: 80
End: 2025-06-11
Payer: MEDICARE

## 2025-06-11 ENCOUNTER — CLINICAL SUPPORT (OUTPATIENT)
Dept: OTHER | Facility: HOSPITAL | Age: 80
End: 2025-06-11
Payer: MEDICARE

## 2025-06-11 VITALS
BODY MASS INDEX: 25.59 KG/M2 | RESPIRATION RATE: 17 BRPM | WEIGHT: 144.4 LBS | DIASTOLIC BLOOD PRESSURE: 70 MMHG | OXYGEN SATURATION: 97 % | TEMPERATURE: 97.8 F | SYSTOLIC BLOOD PRESSURE: 133 MMHG | HEIGHT: 63 IN | HEART RATE: 88 BPM

## 2025-06-11 DIAGNOSIS — C34.92 NSCLC OF LEFT LUNG: Primary | ICD-10-CM

## 2025-06-11 DIAGNOSIS — Z79.899 HIGH RISK MEDICATION USE: ICD-10-CM

## 2025-06-11 LAB
ALBUMIN SERPL-MCNC: 3.7 G/DL (ref 3.5–5.2)
ALBUMIN/GLOB SERPL: 1 G/DL
ALP SERPL-CCNC: 120 U/L (ref 39–117)
ALT SERPL W P-5'-P-CCNC: 28 U/L (ref 1–33)
ANION GAP SERPL CALCULATED.3IONS-SCNC: 13.4 MMOL/L (ref 5–15)
AST SERPL-CCNC: 33 U/L (ref 1–32)
BASOPHILS # BLD AUTO: 0.01 10*3/MM3 (ref 0–0.2)
BASOPHILS NFR BLD AUTO: 0.1 % (ref 0–1.5)
BILIRUB SERPL-MCNC: 0.7 MG/DL (ref 0–1.2)
BUN SERPL-MCNC: 22.1 MG/DL (ref 8–23)
BUN/CREAT SERPL: 21.7 (ref 7–25)
CALCIUM SPEC-SCNC: 9.3 MG/DL (ref 8.6–10.5)
CHLORIDE SERPL-SCNC: 98 MMOL/L (ref 98–107)
CO2 SERPL-SCNC: 25.6 MMOL/L (ref 22–29)
CREAT SERPL-MCNC: 1.02 MG/DL (ref 0.57–1)
DEPRECATED RDW RBC AUTO: 51.4 FL (ref 37–54)
EGFRCR SERPLBLD CKD-EPI 2021: 55 ML/MIN/1.73
EOSINOPHIL # BLD AUTO: 0 10*3/MM3 (ref 0–0.4)
EOSINOPHIL NFR BLD AUTO: 0 % (ref 0.3–6.2)
ERYTHROCYTE [DISTWIDTH] IN BLOOD BY AUTOMATED COUNT: 13.7 % (ref 12.3–15.4)
GLOBULIN UR ELPH-MCNC: 3.6 GM/DL
GLUCOSE SERPL-MCNC: 118 MG/DL (ref 65–99)
HCT VFR BLD AUTO: 33.2 % (ref 34–46.6)
HGB BLD-MCNC: 11.1 G/DL (ref 12–15.9)
IMM GRANULOCYTES # BLD AUTO: 0.08 10*3/MM3 (ref 0–0.05)
IMM GRANULOCYTES NFR BLD AUTO: 0.9 % (ref 0–0.5)
LYMPHOCYTES # BLD AUTO: 0.82 10*3/MM3 (ref 0.7–3.1)
LYMPHOCYTES NFR BLD AUTO: 9.3 % (ref 19.6–45.3)
MCH RBC QN AUTO: 34.6 PG (ref 26.6–33)
MCHC RBC AUTO-ENTMCNC: 33.4 G/DL (ref 31.5–35.7)
MCV RBC AUTO: 103.4 FL (ref 79–97)
MONOCYTES # BLD AUTO: 0.62 10*3/MM3 (ref 0.1–0.9)
MONOCYTES NFR BLD AUTO: 7 % (ref 5–12)
NEUTROPHILS NFR BLD AUTO: 7.27 10*3/MM3 (ref 1.7–7)
NEUTROPHILS NFR BLD AUTO: 82.7 % (ref 42.7–76)
NRBC BLD AUTO-RTO: 0 /100 WBC (ref 0–0.2)
PLATELET # BLD AUTO: 122 10*3/MM3 (ref 140–450)
PMV BLD AUTO: 11.8 FL (ref 6–12)
POTASSIUM SERPL-SCNC: 4.6 MMOL/L (ref 3.5–5.2)
PROT SERPL-MCNC: 7.3 G/DL (ref 6–8.5)
RBC # BLD AUTO: 3.21 10*6/MM3 (ref 3.77–5.28)
SODIUM SERPL-SCNC: 137 MMOL/L (ref 136–145)
WBC NRBC COR # BLD AUTO: 8.8 10*3/MM3 (ref 3.4–10.8)

## 2025-06-11 PROCEDURE — 25010000002 PEMETREXED 100 MG RECONSTITUTED SOLUTION 1 EACH VIAL

## 2025-06-11 PROCEDURE — 85025 COMPLETE CBC W/AUTO DIFF WBC: CPT | Performed by: NURSE PRACTITIONER

## 2025-06-11 PROCEDURE — 96409 CHEMO IV PUSH SNGL DRUG: CPT

## 2025-06-11 PROCEDURE — 80053 COMPREHEN METABOLIC PANEL: CPT | Performed by: NURSE PRACTITIONER

## 2025-06-11 RX ADMIN — PEMETREXED DISODIUM 300 MG: 100 INJECTION, POWDER, LYOPHILIZED, FOR SOLUTION INTRAVENOUS at 13:21

## 2025-06-11 NOTE — PROGRESS NOTES
OUTPATIENT ONCOLOGY NUTRITION ASSESSMENT    Patient Name: Kenia BRIAN Sample  YOB: 1945  MRN: [id number]  Assessment Date: 6/11/2025    COMMENTS:  Follow up with patient in infusion today. Receiving Alimta today with further reductions.    She reports that she is doing okay. Her  fell and broke his hip and shoulder and is in rehab. She is the main caregiver and is worried about how she will manage when he comes home. Her daughter was also just diagnosed with bladder cancer so the patient has been under a lot of stress. Her weight has been stable. She reports she has been trying to eat and her family is helping out.   Will continue to follow and encourage intake.              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   Treatment Plan Immunotherapy     Medical/Surgical History Past Medical History:   Diagnosis Date    Anesthesia complication     slow to wake up    Anginal pain     Arthritis     Cancer     Lung    Gastric reflux syndrome     Gout     Hiatal hernia     High cholesterol     History of bronchitis     Hyperlipidemia     Hypertension     Irregular heart beat     Mild aortic valve stenosis     per echo 6/2023    PONV (postoperative nausea and vomiting)     RLS (restless legs syndrome)        Past Surgical History:   Procedure Laterality Date    APPENDECTOMY      CARDIAC CATHETERIZATION      EXTERNAL EAR SURGERY Right     had tumor excised behind right ear lobe benign    EYE LESION EXCISION Left 6/6/2024    Procedure: LEFT LOWER EYELID BASEL CELL CARCINOMA EXCISIONAL AND REPAIR WITH FROZEN SECTIONS,;  Surgeon: Austin Kamara MD;  Location: [facility] MAIN OR;  Service: Ophthalmology;  Laterality: Left;    FEMUR IM NAILING/RODDING Right 06/15/2022    Procedure: RIGHT HIP INTRAMEDULLARY NAILING/RODDING;  Surgeon: Marc Crawford MD;  Location[facility] MAIN OR;  Service: Orthopedics;  Laterality: Right;     "GALLBLADDER SURGERY      HYSTERECTOMY      KNEE ARTHROPLASTY Right     KNEE ARTHROSCOPY Right     [place] REVJ TOTAL KNEE ARTHRP W/WO ALGRFT 1 COMPONENT Right 09/07/2016    Procedure: RT ILIOTIBIAL BAND RELEASE RT TOTAL KNEE POLY EXCHANGE;  Surgeon: Toy Adame MD;  Location: [facility] OR;  Service: Orthopedics    ROTATOR CUFF REPAIR Bilateral     US GUIDED LYMPH NODE BIOPSY  3/18/2024    VENOUS ACCESS DEVICE (PORT) INSERTION Right 5/1/2025    Procedure: INSERTION VENOUS ACCESS DEVICE;  Surgeon: Saloni Verdugo MD;  Location: [facility] OR;  Service: Vascular;  Laterality: Right;    WRIST MASS EXCISION Right                Anthropometrics        Current Height Ht Readings from Last 1 Encounters:   06/11/25 160 cm (62.99\")      Current Weight Wt Readings from Last 1 Encounters:   06/11/25 65.5 kg (144 lb 6.4 oz)      Weight History Wt Readings from Last 30 Encounters:   06/11/25 1133 65.5 kg (144 lb 6.4 oz)   06/04/25 1250 65 kg (143 lb 6.4 oz)   05/28/25 1030 64.5 kg (142 lb 3.2 oz)   05/21/25 1558 65.3 kg (144 lb)   05/16/25 0000 65.8 kg (145 lb 1 oz)   05/11/25 1620 61 kg (134 lb 8 oz)   05/11/25 1034 68 kg (150 lb)   05/07/25 1145 69.4 kg (152 lb 14.4 oz)   04/30/25 1124 70 kg (154 lb 6.4 oz)   04/29/25 1057 69.4 kg (153 lb)   04/21/25 1206 69 kg (152 lb 3.2 oz)   04/09/25 1336 68.9 kg (152 lb)   10/11/24 1346 68.2 kg (150 lb 6.4 oz)   10/07/24 2112 66.2 kg (146 lb)   10/08/24 1449 66.2 kg (146 lb)   07/12/24 0859 66.7 kg (147 lb)   07/12/24 0812 67 kg (147 lb 12.8 oz)   06/06/24 0713 66.1 kg (145 lb 12.8 oz)   05/30/24 0952 66.7 kg (147 lb)   04/11/24 1002 66.8 kg (147 lb 3.2 oz)   04/08/24 1028 67.2 kg (148 lb 3.2 oz)   03/21/24 0818 66.9 kg (147 lb 8 oz)   03/20/24 0953 68.1 kg (150 lb 3.2 oz)   03/18/24 0800 67.1 kg (148 lb)   03/13/24 0827 67.2 kg (148 lb 3.2 oz)   03/06/24 0701 65.8 kg (145 lb)   02/19/24 1454 66.1 kg (145 lb 11.2 oz)   12/04/23 0653 68.4 kg (150 lb 12.7 oz)   12/03/23 2145 63.5 kg " (140 lb)   06/15/22 0142 72 kg (158 lb 12.8 oz)   08/31/16 1528 74.4 kg (164 lb)          Medications           Current medications: Magnesium, albuterol sulfate HFA, allopurinol, azithromycin, benzonatate, dexAMETHasone, dicyclomine, famotidine, fluticasone, folic acid, lidocaine-prilocaine, methocarbamol, methylPREDNISolone, metoprolol tartrate, naloxone, ondansetron, ondansetron ODT, rOPINIRole, temazepam, traMADol, vitamin B-12, and vitamin D                 Labs       Pertinent Labs    Results from last 7 days   Lab Units 06/11/25  1116   SODIUM mmol/L 137   POTASSIUM mmol/L 4.6   CHLORIDE mmol/L 98   CO2 mmol/L 25.6   BUN mg/dL 22.1   CREATININE mg/dL 1.02*   CALCIUM mg/dL 9.3   BILIRUBIN mg/dL 0.7   ALK PHOS U/L 120*   ALT (SGPT) U/L 28   AST (SGOT) U/L 33*   GLUCOSE mg/dL 118*     Results from last 7 days   Lab Units 06/11/25  1116   HEMOGLOBIN g/dL 11.1*   HEMATOCRIT % 33.2*   WBC 10*3/mm3 8.80   ALBUMIN g/dL 3.7     Results from last 7 days   Lab Units 06/11/25  1116   PLATELETS 10*3/mm3 122*     COVID19   Date Value Ref Range Status   06/14/2022 Not Detected Not Detected - Ref. Range Final     Lab Results   Component Value Date    HGBA1C 5.20 05/14/2025            NUTRITION INTERVENTION / PLAN OF CARE        Monitor/Evaluation PO intake, Supplement intake, Pertinent labs, Weight, Symptoms, Follow up as needed       Electronically signed by:  Margaret Cantor RD, LD  06/11/25 14:34 EDT

## 2025-06-18 ENCOUNTER — TELEPHONE (OUTPATIENT)
Dept: ONCOLOGY | Facility: CLINIC | Age: 80
End: 2025-06-18
Payer: MEDICARE

## 2025-06-18 ENCOUNTER — LAB (OUTPATIENT)
Dept: LAB | Facility: HOSPITAL | Age: 80
End: 2025-06-18
Payer: MEDICARE

## 2025-06-18 ENCOUNTER — CLINICAL SUPPORT (OUTPATIENT)
Dept: ONCOLOGY | Facility: HOSPITAL | Age: 80
End: 2025-06-18
Payer: MEDICARE

## 2025-06-18 DIAGNOSIS — C34.92 NSCLC OF LEFT LUNG: Primary | ICD-10-CM

## 2025-06-18 DIAGNOSIS — Z79.899 HIGH RISK MEDICATION USE: ICD-10-CM

## 2025-06-18 DIAGNOSIS — C34.92 NSCLC OF LEFT LUNG: ICD-10-CM

## 2025-06-18 LAB
ALBUMIN SERPL-MCNC: 3.5 G/DL (ref 3.5–5.2)
ALBUMIN/GLOB SERPL: 1 G/DL
ALP SERPL-CCNC: 121 U/L (ref 39–117)
ALT SERPL W P-5'-P-CCNC: 29 U/L (ref 1–33)
ANION GAP SERPL CALCULATED.3IONS-SCNC: 16.2 MMOL/L (ref 5–15)
AST SERPL-CCNC: 49 U/L (ref 1–32)
BASOPHILS # BLD AUTO: 0.01 10*3/MM3 (ref 0–0.2)
BASOPHILS NFR BLD AUTO: 0.5 % (ref 0–1.5)
BILIRUB SERPL-MCNC: 1.2 MG/DL (ref 0–1.2)
BUN SERPL-MCNC: 37.5 MG/DL (ref 8–23)
BUN/CREAT SERPL: 20.9 (ref 7–25)
CALCIUM SPEC-SCNC: 7.9 MG/DL (ref 8.6–10.5)
CHLORIDE SERPL-SCNC: 102 MMOL/L (ref 98–107)
CO2 SERPL-SCNC: 21.8 MMOL/L (ref 22–29)
CREAT SERPL-MCNC: 1.79 MG/DL (ref 0.57–1)
DEPRECATED RDW RBC AUTO: 51.7 FL (ref 37–54)
EGFRCR SERPLBLD CKD-EPI 2021: 28 ML/MIN/1.73
EOSINOPHIL # BLD AUTO: 0.12 10*3/MM3 (ref 0–0.4)
EOSINOPHIL NFR BLD AUTO: 6.5 % (ref 0.3–6.2)
ERYTHROCYTE [DISTWIDTH] IN BLOOD BY AUTOMATED COUNT: 13.7 % (ref 12.3–15.4)
GLOBULIN UR ELPH-MCNC: 3.4 GM/DL
GLUCOSE SERPL-MCNC: 104 MG/DL (ref 65–99)
HCT VFR BLD AUTO: 28.4 % (ref 34–46.6)
HGB BLD-MCNC: 9.4 G/DL (ref 12–15.9)
IMM GRANULOCYTES # BLD AUTO: 0.03 10*3/MM3 (ref 0–0.05)
IMM GRANULOCYTES NFR BLD AUTO: 1.6 % (ref 0–0.5)
LYMPHOCYTES # BLD AUTO: 0.56 10*3/MM3 (ref 0.7–3.1)
LYMPHOCYTES NFR BLD AUTO: 30.1 % (ref 19.6–45.3)
MCH RBC QN AUTO: 34.6 PG (ref 26.6–33)
MCHC RBC AUTO-ENTMCNC: 33.1 G/DL (ref 31.5–35.7)
MCV RBC AUTO: 104.4 FL (ref 79–97)
MONOCYTES # BLD AUTO: 0.08 10*3/MM3 (ref 0.1–0.9)
MONOCYTES NFR BLD AUTO: 4.3 % (ref 5–12)
NEUTROPHILS NFR BLD AUTO: 1.06 10*3/MM3 (ref 1.7–7)
NEUTROPHILS NFR BLD AUTO: 57 % (ref 42.7–76)
NRBC BLD AUTO-RTO: 1.1 /100 WBC (ref 0–0.2)
PLATELET # BLD AUTO: 23 10*3/MM3 (ref 140–450)
PMV BLD AUTO: 11.5 FL (ref 6–12)
POTASSIUM SERPL-SCNC: 4.6 MMOL/L (ref 3.5–5.2)
PROT SERPL-MCNC: 6.9 G/DL (ref 6–8.5)
RBC # BLD AUTO: 2.72 10*6/MM3 (ref 3.77–5.28)
SODIUM SERPL-SCNC: 140 MMOL/L (ref 136–145)
WBC NRBC COR # BLD AUTO: 1.86 10*3/MM3 (ref 3.4–10.8)

## 2025-06-18 PROCEDURE — 36415 COLL VENOUS BLD VENIPUNCTURE: CPT

## 2025-06-18 PROCEDURE — 85025 COMPLETE CBC W/AUTO DIFF WBC: CPT

## 2025-06-18 PROCEDURE — 80053 COMPREHEN METABOLIC PANEL: CPT

## 2025-06-18 NOTE — PROGRESS NOTES
Labs reviewed with Nadia Garza NP and Dr. Mann. It was decided that pt needs IVF tomorrow. Called and s/w pt's daughter Kaylie. She said pt has been feeling very fatigued and is hopeful IVF will help that as well. Pt to return tomorrow for IVF.

## 2025-06-18 NOTE — TELEPHONE ENCOUNTER
Caller: Kaylie Gutierrez    Relationship: Emergency Contact    Best call back number: [phone]    What is the best time to reach you: ANY    Who are you requesting to speak with (clinical staff, provider,  specific staff member): CLINICAL       What was the call regarding: MISSED CALL POSSIBLE LAB RESULTS       
See nursing note  
no chest pain, no cough, and no shortness of breath.

## 2025-06-19 ENCOUNTER — INFUSION (OUTPATIENT)
Dept: ONCOLOGY | Facility: HOSPITAL | Age: 80
End: 2025-06-19
Payer: MEDICARE

## 2025-06-19 ENCOUNTER — TELEPHONE (OUTPATIENT)
Dept: ONCOLOGY | Facility: CLINIC | Age: 80
End: 2025-06-19
Payer: MEDICARE

## 2025-06-19 ENCOUNTER — CLINICAL SUPPORT (OUTPATIENT)
Dept: OTHER | Facility: HOSPITAL | Age: 80
End: 2025-06-19
Payer: MEDICARE

## 2025-06-19 VITALS
OXYGEN SATURATION: 99 % | DIASTOLIC BLOOD PRESSURE: 61 MMHG | WEIGHT: 140.8 LBS | TEMPERATURE: 97.3 F | SYSTOLIC BLOOD PRESSURE: 91 MMHG | BODY MASS INDEX: 24.95 KG/M2 | RESPIRATION RATE: 16 BRPM | HEART RATE: 69 BPM

## 2025-06-19 DIAGNOSIS — Z79.899 HIGH RISK MEDICATION USE: ICD-10-CM

## 2025-06-19 DIAGNOSIS — C34.92 NSCLC OF LEFT LUNG: Primary | ICD-10-CM

## 2025-06-19 DIAGNOSIS — N17.9 AKI (ACUTE KIDNEY INJURY): ICD-10-CM

## 2025-06-19 PROCEDURE — 96374 THER/PROPH/DIAG INJ IV PUSH: CPT

## 2025-06-19 PROCEDURE — 96361 HYDRATE IV INFUSION ADD-ON: CPT

## 2025-06-19 PROCEDURE — 25810000003 SODIUM CHLORIDE 0.9 % SOLUTION: Performed by: INTERNAL MEDICINE

## 2025-06-19 PROCEDURE — 25010000002 ONDANSETRON PER 1 MG: Performed by: NURSE PRACTITIONER

## 2025-06-19 RX ORDER — ONDANSETRON 2 MG/ML
4 INJECTION INTRAMUSCULAR; INTRAVENOUS ONCE
Status: COMPLETED | OUTPATIENT
Start: 2025-06-19 | End: 2025-06-19

## 2025-06-19 RX ORDER — NEOMYCIN/BACITRACIN/POLYMYXINB 3.5-400-5K
1 OINTMENT (GRAM) TOPICAL 3 TIMES DAILY
Qty: 28 G | Refills: 1 | Status: SHIPPED | OUTPATIENT
Start: 2025-06-19

## 2025-06-19 RX ADMIN — ONDANSETRON 4 MG: 2 INJECTION, SOLUTION INTRAMUSCULAR; INTRAVENOUS at 10:30

## 2025-06-19 RX ADMIN — SODIUM CHLORIDE 1000 ML: 9 INJECTION, SOLUTION INTRAVENOUS at 09:32

## 2025-06-19 NOTE — TELEPHONE ENCOUNTER
----- Message from Sobeida HIGGINBOTHAM sent at 6/19/2025 11:14 AM EDT -----  Regarding: FW: appt    ----- Message -----  From: Chelsey Waddell APRN  Sent: 6/19/2025  11:06 AM EDT  To: [facility]  Subject: appt                                             Please schedule with either Lesly or Nadia next week, Tuesday or Wednesday preferably, here at [facility] for stat CBC, CMP, NP follow-up and possible IV fluids.  Thank you.

## 2025-06-19 NOTE — PROGRESS NOTES
Results from last 7 days   Lab Units 06/18/25  1257   WBC 10*3/mm3 1.86*   NEUTROS ABS 10*3/mm3 1.06*   HEMOGLOBIN g/dL 9.4*   HEMATOCRIT % 28.4*   PLATELETS 10*3/mm3 23*     Results from last 7 days   Lab Units 06/18/25  1257   SODIUM mmol/L 140   POTASSIUM mmol/L 4.6   CHLORIDE mmol/L 102   CO2 mmol/L 21.8*   BUN mg/dL 37.5*   CREATININE mg/dL 1.79*   CALCIUM mg/dL 7.9*   ALBUMIN g/dL 3.5   BILIRUBIN mg/dL 1.2   ALK PHOS U/L 121*   ALT (SGPT) U/L 29   AST (SGOT) U/L 49*   GLUCOSE mg/dL 104*         Patient here, 6/19/2025, to receive IV fluids after kidney function resulted much worse yesterday as outlined above.  RN staff asked me to see the patient in the infusion room due to concern over slight opening of Mediport incision.  She does have a small amount of yellowish drainage from the medial aspect of the old port incision.  Slightly tender as well.  With her poor kidney function at this time do not want to give her oral antibiotics.  Discussed giving her triple antibiotic ointment to apply to the slightly opened area and to monitor.    Patient also has a worsening rash on her arms that is likely Alimta related.  She confirms that she has been taking Decadron twice a day day before day of and day after chemo as prescribed.    We discussed proceeding with IV fluids today, starting the antibiotic ointment, monitoring the rash and returning in 1 week for NP follow-up with stat labs and possible additional IV fluids.  With her multitude of complications it is unclear if she will be able to continue Alimta.  She is scheduled to see Dr. Mann 7-25 for follow-up prior to any further treatment.

## 2025-06-19 NOTE — NURSING NOTE
Pt presents to clinic today for IVF's. Her kidney function was a bit elevated at her RN review yesterday. She's also struggling with nausea and vomiting. She reports new generalized rash that itches, and her port site is  a little bit with some yellow coloring in between. She states it is sore to the touch. I had BLAKE Lentz come and assess her. NP is going to call in some antibiotic ointment for her port site. Will bring pt in next week for an RN review to make sure she is improving. Rosina will send message to scheduling. Message sent to MD to make him aware of pts side effects.

## 2025-06-19 NOTE — PROGRESS NOTES
OUTPATIENT ONCOLOGY NUTRITION ASSESSMENT    Patient Name: Kenia BRIAN Sample  YOB: 1945  MRN: [id number]  Assessment Date: 6/19/2025    COMMENTS:  Follow up with patient in infusion today. Receiving IVF today. Being seen by APRN due to port site.    She reports that she is feeling bad. She has a painful rash and has been having dry heaves.  Not eating much at all.    Her  is not doing well.  Her daughter was also just diagnosed with bladder cancer and starts her treatments today at .   Labs and meds reviewed. Provided her with samples of biolyte to use at home for some extra hydration.   Will continue to follow and encourage intake.              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   Treatment Plan Immunotherapy     Medical/Surgical History Past Medical History:   Diagnosis Date    Anesthesia complication     slow to wake up    Anginal pain     Arthritis     Cancer     Lung    Gastric reflux syndrome     Gout     Hiatal hernia     High cholesterol     History of bronchitis     Hyperlipidemia     Hypertension     Irregular heart beat     Mild aortic valve stenosis     per echo 6/2023    PONV (postoperative nausea and vomiting)     RLS (restless legs syndrome)        Past Surgical History:   Procedure Laterality Date    APPENDECTOMY      CARDIAC CATHETERIZATION      EXTERNAL EAR SURGERY Right     had tumor excised behind right ear lobe benign    EYE LESION EXCISION Left 6/6/2024    Procedure: LEFT LOWER EYELID BASEL CELL CARCINOMA EXCISIONAL AND REPAIR WITH FROZEN SECTIONS,;  Surgeon: Austin Kamara MD;  Location: [facility] MAIN OR;  Service: Ophthalmology;  Laterality: Left;    FEMUR IM NAILING/RODDING Right 06/15/2022    Procedure: RIGHT HIP INTRAMEDULLARY NAILING/RODDING;  Surgeon: Marc Crawford MD;  Location: [facility] MAIN OR;  Service: Orthopedics;  Laterality: Right;    GALLBLADDER SURGERY      HYSTERECTOMY      KNEE  "ARTHROPLASTY Right     KNEE ARTHROSCOPY Right     WA REVJ TOTAL KNEE ARTHRP W/WO ALGRFT 1 COMPONENT Right 09/07/2016    Procedure: RT ILIOTIBIAL BAND RELEASE RT TOTAL KNEE POLY EXCHANGE;  Surgeon: Toy Adame MD;  Location: [facility];  Service: Orthopedics    ROTATOR CUFF REPAIR Bilateral     US GUIDED LYMPH NODE BIOPSY  3/18/2024    VENOUS ACCESS DEVICE (PORT) INSERTION Right 5/1/2025    Procedure: INSERTION VENOUS ACCESS DEVICE;  Surgeon: Saloni Verdugo MD;  Location: [facility] OR;  Service: Vascular;  Laterality: Right;    WRIST MASS EXCISION Right                Anthropometrics        Current Height Ht Readings from Last 1 Encounters:   06/11/25 160 cm (62.99\")      Current Weight Wt Readings from Last 1 Encounters:   06/19/25 63.9 kg (140 lb 12.8 oz)      Weight History Wt Readings from Last 30 Encounters:   06/19/25 0926 63.9 kg (140 lb 12.8 oz)   06/11/25 1133 65.5 kg (144 lb 6.4 oz)   06/04/25 1250 65 kg (143 lb 6.4 oz)   05/28/25 1030 64.5 kg (142 lb 3.2 oz)   05/21/25 1558 65.3 kg (144 lb)   05/16/25 0000 65.8 kg (145 lb 1 oz)   05/11/25 1620 61 kg (134 lb 8 oz)   05/11/25 1034 68 kg (150 lb)   05/07/25 1145 69.4 kg (152 lb 14.4 oz)   04/30/25 1124 70 kg (154 lb 6.4 oz)   04/29/25 1057 69.4 kg (153 lb)   04/21/25 1206 69 kg (152 lb 3.2 oz)   04/09/25 1336 68.9 kg (152 lb)   10/11/24 1346 68.2 kg (150 lb 6.4 oz)   10/07/24 2112 66.2 kg (146 lb)   10/08/24 1449 66.2 kg (146 lb)   07/12/24 0859 66.7 kg (147 lb)   07/12/24 0812 67 kg (147 lb 12.8 oz)   06/06/24 0713 66.1 kg (145 lb 12.8 oz)   05/30/24 0952 66.7 kg (147 lb)   04/11/24 1002 66.8 kg (147 lb 3.2 oz)   04/08/24 1028 67.2 kg (148 lb 3.2 oz)   03/21/24 0818 66.9 kg (147 lb 8 oz)   03/20/24 0953 68.1 kg (150 lb 3.2 oz)   03/18/24 0800 67.1 kg (148 lb)   03/13/24 0827 67.2 kg (148 lb 3.2 oz)   03/06/24 0701 65.8 kg (145 lb)   02/19/24 1454 66.1 kg (145 lb 11.2 oz)   12/04/23 0653 68.4 kg (150 lb 12.7 oz)   12/03/23 2145 63.5 kg (140 lb) "   06/15/22 0142 72 kg (158 lb 12.8 oz)   08/31/16 1528 74.4 kg (164 lb)          Medications           Current medications: Magnesium, albuterol sulfate HFA, allopurinol, azithromycin, benzonatate, dexAMETHasone, dicyclomine, fluticasone, folic acid, lidocaine-prilocaine, methocarbamol, methylPREDNISolone, metoprolol tartrate, naloxone, neomycin-bacitracin-polymyxin, ondansetron, ondansetron ODT, rOPINIRole, temazepam, traMADol, vitamin B-12, and vitamin D                 Labs       Pertinent Labs    Results from last 7 days   Lab Units 06/18/25  1257   SODIUM mmol/L 140   POTASSIUM mmol/L 4.6   CHLORIDE mmol/L 102   CO2 mmol/L 21.8*   BUN mg/dL 37.5*   CREATININE mg/dL 1.79*   CALCIUM mg/dL 7.9*   BILIRUBIN mg/dL 1.2   ALK PHOS U/L 121*   ALT (SGPT) U/L 29   AST (SGOT) U/L 49*   GLUCOSE mg/dL 104*     Results from last 7 days   Lab Units 06/18/25  1257   HEMOGLOBIN g/dL 9.4*   HEMATOCRIT % 28.4*   WBC 10*3/mm3 1.86*   ALBUMIN g/dL 3.5     Results from last 7 days   Lab Units 06/18/25  1257   PLATELETS 10*3/mm3 23*     COVID19   Date Value Ref Range Status   06/14/2022 Not Detected Not Detected - Ref. Range Final     Lab Results   Component Value Date    HGBA1C 5.20 05/14/2025            NUTRITION INTERVENTION / PLAN OF CARE        Monitor/Evaluation PO intake, Supplement intake, Pertinent labs, Weight, Symptoms, Follow up as needed       Electronically signed by:  Margaret Cantor RD, LD  06/19/25 11:52 EDT

## 2025-06-20 NOTE — PROGRESS NOTES
REASON FOR FOLLOW-UP: Left lower lobe lung mass-squamous of carcinoma                               History of Present Illness    She returns today for reconsideration of Alimta.  This was previously held for norovirus and then decreased for creatinine clearance.  She states that she feels well.  Respiratory symptoms have completely resolved.  No signs of infection.  She endorses drinking much water as she can tolerate.  Her creatinine clearance is 44.  Her port incision site has a small open area less than 1 cm.  No drainage, no redness, no warmth.      ONCOLOGY HISTORY:  The patient is an 80-year-old female with below medical history recently admitted 12/4 - 6/20/2023 with a history of right intertrochanteric femoral fracture and osteoarthritis presenting from a fall at home.  She had a stress fracture left foot that affected her balance, chronic low back pain that is also worsened with a fall.  Upon presentation orthopedic surgery was consulted finding a right intramedullary nail fixation and healing without abnormal findings.  No additional surgery was planned though physical therapy continued.  Spinal films demonstrated compression deformity T12 and L2 but fortunately she did further improve and was able to be discharged.    The patient had follow-up with several physicians including orthopedic physicians with an eventual chest exam that revealed an unexpected nodularity.  She was then referred to pulmonary medicine 1/8/2024 with a history of smoking for 35 years quitting 8 years ago and indication that there was a lung nodule found incidentally on imaging for the spine.  Through the [employer] system a CT of the chest demonstrated a 2.0 x 1.9 cm noncalcified nodule involving the posterior medial aspect left lower lobe abutting the pleura, more smoothly marginated 0.9 cm noncalcified right middle lobe nodule and a few additional smaller noncalcified nodules including 2 adjacent nodules measuring less than 0.4 cm  the right middle lobe along with mild emphysema with mild to moderate subpleural and bibasilar scarring.  There is no pleural effusion or pneumothorax.    A PET/CT was obtained 2/6/2024 with a solid subpleural irregular nodule left lower lobe medially measuring 1.7 x 1.8 intensely hypermetabolic with SUV of 12.8, no additional nodular mass, had metabolic right superior mediastinal lymph node lateral measuring 0.9 cm to 3.9, diffuse lower lobe tracer uptake with mediastinal right hilar lymph nodes partially calcified thought to be inflammatory or granulomatous disease.  There is also low-grade tracer uptake associated subacute healing left anterior rib fractures.    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    The patient was referred urgently from [facility] for assessment.  It is not certain why she has not been seen in the [place] system though she now states that she wished to be seen here.     Patient's case discussed during thoracic conference with the finding of a right supraclavicular node.  This was unknown to us when seen her initially in consultation.  Request made to obtain ultrasound-guided biopsy of the right supraclavicular node and this is discussed with the patient's daughter who indicates the patient would be willing to proceed.  We will go and have this scheduled even though she is to be seen within the next week and the result may not yet be available.    3/6/2023 FNA obtained, discussed with IR physician Dr. Courtney with findings of a few atypical clusters present suspicious for involvement by non-small cell carcinoma.  He is offered to rebiopsy quickly if necessary.    The patient is seen 3/13/2024 with rebiopsy requested as well as radiation therapy consultation.  The case is discussed with interventional radiology and a repeat biopsy is possible.  The patient is  willing to proceed in this fashion and we have also discussed radiation therapy consultation in the interval.  She continues have symptoms of chronic back pain and left lid lesion that is going to be reviewed by ophthalmology.    The patient went on to have a follow-up exam with repeat right supraclavicular lymph node biopsy 3/18/2024.  This was found positive for metastatic carcinoma.  The patient was seen by Dr. Hopkins with the conclusion that she has a 2 cm left lower lobe squamous cell carcinoma abutting the pleura additional contralateral supraclavicular lymph node in the thyroid.  This is to be discussed in conference 3/21/2024.    This was discussed 3/21/2024 recognizing the patient's frailty.  Options for treating the areas that are known positive including the left lower lobe and right supraclavicular node would include radiation therapy and Dr. Hopkins will see the patient concerning this as we also follow her for consideration of systemic therapy including a [id number] considering the genomic studies done thus far and the inability to obtain additional genomics from the scant tissue that we have even from recent biopsy.  Finally the patient's overall status including an older adult assessment is going to be requested.    The patient's [id number] revealed NF1  detected with possible indication of Selumetinib effectiveness.  Additionally TMB was 10.4 mutations per megabase.  Concurrently the patient was seen by radiation therapy treating the left lower lobe and right neck site via SBRT.  Further MRI of the brain revealed no evidence of metastatic disease.    She is seen back 4/11/2024.  She had been seen 4/8/2024 with SBRT to left lower lobe and right neck lesion.  This has not been completed and she is to follow-up with radiation therapy mid July.  We have discussed her findings today with the patient seen along with her granddaughter.  She did well with radiation therapy and has no additional symptoms  currently respiratorily.  Her genomic testing suggested possible use of Selumetinib (not currently available) though we will follow this as she is subsequently reevaluated.    A follow-up CT series was obtained 7/1/2024.  This reveals a slight decrease in the hypermetabolic right supraclavicular node measuring 0.9 x 0.7 cm previously 1.2 x 1.1 cm.  In the chest there is decrease in the mass in the posterior medial aspect left lower lobe and evidence of radiation pneumonitis adjacently.  Margins are difficult to appreciate completely but maximal diameter is 2 cm previously 2.2 cm as compared to previous.  There is a pleural-based 1.0 x 1.6 cm nodule medial aspect of the right lower lobe which which was less conspicuous previously, multiple nodule opacities right middle lobe that are larger and a 1.3 cm density possibly mucous plugging otherwise seen.  These findings suggest a follow-up series of scans will still be necessary.    The patient is seen with her daughter and they both agree with this follow-up plan.    The patient required admission 10/7 - 9/20/2024 after mechanical fall with T12-L2 compression fractures treated conservatively.    The patient's follow-up exams obtained 10/4/2024.  She is seen back 10/11/2024 and there is serial decrease in the small right supraclavicular node when compared to previous now down to 7 x 5 mm, evidence of consolidation left lower lobe with tumor developed likely seen measuring 16 x 2 cm, 2 right middle lobe nodules-19 x 13 mm previously 15 x 10, adjacent 9 mm previously 4 to 5 mm and unchanged third right middle lobe noncalcified nodule, other nodule stable and there is no evidence of abdominal/pelvic or osseous metastasis.    The patient is seen 10/11/2024 slowly recovering from the mechanical fall described above.  She does not require additional treatment at this point after review of the scans with follow-up exams again are anticipated and she is willing to  "proceed.    The patient is seen 4/9/2025 and evaluated by CT of soft tissue neck, chest, abdomen, pelvis performed 4/2 2025.  There is now seen increase in the right middle lobe cavitary thick-walled nodule measuring 3.2 x 2.6 compared to previous 2.1 x 1.4.  Other sites are stable and no additional lesions have developed.  The patient is seen with her daughter both indicating that she is still able to carry out daily activities without severe difficulty though she \"has slowed up a bit\".  He does have back pain periodically that improves with rest and does not notice worsening respiratory symptoms.  She may well have disease that would be amenable to local therapy and it is requested that she undergo a PET/CT.    PET/CT demonstrates left lower lobe radiation changes, right middle lobe hypermetabolic 3.1 x 2.3 central cavitary mass that is larger from previous, hypermetabolic right upper lobe 1 cm solid nodule unchanged adjacent right middle lobe 9 mm solid nodule as well as hypermetabolic right hilar and low paratracheal lymphadenopathy concerning for osiel metastatic disease.    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  Radiation therapy would not be expected to control this disease and systemic therapy is going to be necessary.  Considering the tissue type she would be a candidate for Alimta single agent at this point.    We have proceeded to have the patient's tumor further assessed by Caris examination with insufficient tumor in the block.  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   it was recognized the patient's clearance is somewhat marginal and her dose was adjusted down by 25%.  She underwent port placement 5/1/2025 and is seen back in office 5/7/2025 to continue with therapy.  She is agreeable to do so.     Unfortunately, she was recently admitted 5/11/2025 through " 5/18/2025 secondary to acute norovirus gastroenteritis.  She had associated pancytopenia.  She struggled with severe abdominal pain, nausea vomiting and diarrhea.  She received G-CSF support due to neutropenia.        Since discharge, the patient reports she has continued to struggle with diarrhea which finally improved just yesterday.  She is now developed productive cough with purulent sputum and thick drainage.  She has no fevers or chills.  She was treated subsequently with a Z-Denny, Tessalon Perles and then plans were made for follow-up 5/28/2025 and possible chemotherapy with dose reduction.    She is next seen, however, 5/28/2025 with continued cough and general weakness.  Appetite is slowly returning as is her stamina though she states she is having difficulty sleeping from the cough.  Finally, additionally, her cytopenias are not yet resolved enough to proceed with chemotherapy.    She returns today 6/4/2025 in anticipation of Alimta which has been delayed several weeks due to admission, ongoing generalized weakness, cough, and cytopenias. She reports she is back to her baseline and feeling much better. Appetite has improved and cough has nearly resolved. WBC 6.77, hemoglobin 11.1, platelets 186,000. Unfortunately, BUN is eleavted to 32.6 and creatinine is up to 1.2 today. Creatinine clearance is 32.8mL/min and therefore we will postpone Alimta. Proceed with 500mL NS bolus today and patient will return to clinic in 1 week for NP or MD visit, Alimta with labs per protocol. Encouraged her to increase hydration.     She returns 6/11/2025 for reconsideration of Alimta.  This was previously held for norovirus and then decreased for creatinine clearance.  She states that she feels well.  Respiratory symptoms have completely resolved.  No signs of infection.  She endorses drinking much water as she can tolerate.  Her creatinine clearance is 44.  Her port incision site has a small open area less than 1 cm.  No  drainage, no redness, no warmth.  Discussed with Dr. Mann and pharmacy.  Proceeded with 37.5% pemetrexed today.    Patient return to clinic on 6/18/2025 for lab review with RN. Patient's BUN at that time was 37.5, creatinine 1.79.  Patient was brought in the next day for IV fluids.  She is here today for repeat labs and evaluation.  She states that she has been able to increase her fluid intake greatly over the past week.  Her nausea has resolved since last visit.       Past Surgical History:   Procedure Laterality Date    APPENDECTOMY      CARDIAC CATHETERIZATION      EXTERNAL EAR SURGERY Right     had tumor excised behind right ear lobe benign    EYE LESION EXCISION Left 6/6/2024    Procedure: LEFT LOWER EYELID BASEL CELL CARCINOMA EXCISIONAL AND REPAIR WITH FROZEN SECTIONS,;  Surgeon: Austin Kamara MD;  Location: [facility] OR;  Service: Ophthalmology;  Laterality: Left;    FEMUR IM NAILING/RODDING Right 06/15/2022    Procedure: RIGHT HIP INTRAMEDULLARY NAILING/RODDING;  Surgeon: Marc Crawford MD;  Location: [facility];  Service: Orthopedics;  Laterality: Right;    GALLBLADDER SURGERY      HYSTERECTOMY      KNEE ARTHROPLASTY Right     KNEE ARTHROSCOPY Right     DE REVJ TOTAL KNEE ARTHRP W/WO ALGRFT 1 COMPONENT Right 09/07/2016    Procedure: RT ILIOTIBIAL BAND RELEASE RT TOTAL KNEE POLY EXCHANGE;  Surgeon: Toy Adame MD;  Location: [facility];  Service: Orthopedics    ROTATOR CUFF REPAIR Bilateral     US GUIDED LYMPH NODE BIOPSY  3/18/2024    VENOUS ACCESS DEVICE (PORT) INSERTION Right 5/1/2025    Procedure: INSERTION VENOUS ACCESS DEVICE;  Surgeon: Saloni Verdugo MD;  Location: [facility];  Service: Vascular;  Laterality: Right;    WRIST MASS EXCISION Right         Current Outpatient Medications on File Prior to Visit   Medication Sig Dispense Refill    albuterol (PROVENTIL HFA;VENTOLIN HFA) 108 (90 BASE) MCG/ACT inhaler Inhale 2 puffs Every 4 (Four) Hours As Needed. Indications:  Asthma      allopurinol (ZYLOPRIM) 100 MG tablet Take 1 tablet by mouth Daily.      azithromycin (Zithromax Z-Denny) 250 MG tablet Take 2 tablets by mouth on day 1, then 1 tablet daily on days 2-5 6 tablet 0    benzonatate (Tessalon Perles) 100 MG capsule Take 1 capsule by mouth 3 (Three) Times a Day As Needed for Cough. 45 capsule 1    dexAMETHasone (DECADRON) 4 MG tablet Take 1 tablet oral twice a day for 3 consecutive days beginning the day before chemotherapy and continue for 6 doses.Take with food. 30 tablet 2    dicyclomine (BENTYL) 20 MG tablet Take 1 tablet by mouth Every 6 (Six) Hours. 20 tablet 0    fluticasone (FLONASE) 50 MCG/ACT nasal spray Administer 2 sprays into the nostril(s) as directed by provider Daily.      folic acid (FOLVITE) 1 MG tablet Take 1 tablet by mouth Daily. Start at least 7 days prior to chemotherapy until at least 3 weeks after all chemotherapy. 30 tablet 6    lidocaine-prilocaine (EMLA) 2.5-2.5 % cream Apply 1 Application topically to the appropriate area as directed Every 2 (Two) Hours As Needed for Mild Pain. Apply a nickel size amount to port 30 minutes prior to access. 30 g 2    Magnesium 500 MG capsule Take 500 mg by mouth 2 (Two) Times a Day. Indications: Acid Indigestion      methocarbamol (ROBAXIN) 500 MG tablet Take 1 tablet by mouth Every 8 (Eight) Hours As Needed for Muscle Spasms (back pain). 30 tablet 0    methylPREDNISolone (MEDROL) 4 MG dose pack Take as directed on package instructions. 21 tablet 1    metoprolol tartrate (LOPRESSOR) 25 MG tablet Take 1 tablet by mouth 2 (Two) Times a Day. Indications: High Blood Pressure      naloxone (NARCAN) 4 MG/0.1ML nasal spray Call 911. Don't prime. [place] in 1 nostril for overdose. Repeat in 2-3 minutes in other nostril if no or minimal breathing/responsiveness. 2 each 0    neomycin-bacitracin-polymyxin (NEOSPORIN) 5-400-5000 ointment Apply 1 Application topically to the appropriate area as directed 3 (Three) Times a Day. 28 g 1     ondansetron (ZOFRAN) 8 MG tablet Take 1 tablet by mouth 3 (Three) Times a Day As Needed for Nausea or Vomiting. 30 tablet 5    ondansetron ODT (ZOFRAN-ODT) 4 MG disintegrating tablet Place 1 tablet on the tongue 4 (Four) Times a Day As Needed for Nausea. 20 tablet 0    rOPINIRole (REQUIP) 0.5 MG tablet Take 1 tablet by mouth Every Night.      temazepam (RESTORIL) 15 MG capsule Take 1 capsule by mouth At Night As Needed for Sleep.      traMADol (Ultram) 50 MG tablet Take 1 tablet by mouth Every 12 (Twelve) Hours As Needed for Severe Pain for up to 10 doses. 10 tablet 0    vitamin B-12 (CYANOCOBALAMIN) 1000 MCG tablet Take 1 tablet by mouth Daily. Indications: Inadequate Vitamin B12      vitamin D (ERGOCALCIFEROL) 1.25 MG (02329 UT) capsule capsule Take 1 capsule by mouth Every 7 (Seven) Days. 5 capsule 0     Current Facility-Administered Medications on File Prior to Visit   Medication Dose Route Frequency Provider Last Rate Last Admin    cyanocobalamin injection 1,000 mcg  1,000 mcg Intramuscular Q28 Days Gali Marshall APRN   1,000 mcg at 04/30/25 1206        ALLERGIES:    Allergies   Allergen Reactions    Levaquin [Levofloxacin] Hives    Zocor [Simvastatin] Myalgia    Atorvastatin Other (See Comments)    Codeine GI Intolerance    Penicillins Hives and Swelling     Pt reports tongue swelling    Percocet [Oxycodone-Acetaminophen] Itching and Nausea And Vomiting        Social History     Socioeconomic History    Marital status:      Spouse name: Nabil   Tobacco Use    Smoking status: Former     Current packs/day: 1.00     Average packs/day: 1 pack/day for 30.0 years (30.0 ttl pk-yrs)     Types: Cigarettes     Passive exposure: Past    Smokeless tobacco: Never   Vaping Use    Vaping status: Never Used   Substance and Sexual Activity    Alcohol use: Yes     Comment: 1 wine drink q 3-4 months    Drug use: Never    Sexual activity: Defer        Family History   Problem Relation Age of Onset     Hypertension Mother     Heart failure Mother     Heart attack Maternal Grandfather     Malig Hyperthermia Neg Hx       Objective   There were no vitals filed for this visit.        6/19/2025     9:26 AM   Current Status   ECOG score 1     Physical Exam  Constitutional:       Appearance: Normal appearance.   Cardiovascular:      Rate and Rhythm: Normal rate.      Heart sounds: Normal heart sounds.   Pulmonary:      Effort: Pulmonary effort is normal.   Abdominal:      General: Bowel sounds are normal.      Palpations: Abdomen is soft.   Musculoskeletal:      Right lower leg: No edema.      Left lower leg: No edema.   Skin:     General: Skin is warm and dry.      Comments: Small opening at edge of port incision site.  No redness, warmth, drainage.   Neurological:      Mental Status: She is oriented to person, place, and time.   Psychiatric:         Mood and Affect: Mood normal.         Behavior: Behavior normal.           RECENT LABS:  Results from last 7 days   Lab Units 06/18/25  1257   WBC 10*3/mm3 1.86*   NEUTROS ABS 10*3/mm3 1.06*   HEMOGLOBIN g/dL 9.4*   HEMATOCRIT % 28.4*   PLATELETS 10*3/mm3 23*     Results from last 7 days   Lab Units 06/18/25  1257   SODIUM mmol/L 140   POTASSIUM mmol/L 4.6   CHLORIDE mmol/L 102   CO2 mmol/L 21.8*   BUN mg/dL 37.5*   CREATININE mg/dL 1.79*   CALCIUM mg/dL 7.9*   ALBUMIN g/dL 3.5   BILIRUBIN mg/dL 1.2   ALK PHOS U/L 121*   ALT (SGPT) U/L 29   AST (SGOT) U/L 49*   GLUCOSE mg/dL 104*               Assessment & Plan    80 year-old female with a orthopedic history as described in particular involving right hip, bilateral knees and bilateral rotator cuffs recently falling and sustaining a contusion involving her right hip and evidence of compression deformities T12 and L2.  Upon discharge she was seen by orthopedics and, unexpectedly, on additional back studies a left lung nodule was determined.    This was reviewed by pulmonary medicine at [facility] leading to a  a CT of the chest  which demonstrated a 2.0 x 1.9 cm noncalcified nodule involving the posterior medial aspect left lower lobe abutting the pleura, more smoothly marginated 0.9 cm noncalcified right middle lobe nodule and a few additional smaller noncalcified nodules including 2 adjacent nodules measuring less than 0.4 cm the right middle lobe along with mild emphysema with mild to moderate subpleural and bibasilar scarring.  There is no pleural effusion or pneumothorax.    A PET/CT was obtained 2/6/2024 with a solid subpleural irregular nodule left lower lobe medially measuring 1.7 x 1.8 intensely hypermetabolic with SUV of 12.8, no additional nodular mass, had metabolic right superior mediastinal lymph node lateral measuring 0.9 cm to 3.9, diffuse lower lobe tracer uptake with mediastinal right hilar lymph nodes partially calcified thought to be inflammatory or granulomatous disease.  There is also low-grade tracer uptake associated subacute healing left anterior rib fractures.    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    The patient is now seen in CBC office.  There has not been assessment by surgery or, apparently, by pulmonary medicine formally though the studies above are available for review.  After discussion we will need to have her presented in the thoracic conference so that opinions from radiology, thoracic surgery radiation therapy as well as pathology can be obtained.    Patient's case discussed during thoracic conference with the finding of a right supraclavicular node.  This was unknown to us when seen her initially in consultation.  Request made to obtain ultrasound-guided biopsy of the right supraclavicular node and this is discussed with the patient's daughter who indicates the patient would be willing to proceed.  We will go and have this scheduled even though she is to be seen  within the next week and the result may not yet be available.    3/6/2023 FNA obtained, discussed with IR physician Dr. Courtney with findings of a few atypical clusters present suspicious for involvement by non-small cell carcinoma.  He is offered to rebiopsy quickly if necessary.    The patient is seen 3/13/2024 with rebiopsy requested as well as radiation therapy consultation.  The case is discussed with interventional radiology and a repeat biopsy is possible.  The patient is willing to proceed in this fashion and we have also discussed radiation therapy consultation in the interval.  She continues have symptoms of chronic back pain and left lid lesion that is going to be reviewed by ophthalmology.    This was discussed 3/21/2024 recognizing the patient's frailty.  Options for treating the areas that are known positive including the left lower lobe and right supraclavicular node would include radiation therapy and Dr. Hopkins will see the patient concerning this as we also follow her for consideration of systemic therapy including a [id number] considering the genomic studies done thus far and the inability to obtain additional genomics from the scant tissue that we have even from recent biopsy.  Finally the patient's overall status including an older adult assessment is going to be requested.    She is seen back 4/11/2024.  She had been seen 4/8/2024 with SBRT to left lower lobe and right neck lesion.  This has not been completed and she is to follow-up with radiation therapy mid July.  We have discussed her findings today with the patient seen along with her granddaughter.  She did well with radiation therapy and has no additional symptoms currently respiratorily.    Her genomic testing suggested possible use of Selumetinib (not currently available) though we will follow this as she is subsequently reevaluated.    A follow-up CT series was obtained 7/1/2024.  This reveals a slight decrease in the hypermetabolic  right supraclavicular node measuring 0.9 x 0.7 cm previously 1.2 x 1.1 cm.  In the chest there is decrease in the mass in the posterior medial aspect left lower lobe and evidence of radiation pneumonitis adjacently.  Margins are difficult to appreciate completely but maximal diameter is 2 cm previously 2.2 cm as compared to previous.  There is a pleural-based 1.0 x 1.6 cm nodule medial aspect of the right lower lobe which which was less conspicuous previously, multiple nodule opacities right middle lobe that are larger and a 1.3 cm density possibly mucous plugging otherwise seen.  These findings suggest a follow-up series of scans will still be necessary.    The patient is seen in office 7/12/2024 with a reasonably stable performance status.  At this point no additional intervention is necessary but a follow-up scan is requested.    The patient required admission 10/7 - 9/20/2024 after mechanical fall with T12-L2 compression fractures treated conservatively.      The patient's follow-up exams obtained 10/4/2024.  She is seen back 10/11/2024 and there is serial decrease in the small right supraclavicular node when compared to previous now down to 7 x 5 mm, evidence of consolidation left lower lobe with tumor developed likely seen measuring 16 x 2 cm, 2 right middle lobe nodules-19 x 13 mm previously 15 x 10, adjacent 9 mm previously 4 to 5 mm and unchanged third right middle lobe noncalcified nodule, other nodule stable and there is no evidence of abdominal/pelvic or osseous metastasis.    The patient required admission 10/7 - 9/20/2024 after mechanical fall with T12-L2 compression fractures treated conservatively.    The patient's follow-up exams obtained 10/4/2024.  She is seen back 10/11/2024 and there is serial decrease in the small right supraclavicular node when compared to previous now down to 7 x 5 mm, evidence of consolidation left lower lobe with tumor developed likely seen measuring 16 x 2 cm, 2 right  "middle lobe nodules-19 x 13 mm previously 15 x 10, adjacent 9 mm previously 4 to 5 mm and unchanged third right middle lobe noncalcified nodule, other nodule stable and there is no evidence of abdominal/pelvic or osseous metastasis.    The patient is seen 10/11/2024 slowly recovering from the mechanical fall described above.  She does not require additional treatment at this point after review of the scans with follow-up exams again are anticipated and she is willing to proceed.     The patient is seen 4/9/2025 and evaluated by CT of soft tissue neck, chest, abdomen, pelvis performed 4/2 2025.  There is now seen increase in the right middle lobe cavitary thick-walled nodule measuring 3.2 x 2.6 compared to previous 2.1 x 1.4.  Other sites are stable and no additional lesions have developed.      The patient is seen with her daughter both indicating that she is still able to carry out daily activities without severe difficulty though she \"has slowed up a bit\".  He does have back pain periodically that improves with rest and does not notice worsening respiratory symptoms.  She may well have disease that would be amenable to local therapy and it is requested that she undergo a PET/CT.    PET/CT demonstrates left lower lobe radiation changes, right middle lobe hypermetabolic 3.1 x 2.3 central cavitary mass that is larger from previous, hypermetabolic right upper lobe 1 cm solid nodule unchanged adjacent right middle lobe 9 mm solid nodule as well as hypermetabolic right hilar and low paratracheal lymphadenopathy concerning for osiel metastatic disease.    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  Radiation therapy would not be expected to control this disease and systemic therapy is going to be necessary.  Considering the tissue type she would be a candidate for Alimta single agent at this point.  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    We have proceeded to have the patient's tumor further assessed by Caris examination with insufficient tumor in the block.  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   it was recognized the patient's clearance is somewhat marginal and her dose was adjusted down by 25%.  She underwent port placement 5/1/2025 and is seen back in office 5/7/2025 to continue with therapy.  She is agreeable to do so.     Hospitalization 5/11/2025 through 5/18/2025 to acute viral norovirus gastroenteritis.  Profound pancytopenia related to both viral infection and chemotherapy despite dose reduction.    Patient seen for hospital follow-up 5/21/2025 with improvement in pancytopenia.  Now with chest congestion productive purulent sputum.  We will treat with azithromycin as well as Tessalon Perles.  The patient will return in 1 week for MD follow-up and consideration of resumption of chemotherapy with further dose reduction.    The patient went on to start a Z-Denny and Tessalon and is seen back in office 5/28/2025, unfortunately, not improved enough to proceed with chemotherapy.    She returns today 6/4/2025 in anticipation of Alimta which has been delayed several weeks due to admission, ongoing generalized weakness, cough, and cytopenias. She reports she is back to her baseline and feeling much better. Appetite has improved and cough has nearly resolved. WBC 6.77, hemoglobin 11.1, platelets 186,000. Unfortunately, BUN is eleavted to 32.6 and creatinine is up to 1.2 today. Creatinine clearance is 32.8mL/min and therefore we will postpone Alimta. Proceed with 500mL NS bolus today and patient will return to clinic in 1 week for NP or MD visit, Alimta with labs per protocol. Encouraged her to increase hydration.    She returns today  for reconsideration of Alimta.  This was previously held for norovirus and then decreased for creatinine clearance.  She states that she feels well.  Respiratory symptoms have completely resolved.  No signs of infection.  She endorses drinking much water as she can tolerate.  Her creatinine clearance is 44.  Her port incision site has a small open area less than 1 cm.  No drainage, no redness, no warmth.  Discussed with Dr. Mann and pharmacy.  Proceeded with 37.5% pemetrexed today.    Patient return to clinic on 6/18/2025 for lab review with RN. Patient's BUN at that time was 37.5, creatinine 1.79.  Patient was brought in the next day for IV fluids.  She is here today for repeat labs and evaluation.  She states that she has been able to increase her fluid intake greatly over the past week.  Her nausea has resolved since last visit. Today her BUN is 34.5 and creatinine 1.25..  Additional biolyte samples provided.  Urged patient to continue with increased fluid intake.  Patient to call if she develops any nausea/diarrhea in the meantime.    PLAN:  Alimta every 3 weeks due again on 7/2/2025 (Alimta has been further dose reduced by 50% from previous dose.patient ultimately getting 37.5 percent of original dose.  Adjustment reflected in treatment plan)  Return to clinic on 7/2/2025 week for MD or NP visit, Alimta, cbc and cmp.  Instructed to reach out sooner with any concerns.     This patient is on high risk drug therapy requiring intensive monitoring for toxicity.       BLAKE Valadez  06/23/2025

## 2025-06-23 ENCOUNTER — OFFICE VISIT (OUTPATIENT)
Dept: ONCOLOGY | Facility: CLINIC | Age: 80
End: 2025-06-23
Payer: MEDICARE

## 2025-06-23 ENCOUNTER — INFUSION (OUTPATIENT)
Dept: ONCOLOGY | Facility: HOSPITAL | Age: 80
End: 2025-06-23
Payer: MEDICARE

## 2025-06-23 VITALS
HEIGHT: 63 IN | OXYGEN SATURATION: 98 % | SYSTOLIC BLOOD PRESSURE: 94 MMHG | WEIGHT: 144 LBS | DIASTOLIC BLOOD PRESSURE: 61 MMHG | BODY MASS INDEX: 25.52 KG/M2 | TEMPERATURE: 97.6 F | HEART RATE: 69 BPM

## 2025-06-23 DIAGNOSIS — N17.9 AKI (ACUTE KIDNEY INJURY): ICD-10-CM

## 2025-06-23 DIAGNOSIS — N17.9 AKI (ACUTE KIDNEY INJURY): Primary | ICD-10-CM

## 2025-06-23 DIAGNOSIS — Z79.899 HIGH RISK MEDICATION USE: ICD-10-CM

## 2025-06-23 DIAGNOSIS — C34.92 NSCLC OF LEFT LUNG: ICD-10-CM

## 2025-06-23 DIAGNOSIS — Z45.2 FITTING AND ADJUSTMENT OF VASCULAR CATHETER: Primary | ICD-10-CM

## 2025-06-23 LAB
ALBUMIN SERPL-MCNC: 3.4 G/DL (ref 3.5–5.2)
ALBUMIN/GLOB SERPL: 1.1 G/DL
ALP SERPL-CCNC: 113 U/L (ref 39–117)
ALT SERPL W P-5'-P-CCNC: 44 U/L (ref 1–33)
ANION GAP SERPL CALCULATED.3IONS-SCNC: 13.7 MMOL/L (ref 5–15)
AST SERPL-CCNC: 46 U/L (ref 1–32)
BASOPHILS # BLD AUTO: 0 10*3/MM3 (ref 0–0.2)
BASOPHILS NFR BLD AUTO: 0 % (ref 0–1.5)
BILIRUB SERPL-MCNC: 0.6 MG/DL (ref 0–1.2)
BUN SERPL-MCNC: 34.5 MG/DL (ref 8–23)
BUN/CREAT SERPL: 27.6 (ref 7–25)
CALCIUM SPEC-SCNC: 7.6 MG/DL (ref 8.6–10.5)
CHLORIDE SERPL-SCNC: 106 MMOL/L (ref 98–107)
CO2 SERPL-SCNC: 20.3 MMOL/L (ref 22–29)
CREAT SERPL-MCNC: 1.25 MG/DL (ref 0.57–1)
DEPRECATED RDW RBC AUTO: 49 FL (ref 37–54)
EGFRCR SERPLBLD CKD-EPI 2021: 43.1 ML/MIN/1.73
EOSINOPHIL # BLD AUTO: 0.15 10*3/MM3 (ref 0–0.4)
EOSINOPHIL NFR BLD AUTO: 10.3 % (ref 0.3–6.2)
ERYTHROCYTE [DISTWIDTH] IN BLOOD BY AUTOMATED COUNT: 13.1 % (ref 12.3–15.4)
GLOBULIN UR ELPH-MCNC: 3.2 GM/DL
GLUCOSE SERPL-MCNC: 80 MG/DL (ref 65–99)
HCT VFR BLD AUTO: 27.5 % (ref 34–46.6)
HGB BLD-MCNC: 9.2 G/DL (ref 12–15.9)
IMM GRANULOCYTES # BLD AUTO: 0.01 10*3/MM3 (ref 0–0.05)
IMM GRANULOCYTES NFR BLD AUTO: 0.7 % (ref 0–0.5)
LYMPHOCYTES # BLD AUTO: 0.97 10*3/MM3 (ref 0.7–3.1)
LYMPHOCYTES NFR BLD AUTO: 66.4 % (ref 19.6–45.3)
MCH RBC QN AUTO: 34.6 PG (ref 26.6–33)
MCHC RBC AUTO-ENTMCNC: 33.5 G/DL (ref 31.5–35.7)
MCV RBC AUTO: 103.4 FL (ref 79–97)
MONOCYTES # BLD AUTO: 0.03 10*3/MM3 (ref 0.1–0.9)
MONOCYTES NFR BLD AUTO: 2.1 % (ref 5–12)
NEUTROPHILS NFR BLD AUTO: 0.3 10*3/MM3 (ref 1.7–7)
NEUTROPHILS NFR BLD AUTO: 20.5 % (ref 42.7–76)
NRBC BLD AUTO-RTO: 1.4 /100 WBC (ref 0–0.2)
PLATELET # BLD AUTO: 36 10*3/MM3 (ref 140–450)
PMV BLD AUTO: 10.5 FL (ref 6–12)
POTASSIUM SERPL-SCNC: 4.8 MMOL/L (ref 3.5–5.2)
PROT SERPL-MCNC: 6.6 G/DL (ref 6–8.5)
RBC # BLD AUTO: 2.66 10*6/MM3 (ref 3.77–5.28)
SODIUM SERPL-SCNC: 140 MMOL/L (ref 136–145)
WBC NRBC COR # BLD AUTO: 1.46 10*3/MM3 (ref 3.4–10.8)

## 2025-06-23 PROCEDURE — 36591 DRAW BLOOD OFF VENOUS DEVICE: CPT

## 2025-06-23 PROCEDURE — G2211 COMPLEX E/M VISIT ADD ON: HCPCS | Performed by: NURSE PRACTITIONER

## 2025-06-23 PROCEDURE — 85025 COMPLETE CBC W/AUTO DIFF WBC: CPT

## 2025-06-23 PROCEDURE — 80053 COMPREHEN METABOLIC PANEL: CPT

## 2025-06-23 PROCEDURE — 99214 OFFICE O/P EST MOD 30 MIN: CPT | Performed by: NURSE PRACTITIONER

## 2025-06-23 PROCEDURE — 3074F SYST BP LT 130 MM HG: CPT | Performed by: NURSE PRACTITIONER

## 2025-06-23 PROCEDURE — 3078F DIAST BP <80 MM HG: CPT | Performed by: NURSE PRACTITIONER

## 2025-06-23 PROCEDURE — 1126F AMNT PAIN NOTED NONE PRSNT: CPT | Performed by: NURSE PRACTITIONER

## 2025-06-23 PROCEDURE — 25010000002 HEPARIN LOCK FLUSH PER 10 UNITS: Performed by: INTERNAL MEDICINE

## 2025-06-23 RX ORDER — HEPARIN SODIUM (PORCINE) LOCK FLUSH IV SOLN 100 UNIT/ML 100 UNIT/ML
500 SOLUTION INTRAVENOUS AS NEEDED
OUTPATIENT
Start: 2025-06-23

## 2025-06-23 RX ORDER — HEPARIN SODIUM (PORCINE) LOCK FLUSH IV SOLN 100 UNIT/ML 100 UNIT/ML
500 SOLUTION INTRAVENOUS AS NEEDED
Status: DISCONTINUED | OUTPATIENT
Start: 2025-06-23 | End: 2025-06-23 | Stop reason: HOSPADM

## 2025-06-23 RX ORDER — SODIUM CHLORIDE 0.9 % (FLUSH) 0.9 %
10 SYRINGE (ML) INJECTION AS NEEDED
Status: DISCONTINUED | OUTPATIENT
Start: 2025-06-23 | End: 2025-06-23 | Stop reason: HOSPADM

## 2025-06-23 RX ORDER — SODIUM CHLORIDE 0.9 % (FLUSH) 0.9 %
10 SYRINGE (ML) INJECTION AS NEEDED
OUTPATIENT
Start: 2025-06-23

## 2025-06-23 RX ADMIN — Medication 10 ML: at 10:37

## 2025-06-23 RX ADMIN — Medication 500 UNITS: at 10:37

## 2025-06-23 NOTE — NURSING NOTE
No IVFs today.  Port deaccessed per protocol.  Pt aware of next appointment.  Discharged via wheelchair with family member.

## 2025-07-01 NOTE — PROGRESS NOTES
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  When seen 6/23/2025 she had improved substantially and we made plans for follow-up 7/2/2025 for additional up to chemotherapy.  She returns today for reconsideration of Alimta.       Unfortunately she is having quite a lot of issues in her family with her  ill and in the hospital requiring additional procedures.  She is quite worried about his status but just is worried about her being off therapy.  At the time of this dictation H&H were 9.2 and 27.6 with white count of 4930 and platelet count of 82,000 which is a level that could be addressed by lower dose Alimta modified for her thrombocytopenia        ONCOLOGY HISTORY:  The patient is an 80-year-old female with below medical history recently admitted 12/4 - 6/20/2023 with a history of right intertrochanteric femoral fracture and osteoarthritis presenting from a fall at home.  She had a stress fracture left foot that affected her balance, chronic low back pain that is also worsened with a fall.  Upon presentation orthopedic surgery was consulted finding a right intramedullary nail fixation and healing without abnormal findings.  No additional surgery was planned though physical therapy continued.  Spinal films demonstrated compression deformity T12 and L2 but fortunately she did further improve and was able to be discharged.    The patient had follow-up with several physicians including orthopedic physicians with an eventual chest exam that revealed an unexpected nodularity.  She was then referred to pulmonary medicine 1/8/2024 with a history of smoking for 35 years quitting 8 years ago and indication that there was a lung nodule found incidentally on  imaging for the spine.  Through the [facility] system a CT of the chest demonstrated a 2.0 x 1.9 cm noncalcified nodule involving the posterior medial aspect left lower lobe abutting the pleura, more smoothly marginated 0.9 cm noncalcified right middle lobe nodule and a few additional smaller noncalcified nodules including 2 adjacent nodules measuring less than 0.4 cm the right middle lobe along with mild emphysema with mild to moderate subpleural and bibasilar scarring.  There is no pleural effusion or pneumothorax.    A PET/CT was obtained 2/6/2024 with a solid subpleural irregular nodule left lower lobe medially measuring 1.7 x 1.8 intensely hypermetabolic with SUV of 12.8, no additional nodular mass, had metabolic right superior mediastinal lymph node lateral measuring 0.9 cm to 3.9, diffuse lower lobe tracer uptake with mediastinal right hilar lymph nodes partially calcified thought to be inflammatory or granulomatous disease.  There is also low-grade tracer uptake associated subacute healing left anterior rib fractures.    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    The patient was referred urgently from [facility] for assessment.  It is not certain why she has not been seen in the [facility] system though she now states that she wished to be seen here.     Patient's case discussed during thoracic conference with the finding of a right supraclavicular node.  This was unknown to us when seen her initially in consultation.  Request made to obtain ultrasound-guided biopsy of the right supraclavicular node and this is discussed with the patient's daughter who indicates the patient would be willing to proceed.  We will go and have this scheduled even though she is to be seen within the next week and the result may not yet be available.    3/6/2023 FNA obtained, discussed with IR physician  Dr. Courtney with findings of a few atypical clusters present suspicious for involvement by non-small cell carcinoma.  He is offered to rebiopsy quickly if necessary.    The patient is seen 3/13/2024 with rebiopsy requested as well as radiation therapy consultation.  The case is discussed with interventional radiology and a repeat biopsy is possible.  The patient is willing to proceed in this fashion and we have also discussed radiation therapy consultation in the interval.  She continues have symptoms of chronic back pain and left lid lesion that is going to be reviewed by ophthalmology.    The patient went on to have a follow-up exam with repeat right supraclavicular lymph node biopsy 3/18/2024.  This was found positive for metastatic carcinoma.  The patient was seen by Dr. Hopkins with the conclusion that she has a 2 cm left lower lobe squamous cell carcinoma abutting the pleura additional contralateral supraclavicular lymph node in the thyroid.  This is to be discussed in conference 3/21/2024.    This was discussed 3/21/2024 recognizing the patient's frailty.  Options for treating the areas that are known positive including the left lower lobe and right supraclavicular node would include radiation therapy and Dr. Hopkins will see the patient concerning this as we also follow her for consideration of systemic therapy including a [id number] considering the genomic studies done thus far and the inability to obtain additional genomics from the scant tissue that we have even from recent biopsy.  Finally the patient's overall status including an older adult assessment is going to be requested.    The patient's [id number] revealed NF1  detected with possible indication of Selumetinib effectiveness.  Additionally TMB was 10.4 mutations per megabase.  Concurrently the patient was seen by radiation therapy treating the left lower lobe and right neck site via SBRT.  Further MRI of the brain revealed no evidence of  metastatic disease.    She is seen back 4/11/2024.  She had been seen 4/8/2024 with SBRT to left lower lobe and right neck lesion.  This has not been completed and she is to follow-up with radiation therapy mid July.  We have discussed her findings today with the patient seen along with her granddaughter.  She did well with radiation therapy and has no additional symptoms currently respiratorily.  Her genomic testing suggested possible use of Selumetinib (not currently available) though we will follow this as she is subsequently reevaluated.    A follow-up CT series was obtained 7/1/2024.  This reveals a slight decrease in the hypermetabolic right supraclavicular node measuring 0.9 x 0.7 cm previously 1.2 x 1.1 cm.  In the chest there is decrease in the mass in the posterior medial aspect left lower lobe and evidence of radiation pneumonitis adjacently.  Margins are difficult to appreciate completely but maximal diameter is 2 cm previously 2.2 cm as compared to previous.  There is a pleural-based 1.0 x 1.6 cm nodule medial aspect of the right lower lobe which which was less conspicuous previously, multiple nodule opacities right middle lobe that are larger and a 1.3 cm density possibly mucous plugging otherwise seen.  These findings suggest a follow-up series of scans will still be necessary.    The patient is seen with her daughter and they both agree with this follow-up plan.    The patient required admission 10/7 - 9/20/2024 after mechanical fall with T12-L2 compression fractures treated conservatively.    The patient's follow-up exams obtained 10/4/2024.  She is seen back 10/11/2024 and there is serial decrease in the small right supraclavicular node when compared to previous now down to 7 x 5 mm, evidence of consolidation left lower lobe with tumor developed likely seen measuring 16 x 2 cm, 2 right middle lobe nodules-19 x 13 mm previously 15 x 10, adjacent 9 mm previously 4 to 5 mm and unchanged third right  "middle lobe noncalcified nodule, other nodule stable and there is no evidence of abdominal/pelvic or osseous metastasis.    The patient is seen 10/11/2024 slowly recovering from the mechanical fall described above.  She does not require additional treatment at this point after review of the scans with follow-up exams again are anticipated and she is willing to proceed.    The patient is seen 4/9/2025 and evaluated by CT of soft tissue neck, chest, abdomen, pelvis performed 4/2 2025.  There is now seen increase in the right middle lobe cavitary thick-walled nodule measuring 3.2 x 2.6 compared to previous 2.1 x 1.4.  Other sites are stable and no additional lesions have developed.  The patient is seen with her daughter both indicating that she is still able to carry out daily activities without severe difficulty though she \"has slowed up a bit\".  He does have back pain periodically that improves with rest and does not notice worsening respiratory symptoms.  She may well have disease that would be amenable to local therapy and it is requested that she undergo a PET/CT.    PET/CT demonstrates left lower lobe radiation changes, right middle lobe hypermetabolic 3.1 x 2.3 central cavitary mass that is larger from previous, hypermetabolic right upper lobe 1 cm solid nodule unchanged adjacent right middle lobe 9 mm solid nodule as well as hypermetabolic right hilar and low paratracheal lymphadenopathy concerning for osiel metastatic disease.    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  Radiation therapy would not be expected to control this disease and systemic therapy is going to be necessary.  Considering the tissue type she would be a candidate for Alimta single agent at this point.    We have proceeded to have the patient's tumor further assessed by Caris examination with insufficient tumor in the block.  This led to further " assessment and treatment with Alimta for non-squamous carcinoma.   it was recognized the patient's clearance is somewhat marginal and her dose was adjusted down by 25%.  She underwent port placement 5/1/2025 and is seen back in office 5/7/2025 to continue with therapy.  She is agreeable to do so.     Unfortunately, she was recently admitted 5/11/2025 through 5/18/2025 secondary to acute norovirus gastroenteritis.  She had associated pancytopenia.  She struggled with severe abdominal pain, nausea vomiting and diarrhea.  She received G-CSF support due to neutropenia.        Since discharge, the patient reports she has continued to struggle with diarrhea which finally improved just yesterday.  She is now developed productive cough with purulent sputum and thick drainage.  She has no fevers or chills.  She was treated subsequently with a Z-Denny, Tessalon Perles and then plans were made for follow-up 5/28/2025 and possible chemotherapy with dose reduction.    She is next seen, however, 5/28/2025 with continued cough and general weakness.  Appetite is slowly returning as is her stamina though she states she is having difficulty sleeping from the cough.  Finally, additionally, her cytopenias are not yet resolved enough to proceed with chemotherapy.    She returns today 6/4/2025 in anticipation of Alimta which has been delayed several weeks due to admission, ongoing generalized weakness, cough, and cytopenias. She reports she is back to her baseline and feeling much better. Appetite has improved and cough has nearly resolved. WBC 6.77, hemoglobin 11.1, platelets 186,000. Unfortunately, BUN is eleavted to 32.6 and creatinine is up to 1.2 today. Creatinine clearance is 32.8mL/min and therefore we will postpone Alimta. Proceed with 500mL NS bolus today and patient will return to clinic in 1 week for NP or MD visit, Alimta with labs per protocol. Encouraged her to increase hydration.     She returns 6/11/2025 for reconsideration  of Alimta.  This was previously held for norovirus and then decreased for creatinine clearance.  She states that she feels well.  Respiratory symptoms have completely resolved.  No signs of infection.  She endorses drinking much water as she can tolerate.  Her creatinine clearance is 44.  Her port incision site has a small open area less than 1 cm.  No drainage, no redness, no warmth.  Discussed with Dr. Mann and pharmacy.  Proceeded with 37.5% pemetrexed today.    Patient return to clinic on 6/18/2025 for lab review with RN. Patient's BUN at that time was 37.5, creatinine 1.79.  Patient was brought in the next day for IV fluids.  She is here today for repeat labs and evaluation.  She states that she has been able to increase her fluid intake greatly over the past week.  Her nausea has resolved since last visit.     The patient was seen 6/23/2025 for reconsideration of Alimta that had previously been held when she developed norovirus and also had a reduction for a reduced creatinine clearance.  When seen 6/23/2025 she had improved substantially and we made plans for follow-up 7/2/2025 for additional up to chemotherapy.  She returns today for reconsideration of Alimta.       Unfortunately she is having quite a lot of issues in her family with her  ill and in the hospital requiring additional procedures.  She is quite worried about his status but just is worried about her being off therapy.  At the time of this dictation H&H were 9.2 and 27.6 with white count of 4930 and platelet count of 82,000 which is a level that could be addressed by lower dose Alimta modified for her thrombocytopenia          Past Surgical History:   Procedure Laterality Date    APPENDECTOMY      CARDIAC CATHETERIZATION      EXTERNAL EAR SURGERY Right     had tumor excised behind right ear lobe benign    EYE LESION EXCISION Left 6/6/2024    Procedure: LEFT LOWER EYELID BASEL CELL CARCINOMA EXCISIONAL AND REPAIR WITH FROZEN SECTIONS,;   Surgeon: Austin Kamara MD;  Location: [facility] MAIN OR;  Service: Ophthalmology;  Laterality: Left;    FEMUR IM NAILING/RODDING Right 06/15/2022    Procedure: RIGHT HIP INTRAMEDULLARY NAILING/RODDING;  Surgeon: Marc Crawford MD;  Location: [facility] MAIN OR;  Service: Orthopedics;  Laterality: Right;    GALLBLADDER SURGERY      HYSTERECTOMY      KNEE ARTHROPLASTY Right     KNEE ARTHROSCOPY Right     WI REVJ TOTAL KNEE ARTHRP W/WO ALGRFT 1 COMPONENT Right 09/07/2016    Procedure: RT ILIOTIBIAL BAND RELEASE RT TOTAL KNEE POLY EXCHANGE;  Surgeon: Toy Adame MD;  Location: [facility] OR;  Service: Orthopedics    ROTATOR CUFF REPAIR Bilateral     US GUIDED LYMPH NODE BIOPSY  3/18/2024    VENOUS ACCESS DEVICE (PORT) INSERTION Right 5/1/2025    Procedure: INSERTION VENOUS ACCESS DEVICE;  Surgeon: Saloni Verdugo MD;  Location: [facility] OR;  Service: Vascular;  Laterality: Right;    WRIST MASS EXCISION Right         Current Outpatient Medications on File Prior to Visit   Medication Sig Dispense Refill    albuterol (PROVENTIL HFA;VENTOLIN HFA) 108 (90 BASE) MCG/ACT inhaler Inhale 2 puffs Every 4 (Four) Hours As Needed. Indications: Asthma      allopurinol (ZYLOPRIM) 100 MG tablet Take 1 tablet by mouth Daily.      dexAMETHasone (DECADRON) 4 MG tablet Take 1 tablet oral twice a day for 3 consecutive days beginning the day before chemotherapy and continue for 6 doses.Take with food. 30 tablet 2    dicyclomine (BENTYL) 20 MG tablet Take 1 tablet by mouth Every 6 (Six) Hours. 20 tablet 0    fluticasone (FLONASE) 50 MCG/ACT nasal spray Administer 2 sprays into the nostril(s) as directed by provider Daily.      folic acid (FOLVITE) 1 MG tablet Take 1 tablet by mouth Daily. Start at least 7 days prior to chemotherapy until at least 3 weeks after all chemotherapy. 30 tablet 6    lidocaine-prilocaine (EMLA) 2.5-2.5 % cream Apply 1 Application topically to the appropriate area as directed Every 2 (Two) Hours As Needed  for Mild Pain. Apply a nickel size amount to port 30 minutes prior to access. 30 g 2    Magnesium 500 MG capsule Take 500 mg by mouth 2 (Two) Times a Day. Indications: Acid Indigestion      metoprolol tartrate (LOPRESSOR) 25 MG tablet Take 1 tablet by mouth 2 (Two) Times a Day. Indications: High Blood Pressure      neomycin-bacitracin-polymyxin (NEOSPORIN) 5-400-5000 ointment Apply 1 Application topically to the appropriate area as directed 3 (Three) Times a Day. 28 g 1    ondansetron ODT (ZOFRAN-ODT) 4 MG disintegrating tablet Place 1 tablet on the tongue 4 (Four) Times a Day As Needed for Nausea. 20 tablet 0    rOPINIRole (REQUIP) 0.5 MG tablet Take 1 tablet by mouth Every Night.      temazepam (RESTORIL) 15 MG capsule Take 1 capsule by mouth At Night As Needed for Sleep.      vitamin B-12 (CYANOCOBALAMIN) 1000 MCG tablet Take 1 tablet by mouth Daily. Indications: Inadequate Vitamin B12      vitamin D (ERGOCALCIFEROL) 1.25 MG (40959 UT) capsule capsule Take 1 capsule by mouth Every 7 (Seven) Days. 5 capsule 0     Current Facility-Administered Medications on File Prior to Visit   Medication Dose Route Frequency Provider Last Rate Last Admin    cyanocobalamin injection 1,000 mcg  1,000 mcg Intramuscular Q28 Days Gali Marshall APRN   1,000 mcg at 04/30/25 1206        ALLERGIES:    Allergies   Allergen Reactions    Levaquin [Levofloxacin] Hives    Zocor [Simvastatin] Myalgia    Atorvastatin Other (See Comments)    Codeine GI Intolerance    Penicillins Hives and Swelling     Pt reports tongue swelling    Percocet [Oxycodone-Acetaminophen] Itching and Nausea And Vomiting        Social History     Socioeconomic History    Marital status:      Spouse name: Nabil   Tobacco Use    Smoking status: Former     Current packs/day: 1.00     Average packs/day: 1 pack/day for 30.0 years (30.0 ttl pk-yrs)     Types: Cigarettes     Passive exposure: Past    Smokeless tobacco: Never   Vaping Use    Vaping status:  "Never Used   Substance and Sexual Activity    Alcohol use: Yes     Comment: 1 wine drink q 3-4 months    Drug use: Never    Sexual activity: Defer        Family History   Problem Relation Age of Onset    Hypertension Mother     Heart failure Mother     Heart attack Maternal Grandfather     Malig Hyperthermia Neg Hx       Objective   Vitals:    07/02/25 1025   BP: 150/91   Pulse: 58   Temp: 97.3 °F (36.3 °C)   TempSrc: Skin   SpO2: 100%   Weight: 67.6 kg (149 lb 1.6 oz)   Height: 160 cm (62.99\")   PainSc: 0-No pain           7/2/2025    10:28 AM   Current Status   ECOG score 0     Physical Exam  Constitutional:       Appearance: Normal appearance.   Cardiovascular:      Rate and Rhythm: Normal rate.      Heart sounds: Normal heart sounds.   Pulmonary:      Effort: Pulmonary effort is normal.   Abdominal:      General: Bowel sounds are normal.      Palpations: Abdomen is soft.   Musculoskeletal:      Right lower leg: No edema.      Left lower leg: No edema.   Skin:     General: Skin is warm and dry.      Comments: Small opening at edge of port incision site.  No redness, warmth, drainage.   Neurological:      Mental Status: She is oriented to person, place, and time.   Psychiatric:         Mood and Affect: Mood normal.         Behavior: Behavior normal.           RECENT LABS:  Results from last 7 days   Lab Units 07/02/25  1012   WBC 10*3/mm3 4.93   NEUTROS ABS 10*3/mm3 3.68   HEMOGLOBIN g/dL 9.2*   HEMATOCRIT % 27.6*   PLATELETS 10*3/mm3 82*       Results from last 7 days   Lab Units 07/02/25  1012   SODIUM mmol/L 140   POTASSIUM mmol/L 4.7   CHLORIDE mmol/L 106   CO2 mmol/L 21.5*   BUN mg/dL 21.0   CREATININE mg/dL 0.94   CALCIUM mg/dL 7.3*   ALBUMIN g/dL 3.7   BILIRUBIN mg/dL 0.4   ALK PHOS U/L 122*   ALT (SGPT) U/L 32   AST (SGOT) U/L 42*   GLUCOSE mg/dL 133*                 Assessment & Plan    80 year-old female with a orthopedic history as described in particular involving right hip, bilateral knees and " bilateral rotator cuffs recently falling and sustaining a contusion involving her right hip and evidence of compression deformities T12 and L2.  Upon discharge she was seen by orthopedics and, unexpectedly, on additional back studies a left lung nodule was determined.    This was reviewed by pulmonary medicine at [facility] leading to a  a CT of the chest which demonstrated a 2.0 x 1.9 cm noncalcified nodule involving the posterior medial aspect left lower lobe abutting the pleura, more smoothly marginated 0.9 cm noncalcified right middle lobe nodule and a few additional smaller noncalcified nodules including 2 adjacent nodules measuring less than 0.4 cm the right middle lobe along with mild emphysema with mild to moderate subpleural and bibasilar scarring.  There is no pleural effusion or pneumothorax.    A PET/CT was obtained 2/6/2024 with a solid subpleural irregular nodule left lower lobe medially measuring 1.7 x 1.8 intensely hypermetabolic with SUV of 12.8, no additional nodular mass, had metabolic right superior mediastinal lymph node lateral measuring 0.9 cm to 3.9, diffuse lower lobe tracer uptake with mediastinal right hilar lymph nodes partially calcified thought to be inflammatory or granulomatous disease.  There is also low-grade tracer uptake associated subacute healing left anterior rib fractures.    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    The patient is now seen in CBC office.  There has not been assessment by surgery or, apparently, by pulmonary medicine formally though the studies above are available for review.  After discussion we will need to have her presented in the thoracic conference so that opinions from radiology, thoracic surgery radiation therapy as well as pathology can be obtained.    Patient's case discussed during thoracic conference with the  finding of a right supraclavicular node.  This was unknown to us when seen her initially in consultation.  Request made to obtain ultrasound-guided biopsy of the right supraclavicular node and this is discussed with the patient's daughter who indicates the patient would be willing to proceed.  We will go and have this scheduled even though she is to be seen within the next week and the result may not yet be available.    3/6/2023 FNA obtained, discussed with IR physician Dr. Courtney with findings of a few atypical clusters present suspicious for involvement by non-small cell carcinoma.  He is offered to rebiopsy quickly if necessary.    The patient is seen 3/13/2024 with rebiopsy requested as well as radiation therapy consultation.  The case is discussed with interventional radiology and a repeat biopsy is possible.  The patient is willing to proceed in this fashion and we have also discussed radiation therapy consultation in the interval.  She continues have symptoms of chronic back pain and left lid lesion that is going to be reviewed by ophthalmology.    This was discussed 3/21/2024 recognizing the patient's frailty.  Options for treating the areas that are known positive including the left lower lobe and right supraclavicular node would include radiation therapy and Dr. Hopkins will see the patient concerning this as we also follow her for consideration of systemic therapy including a [id number] considering the genomic studies done thus far and the inability to obtain additional genomics from the scant tissue that we have even from recent biopsy.  Finally the patient's overall status including an older adult assessment is going to be requested.    She is seen back 4/11/2024.  She had been seen 4/8/2024 with SBRT to left lower lobe and right neck lesion.  This has not been completed and she is to follow-up with radiation therapy mid July.  We have discussed her findings today with the patient seen along with her  granddaughter.  She did well with radiation therapy and has no additional symptoms currently respiratorily.    Her genomic testing suggested possible use of Selumetinib (not currently available) though we will follow this as she is subsequently reevaluated.    A follow-up CT series was obtained 7/1/2024.  This reveals a slight decrease in the hypermetabolic right supraclavicular node measuring 0.9 x 0.7 cm previously 1.2 x 1.1 cm.  In the chest there is decrease in the mass in the posterior medial aspect left lower lobe and evidence of radiation pneumonitis adjacently.  Margins are difficult to appreciate completely but maximal diameter is 2 cm previously 2.2 cm as compared to previous.  There is a pleural-based 1.0 x 1.6 cm nodule medial aspect of the right lower lobe which which was less conspicuous previously, multiple nodule opacities right middle lobe that are larger and a 1.3 cm density possibly mucous plugging otherwise seen.  These findings suggest a follow-up series of scans will still be necessary.    The patient is seen in office 7/12/2024 with a reasonably stable performance status.  At this point no additional intervention is necessary but a follow-up scan is requested.    The patient required admission 10/7 - 9/20/2024 after mechanical fall with T12-L2 compression fractures treated conservatively.      The patient's follow-up exams obtained 10/4/2024.  She is seen back 10/11/2024 and there is serial decrease in the small right supraclavicular node when compared to previous now down to 7 x 5 mm, evidence of consolidation left lower lobe with tumor developed likely seen measuring 16 x 2 cm, 2 right middle lobe nodules-19 x 13 mm previously 15 x 10, adjacent 9 mm previously 4 to 5 mm and unchanged third right middle lobe noncalcified nodule, other nodule stable and there is no evidence of abdominal/pelvic or osseous metastasis.    The patient required admission 10/7 - 9/20/2024 after mechanical fall with  "T12-L2 compression fractures treated conservatively.    The patient's follow-up exams obtained 10/4/2024.  She is seen back 10/11/2024 and there is serial decrease in the small right supraclavicular node when compared to previous now down to 7 x 5 mm, evidence of consolidation left lower lobe with tumor developed likely seen measuring 16 x 2 cm, 2 right middle lobe nodules-19 x 13 mm previously 15 x 10, adjacent 9 mm previously 4 to 5 mm and unchanged third right middle lobe noncalcified nodule, other nodule stable and there is no evidence of abdominal/pelvic or osseous metastasis.    The patient is seen 10/11/2024 slowly recovering from the mechanical fall described above.  She does not require additional treatment at this point after review of the scans with follow-up exams again are anticipated and she is willing to proceed.     The patient is seen 4/9/2025 and evaluated by CT of soft tissue neck, chest, abdomen, pelvis performed 4/2 2025.  There is now seen increase in the right middle lobe cavitary thick-walled nodule measuring 3.2 x 2.6 compared to previous 2.1 x 1.4.  Other sites are stable and no additional lesions have developed.      The patient is seen with her daughter both indicating that she is still able to carry out daily activities without severe difficulty though she \"has slowed up a bit\".  He does have back pain periodically that improves with rest and does not notice worsening respiratory symptoms.  She may well have disease that would be amenable to local therapy and it is requested that she undergo a PET/CT.    PET/CT demonstrates left lower lobe radiation changes, right middle lobe hypermetabolic 3.1 x 2.3 central cavitary mass that is larger from previous, hypermetabolic right upper lobe 1 cm solid nodule unchanged adjacent right middle lobe 9 mm solid nodule as well as hypermetabolic right hilar and low paratracheal lymphadenopathy concerning for osiel metastatic disease.    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  Radiation therapy would not be expected to control this disease and systemic therapy is going to be necessary.  Considering the tissue type she would be a candidate for Alimta single agent at this point.  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    We have proceeded to have the patient's tumor further assessed by Caris examination with insufficient tumor in the block.  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   it was recognized the patient's clearance is somewhat marginal and her dose was adjusted down by 25%.  She underwent port placement 5/1/2025 and is seen back in office 5/7/2025 to continue with therapy.  She is agreeable to do so.     Hospitalization 5/11/2025 through 5/18/2025 to acute viral norovirus gastroenteritis.  Profound pancytopenia related to both viral infection and chemotherapy despite dose reduction.    Patient seen for hospital follow-up 5/21/2025 with improvement in pancytopenia.  Now with chest congestion productive purulent sputum.  We will treat with azithromycin as well as Tessalon Perles.  The patient will return in 1 week for MD follow-up and consideration of resumption of chemotherapy with further dose reduction.    The patient went on to start a Z-Denny and Tessalon and is seen back in office 5/28/2025, unfortunately, not improved enough to proceed with chemotherapy.    She returns today 6/4/2025 in anticipation of Alimta which has been delayed several weeks due to admission, ongoing generalized weakness, cough, and cytopenias. She reports she is back to her baseline and feeling much better. Appetite has improved and cough has nearly resolved. WBC 6.77, hemoglobin  11.1, platelets 186,000. Unfortunately, BUN is eleavted to 32.6 and creatinine is up to 1.2 today. Creatinine clearance is 32.8mL/min and therefore we will postpone Alimta. Proceed with 500mL NS bolus today and patient will return to clinic in 1 week for NP or MD visit, Alimta with labs per protocol. Encouraged her to increase hydration.    She returns today for reconsideration of Alimta.  This was previously held for norovirus and then decreased for creatinine clearance.  She states that she feels well.  Respiratory symptoms have completely resolved.  No signs of infection.  She endorses drinking much water as she can tolerate.  Her creatinine clearance is 44.  Her port incision site has a small open area less than 1 cm.  No drainage, no redness, no warmth.  Discussed with Dr. Mann and pharmacy.  Proceeded with 37.5% pemetrexed today.    Patient return to clinic on 6/18/2025 for lab review with RN. Patient's BUN at that time was 37.5, creatinine 1.79.  Patient was brought in the next day for IV fluids.  She is here today for repeat labs and evaluation.  She states that she has been able to increase her fluid intake greatly over the past week.  Her nausea has resolved since last visit. Today her BUN is 34.5 and creatinine 1.25..  Additional biolyte samples provided.  Urged patient to continue with increased fluid intake.  Patient to call if she develops any nausea/diarrhea in the meantime.    The patient was seen 6/23/2025 for reconsideration of Alimta that had previously been held when she developed norovirus and also had a reduction for a reduced creatinine clearance.  When seen 6/23/2025 she had improved substantially and we made plans for follow-up 7/2/2025 for additional up to chemotherapy.  She returns today for reconsideration of Alimta.       Unfortunately she is having quite a lot of issues in her family with her  ill and in the hospital requiring additional procedures.  She is quite worried about  his status but just is worried about her being off therapy.  At the time of this dictation H&H were 9.2 and 27.6 with white count of 4930 and platelet count of 82,000 which is a level that could be addressed by lower dose Alimta modified for her thrombocytopenia        PLAN:  Proceed with Alimta every 3 weeks due again on 7/2/2025 (Alimta has been further dose reduced by 50% from previous dose.patient ultimately getting 37.5 percent of original dose.  Adjustment reflected in treatment plan) plan to proceed to 2 cycles before she is reevaluated by scan.  Q. weekly CBC  3 weeks NP, left side, follow-up CTs after this next treatment  Instructed to reach out sooner with any concerns.     This patient is on high risk drug therapy requiring intensive monitoring for toxicity.       Shabbir Mann MD  07/02/2025

## 2025-07-02 ENCOUNTER — OFFICE VISIT (OUTPATIENT)
Dept: ONCOLOGY | Facility: CLINIC | Age: 80
End: 2025-07-02
Payer: MEDICARE

## 2025-07-02 ENCOUNTER — INFUSION (OUTPATIENT)
Dept: ONCOLOGY | Facility: HOSPITAL | Age: 80
End: 2025-07-02
Payer: MEDICARE

## 2025-07-02 VITALS
SYSTOLIC BLOOD PRESSURE: 150 MMHG | DIASTOLIC BLOOD PRESSURE: 91 MMHG | HEIGHT: 63 IN | WEIGHT: 149.1 LBS | BODY MASS INDEX: 26.42 KG/M2 | HEART RATE: 58 BPM | TEMPERATURE: 97.3 F | OXYGEN SATURATION: 100 %

## 2025-07-02 DIAGNOSIS — C34.92 NSCLC OF LEFT LUNG: ICD-10-CM

## 2025-07-02 DIAGNOSIS — Z45.2 FITTING AND ADJUSTMENT OF VASCULAR CATHETER: Primary | ICD-10-CM

## 2025-07-02 DIAGNOSIS — C34.92 NSCLC OF LEFT LUNG: Primary | ICD-10-CM

## 2025-07-02 LAB
ALBUMIN SERPL-MCNC: 3.7 G/DL (ref 3.5–5.2)
ALBUMIN/GLOB SERPL: 1.3 G/DL
ALP SERPL-CCNC: 122 U/L (ref 39–117)
ALT SERPL W P-5'-P-CCNC: 32 U/L (ref 1–33)
ANION GAP SERPL CALCULATED.3IONS-SCNC: 12.5 MMOL/L (ref 5–15)
AST SERPL-CCNC: 42 U/L (ref 1–32)
BASOPHILS # BLD AUTO: 0.01 10*3/MM3 (ref 0–0.2)
BASOPHILS NFR BLD AUTO: 0.2 % (ref 0–1.5)
BILIRUB SERPL-MCNC: 0.4 MG/DL (ref 0–1.2)
BUN SERPL-MCNC: 21 MG/DL (ref 8–23)
BUN/CREAT SERPL: 22.3 (ref 7–25)
CALCIUM SPEC-SCNC: 7.3 MG/DL (ref 8.6–10.5)
CHLORIDE SERPL-SCNC: 106 MMOL/L (ref 98–107)
CO2 SERPL-SCNC: 21.5 MMOL/L (ref 22–29)
CREAT SERPL-MCNC: 0.94 MG/DL (ref 0.57–1)
DEPRECATED RDW RBC AUTO: 51.9 FL (ref 37–54)
EGFRCR SERPLBLD CKD-EPI 2021: 60.7 ML/MIN/1.73
EOSINOPHIL # BLD AUTO: 0.01 10*3/MM3 (ref 0–0.4)
EOSINOPHIL NFR BLD AUTO: 0.2 % (ref 0.3–6.2)
ERYTHROCYTE [DISTWIDTH] IN BLOOD BY AUTOMATED COUNT: 15.7 % (ref 12.3–15.4)
GLOBULIN UR ELPH-MCNC: 2.9 GM/DL
GLUCOSE SERPL-MCNC: 133 MG/DL (ref 65–99)
HCT VFR BLD AUTO: 27.6 % (ref 34–46.6)
HGB BLD-MCNC: 9.2 G/DL (ref 12–15.9)
IMM GRANULOCYTES # BLD AUTO: 0.03 10*3/MM3 (ref 0–0.05)
IMM GRANULOCYTES NFR BLD AUTO: 0.6 % (ref 0–0.5)
LYMPHOCYTES # BLD AUTO: 0.81 10*3/MM3 (ref 0.7–3.1)
LYMPHOCYTES NFR BLD AUTO: 16.4 % (ref 19.6–45.3)
MCH RBC QN AUTO: 35.2 PG (ref 26.6–33)
MCHC RBC AUTO-ENTMCNC: 33.3 G/DL (ref 31.5–35.7)
MCV RBC AUTO: 105.7 FL (ref 79–97)
MONOCYTES # BLD AUTO: 0.39 10*3/MM3 (ref 0.1–0.9)
MONOCYTES NFR BLD AUTO: 7.9 % (ref 5–12)
NEUTROPHILS NFR BLD AUTO: 3.68 10*3/MM3 (ref 1.7–7)
NEUTROPHILS NFR BLD AUTO: 74.7 % (ref 42.7–76)
NRBC BLD AUTO-RTO: 0 /100 WBC (ref 0–0.2)
PLATELET # BLD AUTO: 82 10*3/MM3 (ref 140–450)
PMV BLD AUTO: 10.9 FL (ref 6–12)
POTASSIUM SERPL-SCNC: 4.7 MMOL/L (ref 3.5–5.2)
PROT SERPL-MCNC: 6.6 G/DL (ref 6–8.5)
RBC # BLD AUTO: 2.61 10*6/MM3 (ref 3.77–5.28)
SODIUM SERPL-SCNC: 140 MMOL/L (ref 136–145)
WBC NRBC COR # BLD AUTO: 4.93 10*3/MM3 (ref 3.4–10.8)

## 2025-07-02 PROCEDURE — 80053 COMPREHEN METABOLIC PANEL: CPT

## 2025-07-02 PROCEDURE — 25010000002 CYANOCOBALAMIN PER 1000 MCG: Performed by: INTERNAL MEDICINE

## 2025-07-02 PROCEDURE — 3080F DIAST BP >= 90 MM HG: CPT | Performed by: INTERNAL MEDICINE

## 2025-07-02 PROCEDURE — 96372 THER/PROPH/DIAG INJ SC/IM: CPT

## 2025-07-02 PROCEDURE — 99214 OFFICE O/P EST MOD 30 MIN: CPT | Performed by: INTERNAL MEDICINE

## 2025-07-02 PROCEDURE — 3077F SYST BP >= 140 MM HG: CPT | Performed by: INTERNAL MEDICINE

## 2025-07-02 PROCEDURE — 1126F AMNT PAIN NOTED NONE PRSNT: CPT | Performed by: INTERNAL MEDICINE

## 2025-07-02 PROCEDURE — 85025 COMPLETE CBC W/AUTO DIFF WBC: CPT

## 2025-07-02 PROCEDURE — 25010000002 PEMETREXED 100 MG RECONSTITUTED SOLUTION 1 EACH VIAL: Performed by: INTERNAL MEDICINE

## 2025-07-02 PROCEDURE — 25810000003 SODIUM CHLORIDE 0.9 % SOLUTION: Performed by: INTERNAL MEDICINE

## 2025-07-02 PROCEDURE — 25010000002 HEPARIN LOCK FLUSH PER 10 UNITS: Performed by: INTERNAL MEDICINE

## 2025-07-02 PROCEDURE — 96409 CHEMO IV PUSH SNGL DRUG: CPT

## 2025-07-02 RX ORDER — CYANOCOBALAMIN 1000 UG/ML
1000 INJECTION, SOLUTION INTRAMUSCULAR; SUBCUTANEOUS ONCE
Status: COMPLETED | OUTPATIENT
Start: 2025-07-02 | End: 2025-07-02

## 2025-07-02 RX ORDER — HEPARIN SODIUM (PORCINE) LOCK FLUSH IV SOLN 100 UNIT/ML 100 UNIT/ML
500 SOLUTION INTRAVENOUS AS NEEDED
Status: DISCONTINUED | OUTPATIENT
Start: 2025-07-02 | End: 2025-07-02 | Stop reason: HOSPADM

## 2025-07-02 RX ORDER — SODIUM CHLORIDE 9 MG/ML
20 INJECTION, SOLUTION INTRAVENOUS ONCE
Status: CANCELLED | OUTPATIENT
Start: 2025-07-02

## 2025-07-02 RX ORDER — SODIUM CHLORIDE 9 MG/ML
20 INJECTION, SOLUTION INTRAVENOUS ONCE
Status: COMPLETED | OUTPATIENT
Start: 2025-07-02 | End: 2025-07-02

## 2025-07-02 RX ORDER — HEPARIN SODIUM (PORCINE) LOCK FLUSH IV SOLN 100 UNIT/ML 100 UNIT/ML
500 SOLUTION INTRAVENOUS AS NEEDED
OUTPATIENT
Start: 2025-07-02

## 2025-07-02 RX ORDER — SODIUM CHLORIDE 0.9 % (FLUSH) 0.9 %
10 SYRINGE (ML) INJECTION AS NEEDED
OUTPATIENT
Start: 2025-07-02

## 2025-07-02 RX ORDER — CYANOCOBALAMIN 1000 UG/ML
1000 INJECTION, SOLUTION INTRAMUSCULAR; SUBCUTANEOUS ONCE
Status: CANCELLED | OUTPATIENT
Start: 2025-07-02 | End: 2025-07-02

## 2025-07-02 RX ORDER — SODIUM CHLORIDE 0.9 % (FLUSH) 0.9 %
10 SYRINGE (ML) INJECTION AS NEEDED
Status: DISCONTINUED | OUTPATIENT
Start: 2025-07-02 | End: 2025-07-02 | Stop reason: HOSPADM

## 2025-07-02 RX ADMIN — SODIUM CHLORIDE 20 ML/HR: 900 INJECTION, SOLUTION INTRAVENOUS at 11:51

## 2025-07-02 RX ADMIN — CYANOCOBALAMIN 1000 MCG: 1000 INJECTION, SOLUTION INTRAMUSCULAR at 11:24

## 2025-07-02 RX ADMIN — Medication 500 UNITS: at 12:03

## 2025-07-02 RX ADMIN — PEMETREXED DISODIUM 300 MG: 100 INJECTION, POWDER, LYOPHILIZED, FOR SOLUTION INTRAVENOUS at 11:51

## 2025-07-02 RX ADMIN — Medication 10 ML: at 12:02

## 2025-07-02 NOTE — NURSING NOTE
Lab Results   Component Value Date    WBC 4.93 07/02/2025    HGB 9.2 (L) 07/02/2025    HCT 27.6 (L) 07/02/2025    .7 (H) 07/02/2025    PLT 82 (L) 07/02/2025     Per Dr. Mackenzie shah to treat today with plt count of 83

## 2025-07-02 NOTE — NURSING NOTE
Corrected calcium 7.3. Dr. Mann notified after patient left infusion area. Dr. Mann states that he needs to call the patient and will address it at that time.

## 2025-07-09 ENCOUNTER — TELEPHONE (OUTPATIENT)
Dept: ONCOLOGY | Facility: CLINIC | Age: 80
End: 2025-07-09
Payer: MEDICARE

## 2025-07-09 NOTE — TELEPHONE ENCOUNTER
Attempted to return call to pt's daughter, no answer. LVM advising her that pt does have an apt in our office this morning and she is encouraged to keep that apt so that he platelets can be checked.

## 2025-07-09 NOTE — TELEPHONE ENCOUNTER
Daughter returned call. She states pt has had a nose bleed since last night, off and on. She currently cannot get it to stop bleeding. Pt is not on any anticoagulation currently. Advised daughter since pt's nose has been bleeding since yesterday, she needs to go to the ER to be evaluated and have her plts checked. Daughter v/u and will take her to the ER.

## 2025-07-09 NOTE — TELEPHONE ENCOUNTER
Provider: Mackenzie  Caller: Kaylie  Relationship to Patient: daughter  Call Back Phone Number: [phone]  Reason for Call: she has been having nose bleeds for the last 2 days

## 2025-07-16 ENCOUNTER — APPOINTMENT (OUTPATIENT)
Dept: LAB | Facility: HOSPITAL | Age: 80
End: 2025-07-16
Payer: MEDICARE

## 2025-07-16 ENCOUNTER — LAB (OUTPATIENT)
Dept: LAB | Facility: HOSPITAL | Age: 80
End: 2025-07-16
Payer: MEDICARE

## 2025-07-16 ENCOUNTER — CLINICAL SUPPORT (OUTPATIENT)
Dept: ONCOLOGY | Facility: HOSPITAL | Age: 80
End: 2025-07-16
Payer: MEDICARE

## 2025-07-16 DIAGNOSIS — C34.92 NSCLC OF LEFT LUNG: ICD-10-CM

## 2025-07-16 LAB
BASOPHILS # BLD AUTO: 0.01 10*3/MM3 (ref 0–0.2)
BASOPHILS NFR BLD AUTO: 0.3 % (ref 0–1.5)
DEPRECATED RDW RBC AUTO: 64 FL (ref 37–54)
EOSINOPHIL # BLD AUTO: 0.01 10*3/MM3 (ref 0–0.4)
EOSINOPHIL NFR BLD AUTO: 0.3 % (ref 0.3–6.2)
ERYTHROCYTE [DISTWIDTH] IN BLOOD BY AUTOMATED COUNT: 18.8 % (ref 12.3–15.4)
HCT VFR BLD AUTO: 24.1 % (ref 34–46.6)
HGB BLD-MCNC: 8.4 G/DL (ref 12–15.9)
IMM GRANULOCYTES # BLD AUTO: 0.09 10*3/MM3 (ref 0–0.05)
IMM GRANULOCYTES NFR BLD AUTO: 2.4 % (ref 0–0.5)
LYMPHOCYTES # BLD AUTO: 0.51 10*3/MM3 (ref 0.7–3.1)
LYMPHOCYTES NFR BLD AUTO: 13.7 % (ref 19.6–45.3)
MCH RBC QN AUTO: 35.6 PG (ref 26.6–33)
MCHC RBC AUTO-ENTMCNC: 34.9 G/DL (ref 31.5–35.7)
MCV RBC AUTO: 102.1 FL (ref 79–97)
MONOCYTES # BLD AUTO: 0.21 10*3/MM3 (ref 0.1–0.9)
MONOCYTES NFR BLD AUTO: 5.6 % (ref 5–12)
NEUTROPHILS NFR BLD AUTO: 2.9 10*3/MM3 (ref 1.7–7)
NEUTROPHILS NFR BLD AUTO: 77.7 % (ref 42.7–76)
NRBC BLD AUTO-RTO: 0 /100 WBC (ref 0–0.2)
PLATELET # BLD AUTO: 60 10*3/MM3 (ref 140–450)
PMV BLD AUTO: 12.9 FL (ref 6–12)
RBC # BLD AUTO: 2.36 10*6/MM3 (ref 3.77–5.28)
WBC NRBC COR # BLD AUTO: 3.73 10*3/MM3 (ref 3.4–10.8)

## 2025-07-16 PROCEDURE — G0463 HOSPITAL OUTPT CLINIC VISIT: HCPCS

## 2025-07-16 PROCEDURE — 36415 COLL VENOUS BLD VENIPUNCTURE: CPT

## 2025-07-16 PROCEDURE — 85025 COMPLETE CBC W/AUTO DIFF WBC: CPT

## 2025-07-16 NOTE — PROGRESS NOTES
Labs reviewed with pt and daughter. Plts stable and 60K and Hgb 8.4. Pt was in [facility] for several days due to nose bleed. She was given plt transfusions while inpatient. Pt reports she is feeling a little more fatigued but denies more SOA. We will not pursue blood transfusion at this time. Pt will return next week for treatment. Reviewed with Dr. Mann and he agreed with plan.

## 2025-07-23 ENCOUNTER — INFUSION (OUTPATIENT)
Dept: ONCOLOGY | Facility: HOSPITAL | Age: 80
End: 2025-07-23
Payer: MEDICARE

## 2025-07-23 ENCOUNTER — HOSPITAL ENCOUNTER (OUTPATIENT)
Dept: PET IMAGING | Facility: HOSPITAL | Age: 80
Discharge: HOME | End: 2025-07-23
Admitting: NURSE PRACTITIONER
Payer: MEDICARE

## 2025-07-23 ENCOUNTER — OFFICE VISIT (OUTPATIENT)
Dept: ONCOLOGY | Facility: CLINIC | Age: 80
End: 2025-07-23
Payer: MEDICARE

## 2025-07-23 VITALS
RESPIRATION RATE: 17 BRPM | HEIGHT: 63 IN | HEART RATE: 68 BPM | BODY MASS INDEX: 26.42 KG/M2 | TEMPERATURE: 97.2 F | OXYGEN SATURATION: 96 % | SYSTOLIC BLOOD PRESSURE: 131 MMHG | DIASTOLIC BLOOD PRESSURE: 73 MMHG

## 2025-07-23 DIAGNOSIS — D64.9 ANEMIA, UNSPECIFIED TYPE: Primary | ICD-10-CM

## 2025-07-23 DIAGNOSIS — Z79.899 HIGH RISK MEDICATION USE: ICD-10-CM

## 2025-07-23 DIAGNOSIS — C34.92 NSCLC OF LEFT LUNG: ICD-10-CM

## 2025-07-23 DIAGNOSIS — Z45.2 FITTING AND ADJUSTMENT OF VASCULAR CATHETER: Primary | ICD-10-CM

## 2025-07-23 LAB
ALBUMIN SERPL-MCNC: 3.6 G/DL (ref 3.5–5.2)
ALBUMIN/GLOB SERPL: 1.2 G/DL
ALP SERPL-CCNC: 135 U/L (ref 39–117)
ALT SERPL W P-5'-P-CCNC: 39 U/L (ref 1–33)
ANION GAP SERPL CALCULATED.3IONS-SCNC: 11.5 MMOL/L (ref 5–15)
AST SERPL-CCNC: 41 U/L (ref 1–32)
BASOPHILS # BLD AUTO: 0.01 10*3/MM3 (ref 0–0.2)
BASOPHILS NFR BLD AUTO: 0.2 % (ref 0–1.5)
BILIRUB SERPL-MCNC: 0.6 MG/DL (ref 0–1.2)
BUN SERPL-MCNC: 17.7 MG/DL (ref 8–23)
BUN/CREAT SERPL: 16.9 (ref 7–25)
CALCIUM SPEC-SCNC: 9.1 MG/DL (ref 8.6–10.5)
CHLORIDE SERPL-SCNC: 100 MMOL/L (ref 98–107)
CO2 SERPL-SCNC: 25.5 MMOL/L (ref 22–29)
CREAT SERPL-MCNC: 1.05 MG/DL (ref 0.57–1)
DEPRECATED RDW RBC AUTO: 81 FL (ref 37–54)
EGFRCR SERPLBLD CKD-EPI 2021: 53.2 ML/MIN/1.73
EOSINOPHIL # BLD AUTO: 0.01 10*3/MM3 (ref 0–0.4)
EOSINOPHIL NFR BLD AUTO: 0.2 % (ref 0.3–6.2)
ERYTHROCYTE [DISTWIDTH] IN BLOOD BY AUTOMATED COUNT: 20.9 % (ref 12.3–15.4)
FERRITIN SERPL-MCNC: 752 NG/ML (ref 13–150)
GLOBULIN UR ELPH-MCNC: 3 GM/DL
GLUCOSE SERPL-MCNC: 118 MG/DL (ref 65–99)
HCT VFR BLD AUTO: 27.5 % (ref 34–46.6)
HGB BLD-MCNC: 9 G/DL (ref 12–15.9)
IMM GRANULOCYTES # BLD AUTO: 0.05 10*3/MM3 (ref 0–0.05)
IMM GRANULOCYTES NFR BLD AUTO: 1 % (ref 0–0.5)
IRON 24H UR-MRATE: 78 MCG/DL (ref 37–145)
IRON SATN MFR SERPL: 24 % (ref 20–50)
LYMPHOCYTES # BLD AUTO: 0.69 10*3/MM3 (ref 0.7–3.1)
LYMPHOCYTES NFR BLD AUTO: 13.6 % (ref 19.6–45.3)
MCH RBC QN AUTO: 36.1 PG (ref 26.6–33)
MCHC RBC AUTO-ENTMCNC: 32.7 G/DL (ref 31.5–35.7)
MCV RBC AUTO: 110.4 FL (ref 79–97)
MONOCYTES # BLD AUTO: 0.72 10*3/MM3 (ref 0.1–0.9)
MONOCYTES NFR BLD AUTO: 14.1 % (ref 5–12)
NEUTROPHILS NFR BLD AUTO: 3.61 10*3/MM3 (ref 1.7–7)
NEUTROPHILS NFR BLD AUTO: 70.9 % (ref 42.7–76)
NRBC BLD AUTO-RTO: 0 /100 WBC (ref 0–0.2)
PLATELET # BLD AUTO: 164 10*3/MM3 (ref 140–450)
PMV BLD AUTO: 10.2 FL (ref 6–12)
POTASSIUM SERPL-SCNC: 4.7 MMOL/L (ref 3.5–5.2)
PROT SERPL-MCNC: 6.6 G/DL (ref 6–8.5)
RBC # BLD AUTO: 2.49 10*6/MM3 (ref 3.77–5.28)
SODIUM SERPL-SCNC: 137 MMOL/L (ref 136–145)
TIBC SERPL-MCNC: 320 MCG/DL (ref 298–536)
TRANSFERRIN SERPL-MCNC: 215 MG/DL (ref 200–360)
WBC NRBC COR # BLD AUTO: 5.09 10*3/MM3 (ref 3.4–10.8)

## 2025-07-23 PROCEDURE — 25010000002 HEPARIN LOCK FLUSH PER 10 UNITS: Performed by: INTERNAL MEDICINE

## 2025-07-23 PROCEDURE — 80053 COMPREHEN METABOLIC PANEL: CPT

## 2025-07-23 PROCEDURE — 84466 ASSAY OF TRANSFERRIN: CPT | Performed by: NURSE PRACTITIONER

## 2025-07-23 PROCEDURE — 83540 ASSAY OF IRON: CPT | Performed by: NURSE PRACTITIONER

## 2025-07-23 PROCEDURE — 82728 ASSAY OF FERRITIN: CPT | Performed by: NURSE PRACTITIONER

## 2025-07-23 PROCEDURE — 85025 COMPLETE CBC W/AUTO DIFF WBC: CPT

## 2025-07-23 PROCEDURE — 25510000002 DIATRIZOATE MEGLUMINE & SODIUM PER 1 ML: Performed by: NURSE PRACTITIONER

## 2025-07-23 PROCEDURE — 74176 CT ABD & PELVIS W/O CONTRAST: CPT

## 2025-07-23 PROCEDURE — 71250 CT THORAX DX C-: CPT

## 2025-07-23 RX ORDER — SODIUM CHLORIDE 0.9 % (FLUSH) 0.9 %
10 SYRINGE (ML) INJECTION AS NEEDED
Status: DISCONTINUED | OUTPATIENT
Start: 2025-07-23 | End: 2025-07-23 | Stop reason: HOSPADM

## 2025-07-23 RX ORDER — HEPARIN SODIUM (PORCINE) LOCK FLUSH IV SOLN 100 UNIT/ML 100 UNIT/ML
500 SOLUTION INTRAVENOUS AS NEEDED
Status: DISCONTINUED | OUTPATIENT
Start: 2025-07-23 | End: 2025-07-23 | Stop reason: HOSPADM

## 2025-07-23 RX ORDER — DIATRIZOATE MEGLUMINE AND DIATRIZOATE SODIUM 660; 100 MG/ML; MG/ML
30 SOLUTION ORAL; RECTAL ONCE
Status: COMPLETED | OUTPATIENT
Start: 2025-07-23 | End: 2025-07-23

## 2025-07-23 RX ORDER — HEPARIN SODIUM (PORCINE) LOCK FLUSH IV SOLN 100 UNIT/ML 100 UNIT/ML
500 SOLUTION INTRAVENOUS AS NEEDED
OUTPATIENT
Start: 2025-07-23

## 2025-07-23 RX ORDER — SODIUM CHLORIDE 0.9 % (FLUSH) 0.9 %
10 SYRINGE (ML) INJECTION AS NEEDED
OUTPATIENT
Start: 2025-07-23

## 2025-07-23 RX ADMIN — Medication 10 ML: at 11:11

## 2025-07-23 RX ADMIN — DIATRIZOATE MEGLUMINE AND DIATRIZOATE SODIUM 30 ML: 660; 100 LIQUID ORAL; RECTAL at 11:34

## 2025-07-23 RX ADMIN — Medication 500 UNITS: at 11:10

## 2025-07-23 NOTE — PROGRESS NOTES
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  When seen 6/23/2025 she had improved substantially and we proceeded with treatment 7/2/2025 at the same dose as previous, 37.5% of the original dose at 187.5mg/m2.       She called in reporting nosebleeds and was taken to the hospital thereafter with platelet count of 10,000 and hemoglobin of 8.6, and wbc 1.4.  She was given 4 units platelets and 1 PRBCs.  Her nose was packed and cauterized. She was also given IV folic acid due t the alimta. She was discharged 7/15/25.  She denies further nosebleeds or bleeding.  She reports she is regaining strength however has been very stressed due to her husbands health.       Unfortunately she is having quite a lot of issues in her family with her  ill and in the hospital requiring additional procedures.  Unfortunately he has been hospitalized once again for what sounds to be a very serious stroke.         ONCOLOGY HISTORY:  The patient is an 80-year-old female with below medical history recently admitted 12/4 - 6/20/2023 with a history of right intertrochanteric femoral fracture and osteoarthritis presenting from a fall at home.  She had a stress fracture left foot that affected her balance, chronic low back pain that is also worsened with a fall.  Upon presentation orthopedic surgery was consulted finding a right intramedullary nail fixation and healing without abnormal findings.  No additional surgery was planned though physical therapy continued.  Spinal films demonstrated compression deformity T12 and L2 but fortunately she did further improve and was able to be discharged.    The patient had follow-up with several physicians including orthopedic physicians  with an eventual chest exam that revealed an unexpected nodularity.  She was then referred to pulmonary medicine 1/8/2024 with a history of smoking for 35 years quitting 8 years ago and indication that there was a lung nodule found incidentally on imaging for the spine.  Through the [facility] system a CT of the chest demonstrated a 2.0 x 1.9 cm noncalcified nodule involving the posterior medial aspect left lower lobe abutting the pleura, more smoothly marginated 0.9 cm noncalcified right middle lobe nodule and a few additional smaller noncalcified nodules including 2 adjacent nodules measuring less than 0.4 cm the right middle lobe along with mild emphysema with mild to moderate subpleural and bibasilar scarring.  There is no pleural effusion or pneumothorax.    A PET/CT was obtained 2/6/2024 with a solid subpleural irregular nodule left lower lobe medially measuring 1.7 x 1.8 intensely hypermetabolic with SUV of 12.8, no additional nodular mass, had metabolic right superior mediastinal lymph node lateral measuring 0.9 cm to 3.9, diffuse lower lobe tracer uptake with mediastinal right hilar lymph nodes partially calcified thought to be inflammatory or granulomatous disease.  There is also low-grade tracer uptake associated subacute healing left anterior rib fractures.    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    The patient was referred urgently from [facility] for assessment.  It is not certain why she has not been seen in the [facility] system though she now states that she wished to be seen here.     Patient's case discussed during thoracic conference with the finding of a right supraclavicular node.  This was unknown to us when seen her initially in consultation.  Request made to obtain ultrasound-guided biopsy of the right supraclavicular node and this is discussed with the patient's  daughter who indicates the patient would be willing to proceed.  We will go and have this scheduled even though she is to be seen within the next week and the result may not yet be available.    3/6/2023 FNA obtained, discussed with IR physician Dr. Courtney with findings of a few atypical clusters present suspicious for involvement by non-small cell carcinoma.  He is offered to rebiopsy quickly if necessary.    The patient is seen 3/13/2024 with rebiopsy requested as well as radiation therapy consultation.  The case is discussed with interventional radiology and a repeat biopsy is possible.  The patient is willing to proceed in this fashion and we have also discussed radiation therapy consultation in the interval.  She continues have symptoms of chronic back pain and left lid lesion that is going to be reviewed by ophthalmology.    The patient went on to have a follow-up exam with repeat right supraclavicular lymph node biopsy 3/18/2024.  This was found positive for metastatic carcinoma.  The patient was seen by Dr. Hopkins with the conclusion that she has a 2 cm left lower lobe squamous cell carcinoma abutting the pleura additional contralateral supraclavicular lymph node in the thyroid.  This is to be discussed in conference 3/21/2024.    This was discussed 3/21/2024 recognizing the patient's frailty.  Options for treating the areas that are known positive including the left lower lobe and right supraclavicular node would include radiation therapy and Dr. Hopkins will see the patient concerning this as we also follow her for consideration of systemic therapy including a [id number] considering the genomic studies done thus far and the inability to obtain additional genomics from the scant tissue that we have even from recent biopsy.  Finally the patient's overall status including an older adult assessment is going to be requested.    The patient's [id number] revealed NF1  detected with possible indication of  Selumetinib effectiveness.  Additionally TMB was 10.4 mutations per megabase.  Concurrently the patient was seen by radiation therapy treating the left lower lobe and right neck site via SBRT.  Further MRI of the brain revealed no evidence of metastatic disease.    She is seen back 4/11/2024.  She had been seen 4/8/2024 with SBRT to left lower lobe and right neck lesion.  This has not been completed and she is to follow-up with radiation therapy mid July.  We have discussed her findings today with the patient seen along with her granddaughter.  She did well with radiation therapy and has no additional symptoms currently respiratorily.  Her genomic testing suggested possible use of Selumetinib (not currently available) though we will follow this as she is subsequently reevaluated.    A follow-up CT series was obtained 7/1/2024.  This reveals a slight decrease in the hypermetabolic right supraclavicular node measuring 0.9 x 0.7 cm previously 1.2 x 1.1 cm.  In the chest there is decrease in the mass in the posterior medial aspect left lower lobe and evidence of radiation pneumonitis adjacently.  Margins are difficult to appreciate completely but maximal diameter is 2 cm previously 2.2 cm as compared to previous.  There is a pleural-based 1.0 x 1.6 cm nodule medial aspect of the right lower lobe which which was less conspicuous previously, multiple nodule opacities right middle lobe that are larger and a 1.3 cm density possibly mucous plugging otherwise seen.  These findings suggest a follow-up series of scans will still be necessary.    The patient is seen with her daughter and they both agree with this follow-up plan.    The patient required admission 10/7 - 9/20/2024 after mechanical fall with T12-L2 compression fractures treated conservatively.    The patient's follow-up exams obtained 10/4/2024.  She is seen back 10/11/2024 and there is serial decrease in the small right supraclavicular node when compared to  "previous now down to 7 x 5 mm, evidence of consolidation left lower lobe with tumor developed likely seen measuring 16 x 2 cm, 2 right middle lobe nodules-19 x 13 mm previously 15 x 10, adjacent 9 mm previously 4 to 5 mm and unchanged third right middle lobe noncalcified nodule, other nodule stable and there is no evidence of abdominal/pelvic or osseous metastasis.    The patient is seen 10/11/2024 slowly recovering from the mechanical fall described above.  She does not require additional treatment at this point after review of the scans with follow-up exams again are anticipated and she is willing to proceed.    The patient is seen 4/9/2025 and evaluated by CT of soft tissue neck, chest, abdomen, pelvis performed 4/2 2025.  There is now seen increase in the right middle lobe cavitary thick-walled nodule measuring 3.2 x 2.6 compared to previous 2.1 x 1.4.  Other sites are stable and no additional lesions have developed.  The patient is seen with her daughter both indicating that she is still able to carry out daily activities without severe difficulty though she \"has slowed up a bit\".  He does have back pain periodically that improves with rest and does not notice worsening respiratory symptoms.  She may well have disease that would be amenable to local therapy and it is requested that she undergo a PET/CT.    PET/CT demonstrates left lower lobe radiation changes, right middle lobe hypermetabolic 3.1 x 2.3 central cavitary mass that is larger from previous, hypermetabolic right upper lobe 1 cm solid nodule unchanged adjacent right middle lobe 9 mm solid nodule as well as hypermetabolic right hilar and low paratracheal lymphadenopathy concerning for osiel metastatic disease.    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  Radiation therapy would not be expected to control this disease and systemic therapy is going to be " necessary.  Considering the tissue type she would be a candidate for Alimta single agent at this point.    We have proceeded to have the patient's tumor further assessed by Caris examination with insufficient tumor in the block.  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   it was recognized the patient's clearance is somewhat marginal and her dose was adjusted down by 25%.  She underwent port placement 5/1/2025 and is seen back in office 5/7/2025 to continue with therapy.  She is agreeable to do so.     Unfortunately, she was recently admitted 5/11/2025 through 5/18/2025 secondary to acute norovirus gastroenteritis.  She had associated pancytopenia.  She struggled with severe abdominal pain, nausea vomiting and diarrhea.  She received G-CSF support due to neutropenia.        Since discharge, the patient reports she has continued to struggle with diarrhea which finally improved just yesterday.  She is now developed productive cough with purulent sputum and thick drainage.  She has no fevers or chills.  She was treated subsequently with a Z-Denny, Tessalon Perles and then plans were made for follow-up 5/28/2025 and possible chemotherapy with dose reduction.    She is next seen, however, 5/28/2025 with continued cough and general weakness.  Appetite is slowly returning as is her stamina though she states she is having difficulty sleeping from the cough.  Finally, additionally, her cytopenias are not yet resolved enough to proceed with chemotherapy.    She returns today 6/4/2025 in anticipation of Alimta which has been delayed several weeks due to admission, ongoing generalized weakness, cough, and cytopenias. She reports she is back to her baseline and feeling much better. Appetite has improved and cough has nearly resolved. WBC 6.77, hemoglobin 11.1, platelets 186,000. Unfortunately, BUN is eleavted to 32.6 and creatinine is up to 1.2 today. Creatinine clearance is 32.8mL/min  and therefore we will postpone Alimta. Proceed with 500mL NS bolus today and patient will return to clinic in 1 week for NP or MD visit, Alimta with labs per protocol. Encouraged her to increase hydration.     She returns 6/11/2025 for reconsideration of Alimta.  This was previously held for norovirus and then decreased for creatinine clearance.  She states that she feels well.  Respiratory symptoms have completely resolved.  No signs of infection.  She endorses drinking much water as she can tolerate.  Her creatinine clearance is 44.  Her port incision site has a small open area less than 1 cm.  No drainage, no redness, no warmth.  Discussed with Dr. Mann and pharmacy.  Proceeded with 37.5% pemetrexed today.    Patient return to clinic on 6/18/2025 for lab review with RN. Patient's BUN at that time was 37.5, creatinine 1.79.  Patient was brought in the next day for IV fluids.  She is here today for repeat labs and evaluation.  She states that she has been able to increase her fluid intake greatly over the past week.  Her nausea has resolved since last visit.     The patient was seen 6/23/2025 for reconsideration of Alimta that had previously been held when she developed norovirus and also had a reduction for a reduced creatinine clearance.  When seen 6/23/2025 she had improved substantially and we made plans for follow-up 7/2/2025 for additional up to chemotherapy.  She returns today for reconsideration of Alimta.       Unfortunately she is having quite a lot of issues in her family with her  ill and in the hospital requiring additional procedures.  She is quite worried about his status but just is worried about her being off therapy.  At the time of this dictation H&H were 9.2 and 27.6 with white count of 4930 and platelet count of 82,000 which is a level that could be addressed by lower dose Alimta modified for her thrombocytopenia          Past Surgical History:   Procedure Laterality Date     APPENDECTOMY      CARDIAC CATHETERIZATION      EXTERNAL EAR SURGERY Right     had tumor excised behind right ear lobe benign    EYE LESION EXCISION Left 6/6/2024    Procedure: LEFT LOWER EYELID BASEL CELL CARCINOMA EXCISIONAL AND REPAIR WITH FROZEN SECTIONS,;  Surgeon: Austin Kamara MD;  Location: [facility] OR;  Service: Ophthalmology;  Laterality: Left;    FEMUR IM NAILING/RODDING Right 06/15/2022    Procedure: RIGHT HIP INTRAMEDULLARY NAILING/RODDING;  Surgeon: Marc Crawford MD;  Location: [facility] OR;  Service: Orthopedics;  Laterality: Right;    GALLBLADDER SURGERY      HYSTERECTOMY      KNEE ARTHROPLASTY Right     KNEE ARTHROSCOPY Right     MT REVJ TOTAL KNEE ARTHRP W/WO ALGRFT 1 COMPONENT Right 09/07/2016    Procedure: RT ILIOTIBIAL BAND RELEASE RT TOTAL KNEE POLY EXCHANGE;  Surgeon: Toy Adame MD;  Location: [facility];  Service: Orthopedics    ROTATOR CUFF REPAIR Bilateral     US GUIDED LYMPH NODE BIOPSY  3/18/2024    VENOUS ACCESS DEVICE (PORT) INSERTION Right 5/1/2025    Procedure: INSERTION VENOUS ACCESS DEVICE;  Surgeon: Saloni Verdugo MD;  Location: [facility];  Service: Vascular;  Laterality: Right;    WRIST MASS EXCISION Right         Current Outpatient Medications on File Prior to Visit   Medication Sig Dispense Refill    albuterol (PROVENTIL HFA;VENTOLIN HFA) 108 (90 BASE) MCG/ACT inhaler Inhale 2 puffs Every 4 (Four) Hours As Needed. Indications: Asthma      allopurinol (ZYLOPRIM) 100 MG tablet Take 1 tablet by mouth Daily.      dexAMETHasone (DECADRON) 4 MG tablet Take 1 tablet oral twice a day for 3 consecutive days beginning the day before chemotherapy and continue for 6 doses.Take with food. 30 tablet 2    dicyclomine (BENTYL) 20 MG tablet Take 1 tablet by mouth Every 6 (Six) Hours. 20 tablet 0    fluticasone (FLONASE) 50 MCG/ACT nasal spray Administer 2 sprays into the nostril(s) as directed by provider Daily.      folic acid (FOLVITE) 1 MG tablet Take 1 tablet  by mouth Daily. Start at least 7 days prior to chemotherapy until at least 3 weeks after all chemotherapy. 30 tablet 6    lidocaine-prilocaine (EMLA) 2.5-2.5 % cream Apply 1 Application topically to the appropriate area as directed Every 2 (Two) Hours As Needed for Mild Pain. Apply a nickel size amount to port 30 minutes prior to access. 30 g 2    Magnesium 500 MG capsule Take 500 mg by mouth 2 (Two) Times a Day. Indications: Acid Indigestion      metoprolol tartrate (LOPRESSOR) 25 MG tablet Take 1 tablet by mouth 2 (Two) Times a Day. Indications: High Blood Pressure      neomycin-bacitracin-polymyxin (NEOSPORIN) 5-400-5000 ointment Apply 1 Application topically to the appropriate area as directed 3 (Three) Times a Day. 28 g 1    ondansetron ODT (ZOFRAN-ODT) 4 MG disintegrating tablet Place 1 tablet on the tongue 4 (Four) Times a Day As Needed for Nausea. 20 tablet 0    rOPINIRole (REQUIP) 0.5 MG tablet Take 1 tablet by mouth Every Night.      temazepam (RESTORIL) 15 MG capsule Take 1 capsule by mouth At Night As Needed for Sleep.      vitamin B-12 (CYANOCOBALAMIN) 1000 MCG tablet Take 1 tablet by mouth Daily. Indications: Inadequate Vitamin B12      vitamin D (ERGOCALCIFEROL) 1.25 MG (62453 UT) capsule capsule Take 1 capsule by mouth Every 7 (Seven) Days. 5 capsule 0     Current Facility-Administered Medications on File Prior to Visit   Medication Dose Route Frequency Provider Last Rate Last Admin    cyanocobalamin injection 1,000 mcg  1,000 mcg Intramuscular Q28 Days Gali Marshall APRN   1,000 mcg at 04/30/25 1206        ALLERGIES:    Allergies   Allergen Reactions    Levaquin [Levofloxacin] Hives    Zocor [Simvastatin] Myalgia    Atorvastatin Other (See Comments)    Codeine GI Intolerance    Penicillins Hives and Swelling     Pt reports tongue swelling    Percocet [Oxycodone-Acetaminophen] Itching and Nausea And Vomiting        Social History     Socioeconomic History    Marital status:       "Spouse name: Nabil   Tobacco Use    Smoking status: Former     Current packs/day: 1.00     Average packs/day: 1 pack/day for 30.0 years (30.0 ttl pk-yrs)     Types: Cigarettes     Passive exposure: Past    Smokeless tobacco: Never   Vaping Use    Vaping status: Never Used   Substance and Sexual Activity    Alcohol use: Yes     Comment: 1 wine drink q 3-4 months    Drug use: Never    Sexual activity: Defer        Family History   Problem Relation Age of Onset    Hypertension Mother     Heart failure Mother     Heart attack Maternal Grandfather     Malig Hyperthermia Neg Hx       Objective   Vitals:    07/23/25 1018   BP: 131/73   Pulse: 68   Resp: 17   Temp: 97.2 °F (36.2 °C)   TempSrc: Oral   SpO2: 96%   Weight: Comment: wheelchair   Height: 160 cm (62.99\")   PainSc: 0-No pain           7/23/2025    10:22 AM   Current Status   ECOG score 2     Physical Exam  Constitutional:       Appearance: Normal appearance.   Eyes:      Extraocular Movements: Extraocular movements intact.      Conjunctiva/sclera: Conjunctivae normal.   Cardiovascular:      Rate and Rhythm: Normal rate.      Heart sounds: Normal heart sounds.   Pulmonary:      Effort: Pulmonary effort is normal.   Abdominal:      General: Bowel sounds are normal.      Palpations: Abdomen is soft.   Musculoskeletal:      Right lower leg: No edema.      Left lower leg: No edema.   Skin:     General: Skin is warm and dry.   Neurological:      Mental Status: She is oriented to person, place, and time.   Psychiatric:         Mood and Affect: Mood normal.         Behavior: Behavior normal.           RECENT LABS:  Results from last 7 days   Lab Units 07/23/25  1008 07/16/25  1146   WBC 10*3/mm3 5.09 3.73   NEUTROS ABS 10*3/mm3 3.61 2.90   HEMOGLOBIN g/dL 9.0* 8.4*   HEMATOCRIT % 27.5* 24.1*   PLATELETS 10*3/mm3 164 60*                         Assessment & Plan    80 year-old female with a orthopedic history as described in particular involving right hip, bilateral " knees and bilateral rotator cuffs recently falling and sustaining a contusion involving her right hip and evidence of compression deformities T12 and L2.  Upon discharge she was seen by orthopedics and, unexpectedly, on additional back studies a left lung nodule was determined.    This was reviewed by pulmonary medicine at [facility] leading to a  a CT of the chest which demonstrated a 2.0 x 1.9 cm noncalcified nodule involving the posterior medial aspect left lower lobe abutting the pleura, more smoothly marginated 0.9 cm noncalcified right middle lobe nodule and a few additional smaller noncalcified nodules including 2 adjacent nodules measuring less than 0.4 cm the right middle lobe along with mild emphysema with mild to moderate subpleural and bibasilar scarring.  There is no pleural effusion or pneumothorax.    A PET/CT was obtained 2/6/2024 with a solid subpleural irregular nodule left lower lobe medially measuring 1.7 x 1.8 intensely hypermetabolic with SUV of 12.8, no additional nodular mass, had metabolic right superior mediastinal lymph node lateral measuring 0.9 cm to 3.9, diffuse lower lobe tracer uptake with mediastinal right hilar lymph nodes partially calcified thought to be inflammatory or granulomatous disease.  There is also low-grade tracer uptake associated subacute healing left anterior rib fractures.    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    The patient is now seen in CBC office.  There has not been assessment by surgery or, apparently, by pulmonary medicine formally though the studies above are available for review.  After discussion we will need to have her presented in the thoracic conference so that opinions from radiology, thoracic surgery radiation therapy as well as pathology can be obtained.    Patient's case discussed during thoracic conference with  the finding of a right supraclavicular node.  This was unknown to us when seen her initially in consultation.  Request made to obtain ultrasound-guided biopsy of the right supraclavicular node and this is discussed with the patient's daughter who indicates the patient would be willing to proceed.  We will go and have this scheduled even though she is to be seen within the next week and the result may not yet be available.    3/6/2023 FNA obtained, discussed with IR physician Dr. Courtney with findings of a few atypical clusters present suspicious for involvement by non-small cell carcinoma.  He is offered to rebiopsy quickly if necessary.    The patient is seen 3/13/2024 with rebiopsy requested as well as radiation therapy consultation.  The case is discussed with interventional radiology and a repeat biopsy is possible.  The patient is willing to proceed in this fashion and we have also discussed radiation therapy consultation in the interval.  She continues have symptoms of chronic back pain and left lid lesion that is going to be reviewed by ophthalmology.    This was discussed 3/21/2024 recognizing the patient's frailty.  Options for treating the areas that are known positive including the left lower lobe and right supraclavicular node would include radiation therapy and Dr. Hopkins will see the patient concerning this as we also follow her for consideration of systemic therapy including a [id number] considering the genomic studies done thus far and the inability to obtain additional genomics from the scant tissue that we have even from recent biopsy.  Finally the patient's overall status including an older adult assessment is going to be requested.    She is seen back 4/11/2024.  She had been seen 4/8/2024 with SBRT to left lower lobe and right neck lesion.  This has not been completed and she is to follow-up with radiation therapy mid July.  We have discussed her findings today with the patient seen along with her  granddaughter.  She did well with radiation therapy and has no additional symptoms currently respiratorily.    Her genomic testing suggested possible use of Selumetinib (not currently available) though we will follow this as she is subsequently reevaluated.    A follow-up CT series was obtained 7/1/2024.  This reveals a slight decrease in the hypermetabolic right supraclavicular node measuring 0.9 x 0.7 cm previously 1.2 x 1.1 cm.  In the chest there is decrease in the mass in the posterior medial aspect left lower lobe and evidence of radiation pneumonitis adjacently.  Margins are difficult to appreciate completely but maximal diameter is 2 cm previously 2.2 cm as compared to previous.  There is a pleural-based 1.0 x 1.6 cm nodule medial aspect of the right lower lobe which which was less conspicuous previously, multiple nodule opacities right middle lobe that are larger and a 1.3 cm density possibly mucous plugging otherwise seen.  These findings suggest a follow-up series of scans will still be necessary.    The patient is seen in office 7/12/2024 with a reasonably stable performance status.  At this point no additional intervention is necessary but a follow-up scan is requested.    The patient required admission 10/7 - 9/20/2024 after mechanical fall with T12-L2 compression fractures treated conservatively.      The patient's follow-up exams obtained 10/4/2024.  She is seen back 10/11/2024 and there is serial decrease in the small right supraclavicular node when compared to previous now down to 7 x 5 mm, evidence of consolidation left lower lobe with tumor developed likely seen measuring 16 x 2 cm, 2 right middle lobe nodules-19 x 13 mm previously 15 x 10, adjacent 9 mm previously 4 to 5 mm and unchanged third right middle lobe noncalcified nodule, other nodule stable and there is no evidence of abdominal/pelvic or osseous metastasis.    The patient required admission 10/7 - 9/20/2024 after mechanical fall with  "T12-L2 compression fractures treated conservatively.    The patient's follow-up exams obtained 10/4/2024.  She is seen back 10/11/2024 and there is serial decrease in the small right supraclavicular node when compared to previous now down to 7 x 5 mm, evidence of consolidation left lower lobe with tumor developed likely seen measuring 16 x 2 cm, 2 right middle lobe nodules-19 x 13 mm previously 15 x 10, adjacent 9 mm previously 4 to 5 mm and unchanged third right middle lobe noncalcified nodule, other nodule stable and there is no evidence of abdominal/pelvic or osseous metastasis.    The patient is seen 10/11/2024 slowly recovering from the mechanical fall described above.  She does not require additional treatment at this point after review of the scans with follow-up exams again are anticipated and she is willing to proceed.     The patient is seen 4/9/2025 and evaluated by CT of soft tissue neck, chest, abdomen, pelvis performed 4/2 2025.  There is now seen increase in the right middle lobe cavitary thick-walled nodule measuring 3.2 x 2.6 compared to previous 2.1 x 1.4.  Other sites are stable and no additional lesions have developed.      The patient is seen with her daughter both indicating that she is still able to carry out daily activities without severe difficulty though she \"has slowed up a bit\".  He does have back pain periodically that improves with rest and does not notice worsening respiratory symptoms.  She may well have disease that would be amenable to local therapy and it is requested that she undergo a PET/CT.    PET/CT demonstrates left lower lobe radiation changes, right middle lobe hypermetabolic 3.1 x 2.3 central cavitary mass that is larger from previous, hypermetabolic right upper lobe 1 cm solid nodule unchanged adjacent right middle lobe 9 mm solid nodule as well as hypermetabolic right hilar and low paratracheal lymphadenopathy concerning for osiel metastatic disease.    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  Radiation therapy would not be expected to control this disease and systemic therapy is going to be necessary.  Considering the tissue type she would be a candidate for Alimta single agent at this point.  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    We have proceeded to have the patient's tumor further assessed by Caris examination with insufficient tumor in the block.  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   it was recognized the patient's clearance is somewhat marginal and her dose was adjusted down by 25%.  She underwent port placement 5/1/2025 and is seen back in office 5/7/2025 to continue with therapy.  She is agreeable to do so.     Hospitalization 5/11/2025 through 5/18/2025 to acute viral norovirus gastroenteritis.  Profound pancytopenia related to both viral infection and chemotherapy despite dose reduction.    Patient seen for hospital follow-up 5/21/2025 with improvement in pancytopenia.  Now with chest congestion productive purulent sputum.  We will treat with azithromycin as well as Tessalon Perles.  The patient will return in 1 week for MD follow-up and consideration of resumption of chemotherapy with further dose reduction.    The patient went on to start a Z-Denny and Tessalon and is seen back in office 5/28/2025, unfortunately, not improved enough to proceed with chemotherapy.    She returns today 6/4/2025 in anticipation of Alimta which has been delayed several weeks due to admission, ongoing generalized weakness, cough, and cytopenias. She reports she is back to her baseline and feeling much better. Appetite has improved and cough has nearly resolved. WBC 6.77, hemoglobin  11.1, platelets 186,000. Unfortunately, BUN is eleavted to 32.6 and creatinine is up to 1.2 today. Creatinine clearance is 32.8mL/min and therefore we will postpone Alimta. Proceed with 500mL NS bolus today and patient will return to clinic in 1 week for NP or MD visit, Alimta with labs per protocol. Encouraged her to increase hydration.    She returns today for reconsideration of Alimta.  This was previously held for norovirus and then decreased for creatinine clearance.  She states that she feels well.  Respiratory symptoms have completely resolved.  No signs of infection.  She endorses drinking much water as she can tolerate.  Her creatinine clearance is 44.  Her port incision site has a small open area less than 1 cm.  No drainage, no redness, no warmth.  Discussed with Dr. Mann and pharmacy.  Proceeded with 37.5% pemetrexed today.    Patient return to clinic on 6/18/2025 for lab review with RN. Patient's BUN at that time was 37.5, creatinine 1.79.  Patient was brought in the next day for IV fluids.  She is here today for repeat labs and evaluation.  She states that she has been able to increase her fluid intake greatly over the past week.  Her nausea has resolved since last visit. Today her BUN is 34.5 and creatinine 1.25..  Additional biolyte samples provided.  Urged patient to continue with increased fluid intake.  Patient to call if she develops any nausea/diarrhea in the meantime.    The patient was seen 6/23/2025 for reconsideration of Alimta that had previously been held when she developed norovirus and also had a reduction for a reduced creatinine clearance.  When seen 6/23/2025 she had improved substantially and we made plans for follow-up 7/2/2025 for additional up to chemotherapy.  She returns today for reconsideration of Alimta.       Unfortunately she is having quite a lot of issues in her family with her  ill and in the hospital requiring additional procedures.  She is quite worried about  his status but just is worried about her being off therapy.  At the time of this dictation H&H were 9.2 and 27.6 with white count of 4930 and platelet count of 82,000 which is a level that could be addressed by lower dose Alimta modified for her thrombocytopenia    She returns 7/23/24 for follow up having been admitted to [facility] 7/9/25-7/15/25 due to epistaxis with platelet count 10,000.  She was transfused platelets x 4 and PRBC x 1 during the hospitalization.  She was also given IV folic acid per oncology due to alimta.  They packed and thereafter cauterized her nose to stop the bleeding.  She denies any bleeding since discharge.  Platelts have improved today to 164,000, hgb 9 and WBC 5.09.  In addition to her own health issues her  has been admitted once again, this time with a stroke that per her explanation sounds very serious.  There is concern about treating her once again at the dose reduced rate as she still ended up hospitalized.  Case reviewed with Dr Mann and plans made to repeat CT CAP this week and delay treatment to next week.  If disease is stable to improved could consider treating once again with further dose reduction. Patient and daughter agreeable to plan.       PLAN:  HOLD ALIMTA today.  Previously receiving Alimta every 3 weeks (Alimta has been further dose reduced by 50% from previous dose.patient ultimately getting 37.5 percent of original dose.  Adjustment reflected in treatment plan)   Proceed with CT CAP this week  Follow up with Dr Mann next week for labs, scan review and possible Alimta.     This patient is on high risk drug therapy requiring intensive monitoring for toxicity. Records reviewed from [facility]. Case discussed with Dr. Mann. I spent 40 minutes caring for Ada on this date of service. This time includes time spent by me in the following activities: preparing for the visit, reviewing tests, obtaining and/or reviewing a separately obtained history, performing a medically  appropriate examination and/or evaluation, counseling and educating the patient/family/caregiver, ordering medications, tests, or procedures, referring and communicating with other health care professionals, documenting information in the medical record, independently interpreting results and communicating that information with the patient/family/caregiver, and care coordination.         Colleen Bean, APRN  07/23/2025

## 2025-07-31 NOTE — PROGRESS NOTES
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  When seen 6/23/2025 she had improved substantially and we proceeded with treatment 7/2/2025 at the same dose as previous, 37.5% of the original dose at 187.5mg/m2.       She called in reporting nosebleeds and was taken to the hospital thereafter with platelet count of 10,000 and hemoglobin of 8.6, and wbc 1.4.  She was given 4 units platelets and 1 PRBCs.  Her nose was packed and cauterized. She was also given IV folic acid due t the alimta. She was discharged 7/15/25.  She denies further nosebleeds or bleeding.  She reports she is regaining strength however has been very stressed due to her husbands health.       Unfortunately she is having quite a lot of issues in her family with her  ill and in the hospital requiring additional procedures.  Unfortunately he has been hospitalized once again for what sounds to be a very serious stroke when she was assessed 7/23/2025.  This led to holding Alimta and repeat studies that included CT imaging  Shortly thereafter also 7/23/2025 that demonstrated a stable mass in the right middle lobe and adjacent solid pulmonary nodule and a slight decrease in subpleural nodularity in the anterior right upper lobe adjacent mediastinum, radiation changes.  Segment left lower lobe stable and fibrosis interstitial lung disease unchanged.  There is no evidence of metastatic disease in the abdomen or pelvis.  Additional testing 7/23 include H&H of 9.0 and 27.5 with a white count of 5090 and platelet count 164,000, CMP with slight elevation in ALT and AST, BUN/creatinine of 17.7 and 1.05, ferritin of 752, iron saturation of 78.    She is next seen 8/1/2025.  She has improved  dramatically after recent hospitalization at [facility] where she had presented with acute epistaxis and experienced pancytopenia with white count of 1400, hemoglobin 8.6 and platelet count of 10,000 on admission, ANC dropping to 200.  She did gradually improve with supportive care and is seen back now for potential further therapy with scans, as above showing an improvement or at least stable disease.  Unfortunately she lost her  after 66 years recently and is grieving his loss.  She states, physically, that she is feeling much better than she has for many months however.  We have had a lengthy conversation about trying to continue therapy recognizing further dose reduction and improvement in her current  creatinine clearance.      ONCOLOGY HISTORY:  The patient is an 80-year-old female with below medical history recently admitted 12/4 - 6/20/2023 with a history of right intertrochanteric femoral fracture and osteoarthritis presenting from a fall at home.  She had a stress fracture left foot that affected her balance, chronic low back pain that is also worsened with a fall.  Upon presentation orthopedic surgery was consulted finding a right intramedullary nail fixation and healing without abnormal findings.  No additional surgery was planned though physical therapy continued.  Spinal films demonstrated compression deformity T12 and L2 but fortunately she did further improve and was able to be discharged.    The patient had follow-up with several physicians including orthopedic physicians with an eventual chest exam that revealed an unexpected nodularity.  She was then referred to pulmonary medicine 1/8/2024 with a history of smoking for 35 years quitting 8 years ago and indication that there was a lung nodule found incidentally on imaging for the spine.  Through the [facility] system a CT of the chest demonstrated a 2.0 x 1.9 cm noncalcified nodule involving the posterior medial aspect left lower lobe abutting the  pleura, more smoothly marginated 0.9 cm noncalcified right middle lobe nodule and a few additional smaller noncalcified nodules including 2 adjacent nodules measuring less than 0.4 cm the right middle lobe along with mild emphysema with mild to moderate subpleural and bibasilar scarring.  There is no pleural effusion or pneumothorax.    A PET/CT was obtained 2/6/2024 with a solid subpleural irregular nodule left lower lobe medially measuring 1.7 x 1.8 intensely hypermetabolic with SUV of 12.8, no additional nodular mass, had metabolic right superior mediastinal lymph node lateral measuring 0.9 cm to 3.9, diffuse lower lobe tracer uptake with mediastinal right hilar lymph nodes partially calcified thought to be inflammatory or granulomatous disease.  There is also low-grade tracer uptake associated subacute healing left anterior rib fractures.    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    The patient was referred urgently from [facility] for assessment.  It is not certain why she has not been seen in the [place] system though she now states that she wished to be seen here.     Patient's case discussed during thoracic conference with the finding of a right supraclavicular node.  This was unknown to us when seen her initially in consultation.  Request made to obtain ultrasound-guided biopsy of the right supraclavicular node and this is discussed with the patient's daughter who indicates the patient would be willing to proceed.  We will go and have this scheduled even though she is to be seen within the next week and the result may not yet be available.    3/6/2023 FNA obtained, discussed with IR physician Dr. Courtney with findings of a few atypical clusters present suspicious for involvement by non-small cell carcinoma.  He is offered to rebiopsy quickly if necessary.    The patient is  seen 3/13/2024 with rebiopsy requested as well as radiation therapy consultation.  The case is discussed with interventional radiology and a repeat biopsy is possible.  The patient is willing to proceed in this fashion and we have also discussed radiation therapy consultation in the interval.  She continues have symptoms of chronic back pain and left lid lesion that is going to be reviewed by ophthalmology.    The patient went on to have a follow-up exam with repeat right supraclavicular lymph node biopsy 3/18/2024.  This was found positive for metastatic carcinoma.  The patient was seen by Dr. Hopkins with the conclusion that she has a 2 cm left lower lobe squamous cell carcinoma abutting the pleura additional contralateral supraclavicular lymph node in the thyroid.  This is to be discussed in conference 3/21/2024.    This was discussed 3/21/2024 recognizing the patient's frailty.  Options for treating the areas that are known positive including the left lower lobe and right supraclavicular node would include radiation therapy and Dr. Hopkins will see the patient concerning this as we also follow her for consideration of systemic therapy including a [id number] considering the genomic studies done thus far and the inability to obtain additional genomics from the scant tissue that we have even from recent biopsy.  Finally the patient's overall status including an older adult assessment is going to be requested.    The patient's [id number] revealed NF1  detected with possible indication of Selumetinib effectiveness.  Additionally TMB was 10.4 mutations per megabase.  Concurrently the patient was seen by radiation therapy treating the left lower lobe and right neck site via SBRT.  Further MRI of the brain revealed no evidence of metastatic disease.    She is seen back 4/11/2024.  She had been seen 4/8/2024 with SBRT to left lower lobe and right neck lesion.  This has not been completed and she is to follow-up with  radiation therapy mid July.  We have discussed her findings today with the patient seen along with her granddaughter.  She did well with radiation therapy and has no additional symptoms currently respiratorily.  Her genomic testing suggested possible use of Selumetinib (not currently available) though we will follow this as she is subsequently reevaluated.    A follow-up CT series was obtained 7/1/2024.  This reveals a slight decrease in the hypermetabolic right supraclavicular node measuring 0.9 x 0.7 cm previously 1.2 x 1.1 cm.  In the chest there is decrease in the mass in the posterior medial aspect left lower lobe and evidence of radiation pneumonitis adjacently.  Margins are difficult to appreciate completely but maximal diameter is 2 cm previously 2.2 cm as compared to previous.  There is a pleural-based 1.0 x 1.6 cm nodule medial aspect of the right lower lobe which which was less conspicuous previously, multiple nodule opacities right middle lobe that are larger and a 1.3 cm density possibly mucous plugging otherwise seen.  These findings suggest a follow-up series of scans will still be necessary.    The patient is seen with her daughter and they both agree with this follow-up plan.    The patient required admission 10/7 - 9/20/2024 after mechanical fall with T12-L2 compression fractures treated conservatively.    The patient's follow-up exams obtained 10/4/2024.  She is seen back 10/11/2024 and there is serial decrease in the small right supraclavicular node when compared to previous now down to 7 x 5 mm, evidence of consolidation left lower lobe with tumor developed likely seen measuring 16 x 2 cm, 2 right middle lobe nodules-19 x 13 mm previously 15 x 10, adjacent 9 mm previously 4 to 5 mm and unchanged third right middle lobe noncalcified nodule, other nodule stable and there is no evidence of abdominal/pelvic or osseous metastasis.    The patient is seen 10/11/2024 slowly recovering from the  "mechanical fall described above.  She does not require additional treatment at this point after review of the scans with follow-up exams again are anticipated and she is willing to proceed.    The patient is seen 4/9/2025 and evaluated by CT of soft tissue neck, chest, abdomen, pelvis performed 4/2 2025.  There is now seen increase in the right middle lobe cavitary thick-walled nodule measuring 3.2 x 2.6 compared to previous 2.1 x 1.4.  Other sites are stable and no additional lesions have developed.  The patient is seen with her daughter both indicating that she is still able to carry out daily activities without severe difficulty though she \"has slowed up a bit\".  He does have back pain periodically that improves with rest and does not notice worsening respiratory symptoms.  She may well have disease that would be amenable to local therapy and it is requested that she undergo a PET/CT.    PET/CT demonstrates left lower lobe radiation changes, right middle lobe hypermetabolic 3.1 x 2.3 central cavitary mass that is larger from previous, hypermetabolic right upper lobe 1 cm solid nodule unchanged adjacent right middle lobe 9 mm solid nodule as well as hypermetabolic right hilar and low paratracheal lymphadenopathy concerning for osiel metastatic disease.    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  Radiation therapy would not be expected to control this disease and systemic therapy is going to be necessary.  Considering the tissue type she would be a candidate for Alimta single agent at this point.    We have proceeded to have the patient's tumor further assessed by Caris examination with insufficient tumor in the block.  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   it was recognized the patient's clearance is somewhat marginal and her dose was adjusted down by 25%.  She underwent " port placement 5/1/2025 and is seen back in office 5/7/2025 to continue with therapy.  She is agreeable to do so.     Unfortunately, she was recently admitted 5/11/2025 through 5/18/2025 secondary to acute norovirus gastroenteritis.  She had associated pancytopenia.  She struggled with severe abdominal pain, nausea vomiting and diarrhea.  She received G-CSF support due to neutropenia.        Since discharge, the patient reports she has continued to struggle with diarrhea which finally improved just yesterday.  She is now developed productive cough with purulent sputum and thick drainage.  She has no fevers or chills.  She was treated subsequently with a Z-Denny, Tessalon Perles and then plans were made for follow-up 5/28/2025 and possible chemotherapy with dose reduction.    She is next seen, however, 5/28/2025 with continued cough and general weakness.  Appetite is slowly returning as is her stamina though she states she is having difficulty sleeping from the cough.  Finally, additionally, her cytopenias are not yet resolved enough to proceed with chemotherapy.    She returns today 6/4/2025 in anticipation of Alimta which has been delayed several weeks due to admission, ongoing generalized weakness, cough, and cytopenias. She reports she is back to her baseline and feeling much better. Appetite has improved and cough has nearly resolved. WBC 6.77, hemoglobin 11.1, platelets 186,000. Unfortunately, BUN is eleavted to 32.6 and creatinine is up to 1.2 today. Creatinine clearance is 32.8mL/min and therefore we will postpone Alimta. Proceed with 500mL NS bolus today and patient will return to clinic in 1 week for NP or MD visit, Alimta with labs per protocol. Encouraged her to increase hydration.     She returns 6/11/2025 for reconsideration of Alimta.  This was previously held for norovirus and then decreased for creatinine clearance.  She states that she feels well.  Respiratory symptoms have completely resolved.  No  signs of infection.  She endorses drinking much water as she can tolerate.  Her creatinine clearance is 44.  Her port incision site has a small open area less than 1 cm.  No drainage, no redness, no warmth.  Discussed with Dr. Mann and pharmacy.  Proceeded with 37.5% pemetrexed today.    Patient return to clinic on 6/18/2025 for lab review with RN. Patient's BUN at that time was 37.5, creatinine 1.79.  Patient was brought in the next day for IV fluids.  She is here today for repeat labs and evaluation.  She states that she has been able to increase her fluid intake greatly over the past week.  Her nausea has resolved since last visit.     The patient was seen 6/23/2025 for reconsideration of Alimta that had previously been held when she developed norovirus and also had a reduction for a reduced creatinine clearance.  When seen 6/23/2025 she had improved substantially and we made plans for follow-up 7/2/2025 for additional up to chemotherapy.  She returns today for reconsideration of Alimta.       Unfortunately  7/2/2025 she is having quite a lot of issues in her family with her  ill and in the hospital requiring additional procedures.  She is quite worried about his status but just is worried about her being off therapy.  At the time of this dictation H&H were 9.2 and 27.6 with white count of 4930 and platelet count of 82,000 which is a level that could be addressed by lower dose Alimta modified for her thrombocytopenia.      Unfortunately she is having quite a lot of issues in her family with her  ill and in the hospital requiring additional procedures.  Unfortunately he has been hospitalized once again for what sounds to be a very serious stroke when she was assessed 7/23/2025.  This led to holding Alimta and repeat studies that included CT imaging  Shortly thereafter also 7/23/2025 that demonstrated a stable mass in the right middle lobe and adjacent solid pulmonary nodule and a slight decrease in  subpleural nodularity in the anterior right upper lobe adjacent mediastinum, radiation changes.  Segment left lower lobe stable and fibrosis interstitial lung disease unchanged.  There is no evidence of metastatic disease in the abdomen or pelvis.  Additional testing 7/23 include H&H of 9.0 and 27.5 with a white count of 5090 and platelet count 164,000, CMP with slight elevation in ALT and AST, BUN/creatinine of 17.7 and 1.05, ferritin of 752, iron saturation of 78.    She is next seen 8/1/2025.     Unfortunately she is having quite a lot of issues in her family with her  ill and in the hospital requiring additional procedures.  Unfortunately he has been hospitalized once again for what sounds to be a very serious stroke when she was assessed 7/23/2025.  This led to holding Alimta and repeat studies that included CT imaging  Shortly thereafter also 7/23/2025 that demonstrated a stable mass in the right middle lobe and adjacent solid pulmonary nodule and a slight decrease in subpleural nodularity in the anterior right upper lobe adjacent mediastinum, radiation changes.  Segment left lower lobe stable and fibrosis interstitial lung disease unchanged.  There is no evidence of metastatic disease in the abdomen or pelvis.  Additional testing 7/23 include H&H of 9.0 and 27.5 with a white count of 5090 and platelet count 164,000, CMP with slight elevation in ALT and AST, BUN/creatinine of 17.7 and 1.05, ferritin of 752, iron saturation of 78.    She is next seen 8/1/2025.  She has improved dramatically after recent hospitalization at [facility] where she had presented with acute epistaxis and experienced pancytopenia with white count of 1400, hemoglobin 8.6 and platelet count of 10,000 on admission, ANC dropping to 200.  She did gradually improve with supportive care and is seen back now for potential further therapy with scans, as above showing an improvement or at least stable disease.  Unfortunately she lost her   after 66 years recently and is grieving his loss.  She states, physically, that she is feeling much better than she has for many months however.  We have had a lengthy conversation about trying to continue therapy recognizing further dose reduction and improvement in her current  creatinine clearance.        Past Surgical History:   Procedure Laterality Date    APPENDECTOMY      CARDIAC CATHETERIZATION      EXTERNAL EAR SURGERY Right     had tumor excised behind right ear lobe benign    EYE LESION EXCISION Left 6/6/2024    Procedure: LEFT LOWER EYELID BASEL CELL CARCINOMA EXCISIONAL AND REPAIR WITH FROZEN SECTIONS,;  Surgeon: Austin Kamara MD;  Location: [facility] OR;  Service: Ophthalmology;  Laterality: Left;    FEMUR IM NAILING/RODDING Right 06/15/2022    Procedure: RIGHT HIP INTRAMEDULLARY NAILING/RODDING;  Surgeon: Marc Crawford MD;  Location: [facility];  Service: Orthopedics;  Laterality: Right;    GALLBLADDER SURGERY      HYSTERECTOMY      KNEE ARTHROPLASTY Right     KNEE ARTHROSCOPY Right     TN REVJ TOTAL KNEE ARTHRP W/WO ALGRFT 1 COMPONENT Right 09/07/2016    Procedure: RT ILIOTIBIAL BAND RELEASE RT TOTAL KNEE POLY EXCHANGE;  Surgeon: Toy Adame MD;  Location: [facility];  Service: Orthopedics    ROTATOR CUFF REPAIR Bilateral     US GUIDED LYMPH NODE BIOPSY  3/18/2024    VENOUS ACCESS DEVICE (PORT) INSERTION Right 5/1/2025    Procedure: INSERTION VENOUS ACCESS DEVICE;  Surgeon: Saloni Verdugo MD;  Location: [facility];  Service: Vascular;  Laterality: Right;    WRIST MASS EXCISION Right         Current Outpatient Medications on File Prior to Visit   Medication Sig Dispense Refill    albuterol (PROVENTIL HFA;VENTOLIN HFA) 108 (90 BASE) MCG/ACT inhaler Inhale 2 puffs Every 4 (Four) Hours As Needed. Indications: Asthma      allopurinol (ZYLOPRIM) 100 MG tablet Take 1 tablet by mouth Daily.      dexAMETHasone (DECADRON) 4 MG tablet Take 1 tablet oral twice a day for 3  consecutive days beginning the day before chemotherapy and continue for 6 doses.Take with food. 30 tablet 2    dicyclomine (BENTYL) 20 MG tablet Take 1 tablet by mouth Every 6 (Six) Hours. 20 tablet 0    fluticasone (FLONASE) 50 MCG/ACT nasal spray Administer 2 sprays into the nostril(s) as directed by provider Daily.      folic acid (FOLVITE) 1 MG tablet Take 1 tablet by mouth Daily. Start at least 7 days prior to chemotherapy until at least 3 weeks after all chemotherapy. 30 tablet 6    lidocaine-prilocaine (EMLA) 2.5-2.5 % cream Apply 1 Application topically to the appropriate area as directed Every 2 (Two) Hours As Needed for Mild Pain. Apply a nickel size amount to port 30 minutes prior to access. 30 g 2    Magnesium 500 MG capsule Take 500 mg by mouth 2 (Two) Times a Day. Indications: Acid Indigestion      metoprolol tartrate (LOPRESSOR) 25 MG tablet Take 1 tablet by mouth 2 (Two) Times a Day. Indications: High Blood Pressure      neomycin-bacitracin-polymyxin (NEOSPORIN) 5-400-5000 ointment Apply 1 Application topically to the appropriate area as directed 3 (Three) Times a Day. 28 g 1    ondansetron ODT (ZOFRAN-ODT) 4 MG disintegrating tablet Place 1 tablet on the tongue 4 (Four) Times a Day As Needed for Nausea. 20 tablet 0    temazepam (RESTORIL) 15 MG capsule Take 1 capsule by mouth At Night As Needed for Sleep.      vitamin B-12 (CYANOCOBALAMIN) 1000 MCG tablet Take 1 tablet by mouth Daily. Indications: Inadequate Vitamin B12      vitamin D (ERGOCALCIFEROL) 1.25 MG (80529 UT) capsule capsule Take 1 capsule by mouth Every 7 (Seven) Days. 5 capsule 0     Current Facility-Administered Medications on File Prior to Visit   Medication Dose Route Frequency Provider Last Rate Last Admin    cyanocobalamin injection 1,000 mcg  1,000 mcg Intramuscular Q28 Days Gali Marshall APRN   1,000 mcg at 04/30/25 1206        ALLERGIES:    Allergies   Allergen Reactions    Levaquin [Levofloxacin] Hives    Zocor  "[Simvastatin] Myalgia    Atorvastatin Other (See Comments)    Codeine GI Intolerance    Penicillins Hives and Swelling     Pt reports tongue swelling    Percocet [Oxycodone-Acetaminophen] Itching and Nausea And Vomiting        Social History     Socioeconomic History    Marital status:      Spouse name: Nabil   Tobacco Use    Smoking status: Former     Current packs/day: 1.00     Average packs/day: 1 pack/day for 30.0 years (30.0 ttl pk-yrs)     Types: Cigarettes     Passive exposure: Past    Smokeless tobacco: Never   Vaping Use    Vaping status: Never Used   Substance and Sexual Activity    Alcohol use: Yes     Comment: 1 wine drink q 3-4 months    Drug use: Never    Sexual activity: Defer        Family History   Problem Relation Age of Onset    Hypertension Mother     Heart failure Mother     Heart attack Maternal Grandfather     Malig Hyperthermia Neg Hx       Objective   Vitals:    08/01/25 0845   BP: 154/93   Pulse: 54   Temp: 97.4 °F (36.3 °C)   TempSrc: Skin   SpO2: 100%   Weight: 65.4 kg (144 lb 3.2 oz)   Height: 160 cm (62.99\")   PainSc: 0-No pain             8/1/2025     8:45 AM   Current Status   ECOG score 1     Physical Exam  Constitutional:       Appearance: Normal appearance.   Eyes:      Extraocular Movements: Extraocular movements intact.      Conjunctiva/sclera: Conjunctivae normal.   Cardiovascular:      Rate and Rhythm: Normal rate.      Heart sounds: Normal heart sounds.   Pulmonary:      Effort: Pulmonary effort is normal.   Abdominal:      General: Bowel sounds are normal.      Palpations: Abdomen is soft.   Musculoskeletal:      Right lower leg: No edema.      Left lower leg: No edema.   Skin:     General: Skin is warm and dry.   Neurological:      Mental Status: She is oriented to person, place, and time.   Psychiatric:         Mood and Affect: Mood normal.         Behavior: Behavior normal.           RECENT LABS:    WBC   Date Value Ref Range Status   08/01/2025 7.95 3.40 - 10.80 " 10*3/mm3 Final   02/06/2024 4.15 (L) 4.5 - 11.0 10*3/uL Final     RBC   Date Value Ref Range Status   08/01/2025 2.87 (L) 3.77 - 5.28 10*6/mm3 Final   02/06/2024 3.70 (L) 4.0 - 5.2 10*6/uL Final     Hemoglobin   Date Value Ref Range Status   08/01/2025 9.9 (L) 12.0 - 15.9 g/dL Final   02/06/2024 12.4 12.0 - 16.0 g/dL Final     Hematocrit   Date Value Ref Range Status   08/01/2025 31.7 (L) 34.0 - 46.6 % Final   02/06/2024 36.6 36.0 - 46.0 % Final     MCV   Date Value Ref Range Status   08/01/2025 110.5 (H) 79.0 - 97.0 fL Final   02/06/2024 98.9 80.0 - 100.0 fL Final     MCH   Date Value Ref Range Status   08/01/2025 34.5 (H) 26.6 - 33.0 pg Final   02/06/2024 33.5 26.0 - 34.0 pg Final     MCHC   Date Value Ref Range Status   08/01/2025 31.2 (L) 31.5 - 35.7 g/dL Final   02/06/2024 33.9 31.0 - 37.0 g/dL Final     RDW   Date Value Ref Range Status   08/01/2025 18.2 (H) 12.3 - 15.4 % Final   02/06/2024 12.8 12.0 - 16.8 % Final     RDW-SD   Date Value Ref Range Status   08/01/2025 73.5 (H) 37.0 - 54.0 fl Final     MPV   Date Value Ref Range Status   08/01/2025 11.4 6.0 - 12.0 fL Final   02/06/2024 10.1 8.4 - 12.4 fL Final     Platelets   Date Value Ref Range Status   08/01/2025 117 (L) 140 - 450 10*3/mm3 Final   02/06/2024 77 (L) 140 - 440 10*3/uL Final     Neutrophil Rel %   Date Value Ref Range Status   07/30/2019 49.0 45 - 80 % Final     Neutrophil %   Date Value Ref Range Status   08/01/2025 60.3 42.7 - 76.0 % Final     Lymphocyte Rel %   Date Value Ref Range Status   07/30/2019 37.8 15 - 50 % Final     Lymphocyte %   Date Value Ref Range Status   08/01/2025 23.4 19.6 - 45.3 % Final     Monocyte Rel %   Date Value Ref Range Status   07/30/2019 8.6 0 - 15 % Final     Monocyte %   Date Value Ref Range Status   08/01/2025 13.3 (H) 5.0 - 12.0 % Final     Eosinophil %   Date Value Ref Range Status   08/01/2025 2.0 0.3 - 6.2 % Final   07/30/2019 3.3 0 - 7 % Final     Basophil Rel %   Date Value Ref Range Status   07/30/2019  1.0 0 - 2 % Final     Basophil %   Date Value Ref Range Status   08/01/2025 0.4 0.0 - 1.5 % Final     Immature Grans %   Date Value Ref Range Status   08/01/2025 0.6 (H) 0.0 - 0.5 % Final   07/30/2019 0.3 (H) 0 % Final     Neutrophils Absolute   Date Value Ref Range Status   07/30/2019 1.95 (L) 2.0 - 8.8 10*3/uL Final     Neutrophils, Absolute   Date Value Ref Range Status   08/01/2025 4.79 1.70 - 7.00 10*3/mm3 Final     Lymphocytes Absolute   Date Value Ref Range Status   07/30/2019 1.50 0.7 - 5.5 10*3/uL Final     Lymphocytes, Absolute   Date Value Ref Range Status   08/01/2025 1.86 0.70 - 3.10 10*3/mm3 Final     Monocytes Absolute   Date Value Ref Range Status   07/30/2019 0.34 0.0 - 1.7 10*3/uL Final     Monocytes, Absolute   Date Value Ref Range Status   08/01/2025 1.06 (H) 0.10 - 0.90 10*3/mm3 Final     Eosinophils Absolute   Date Value Ref Range Status   07/30/2019 0.13 0.0 - 0.8 10*3/uL Final     Eosinophils, Absolute   Date Value Ref Range Status   08/01/2025 0.16 0.00 - 0.40 10*3/mm3 Final     Basophils Absolute   Date Value Ref Range Status   07/30/2019 0.04 0.0 - 0.2 10*3/uL Final     Basophils, Absolute   Date Value Ref Range Status   08/01/2025 0.03 0.00 - 0.20 10*3/mm3 Final     Immature Grans, Absolute   Date Value Ref Range Status   08/01/2025 0.05 0.00 - 0.05 10*3/mm3 Final   07/30/2019 0.01 <1 10*3/uL Final     nRBC   Date Value Ref Range Status   08/01/2025 0.0 0.0 - 0.2 /100 WBC Final         Assessment & Plan    80 year-old female with a orthopedic history as described in particular involving right hip, bilateral knees and bilateral rotator cuffs recently falling and sustaining a contusion involving her right hip and evidence of compression deformities T12 and L2.  Upon discharge she was seen by orthopedics and, unexpectedly, on additional back studies a left lung nodule was determined.    This was reviewed by pulmonary medicine at [facility] leading to a  a CT of the chest which demonstrated a  2.0 x 1.9 cm noncalcified nodule involving the posterior medial aspect left lower lobe abutting the pleura, more smoothly marginated 0.9 cm noncalcified right middle lobe nodule and a few additional smaller noncalcified nodules including 2 adjacent nodules measuring less than 0.4 cm the right middle lobe along with mild emphysema with mild to moderate subpleural and bibasilar scarring.  There is no pleural effusion or pneumothorax.    A PET/CT was obtained 2/6/2024 with a solid subpleural irregular nodule left lower lobe medially measuring 1.7 x 1.8 intensely hypermetabolic with SUV of 12.8, no additional nodular mass, had metabolic right superior mediastinal lymph node lateral measuring 0.9 cm to 3.9, diffuse lower lobe tracer uptake with mediastinal right hilar lymph nodes partially calcified thought to be inflammatory or granulomatous disease.  There is also low-grade tracer uptake associated subacute healing left anterior rib fractures.    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    The patient is now seen in CBC office.  There has not been assessment by surgery or, apparently, by pulmonary medicine formally though the studies above are available for review.  After discussion we will need to have her presented in the thoracic conference so that opinions from radiology, thoracic surgery radiation therapy as well as pathology can be obtained.    Patient's case discussed during thoracic conference with the finding of a right supraclavicular node.  This was unknown to us when seen her initially in consultation.  Request made to obtain ultrasound-guided biopsy of the right supraclavicular node and this is discussed with the patient's daughter who indicates the patient would be willing to proceed.  We will go and have this scheduled even though she is to be seen within the next week  and the result may not yet be available.    3/6/2023 FNA obtained, discussed with IR physician Dr. Courtney with findings of a few atypical clusters present suspicious for involvement by non-small cell carcinoma.  He is offered to rebiopsy quickly if necessary.    The patient is seen 3/13/2024 with rebiopsy requested as well as radiation therapy consultation.  The case is discussed with interventional radiology and a repeat biopsy is possible.  The patient is willing to proceed in this fashion and we have also discussed radiation therapy consultation in the interval.  She continues have symptoms of chronic back pain and left lid lesion that is going to be reviewed by ophthalmology.    This was discussed 3/21/2024 recognizing the patient's frailty.  Options for treating the areas that are known positive including the left lower lobe and right supraclavicular node would include radiation therapy and Dr. Hopkins will see the patient concerning this as we also follow her for consideration of systemic therapy including a [id number] considering the genomic studies done thus far and the inability to obtain additional genomics from the scant tissue that we have even from recent biopsy.  Finally the patient's overall status including an older adult assessment is going to be requested.    She is seen back 4/11/2024.  She had been seen 4/8/2024 with SBRT to left lower lobe and right neck lesion.  This has not been completed and she is to follow-up with radiation therapy mid July.  We have discussed her findings today with the patient seen along with her granddaughter.  She did well with radiation therapy and has no additional symptoms currently respiratorily.    Her genomic testing suggested possible use of Selumetinib (not currently available) though we will follow this as she is subsequently reevaluated.    A follow-up CT series was obtained 7/1/2024.  This reveals a slight decrease in the hypermetabolic right supraclavicular  node measuring 0.9 x 0.7 cm previously 1.2 x 1.1 cm.  In the chest there is decrease in the mass in the posterior medial aspect left lower lobe and evidence of radiation pneumonitis adjacently.  Margins are difficult to appreciate completely but maximal diameter is 2 cm previously 2.2 cm as compared to previous.  There is a pleural-based 1.0 x 1.6 cm nodule medial aspect of the right lower lobe which which was less conspicuous previously, multiple nodule opacities right middle lobe that are larger and a 1.3 cm density possibly mucous plugging otherwise seen.  These findings suggest a follow-up series of scans will still be necessary.    The patient is seen in office 7/12/2024 with a reasonably stable performance status.  At this point no additional intervention is necessary but a follow-up scan is requested.    The patient required admission 10/7 - 9/20/2024 after mechanical fall with T12-L2 compression fractures treated conservatively.      The patient's follow-up exams obtained 10/4/2024.  She is seen back 10/11/2024 and there is serial decrease in the small right supraclavicular node when compared to previous now down to 7 x 5 mm, evidence of consolidation left lower lobe with tumor developed likely seen measuring 16 x 2 cm, 2 right middle lobe nodules-19 x 13 mm previously 15 x 10, adjacent 9 mm previously 4 to 5 mm and unchanged third right middle lobe noncalcified nodule, other nodule stable and there is no evidence of abdominal/pelvic or osseous metastasis.    The patient required admission 10/7 - 9/20/2024 after mechanical fall with T12-L2 compression fractures treated conservatively.    The patient's follow-up exams obtained 10/4/2024.  She is seen back 10/11/2024 and there is serial decrease in the small right supraclavicular node when compared to previous now down to 7 x 5 mm, evidence of consolidation left lower lobe with tumor developed likely seen measuring 16 x 2 cm, 2 right middle lobe nodules-19 x  "13 mm previously 15 x 10, adjacent 9 mm previously 4 to 5 mm and unchanged third right middle lobe noncalcified nodule, other nodule stable and there is no evidence of abdominal/pelvic or osseous metastasis.    The patient is seen 10/11/2024 slowly recovering from the mechanical fall described above.  She does not require additional treatment at this point after review of the scans with follow-up exams again are anticipated and she is willing to proceed.     The patient is seen 4/9/2025 and evaluated by CT of soft tissue neck, chest, abdomen, pelvis performed 4/2 2025.  There is now seen increase in the right middle lobe cavitary thick-walled nodule measuring 3.2 x 2.6 compared to previous 2.1 x 1.4.  Other sites are stable and no additional lesions have developed.      The patient is seen with her daughter both indicating that she is still able to carry out daily activities without severe difficulty though she \"has slowed up a bit\".  He does have back pain periodically that improves with rest and does not notice worsening respiratory symptoms.  She may well have disease that would be amenable to local therapy and it is requested that she undergo a PET/CT.    PET/CT demonstrates left lower lobe radiation changes, right middle lobe hypermetabolic 3.1 x 2.3 central cavitary mass that is larger from previous, hypermetabolic right upper lobe 1 cm solid nodule unchanged adjacent right middle lobe 9 mm solid nodule as well as hypermetabolic right hilar and low paratracheal lymphadenopathy concerning for osiel metastatic disease.    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  Radiation therapy would not be expected to control this disease and systemic therapy is going to be necessary.  Considering the tissue type she would be a candidate for Alimta single agent at this point.  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    We have proceeded to have the patient's tumor further assessed by Caris examination with insufficient tumor in the block.  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   it was recognized the patient's clearance is somewhat marginal and her dose was adjusted down by 25%.  She underwent port placement 5/1/2025 and is seen back in office 5/7/2025 to continue with therapy.  She is agreeable to do so.     Hospitalization 5/11/2025 through 5/18/2025 to acute viral norovirus gastroenteritis.  Profound pancytopenia related to both viral infection and chemotherapy despite dose reduction.    Patient seen for hospital follow-up 5/21/2025 with improvement in pancytopenia.  Now with chest congestion productive purulent sputum.  We will treat with azithromycin as well as Tessalon Perles.  The patient will return in 1 week for MD follow-up and consideration of resumption of chemotherapy with further dose reduction.    The patient went on to start a Z-Denny and Tessalon and is seen back in office 5/28/2025, unfortunately, not improved enough to proceed with chemotherapy.    She returns today 6/4/2025 in anticipation of Alimta which has been delayed several weeks due to admission, ongoing generalized weakness, cough, and cytopenias. She reports she is back to her baseline and feeling much better. Appetite has improved and cough has nearly resolved. WBC 6.77, hemoglobin 11.1, platelets 186,000. Unfortunately, BUN is eleavted to 32.6 and creatinine is up to 1.2 today. Creatinine clearance is 32.8mL/min and therefore we will postpone Alimta. Proceed with 500mL NS bolus today and patient will return to clinic in 1 week for NP or MD visit, Alimta with labs per protocol. Encouraged her to increase hydration.    She returns today for  reconsideration of Alimta.  This was previously held for norovirus and then decreased for creatinine clearance.  She states that she feels well.  Respiratory symptoms have completely resolved.  No signs of infection.  She endorses drinking much water as she can tolerate.  Her creatinine clearance is 44.  Her port incision site has a small open area less than 1 cm.  No drainage, no redness, no warmth.  Discussed with Dr. Mann and pharmacy.  Proceeded with 37.5% pemetrexed today.    Patient return to clinic on 6/18/2025 for lab review with RN. Patient's BUN at that time was 37.5, creatinine 1.79.  Patient was brought in the next day for IV fluids.  She is here today for repeat labs and evaluation.  She states that she has been able to increase her fluid intake greatly over the past week.  Her nausea has resolved since last visit. Today her BUN is 34.5 and creatinine 1.25..  Additional biolyte samples provided.  Urged patient to continue with increased fluid intake.  Patient to call if she develops any nausea/diarrhea in the meantime.    The patient was seen 6/23/2025 for reconsideration of Alimta that had previously been held when she developed norovirus and also had a reduction for a reduced creatinine clearance.  When seen 6/23/2025 she had improved substantially and we made plans for follow-up 7/2/2025 for additional up to chemotherapy.  She returns today for reconsideration of Alimta.       Unfortunately she is having quite a lot of issues in her family with her  ill and in the hospital requiring additional procedures.  She is quite worried about his status but just is worried about her being off therapy.  At the time of this dictation H&H were 9.2 and 27.6 with white count of 4930 and platelet count of 82,000 which is a level that could be addressed by lower dose Alimta modified for her thrombocytopenia    She returns 7/23/24 for follow up having been admitted to [facility] 7/9/25-7/15/25 due to epistaxis with  platelet count 10,000.  She was transfused platelets x 4 and PRBC x 1 during the hospitalization.  She was also given IV folic acid per oncology due to alimta.  They packed and thereafter cauterized her nose to stop the bleeding.  She denies any bleeding since discharge.  Platelts have improved today to 164,000, hgb 9 and WBC 5.09.  In addition to her own health issues her  has been admitted once again, this time with a stroke that per her explanation sounds very serious.  There is concern about treating her once again at the dose reduced rate as she still ended up hospitalized.  Case reviewed with Dr Mann and plans made to repeat CT CAP this week and delay treatment to next week.  If disease is stable to improved could consider treating once again with further dose reduction. Patient and daughter agreeable to plan.     Repeat studies that included CT imaging shortly thereafter also 7/23/2025 that demonstrated a stable mass in the right middle lobe and adjacent solid pulmonary nodule and a slight decrease in subpleural nodularity in the anterior right upper lobe adjacent mediastinum, radiation changes.  Segment left lower lobe stable and fibrosis interstitial lung disease unchanged.  There is no evidence of metastatic disease in the abdomen or pelvis.  Additional testing 7/23 include H&H of 9.0 and 27.5 with a white count of 5090 and platelet count 164,000, CMP with slight elevation in ALT and AST, BUN/creatinine of 17.7 and 1.05, ferritin of 752, iron saturation of 78.       Unfortunately she was having quite a lot of issues in her family with her  ill and in the hospital requiring additional procedures.  Unfortunately he has been hospitalized once again for what sounds to be a very serious stroke when she was assessed 7/23/2025.  This led to holding Alimta and repeat studies that included CT imaging  Shortly thereafter also 7/23/2025 that demonstrated a stable mass in the right middle lobe and  adjacent solid pulmonary nodule and a slight decrease in subpleural nodularity in the anterior right upper lobe adjacent mediastinum, radiation changes.  Segment left lower lobe stable and fibrosis interstitial lung disease unchanged.  There is no evidence of metastatic disease in the abdomen or pelvis.  Additional testing 7/23 include H&H of 9.0 and 27.5 with a white count of 5090 and platelet count 164,000, CMP with slight elevation in ALT and AST, BUN/creatinine of 17.7 and 1.05, ferritin of 752, iron saturation of 78.    She is next seen 8/1/2025.  She has improved dramatically after recent hospitalization at [facility] where she had presented with acute epistaxis and experienced pancytopenia with white count of 1400, hemoglobin 8.6 and platelet count of 10,000 on admission, ANC dropping to 200.  She did gradually improve with supportive care and is seen back now for potential further therapy with scans, as above showing an improvement or at least stable disease.  Unfortunately she lost her  after 66 years recently and is grieving his loss.  She states, physically, that she is feeling much better than she has for many months however.  We have had a lengthy conversation about trying to continue therapy recognizing further dose reduction and improvement in her current  creatinine clearance now documented 8/1/2025.      PLAN:  *Proceed with Alimta chemotherapy dropping to a 25% dose/75% overall dose reduction.    *Return weekly for CBC    *Additional 3 course of chemotherapy planned before repeat scans.    I spent 45 minutes caring for Ada on this date of service. This time includes time spent by me in the following activities: preparing for the visit, reviewing tests, obtaining and/or reviewing a separately obtained history, performing a medically appropriate examination and/or evaluation, counseling and educating the patient/family/caregiver, ordering medications, tests, or procedures, referring and  communicating with other health care professionals, documenting information in the medical record, independently interpreting results and communicating that information with the patient/family/caregiver, and care coordination.       Shabbir Mann MD  08/01/2025

## 2025-08-01 ENCOUNTER — INFUSION (OUTPATIENT)
Dept: ONCOLOGY | Facility: HOSPITAL | Age: 80
End: 2025-08-01
Payer: MEDICARE

## 2025-08-01 ENCOUNTER — OFFICE VISIT (OUTPATIENT)
Dept: ONCOLOGY | Facility: CLINIC | Age: 80
End: 2025-08-01
Payer: MEDICARE

## 2025-08-01 VITALS
BODY MASS INDEX: 25.55 KG/M2 | HEIGHT: 63 IN | TEMPERATURE: 97.4 F | WEIGHT: 144.2 LBS | HEART RATE: 54 BPM | SYSTOLIC BLOOD PRESSURE: 154 MMHG | DIASTOLIC BLOOD PRESSURE: 93 MMHG | OXYGEN SATURATION: 100 %

## 2025-08-01 DIAGNOSIS — C34.92 NSCLC OF LEFT LUNG: ICD-10-CM

## 2025-08-01 DIAGNOSIS — Z45.2 FITTING AND ADJUSTMENT OF VASCULAR CATHETER: ICD-10-CM

## 2025-08-01 DIAGNOSIS — C34.92 NSCLC OF LEFT LUNG: Primary | ICD-10-CM

## 2025-08-01 LAB
ALBUMIN SERPL-MCNC: 3.6 G/DL (ref 3.5–5.2)
ALBUMIN/GLOB SERPL: 1.1 G/DL
ALP SERPL-CCNC: 133 U/L (ref 39–117)
ALT SERPL W P-5'-P-CCNC: 24 U/L (ref 1–33)
ANION GAP SERPL CALCULATED.3IONS-SCNC: 11 MMOL/L (ref 5–15)
AST SERPL-CCNC: 35 U/L (ref 1–32)
BASOPHILS # BLD AUTO: 0.03 10*3/MM3 (ref 0–0.2)
BASOPHILS NFR BLD AUTO: 0.4 % (ref 0–1.5)
BILIRUB SERPL-MCNC: 0.4 MG/DL (ref 0–1.2)
BUN SERPL-MCNC: 19.1 MG/DL (ref 8–23)
BUN/CREAT SERPL: 20.5 (ref 7–25)
CALCIUM SPEC-SCNC: 9.8 MG/DL (ref 8.6–10.5)
CHLORIDE SERPL-SCNC: 99 MMOL/L (ref 98–107)
CO2 SERPL-SCNC: 27 MMOL/L (ref 22–29)
CREAT SERPL-MCNC: 0.93 MG/DL (ref 0.57–1)
DEPRECATED RDW RBC AUTO: 73.5 FL (ref 37–54)
EGFRCR SERPLBLD CKD-EPI 2021: 61.5 ML/MIN/1.73
EOSINOPHIL # BLD AUTO: 0.16 10*3/MM3 (ref 0–0.4)
EOSINOPHIL NFR BLD AUTO: 2 % (ref 0.3–6.2)
ERYTHROCYTE [DISTWIDTH] IN BLOOD BY AUTOMATED COUNT: 18.2 % (ref 12.3–15.4)
GLOBULIN UR ELPH-MCNC: 3.2 GM/DL
GLUCOSE SERPL-MCNC: 78 MG/DL (ref 65–99)
HCT VFR BLD AUTO: 31.7 % (ref 34–46.6)
HGB BLD-MCNC: 9.9 G/DL (ref 12–15.9)
IMM GRANULOCYTES # BLD AUTO: 0.05 10*3/MM3 (ref 0–0.05)
IMM GRANULOCYTES NFR BLD AUTO: 0.6 % (ref 0–0.5)
LYMPHOCYTES # BLD AUTO: 1.86 10*3/MM3 (ref 0.7–3.1)
LYMPHOCYTES NFR BLD AUTO: 23.4 % (ref 19.6–45.3)
MCH RBC QN AUTO: 34.5 PG (ref 26.6–33)
MCHC RBC AUTO-ENTMCNC: 31.2 G/DL (ref 31.5–35.7)
MCV RBC AUTO: 110.5 FL (ref 79–97)
MONOCYTES # BLD AUTO: 1.06 10*3/MM3 (ref 0.1–0.9)
MONOCYTES NFR BLD AUTO: 13.3 % (ref 5–12)
NEUTROPHILS NFR BLD AUTO: 4.79 10*3/MM3 (ref 1.7–7)
NEUTROPHILS NFR BLD AUTO: 60.3 % (ref 42.7–76)
NRBC BLD AUTO-RTO: 0 /100 WBC (ref 0–0.2)
PLATELET # BLD AUTO: 117 10*3/MM3 (ref 140–450)
PMV BLD AUTO: 11.4 FL (ref 6–12)
POTASSIUM SERPL-SCNC: 4.6 MMOL/L (ref 3.5–5.2)
PROT SERPL-MCNC: 6.8 G/DL (ref 6–8.5)
RBC # BLD AUTO: 2.87 10*6/MM3 (ref 3.77–5.28)
SODIUM SERPL-SCNC: 137 MMOL/L (ref 136–145)
WBC NRBC COR # BLD AUTO: 7.95 10*3/MM3 (ref 3.4–10.8)

## 2025-08-01 PROCEDURE — 25010000002 HEPARIN LOCK FLUSH PER 10 UNITS: Performed by: INTERNAL MEDICINE

## 2025-08-01 PROCEDURE — 80053 COMPREHEN METABOLIC PANEL: CPT

## 2025-08-01 PROCEDURE — 25010000002 PEMETREXED 100 MG RECONSTITUTED SOLUTION 1 EACH VIAL: Performed by: INTERNAL MEDICINE

## 2025-08-01 PROCEDURE — 85025 COMPLETE CBC W/AUTO DIFF WBC: CPT

## 2025-08-01 PROCEDURE — 96409 CHEMO IV PUSH SNGL DRUG: CPT

## 2025-08-01 RX ORDER — SODIUM CHLORIDE 9 MG/ML
20 INJECTION, SOLUTION INTRAVENOUS ONCE
Status: DISCONTINUED | OUTPATIENT
Start: 2025-08-01 | End: 2025-08-01 | Stop reason: HOSPADM

## 2025-08-01 RX ORDER — HEPARIN SODIUM (PORCINE) LOCK FLUSH IV SOLN 100 UNIT/ML 100 UNIT/ML
500 SOLUTION INTRAVENOUS AS NEEDED
OUTPATIENT
Start: 2025-08-01

## 2025-08-01 RX ORDER — HEPARIN SODIUM (PORCINE) LOCK FLUSH IV SOLN 100 UNIT/ML 100 UNIT/ML
500 SOLUTION INTRAVENOUS AS NEEDED
Status: DISCONTINUED | OUTPATIENT
Start: 2025-08-01 | End: 2025-08-01 | Stop reason: HOSPADM

## 2025-08-01 RX ORDER — SODIUM CHLORIDE 0.9 % (FLUSH) 0.9 %
10 SYRINGE (ML) INJECTION AS NEEDED
OUTPATIENT
Start: 2025-08-01

## 2025-08-01 RX ORDER — SODIUM CHLORIDE 0.9 % (FLUSH) 0.9 %
10 SYRINGE (ML) INJECTION AS NEEDED
Status: DISCONTINUED | OUTPATIENT
Start: 2025-08-01 | End: 2025-08-01 | Stop reason: HOSPADM

## 2025-08-01 RX ORDER — SODIUM CHLORIDE 9 MG/ML
20 INJECTION, SOLUTION INTRAVENOUS ONCE
Status: CANCELLED | OUTPATIENT
Start: 2025-08-01

## 2025-08-01 RX ADMIN — Medication 10 ML: at 10:14

## 2025-08-01 RX ADMIN — Medication 500 UNITS: at 10:14

## 2025-08-01 RX ADMIN — PEMETREXED DISODIUM 210 MG: 100 INJECTION, POWDER, LYOPHILIZED, FOR SOLUTION INTRAVENOUS at 10:00

## 2025-08-01 NOTE — LETTER
August 1, 2025     Amy Guillory MD  [address]  [facility] [address]  [facility] [address]    Patient: Kenia N Sample   YOB: 1945   Date of Visit: 8/1/2025     Dear Amy Guillory MD:       Thank you for referring Ada Sample to me for evaluation. Below are the relevant portions of my assessment and plan of care.    If you have questions, please do not hesitate to call me. I look forward to following Ada along with you.         Sincerely,        Shabbir Mann MD        CC: No Recipients    Shabbir Mann MD  08/01/25 0926  Incomplete  REASON FOR FOLLOW-UP: Left lower lobe lung mass-squamous of carcinoma                               History of Present Illness    The patient was seen 6/23/2025 for reconsideration of Alimta that had previously been held when she developed norovirus and also had a reduction for a reduced creatinine clearance.  When seen 6/23/2025 she had improved substantially and we proceeded with treatment 7/2/2025 at the same dose as previous, 37.5% of the original dose at 187.5mg/m2.       She called in reporting nosebleeds and was taken to the hospital thereafter with platelet count of 10,000 and hemoglobin of 8.6, and wbc 1.4.  She was given 4 units platelets and 1 PRBCs.  Her nose was packed and cauterized. She was also given IV folic acid due t the alimta. She was discharged 7/15/25.  She denies further nosebleeds or bleeding.  She reports she is regaining strength however has been very stressed due to her husbands health.       Unfortunately she is having quite a lot of issues in her family with her  ill and in the hospital requiring additional procedures.  Unfortunately he has been hospitalized once again for what sounds to be a very serious stroke when she was assessed 7/23/2025.  This led to holding Alimta and repeat studies that included CT imaging  Shortly thereafter also 7/23/2025 that demonstrated a stable mass in the right middle lobe and adjacent  solid pulmonary nodule and a slight decrease in subpleural nodularity in the anterior right upper lobe adjacent mediastinum, radiation changes.  Segment left lower lobe stable and fibrosis interstitial lung disease unchanged.  There is no evidence of metastatic disease in the abdomen or pelvis.  Additional testing 7/23 include H&H of 9.0 and 27.5 with a white count of 5090 and platelet count 164,000, CMP with slight elevation in ALT and AST, BUN/creatinine of 17.7 and 1.05, ferritin of 752, iron saturation of 78.    She is next seen 8/1/2025.  She has improved dramatically after recent hospitalization at [facility]      ONCOLOGY HISTORY:  The patient is an 80-year-old female with below medical history recently admitted 12/4 - 6/20/2023 with a history of right intertrochanteric femoral fracture and osteoarthritis presenting from a fall at home.  She had a stress fracture left foot that affected her balance, chronic low back pain that is also worsened with a fall.  Upon presentation orthopedic surgery was consulted finding a right intramedullary nail fixation and healing without abnormal findings.  No additional surgery was planned though physical therapy continued.  Spinal films demonstrated compression deformity T12 and L2 but fortunately she did further improve and was able to be discharged.    The patient had follow-up with several physicians including orthopedic physicians with an eventual chest exam that revealed an unexpected nodularity.  She was then referred to pulmonary medicine 1/8/2024 with a history of smoking for 35 years quitting 8 years ago and indication that there was a lung nodule found incidentally on imaging for the spine.  Through the [place] system a CT of the chest demonstrated a 2.0 x 1.9 cm noncalcified nodule involving the posterior medial aspect left lower lobe abutting the pleura, more smoothly marginated 0.9 cm noncalcified right middle lobe nodule and a few additional smaller noncalcified  nodules including 2 adjacent nodules measuring less than 0.4 cm the right middle lobe along with mild emphysema with mild to moderate subpleural and bibasilar scarring.  There is no pleural effusion or pneumothorax.    A PET/CT was obtained 2/6/2024 with a solid subpleural irregular nodule left lower lobe medially measuring 1.7 x 1.8 intensely hypermetabolic with SUV of 12.8, no additional nodular mass, had metabolic right superior mediastinal lymph node lateral measuring 0.9 cm to 3.9, diffuse lower lobe tracer uptake with mediastinal right hilar lymph nodes partially calcified thought to be inflammatory or granulomatous disease.  There is also low-grade tracer uptake associated subacute healing left anterior rib fractures.    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    The patient was referred urgently from [facility] for assessment.  It is not certain why she has not been seen in the [facility] system though she now states that she wished to be seen here.     Patient's case discussed during thoracic conference with the finding of a right supraclavicular node.  This was unknown to us when seen her initially in consultation.  Request made to obtain ultrasound-guided biopsy of the right supraclavicular node and this is discussed with the patient's daughter who indicates the patient would be willing to proceed.  We will go and have this scheduled even though she is to be seen within the next week and the result may not yet be available.    3/6/2023 FNA obtained, discussed with IR physician Dr. Courtney with findings of a few atypical clusters present suspicious for involvement by non-small cell carcinoma.  He is offered to rebiopsy quickly if necessary.    The patient is seen 3/13/2024 with rebiopsy requested as well as radiation therapy consultation.  The case is discussed with  interventional radiology and a repeat biopsy is possible.  The patient is willing to proceed in this fashion and we have also discussed radiation therapy consultation in the interval.  She continues have symptoms of chronic back pain and left lid lesion that is going to be reviewed by ophthalmology.    The patient went on to have a follow-up exam with repeat right supraclavicular lymph node biopsy 3/18/2024.  This was found positive for metastatic carcinoma.  The patient was seen by Dr. Hopkins with the conclusion that she has a 2 cm left lower lobe squamous cell carcinoma abutting the pleura additional contralateral supraclavicular lymph node in the thyroid.  This is to be discussed in conference 3/21/2024.    This was discussed 3/21/2024 recognizing the patient's frailty.  Options for treating the areas that are known positive including the left lower lobe and right supraclavicular node would include radiation therapy and Dr. Hopkins will see the patient concerning this as we also follow her for consideration of systemic therapy including a [id number] considering the genomic studies done thus far and the inability to obtain additional genomics from the scant tissue that we have even from recent biopsy.  Finally the patient's overall status including an older adult assessment is going to be requested.    The patient's [id number] revealed NF1  detected with possible indication of Selumetinib effectiveness.  Additionally TMB was 10.4 mutations per megabase.  Concurrently the patient was seen by radiation therapy treating the left lower lobe and right neck site via SBRT.  Further MRI of the brain revealed no evidence of metastatic disease.    She is seen back 4/11/2024.  She had been seen 4/8/2024 with SBRT to left lower lobe and right neck lesion.  This has not been completed and she is to follow-up with radiation therapy mid July.  We have discussed her findings today with the patient seen along with her  granddaughter.  She did well with radiation therapy and has no additional symptoms currently respiratorily.  Her genomic testing suggested possible use of Selumetinib (not currently available) though we will follow this as she is subsequently reevaluated.    A follow-up CT series was obtained 7/1/2024.  This reveals a slight decrease in the hypermetabolic right supraclavicular node measuring 0.9 x 0.7 cm previously 1.2 x 1.1 cm.  In the chest there is decrease in the mass in the posterior medial aspect left lower lobe and evidence of radiation pneumonitis adjacently.  Margins are difficult to appreciate completely but maximal diameter is 2 cm previously 2.2 cm as compared to previous.  There is a pleural-based 1.0 x 1.6 cm nodule medial aspect of the right lower lobe which which was less conspicuous previously, multiple nodule opacities right middle lobe that are larger and a 1.3 cm density possibly mucous plugging otherwise seen.  These findings suggest a follow-up series of scans will still be necessary.    The patient is seen with her daughter and they both agree with this follow-up plan.    The patient required admission 10/7 - 9/20/2024 after mechanical fall with T12-L2 compression fractures treated conservatively.    The patient's follow-up exams obtained 10/4/2024.  She is seen back 10/11/2024 and there is serial decrease in the small right supraclavicular node when compared to previous now down to 7 x 5 mm, evidence of consolidation left lower lobe with tumor developed likely seen measuring 16 x 2 cm, 2 right middle lobe nodules-19 x 13 mm previously 15 x 10, adjacent 9 mm previously 4 to 5 mm and unchanged third right middle lobe noncalcified nodule, other nodule stable and there is no evidence of abdominal/pelvic or osseous metastasis.    The patient is seen 10/11/2024 slowly recovering from the mechanical fall described above.  She does not require additional treatment at this point after review of the  "scans with follow-up exams again are anticipated and she is willing to proceed.    The patient is seen 4/9/2025 and evaluated by CT of soft tissue neck, chest, abdomen, pelvis performed 4/2 2025.  There is now seen increase in the right middle lobe cavitary thick-walled nodule measuring 3.2 x 2.6 compared to previous 2.1 x 1.4.  Other sites are stable and no additional lesions have developed.  The patient is seen with her daughter both indicating that she is still able to carry out daily activities without severe difficulty though she \"has slowed up a bit\".  He does have back pain periodically that improves with rest and does not notice worsening respiratory symptoms.  She may well have disease that would be amenable to local therapy and it is requested that she undergo a PET/CT.    PET/CT demonstrates left lower lobe radiation changes, right middle lobe hypermetabolic 3.1 x 2.3 central cavitary mass that is larger from previous, hypermetabolic right upper lobe 1 cm solid nodule unchanged adjacent right middle lobe 9 mm solid nodule as well as hypermetabolic right hilar and low paratracheal lymphadenopathy concerning for osiel metastatic disease.    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  Radiation therapy would not be expected to control this disease and systemic therapy is going to be necessary.  Considering the tissue type she would be a candidate for Alimta single agent at this point.    We have proceeded to have the patient's tumor further assessed by Caris examination with insufficient tumor in the block.  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   it was recognized the patient's clearance is somewhat marginal and her dose was adjusted down by 25%.  She underwent port placement 5/1/2025 and is seen back in office 5/7/2025 to continue with therapy.  She is agreeable to do " so.     Unfortunately, she was recently admitted 5/11/2025 through 5/18/2025 secondary to acute norovirus gastroenteritis.  She had associated pancytopenia.  She struggled with severe abdominal pain, nausea vomiting and diarrhea.  She received G-CSF support due to neutropenia.        Since discharge, the patient reports she has continued to struggle with diarrhea which finally improved just yesterday.  She is now developed productive cough with purulent sputum and thick drainage.  She has no fevers or chills.  She was treated subsequently with a Z-Denny, Tessalon Perles and then plans were made for follow-up 5/28/2025 and possible chemotherapy with dose reduction.    She is next seen, however, 5/28/2025 with continued cough and general weakness.  Appetite is slowly returning as is her stamina though she states she is having difficulty sleeping from the cough.  Finally, additionally, her cytopenias are not yet resolved enough to proceed with chemotherapy.    She returns today 6/4/2025 in anticipation of Alimta which has been delayed several weeks due to admission, ongoing generalized weakness, cough, and cytopenias. She reports she is back to her baseline and feeling much better. Appetite has improved and cough has nearly resolved. WBC 6.77, hemoglobin 11.1, platelets 186,000. Unfortunately, BUN is eleavted to 32.6 and creatinine is up to 1.2 today. Creatinine clearance is 32.8mL/min and therefore we will postpone Alimta. Proceed with 500mL NS bolus today and patient will return to clinic in 1 week for NP or MD visit, Alimta with labs per protocol. Encouraged her to increase hydration.     She returns 6/11/2025 for reconsideration of Alimta.  This was previously held for norovirus and then decreased for creatinine clearance.  She states that she feels well.  Respiratory symptoms have completely resolved.  No signs of infection.  She endorses drinking much water as she can tolerate.  Her creatinine clearance is 44.  Her  port incision site has a small open area less than 1 cm.  No drainage, no redness, no warmth.  Discussed with Dr. Mann and pharmacy.  Proceeded with 37.5% pemetrexed today.    Patient return to clinic on 6/18/2025 for lab review with RN. Patient's BUN at that time was 37.5, creatinine 1.79.  Patient was brought in the next day for IV fluids.  She is here today for repeat labs and evaluation.  She states that she has been able to increase her fluid intake greatly over the past week.  Her nausea has resolved since last visit.     The patient was seen 6/23/2025 for reconsideration of Alimta that had previously been held when she developed norovirus and also had a reduction for a reduced creatinine clearance.  When seen 6/23/2025 she had improved substantially and we made plans for follow-up 7/2/2025 for additional up to chemotherapy.  She returns today for reconsideration of Alimta.       Unfortunately  7/2/2025 she is having quite a lot of issues in her family with her  ill and in the hospital requiring additional procedures.  She is quite worried about his status but just is worried about her being off therapy.  At the time of this dictation H&H were 9.2 and 27.6 with white count of 4930 and platelet count of 82,000 which is a level that could be addressed by lower dose Alimta modified for her thrombocytopenia.      Unfortunately she is having quite a lot of issues in her family with her  ill and in the hospital requiring additional procedures.  Unfortunately he has been hospitalized once again for what sounds to be a very serious stroke when she was assessed 7/23/2025.  This led to holding Alimta and repeat studies that included CT imaging  Shortly thereafter also 7/23/2025 that demonstrated a stable mass in the right middle lobe and adjacent solid pulmonary nodule and a slight decrease in subpleural nodularity in the anterior right upper lobe adjacent mediastinum, radiation changes.  Segment left  lower lobe stable and fibrosis interstitial lung disease unchanged.  There is no evidence of metastatic disease in the abdomen or pelvis.  Additional testing 7/23 include H&H of 9.0 and 27.5 with a white count of 5090 and platelet count 164,000, CMP with slight elevation in ALT and AST, BUN/creatinine of 17.7 and 1.05, ferritin of 752, iron saturation of 78.    She is next seen 8/1/2025.        Past Surgical History:   Procedure Laterality Date   • APPENDECTOMY     • CARDIAC CATHETERIZATION     • EXTERNAL EAR SURGERY Right     had tumor excised behind right ear lobe benign   • EYE LESION EXCISION Left 6/6/2024    Procedure: LEFT LOWER EYELID BASEL CELL CARCINOMA EXCISIONAL AND REPAIR WITH FROZEN SECTIONS,;  Surgeon: Austin Kamara MD;  Location: [facility];  Service: Ophthalmology;  Laterality: Left;   • FEMUR IM NAILING/RODDING Right 06/15/2022    Procedure: RIGHT HIP INTRAMEDULLARY NAILING/RODDING;  Surgeon: Marc Crawford MD;  Location: [facility];  Service: Orthopedics;  Laterality: Right;   • GALLBLADDER SURGERY     • HYSTERECTOMY     • KNEE ARTHROPLASTY Right    • KNEE ARTHROSCOPY Right    • CA REVJ TOTAL KNEE ARTHRP W/WO ALGRFT 1 COMPONENT Right 09/07/2016    Procedure: RT ILIOTIBIAL BAND RELEASE RT TOTAL KNEE POLY EXCHANGE;  Surgeon: Toy Adame MD;  Location: [facility];  Service: Orthopedics   • ROTATOR CUFF REPAIR Bilateral    • US GUIDED LYMPH NODE BIOPSY  3/18/2024   • VENOUS ACCESS DEVICE (PORT) INSERTION Right 5/1/2025    Procedure: INSERTION VENOUS ACCESS DEVICE;  Surgeon: Saloni Verdugo MD;  Location: [facility];  Service: Vascular;  Laterality: Right;   • WRIST MASS EXCISION Right         Current Outpatient Medications on File Prior to Visit   Medication Sig Dispense Refill   • albuterol (PROVENTIL HFA;VENTOLIN HFA) 108 (90 BASE) MCG/ACT inhaler Inhale 2 puffs Every 4 (Four) Hours As Needed. Indications: Asthma     • allopurinol (ZYLOPRIM) 100 MG tablet Take 1 tablet  by mouth Daily.     • dexAMETHasone (DECADRON) 4 MG tablet Take 1 tablet oral twice a day for 3 consecutive days beginning the day before chemotherapy and continue for 6 doses.Take with food. 30 tablet 2   • dicyclomine (BENTYL) 20 MG tablet Take 1 tablet by mouth Every 6 (Six) Hours. 20 tablet 0   • fluticasone (FLONASE) 50 MCG/ACT nasal spray Administer 2 sprays into the nostril(s) as directed by provider Daily.     • folic acid (FOLVITE) 1 MG tablet Take 1 tablet by mouth Daily. Start at least 7 days prior to chemotherapy until at least 3 weeks after all chemotherapy. 30 tablet 6   • lidocaine-prilocaine (EMLA) 2.5-2.5 % cream Apply 1 Application topically to the appropriate area as directed Every 2 (Two) Hours As Needed for Mild Pain. Apply a nickel size amount to port 30 minutes prior to access. 30 g 2   • Magnesium 500 MG capsule Take 500 mg by mouth 2 (Two) Times a Day. Indications: Acid Indigestion     • metoprolol tartrate (LOPRESSOR) 25 MG tablet Take 1 tablet by mouth 2 (Two) Times a Day. Indications: High Blood Pressure     • neomycin-bacitracin-polymyxin (NEOSPORIN) 5-400-5000 ointment Apply 1 Application topically to the appropriate area as directed 3 (Three) Times a Day. 28 g 1   • ondansetron ODT (ZOFRAN-ODT) 4 MG disintegrating tablet Place 1 tablet on the tongue 4 (Four) Times a Day As Needed for Nausea. 20 tablet 0   • temazepam (RESTORIL) 15 MG capsule Take 1 capsule by mouth At Night As Needed for Sleep.     • vitamin B-12 (CYANOCOBALAMIN) 1000 MCG tablet Take 1 tablet by mouth Daily. Indications: Inadequate Vitamin B12     • vitamin D (ERGOCALCIFEROL) 1.25 MG (93849 UT) capsule capsule Take 1 capsule by mouth Every 7 (Seven) Days. 5 capsule 0     Current Facility-Administered Medications on File Prior to Visit   Medication Dose Route Frequency Provider Last Rate Last Admin   • cyanocobalamin injection 1,000 mcg  1,000 mcg Intramuscular Q28 Days Gali Marshall APRN   1,000 mcg at  04/30/25 1206        ALLERGIES:    Allergies   Allergen Reactions   • Levaquin [Levofloxacin] Hives   • Zocor [Simvastatin] Myalgia   • Atorvastatin Other (See Comments)   • Codeine GI Intolerance   • Penicillins Hives and Swelling     Pt reports tongue swelling   • Percocet [Oxycodone-Acetaminophen] Itching and Nausea And Vomiting        Social History     Socioeconomic History   • Marital status:      Spouse name: Nabil   Tobacco Use   • Smoking status: Former     Current packs/day: 1.00     Average packs/day: 1 pack/day for 30.0 years (30.0 ttl pk-yrs)     Types: Cigarettes     Passive exposure: Past   • Smokeless tobacco: Never   Vaping Use   • Vaping status: Never Used   Substance and Sexual Activity   • Alcohol use: Yes     Comment: 1 wine drink q 3-4 months   • Drug use: Never   • Sexual activity: Defer        Family History   Problem Relation Age of Onset   • Hypertension Mother    • Heart failure Mother    • Heart attack Maternal Grandfather    • Malig Hyperthermia Neg Hx       Objective   There were no vitals filed for this visit.          7/23/2025    10:22 AM   Current Status   ECOG score 2     Physical Exam  Constitutional:       Appearance: Normal appearance.   Eyes:      Extraocular Movements: Extraocular movements intact.      Conjunctiva/sclera: Conjunctivae normal.   Cardiovascular:      Rate and Rhythm: Normal rate.      Heart sounds: Normal heart sounds.   Pulmonary:      Effort: Pulmonary effort is normal.   Abdominal:      General: Bowel sounds are normal.      Palpations: Abdomen is soft.   Musculoskeletal:      Right lower leg: No edema.      Left lower leg: No edema.   Skin:     General: Skin is warm and dry.   Neurological:      Mental Status: She is oriented to person, place, and time.   Psychiatric:         Mood and Affect: Mood normal.         Behavior: Behavior normal.         RECENT LABS:        Assessment & Plan    80 year-old female with a orthopedic history as described in  particular involving right hip, bilateral knees and bilateral rotator cuffs recently falling and sustaining a contusion involving her right hip and evidence of compression deformities T12 and L2.  Upon discharge she was seen by orthopedics and, unexpectedly, on additional back studies a left lung nodule was determined.    This was reviewed by pulmonary medicine at [facility] leading to a  a CT of the chest which demonstrated a 2.0 x 1.9 cm noncalcified nodule involving the posterior medial aspect left lower lobe abutting the pleura, more smoothly marginated 0.9 cm noncalcified right middle lobe nodule and a few additional smaller noncalcified nodules including 2 adjacent nodules measuring less than 0.4 cm the right middle lobe along with mild emphysema with mild to moderate subpleural and bibasilar scarring.  There is no pleural effusion or pneumothorax.    A PET/CT was obtained 2/6/2024 with a solid subpleural irregular nodule left lower lobe medially measuring 1.7 x 1.8 intensely hypermetabolic with SUV of 12.8, no additional nodular mass, had metabolic right superior mediastinal lymph node lateral measuring 0.9 cm to 3.9, diffuse lower lobe tracer uptake with mediastinal right hilar lymph nodes partially calcified thought to be inflammatory or granulomatous disease.  There is also low-grade tracer uptake associated subacute healing left anterior rib fractures.    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    The patient is now seen in CBC office.  There has not been assessment by surgery or, apparently, by pulmonary medicine formally though the studies above are available for review.  After discussion we will need to have her presented in the thoracic conference so that opinions from radiology, thoracic surgery radiation therapy as well as pathology can be obtained.    Patient's case  discussed during thoracic conference with the finding of a right supraclavicular node.  This was unknown to us when seen her initially in consultation.  Request made to obtain ultrasound-guided biopsy of the right supraclavicular node and this is discussed with the patient's daughter who indicates the patient would be willing to proceed.  We will go and have this scheduled even though she is to be seen within the next week and the result may not yet be available.    3/6/2023 FNA obtained, discussed with IR physician Dr. Courtney with findings of a few atypical clusters present suspicious for involvement by non-small cell carcinoma.  He is offered to rebiopsy quickly if necessary.    The patient is seen 3/13/2024 with rebiopsy requested as well as radiation therapy consultation.  The case is discussed with interventional radiology and a repeat biopsy is possible.  The patient is willing to proceed in this fashion and we have also discussed radiation therapy consultation in the interval.  She continues have symptoms of chronic back pain and left lid lesion that is going to be reviewed by ophthalmology.    This was discussed 3/21/2024 recognizing the patient's frailty.  Options for treating the areas that are known positive including the left lower lobe and right supraclavicular node would include radiation therapy and Dr. Hopkins will see the patient concerning this as we also follow her for consideration of systemic therapy including a [id number] considering the genomic studies done thus far and the inability to obtain additional genomics from the scant tissue that we have even from recent biopsy.  Finally the patient's overall status including an older adult assessment is going to be requested.    She is seen back 4/11/2024.  She had been seen 4/8/2024 with SBRT to left lower lobe and right neck lesion.  This has not been completed and she is to follow-up with radiation therapy mid July.  We have discussed her findings  today with the patient seen along with her granddaughter.  She did well with radiation therapy and has no additional symptoms currently respiratorily.    Her genomic testing suggested possible use of Selumetinib (not currently available) though we will follow this as she is subsequently reevaluated.    A follow-up CT series was obtained 7/1/2024.  This reveals a slight decrease in the hypermetabolic right supraclavicular node measuring 0.9 x 0.7 cm previously 1.2 x 1.1 cm.  In the chest there is decrease in the mass in the posterior medial aspect left lower lobe and evidence of radiation pneumonitis adjacently.  Margins are difficult to appreciate completely but maximal diameter is 2 cm previously 2.2 cm as compared to previous.  There is a pleural-based 1.0 x 1.6 cm nodule medial aspect of the right lower lobe which which was less conspicuous previously, multiple nodule opacities right middle lobe that are larger and a 1.3 cm density possibly mucous plugging otherwise seen.  These findings suggest a follow-up series of scans will still be necessary.    The patient is seen in office 7/12/2024 with a reasonably stable performance status.  At this point no additional intervention is necessary but a follow-up scan is requested.    The patient required admission 10/7 - 9/20/2024 after mechanical fall with T12-L2 compression fractures treated conservatively.      The patient's follow-up exams obtained 10/4/2024.  She is seen back 10/11/2024 and there is serial decrease in the small right supraclavicular node when compared to previous now down to 7 x 5 mm, evidence of consolidation left lower lobe with tumor developed likely seen measuring 16 x 2 cm, 2 right middle lobe nodules-19 x 13 mm previously 15 x 10, adjacent 9 mm previously 4 to 5 mm and unchanged third right middle lobe noncalcified nodule, other nodule stable and there is no evidence of abdominal/pelvic or osseous metastasis.    The patient required admission  "10/7 - 9/20/2024 after mechanical fall with T12-L2 compression fractures treated conservatively.    The patient's follow-up exams obtained 10/4/2024.  She is seen back 10/11/2024 and there is serial decrease in the small right supraclavicular node when compared to previous now down to 7 x 5 mm, evidence of consolidation left lower lobe with tumor developed likely seen measuring 16 x 2 cm, 2 right middle lobe nodules-19 x 13 mm previously 15 x 10, adjacent 9 mm previously 4 to 5 mm and unchanged third right middle lobe noncalcified nodule, other nodule stable and there is no evidence of abdominal/pelvic or osseous metastasis.    The patient is seen 10/11/2024 slowly recovering from the mechanical fall described above.  She does not require additional treatment at this point after review of the scans with follow-up exams again are anticipated and she is willing to proceed.     The patient is seen 4/9/2025 and evaluated by CT of soft tissue neck, chest, abdomen, pelvis performed 4/2 2025.  There is now seen increase in the right middle lobe cavitary thick-walled nodule measuring 3.2 x 2.6 compared to previous 2.1 x 1.4.  Other sites are stable and no additional lesions have developed.      The patient is seen with her daughter both indicating that she is still able to carry out daily activities without severe difficulty though she \"has slowed up a bit\".  He does have back pain periodically that improves with rest and does not notice worsening respiratory symptoms.  She may well have disease that would be amenable to local therapy and it is requested that she undergo a PET/CT.    PET/CT demonstrates left lower lobe radiation changes, right middle lobe hypermetabolic 3.1 x 2.3 central cavitary mass that is larger from previous, hypermetabolic right upper lobe 1 cm solid nodule unchanged adjacent right middle lobe 9 mm solid nodule as well as hypermetabolic right hilar and low paratracheal lymphadenopathy concerning for " osiel metastatic disease.    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  Radiation therapy would not be expected to control this disease and systemic therapy is going to be necessary.  Considering the tissue type she would be a candidate for Alimta single agent at this point.  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    We have proceeded to have the patient's tumor further assessed by Caris examination with insufficient tumor in the block.  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   it was recognized the patient's clearance is somewhat marginal and her dose was adjusted down by 25%.  She underwent port placement 5/1/2025 and is seen back in office 5/7/2025 to continue with therapy.  She is agreeable to do so.     Hospitalization 5/11/2025 through 5/18/2025 to acute viral norovirus gastroenteritis.  Profound pancytopenia related to both viral infection and chemotherapy despite dose reduction.    Patient seen for hospital follow-up 5/21/2025 with improvement in pancytopenia.  Now with chest congestion productive purulent sputum.  We will treat with azithromycin as well as Tessalon Perles.  The patient will return in 1 week for MD follow-up and consideration of resumption of chemotherapy with further dose reduction.    The patient went on to start a Z-Denny and Tessalon and is seen back in office 5/28/2025, unfortunately, not improved enough to proceed with chemotherapy.    She returns today 6/4/2025 in anticipation of Alimta which has been delayed several weeks due to admission, ongoing generalized weakness, cough, and cytopenias. She reports she is back to her baseline and feeling much better. Appetite has improved and cough  has nearly resolved. WBC 6.77, hemoglobin 11.1, platelets 186,000. Unfortunately, BUN is eleavted to 32.6 and creatinine is up to 1.2 today. Creatinine clearance is 32.8mL/min and therefore we will postpone Alimta. Proceed with 500mL NS bolus today and patient will return to clinic in 1 week for NP or MD visit, Alimta with labs per protocol. Encouraged her to increase hydration.    She returns today for reconsideration of Alimta.  This was previously held for norovirus and then decreased for creatinine clearance.  She states that she feels well.  Respiratory symptoms have completely resolved.  No signs of infection.  She endorses drinking much water as she can tolerate.  Her creatinine clearance is 44.  Her port incision site has a small open area less than 1 cm.  No drainage, no redness, no warmth.  Discussed with Dr. Mann and pharmacy.  Proceeded with 37.5% pemetrexed today.    Patient return to clinic on 6/18/2025 for lab review with RN. Patient's BUN at that time was 37.5, creatinine 1.79.  Patient was brought in the next day for IV fluids.  She is here today for repeat labs and evaluation.  She states that she has been able to increase her fluid intake greatly over the past week.  Her nausea has resolved since last visit. Today her BUN is 34.5 and creatinine 1.25..  Additional biolyte samples provided.  Urged patient to continue with increased fluid intake.  Patient to call if she develops any nausea/diarrhea in the meantime.    The patient was seen 6/23/2025 for reconsideration of Alimta that had previously been held when she developed norovirus and also had a reduction for a reduced creatinine clearance.  When seen 6/23/2025 she had improved substantially and we made plans for follow-up 7/2/2025 for additional up to chemotherapy.  She returns today for reconsideration of Alimta.       Unfortunately she is having quite a lot of issues in her family with her  ill and in the hospital requiring  additional procedures.  She is quite worried about his status but just is worried about her being off therapy.  At the time of this dictation H&H were 9.2 and 27.6 with white count of 4930 and platelet count of 82,000 which is a level that could be addressed by lower dose Alimta modified for her thrombocytopenia    She returns 7/23/24 for follow up having been admitted to [facility] 7/9/25-7/15/25 due to epistaxis with platelet count 10,000.  She was transfused platelets x 4 and PRBC x 1 during the hospitalization.  She was also given IV folic acid per oncology due to alimta.  They packed and thereafter cauterized her nose to stop the bleeding.  She denies any bleeding since discharge.  Platelts have improved today to 164,000, hgb 9 and WBC 5.09.  In addition to her own health issues her  has been admitted once again, this time with a stroke that per her explanation sounds very serious.  There is concern about treating her once again at the dose reduced rate as she still ended up hospitalized.  Case reviewed with Dr Mann and plans made to repeat CT CAP this week and delay treatment to next week.  If disease is stable to improved could consider treating once again with further dose reduction. Patient and daughter agreeable to plan.     Repeat studies that included CT imaging shortly thereafter also 7/23/2025 that demonstrated a stable mass in the right middle lobe and adjacent solid pulmonary nodule and a slight decrease in subpleural nodularity in the anterior right upper lobe adjacent mediastinum, radiation changes.  Segment left lower lobe stable and fibrosis interstitial lung disease unchanged.  There is no evidence of metastatic disease in the abdomen or pelvis.  Additional testing 7/23 include H&H of 9.0 and 27.5 with a white count of 5090 and platelet count 164,000, CMP with slight elevation in ALT and AST, BUN/creatinine of 17.7 and 1.05, ferritin of 752, iron saturation of 78.    She is next seen  8/1/2025.      PLAN:  HOLD ALIMTA today.  Previously receiving Alimta every 3 weeks (Alimta has been further dose reduced by 50% from previous dose.patient ultimately getting 37.5 percent of original dose.  Adjustment reflected in treatment plan)   Proceed with CT CAP this week  Follow up with Dr Mann next week for labs, scan review and possible Alimta.     This patient is on high risk drug therapy requiring intensive monitoring for toxicity. Records reviewed from [facility]. Case discussed with Dr. Mann. I spent 40 minutes caring for Ada on this date of service. This time includes time spent by me in the following activities: preparing for the visit, reviewing tests, obtaining and/or reviewing a separately obtained history, performing a medically appropriate examination and/or evaluation, counseling and educating the patient/family/caregiver, ordering medications, tests, or procedures, referring and communicating with other health care professionals, documenting information in the medical record, independently interpreting results and communicating that information with the patient/family/caregiver, and care coordination.         Shabbir Mann MD  08/01/2025          Sahbbir Mann MD  08/01/25 0748  Sign when Signing Visit  REASON FOR FOLLOW-UP: Left lower lobe lung mass-squamous of carcinoma                               History of Present Illness    The patient was seen 6/23/2025 for reconsideration of Alimta that had previously been held when she developed norovirus and also had a reduction for a reduced creatinine clearance.  When seen 6/23/2025 she had improved substantially and we proceeded with treatment 7/2/2025 at the same dose as previous, 37.5% of the original dose at 187.5mg/m2.       She called in reporting nosebleeds and was taken to the hospital thereafter with platelet count of 10,000 and hemoglobin of 8.6, and wbc 1.4.  She was given 4 units platelets and 1 PRBCs.  Her nose was packed and  cauterized. She was also given IV folic acid due t the alimta. She was discharged 7/15/25.  She denies further nosebleeds or bleeding.  She reports she is regaining strength however has been very stressed due to her husbands health.       Unfortunately she is having quite a lot of issues in her family with her  ill and in the hospital requiring additional procedures.  Unfortunately he has been hospitalized once again for what sounds to be a very serious stroke when she was assessed 7/23/2025.  This led to holding Alimta and repeat studies that included CT imaging  Shortly thereafter also 7/23/2025 that demonstrated a stable mass in the right middle lobe and adjacent solid pulmonary nodule and a slight decrease in subpleural nodularity in the anterior right upper lobe adjacent mediastinum, radiation changes.  Segment left lower lobe stable and fibrosis interstitial lung disease unchanged.  There is no evidence of metastatic disease in the abdomen or pelvis.  Additional testing 7/23 include H&H of 9.0 and 27.5 with a white count of 5090 and platelet count 164,000, CMP with slight elevation in ALT and AST, BUN/creatinine of 17.7 and 1.05, ferritin of 752, iron saturation of 78.    She is next seen 8/1/2025.      ONCOLOGY HISTORY:  The patient is an 80-year-old female with below medical history recently admitted 12/4 - 6/20/2023 with a history of right intertrochanteric femoral fracture and osteoarthritis presenting from a fall at home.  She had a stress fracture left foot that affected her balance, chronic low back pain that is also worsened with a fall.  Upon presentation orthopedic surgery was consulted finding a right intramedullary nail fixation and healing without abnormal findings.  No additional surgery was planned though physical therapy continued.  Spinal films demonstrated compression deformity T12 and L2 but fortunately she did further improve and was able to be discharged.    The patient had  follow-up with several physicians including orthopedic physicians with an eventual chest exam that revealed an unexpected nodularity.  She was then referred to pulmonary medicine 1/8/2024 with a history of smoking for 35 years quitting 8 years ago and indication that there was a lung nodule found incidentally on imaging for the spine.  Through the [facility] system a CT of the chest demonstrated a 2.0 x 1.9 cm noncalcified nodule involving the posterior medial aspect left lower lobe abutting the pleura, more smoothly marginated 0.9 cm noncalcified right middle lobe nodule and a few additional smaller noncalcified nodules including 2 adjacent nodules measuring less than 0.4 cm the right middle lobe along with mild emphysema with mild to moderate subpleural and bibasilar scarring.  There is no pleural effusion or pneumothorax.    A PET/CT was obtained 2/6/2024 with a solid subpleural irregular nodule left lower lobe medially measuring 1.7 x 1.8 intensely hypermetabolic with SUV of 12.8, no additional nodular mass, had metabolic right superior mediastinal lymph node lateral measuring 0.9 cm to 3.9, diffuse lower lobe tracer uptake with mediastinal right hilar lymph nodes partially calcified thought to be inflammatory or granulomatous disease.  There is also low-grade tracer uptake associated subacute healing left anterior rib fractures.    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    The patient was referred urgently from [facility] for assessment.  It is not certain why she has not been seen in the [facility] system though she now states that she wished to be seen here.     Patient's case discussed during thoracic conference with the finding of a right supraclavicular node.  This was unknown to us when seen her initially in consultation.  Request made to obtain ultrasound-guided biopsy of the  right supraclavicular node and this is discussed with the patient's daughter who indicates the patient would be willing to proceed.  We will go and have this scheduled even though she is to be seen within the next week and the result may not yet be available.    3/6/2023 FNA obtained, discussed with IR physician Dr. Courtney with findings of a few atypical clusters present suspicious for involvement by non-small cell carcinoma.  He is offered to rebiopsy quickly if necessary.    The patient is seen 3/13/2024 with rebiopsy requested as well as radiation therapy consultation.  The case is discussed with interventional radiology and a repeat biopsy is possible.  The patient is willing to proceed in this fashion and we have also discussed radiation therapy consultation in the interval.  She continues have symptoms of chronic back pain and left lid lesion that is going to be reviewed by ophthalmology.    The patient went on to have a follow-up exam with repeat right supraclavicular lymph node biopsy 3/18/2024.  This was found positive for metastatic carcinoma.  The patient was seen by Dr. Hopkins with the conclusion that she has a 2 cm left lower lobe squamous cell carcinoma abutting the pleura additional contralateral supraclavicular lymph node in the thyroid.  This is to be discussed in conference 3/21/2024.    This was discussed 3/21/2024 recognizing the patient's frailty.  Options for treating the areas that are known positive including the left lower lobe and right supraclavicular node would include radiation therapy and Dr. Hopkins will see the patient concerning this as we also follow her for consideration of systemic therapy including a [id number] considering the genomic studies done thus far and the inability to obtain additional genomics from the scant tissue that we have even from recent biopsy.  Finally the patient's overall status including an older adult assessment is going to be requested.    The patient's  [id number] revealed NF1  detected with possible indication of Selumetinib effectiveness.  Additionally TMB was 10.4 mutations per megabase.  Concurrently the patient was seen by radiation therapy treating the left lower lobe and right neck site via SBRT.  Further MRI of the brain revealed no evidence of metastatic disease.    She is seen back 4/11/2024.  She had been seen 4/8/2024 with SBRT to left lower lobe and right neck lesion.  This has not been completed and she is to follow-up with radiation therapy mid July.  We have discussed her findings today with the patient seen along with her granddaughter.  She did well with radiation therapy and has no additional symptoms currently respiratorily.  Her genomic testing suggested possible use of Selumetinib (not currently available) though we will follow this as she is subsequently reevaluated.    A follow-up CT series was obtained 7/1/2024.  This reveals a slight decrease in the hypermetabolic right supraclavicular node measuring 0.9 x 0.7 cm previously 1.2 x 1.1 cm.  In the chest there is decrease in the mass in the posterior medial aspect left lower lobe and evidence of radiation pneumonitis adjacently.  Margins are difficult to appreciate completely but maximal diameter is 2 cm previously 2.2 cm as compared to previous.  There is a pleural-based 1.0 x 1.6 cm nodule medial aspect of the right lower lobe which which was less conspicuous previously, multiple nodule opacities right middle lobe that are larger and a 1.3 cm density possibly mucous plugging otherwise seen.  These findings suggest a follow-up series of scans will still be necessary.    The patient is seen with her daughter and they both agree with this follow-up plan.    The patient required admission 10/7 - 9/20/2024 after mechanical fall with T12-L2 compression fractures treated conservatively.    The patient's follow-up exams obtained 10/4/2024.  She is seen back 10/11/2024 and there is serial  "decrease in the small right supraclavicular node when compared to previous now down to 7 x 5 mm, evidence of consolidation left lower lobe with tumor developed likely seen measuring 16 x 2 cm, 2 right middle lobe nodules-19 x 13 mm previously 15 x 10, adjacent 9 mm previously 4 to 5 mm and unchanged third right middle lobe noncalcified nodule, other nodule stable and there is no evidence of abdominal/pelvic or osseous metastasis.    The patient is seen 10/11/2024 slowly recovering from the mechanical fall described above.  She does not require additional treatment at this point after review of the scans with follow-up exams again are anticipated and she is willing to proceed.    The patient is seen 4/9/2025 and evaluated by CT of soft tissue neck, chest, abdomen, pelvis performed 4/2 2025.  There is now seen increase in the right middle lobe cavitary thick-walled nodule measuring 3.2 x 2.6 compared to previous 2.1 x 1.4.  Other sites are stable and no additional lesions have developed.  The patient is seen with her daughter both indicating that she is still able to carry out daily activities without severe difficulty though she \"has slowed up a bit\".  He does have back pain periodically that improves with rest and does not notice worsening respiratory symptoms.  She may well have disease that would be amenable to local therapy and it is requested that she undergo a PET/CT.    PET/CT demonstrates left lower lobe radiation changes, right middle lobe hypermetabolic 3.1 x 2.3 central cavitary mass that is larger from previous, hypermetabolic right upper lobe 1 cm solid nodule unchanged adjacent right middle lobe 9 mm solid nodule as well as hypermetabolic right hilar and low paratracheal lymphadenopathy concerning for osiel metastatic disease.    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  Radiation therapy would not be expected " to control this disease and systemic therapy is going to be necessary.  Considering the tissue type she would be a candidate for Alimta single agent at this point.    We have proceeded to have the patient's tumor further assessed by Caris examination with insufficient tumor in the block.  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   it was recognized the patient's clearance is somewhat marginal and her dose was adjusted down by 25%.  She underwent port placement 5/1/2025 and is seen back in office 5/7/2025 to continue with therapy.  She is agreeable to do so.     Unfortunately, she was recently admitted 5/11/2025 through 5/18/2025 secondary to acute norovirus gastroenteritis.  She had associated pancytopenia.  She struggled with severe abdominal pain, nausea vomiting and diarrhea.  She received G-CSF support due to neutropenia.        Since discharge, the patient reports she has continued to struggle with diarrhea which finally improved just yesterday.  She is now developed productive cough with purulent sputum and thick drainage.  She has no fevers or chills.  She was treated subsequently with a Z-Denny, Tessalon Perles and then plans were made for follow-up 5/28/2025 and possible chemotherapy with dose reduction.    She is next seen, however, 5/28/2025 with continued cough and general weakness.  Appetite is slowly returning as is her stamina though she states she is having difficulty sleeping from the cough.  Finally, additionally, her cytopenias are not yet resolved enough to proceed with chemotherapy.    She returns today 6/4/2025 in anticipation of Alimta which has been delayed several weeks due to admission, ongoing generalized weakness, cough, and cytopenias. She reports she is back to her baseline and feeling much better. Appetite has improved and cough has nearly resolved. WBC 6.77, hemoglobin 11.1, platelets 186,000. Unfortunately, BUN is eleavted to 32.6 and  creatinine is up to 1.2 today. Creatinine clearance is 32.8mL/min and therefore we will postpone Alimta. Proceed with 500mL NS bolus today and patient will return to clinic in 1 week for NP or MD visit, Neldaa with labs per protocol. Encouraged her to increase hydration.     She returns 6/11/2025 for reconsideration of Alimta.  This was previously held for norovirus and then decreased for creatinine clearance.  She states that she feels well.  Respiratory symptoms have completely resolved.  No signs of infection.  She endorses drinking much water as she can tolerate.  Her creatinine clearance is 44.  Her port incision site has a small open area less than 1 cm.  No drainage, no redness, no warmth.  Discussed with Dr. Mann and pharmacy.  Proceeded with 37.5% pemetrexed today.    Patient return to clinic on 6/18/2025 for lab review with RN. Patient's BUN at that time was 37.5, creatinine 1.79.  Patient was brought in the next day for IV fluids.  She is here today for repeat labs and evaluation.  She states that she has been able to increase her fluid intake greatly over the past week.  Her nausea has resolved since last visit.     The patient was seen 6/23/2025 for reconsideration of Alimta that had previously been held when she developed norovirus and also had a reduction for a reduced creatinine clearance.  When seen 6/23/2025 she had improved substantially and we made plans for follow-up 7/2/2025 for additional up to chemotherapy.  She returns today for reconsideration of Alimta.       Unfortunately  7/2/2025 she is having quite a lot of issues in her family with her  ill and in the hospital requiring additional procedures.  She is quite worried about his status but just is worried about her being off therapy.  At the time of this dictation H&H were 9.2 and 27.6 with white count of 4930 and platelet count of 82,000 which is a level that could be addressed by lower dose Alimta modified for her  thrombocytopenia.      Unfortunately she is having quite a lot of issues in her family with her  ill and in the hospital requiring additional procedures.  Unfortunately he has been hospitalized once again for what sounds to be a very serious stroke when she was assessed 7/23/2025.  This led to holding Alimta and repeat studies that included CT imaging  Shortly thereafter also 7/23/2025 that demonstrated a stable mass in the right middle lobe and adjacent solid pulmonary nodule and a slight decrease in subpleural nodularity in the anterior right upper lobe adjacent mediastinum, radiation changes.  Segment left lower lobe stable and fibrosis interstitial lung disease unchanged.  There is no evidence of metastatic disease in the abdomen or pelvis.  Additional testing 7/23 include H&H of 9.0 and 27.5 with a white count of 5090 and platelet count 164,000, CMP with slight elevation in ALT and AST, BUN/creatinine of 17.7 and 1.05, ferritin of 752, iron saturation of 78.    She is next seen 8/1/2025.        Past Surgical History:   Procedure Laterality Date   • APPENDECTOMY     • CARDIAC CATHETERIZATION     • EXTERNAL EAR SURGERY Right     had tumor excised behind right ear lobe benign   • EYE LESION EXCISION Left 6/6/2024    Procedure: LEFT LOWER EYELID BASEL CELL CARCINOMA EXCISIONAL AND REPAIR WITH FROZEN SECTIONS,;  Surgeon: Austin Kamara MD;  Location: [facility] OR;  Service: Ophthalmology;  Laterality: Left;   • FEMUR IM NAILING/RODDING Right 06/15/2022    Procedure: RIGHT HIP INTRAMEDULLARY NAILING/RODDING;  Surgeon: Marc Crawford MD;  Location: [facility] OR;  Service: Orthopedics;  Laterality: Right;   • GALLBLADDER SURGERY     • HYSTERECTOMY     • KNEE ARTHROPLASTY Right    • KNEE ARTHROSCOPY Right    • GA REVJ TOTAL KNEE ARTHRP W/WO ALGRFT 1 COMPONENT Right 09/07/2016    Procedure: RT ILIOTIBIAL BAND RELEASE RT TOTAL KNEE POLY EXCHANGE;  Surgeon: Toy Adame MD;  Location: [facility] OR;   Service: Orthopedics   • ROTATOR CUFF REPAIR Bilateral    • US GUIDED LYMPH NODE BIOPSY  3/18/2024   • VENOUS ACCESS DEVICE (PORT) INSERTION Right 5/1/2025    Procedure: INSERTION VENOUS ACCESS DEVICE;  Surgeon: Saloni Verdugo MD;  Location: [facility] OR;  Service: Vascular;  Laterality: Right;   • WRIST MASS EXCISION Right         Current Outpatient Medications on File Prior to Visit   Medication Sig Dispense Refill   • albuterol (PROVENTIL HFA;VENTOLIN HFA) 108 (90 BASE) MCG/ACT inhaler Inhale 2 puffs Every 4 (Four) Hours As Needed. Indications: Asthma     • allopurinol (ZYLOPRIM) 100 MG tablet Take 1 tablet by mouth Daily.     • dexAMETHasone (DECADRON) 4 MG tablet Take 1 tablet oral twice a day for 3 consecutive days beginning the day before chemotherapy and continue for 6 doses.Take with food. 30 tablet 2   • dicyclomine (BENTYL) 20 MG tablet Take 1 tablet by mouth Every 6 (Six) Hours. 20 tablet 0   • fluticasone (FLONASE) 50 MCG/ACT nasal spray Administer 2 sprays into the nostril(s) as directed by provider Daily.     • folic acid (FOLVITE) 1 MG tablet Take 1 tablet by mouth Daily. Start at least 7 days prior to chemotherapy until at least 3 weeks after all chemotherapy. 30 tablet 6   • lidocaine-prilocaine (EMLA) 2.5-2.5 % cream Apply 1 Application topically to the appropriate area as directed Every 2 (Two) Hours As Needed for Mild Pain. Apply a nickel size amount to port 30 minutes prior to access. 30 g 2   • Magnesium 500 MG capsule Take 500 mg by mouth 2 (Two) Times a Day. Indications: Acid Indigestion     • metoprolol tartrate (LOPRESSOR) 25 MG tablet Take 1 tablet by mouth 2 (Two) Times a Day. Indications: High Blood Pressure     • neomycin-bacitracin-polymyxin (NEOSPORIN) 5-400-5000 ointment Apply 1 Application topically to the appropriate area as directed 3 (Three) Times a Day. 28 g 1   • ondansetron ODT (ZOFRAN-ODT) 4 MG disintegrating tablet Place 1 tablet on the tongue 4 (Four) Times a Day  As Needed for Nausea. 20 tablet 0   • temazepam (RESTORIL) 15 MG capsule Take 1 capsule by mouth At Night As Needed for Sleep.     • vitamin B-12 (CYANOCOBALAMIN) 1000 MCG tablet Take 1 tablet by mouth Daily. Indications: Inadequate Vitamin B12     • vitamin D (ERGOCALCIFEROL) 1.25 MG (59483 UT) capsule capsule Take 1 capsule by mouth Every 7 (Seven) Days. 5 capsule 0     Current Facility-Administered Medications on File Prior to Visit   Medication Dose Route Frequency Provider Last Rate Last Admin   • cyanocobalamin injection 1,000 mcg  1,000 mcg Intramuscular Q28 Days Gali Marshall APRN   1,000 mcg at 04/30/25 1206        ALLERGIES:    Allergies   Allergen Reactions   • Levaquin [Levofloxacin] Hives   • Zocor [Simvastatin] Myalgia   • Atorvastatin Other (See Comments)   • Codeine GI Intolerance   • Penicillins Hives and Swelling     Pt reports tongue swelling   • Percocet [Oxycodone-Acetaminophen] Itching and Nausea And Vomiting        Social History     Socioeconomic History   • Marital status:      Spouse name: Nabil   Tobacco Use   • Smoking status: Former     Current packs/day: 1.00     Average packs/day: 1 pack/day for 30.0 years (30.0 ttl pk-yrs)     Types: Cigarettes     Passive exposure: Past   • Smokeless tobacco: Never   Vaping Use   • Vaping status: Never Used   Substance and Sexual Activity   • Alcohol use: Yes     Comment: 1 wine drink q 3-4 months   • Drug use: Never   • Sexual activity: Defer        Family History   Problem Relation Age of Onset   • Hypertension Mother    • Heart failure Mother    • Heart attack Maternal Grandfather    • Malig Hyperthermia Neg Hx       Objective   There were no vitals filed for this visit.          7/23/2025    10:22 AM   Current Status   ECOG score 2     Physical Exam  Constitutional:       Appearance: Normal appearance.   Eyes:      Extraocular Movements: Extraocular movements intact.      Conjunctiva/sclera: Conjunctivae normal.    Cardiovascular:      Rate and Rhythm: Normal rate.      Heart sounds: Normal heart sounds.   Pulmonary:      Effort: Pulmonary effort is normal.   Abdominal:      General: Bowel sounds are normal.      Palpations: Abdomen is soft.   Musculoskeletal:      Right lower leg: No edema.      Left lower leg: No edema.   Skin:     General: Skin is warm and dry.   Neurological:      Mental Status: She is oriented to person, place, and time.   Psychiatric:         Mood and Affect: Mood normal.         Behavior: Behavior normal.         RECENT LABS:        Assessment & Plan    80 year-old female with a orthopedic history as described in particular involving right hip, bilateral knees and bilateral rotator cuffs recently falling and sustaining a contusion involving her right hip and evidence of compression deformities T12 and L2.  Upon discharge she was seen by orthopedics and, unexpectedly, on additional back studies a left lung nodule was determined.    This was reviewed by pulmonary medicine at [facility] leading to a  a CT of the chest which demonstrated a 2.0 x 1.9 cm noncalcified nodule involving the posterior medial aspect left lower lobe abutting the pleura, more smoothly marginated 0.9 cm noncalcified right middle lobe nodule and a few additional smaller noncalcified nodules including 2 adjacent nodules measuring less than 0.4 cm the right middle lobe along with mild emphysema with mild to moderate subpleural and bibasilar scarring.  There is no pleural effusion or pneumothorax.    A PET/CT was obtained 2/6/2024 with a solid subpleural irregular nodule left lower lobe medially measuring 1.7 x 1.8 intensely hypermetabolic with SUV of 12.8, no additional nodular mass, had metabolic right superior mediastinal lymph node lateral measuring 0.9 cm to 3.9, diffuse lower lobe tracer uptake with mediastinal right hilar lymph nodes partially calcified thought to be inflammatory or granulomatous disease.  There is also low-grade  tracer uptake associated subacute healing left anterior rib fractures.    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    The patient is now seen in CBC office.  There has not been assessment by surgery or, apparently, by pulmonary medicine formally though the studies above are available for review.  After discussion we will need to have her presented in the thoracic conference so that opinions from radiology, thoracic surgery radiation therapy as well as pathology can be obtained.    Patient's case discussed during thoracic conference with the finding of a right supraclavicular node.  This was unknown to us when seen her initially in consultation.  Request made to obtain ultrasound-guided biopsy of the right supraclavicular node and this is discussed with the patient's daughter who indicates the patient would be willing to proceed.  We will go and have this scheduled even though she is to be seen within the next week and the result may not yet be available.    3/6/2023 FNA obtained, discussed with IR physician Dr. Courtney with findings of a few atypical clusters present suspicious for involvement by non-small cell carcinoma.  He is offered to rebiopsy quickly if necessary.    The patient is seen 3/13/2024 with rebiopsy requested as well as radiation therapy consultation.  The case is discussed with interventional radiology and a repeat biopsy is possible.  The patient is willing to proceed in this fashion and we have also discussed radiation therapy consultation in the interval.  She continues have symptoms of chronic back pain and left lid lesion that is going to be reviewed by ophthalmology.    This was discussed 3/21/2024 recognizing the patient's frailty.  Options for treating the areas that are known positive including the left lower lobe and right supraclavicular node would  include radiation therapy and Dr. Hopkins will see the patient concerning this as we also follow her for consideration of systemic therapy including a [id number] considering the genomic studies done thus far and the inability to obtain additional genomics from the scant tissue that we have even from recent biopsy.  Finally the patient's overall status including an older adult assessment is going to be requested.    She is seen back 4/11/2024.  She had been seen 4/8/2024 with SBRT to left lower lobe and right neck lesion.  This has not been completed and she is to follow-up with radiation therapy mid July.  We have discussed her findings today with the patient seen along with her granddaughter.  She did well with radiation therapy and has no additional symptoms currently respiratorily.    Her genomic testing suggested possible use of Selumetinib (not currently available) though we will follow this as she is subsequently reevaluated.    A follow-up CT series was obtained 7/1/2024.  This reveals a slight decrease in the hypermetabolic right supraclavicular node measuring 0.9 x 0.7 cm previously 1.2 x 1.1 cm.  In the chest there is decrease in the mass in the posterior medial aspect left lower lobe and evidence of radiation pneumonitis adjacently.  Margins are difficult to appreciate completely but maximal diameter is 2 cm previously 2.2 cm as compared to previous.  There is a pleural-based 1.0 x 1.6 cm nodule medial aspect of the right lower lobe which which was less conspicuous previously, multiple nodule opacities right middle lobe that are larger and a 1.3 cm density possibly mucous plugging otherwise seen.  These findings suggest a follow-up series of scans will still be necessary.    The patient is seen in office 7/12/2024 with a reasonably stable performance status.  At this point no additional intervention is necessary but a follow-up scan is requested.    The patient required admission 10/7 - 9/20/2024 after  mechanical fall with T12-L2 compression fractures treated conservatively.      The patient's follow-up exams obtained 10/4/2024.  She is seen back 10/11/2024 and there is serial decrease in the small right supraclavicular node when compared to previous now down to 7 x 5 mm, evidence of consolidation left lower lobe with tumor developed likely seen measuring 16 x 2 cm, 2 right middle lobe nodules-19 x 13 mm previously 15 x 10, adjacent 9 mm previously 4 to 5 mm and unchanged third right middle lobe noncalcified nodule, other nodule stable and there is no evidence of abdominal/pelvic or osseous metastasis.    The patient required admission 10/7 - 9/20/2024 after mechanical fall with T12-L2 compression fractures treated conservatively.    The patient's follow-up exams obtained 10/4/2024.  She is seen back 10/11/2024 and there is serial decrease in the small right supraclavicular node when compared to previous now down to 7 x 5 mm, evidence of consolidation left lower lobe with tumor developed likely seen measuring 16 x 2 cm, 2 right middle lobe nodules-19 x 13 mm previously 15 x 10, adjacent 9 mm previously 4 to 5 mm and unchanged third right middle lobe noncalcified nodule, other nodule stable and there is no evidence of abdominal/pelvic or osseous metastasis.    The patient is seen 10/11/2024 slowly recovering from the mechanical fall described above.  She does not require additional treatment at this point after review of the scans with follow-up exams again are anticipated and she is willing to proceed.     The patient is seen 4/9/2025 and evaluated by CT of soft tissue neck, chest, abdomen, pelvis performed 4/2 2025.  There is now seen increase in the right middle lobe cavitary thick-walled nodule measuring 3.2 x 2.6 compared to previous 2.1 x 1.4.  Other sites are stable and no additional lesions have developed.      The patient is seen with her daughter both indicating that she is still able to carry out daily  "activities without severe difficulty though she \"has slowed up a bit\".  He does have back pain periodically that improves with rest and does not notice worsening respiratory symptoms.  She may well have disease that would be amenable to local therapy and it is requested that she undergo a PET/CT.    PET/CT demonstrates left lower lobe radiation changes, right middle lobe hypermetabolic 3.1 x 2.3 central cavitary mass that is larger from previous, hypermetabolic right upper lobe 1 cm solid nodule unchanged adjacent right middle lobe 9 mm solid nodule as well as hypermetabolic right hilar and low paratracheal lymphadenopathy concerning for osiel metastatic disease.    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  Radiation therapy would not be expected to control this disease and systemic therapy is going to be necessary.  Considering the tissue type she would be a candidate for Alimta single agent at this point.  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    We have proceeded to have the patient's tumor further assessed by Caris examination with insufficient tumor in the block.  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   it was recognized the patient's clearance is somewhat marginal and her dose was adjusted down by 25%.  She underwent port placement 5/1/2025 and is seen back in office 5/7/2025 to continue with therapy.  She is agreeable to do so.     Hospitalization 5/11/2025 through 5/18/2025 to acute viral norovirus gastroenteritis.  Profound pancytopenia related to both viral infection and chemotherapy despite dose reduction.    Patient seen for hospital follow-up 5/21/2025 with improvement in pancytopenia.  Now with chest " congestion productive purulent sputum.  We will treat with azithromycin as well as Tessalon Perles.  The patient will return in 1 week for MD follow-up and consideration of resumption of chemotherapy with further dose reduction.    The patient went on to start a Z-Denny and Tessalon and is seen back in office 5/28/2025, unfortunately, not improved enough to proceed with chemotherapy.    She returns today 6/4/2025 in anticipation of Alimta which has been delayed several weeks due to admission, ongoing generalized weakness, cough, and cytopenias. She reports she is back to her baseline and feeling much better. Appetite has improved and cough has nearly resolved. WBC 6.77, hemoglobin 11.1, platelets 186,000. Unfortunately, BUN is eleavted to 32.6 and creatinine is up to 1.2 today. Creatinine clearance is 32.8mL/min and therefore we will postpone Alimta. Proceed with 500mL NS bolus today and patient will return to clinic in 1 week for NP or MD visit, Alimta with labs per protocol. Encouraged her to increase hydration.    She returns today for reconsideration of Alimta.  This was previously held for norovirus and then decreased for creatinine clearance.  She states that she feels well.  Respiratory symptoms have completely resolved.  No signs of infection.  She endorses drinking much water as she can tolerate.  Her creatinine clearance is 44.  Her port incision site has a small open area less than 1 cm.  No drainage, no redness, no warmth.  Discussed with Dr. Mann and pharmacy.  Proceeded with 37.5% pemetrexed today.    Patient return to clinic on 6/18/2025 for lab review with RN. Patient's BUN at that time was 37.5, creatinine 1.79.  Patient was brought in the next day for IV fluids.  She is here today for repeat labs and evaluation.  She states that she has been able to increase her fluid intake greatly over the past week.  Her nausea has resolved since last visit. Today her BUN is 34.5 and creatinine 1.25..   Additional biolyte samples provided.  Urged patient to continue with increased fluid intake.  Patient to call if she develops any nausea/diarrhea in the meantime.    The patient was seen 6/23/2025 for reconsideration of Alimta that had previously been held when she developed norovirus and also had a reduction for a reduced creatinine clearance.  When seen 6/23/2025 she had improved substantially and we made plans for follow-up 7/2/2025 for additional up to chemotherapy.  She returns today for reconsideration of Alimta.       Unfortunately she is having quite a lot of issues in her family with her  ill and in the hospital requiring additional procedures.  She is quite worried about his status but just is worried about her being off therapy.  At the time of this dictation H&H were 9.2 and 27.6 with white count of 4930 and platelet count of 82,000 which is a level that could be addressed by lower dose Alimta modified for her thrombocytopenia    She returns 7/23/24 for follow up having been admitted to [facility] 7/9/25-7/15/25 due to epistaxis with platelet count 10,000.  She was transfused platelets x 4 and PRBC x 1 during the hospitalization.  She was also given IV folic acid per oncology due to alimta.  They packed and thereafter cauterized her nose to stop the bleeding.  She denies any bleeding since discharge.  Platelts have improved today to 164,000, hgb 9 and WBC 5.09.  In addition to her own health issues her  has been admitted once again, this time with a stroke that per her explanation sounds very serious.  There is concern about treating her once again at the dose reduced rate as she still ended up hospitalized.  Case reviewed with Dr Mann and plans made to repeat CT CAP this week and delay treatment to next week.  If disease is stable to improved could consider treating once again with further dose reduction. Patient and daughter agreeable to plan.     Repeat studies that included CT imaging  shortly thereafter also 7/23/2025 that demonstrated a stable mass in the right middle lobe and adjacent solid pulmonary nodule and a slight decrease in subpleural nodularity in the anterior right upper lobe adjacent mediastinum, radiation changes.  Segment left lower lobe stable and fibrosis interstitial lung disease unchanged.  There is no evidence of metastatic disease in the abdomen or pelvis.  Additional testing 7/23 include H&H of 9.0 and 27.5 with a white count of 5090 and platelet count 164,000, CMP with slight elevation in ALT and AST, BUN/creatinine of 17.7 and 1.05, ferritin of 752, iron saturation of 78.    She is next seen 8/1/2025.      PLAN:  HOLD ALIMTA today.  Previously receiving Alimta every 3 weeks (Alimta has been further dose reduced by 50% from previous dose.patient ultimately getting 37.5 percent of original dose.  Adjustment reflected in treatment plan)   Proceed with CT CAP this week  Follow up with Dr Mann next week for labs, scan review and possible Alimta.     This patient is on high risk drug therapy requiring intensive monitoring for toxicity. Records reviewed from [facility]. Case discussed with Dr. Mann. I spent 40 minutes caring for Ada on this date of service. This time includes time spent by me in the following activities: preparing for the visit, reviewing tests, obtaining and/or reviewing a separately obtained history, performing a medically appropriate examination and/or evaluation, counseling and educating the patient/family/caregiver, ordering medications, tests, or procedures, referring and communicating with other health care professionals, documenting information in the medical record, independently interpreting results and communicating that information with the patient/family/caregiver, and care [email or web address]         Shabbir Mann MD  08/01/2025

## 2025-08-06 ENCOUNTER — TELEPHONE (OUTPATIENT)
Dept: ONCOLOGY | Facility: CLINIC | Age: 80
End: 2025-08-06
Payer: MEDICARE

## 2025-08-08 ENCOUNTER — CLINICAL SUPPORT (OUTPATIENT)
Dept: ONCOLOGY | Facility: HOSPITAL | Age: 80
End: 2025-08-08
Payer: MEDICARE

## 2025-08-08 ENCOUNTER — LAB (OUTPATIENT)
Dept: ONCOLOGY | Facility: HOSPITAL | Age: 80
End: 2025-08-08
Payer: MEDICARE

## 2025-08-08 DIAGNOSIS — C34.92 NSCLC OF LEFT LUNG: ICD-10-CM

## 2025-08-08 DIAGNOSIS — Z79.899 HIGH RISK MEDICATION USE: ICD-10-CM

## 2025-08-08 DIAGNOSIS — D69.6 THROMBOCYTOPENIA: ICD-10-CM

## 2025-08-08 DIAGNOSIS — Z45.2 FITTING AND ADJUSTMENT OF VASCULAR CATHETER: Primary | ICD-10-CM

## 2025-08-08 DIAGNOSIS — C34.92 NSCLC OF LEFT LUNG: Primary | ICD-10-CM

## 2025-08-08 LAB
BASOPHILS # BLD AUTO: 0 10*3/MM3 (ref 0–0.2)
BASOPHILS NFR BLD AUTO: 0 % (ref 0–1.5)
DEPRECATED RDW RBC AUTO: 64.2 FL (ref 37–54)
EOSINOPHIL # BLD AUTO: 0.04 10*3/MM3 (ref 0–0.4)
EOSINOPHIL NFR BLD AUTO: 4.1 % (ref 0.3–6.2)
ERYTHROCYTE [DISTWIDTH] IN BLOOD BY AUTOMATED COUNT: 16.8 % (ref 12.3–15.4)
HCT VFR BLD AUTO: 26.1 % (ref 34–46.6)
HGB BLD-MCNC: 8.7 G/DL (ref 12–15.9)
IMM GRANULOCYTES # BLD AUTO: 0.07 10*3/MM3 (ref 0–0.05)
IMM GRANULOCYTES NFR BLD AUTO: 7.2 % (ref 0–0.5)
LYMPHOCYTES # BLD AUTO: 0.6 10*3/MM3 (ref 0.7–3.1)
LYMPHOCYTES NFR BLD AUTO: 61.9 % (ref 19.6–45.3)
MCH RBC QN AUTO: 34.7 PG (ref 26.6–33)
MCHC RBC AUTO-ENTMCNC: 33.3 G/DL (ref 31.5–35.7)
MCV RBC AUTO: 104 FL (ref 79–97)
MONOCYTES # BLD AUTO: 0.04 10*3/MM3 (ref 0.1–0.9)
MONOCYTES NFR BLD AUTO: 4.1 % (ref 5–12)
NEUTROPHILS NFR BLD AUTO: 0.22 10*3/MM3 (ref 1.7–7)
NEUTROPHILS NFR BLD AUTO: 22.7 % (ref 42.7–76)
NRBC BLD AUTO-RTO: 2.1 /100 WBC (ref 0–0.2)
PLATELET # BLD AUTO: 8 10*3/MM3 (ref 140–450)
RBC # BLD AUTO: 2.51 10*6/MM3 (ref 3.77–5.28)
WBC NRBC COR # BLD AUTO: 0.97 10*3/MM3 (ref 3.4–10.8)

## 2025-08-08 PROCEDURE — 36415 COLL VENOUS BLD VENIPUNCTURE: CPT

## 2025-08-08 PROCEDURE — 25010000002 HEPARIN LOCK FLUSH PER 10 UNITS: Performed by: INTERNAL MEDICINE

## 2025-08-08 PROCEDURE — 36591 DRAW BLOOD OFF VENOUS DEVICE: CPT

## 2025-08-08 PROCEDURE — 85025 COMPLETE CBC W/AUTO DIFF WBC: CPT

## 2025-08-08 RX ORDER — BENZONATATE 100 MG/1
100 CAPSULE ORAL 3 TIMES DAILY PRN
Qty: 90 CAPSULE | Refills: 0 | Status: ON HOLD | OUTPATIENT
Start: 2025-08-08

## 2025-08-08 RX ORDER — HEPARIN SODIUM (PORCINE) LOCK FLUSH IV SOLN 100 UNIT/ML 100 UNIT/ML
500 SOLUTION INTRAVENOUS AS NEEDED
Status: DISCONTINUED | OUTPATIENT
Start: 2025-08-08 | End: 2025-08-08 | Stop reason: HOSPADM

## 2025-08-08 RX ORDER — DOXYCYCLINE HYCLATE 100 MG
100 TABLET ORAL 2 TIMES DAILY
Qty: 20 TABLET | Refills: 0 | Status: ON HOLD | OUTPATIENT
Start: 2025-08-08 | End: 2025-08-18

## 2025-08-08 RX ORDER — SODIUM CHLORIDE 0.9 % (FLUSH) 0.9 %
10 SYRINGE (ML) INJECTION AS NEEDED
OUTPATIENT
Start: 2025-08-08

## 2025-08-08 RX ORDER — SODIUM CHLORIDE 9 MG/ML
250 INJECTION, SOLUTION INTRAVENOUS ONCE
Status: CANCELLED | OUTPATIENT
Start: 2025-08-08

## 2025-08-08 RX ORDER — HEPARIN SODIUM (PORCINE) LOCK FLUSH IV SOLN 100 UNIT/ML 100 UNIT/ML
500 SOLUTION INTRAVENOUS AS NEEDED
OUTPATIENT
Start: 2025-08-08

## 2025-08-08 RX ORDER — SODIUM CHLORIDE 0.9 % (FLUSH) 0.9 %
10 SYRINGE (ML) INJECTION AS NEEDED
Status: DISCONTINUED | OUTPATIENT
Start: 2025-08-08 | End: 2025-08-08 | Stop reason: HOSPADM

## 2025-08-08 RX ADMIN — Medication 10 ML: at 12:26

## 2025-08-08 RX ADMIN — Medication 500 UNITS: at 12:27

## 2025-08-09 ENCOUNTER — HOSPITAL ENCOUNTER (INPATIENT)
Facility: HOSPITAL | Age: 80
LOS: 12 days | Discharge: NURSING, REHAB OR LONG-TERM CARE | End: 2025-08-22
Attending: EMERGENCY MEDICINE | Admitting: STUDENT IN AN ORGANIZED HEALTH CARE EDUCATION/TRAINING PROGRAM
Payer: MEDICARE

## 2025-08-09 ENCOUNTER — APPOINTMENT (OUTPATIENT)
Dept: GENERAL RADIOLOGY | Facility: HOSPITAL | Age: 80
End: 2025-08-09
Payer: MEDICARE

## 2025-08-09 ENCOUNTER — INFUSION (OUTPATIENT)
Dept: ONCOLOGY | Facility: HOSPITAL | Age: 80
End: 2025-08-09
Payer: MEDICARE

## 2025-08-09 VITALS
OXYGEN SATURATION: 91 % | SYSTOLIC BLOOD PRESSURE: 101 MMHG | RESPIRATION RATE: 20 BRPM | TEMPERATURE: 98.3 F | HEART RATE: 86 BPM | DIASTOLIC BLOOD PRESSURE: 63 MMHG

## 2025-08-09 DIAGNOSIS — D69.6 THROMBOCYTOPENIA: ICD-10-CM

## 2025-08-09 DIAGNOSIS — Z79.899 HIGH RISK MEDICATION USE: ICD-10-CM

## 2025-08-09 DIAGNOSIS — C34.92 NSCLC OF LEFT LUNG: ICD-10-CM

## 2025-08-09 PROBLEM — J18.9 PNEUMONIA: Status: ACTIVE | Noted: 2025-08-09

## 2025-08-10 PROBLEM — D61.810 PANCYTOPENIA DUE TO ANTINEOPLASTIC CHEMOTHERAPY: Status: ACTIVE | Noted: 2025-08-10

## 2025-08-10 PROBLEM — T45.1X5A CHEMOTHERAPY-INDUCED THROMBOCYTOPENIA: Status: ACTIVE | Noted: 2025-08-10

## 2025-08-10 PROBLEM — D69.59 CHEMOTHERAPY-INDUCED THROMBOCYTOPENIA: Status: ACTIVE | Noted: 2025-08-10

## 2025-08-10 PROBLEM — T45.1X5A PANCYTOPENIA DUE TO ANTINEOPLASTIC CHEMOTHERAPY: Status: ACTIVE | Noted: 2025-08-10

## 2025-08-10 PROBLEM — D64.9 SYMPTOMATIC ANEMIA: Status: ACTIVE | Noted: 2025-08-10

## 2025-08-10 PROBLEM — D70.9 NEUTROPENIC FEVER: Status: ACTIVE | Noted: 2025-08-10

## 2025-08-10 PROBLEM — R50.81 NEUTROPENIC FEVER: Status: ACTIVE | Noted: 2025-08-10

## 2025-08-11 ENCOUNTER — TELEPHONE (OUTPATIENT)
Dept: ONCOLOGY | Facility: CLINIC | Age: 80
End: 2025-08-11
Payer: MEDICARE

## 2025-08-13 ENCOUNTER — APPOINTMENT (OUTPATIENT)
Dept: GENERAL RADIOLOGY | Facility: HOSPITAL | Age: 80
End: 2025-08-13
Payer: MEDICARE

## 2025-08-13 ENCOUNTER — APPOINTMENT (OUTPATIENT)
Dept: CT IMAGING | Facility: HOSPITAL | Age: 80
End: 2025-08-13
Payer: MEDICARE

## 2025-08-14 ENCOUNTER — TELEPHONE (OUTPATIENT)
Dept: ONCOLOGY | Facility: CLINIC | Age: 80
End: 2025-08-14
Payer: MEDICARE

## 2025-08-14 ENCOUNTER — APPOINTMENT (OUTPATIENT)
Dept: CT IMAGING | Facility: HOSPITAL | Age: 80
End: 2025-08-14
Payer: MEDICARE

## 2025-08-14 PROBLEM — J98.4 DRUG-INDUCED PNEUMONITIS: Status: ACTIVE | Noted: 2025-08-14

## 2025-08-14 PROBLEM — T50.905A DRUG-INDUCED PNEUMONITIS: Status: ACTIVE | Noted: 2025-08-14

## 2025-08-17 ENCOUNTER — APPOINTMENT (OUTPATIENT)
Dept: GENERAL RADIOLOGY | Facility: HOSPITAL | Age: 80
End: 2025-08-17
Payer: MEDICARE

## 2025-08-17 ENCOUNTER — APPOINTMENT (OUTPATIENT)
Dept: CT IMAGING | Facility: HOSPITAL | Age: 80
End: 2025-08-17
Payer: MEDICARE

## 2025-08-22 ENCOUNTER — APPOINTMENT (OUTPATIENT)
Dept: GENERAL RADIOLOGY | Facility: HOSPITAL | Age: 80
End: 2025-08-22
Payer: MEDICARE

## 2025-08-22 PROBLEM — D70.9 NEUTROPENIC FEVER: Status: RESOLVED | Noted: 2025-08-10 | Resolved: 2025-08-22

## 2025-08-22 PROBLEM — R50.81 NEUTROPENIC FEVER: Status: RESOLVED | Noted: 2025-08-10 | Resolved: 2025-08-22

## 2025-08-22 PROBLEM — J18.9 PNEUMONIA, UNSPECIFIED ORGANISM: Status: ACTIVE | Noted: 2025-08-22

## 2025-08-22 PROBLEM — G92.9 ENCEPHALOPATHY, TOXIC: Status: RESOLVED | Noted: 2025-08-22 | Resolved: 2025-08-22

## 2025-08-22 PROBLEM — G92.9 ENCEPHALOPATHY, TOXIC: Status: ACTIVE | Noted: 2025-08-22

## 2025-08-26 ENCOUNTER — TRANSCRIBE ORDERS (OUTPATIENT)
Dept: ADMINISTRATIVE | Facility: HOSPITAL | Age: 80
End: 2025-08-26
Payer: MEDICARE

## 2025-08-26 DIAGNOSIS — C34.92 NSCLC OF LEFT LUNG: ICD-10-CM

## 2025-08-26 DIAGNOSIS — J18.9 PNEUMONIA DUE TO INFECTIOUS ORGANISM, UNSPECIFIED LATERALITY, UNSPECIFIED PART OF LUNG: Primary | ICD-10-CM

## 2025-08-26 DIAGNOSIS — D61.818 PANCYTOPENIA: Primary | ICD-10-CM

## 2025-08-27 ENCOUNTER — TELEPHONE (OUTPATIENT)
Dept: ONCOLOGY | Facility: CLINIC | Age: 80
End: 2025-08-27
Payer: MEDICARE

## (undated) DEVICE — SYR LUERLOK 20CC BX/50

## (undated) DEVICE — PK UNIV COMPL 40

## (undated) DEVICE — GLV SURG BIOGEL M LTX PF 6 1/2

## (undated) DEVICE — 4-PORT MANIFOLD: Brand: NEPTUNE 2

## (undated) DEVICE — SUT MNCRYL PLS ANTIB UD 4/0 PS2 18IN

## (undated) DEVICE — SYR LL W/SCALE/MARK 3ML STRL

## (undated) DEVICE — INTERTAN LAG SCREW DRILL: Brand: TRIGEN

## (undated) DEVICE — GLV SURG SIGNATURE ESSENTIAL PF LTX SZ8

## (undated) DEVICE — Device

## (undated) DEVICE — ADHS SKIN SURG TISS VISC PREMIERPRO EXOFIN HI/VISC FAST/DRY

## (undated) DEVICE — POSITIONER,HEAD,MULTIRING,36CS: Brand: MEDLINE

## (undated) DEVICE — GLV SURG BIOGEL SENSR LTX PF SZ7.5

## (undated) DEVICE — GLV SURG BIOGEL LTX PF 8

## (undated) DEVICE — PK HIP PINNING 40

## (undated) DEVICE — EXOFIN PRECISION PEN HIGH VISCOSITY TOPICAL SKIN ADHESIVE: Brand: EXOFIN PRECISION PEN, 1G

## (undated) DEVICE — SWABSTK SKINPREP PVPI LF PK/3

## (undated) DEVICE — ANTIBACTERIAL UNDYED BRAIDED (POLYGLACTIN 910), SYNTHETIC ABSORBABLE SUTURE: Brand: COATED VICRYL

## (undated) DEVICE — GOWN,NON-REINFORCED,SIRUS,SET IN SLV,XL: Brand: MEDLINE

## (undated) DEVICE — GLV SURG PREMIERPRO ORTHO LTX PF SZ8 BRN

## (undated) DEVICE — DRP C/ARMOR

## (undated) DEVICE — SUT SILK G3 DBL/ARM 4/0 18IN BLK

## (undated) DEVICE — THE STERILE LIGHT HANDLE COVER IS USED WITH STERIS SURGICAL LIGHTING AND VISUALIZATION SYSTEMS.

## (undated) DEVICE — TRAP FLD MINIVAC MEGADYNE 100ML

## (undated) DEVICE — NEEDLE, QUINCKE, 20GX3.5": Brand: MEDLINE

## (undated) DEVICE — INTENDED FOR TISSUE SEPARATION, AND OTHER PROCEDURES THAT REQUIRE A SHARP SURGICAL BLADE TO PUNCTURE OR CUT.: Brand: BARD-PARKER ® CARBON RIB-BACK BLADES

## (undated) DEVICE — CVR PROB 96IN LF STRL

## (undated) DEVICE — SUT VIC 6/0 S14 18IN DYED J570G

## (undated) DEVICE — GOWN,SIRUS,NONRNF,SETINSLV,2XL,18/CS: Brand: MEDLINE

## (undated) DEVICE — GLV SURG SENSICARE PI PF LF 7 GRN STRL

## (undated) DEVICE — MAT FLR ABSORBENT LG 4FT 10 2.5FT

## (undated) DEVICE — STPLR SKIN VISISTAT WD 35CT

## (undated) DEVICE — COVER,C-ARM,41X74: Brand: MEDLINE

## (undated) DEVICE — NDL BLNT 18G 1 1/2IN

## (undated) DEVICE — PK ENT 40

## (undated) DEVICE — GLV SURG SENSICARE PI MIC PF SZ7 LF STRL

## (undated) DEVICE — APPL CHLORAPREP HI/LITE 26ML ORNG

## (undated) DEVICE — DECANTER BAG 9": Brand: MEDLINE INDUSTRIES, INC.

## (undated) DEVICE — GAUZE,SPONGE,4"X4",16PLY,STRL,LF,10/TRAY: Brand: MEDLINE

## (undated) DEVICE — MARKR SKIN W/RULR ULTRAFINETP

## (undated) DEVICE — OPTIFOAM GENTLE SA, POSTOP, 4X8: Brand: MEDLINE

## (undated) DEVICE — TRAP FLD MEGADYNE

## (undated) DEVICE — 3.0MM X 1000MM BALL TIP GUIDE ROD: Brand: TRIGEN

## (undated) DEVICE — GOWN,SIRUS,NON REINFRCD,LARGE,SET IN SL: Brand: MEDLINE

## (undated) DEVICE — TBG PENCL TELESCP MEGADYNE SMOKE EVAC 10FT

## (undated) DEVICE — NDL HYPO PRECISIONGLIDE REG 25G 1 1/2

## (undated) DEVICE — GUIDE PIN 3.2MM X 343MM: Brand: TRIGEN

## (undated) DEVICE — ST ACC MICROPUNCTURE STFF .018 ECHO/PLAT/TP 4F/10CM 21G/7CM

## (undated) DEVICE — DRSNG WND SIL OPTIFOAM GENTLE BRDR ADHS W/SA 4X4IN

## (undated) DEVICE — WIPE INST 3X3IN 2MM BX/20

## (undated) DEVICE — SUT GUT PLN FAST ABS 5/0 PC1 18IN 1915G

## (undated) DEVICE — SUT SILK 5/0 P3 18IN 640G

## (undated) DEVICE — SUT SILK 2/0 TIES 18IN A185H

## (undated) DEVICE — 3M™ IOBAN™ 2 ANTIMICROBIAL INCISE DRAPE 6650EZ: Brand: IOBAN™ 2

## (undated) DEVICE — NDL HYPO PRECISIONGLIDE/REG 30G 1/2 BRN

## (undated) DEVICE — 4.0MM LONG AO PILOT DRILL: Brand: TRIGEN

## (undated) DEVICE — SOL ISO/ALC RUB 70PCT 4OZ

## (undated) DEVICE — SYR LL TP 10ML STRL

## (undated) DEVICE — STERILE COTTON TIP 6IN 10PK: Brand: MEDLINE

## (undated) DEVICE — ELECTRD NDL EZ CLN MOD 2.75IN

## (undated) DEVICE — PENCL SMOKE/EVAC MEGADYNE TELESCP 10FT

## (undated) DEVICE — SHLD PR W/SXN/CUP 22X21MM MD ORNG STRL

## (undated) DEVICE — MEDICINE CUP, GRADUATED, STER: Brand: MEDLINE

## (undated) DEVICE — PATIENT RETURN ELECTRODE, SINGLE-USE, CONTACT QUALITY MONITORING, ADULT, WITH 9FT CORD, FOR PATIENTS WEIGING OVER 33LBS. (15KG): Brand: MEGADYNE